# Patient Record
Sex: MALE | Race: WHITE | NOT HISPANIC OR LATINO | Employment: OTHER | URBAN - NONMETROPOLITAN AREA
[De-identification: names, ages, dates, MRNs, and addresses within clinical notes are randomized per-mention and may not be internally consistent; named-entity substitution may affect disease eponyms.]

---

## 2017-01-30 ENCOUNTER — OFFICE VISIT (OUTPATIENT)
Dept: GASTROENTEROLOGY | Facility: CLINIC | Age: 47
End: 2017-01-30

## 2017-01-30 VITALS
SYSTOLIC BLOOD PRESSURE: 161 MMHG | WEIGHT: 173.6 LBS | HEART RATE: 103 BPM | BODY MASS INDEX: 27.9 KG/M2 | OXYGEN SATURATION: 98 % | HEIGHT: 66 IN | DIASTOLIC BLOOD PRESSURE: 92 MMHG

## 2017-01-30 PROCEDURE — 99024 POSTOP FOLLOW-UP VISIT: CPT | Performed by: INTERNAL MEDICINE

## 2017-01-30 RX ORDER — CIMETIDINE 800 MG
800 TABLET ORAL 3 TIMES DAILY
COMMUNITY
End: 2020-12-08

## 2017-01-30 RX ORDER — HYDROCHLOROTHIAZIDE 25 MG/1
25 TABLET ORAL DAILY
COMMUNITY
End: 2021-09-30

## 2017-01-30 RX ORDER — BACLOFEN 20 MG/1
10-20 TABLET ORAL 3 TIMES DAILY PRN
COMMUNITY
End: 2023-01-05 | Stop reason: HOSPADM

## 2017-01-30 RX ORDER — OMEPRAZOLE 40 MG/1
40 CAPSULE, DELAYED RELEASE ORAL DAILY
Status: ON HOLD | COMMUNITY
End: 2017-11-03

## 2017-01-30 RX ORDER — QUETIAPINE FUMARATE 25 MG/1
25 TABLET, FILM COATED ORAL NIGHTLY
Status: ON HOLD | COMMUNITY
End: 2017-04-21

## 2017-04-04 ENCOUNTER — HOSPITAL ENCOUNTER (EMERGENCY)
Facility: HOSPITAL | Age: 47
Discharge: HOME | End: 2017-04-04
Admitting: FAMILY MEDICINE

## 2017-04-04 ENCOUNTER — APPOINTMENT (OUTPATIENT)
Dept: CT IMAGING | Facility: HOSPITAL | Age: 47
End: 2017-04-04

## 2017-04-04 VITALS
BODY MASS INDEX: 27.32 KG/M2 | WEIGHT: 170 LBS | TEMPERATURE: 97.6 F | SYSTOLIC BLOOD PRESSURE: 128 MMHG | OXYGEN SATURATION: 99 % | HEART RATE: 104 BPM | DIASTOLIC BLOOD PRESSURE: 79 MMHG | HEIGHT: 66 IN | RESPIRATION RATE: 18 BRPM

## 2017-04-04 DIAGNOSIS — K29.80 ACUTE DUODENITIS: Primary | ICD-10-CM

## 2017-04-04 LAB
ALBUMIN SERPL-MCNC: 4.4 G/DL (ref 3.5–5)
ALBUMIN/GLOB SERPL: 1.8 G/DL (ref 1.5–2.5)
ALP SERPL-CCNC: 72 U/L (ref 40–129)
ALT SERPL W P-5'-P-CCNC: 11 U/L (ref 10–44)
AMPHET+METHAMPHET UR QL: NEGATIVE
AMYLASE SERPL-CCNC: 38 U/L (ref 28–100)
ANION GAP SERPL CALCULATED.3IONS-SCNC: 6.4 MMOL/L (ref 3.6–11.2)
AST SERPL-CCNC: 17 U/L (ref 10–34)
BACTERIA UR QL AUTO: ABNORMAL /HPF
BARBITURATES UR QL SCN: NEGATIVE
BASOPHILS # BLD AUTO: 0.02 10*3/MM3 (ref 0–0.3)
BASOPHILS NFR BLD AUTO: 0.2 % (ref 0–2)
BENZODIAZ UR QL SCN: NEGATIVE
BILIRUB SERPL-MCNC: 0.3 MG/DL (ref 0.2–1.8)
BILIRUB UR QL STRIP: NEGATIVE
BUN BLD-MCNC: <5 MG/DL (ref 7–21)
BUN/CREAT SERPL: ABNORMAL (ref 7–25)
CALCIUM SPEC-SCNC: 9.3 MG/DL (ref 7.7–10)
CANNABINOIDS SERPL QL: NEGATIVE
CHLORIDE SERPL-SCNC: 100 MMOL/L (ref 99–112)
CLARITY UR: ABNORMAL
CO2 SERPL-SCNC: 26.6 MMOL/L (ref 24.3–31.9)
COCAINE UR QL: NEGATIVE
COLOR UR: YELLOW
CREAT BLD-MCNC: 0.8 MG/DL (ref 0.43–1.29)
DEPRECATED RDW RBC AUTO: 40.2 FL (ref 37–54)
EOSINOPHIL # BLD AUTO: 0.03 10*3/MM3 (ref 0–0.7)
EOSINOPHIL NFR BLD AUTO: 0.2 % (ref 0–5)
ERYTHROCYTE [DISTWIDTH] IN BLOOD BY AUTOMATED COUNT: 13.1 % (ref 11.5–14.5)
GFR SERPL CREATININE-BSD FRML MDRD: 103 ML/MIN/1.73
GLOBULIN UR ELPH-MCNC: 2.4 GM/DL
GLUCOSE BLD-MCNC: 121 MG/DL (ref 70–110)
GLUCOSE UR STRIP-MCNC: NEGATIVE MG/DL
HCT VFR BLD AUTO: 39.8 % (ref 42–52)
HGB BLD-MCNC: 13.4 G/DL (ref 14–18)
HGB UR QL STRIP.AUTO: NEGATIVE
HYALINE CASTS UR QL AUTO: ABNORMAL /LPF
IMM GRANULOCYTES # BLD: 0.01 10*3/MM3 (ref 0–0.03)
IMM GRANULOCYTES NFR BLD: 0.1 % (ref 0–0.5)
KETONES UR QL STRIP: NEGATIVE
LEUKOCYTE ESTERASE UR QL STRIP.AUTO: ABNORMAL
LIPASE SERPL-CCNC: 32 U/L (ref 13–60)
LYMPHOCYTES # BLD AUTO: 1.11 10*3/MM3 (ref 1–3)
LYMPHOCYTES NFR BLD AUTO: 8.3 % (ref 21–51)
MCH RBC QN AUTO: 28.9 PG (ref 27–33)
MCHC RBC AUTO-ENTMCNC: 33.7 G/DL (ref 33–37)
MCV RBC AUTO: 86 FL (ref 80–94)
METHADONE UR QL SCN: NEGATIVE
MONOCYTES # BLD AUTO: 0.8 10*3/MM3 (ref 0.1–0.9)
MONOCYTES NFR BLD AUTO: 6 % (ref 0–10)
NEUTROPHILS # BLD AUTO: 11.35 10*3/MM3 (ref 1.4–6.5)
NEUTROPHILS NFR BLD AUTO: 85.2 % (ref 30–70)
NITRITE UR QL STRIP: NEGATIVE
OPIATES UR QL: NEGATIVE
OSMOLALITY SERPL CALC.SUM OF ELEC: NORMAL MOSM/KG (ref 273–305)
OXYCODONE UR QL SCN: NEGATIVE
PCP UR QL SCN: NEGATIVE
PH UR STRIP.AUTO: 7.5 [PH] (ref 5–8)
PLATELET # BLD AUTO: 248 10*3/MM3 (ref 130–400)
PMV BLD AUTO: 9.6 FL (ref 6–10)
POTASSIUM BLD-SCNC: 3.2 MMOL/L (ref 3.5–5.3)
PROPOXYPH UR QL: NEGATIVE
PROT SERPL-MCNC: 6.8 G/DL (ref 6–8)
PROT UR QL STRIP: NEGATIVE
RBC # BLD AUTO: 4.63 10*6/MM3 (ref 4.7–6.1)
RBC # UR: ABNORMAL /HPF
REF LAB TEST METHOD: ABNORMAL
SODIUM BLD-SCNC: 133 MMOL/L (ref 135–153)
SP GR UR STRIP: 1.02 (ref 1–1.03)
SQUAMOUS #/AREA URNS HPF: ABNORMAL /HPF
UROBILINOGEN UR QL STRIP: ABNORMAL
WBC NRBC COR # BLD: 13.32 10*3/MM3 (ref 4.5–12.5)
WBC UR QL AUTO: ABNORMAL /HPF

## 2017-04-04 PROCEDURE — 80307 DRUG TEST PRSMV CHEM ANLYZR: CPT | Performed by: PHYSICIAN ASSISTANT

## 2017-04-04 PROCEDURE — 25010000002 ONDANSETRON PER 1 MG: Performed by: PHYSICIAN ASSISTANT

## 2017-04-04 PROCEDURE — 83690 ASSAY OF LIPASE: CPT | Performed by: PHYSICIAN ASSISTANT

## 2017-04-04 PROCEDURE — 25010000002 HYDROMORPHONE PER 4 MG: Performed by: FAMILY MEDICINE

## 2017-04-04 PROCEDURE — 25010000002 ONDANSETRON PER 1 MG

## 2017-04-04 PROCEDURE — 80053 COMPREHEN METABOLIC PANEL: CPT | Performed by: PHYSICIAN ASSISTANT

## 2017-04-04 PROCEDURE — 74176 CT ABD & PELVIS W/O CONTRAST: CPT | Performed by: RADIOLOGY

## 2017-04-04 PROCEDURE — 96375 TX/PRO/DX INJ NEW DRUG ADDON: CPT

## 2017-04-04 PROCEDURE — 74176 CT ABD & PELVIS W/O CONTRAST: CPT

## 2017-04-04 PROCEDURE — 96365 THER/PROPH/DIAG IV INF INIT: CPT

## 2017-04-04 PROCEDURE — 81001 URINALYSIS AUTO W/SCOPE: CPT | Performed by: PHYSICIAN ASSISTANT

## 2017-04-04 PROCEDURE — 82150 ASSAY OF AMYLASE: CPT | Performed by: PHYSICIAN ASSISTANT

## 2017-04-04 PROCEDURE — 25010000002 PROMETHAZINE PER 50 MG: Performed by: EMERGENCY MEDICINE

## 2017-04-04 PROCEDURE — 96376 TX/PRO/DX INJ SAME DRUG ADON: CPT

## 2017-04-04 PROCEDURE — 96361 HYDRATE IV INFUSION ADD-ON: CPT

## 2017-04-04 PROCEDURE — 99284 EMERGENCY DEPT VISIT MOD MDM: CPT

## 2017-04-04 PROCEDURE — 25010000002 DIPHENHYDRAMINE PER 50 MG: Performed by: PHYSICIAN ASSISTANT

## 2017-04-04 PROCEDURE — 85025 COMPLETE CBC W/AUTO DIFF WBC: CPT | Performed by: PHYSICIAN ASSISTANT

## 2017-04-04 RX ORDER — ONDANSETRON 2 MG/ML
4 INJECTION INTRAMUSCULAR; INTRAVENOUS ONCE
Status: COMPLETED | OUTPATIENT
Start: 2017-04-04 | End: 2017-04-04

## 2017-04-04 RX ORDER — ONDANSETRON 4 MG/1
4 TABLET, ORALLY DISINTEGRATING ORAL 4 TIMES DAILY
Qty: 15 TABLET | Refills: 0 | Status: ON HOLD | OUTPATIENT
Start: 2017-04-04 | End: 2017-04-21

## 2017-04-04 RX ORDER — CIPROFLOXACIN 500 MG/1
500 TABLET, FILM COATED ORAL 2 TIMES DAILY
Qty: 20 TABLET | Refills: 0 | Status: SHIPPED | OUTPATIENT
Start: 2017-04-04 | End: 2017-04-18

## 2017-04-04 RX ORDER — ONDANSETRON 2 MG/ML
INJECTION INTRAMUSCULAR; INTRAVENOUS
Status: COMPLETED
Start: 2017-04-04 | End: 2017-04-04

## 2017-04-04 RX ORDER — PROMETHAZINE HYDROCHLORIDE 25 MG/1
25 SUPPOSITORY RECTAL EVERY 6 HOURS PRN
Qty: 12 SUPPOSITORY | Refills: 0 | Status: ON HOLD | OUTPATIENT
Start: 2017-04-04 | End: 2017-04-21

## 2017-04-04 RX ORDER — METRONIDAZOLE 500 MG/1
500 TABLET ORAL 3 TIMES DAILY
Qty: 30 TABLET | Refills: 0 | Status: ON HOLD | OUTPATIENT
Start: 2017-04-04 | End: 2017-04-21

## 2017-04-04 RX ORDER — DIPHENHYDRAMINE HYDROCHLORIDE 50 MG/ML
25 INJECTION INTRAMUSCULAR; INTRAVENOUS ONCE
Status: COMPLETED | OUTPATIENT
Start: 2017-04-04 | End: 2017-04-04

## 2017-04-04 RX ORDER — SODIUM CHLORIDE 0.9 % (FLUSH) 0.9 %
10 SYRINGE (ML) INJECTION AS NEEDED
Status: DISCONTINUED | OUTPATIENT
Start: 2017-04-04 | End: 2017-04-05 | Stop reason: HOSPADM

## 2017-04-04 RX ADMIN — ONDANSETRON 4 MG: 2 INJECTION INTRAMUSCULAR; INTRAVENOUS at 21:21

## 2017-04-04 RX ADMIN — ONDANSETRON 4 MG: 2 INJECTION INTRAMUSCULAR; INTRAVENOUS at 22:53

## 2017-04-04 RX ADMIN — DIPHENHYDRAMINE HYDROCHLORIDE 25 MG: 50 INJECTION INTRAMUSCULAR; INTRAVENOUS at 21:58

## 2017-04-04 RX ADMIN — PROMETHAZINE HYDROCHLORIDE 12.5 MG: 25 INJECTION INTRAMUSCULAR; INTRAVENOUS at 22:56

## 2017-04-04 RX ADMIN — HYDROMORPHONE HYDROCHLORIDE 1 MG: 1 INJECTION, SOLUTION INTRAMUSCULAR; INTRAVENOUS; SUBCUTANEOUS at 21:23

## 2017-04-04 RX ADMIN — SODIUM CHLORIDE 1000 ML: 9 INJECTION, SOLUTION INTRAVENOUS at 21:25

## 2017-04-05 ENCOUNTER — HOSPITAL ENCOUNTER (OUTPATIENT)
Dept: ULTRASOUND IMAGING | Facility: HOSPITAL | Age: 47
Discharge: HOME | End: 2017-04-05
Admitting: FAMILY MEDICINE

## 2017-04-05 ENCOUNTER — HOSPITAL ENCOUNTER (OUTPATIENT)
Dept: ULTRASOUND IMAGING | Facility: HOSPITAL | Age: 47
Discharge: HOME | End: 2017-04-05

## 2017-04-05 ENCOUNTER — TRANSCRIBE ORDERS (OUTPATIENT)
Dept: ADMINISTRATIVE | Facility: HOSPITAL | Age: 47
End: 2017-04-05

## 2017-04-05 DIAGNOSIS — R10.9 ACUTE ABDOMINAL PAIN: Primary | ICD-10-CM

## 2017-04-05 DIAGNOSIS — R10.9 ACUTE ABDOMINAL PAIN: ICD-10-CM

## 2017-04-05 PROCEDURE — 76705 ECHO EXAM OF ABDOMEN: CPT

## 2017-04-05 PROCEDURE — 76705 ECHO EXAM OF ABDOMEN: CPT | Performed by: RADIOLOGY

## 2017-04-05 NOTE — ED NOTES
CT scan changed to with out contrast due to patients hx of allergy to contrast dye.      Rupa Robert, RN  04/04/17 8182

## 2017-04-05 NOTE — ED PROVIDER NOTES
Subjective   History of Present Illness    Review of Systems    Past Medical History:   Diagnosis Date   • Barretts esophagus    • Degenerative disc disease, lumbar    • GERD (gastroesophageal reflux disease)    • Hypertension    • Peptic ulcer disease        No Known Allergies    Past Surgical History:   Procedure Laterality Date   • APPENDECTOMY     • ENDOSCOPY         Family History   Problem Relation Age of Onset   • Cancer Other    • Hypertension Other    • Stroke Other    • Diabetes Other        Social History     Social History   • Marital status:      Spouse name: N/A   • Number of children: N/A   • Years of education: N/A     Social History Main Topics   • Smoking status: Current Every Day Smoker     Packs/day: 1.00     Years: 35.00     Types: Cigarettes   • Smokeless tobacco: Never Used   • Alcohol use No   • Drug use: No   • Sexual activity: Defer     Other Topics Concern   • None     Social History Narrative   • None           Objective   Physical Exam    Procedures         ED Course  ED Course                  MDM    Final diagnoses:   None

## 2017-04-05 NOTE — ED NOTES
PT STATES THAT HE HAS BEEN HAVING ABD PAIN NAUSEA AND DIARRHEA OFF AND ON FOR THE PAST SEVERAL MONTHS PT STATES THAT THE PAIN HAS GOT WORSE AND IS DIFFERENT THAN WHAT IT HAS BEEN IN THE PAST WITH HIS PUD PT STATES THAT HE HAS LOST 40LBS IN THE PAST YEAR UNINTENTIONALLY      Livia Gomes RN  04/04/17 0997

## 2017-04-05 NOTE — ED PROVIDER NOTES
Subjective   Patient is a 46 y.o. male presenting with abdominal pain.   History provided by:  Patient   used: No    Abdominal Pain   Pain location:  RUQ  Pain quality: cramping and fullness    Pain quality: not dull and not shooting    Pain radiates to:  Does not radiate  Pain severity:  Severe  Timing:  Constant  Progression:  Worsening  Chronicity:  Recurrent  Context: eating    Context: not alcohol use, not diet changes and not sick contacts    Relieved by:  Nothing  Worsened by:  Eating  Ineffective treatments:  Antacids  Associated symptoms: diarrhea and nausea    Associated symptoms: no chest pain, no chills, no constipation, no dysuria, no fever, no hematuria, no melena and no vomiting    Diarrhea:     Quality:  Semi-solid    Severity:  Moderate    Progression:  Unchanged  Risk factors: no alcohol abuse, no aspirin use and no NSAID use        Review of Systems   Constitutional: Negative.  Negative for chills and fever.   HENT: Negative.    Respiratory: Negative.    Cardiovascular: Negative.  Negative for chest pain.   Gastrointestinal: Positive for abdominal pain, diarrhea and nausea. Negative for constipation, melena and vomiting.   Endocrine: Negative.    Genitourinary: Negative.  Negative for dysuria and hematuria.   Skin: Negative.    Neurological: Negative.    Psychiatric/Behavioral: Negative.    All other systems reviewed and are negative.      Past Medical History:   Diagnosis Date   • Barretts esophagus    • Degenerative disc disease, lumbar    • GERD (gastroesophageal reflux disease)    • Hypertension    • Peptic ulcer disease        No Known Allergies    Past Surgical History:   Procedure Laterality Date   • APPENDECTOMY     • ENDOSCOPY         Family History   Problem Relation Age of Onset   • Cancer Other    • Hypertension Other    • Stroke Other    • Diabetes Other        Social History     Social History   • Marital status:      Spouse name: N/A   • Number of  children: N/A   • Years of education: N/A     Social History Main Topics   • Smoking status: Current Every Day Smoker     Packs/day: 1.00     Years: 35.00     Types: Cigarettes   • Smokeless tobacco: Never Used   • Alcohol use No   • Drug use: No   • Sexual activity: Defer     Other Topics Concern   • None     Social History Narrative   • None           Objective   Physical Exam   Constitutional: He is oriented to person, place, and time. He appears well-developed and well-nourished. No distress.   HENT:   Head: Normocephalic and atraumatic.   Right Ear: External ear normal.   Left Ear: External ear normal.   Nose: Nose normal.   Eyes: Conjunctivae and EOM are normal. Pupils are equal, round, and reactive to light.   Neck: Normal range of motion. Neck supple. No JVD present. No tracheal deviation present.   Cardiovascular: Normal rate, regular rhythm and normal heart sounds.    No murmur heard.  Pulmonary/Chest: Effort normal and breath sounds normal. No respiratory distress. He has no wheezes.   Abdominal: Soft. Normal appearance and bowel sounds are normal. There is tenderness in the right upper quadrant. There is no CVA tenderness, no tenderness at McBurney's point and negative Bates's sign.   Musculoskeletal: Normal range of motion. He exhibits no edema or deformity.   Neurological: He is alert and oriented to person, place, and time. No cranial nerve deficit.   Skin: Skin is warm and dry. No rash noted. He is not diaphoretic. No erythema. No pallor.   Psychiatric: He has a normal mood and affect. His behavior is normal. Thought content normal.   Nursing note and vitals reviewed.      Procedures         ED Course  ED Course                  MDM    Final diagnoses:   Acute duodenitis            Jing Guzman PA-C  04/05/17 0000

## 2017-04-06 ENCOUNTER — OFFICE VISIT (OUTPATIENT)
Dept: GASTROENTEROLOGY | Facility: CLINIC | Age: 47
End: 2017-04-06

## 2017-04-06 VITALS
HEIGHT: 66 IN | SYSTOLIC BLOOD PRESSURE: 154 MMHG | HEART RATE: 96 BPM | BODY MASS INDEX: 28.06 KG/M2 | WEIGHT: 174.6 LBS | DIASTOLIC BLOOD PRESSURE: 90 MMHG | OXYGEN SATURATION: 99 %

## 2017-04-06 DIAGNOSIS — R10.13 EPIGASTRIC PAIN: Primary | ICD-10-CM

## 2017-04-06 DIAGNOSIS — K27.9 PUD (PEPTIC ULCER DISEASE): ICD-10-CM

## 2017-04-06 DIAGNOSIS — K22.70 BARRETT'S ESOPHAGUS WITHOUT DYSPLASIA: ICD-10-CM

## 2017-04-06 DIAGNOSIS — R63.4 WEIGHT LOSS: ICD-10-CM

## 2017-04-06 DIAGNOSIS — Z12.11 COLON CANCER SCREENING: ICD-10-CM

## 2017-04-06 DIAGNOSIS — R19.7 DIARRHEA, UNSPECIFIED TYPE: ICD-10-CM

## 2017-04-06 DIAGNOSIS — K21.9 GASTROESOPHAGEAL REFLUX DISEASE, ESOPHAGITIS PRESENCE NOT SPECIFIED: ICD-10-CM

## 2017-04-06 PROCEDURE — 99204 OFFICE O/P NEW MOD 45 MIN: CPT | Performed by: INTERNAL MEDICINE

## 2017-04-06 NOTE — PROGRESS NOTES
Chief Complaint   Patient presents with   • Peptic Ulcer Disease   • GI Problem     Johnson's esophagus     Jamison Edward is a 46 y.o. male who presents to the office today at the request of Dr. Kenton Cerda for Peptic Ulcer Disease and GI Problem (Johnson's esophagus).    HPI  46-year-old white male presents for second opinion.  Former patient of Dr. Starr.  Patient presents with long history of GI symptoms.  Over the past several weeks he has experienced recurrent severe epigastric pain associated with intermittent nausea/vomiting.  He cannot identify consistent precipitating or palliating factors.  He reports recent recurrent diarrhea without overt rectal bleeding.  He reports gradual 35 pound weight loss (unintentional).  He has history of PUD and Johnson's esophagus.  He is currently taking Prilosec 40 mg daily and Tagamet 800 mg 3 times a day.  He takes aspirin infrequently.  EGD was last performed about 6 months ago.  He has not previously undergone colonoscopy.  Recent lab and imaging studies were reviewed.  Abdominal CT and ultrasound examinations showed no cause for his abdominal pain.  Family history is negative for GI disease.      Review of Systems   Constitutional: Negative.    HENT: Negative.    Eyes: Negative.    Respiratory: Negative.    Cardiovascular: Negative.    Gastrointestinal: Positive for abdominal pain, diarrhea, nausea and vomiting. Negative for abdominal distention, anal bleeding, blood in stool, constipation and rectal pain.   Endocrine: Negative.    Genitourinary: Negative.    Musculoskeletal: Positive for arthralgias, back pain and myalgias.   Skin: Negative.    Allergic/Immunologic: Negative.    Neurological: Negative.    Hematological: Negative.    Psychiatric/Behavioral: Negative.        ACTIVE PROBLEMS:   Specialty Problems     None          PAST MEDICAL HISTORY:  Past Medical History:   Diagnosis Date   • Johnson esophagus    • Barretts esophagus    • Degenerative disc disease,  lumbar    • GERD (gastroesophageal reflux disease)    • Hypertension    • Peptic ulcer disease        SURGICAL HISTORY:  Past Surgical History:   Procedure Laterality Date   • APPENDECTOMY     • ENDOSCOPY         FAMILY HISTORY:  Family History   Problem Relation Age of Onset   • Cancer Other    • Hypertension Other    • Stroke Other    • Diabetes Other        SOCIAL HISTORY:  Social History   Substance Use Topics   • Smoking status: Current Every Day Smoker     Packs/day: 1.00     Years: 35.00     Types: Cigarettes   • Smokeless tobacco: Never Used   • Alcohol use No       CURRENT MEDICATION:    Current Outpatient Prescriptions:   •  baclofen (LIORESAL) 10 MG tablet, Take 10 mg by mouth 2 (Two) Times a Day., Disp: , Rfl:   •  cimetidine (TAGAMET) 800 MG tablet, Take 800 mg by mouth 3 (Three) Times a Day., Disp: , Rfl:   •  ciprofloxacin (CIPRO) 500 MG tablet, Take 1 tablet by mouth 2 (Two) Times a Day., Disp: 20 tablet, Rfl: 0  •  hydrochlorothiazide (HYDRODIURIL) 25 MG tablet, Take 25 mg by mouth Daily., Disp: , Rfl:   •  metroNIDAZOLE (FLAGYL) 500 MG tablet, Take 1 tablet by mouth 3 (Three) Times a Day., Disp: 30 tablet, Rfl: 0  •  omeprazole (priLOSEC) 40 MG capsule, Take 40 mg by mouth Daily., Disp: , Rfl:   •  ondansetron ODT (ZOFRAN-ODT) 4 MG disintegrating tablet, Take 1 tablet by mouth 4 (Four) Times a Day., Disp: 15 tablet, Rfl: 0  •  promethazine (PHENERGAN) 25 MG suppository, Insert 1 suppository into the rectum Every 6 (Six) Hours As Needed for Nausea or Vomiting., Disp: 12 suppository, Rfl: 0  •  QUEtiapine (SEROquel) 25 MG tablet, Take 25 mg by mouth Every Night., Disp: , Rfl:   •  polyethylene glycol (GoLYTELY) 236 G solution, Starting at 6 pm on day prior to procedure, drink 8 ounces every 15 minutes until all gone or stools are clear. May add flavor packet., Disp: 4000 mL, Rfl: 0    ALLERGIES:  Review of patient's allergies indicates no known allergies.    VISIT VITALS:  /90  Pulse 96  Ht  "66\" (167.6 cm)  Wt 174 lb 9.6 oz (79.2 kg)  SpO2 99%  BMI 28.18 kg/m2    PHYSICAL EXAMINATION:  Physical Exam   Constitutional: He is oriented to person, place, and time. He appears well-developed and well-nourished.   HENT:   Head: Normocephalic and atraumatic.   Eyes: Conjunctivae and EOM are normal. Pupils are equal, round, and reactive to light.   Neck: Normal range of motion. Neck supple.   Cardiovascular: Normal rate, regular rhythm and normal heart sounds.    Pulmonary/Chest: Effort normal and breath sounds normal.   Abdominal: Soft. Bowel sounds are normal.   Musculoskeletal: Normal range of motion.   Neurological: He is alert and oriented to person, place, and time. He has normal reflexes.   Skin: Skin is warm and dry.   Psychiatric: He has a normal mood and affect. His behavior is normal.   Nursing note and vitals reviewed.      Assessment/Plan      Diagnosis Plan   1. Epigastric pain  Case request    NM HIDA Scan With Pharmacological Intervention   2. Weight loss  Case request   3. Diarrhea, unspecified type  Case request   4. Colon cancer screening  Case request   5. Gastroesophageal reflux disease, esophagitis presence not specified     6. Johnson's esophagus without dysplasia     7. PUD (peptic ulcer disease)       REC  I recommended that he undergo EGD and screening colonoscopy for further evaluation.  The procedures, benefits, risks and alternatives were explained to the patient.  HIDA scan was requested.  I have recommended no changes in his medical treatment at this time.    Return if symptoms worsen or fail to improve.           Nicho Richey III, MD  "

## 2017-04-14 ENCOUNTER — TELEPHONE (OUTPATIENT)
Dept: GASTROENTEROLOGY | Facility: CLINIC | Age: 47
End: 2017-04-14

## 2017-04-14 ENCOUNTER — HOSPITAL ENCOUNTER (OUTPATIENT)
Dept: NUCLEAR MEDICINE | Facility: HOSPITAL | Age: 47
Discharge: HOME | End: 2017-04-14
Attending: INTERNAL MEDICINE

## 2017-04-14 DIAGNOSIS — R94.8 ABNORMAL BILIARY HIDA SCAN: ICD-10-CM

## 2017-04-14 DIAGNOSIS — R10.9 ABDOMINAL PAIN, UNSPECIFIED LOCATION: Primary | ICD-10-CM

## 2017-04-14 PROCEDURE — 78227 HEPATOBIL SYST IMAGE W/DRUG: CPT | Performed by: RADIOLOGY

## 2017-04-14 PROCEDURE — 25010000002 SINCALIDE PER 5 MCG: Performed by: INTERNAL MEDICINE

## 2017-04-14 PROCEDURE — A9537 TC99M MEBROFENIN: HCPCS | Performed by: INTERNAL MEDICINE

## 2017-04-14 PROCEDURE — 0 TECHNETIUM TC 99M MEBROFENIN KIT: Performed by: INTERNAL MEDICINE

## 2017-04-14 PROCEDURE — 78227 HEPATOBIL SYST IMAGE W/DRUG: CPT

## 2017-04-14 RX ORDER — KIT FOR THE PREPARATION OF TECHNETIUM TC 99M MEBROFENIN 45 MG/10ML
1 INJECTION, POWDER, LYOPHILIZED, FOR SOLUTION INTRAVENOUS
Status: COMPLETED | OUTPATIENT
Start: 2017-04-14 | End: 2017-04-14

## 2017-04-14 RX ADMIN — MEBROFENIN 1 DOSE: 45 INJECTION, POWDER, LYOPHILIZED, FOR SOLUTION INTRAVENOUS at 10:30

## 2017-04-14 RX ADMIN — SINCALIDE 3 MCG: 5 INJECTION, POWDER, LYOPHILIZED, FOR SOLUTION INTRAVENOUS at 11:14

## 2017-04-14 NOTE — TELEPHONE ENCOUNTER
V/o to pt wife, she says he will get egd/colon on 4/24. And then he would either go to Dr Donaldson or Dr De León for surgery consult. sk

## 2017-04-14 NOTE — TELEPHONE ENCOUNTER
HIDA showed decreased GB ejection fraction.  Okay to arrange surgery referral if this is what he wants.  However, I recommend that he undergo both EGD and colonoscopy prior to considering any type of surgery.  NEFTALY

## 2017-04-14 NOTE — TELEPHONE ENCOUNTER
HIDA showed decreased GB ejection fraction.  Okay to arrange surgery referral if this is what he wants.  However, I recommend that he undergo both EGD and colonoscopy prior to considering any type of surgery.  NEFTALY NULL

## 2017-04-14 NOTE — TELEPHONE ENCOUNTER
Pt called to check on results of hida scan he had today. It says EF 27%, wife says he has a lot of abd pain and he has lost 10 # in 3 days.

## 2017-04-18 ENCOUNTER — APPOINTMENT (OUTPATIENT)
Dept: GENERAL RADIOLOGY | Facility: HOSPITAL | Age: 47
End: 2017-04-18

## 2017-04-18 ENCOUNTER — TELEPHONE (OUTPATIENT)
Dept: GASTROENTEROLOGY | Facility: CLINIC | Age: 47
End: 2017-04-18

## 2017-04-18 ENCOUNTER — APPOINTMENT (OUTPATIENT)
Dept: CT IMAGING | Facility: HOSPITAL | Age: 47
End: 2017-04-18

## 2017-04-18 ENCOUNTER — HOSPITAL ENCOUNTER (EMERGENCY)
Facility: HOSPITAL | Age: 47
Discharge: HOME | End: 2017-04-18
Attending: EMERGENCY MEDICINE | Admitting: EMERGENCY MEDICINE

## 2017-04-18 VITALS
SYSTOLIC BLOOD PRESSURE: 133 MMHG | TEMPERATURE: 98.4 F | WEIGHT: 160 LBS | DIASTOLIC BLOOD PRESSURE: 87 MMHG | OXYGEN SATURATION: 100 % | HEART RATE: 78 BPM | BODY MASS INDEX: 25.71 KG/M2 | HEIGHT: 66 IN | RESPIRATION RATE: 18 BRPM

## 2017-04-18 DIAGNOSIS — R10.11 RUQ ABDOMINAL PAIN: Primary | ICD-10-CM

## 2017-04-18 LAB
ALBUMIN SERPL-MCNC: 4.9 G/DL (ref 3.5–5)
ALBUMIN/GLOB SERPL: 1.6 G/DL (ref 1.5–2.5)
ALP SERPL-CCNC: 93 U/L (ref 40–129)
ALT SERPL W P-5'-P-CCNC: 9 U/L (ref 10–44)
ANION GAP SERPL CALCULATED.3IONS-SCNC: 7 MMOL/L (ref 3.6–11.2)
AST SERPL-CCNC: 13 U/L (ref 10–34)
BASOPHILS # BLD AUTO: 0.03 10*3/MM3 (ref 0–0.3)
BASOPHILS NFR BLD AUTO: 0.3 % (ref 0–2)
BILIRUB SERPL-MCNC: 0.2 MG/DL (ref 0.2–1.8)
BILIRUB UR QL STRIP: NEGATIVE
BUN BLD-MCNC: 6 MG/DL (ref 7–21)
BUN/CREAT SERPL: 5.9 (ref 7–25)
CALCIUM SPEC-SCNC: 10.1 MG/DL (ref 7.7–10)
CHLORIDE SERPL-SCNC: 104 MMOL/L (ref 99–112)
CLARITY UR: CLEAR
CO2 SERPL-SCNC: 28 MMOL/L (ref 24.3–31.9)
COLOR UR: YELLOW
CREAT BLD-MCNC: 1.01 MG/DL (ref 0.43–1.29)
DEPRECATED RDW RBC AUTO: 41.3 FL (ref 37–54)
EOSINOPHIL # BLD AUTO: 0.05 10*3/MM3 (ref 0–0.7)
EOSINOPHIL NFR BLD AUTO: 0.5 % (ref 0–5)
ERYTHROCYTE [DISTWIDTH] IN BLOOD BY AUTOMATED COUNT: 13.3 % (ref 11.5–14.5)
GFR SERPL CREATININE-BSD FRML MDRD: 79 ML/MIN/1.73
GLOBULIN UR ELPH-MCNC: 3.1 GM/DL
GLUCOSE BLD-MCNC: 116 MG/DL (ref 70–110)
GLUCOSE UR STRIP-MCNC: NEGATIVE MG/DL
HCT VFR BLD AUTO: 44.8 % (ref 42–52)
HGB BLD-MCNC: 15.2 G/DL (ref 14–18)
HGB UR QL STRIP.AUTO: NEGATIVE
HOLD SPECIMEN: NORMAL
HOLD SPECIMEN: NORMAL
IMM GRANULOCYTES # BLD: 0.02 10*3/MM3 (ref 0–0.03)
IMM GRANULOCYTES NFR BLD: 0.2 % (ref 0–0.5)
KETONES UR QL STRIP: NEGATIVE
LEUKOCYTE ESTERASE UR QL STRIP.AUTO: NEGATIVE
LIPASE SERPL-CCNC: 32 U/L (ref 13–60)
LYMPHOCYTES # BLD AUTO: 1.61 10*3/MM3 (ref 1–3)
LYMPHOCYTES NFR BLD AUTO: 15.1 % (ref 21–51)
MCH RBC QN AUTO: 28.8 PG (ref 27–33)
MCHC RBC AUTO-ENTMCNC: 33.9 G/DL (ref 33–37)
MCV RBC AUTO: 85 FL (ref 80–94)
MONOCYTES # BLD AUTO: 0.77 10*3/MM3 (ref 0.1–0.9)
MONOCYTES NFR BLD AUTO: 7.2 % (ref 0–10)
NEUTROPHILS # BLD AUTO: 8.16 10*3/MM3 (ref 1.4–6.5)
NEUTROPHILS NFR BLD AUTO: 76.7 % (ref 30–70)
NITRITE UR QL STRIP: NEGATIVE
OSMOLALITY SERPL CALC.SUM OF ELEC: 276.1 MOSM/KG (ref 273–305)
PH UR STRIP.AUTO: 7.5 [PH] (ref 5–8)
PLATELET # BLD AUTO: 326 10*3/MM3 (ref 130–400)
PMV BLD AUTO: 9.5 FL (ref 6–10)
POTASSIUM BLD-SCNC: 3.7 MMOL/L (ref 3.5–5.3)
PROT SERPL-MCNC: 8 G/DL (ref 6–8)
PROT UR QL STRIP: NEGATIVE
RBC # BLD AUTO: 5.27 10*6/MM3 (ref 4.7–6.1)
SODIUM BLD-SCNC: 139 MMOL/L (ref 135–153)
SP GR UR STRIP: 1.02 (ref 1–1.03)
TROPONIN I SERPL-MCNC: <0.006 NG/ML
UROBILINOGEN UR QL STRIP: NORMAL
WBC NRBC COR # BLD: 10.64 10*3/MM3 (ref 4.5–12.5)
WHOLE BLOOD HOLD SPECIMEN: NORMAL
WHOLE BLOOD HOLD SPECIMEN: NORMAL

## 2017-04-18 PROCEDURE — 71020 XR CHEST 2 VW: CPT | Performed by: RADIOLOGY

## 2017-04-18 PROCEDURE — 93010 ELECTROCARDIOGRAM REPORT: CPT | Performed by: INTERNAL MEDICINE

## 2017-04-18 PROCEDURE — 74176 CT ABD & PELVIS W/O CONTRAST: CPT | Performed by: RADIOLOGY

## 2017-04-18 RX ORDER — ONDANSETRON 2 MG/ML
4 INJECTION INTRAMUSCULAR; INTRAVENOUS ONCE
Status: COMPLETED | OUTPATIENT
Start: 2017-04-18 | End: 2017-04-18

## 2017-04-18 RX ORDER — SODIUM CHLORIDE 0.9 % (FLUSH) 0.9 %
10 SYRINGE (ML) INJECTION AS NEEDED
Status: DISCONTINUED | OUTPATIENT
Start: 2017-04-18 | End: 2017-04-18 | Stop reason: HOSPADM

## 2017-04-18 RX ORDER — SUCRALFATE 1 G/1
1 TABLET ORAL 4 TIMES DAILY
Qty: 40 TABLET | Refills: 0 | Status: SHIPPED | OUTPATIENT
Start: 2017-04-18 | End: 2020-12-08

## 2017-04-18 RX ADMIN — ONDANSETRON 4 MG: 2 INJECTION, SOLUTION INTRAMUSCULAR; INTRAVENOUS at 15:41

## 2017-04-18 RX ADMIN — SODIUM CHLORIDE 1000 ML: 9 INJECTION, SOLUTION INTRAVENOUS at 15:41

## 2017-04-18 RX ADMIN — HYDROMORPHONE HYDROCHLORIDE 1 MG: 1 INJECTION, SOLUTION INTRAMUSCULAR; INTRAVENOUS; SUBCUTANEOUS at 15:42

## 2017-04-18 NOTE — TELEPHONE ENCOUNTER
I do not prescribe pain meds.  He should go to the ER if his pain becomes intolerable.  Thank you.  NEFTALY

## 2017-04-18 NOTE — TELEPHONE ENCOUNTER
Pt c/o having bad abd pain, wants to know if dr mcmahon can give him something for pain,?  His appt with surgeon is not until the 27th. sk

## 2017-04-21 ENCOUNTER — HOSPITAL ENCOUNTER (INPATIENT)
Facility: HOSPITAL | Age: 47
LOS: 2 days | Discharge: HOME | End: 2017-04-23
Attending: FAMILY MEDICINE | Admitting: FAMILY MEDICINE

## 2017-04-21 DIAGNOSIS — R94.8 ABNORMAL BILIARY HIDA SCAN: Primary | ICD-10-CM

## 2017-04-21 PROBLEM — R10.11 ABDOMINAL PAIN, RIGHT UPPER QUADRANT: Status: ACTIVE | Noted: 2017-04-21

## 2017-04-21 LAB
ANION GAP SERPL CALCULATED.3IONS-SCNC: 6.6 MMOL/L (ref 3.6–11.2)
BASOPHILS # BLD AUTO: 0.02 10*3/MM3 (ref 0–0.3)
BASOPHILS NFR BLD AUTO: 0.2 % (ref 0–2)
BUN BLD-MCNC: 5 MG/DL (ref 7–21)
BUN/CREAT SERPL: 5.2 (ref 7–25)
CALCIUM SPEC-SCNC: 9.8 MG/DL (ref 7.7–10)
CHLORIDE SERPL-SCNC: 100 MMOL/L (ref 99–112)
CO2 SERPL-SCNC: 29.4 MMOL/L (ref 24.3–31.9)
CREAT BLD-MCNC: 0.96 MG/DL (ref 0.43–1.29)
DEPRECATED RDW RBC AUTO: 42.6 FL (ref 37–54)
EOSINOPHIL # BLD AUTO: 0.03 10*3/MM3 (ref 0–0.7)
EOSINOPHIL NFR BLD AUTO: 0.3 % (ref 0–5)
ERYTHROCYTE [DISTWIDTH] IN BLOOD BY AUTOMATED COUNT: 13.7 % (ref 11.5–14.5)
GFR SERPL CREATININE-BSD FRML MDRD: 84 ML/MIN/1.73
GLUCOSE BLD-MCNC: 99 MG/DL (ref 70–110)
HCT VFR BLD AUTO: 40.6 % (ref 42–52)
HGB BLD-MCNC: 13.6 G/DL (ref 14–18)
IMM GRANULOCYTES # BLD: 0.03 10*3/MM3 (ref 0–0.03)
IMM GRANULOCYTES NFR BLD: 0.3 % (ref 0–0.5)
LYMPHOCYTES # BLD AUTO: 1.18 10*3/MM3 (ref 1–3)
LYMPHOCYTES NFR BLD AUTO: 12.9 % (ref 21–51)
MCH RBC QN AUTO: 28.7 PG (ref 27–33)
MCHC RBC AUTO-ENTMCNC: 33.5 G/DL (ref 33–37)
MCV RBC AUTO: 85.7 FL (ref 80–94)
MONOCYTES # BLD AUTO: 0.63 10*3/MM3 (ref 0.1–0.9)
MONOCYTES NFR BLD AUTO: 6.9 % (ref 0–10)
NEUTROPHILS # BLD AUTO: 7.23 10*3/MM3 (ref 1.4–6.5)
NEUTROPHILS NFR BLD AUTO: 79.4 % (ref 30–70)
OSMOLALITY SERPL CALC.SUM OF ELEC: 269.2 MOSM/KG (ref 273–305)
PLATELET # BLD AUTO: 258 10*3/MM3 (ref 130–400)
PMV BLD AUTO: 10.4 FL (ref 6–10)
POTASSIUM BLD-SCNC: 3.7 MMOL/L (ref 3.5–5.3)
RBC # BLD AUTO: 4.74 10*6/MM3 (ref 4.7–6.1)
SODIUM BLD-SCNC: 136 MMOL/L (ref 135–153)
WBC NRBC COR # BLD: 9.12 10*3/MM3 (ref 4.5–12.5)

## 2017-04-21 PROCEDURE — 85025 COMPLETE CBC W/AUTO DIFF WBC: CPT | Performed by: FAMILY MEDICINE

## 2017-04-21 PROCEDURE — 25010000002 HYDROMORPHONE PER 4 MG

## 2017-04-21 PROCEDURE — C1751 CATH, INF, PER/CENT/MIDLINE: HCPCS

## 2017-04-21 PROCEDURE — 25010000002 HEPARIN (PORCINE) PER 1000 UNITS: Performed by: FAMILY MEDICINE

## 2017-04-21 PROCEDURE — 99221 1ST HOSP IP/OBS SF/LOW 40: CPT | Performed by: SURGERY

## 2017-04-21 PROCEDURE — 25010000002 HYDROMORPHONE PER 4 MG: Performed by: FAMILY MEDICINE

## 2017-04-21 PROCEDURE — 25010000002 PROMETHAZINE PER 50 MG: Performed by: FAMILY MEDICINE

## 2017-04-21 PROCEDURE — 80048 BASIC METABOLIC PNL TOTAL CA: CPT | Performed by: FAMILY MEDICINE

## 2017-04-21 RX ORDER — ASPIRIN 81 MG/1
81 TABLET ORAL DAILY
Status: DISCONTINUED | OUTPATIENT
Start: 2017-04-22 | End: 2017-04-23 | Stop reason: HOSPADM

## 2017-04-21 RX ORDER — PROMETHAZINE HYDROCHLORIDE 25 MG/1
25 TABLET ORAL 4 TIMES DAILY
Status: CANCELLED | OUTPATIENT
Start: 2017-04-21

## 2017-04-21 RX ORDER — PANTOPRAZOLE SODIUM 40 MG/1
40 TABLET, DELAYED RELEASE ORAL EVERY MORNING
Status: DISCONTINUED | OUTPATIENT
Start: 2017-04-21 | End: 2017-04-23 | Stop reason: HOSPADM

## 2017-04-21 RX ORDER — SODIUM CHLORIDE 0.9 % (FLUSH) 0.9 %
10 SYRINGE (ML) INJECTION EVERY 12 HOURS SCHEDULED
Status: DISCONTINUED | OUTPATIENT
Start: 2017-04-21 | End: 2017-04-23 | Stop reason: HOSPADM

## 2017-04-21 RX ORDER — BACLOFEN 10 MG/1
10 TABLET ORAL EVERY 12 HOURS SCHEDULED
Status: DISCONTINUED | OUTPATIENT
Start: 2017-04-21 | End: 2017-04-23 | Stop reason: HOSPADM

## 2017-04-21 RX ORDER — SODIUM CHLORIDE 0.9 % (FLUSH) 0.9 %
10 SYRINGE (ML) INJECTION AS NEEDED
Status: DISCONTINUED | OUTPATIENT
Start: 2017-04-21 | End: 2017-04-23 | Stop reason: HOSPADM

## 2017-04-21 RX ORDER — PROMETHAZINE HYDROCHLORIDE 25 MG/1
25 SUPPOSITORY RECTAL EVERY 6 HOURS PRN
Status: DISCONTINUED | OUTPATIENT
Start: 2017-04-21 | End: 2017-04-23 | Stop reason: HOSPADM

## 2017-04-21 RX ORDER — QUETIAPINE FUMARATE 25 MG/1
50 TABLET, FILM COATED ORAL NIGHTLY
COMMUNITY
End: 2018-10-10

## 2017-04-21 RX ORDER — ASPIRIN 81 MG/1
81 TABLET ORAL DAILY
COMMUNITY
End: 2019-09-23 | Stop reason: HOSPADM

## 2017-04-21 RX ORDER — PROMETHAZINE HYDROCHLORIDE 25 MG/1
25 TABLET ORAL EVERY 6 HOURS PRN
Status: DISCONTINUED | OUTPATIENT
Start: 2017-04-21 | End: 2017-04-23 | Stop reason: HOSPADM

## 2017-04-21 RX ORDER — SODIUM CHLORIDE 9 MG/ML
100 INJECTION, SOLUTION INTRAVENOUS CONTINUOUS
Status: DISCONTINUED | OUTPATIENT
Start: 2017-04-21 | End: 2017-04-23 | Stop reason: HOSPADM

## 2017-04-21 RX ORDER — QUETIAPINE FUMARATE 25 MG/1
50 TABLET, FILM COATED ORAL NIGHTLY
Status: DISCONTINUED | OUTPATIENT
Start: 2017-04-21 | End: 2017-04-23 | Stop reason: HOSPADM

## 2017-04-21 RX ORDER — SUCRALFATE 1 G/1
1 TABLET ORAL
Status: DISCONTINUED | OUTPATIENT
Start: 2017-04-21 | End: 2017-04-23 | Stop reason: HOSPADM

## 2017-04-21 RX ORDER — CIMETIDINE 400 MG/1
800 TABLET, FILM COATED ORAL 3 TIMES DAILY
Status: CANCELLED | OUTPATIENT
Start: 2017-04-21

## 2017-04-21 RX ORDER — PROMETHAZINE HYDROCHLORIDE 25 MG/1
25 TABLET ORAL 4 TIMES DAILY
Status: ON HOLD | COMMUNITY
End: 2017-04-29

## 2017-04-21 RX ORDER — HYDROCHLOROTHIAZIDE 25 MG/1
25 TABLET ORAL DAILY
Status: DISCONTINUED | OUTPATIENT
Start: 2017-04-22 | End: 2017-04-23 | Stop reason: HOSPADM

## 2017-04-21 RX ORDER — HYDROMORPHONE HYDROCHLORIDE 1 MG/ML
0.5 INJECTION, SOLUTION INTRAMUSCULAR; INTRAVENOUS; SUBCUTANEOUS EVERY 4 HOURS PRN
Status: DISCONTINUED | OUTPATIENT
Start: 2017-04-21 | End: 2017-04-23 | Stop reason: HOSPADM

## 2017-04-21 RX ORDER — PROMETHAZINE HYDROCHLORIDE 6.25 MG/5ML
12.5 SYRUP ORAL EVERY 6 HOURS PRN
Status: DISCONTINUED | OUTPATIENT
Start: 2017-04-21 | End: 2017-04-21

## 2017-04-21 RX ORDER — SODIUM CHLORIDE 0.9 % (FLUSH) 0.9 %
1-10 SYRINGE (ML) INJECTION AS NEEDED
Status: DISCONTINUED | OUTPATIENT
Start: 2017-04-21 | End: 2017-04-23 | Stop reason: HOSPADM

## 2017-04-21 RX ORDER — HEPARIN SODIUM 5000 [USP'U]/ML
5000 INJECTION, SOLUTION INTRAVENOUS; SUBCUTANEOUS EVERY 12 HOURS SCHEDULED
Status: DISCONTINUED | OUTPATIENT
Start: 2017-04-21 | End: 2017-04-23 | Stop reason: HOSPADM

## 2017-04-21 RX ADMIN — Medication 10 ML: at 15:25

## 2017-04-21 RX ADMIN — PANTOPRAZOLE SODIUM 40 MG: 40 TABLET, DELAYED RELEASE ORAL at 20:27

## 2017-04-21 RX ADMIN — HYDROMORPHONE HYDROCHLORIDE 1 MG: 1 INJECTION, SOLUTION INTRAMUSCULAR; INTRAVENOUS; SUBCUTANEOUS at 18:55

## 2017-04-21 RX ADMIN — SODIUM CHLORIDE 100 ML/HR: 9 INJECTION, SOLUTION INTRAVENOUS at 15:22

## 2017-04-21 RX ADMIN — HEPARIN SODIUM 5000 UNITS: 5000 INJECTION, SOLUTION INTRAVENOUS; SUBCUTANEOUS at 20:28

## 2017-04-21 RX ADMIN — HYDROMORPHONE HYDROCHLORIDE 0.5 MG: 1 INJECTION, SOLUTION INTRAMUSCULAR; INTRAVENOUS; SUBCUTANEOUS at 15:19

## 2017-04-21 RX ADMIN — BACLOFEN 10 MG: 10 TABLET ORAL at 20:28

## 2017-04-21 RX ADMIN — SUCRALFATE 1 G: 1 TABLET ORAL at 20:28

## 2017-04-21 RX ADMIN — QUETIAPINE FUMARATE 50 MG: 25 TABLET, FILM COATED ORAL at 20:27

## 2017-04-21 RX ADMIN — Medication 10 ML: at 20:29

## 2017-04-21 RX ADMIN — HYDROMORPHONE HYDROCHLORIDE 0.5 MG: 1 INJECTION, SOLUTION INTRAMUSCULAR; INTRAVENOUS; SUBCUTANEOUS at 22:57

## 2017-04-21 RX ADMIN — PROMETHAZINE HYDROCHLORIDE 12.5 MG: 25 INJECTION INTRAMUSCULAR; INTRAVENOUS at 19:03

## 2017-04-22 ENCOUNTER — ANESTHESIA EVENT (OUTPATIENT)
Dept: PERIOP | Facility: HOSPITAL | Age: 47
End: 2017-04-22

## 2017-04-22 ENCOUNTER — APPOINTMENT (OUTPATIENT)
Dept: GENERAL RADIOLOGY | Facility: HOSPITAL | Age: 47
End: 2017-04-22

## 2017-04-22 ENCOUNTER — ANESTHESIA (OUTPATIENT)
Dept: PERIOP | Facility: HOSPITAL | Age: 47
End: 2017-04-22

## 2017-04-22 PROCEDURE — 25010000002 HEPARIN (PORCINE) PER 1000 UNITS: Performed by: FAMILY MEDICINE

## 2017-04-22 PROCEDURE — 25010000002 DEXAMETHASONE PER 1 MG: Performed by: ANESTHESIOLOGY

## 2017-04-22 PROCEDURE — 25010000002 HYDROMORPHONE PER 4 MG

## 2017-04-22 PROCEDURE — 25010000002 ONDANSETRON PER 1 MG: Performed by: ANESTHESIOLOGY

## 2017-04-22 PROCEDURE — 25010000002 KETOROLAC TROMETHAMINE PER 15 MG: Performed by: ANESTHESIOLOGY

## 2017-04-22 PROCEDURE — 0FT44ZZ RESECTION OF GALLBLADDER, PERCUTANEOUS ENDOSCOPIC APPROACH: ICD-10-PCS | Performed by: SURGERY

## 2017-04-22 PROCEDURE — 88304 TISSUE EXAM BY PATHOLOGIST: CPT | Performed by: SURGERY

## 2017-04-22 PROCEDURE — C1726 CATH, BAL DIL, NON-VASCULAR: HCPCS | Performed by: SURGERY

## 2017-04-22 PROCEDURE — 63710000001 PROMETHAZINE PER 25 MG: Performed by: FAMILY MEDICINE

## 2017-04-22 PROCEDURE — 47562 LAPAROSCOPIC CHOLECYSTECTOMY: CPT | Performed by: SURGERY

## 2017-04-22 PROCEDURE — 25010000002 FENTANYL CITRATE (PF) 100 MCG/2ML SOLUTION: Performed by: ANESTHESIOLOGY

## 2017-04-22 PROCEDURE — 25010000002 HYDROMORPHONE PER 4 MG: Performed by: ANESTHESIOLOGY

## 2017-04-22 PROCEDURE — 25010000002 PROPOFOL 10 MG/ML EMULSION: Performed by: ANESTHESIOLOGY

## 2017-04-22 RX ORDER — METOPROLOL TARTRATE 5 MG/5ML
INJECTION INTRAVENOUS AS NEEDED
Status: DISCONTINUED | OUTPATIENT
Start: 2017-04-22 | End: 2017-04-22 | Stop reason: SURG

## 2017-04-22 RX ORDER — MAGNESIUM HYDROXIDE 1200 MG/15ML
LIQUID ORAL AS NEEDED
Status: DISCONTINUED | OUTPATIENT
Start: 2017-04-22 | End: 2017-04-22 | Stop reason: HOSPADM

## 2017-04-22 RX ORDER — ROCURONIUM BROMIDE 10 MG/ML
INJECTION, SOLUTION INTRAVENOUS AS NEEDED
Status: DISCONTINUED | OUTPATIENT
Start: 2017-04-22 | End: 2017-04-22 | Stop reason: SURG

## 2017-04-22 RX ORDER — FENTANYL CITRATE 50 UG/ML
50 INJECTION, SOLUTION INTRAMUSCULAR; INTRAVENOUS
Status: COMPLETED | OUTPATIENT
Start: 2017-04-22 | End: 2017-04-22

## 2017-04-22 RX ORDER — OXYCODONE HYDROCHLORIDE AND ACETAMINOPHEN 5; 325 MG/1; MG/1
1 TABLET ORAL ONCE AS NEEDED
Status: DISCONTINUED | OUTPATIENT
Start: 2017-04-22 | End: 2017-04-22 | Stop reason: HOSPADM

## 2017-04-22 RX ORDER — PROPOFOL 10 MG/ML
VIAL (ML) INTRAVENOUS AS NEEDED
Status: DISCONTINUED | OUTPATIENT
Start: 2017-04-22 | End: 2017-04-22 | Stop reason: SURG

## 2017-04-22 RX ORDER — HYDROMORPHONE HCL 110MG/55ML
PATIENT CONTROLLED ANALGESIA SYRINGE INTRAVENOUS AS NEEDED
Status: DISCONTINUED | OUTPATIENT
Start: 2017-04-22 | End: 2017-04-22 | Stop reason: SURG

## 2017-04-22 RX ORDER — SODIUM CHLORIDE, SODIUM LACTATE, POTASSIUM CHLORIDE, CALCIUM CHLORIDE 600; 310; 30; 20 MG/100ML; MG/100ML; MG/100ML; MG/100ML
INJECTION, SOLUTION INTRAVENOUS CONTINUOUS PRN
Status: DISCONTINUED | OUTPATIENT
Start: 2017-04-22 | End: 2017-04-22 | Stop reason: SURG

## 2017-04-22 RX ORDER — KETOROLAC TROMETHAMINE 30 MG/ML
INJECTION, SOLUTION INTRAMUSCULAR; INTRAVENOUS AS NEEDED
Status: DISCONTINUED | OUTPATIENT
Start: 2017-04-22 | End: 2017-04-22 | Stop reason: SURG

## 2017-04-22 RX ORDER — FENTANYL CITRATE 50 UG/ML
INJECTION, SOLUTION INTRAMUSCULAR; INTRAVENOUS AS NEEDED
Status: DISCONTINUED | OUTPATIENT
Start: 2017-04-22 | End: 2017-04-22 | Stop reason: SURG

## 2017-04-22 RX ORDER — ONDANSETRON 2 MG/ML
INJECTION INTRAMUSCULAR; INTRAVENOUS AS NEEDED
Status: DISCONTINUED | OUTPATIENT
Start: 2017-04-22 | End: 2017-04-22 | Stop reason: SURG

## 2017-04-22 RX ORDER — SODIUM CHLORIDE 0.9 % (FLUSH) 0.9 %
1-10 SYRINGE (ML) INJECTION AS NEEDED
Status: DISCONTINUED | OUTPATIENT
Start: 2017-04-22 | End: 2017-04-22 | Stop reason: HOSPADM

## 2017-04-22 RX ORDER — FAMOTIDINE 10 MG/ML
INJECTION, SOLUTION INTRAVENOUS AS NEEDED
Status: DISCONTINUED | OUTPATIENT
Start: 2017-04-22 | End: 2017-04-22 | Stop reason: SURG

## 2017-04-22 RX ORDER — DEXAMETHASONE SODIUM PHOSPHATE 4 MG/ML
INJECTION, SOLUTION INTRA-ARTICULAR; INTRALESIONAL; INTRAMUSCULAR; INTRAVENOUS; SOFT TISSUE AS NEEDED
Status: DISCONTINUED | OUTPATIENT
Start: 2017-04-22 | End: 2017-04-22 | Stop reason: SURG

## 2017-04-22 RX ORDER — SODIUM CHLORIDE 9 MG/ML
INJECTION, SOLUTION INTRAVENOUS CONTINUOUS PRN
Status: DISCONTINUED | OUTPATIENT
Start: 1840-12-31 | End: 2017-04-22 | Stop reason: HOSPADM

## 2017-04-22 RX ORDER — ONDANSETRON 2 MG/ML
4 INJECTION INTRAMUSCULAR; INTRAVENOUS ONCE AS NEEDED
Status: DISCONTINUED | OUTPATIENT
Start: 2017-04-22 | End: 2017-04-22 | Stop reason: HOSPADM

## 2017-04-22 RX ORDER — OXYCODONE HYDROCHLORIDE AND ACETAMINOPHEN 5; 325 MG/1; MG/1
1 TABLET ORAL EVERY 4 HOURS PRN
Status: DISCONTINUED | OUTPATIENT
Start: 2017-04-22 | End: 2017-04-23 | Stop reason: HOSPADM

## 2017-04-22 RX ORDER — SODIUM CHLORIDE, SODIUM LACTATE, POTASSIUM CHLORIDE, CALCIUM CHLORIDE 600; 310; 30; 20 MG/100ML; MG/100ML; MG/100ML; MG/100ML
125 INJECTION, SOLUTION INTRAVENOUS CONTINUOUS
Status: DISCONTINUED | OUTPATIENT
Start: 2017-04-22 | End: 2017-04-23 | Stop reason: HOSPADM

## 2017-04-22 RX ORDER — IPRATROPIUM BROMIDE AND ALBUTEROL SULFATE 2.5; .5 MG/3ML; MG/3ML
3 SOLUTION RESPIRATORY (INHALATION) ONCE AS NEEDED
Status: DISCONTINUED | OUTPATIENT
Start: 2017-04-22 | End: 2017-04-22 | Stop reason: HOSPADM

## 2017-04-22 RX ORDER — MEPERIDINE HYDROCHLORIDE 25 MG/ML
12.5 INJECTION INTRAMUSCULAR; INTRAVENOUS; SUBCUTANEOUS
Status: DISCONTINUED | OUTPATIENT
Start: 2017-04-22 | End: 2017-04-22 | Stop reason: HOSPADM

## 2017-04-22 RX ADMIN — Medication 10 ML: at 20:34

## 2017-04-22 RX ADMIN — SODIUM CHLORIDE, POTASSIUM CHLORIDE, SODIUM LACTATE AND CALCIUM CHLORIDE: 600; 310; 30; 20 INJECTION, SOLUTION INTRAVENOUS at 09:15

## 2017-04-22 RX ADMIN — PROMETHAZINE HYDROCHLORIDE 25 MG: 25 TABLET ORAL at 21:29

## 2017-04-22 RX ADMIN — SODIUM CHLORIDE 100 ML/HR: 9 INJECTION, SOLUTION INTRAVENOUS at 03:08

## 2017-04-22 RX ADMIN — BACLOFEN 10 MG: 10 TABLET ORAL at 20:34

## 2017-04-22 RX ADMIN — ONDANSETRON 4 MG: 2 INJECTION, SOLUTION INTRAMUSCULAR; INTRAVENOUS at 08:59

## 2017-04-22 RX ADMIN — HEPARIN SODIUM 5000 UNITS: 5000 INJECTION, SOLUTION INTRAVENOUS; SUBCUTANEOUS at 20:34

## 2017-04-22 RX ADMIN — PROPOFOL 150 MG: 10 INJECTION, EMULSION INTRAVENOUS at 09:31

## 2017-04-22 RX ADMIN — DEXAMETHASONE SODIUM PHOSPHATE 4 MG: 4 INJECTION, SOLUTION INTRAMUSCULAR; INTRAVENOUS at 08:57

## 2017-04-22 RX ADMIN — FENTANYL CITRATE 50 MCG: 50 INJECTION INTRAMUSCULAR; INTRAVENOUS at 10:40

## 2017-04-22 RX ADMIN — PANTOPRAZOLE SODIUM 40 MG: 40 TABLET, DELAYED RELEASE ORAL at 06:32

## 2017-04-22 RX ADMIN — PROMETHAZINE HYDROCHLORIDE 25 MG: 25 TABLET ORAL at 15:39

## 2017-04-22 RX ADMIN — FENTANYL CITRATE 100 MCG: 50 INJECTION INTRAMUSCULAR; INTRAVENOUS at 09:18

## 2017-04-22 RX ADMIN — SUCRALFATE 1 G: 1 TABLET ORAL at 20:34

## 2017-04-22 RX ADMIN — HYDROMORPHONE HYDROCHLORIDE 0.5 MG: 1 INJECTION, SOLUTION INTRAMUSCULAR; INTRAVENOUS; SUBCUTANEOUS at 03:07

## 2017-04-22 RX ADMIN — HYDROMORPHONE HYDROCHLORIDE 2 MG: 2 INJECTION, SOLUTION INTRAMUSCULAR; INTRAVENOUS; SUBCUTANEOUS at 09:18

## 2017-04-22 RX ADMIN — CEFAZOLIN 1 G: 1 INJECTION, POWDER, FOR SOLUTION INTRAMUSCULAR; INTRAVENOUS; PARENTERAL at 09:07

## 2017-04-22 RX ADMIN — OXYCODONE HYDROCHLORIDE AND ACETAMINOPHEN 1 TABLET: 5; 325 TABLET ORAL at 13:09

## 2017-04-22 RX ADMIN — QUETIAPINE FUMARATE 50 MG: 25 TABLET, FILM COATED ORAL at 20:34

## 2017-04-22 RX ADMIN — FENTANYL CITRATE 50 MCG: 50 INJECTION INTRAMUSCULAR; INTRAVENOUS at 10:35

## 2017-04-22 RX ADMIN — ROCURONIUM BROMIDE 30 MG: 10 INJECTION INTRAVENOUS at 09:18

## 2017-04-22 RX ADMIN — SUCRALFATE 1 G: 1 TABLET ORAL at 06:32

## 2017-04-22 RX ADMIN — KETOROLAC TROMETHAMINE 30 MG: 30 INJECTION, SOLUTION INTRAMUSCULAR; INTRAVENOUS at 08:59

## 2017-04-22 RX ADMIN — FAMOTIDINE 20 MG: 10 INJECTION, SOLUTION INTRAVENOUS at 08:58

## 2017-04-22 RX ADMIN — OXYCODONE HYDROCHLORIDE AND ACETAMINOPHEN 1 TABLET: 5; 325 TABLET ORAL at 21:29

## 2017-04-22 RX ADMIN — PROMETHAZINE HYDROCHLORIDE 25 MG: 25 TABLET ORAL at 04:46

## 2017-04-22 RX ADMIN — OXYCODONE HYDROCHLORIDE AND ACETAMINOPHEN 1 TABLET: 5; 325 TABLET ORAL at 17:15

## 2017-04-22 RX ADMIN — SUCRALFATE 1 G: 1 TABLET ORAL at 17:16

## 2017-04-22 RX ADMIN — FENTANYL CITRATE 100 MCG: 50 INJECTION INTRAMUSCULAR; INTRAVENOUS at 08:56

## 2017-04-22 RX ADMIN — HYDROMORPHONE HYDROCHLORIDE 0.5 MG: 1 INJECTION, SOLUTION INTRAMUSCULAR; INTRAVENOUS; SUBCUTANEOUS at 07:35

## 2017-04-22 RX ADMIN — METOPROLOL TARTRATE 5 MG: 1 INJECTION, SOLUTION INTRAVENOUS at 09:16

## 2017-04-22 NOTE — ANESTHESIA POSTPROCEDURE EVALUATION
Patient: Jamison Edward    Procedure Summary     Date Anesthesia Start Anesthesia Stop Room / Location    04/22/17 0915 1027  [facility] OR 03 / [facility]       Procedure Diagnosis Surgeon Provider    CHOLECYSTECTOMY LAPAROSCOPIC POSSIBLE OPEN CHOLECYSTECTOMY (N/A Abdomen) Abnormal biliary HIDA scan  (Abnormal biliary HIDA scan [R94.8]) MD Issa Escobar MD          Anesthesia Type: general  Last vitals  BP      Temp      Pulse     Resp      SpO2        Post Anesthesia Care and Evaluation    Patient location during evaluation: PACU  Patient participation: complete - patient participated  Level of consciousness: awake and alert  Pain score: 1  Pain management: adequate  Airway patency: patent  Anesthetic complications: No anesthetic complications  PONV Status: controlled  Cardiovascular status: acceptable  Respiratory status: acceptable  Hydration status: acceptable

## 2017-04-22 NOTE — ANESTHESIA PREPROCEDURE EVALUATION
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   Substance History - negative use     OB/GYN negative ob/gyn ROS         Other   (+) arthritis                             Anesthesia Plan    ASA 3     general     intravenous induction   Anesthetic plan and risks discussed with patient and spouse/significant other.  Use of blood products discussed with patient and spouse/significant other  Consented to blood products.

## 2017-04-23 VITALS
RESPIRATION RATE: 18 BRPM | HEIGHT: 66 IN | OXYGEN SATURATION: 95 % | TEMPERATURE: 99.6 F | DIASTOLIC BLOOD PRESSURE: 90 MMHG | WEIGHT: 165 LBS | HEART RATE: 111 BPM | SYSTOLIC BLOOD PRESSURE: 144 MMHG | BODY MASS INDEX: 26.52 KG/M2

## 2017-04-23 PROCEDURE — 25010000002 HEPARIN (PORCINE) PER 1000 UNITS: Performed by: FAMILY MEDICINE

## 2017-04-23 RX ADMIN — OXYCODONE HYDROCHLORIDE AND ACETAMINOPHEN 1 TABLET: 5; 325 TABLET ORAL at 06:30

## 2017-04-23 RX ADMIN — PANTOPRAZOLE SODIUM 40 MG: 40 TABLET, DELAYED RELEASE ORAL at 06:30

## 2017-04-23 RX ADMIN — HYDROCHLOROTHIAZIDE 25 MG: 25 TABLET ORAL at 09:02

## 2017-04-23 RX ADMIN — HEPARIN SODIUM 5000 UNITS: 5000 INJECTION, SOLUTION INTRAVENOUS; SUBCUTANEOUS at 09:01

## 2017-04-23 RX ADMIN — Medication 10 ML: at 09:02

## 2017-04-23 RX ADMIN — OXYCODONE HYDROCHLORIDE AND ACETAMINOPHEN 1 TABLET: 5; 325 TABLET ORAL at 02:24

## 2017-04-23 RX ADMIN — BACLOFEN 10 MG: 10 TABLET ORAL at 09:01

## 2017-04-23 RX ADMIN — SUCRALFATE 1 G: 1 TABLET ORAL at 11:41

## 2017-04-23 RX ADMIN — OXYCODONE HYDROCHLORIDE AND ACETAMINOPHEN 1 TABLET: 5; 325 TABLET ORAL at 10:39

## 2017-04-23 RX ADMIN — SUCRALFATE 1 G: 1 TABLET ORAL at 06:30

## 2017-04-23 RX ADMIN — ASPIRIN 81 MG: 81 TABLET ORAL at 09:01

## 2017-04-27 LAB
LAB AP CASE REPORT: NORMAL
Lab: NORMAL
PATH REPORT.FINAL DX SPEC: NORMAL

## 2017-04-29 ENCOUNTER — APPOINTMENT (OUTPATIENT)
Dept: NUCLEAR MEDICINE | Facility: HOSPITAL | Age: 47
End: 2017-04-29

## 2017-04-29 ENCOUNTER — APPOINTMENT (OUTPATIENT)
Dept: GENERAL RADIOLOGY | Facility: HOSPITAL | Age: 47
End: 2017-04-29

## 2017-04-29 ENCOUNTER — HOSPITAL ENCOUNTER (INPATIENT)
Facility: HOSPITAL | Age: 47
LOS: 2 days | Discharge: HOME | End: 2017-05-01
Attending: EMERGENCY MEDICINE | Admitting: FAMILY MEDICINE

## 2017-04-29 DIAGNOSIS — R07.9 CHEST PAIN, UNSPECIFIED TYPE: Primary | ICD-10-CM

## 2017-04-29 DIAGNOSIS — Z90.49 STATUS POST LAPAROSCOPIC CHOLECYSTECTOMY: ICD-10-CM

## 2017-04-29 LAB
ALBUMIN SERPL-MCNC: 4.8 G/DL (ref 3.5–5)
ALBUMIN/GLOB SERPL: 1.8 G/DL (ref 1.5–2.5)
ALP SERPL-CCNC: 95 U/L (ref 40–129)
ALT SERPL W P-5'-P-CCNC: 14 U/L (ref 10–44)
ANION GAP SERPL CALCULATED.3IONS-SCNC: 4.8 MMOL/L (ref 3.6–11.2)
AST SERPL-CCNC: 19 U/L (ref 10–34)
BASOPHILS # BLD AUTO: 0.04 10*3/MM3 (ref 0–0.3)
BASOPHILS NFR BLD AUTO: 0.4 % (ref 0–2)
BILIRUB SERPL-MCNC: 0.2 MG/DL (ref 0.2–1.8)
BUN BLD-MCNC: 14 MG/DL (ref 7–21)
BUN/CREAT SERPL: 12.5 (ref 7–25)
CALCIUM SPEC-SCNC: 9.9 MG/DL (ref 7.7–10)
CHLORIDE SERPL-SCNC: 102 MMOL/L (ref 99–112)
CK MB SERPL-CCNC: 0.21 NG/ML (ref 0–5)
CK MB SERPL-RTO: 0.3 % (ref 0–3)
CK SERPL-CCNC: 69 U/L (ref 24–204)
CO2 SERPL-SCNC: 31.2 MMOL/L (ref 24.3–31.9)
CREAT BLD-MCNC: 1.12 MG/DL (ref 0.43–1.29)
D DIMER PPP FEU-MCNC: 0.83 MG/L (FEU) (ref 0–0.5)
DEPRECATED RDW RBC AUTO: 42.4 FL (ref 37–54)
EOSINOPHIL # BLD AUTO: 0.05 10*3/MM3 (ref 0–0.7)
EOSINOPHIL NFR BLD AUTO: 0.5 % (ref 0–5)
ERYTHROCYTE [DISTWIDTH] IN BLOOD BY AUTOMATED COUNT: 13.5 % (ref 11.5–14.5)
GFR SERPL CREATININE-BSD FRML MDRD: 70 ML/MIN/1.73
GLOBULIN UR ELPH-MCNC: 2.7 GM/DL
GLUCOSE BLD-MCNC: 115 MG/DL (ref 70–110)
HCT VFR BLD AUTO: 42.9 % (ref 42–52)
HGB BLD-MCNC: 14.6 G/DL (ref 14–18)
HOLD SPECIMEN: NORMAL
HOLD SPECIMEN: NORMAL
IMM GRANULOCYTES # BLD: 0.02 10*3/MM3 (ref 0–0.03)
IMM GRANULOCYTES NFR BLD: 0.2 % (ref 0–0.5)
INR PPP: 0.9 (ref 0.8–1.1)
LYMPHOCYTES # BLD AUTO: 1.15 10*3/MM3 (ref 1–3)
LYMPHOCYTES NFR BLD AUTO: 11.7 % (ref 21–51)
MCH RBC QN AUTO: 29.4 PG (ref 27–33)
MCHC RBC AUTO-ENTMCNC: 34 G/DL (ref 33–37)
MCV RBC AUTO: 86.5 FL (ref 80–94)
MONOCYTES # BLD AUTO: 0.78 10*3/MM3 (ref 0.1–0.9)
MONOCYTES NFR BLD AUTO: 7.9 % (ref 0–10)
NEUTROPHILS # BLD AUTO: 7.78 10*3/MM3 (ref 1.4–6.5)
NEUTROPHILS NFR BLD AUTO: 79.3 % (ref 30–70)
OSMOLALITY SERPL CALC.SUM OF ELEC: 277.1 MOSM/KG (ref 273–305)
PLATELET # BLD AUTO: 307 10*3/MM3 (ref 130–400)
PMV BLD AUTO: 9.6 FL (ref 6–10)
POTASSIUM BLD-SCNC: 4 MMOL/L (ref 3.5–5.3)
PROT SERPL-MCNC: 7.5 G/DL (ref 6–8)
PROTHROMBIN TIME: 9.9 SECONDS (ref 9.8–11.9)
RBC # BLD AUTO: 4.96 10*6/MM3 (ref 4.7–6.1)
SODIUM BLD-SCNC: 138 MMOL/L (ref 135–153)
TROPONIN I SERPL-MCNC: 0.09 NG/ML
TROPONIN I SERPL-MCNC: 0.1 NG/ML
TROPONIN I SERPL-MCNC: 0.14 NG/ML
WBC NRBC COR # BLD: 9.82 10*3/MM3 (ref 4.5–12.5)
WHOLE BLOOD HOLD SPECIMEN: NORMAL

## 2017-04-29 PROCEDURE — 85025 COMPLETE CBC W/AUTO DIFF WBC: CPT | Performed by: PHYSICIAN ASSISTANT

## 2017-04-29 PROCEDURE — 93005 ELECTROCARDIOGRAM TRACING: CPT | Performed by: PHYSICIAN ASSISTANT

## 2017-04-29 PROCEDURE — 99284 EMERGENCY DEPT VISIT MOD MDM: CPT

## 2017-04-29 PROCEDURE — 93010 ELECTROCARDIOGRAM REPORT: CPT | Performed by: INTERNAL MEDICINE

## 2017-04-29 PROCEDURE — 82553 CREATINE MB FRACTION: CPT | Performed by: PHYSICIAN ASSISTANT

## 2017-04-29 PROCEDURE — 80053 COMPREHEN METABOLIC PANEL: CPT | Performed by: PHYSICIAN ASSISTANT

## 2017-04-29 PROCEDURE — 74022 RADEX COMPL AQT ABD SERIES: CPT | Performed by: RADIOLOGY

## 2017-04-29 PROCEDURE — 36415 COLL VENOUS BLD VENIPUNCTURE: CPT

## 2017-04-29 PROCEDURE — 94799 UNLISTED PULMONARY SVC/PX: CPT

## 2017-04-29 PROCEDURE — 99222 1ST HOSP IP/OBS MODERATE 55: CPT | Performed by: INTERNAL MEDICINE

## 2017-04-29 PROCEDURE — 85379 FIBRIN DEGRADATION QUANT: CPT | Performed by: PHYSICIAN ASSISTANT

## 2017-04-29 PROCEDURE — 78582 LUNG VENTILAT&PERFUS IMAGING: CPT

## 2017-04-29 PROCEDURE — 74022 RADEX COMPL AQT ABD SERIES: CPT

## 2017-04-29 PROCEDURE — 0 TECHNETIUM ALBUMIN AGGREGATED: Performed by: EMERGENCY MEDICINE

## 2017-04-29 PROCEDURE — 82550 ASSAY OF CK (CPK): CPT | Performed by: PHYSICIAN ASSISTANT

## 2017-04-29 PROCEDURE — A9539 TC99M PENTETATE: HCPCS | Performed by: EMERGENCY MEDICINE

## 2017-04-29 PROCEDURE — 84484 ASSAY OF TROPONIN QUANT: CPT | Performed by: INTERNAL MEDICINE

## 2017-04-29 PROCEDURE — 84484 ASSAY OF TROPONIN QUANT: CPT | Performed by: PHYSICIAN ASSISTANT

## 2017-04-29 PROCEDURE — 0 TECHNETIUM TC 99M PENTETATE KIT: Performed by: EMERGENCY MEDICINE

## 2017-04-29 PROCEDURE — A9540 TC99M MAA: HCPCS | Performed by: EMERGENCY MEDICINE

## 2017-04-29 PROCEDURE — 78582 LUNG VENTILAT&PERFUS IMAGING: CPT | Performed by: RADIOLOGY

## 2017-04-29 PROCEDURE — 85610 PROTHROMBIN TIME: CPT | Performed by: PHYSICIAN ASSISTANT

## 2017-04-29 RX ORDER — OXYCODONE HYDROCHLORIDE AND ACETAMINOPHEN 5; 325 MG/1; MG/1
1 TABLET ORAL EVERY 6 HOURS PRN
Status: DISCONTINUED | OUTPATIENT
Start: 2017-04-29 | End: 2017-05-01 | Stop reason: HOSPADM

## 2017-04-29 RX ORDER — PANTOPRAZOLE SODIUM 40 MG/1
40 TABLET, DELAYED RELEASE ORAL
Status: CANCELLED | OUTPATIENT
Start: 2017-04-30

## 2017-04-29 RX ORDER — OXYCODONE HYDROCHLORIDE AND ACETAMINOPHEN 5; 325 MG/1; MG/1
1 TABLET ORAL EVERY 6 HOURS PRN
COMMUNITY
End: 2017-11-02

## 2017-04-29 RX ORDER — FAMOTIDINE 20 MG/1
40 TABLET, FILM COATED ORAL EVERY 12 HOURS SCHEDULED
Status: DISCONTINUED | OUTPATIENT
Start: 2017-04-29 | End: 2017-05-01 | Stop reason: HOSPADM

## 2017-04-29 RX ORDER — ASPIRIN 81 MG/1
81 TABLET ORAL DAILY
Status: DISCONTINUED | OUTPATIENT
Start: 2017-04-30 | End: 2017-05-01 | Stop reason: HOSPADM

## 2017-04-29 RX ORDER — SUCRALFATE 1 G/1
1 TABLET ORAL 4 TIMES DAILY
Status: DISCONTINUED | OUTPATIENT
Start: 2017-04-29 | End: 2017-04-30

## 2017-04-29 RX ORDER — CIMETIDINE 400 MG/1
800 TABLET, FILM COATED ORAL 3 TIMES DAILY
Status: CANCELLED | OUTPATIENT
Start: 2017-04-29

## 2017-04-29 RX ORDER — HYDROCHLOROTHIAZIDE 25 MG/1
25 TABLET ORAL DAILY
Status: DISCONTINUED | OUTPATIENT
Start: 2017-04-30 | End: 2017-05-01 | Stop reason: HOSPADM

## 2017-04-29 RX ORDER — SODIUM CHLORIDE 9 MG/ML
75 INJECTION, SOLUTION INTRAVENOUS CONTINUOUS
Status: DISCONTINUED | OUTPATIENT
Start: 2017-04-29 | End: 2017-05-01 | Stop reason: HOSPADM

## 2017-04-29 RX ORDER — SODIUM CHLORIDE 0.9 % (FLUSH) 0.9 %
10 SYRINGE (ML) INJECTION AS NEEDED
Status: DISCONTINUED | OUTPATIENT
Start: 2017-04-29 | End: 2017-05-01 | Stop reason: HOSPADM

## 2017-04-29 RX ORDER — NITROGLYCERIN 0.4 MG/1
0.4 TABLET SUBLINGUAL
Status: DISCONTINUED | OUTPATIENT
Start: 2017-04-29 | End: 2017-05-01 | Stop reason: HOSPADM

## 2017-04-29 RX ORDER — BACLOFEN 10 MG/1
10 TABLET ORAL EVERY 12 HOURS SCHEDULED
Status: DISCONTINUED | OUTPATIENT
Start: 2017-04-29 | End: 2017-05-01 | Stop reason: HOSPADM

## 2017-04-29 RX ORDER — QUETIAPINE FUMARATE 25 MG/1
50 TABLET, FILM COATED ORAL NIGHTLY
Status: DISCONTINUED | OUTPATIENT
Start: 2017-04-29 | End: 2017-05-01 | Stop reason: HOSPADM

## 2017-04-29 RX ORDER — ASPIRIN 81 MG/1
324 TABLET, CHEWABLE ORAL ONCE
Status: COMPLETED | OUTPATIENT
Start: 2017-04-29 | End: 2017-04-29

## 2017-04-29 RX ADMIN — QUETIAPINE FUMARATE 50 MG: 25 TABLET, FILM COATED ORAL at 21:50

## 2017-04-29 RX ADMIN — Medication 1 DOSE: at 13:10

## 2017-04-29 RX ADMIN — SUCRALFATE 1 G: 1 TABLET ORAL at 21:50

## 2017-04-29 RX ADMIN — BACLOFEN 10 MG: 10 TABLET ORAL at 21:50

## 2017-04-29 RX ADMIN — SODIUM CHLORIDE 75 ML/HR: 9 INJECTION, SOLUTION INTRAVENOUS at 20:13

## 2017-04-29 RX ADMIN — NITROGLYCERIN 1 INCH: 20 OINTMENT TOPICAL at 15:16

## 2017-04-29 RX ADMIN — KIT FOR THE PREPARATION OF TECHNETIUM TC 99M PENTETATE 1 DOSE: 20 INJECTION, POWDER, LYOPHILIZED, FOR SOLUTION INTRAVENOUS; RESPIRATORY (INHALATION) at 12:52

## 2017-04-29 RX ADMIN — ASPIRIN 324 MG: 81 TABLET, CHEWABLE ORAL at 12:34

## 2017-04-29 RX ADMIN — FAMOTIDINE 40 MG: 20 TABLET, FILM COATED ORAL at 21:50

## 2017-04-30 ENCOUNTER — APPOINTMENT (OUTPATIENT)
Dept: CARDIOLOGY | Facility: HOSPITAL | Age: 47
End: 2017-04-30
Attending: FAMILY MEDICINE

## 2017-04-30 LAB
ALBUMIN SERPL-MCNC: 4.1 G/DL (ref 3.5–5)
ALBUMIN/GLOB SERPL: 1.5 G/DL (ref 1.5–2.5)
ALP SERPL-CCNC: 86 U/L (ref 40–129)
ALT SERPL W P-5'-P-CCNC: 15 U/L (ref 10–44)
ANION GAP SERPL CALCULATED.3IONS-SCNC: 3.9 MMOL/L (ref 3.6–11.2)
AST SERPL-CCNC: 19 U/L (ref 10–34)
BH CV ECHO MEAS - % IVS THICK: 14.5 %
BH CV ECHO MEAS - % LVPW THICK: 17.3 %
BH CV ECHO MEAS - ACS: 1.8 CM
BH CV ECHO MEAS - AO ROOT AREA (BSA CORRECTED): 1.9
BH CV ECHO MEAS - AO ROOT AREA: 8.9 CM^2
BH CV ECHO MEAS - AO ROOT DIAM: 3.4 CM
BH CV ECHO MEAS - BSA(HAYCOCK): 1.8 M^2
BH CV ECHO MEAS - BSA: 1.8 M^2
BH CV ECHO MEAS - BZI_BMI: 24.5 KILOGRAMS/M^2
BH CV ECHO MEAS - BZI_METRIC_HEIGHT: 167.6 CM
BH CV ECHO MEAS - BZI_METRIC_WEIGHT: 68.9 KG
BH CV ECHO MEAS - CONTRAST EF 4CH: 56.8 ML/M^2
BH CV ECHO MEAS - EDV(CUBED): 153.4 ML
BH CV ECHO MEAS - EDV(MOD-SP4): 37 ML
BH CV ECHO MEAS - EDV(TEICH): 138.5 ML
BH CV ECHO MEAS - EF(CUBED): 54.9 %
BH CV ECHO MEAS - EF(MOD-SP4): 56.8 %
BH CV ECHO MEAS - EF(TEICH): 46.3 %
BH CV ECHO MEAS - ESV(CUBED): 69.2 ML
BH CV ECHO MEAS - ESV(MOD-SP4): 16 ML
BH CV ECHO MEAS - ESV(TEICH): 74.4 ML
BH CV ECHO MEAS - FS: 23.3 %
BH CV ECHO MEAS - IVS/LVPW: 0.78
BH CV ECHO MEAS - IVSD: 0.9 CM
BH CV ECHO MEAS - IVSS: 1 CM
BH CV ECHO MEAS - LA DIMENSION: 2.9 CM
BH CV ECHO MEAS - LA/AO: 0.86
BH CV ECHO MEAS - LV DIASTOLIC VOL/BSA (35-75): 20.8 ML/M^2
BH CV ECHO MEAS - LV MASS(C)D: 211.2 GRAMS
BH CV ECHO MEAS - LV MASS(C)DI: 118.7 GRAMS/M^2
BH CV ECHO MEAS - LV MASS(C)S: 170.5 GRAMS
BH CV ECHO MEAS - LV MASS(C)SI: 95.8 GRAMS/M^2
BH CV ECHO MEAS - LV SYSTOLIC VOL/BSA (12-30): 9 ML/M^2
BH CV ECHO MEAS - LVIDD: 5.4 CM
BH CV ECHO MEAS - LVIDS: 4.1 CM
BH CV ECHO MEAS - LVLD AP4: 7.8 CM
BH CV ECHO MEAS - LVLS AP4: 6.8 CM
BH CV ECHO MEAS - LVOT AREA (M): 2.5 CM^2
BH CV ECHO MEAS - LVOT AREA: 2.6 CM^2
BH CV ECHO MEAS - LVOT DIAM: 1.8 CM
BH CV ECHO MEAS - LVPWD: 1.2 CM
BH CV ECHO MEAS - LVPWS: 1.4 CM
BH CV ECHO MEAS - MV A MAX VEL: 93 CM/SEC
BH CV ECHO MEAS - MV DEC SLOPE: 433.8 CM/SEC^2
BH CV ECHO MEAS - MV E MAX VEL: 93.6 CM/SEC
BH CV ECHO MEAS - MV E/A: 1
BH CV ECHO MEAS - MV P1/2T MAX VEL: 91.1 CM/SEC
BH CV ECHO MEAS - MV P1/2T: 61.5 MSEC
BH CV ECHO MEAS - MVA P1/2T LCG: 2.4 CM^2
BH CV ECHO MEAS - MVA(P1/2T): 3.6 CM^2
BH CV ECHO MEAS - RVDD: 0.94 CM
BH CV ECHO MEAS - SI(CUBED): 47.3 ML/M^2
BH CV ECHO MEAS - SI(MOD-SP4): 11.8 ML/M^2
BH CV ECHO MEAS - SI(TEICH): 36 ML/M^2
BH CV ECHO MEAS - SV(CUBED): 84.2 ML
BH CV ECHO MEAS - SV(MOD-SP4): 21 ML
BH CV ECHO MEAS - SV(TEICH): 64.1 ML
BILIRUB SERPL-MCNC: 0.2 MG/DL (ref 0.2–1.8)
BUN BLD-MCNC: 8 MG/DL (ref 7–21)
BUN/CREAT SERPL: 9.2 (ref 7–25)
CALCIUM SPEC-SCNC: 9.2 MG/DL (ref 7.7–10)
CHLORIDE SERPL-SCNC: 106 MMOL/L (ref 99–112)
CO2 SERPL-SCNC: 27.1 MMOL/L (ref 24.3–31.9)
CREAT BLD-MCNC: 0.87 MG/DL (ref 0.43–1.29)
DEPRECATED RDW RBC AUTO: 42.1 FL (ref 37–54)
ERYTHROCYTE [DISTWIDTH] IN BLOOD BY AUTOMATED COUNT: 13.4 % (ref 11.5–14.5)
GFR SERPL CREATININE-BSD FRML MDRD: 94 ML/MIN/1.73
GLOBULIN UR ELPH-MCNC: 2.7 GM/DL
GLUCOSE BLD-MCNC: 118 MG/DL (ref 70–110)
HCT VFR BLD AUTO: 39.5 % (ref 42–52)
HGB BLD-MCNC: 12.9 G/DL (ref 14–18)
LV EF 2D ECHO EST: 55 %
MCH RBC QN AUTO: 28.5 PG (ref 27–33)
MCHC RBC AUTO-ENTMCNC: 32.7 G/DL (ref 33–37)
MCV RBC AUTO: 87.2 FL (ref 80–94)
OSMOLALITY SERPL CALC.SUM OF ELEC: 273.2 MOSM/KG (ref 273–305)
PLATELET # BLD AUTO: 264 10*3/MM3 (ref 130–400)
PMV BLD AUTO: 10 FL (ref 6–10)
POTASSIUM BLD-SCNC: 3.5 MMOL/L (ref 3.5–5.3)
PROT SERPL-MCNC: 6.8 G/DL (ref 6–8)
RBC # BLD AUTO: 4.53 10*6/MM3 (ref 4.7–6.1)
SODIUM BLD-SCNC: 137 MMOL/L (ref 135–153)
TROPONIN I SERPL-MCNC: 0.03 NG/ML
TROPONIN I SERPL-MCNC: 0.03 NG/ML
TROPONIN I SERPL-MCNC: 0.06 NG/ML
TROPONIN I SERPL-MCNC: 0.08 NG/ML
WBC NRBC COR # BLD: 9.04 10*3/MM3 (ref 4.5–12.5)

## 2017-04-30 PROCEDURE — 84484 ASSAY OF TROPONIN QUANT: CPT | Performed by: INTERNAL MEDICINE

## 2017-04-30 PROCEDURE — 80053 COMPREHEN METABOLIC PANEL: CPT | Performed by: FAMILY MEDICINE

## 2017-04-30 PROCEDURE — 85027 COMPLETE CBC AUTOMATED: CPT | Performed by: FAMILY MEDICINE

## 2017-04-30 PROCEDURE — 93306 TTE W/DOPPLER COMPLETE: CPT | Performed by: INTERNAL MEDICINE

## 2017-04-30 PROCEDURE — 93005 ELECTROCARDIOGRAM TRACING: CPT | Performed by: INTERNAL MEDICINE

## 2017-04-30 PROCEDURE — 93010 ELECTROCARDIOGRAM REPORT: CPT | Performed by: INTERNAL MEDICINE

## 2017-04-30 PROCEDURE — 25010000002 ONDANSETRON PER 1 MG: Performed by: FAMILY MEDICINE

## 2017-04-30 PROCEDURE — 99232 SBSQ HOSP IP/OBS MODERATE 35: CPT | Performed by: INTERNAL MEDICINE

## 2017-04-30 PROCEDURE — 94799 UNLISTED PULMONARY SVC/PX: CPT

## 2017-04-30 PROCEDURE — 93306 TTE W/DOPPLER COMPLETE: CPT

## 2017-04-30 RX ORDER — ONDANSETRON 2 MG/ML
4 INJECTION INTRAMUSCULAR; INTRAVENOUS ONCE
Status: COMPLETED | OUTPATIENT
Start: 2017-04-30 | End: 2017-04-30

## 2017-04-30 RX ORDER — ONDANSETRON 2 MG/ML
4 INJECTION INTRAMUSCULAR; INTRAVENOUS EVERY 8 HOURS PRN
Status: DISCONTINUED | OUTPATIENT
Start: 2017-04-30 | End: 2017-05-01 | Stop reason: HOSPADM

## 2017-04-30 RX ORDER — ACETAMINOPHEN 325 MG/1
500 TABLET ORAL EVERY 4 HOURS PRN
Status: DISCONTINUED | OUTPATIENT
Start: 2017-04-30 | End: 2017-05-01 | Stop reason: HOSPADM

## 2017-04-30 RX ORDER — PANTOPRAZOLE SODIUM 40 MG/10ML
40 INJECTION, POWDER, LYOPHILIZED, FOR SOLUTION INTRAVENOUS
Status: DISCONTINUED | OUTPATIENT
Start: 2017-04-30 | End: 2017-04-30

## 2017-04-30 RX ADMIN — SODIUM CHLORIDE 75 ML/HR: 9 INJECTION, SOLUTION INTRAVENOUS at 11:19

## 2017-04-30 RX ADMIN — ONDANSETRON 4 MG: 2 INJECTION, SOLUTION INTRAMUSCULAR; INTRAVENOUS at 08:55

## 2017-04-30 RX ADMIN — QUETIAPINE FUMARATE 50 MG: 25 TABLET, FILM COATED ORAL at 19:43

## 2017-04-30 RX ADMIN — BACLOFEN 10 MG: 10 TABLET ORAL at 19:43

## 2017-04-30 RX ADMIN — OXYCODONE HYDROCHLORIDE AND ACETAMINOPHEN 1 TABLET: 5; 325 TABLET ORAL at 07:35

## 2017-04-30 RX ADMIN — SODIUM CHLORIDE 75 ML/HR: 9 INJECTION, SOLUTION INTRAVENOUS at 23:40

## 2017-04-30 RX ADMIN — OXYCODONE HYDROCHLORIDE AND ACETAMINOPHEN 1 TABLET: 5; 325 TABLET ORAL at 13:51

## 2017-04-30 RX ADMIN — SUCRALFATE 1 G: 1 TABLET ORAL at 08:13

## 2017-04-30 RX ADMIN — FAMOTIDINE 40 MG: 20 TABLET, FILM COATED ORAL at 08:13

## 2017-04-30 RX ADMIN — ONDANSETRON 4 MG: 2 INJECTION, SOLUTION INTRAMUSCULAR; INTRAVENOUS at 02:02

## 2017-04-30 RX ADMIN — ASPIRIN 81 MG: 81 TABLET ORAL at 08:13

## 2017-04-30 RX ADMIN — BACLOFEN 10 MG: 10 TABLET ORAL at 08:13

## 2017-04-30 RX ADMIN — FAMOTIDINE 40 MG: 20 TABLET, FILM COATED ORAL at 19:43

## 2017-04-30 RX ADMIN — ACETAMINOPHEN 487.5 MG: 325 TABLET, FILM COATED ORAL at 02:02

## 2017-04-30 RX ADMIN — HYDROCHLOROTHIAZIDE 25 MG: 25 TABLET ORAL at 08:13

## 2017-04-30 RX ADMIN — OXYCODONE HYDROCHLORIDE AND ACETAMINOPHEN 1 TABLET: 5; 325 TABLET ORAL at 19:45

## 2017-05-01 ENCOUNTER — APPOINTMENT (OUTPATIENT)
Dept: NUCLEAR MEDICINE | Facility: HOSPITAL | Age: 47
End: 2017-05-01
Attending: INTERNAL MEDICINE

## 2017-05-01 ENCOUNTER — APPOINTMENT (OUTPATIENT)
Dept: CARDIOLOGY | Facility: HOSPITAL | Age: 47
End: 2017-05-01
Attending: INTERNAL MEDICINE

## 2017-05-01 VITALS
WEIGHT: 161.3 LBS | BODY MASS INDEX: 25.92 KG/M2 | RESPIRATION RATE: 20 BRPM | SYSTOLIC BLOOD PRESSURE: 130 MMHG | OXYGEN SATURATION: 99 % | HEIGHT: 66 IN | DIASTOLIC BLOOD PRESSURE: 80 MMHG | HEART RATE: 96 BPM | TEMPERATURE: 99 F

## 2017-05-01 LAB
BH CV NUCLEAR PRIOR STUDY: 3
BH CV STRESS BP STAGE 1: NORMAL
BH CV STRESS BP STAGE 2: NORMAL
BH CV STRESS COMMENTS STAGE 1: NORMAL
BH CV STRESS COMMENTS STAGE 2: NORMAL
BH CV STRESS DOSE REGADENOSON STAGE 1: 0.4
BH CV STRESS DURATION MIN STAGE 1: 0
BH CV STRESS DURATION MIN STAGE 2: 4
BH CV STRESS DURATION SEC STAGE 1: 15
BH CV STRESS DURATION SEC STAGE 2: 0
BH CV STRESS HR STAGE 1: 96
BH CV STRESS HR STAGE 2: 102
BH CV STRESS PROTOCOL 1: NORMAL
BH CV STRESS RECOVERY BP: NORMAL MMHG
BH CV STRESS RECOVERY HR: 97 BPM
BH CV STRESS STAGE 1: 1
BH CV STRESS STAGE 2: 2
LV EF NUC BP: 64 %
MAXIMAL PREDICTED HEART RATE: 172 BPM
PERCENT MAX PREDICTED HR: 69.77 %
STRESS BASELINE BP: NORMAL MMHG
STRESS BASELINE HR: 92 BPM
STRESS PERCENT HR: 82 %
STRESS POST PEAK BP: NORMAL MMHG
STRESS POST PEAK HR: 120 BPM
STRESS TARGET HR: 146 BPM

## 2017-05-01 PROCEDURE — 0 TECHNETIUM SESTAMIBI: Performed by: FAMILY MEDICINE

## 2017-05-01 PROCEDURE — 78452 HT MUSCLE IMAGE SPECT MULT: CPT | Performed by: INTERNAL MEDICINE

## 2017-05-01 PROCEDURE — 94799 UNLISTED PULMONARY SVC/PX: CPT

## 2017-05-01 PROCEDURE — 93018 CV STRESS TEST I&R ONLY: CPT | Performed by: INTERNAL MEDICINE

## 2017-05-01 PROCEDURE — 93017 CV STRESS TEST TRACING ONLY: CPT

## 2017-05-01 PROCEDURE — 25010000002 REGADENOSON 0.4 MG/5ML SOLUTION: Performed by: INTERNAL MEDICINE

## 2017-05-01 PROCEDURE — 99232 SBSQ HOSP IP/OBS MODERATE 35: CPT | Performed by: INTERNAL MEDICINE

## 2017-05-01 PROCEDURE — 78451 HT MUSCLE IMAGE SPECT SING: CPT

## 2017-05-01 PROCEDURE — A9500 TC99M SESTAMIBI: HCPCS | Performed by: FAMILY MEDICINE

## 2017-05-01 RX ADMIN — ASPIRIN 81 MG: 81 TABLET ORAL at 12:54

## 2017-05-01 RX ADMIN — HYDROCHLOROTHIAZIDE 25 MG: 25 TABLET ORAL at 12:54

## 2017-05-01 RX ADMIN — BACLOFEN 10 MG: 10 TABLET ORAL at 12:54

## 2017-05-01 RX ADMIN — Medication 1 DOSE: at 10:55

## 2017-05-01 RX ADMIN — OXYCODONE HYDROCHLORIDE AND ACETAMINOPHEN 1 TABLET: 5; 325 TABLET ORAL at 12:54

## 2017-05-01 RX ADMIN — REGADENOSON 0.4 MG: 0.08 INJECTION, SOLUTION INTRAVENOUS at 10:55

## 2017-05-01 RX ADMIN — FAMOTIDINE 40 MG: 20 TABLET, FILM COATED ORAL at 12:54

## 2017-05-01 RX ADMIN — Medication 1 DOSE: at 10:28

## 2017-05-03 ENCOUNTER — DOCUMENTATION (OUTPATIENT)
Dept: GASTROENTEROLOGY | Facility: CLINIC | Age: 47
End: 2017-05-03

## 2017-05-04 ENCOUNTER — OFFICE VISIT (OUTPATIENT)
Dept: GASTROENTEROLOGY | Facility: CLINIC | Age: 47
End: 2017-05-04

## 2017-05-04 ENCOUNTER — OFFICE VISIT (OUTPATIENT)
Dept: SURGERY | Facility: CLINIC | Age: 47
End: 2017-05-04

## 2017-05-04 VITALS
HEIGHT: 66 IN | OXYGEN SATURATION: 97 % | HEART RATE: 113 BPM | BODY MASS INDEX: 24.85 KG/M2 | SYSTOLIC BLOOD PRESSURE: 141 MMHG | WEIGHT: 154.6 LBS | DIASTOLIC BLOOD PRESSURE: 94 MMHG

## 2017-05-04 VITALS
HEIGHT: 66 IN | HEART RATE: 125 BPM | DIASTOLIC BLOOD PRESSURE: 98 MMHG | BODY MASS INDEX: 25.07 KG/M2 | WEIGHT: 156 LBS | SYSTOLIC BLOOD PRESSURE: 150 MMHG

## 2017-05-04 DIAGNOSIS — K92.1 MELENA: Primary | ICD-10-CM

## 2017-05-04 DIAGNOSIS — R10.33 PERIUMBILICAL ABDOMINAL PAIN: ICD-10-CM

## 2017-05-04 DIAGNOSIS — R11.0 NAUSEA: ICD-10-CM

## 2017-05-04 DIAGNOSIS — R94.8 ABNORMAL BILIARY HIDA SCAN: Primary | ICD-10-CM

## 2017-05-04 DIAGNOSIS — R63.4 WEIGHT LOSS, ABNORMAL: ICD-10-CM

## 2017-05-04 DIAGNOSIS — Z09 POSTOP CHECK: ICD-10-CM

## 2017-05-04 DIAGNOSIS — R10.13 EPIGASTRIC PAIN: ICD-10-CM

## 2017-05-04 DIAGNOSIS — R10.11 RIGHT UPPER QUADRANT ABDOMINAL PAIN: ICD-10-CM

## 2017-05-04 DIAGNOSIS — Z87.11 HISTORY OF BLEEDING ULCERS: ICD-10-CM

## 2017-05-04 DIAGNOSIS — R53.81 MALAISE AND FATIGUE: ICD-10-CM

## 2017-05-04 DIAGNOSIS — R53.83 MALAISE AND FATIGUE: ICD-10-CM

## 2017-05-04 PROCEDURE — 99024 POSTOP FOLLOW-UP VISIT: CPT | Performed by: SURGERY

## 2017-05-04 PROCEDURE — 99214 OFFICE O/P EST MOD 30 MIN: CPT | Performed by: PHYSICIAN ASSISTANT

## 2017-05-04 RX ORDER — HYDROCODONE BITARTRATE AND ACETAMINOPHEN 7.5; 325 MG/1; MG/1
TABLET ORAL
COMMUNITY
Start: 2017-05-02 | End: 2017-11-02

## 2017-05-04 RX ORDER — PROMETHAZINE HYDROCHLORIDE 25 MG/1
TABLET ORAL
COMMUNITY
Start: 2017-05-02 | End: 2018-02-27

## 2017-05-06 PROBLEM — R94.8 ABNORMAL BILIARY HIDA SCAN: Status: ACTIVE | Noted: 2017-05-06

## 2017-05-08 ENCOUNTER — ANESTHESIA EVENT (OUTPATIENT)
Dept: PERIOP | Facility: HOSPITAL | Age: 47
End: 2017-05-08

## 2017-05-08 ENCOUNTER — HOSPITAL ENCOUNTER (OUTPATIENT)
Facility: HOSPITAL | Age: 47
Setting detail: HOSPITAL OUTPATIENT SURGERY
Discharge: HOME | End: 2017-05-08
Attending: INTERNAL MEDICINE | Admitting: INTERNAL MEDICINE

## 2017-05-08 ENCOUNTER — ANESTHESIA (OUTPATIENT)
Dept: PERIOP | Facility: HOSPITAL | Age: 47
End: 2017-05-08

## 2017-05-08 VITALS
OXYGEN SATURATION: 100 % | TEMPERATURE: 98.7 F | HEIGHT: 66 IN | BODY MASS INDEX: 24.75 KG/M2 | WEIGHT: 154 LBS | RESPIRATION RATE: 20 BRPM | SYSTOLIC BLOOD PRESSURE: 129 MMHG | HEART RATE: 84 BPM | DIASTOLIC BLOOD PRESSURE: 84 MMHG

## 2017-05-08 DIAGNOSIS — R53.81 MALAISE AND FATIGUE: ICD-10-CM

## 2017-05-08 DIAGNOSIS — R11.0 NAUSEA: ICD-10-CM

## 2017-05-08 DIAGNOSIS — R53.83 MALAISE AND FATIGUE: ICD-10-CM

## 2017-05-08 DIAGNOSIS — K92.1 MELENA: ICD-10-CM

## 2017-05-08 DIAGNOSIS — R10.11 RIGHT UPPER QUADRANT ABDOMINAL PAIN: ICD-10-CM

## 2017-05-08 DIAGNOSIS — R10.13 EPIGASTRIC PAIN: ICD-10-CM

## 2017-05-08 DIAGNOSIS — R10.33 PERIUMBILICAL ABDOMINAL PAIN: ICD-10-CM

## 2017-05-08 DIAGNOSIS — R63.4 WEIGHT LOSS, ABNORMAL: ICD-10-CM

## 2017-05-08 DIAGNOSIS — Z87.11 HISTORY OF BLEEDING ULCERS: ICD-10-CM

## 2017-05-08 LAB
027 TOXIN: (no result)
C DIFF TOX GENS STL QL NAA+PROBE: NEGATIVE

## 2017-05-08 PROCEDURE — 87177 OVA AND PARASITES SMEARS: CPT | Performed by: INTERNAL MEDICINE

## 2017-05-08 PROCEDURE — 25010000002 PROPOFOL 1000 MG/ML EMULSION: Performed by: NURSE ANESTHETIST, CERTIFIED REGISTERED

## 2017-05-08 PROCEDURE — 25010000002 MIDAZOLAM PER 1 MG: Performed by: NURSE ANESTHETIST, CERTIFIED REGISTERED

## 2017-05-08 PROCEDURE — 45380 COLONOSCOPY AND BIOPSY: CPT | Performed by: INTERNAL MEDICINE

## 2017-05-08 PROCEDURE — 25010000002 FENTANYL CITRATE (PF) 100 MCG/2ML SOLUTION: Performed by: NURSE ANESTHETIST, CERTIFIED REGISTERED

## 2017-05-08 PROCEDURE — 43239 EGD BIOPSY SINGLE/MULTIPLE: CPT | Performed by: INTERNAL MEDICINE

## 2017-05-08 PROCEDURE — 87493 C DIFF AMPLIFIED PROBE: CPT | Performed by: INTERNAL MEDICINE

## 2017-05-08 PROCEDURE — 87209 SMEAR COMPLEX STAIN: CPT | Performed by: INTERNAL MEDICINE

## 2017-05-08 PROCEDURE — 88305 TISSUE EXAM BY PATHOLOGIST: CPT | Performed by: INTERNAL MEDICINE

## 2017-05-08 PROCEDURE — 25010000002 PROPOFOL 10 MG/ML EMULSION: Performed by: NURSE ANESTHETIST, CERTIFIED REGISTERED

## 2017-05-08 RX ORDER — OXYCODONE HYDROCHLORIDE AND ACETAMINOPHEN 5; 325 MG/1; MG/1
1 TABLET ORAL ONCE AS NEEDED
Status: DISCONTINUED | OUTPATIENT
Start: 2017-05-08 | End: 2017-05-08 | Stop reason: HOSPADM

## 2017-05-08 RX ORDER — SODIUM CHLORIDE 0.9 % (FLUSH) 0.9 %
1-10 SYRINGE (ML) INJECTION AS NEEDED
Status: DISCONTINUED | OUTPATIENT
Start: 2017-05-08 | End: 2017-05-08 | Stop reason: HOSPADM

## 2017-05-08 RX ORDER — PROPOFOL 10 MG/ML
VIAL (ML) INTRAVENOUS AS NEEDED
Status: DISCONTINUED | OUTPATIENT
Start: 2017-05-08 | End: 2017-05-08 | Stop reason: SURG

## 2017-05-08 RX ORDER — LIDOCAINE HYDROCHLORIDE 20 MG/ML
INJECTION, SOLUTION INFILTRATION; PERINEURAL AS NEEDED
Status: DISCONTINUED | OUTPATIENT
Start: 2017-05-08 | End: 2017-05-08 | Stop reason: SURG

## 2017-05-08 RX ORDER — MIDAZOLAM HYDROCHLORIDE 1 MG/ML
INJECTION INTRAMUSCULAR; INTRAVENOUS AS NEEDED
Status: DISCONTINUED | OUTPATIENT
Start: 2017-05-08 | End: 2017-05-08 | Stop reason: SURG

## 2017-05-08 RX ORDER — FENTANYL CITRATE 50 UG/ML
INJECTION, SOLUTION INTRAMUSCULAR; INTRAVENOUS AS NEEDED
Status: DISCONTINUED | OUTPATIENT
Start: 2017-05-08 | End: 2017-05-08 | Stop reason: SURG

## 2017-05-08 RX ORDER — IPRATROPIUM BROMIDE AND ALBUTEROL SULFATE 2.5; .5 MG/3ML; MG/3ML
3 SOLUTION RESPIRATORY (INHALATION) ONCE AS NEEDED
Status: DISCONTINUED | OUTPATIENT
Start: 2017-05-08 | End: 2017-05-08 | Stop reason: HOSPADM

## 2017-05-08 RX ORDER — MEPERIDINE HYDROCHLORIDE 25 MG/ML
12.5 INJECTION INTRAMUSCULAR; INTRAVENOUS; SUBCUTANEOUS
Status: DISCONTINUED | OUTPATIENT
Start: 2017-05-08 | End: 2017-05-08 | Stop reason: HOSPADM

## 2017-05-08 RX ORDER — ONDANSETRON 2 MG/ML
4 INJECTION INTRAMUSCULAR; INTRAVENOUS ONCE AS NEEDED
Status: DISCONTINUED | OUTPATIENT
Start: 2017-05-08 | End: 2017-05-08 | Stop reason: HOSPADM

## 2017-05-08 RX ORDER — SODIUM CHLORIDE, SODIUM LACTATE, POTASSIUM CHLORIDE, CALCIUM CHLORIDE 600; 310; 30; 20 MG/100ML; MG/100ML; MG/100ML; MG/100ML
125 INJECTION, SOLUTION INTRAVENOUS CONTINUOUS
Status: DISCONTINUED | OUTPATIENT
Start: 2017-05-08 | End: 2017-05-08 | Stop reason: HOSPADM

## 2017-05-08 RX ORDER — FENTANYL CITRATE 50 UG/ML
50 INJECTION, SOLUTION INTRAMUSCULAR; INTRAVENOUS
Status: DISCONTINUED | OUTPATIENT
Start: 2017-05-08 | End: 2017-05-08 | Stop reason: HOSPADM

## 2017-05-08 RX ADMIN — FENTANYL CITRATE 100 MCG: 50 INJECTION INTRAMUSCULAR; INTRAVENOUS at 12:40

## 2017-05-08 RX ADMIN — PROPOFOL 50 MG: 10 INJECTION, EMULSION INTRAVENOUS at 12:43

## 2017-05-08 RX ADMIN — LIDOCAINE HYDROCHLORIDE 100 MG: 20 INJECTION, SOLUTION INFILTRATION; PERINEURAL at 12:40

## 2017-05-08 RX ADMIN — PROPOFOL 75 MG: 10 INJECTION, EMULSION INTRAVENOUS at 12:40

## 2017-05-08 RX ADMIN — SODIUM CHLORIDE, POTASSIUM CHLORIDE, SODIUM LACTATE AND CALCIUM CHLORIDE: 600; 310; 30; 20 INJECTION, SOLUTION INTRAVENOUS at 12:37

## 2017-05-08 RX ADMIN — PROPOFOL 160 MCG/KG/MIN: 10 INJECTION, EMULSION INTRAVENOUS at 12:41

## 2017-05-08 RX ADMIN — MIDAZOLAM HYDROCHLORIDE 2 MG: 1 INJECTION, SOLUTION INTRAMUSCULAR; INTRAVENOUS at 12:36

## 2017-05-09 LAB
LAB AP CASE REPORT: NORMAL
Lab: NORMAL
PATH REPORT.FINAL DX SPEC: NORMAL

## 2017-05-11 LAB
O+P SPEC MICRO: NORMAL
OVA + PARASITE RESULT 1: NORMAL

## 2017-05-12 ENCOUNTER — TELEPHONE (OUTPATIENT)
Dept: GASTROENTEROLOGY | Facility: CLINIC | Age: 47
End: 2017-05-12

## 2017-10-25 ENCOUNTER — HOSPITAL ENCOUNTER (EMERGENCY)
Facility: HOSPITAL | Age: 47
Discharge: HOME | End: 2017-10-25
Attending: EMERGENCY MEDICINE | Admitting: EMERGENCY MEDICINE

## 2017-10-25 VITALS
BODY MASS INDEX: 23.63 KG/M2 | TEMPERATURE: 98.6 F | DIASTOLIC BLOOD PRESSURE: 82 MMHG | OXYGEN SATURATION: 100 % | RESPIRATION RATE: 16 BRPM | WEIGHT: 147 LBS | HEART RATE: 97 BPM | HEIGHT: 66 IN | SYSTOLIC BLOOD PRESSURE: 129 MMHG

## 2017-10-25 DIAGNOSIS — D64.9 ANEMIA, UNSPECIFIED TYPE: Primary | ICD-10-CM

## 2017-10-25 LAB
ALBUMIN SERPL-MCNC: 4.3 G/DL (ref 3.5–5)
ALBUMIN/GLOB SERPL: 1.9 G/DL (ref 1.5–2.5)
ALP SERPL-CCNC: 69 U/L (ref 40–129)
ALT SERPL W P-5'-P-CCNC: 10 U/L (ref 10–44)
ANION GAP SERPL CALCULATED.3IONS-SCNC: 3.1 MMOL/L (ref 3.6–11.2)
APTT PPP: 27.9 SECONDS (ref 23.8–36.1)
AST SERPL-CCNC: 17 U/L (ref 10–34)
BASOPHILS # BLD AUTO: 0.03 10*3/MM3 (ref 0–0.3)
BASOPHILS NFR BLD AUTO: 0.3 % (ref 0–2)
BILIRUB SERPL-MCNC: 0.1 MG/DL (ref 0.2–1.8)
BUN BLD-MCNC: 7 MG/DL (ref 7–21)
BUN/CREAT SERPL: 6.9 (ref 7–25)
CALCIUM SPEC-SCNC: 9 MG/DL (ref 7.7–10)
CHLORIDE SERPL-SCNC: 108 MMOL/L (ref 99–112)
CO2 SERPL-SCNC: 23.9 MMOL/L (ref 24.3–31.9)
CREAT BLD-MCNC: 1.02 MG/DL (ref 0.43–1.29)
DEPRECATED RDW RBC AUTO: 50 FL (ref 37–54)
EOSINOPHIL # BLD AUTO: 0.03 10*3/MM3 (ref 0–0.7)
EOSINOPHIL NFR BLD AUTO: 0.3 % (ref 0–5)
ERYTHROCYTE [DISTWIDTH] IN BLOOD BY AUTOMATED COUNT: 16.7 % (ref 11.5–14.5)
GFR SERPL CREATININE-BSD FRML MDRD: 78 ML/MIN/1.73
GLOBULIN UR ELPH-MCNC: 2.3 GM/DL
GLUCOSE BLD-MCNC: 194 MG/DL (ref 70–110)
HCT VFR BLD AUTO: 35.5 % (ref 42–52)
HGB BLD-MCNC: 11.1 G/DL (ref 14–18)
IMM GRANULOCYTES # BLD: 0.03 10*3/MM3 (ref 0–0.03)
IMM GRANULOCYTES NFR BLD: 0.3 % (ref 0–0.5)
INR PPP: 0.95 (ref 0.9–1.1)
LIPASE SERPL-CCNC: 37 U/L (ref 13–60)
LYMPHOCYTES # BLD AUTO: 1.45 10*3/MM3 (ref 1–3)
LYMPHOCYTES NFR BLD AUTO: 14.2 % (ref 21–51)
MCH RBC QN AUTO: 25.8 PG (ref 27–33)
MCHC RBC AUTO-ENTMCNC: 31.3 G/DL (ref 33–37)
MCV RBC AUTO: 82.4 FL (ref 80–94)
MONOCYTES # BLD AUTO: 0.76 10*3/MM3 (ref 0.1–0.9)
MONOCYTES NFR BLD AUTO: 7.4 % (ref 0–10)
NEUTROPHILS # BLD AUTO: 7.94 10*3/MM3 (ref 1.4–6.5)
NEUTROPHILS NFR BLD AUTO: 77.5 % (ref 30–70)
OSMOLALITY SERPL CALC.SUM OF ELEC: 273.4 MOSM/KG (ref 273–305)
PLATELET # BLD AUTO: 346 10*3/MM3 (ref 130–400)
PMV BLD AUTO: 10.2 FL (ref 6–10)
POTASSIUM BLD-SCNC: 3.3 MMOL/L (ref 3.5–5.3)
PROT SERPL-MCNC: 6.6 G/DL (ref 6–8)
PROTHROMBIN TIME: 12.8 SECONDS (ref 11–15.4)
RBC # BLD AUTO: 4.31 10*6/MM3 (ref 4.7–6.1)
SODIUM BLD-SCNC: 135 MMOL/L (ref 135–153)
WBC NRBC COR # BLD: 10.24 10*3/MM3 (ref 4.5–12.5)

## 2017-10-25 PROCEDURE — 96360 HYDRATION IV INFUSION INIT: CPT

## 2017-10-25 PROCEDURE — 99283 EMERGENCY DEPT VISIT LOW MDM: CPT

## 2017-10-25 PROCEDURE — 83690 ASSAY OF LIPASE: CPT | Performed by: EMERGENCY MEDICINE

## 2017-10-25 PROCEDURE — 85730 THROMBOPLASTIN TIME PARTIAL: CPT | Performed by: EMERGENCY MEDICINE

## 2017-10-25 PROCEDURE — 85025 COMPLETE CBC W/AUTO DIFF WBC: CPT | Performed by: EMERGENCY MEDICINE

## 2017-10-25 PROCEDURE — 85610 PROTHROMBIN TIME: CPT | Performed by: EMERGENCY MEDICINE

## 2017-10-25 PROCEDURE — 80053 COMPREHEN METABOLIC PANEL: CPT | Performed by: EMERGENCY MEDICINE

## 2017-10-25 RX ORDER — ESOMEPRAZOLE MAGNESIUM 40 MG/1
40 CAPSULE, DELAYED RELEASE ORAL
COMMUNITY
End: 2017-12-13 | Stop reason: SDUPTHER

## 2017-10-25 RX ORDER — PANTOPRAZOLE SODIUM 40 MG/10ML
80 INJECTION, POWDER, LYOPHILIZED, FOR SOLUTION INTRAVENOUS ONCE
Status: DISCONTINUED | OUTPATIENT
Start: 2017-10-25 | End: 2017-10-26 | Stop reason: HOSPADM

## 2017-10-25 RX ORDER — ONDANSETRON 4 MG/1
4 TABLET, ORALLY DISINTEGRATING ORAL EVERY 6 HOURS PRN
Qty: 12 TABLET | Refills: 0 | Status: SHIPPED | OUTPATIENT
Start: 2017-10-25 | End: 2019-09-12

## 2017-10-25 RX ORDER — CITALOPRAM 10 MG/1
10 TABLET ORAL DAILY
COMMUNITY
End: 2019-09-12

## 2017-10-25 RX ORDER — PANTOPRAZOLE SODIUM 40 MG/1
40 TABLET, DELAYED RELEASE ORAL 2 TIMES DAILY
Qty: 24 TABLET | Refills: 0 | Status: SHIPPED | OUTPATIENT
Start: 2017-10-25 | End: 2017-11-02

## 2017-10-25 RX ADMIN — SODIUM CHLORIDE 1000 ML: 9 INJECTION, SOLUTION INTRAVENOUS at 20:52

## 2017-10-26 NOTE — ED NOTES
Per provider, pt is to finish fluid bolus prior to being discharged.     Diana Kern RN  10/25/17 9835

## 2017-10-26 NOTE — ED PROVIDER NOTES
Subjective   Patient is a 47 y.o. male presenting with vomiting.   History provided by:  Patient  Vomiting   The primary symptoms include nausea, vomiting and hematemesis. Primary symptoms do not include fever, abdominal pain or dysuria. The illness began yesterday. The onset was gradual.   The vomiting began today. Vomiting occurs 2 to 5 times per day. The emesis contains stomach contents (some blood in vomitus).   The illness is also significant for chills.       Review of Systems   Constitutional: Positive for chills. Negative for fever.   HENT: Negative.    Respiratory: Negative.    Cardiovascular: Negative.  Negative for chest pain.   Gastrointestinal: Positive for hematemesis, nausea and vomiting. Negative for abdominal pain.   Endocrine: Negative.    Genitourinary: Negative.  Negative for dysuria.   Skin: Negative.    Neurological: Negative.    Psychiatric/Behavioral: Negative.    All other systems reviewed and are negative.      Past Medical History:   Diagnosis Date   • Johnson esophagus    • Barretts esophagus    • COPD (chronic obstructive pulmonary disease)    • DDD (degenerative disc disease), thoracic    • Degenerative disc disease, lumbar    • GERD (gastroesophageal reflux disease)    • Hypertension    • Peptic ulcer disease    • Stroke        Allergies   Allergen Reactions   • Protonix [Pantoprazole Sodium] Palpitations     Patient states that protonix causes chest pain.  Shruthi Pérez [facility]  4/23/2017  11:19 AM         Past Surgical History:   Procedure Laterality Date   • APPENDECTOMY     • CHOLECYSTECTOMY N/A 4/22/2017    Procedure: CHOLECYSTECTOMY LAPAROSCOPIC;  Surgeon: Jaqueline Guaman MD;  Location: [facility];  Service:    • CHOLECYSTECTOMY     • COLONOSCOPY N/A 5/8/2017    Procedure: COLONOSCOPY  CPTCODE:92467;  Surgeon: Nicho Richey III, MD;  Location: [facility] OR;  Service:    • ENDOSCOPY     • ENDOSCOPY N/A 5/8/2017    Procedure: ESOPHAGOGASTRODUODENOSCOPY WITH BIOPSY   CPTCODE:78170;  Surgeon: Nicho Richey III, MD;  Location: [facility];  Service:        Family History   Problem Relation Age of Onset   • Cancer Other    • Hypertension Other    • Stroke Other    • Diabetes Other        Social History     Social History   • Marital status:      Spouse name: N/A   • Number of children: N/A   • Years of education: N/A     Social History Main Topics   • Smoking status: Current Every Day Smoker     Packs/day: 1.00     Years: 35.00     Types: Cigarettes   • Smokeless tobacco: Never Used   • Alcohol use No   • Drug use: No   • Sexual activity: Defer     Other Topics Concern   • None     Social History Narrative           Objective   Physical Exam   Constitutional: He is oriented to person, place, and time. He appears well-developed and well-nourished. No distress.   HENT:   Head: Normocephalic and atraumatic.   Right Ear: External ear normal.   Left Ear: External ear normal.   Nose: Nose normal.   Eyes: Conjunctivae and EOM are normal. Pupils are equal, round, and reactive to light.   Neck: Normal range of motion. Neck supple. No JVD present. No tracheal deviation present.   Cardiovascular: Normal rate, regular rhythm and normal heart sounds.    No murmur heard.  Pulmonary/Chest: Effort normal and breath sounds normal. No respiratory distress. He has no wheezes.   Abdominal: Soft. Bowel sounds are normal. There is no tenderness.   Genitourinary:   Genitourinary Comments: Stool light yellow, guaiac negative   Musculoskeletal: Normal range of motion. He exhibits no edema or deformity.   Neurological: He is alert and oriented to person, place, and time. No cranial nerve deficit.   Skin: Skin is warm and dry. No rash noted. He is not diaphoretic. No erythema. No pallor.   Psychiatric: He has a normal mood and affect. His behavior is normal. Thought content normal.   Nursing note and vitals reviewed.      Procedures         ED Course  ED Course                  MDM    Final  diagnoses:   Anemia, unspecified type            Gordon Grier MD  10/25/17 2049       Gordon Grier MD  10/25/17 2122

## 2017-10-26 NOTE — ED NOTES
"Pt presents to the ED this night c/o \"coughing up blood\", pt states it is not bright red blood, it is just mixed with his saliva/sputum.  Pt states he has been told by his PCP that he is \"anemic\" and will be starting iron medicine when the pharmacy receives it.  Pt also states he has \"lost 65lbs  In less than 6 months\" and does not know why.     Diana Kern RN  10/25/17 2011    "

## 2017-10-26 NOTE — ED NOTES
Provider in room discussing results and plan to discharge home at this time, all questions and concerns addressed, pt and family verbalize understanding.     Diana Kern RN  10/25/17 3040

## 2017-10-31 ENCOUNTER — TELEPHONE (OUTPATIENT)
Dept: GASTROENTEROLOGY | Facility: CLINIC | Age: 47
End: 2017-10-31

## 2017-10-31 DIAGNOSIS — K92.0 HEMATEMESIS WITH NAUSEA: Primary | ICD-10-CM

## 2017-11-01 PROBLEM — K92.0 HEMATEMESIS WITH NAUSEA: Status: ACTIVE | Noted: 2017-11-01

## 2017-11-03 ENCOUNTER — ANESTHESIA (OUTPATIENT)
Dept: PERIOP | Facility: HOSPITAL | Age: 47
End: 2017-11-03

## 2017-11-03 ENCOUNTER — TRANSCRIBE ORDERS (OUTPATIENT)
Dept: ADMINISTRATIVE | Facility: HOSPITAL | Age: 47
End: 2017-11-03

## 2017-11-03 ENCOUNTER — ANESTHESIA EVENT (OUTPATIENT)
Dept: PERIOP | Facility: HOSPITAL | Age: 47
End: 2017-11-03

## 2017-11-03 ENCOUNTER — HOSPITAL ENCOUNTER (OUTPATIENT)
Facility: HOSPITAL | Age: 47
Setting detail: HOSPITAL OUTPATIENT SURGERY
Discharge: HOME | End: 2017-11-03
Attending: INTERNAL MEDICINE | Admitting: INTERNAL MEDICINE

## 2017-11-03 VITALS
RESPIRATION RATE: 18 BRPM | WEIGHT: 143 LBS | BODY MASS INDEX: 22.98 KG/M2 | DIASTOLIC BLOOD PRESSURE: 86 MMHG | OXYGEN SATURATION: 99 % | TEMPERATURE: 98.1 F | HEIGHT: 66 IN | SYSTOLIC BLOOD PRESSURE: 149 MMHG | HEART RATE: 78 BPM

## 2017-11-03 DIAGNOSIS — R10.84 ABDOMINAL PAIN, GENERALIZED: Primary | ICD-10-CM

## 2017-11-03 DIAGNOSIS — K92.0 HEMATEMESIS WITH NAUSEA: ICD-10-CM

## 2017-11-03 PROCEDURE — 88342 IMHCHEM/IMCYTCHM 1ST ANTB: CPT | Performed by: INTERNAL MEDICINE

## 2017-11-03 PROCEDURE — 88305 TISSUE EXAM BY PATHOLOGIST: CPT | Performed by: INTERNAL MEDICINE

## 2017-11-03 PROCEDURE — 25010000002 MIDAZOLAM PER 1 MG: Performed by: NURSE ANESTHETIST, CERTIFIED REGISTERED

## 2017-11-03 PROCEDURE — 25010000002 FENTANYL CITRATE (PF) 100 MCG/2ML SOLUTION: Performed by: NURSE ANESTHETIST, CERTIFIED REGISTERED

## 2017-11-03 PROCEDURE — 43239 EGD BIOPSY SINGLE/MULTIPLE: CPT | Performed by: INTERNAL MEDICINE

## 2017-11-03 PROCEDURE — 25010000002 PROPOFOL 10 MG/ML EMULSION: Performed by: NURSE ANESTHETIST, CERTIFIED REGISTERED

## 2017-11-03 RX ORDER — FENTANYL CITRATE 50 UG/ML
50 INJECTION, SOLUTION INTRAMUSCULAR; INTRAVENOUS
Status: DISCONTINUED | OUTPATIENT
Start: 2017-11-03 | End: 2017-11-03 | Stop reason: HOSPADM

## 2017-11-03 RX ORDER — PROPOFOL 10 MG/ML
VIAL (ML) INTRAVENOUS AS NEEDED
Status: DISCONTINUED | OUTPATIENT
Start: 2017-11-03 | End: 2017-11-03 | Stop reason: SURG

## 2017-11-03 RX ORDER — ONDANSETRON 2 MG/ML
4 INJECTION INTRAMUSCULAR; INTRAVENOUS ONCE AS NEEDED
Status: DISCONTINUED | OUTPATIENT
Start: 2017-11-03 | End: 2017-11-03 | Stop reason: HOSPADM

## 2017-11-03 RX ORDER — MIDAZOLAM HYDROCHLORIDE 1 MG/ML
INJECTION INTRAMUSCULAR; INTRAVENOUS AS NEEDED
Status: DISCONTINUED | OUTPATIENT
Start: 2017-11-03 | End: 2017-11-03 | Stop reason: SURG

## 2017-11-03 RX ORDER — SODIUM CHLORIDE 0.9 % (FLUSH) 0.9 %
1-10 SYRINGE (ML) INJECTION AS NEEDED
Status: DISCONTINUED | OUTPATIENT
Start: 2017-11-03 | End: 2017-11-03 | Stop reason: HOSPADM

## 2017-11-03 RX ORDER — SODIUM CHLORIDE, SODIUM LACTATE, POTASSIUM CHLORIDE, CALCIUM CHLORIDE 600; 310; 30; 20 MG/100ML; MG/100ML; MG/100ML; MG/100ML
125 INJECTION, SOLUTION INTRAVENOUS CONTINUOUS
Status: DISCONTINUED | OUTPATIENT
Start: 2017-11-03 | End: 2017-11-03 | Stop reason: HOSPADM

## 2017-11-03 RX ORDER — IPRATROPIUM BROMIDE AND ALBUTEROL SULFATE 2.5; .5 MG/3ML; MG/3ML
3 SOLUTION RESPIRATORY (INHALATION) ONCE AS NEEDED
Status: DISCONTINUED | OUTPATIENT
Start: 2017-11-03 | End: 2017-11-03 | Stop reason: HOSPADM

## 2017-11-03 RX ORDER — LIDOCAINE HYDROCHLORIDE 20 MG/ML
INJECTION, SOLUTION INFILTRATION; PERINEURAL AS NEEDED
Status: DISCONTINUED | OUTPATIENT
Start: 2017-11-03 | End: 2017-11-03 | Stop reason: SURG

## 2017-11-03 RX ORDER — FENTANYL CITRATE 50 UG/ML
INJECTION, SOLUTION INTRAMUSCULAR; INTRAVENOUS AS NEEDED
Status: DISCONTINUED | OUTPATIENT
Start: 2017-11-03 | End: 2017-11-03 | Stop reason: SURG

## 2017-11-03 RX ADMIN — LIDOCAINE HYDROCHLORIDE 100 MG: 20 INJECTION, SOLUTION INFILTRATION; PERINEURAL at 10:47

## 2017-11-03 RX ADMIN — FENTANYL CITRATE 100 MCG: 50 INJECTION INTRAMUSCULAR; INTRAVENOUS at 10:45

## 2017-11-03 RX ADMIN — SODIUM CHLORIDE, POTASSIUM CHLORIDE, SODIUM LACTATE AND CALCIUM CHLORIDE: 600; 310; 30; 20 INJECTION, SOLUTION INTRAVENOUS at 10:45

## 2017-11-03 RX ADMIN — MIDAZOLAM HYDROCHLORIDE 2 MG: 1 INJECTION, SOLUTION INTRAMUSCULAR; INTRAVENOUS at 10:45

## 2017-11-03 RX ADMIN — PROPOFOL 60 MG: 10 INJECTION, EMULSION INTRAVENOUS at 10:47

## 2017-11-03 NOTE — PLAN OF CARE
Problem: Anesthesia, General (Adult)  Goal: Signs and Symptoms of Listed Potential Problems Will be Absent or Manageable (Anesthesia, General)  Outcome: Ongoing (interventions implemented as appropriate)    11/03/17 0956   Anesthesia, General   Problems Assessed (General Anesthesia) all   Problems Present (General Anesthesia) none

## 2017-11-03 NOTE — PLAN OF CARE
Problem: GI Endoscopy (Adult)  Goal: Signs and Symptoms of Listed Potential Problems Will be Absent or Manageable (GI Endoscopy)  Outcome: Ongoing (interventions implemented as appropriate)    11/03/17 0956   GI Endoscopy   Problems Assessed (GI Endoscopy) all   Problems Present (GI Endoscopy) none

## 2017-11-03 NOTE — ANESTHESIA POSTPROCEDURE EVALUATION
Patient: Jamison Edward    Procedure Summary     Date Anesthesia Start Anesthesia Stop Room / Location    11/03/17 1045 1056 [facility] / [facility]       Procedure Diagnosis Surgeon Provider    ESOPHAGOGASTRODUODENOSCOPY WITH BIOPSY  CPTCODE: 10376 (N/A Esophagus) Hematemesis with nausea  (Hematemesis with nausea [K92.0]) MD Surya Mccabe III, MD          Anesthesia Type: general  Last vitals  BP   129/78 (11/03/17 1101)   Temp   98.1 °F (36.7 °C) (11/03/17 1056)   Pulse   85 (11/03/17 1101)   Resp   16 (11/03/17 1101)     SpO2   100 % (11/03/17 1101)     Post Anesthesia Care and Evaluation    Patient location during evaluation: bedside  Patient participation: complete - patient participated  Level of consciousness: awake and alert  Pain score: 1  Pain management: adequate  Airway patency: patent  Anesthetic complications: No anesthetic complications  PONV Status: none  Cardiovascular status: acceptable  Respiratory status: acceptable  Hydration status: acceptable

## 2017-11-03 NOTE — PLAN OF CARE
Problem: GI Endoscopy (Adult)  Goal: Signs and Symptoms of Listed Potential Problems Will be Absent or Manageable (GI Endoscopy)  Outcome: Ongoing (interventions implemented as appropriate)    11/03/17 1050   GI Endoscopy   Problems Assessed (GI Endoscopy) all   Problems Present (GI Endoscopy) none

## 2017-11-03 NOTE — OP NOTE
11/03/17    ESOPHAGOGASTRODUODENOSCOPY WITH BIOPSY  Procedure Note    Jamison Edward  11/3/2017    Pre-op Diagnosis: Hematemesis, unexplained weight loss      Post-op Diagnosis: Gastric and duodenal ulcers, described below        Anesthesia: Per Anesthesia service, general anesthesia      Estimated Blood Loss: Negligible      Findings: EGD performed with careful inspection of the esophagus, stomach and duodenum to the fourth portion. Two benign-appearing superficial ulcers were found in the prepyloric area and another benign-appearing superficial ulcer was found in the duodenal bulb.  No bleeding was observed.  The duodenum was not obstructed.  No other abnormality was seen.  Biopsies were taken from the gastric antrum and prepyloric area (from the margins of the ulcers).  The scope was withdrawn and the procedure was terminated without complications.  The patient left the OR in stable and satisfactory condition.      Complications: None      Recommendations:   Findings discussed with the patient  Continue present treatment  Avoid use of NSAIDs      Nicho Richey III, MD     Date: 11/3/2017  Time: 10:53 AM

## 2017-11-03 NOTE — H&P
History and physical examination  November 3, 2017    HPI  47-year-old white male presents for EGD in order to investigate recent unexplained weight loss and hematemesis.  He has history of PUD.      Review of Systems   Negative and noncontributory.    ACTIVE PROBLEMS:   Specialty Problems        Gastroenterology Problems    Melena        * (Principal)Hematemesis with nausea              PAST MEDICAL HISTORY:  Past Medical History:   Diagnosis Date   • Johnson esophagus    • Barretts esophagus    • COPD (chronic obstructive pulmonary disease)    • DDD (degenerative disc disease), thoracic    • Degenerative disc disease, lumbar    • GERD (gastroesophageal reflux disease)    • Hypertension    • Peptic ulcer disease    • Stroke        SURGICAL HISTORY:  Past Surgical History:   Procedure Laterality Date   • APPENDECTOMY     • CHOLECYSTECTOMY N/A 4/22/2017    Procedure: CHOLECYSTECTOMY LAPAROSCOPIC;  Surgeon: Jaqueline Guaman MD;  Location: [facility];  Service:    • CHOLECYSTECTOMY     • COLONOSCOPY N/A 5/8/2017    Procedure: COLONOSCOPY  CPTCODE:47242;  Surgeon: Nicho Richey III, MD;  Location: [facility];  Service:    • ENDOSCOPY     • ENDOSCOPY N/A 5/8/2017    Procedure: ESOPHAGOGASTRODUODENOSCOPY WITH BIOPSY  CPTCODE:12204;  Surgeon: Nicho Richey III, MD;  Location: [facility];  Service:        FAMILY HISTORY:  Family History   Problem Relation Age of Onset   • Cancer Other    • Hypertension Other    • Stroke Other    • Diabetes Other        SOCIAL HISTORY:  Social History   Substance Use Topics   • Smoking status: Current Every Day Smoker     Packs/day: 1.00     Years: 35.00     Types: Cigarettes   • Smokeless tobacco: Never Used   • Alcohol use No       CURRENT MEDICATION:    Current Facility-Administered Medications:   •  lactated ringers infusion, 125 mL/hr, Intravenous, Continuous, Surya Ledezma MD  •  sodium chloride 0.9 % flush 1-10 mL, 1-10 mL, Intravenous, PRN, Surya Ledezma MD     I  "have reviewed his list of current medications.    ALLERGIES:  Protonix [pantoprazole sodium]    VISIT VITALS:  /80 (BP Location: Left arm, Patient Position: Lying)  Pulse 104  Temp 98.4 °F (36.9 °C) (Tympanic)   Resp 20  Ht 66\" (167.6 cm)  Wt 143 lb (64.9 kg)  SpO2 99%  BMI 23.08 kg/m2    PHYSICAL EXAMINATION:  Physical Exam   Alert, oriented ×3  HEENT: Normal  Neck: No mass  Chest: Clear  Heart: Regular rhythm  Abdomen: Soft, nontender, active BS  Extremities: No edema  Neuro: No focal deficit    Assessment/Plan   Hematemesis  Unexplained weight loss  History of PUD    REC  EGD is planned.  Procedure, benefits, risks and alternatives were explained to the patient.         Nicho Richey III, MD  "

## 2017-11-08 ENCOUNTER — HOSPITAL ENCOUNTER (OUTPATIENT)
Dept: CT IMAGING | Facility: HOSPITAL | Age: 47
Discharge: HOME | End: 2017-11-08
Attending: FAMILY MEDICINE | Admitting: FAMILY MEDICINE

## 2017-11-08 DIAGNOSIS — R10.84 ABDOMINAL PAIN, GENERALIZED: ICD-10-CM

## 2017-11-08 LAB
LAB AP CASE REPORT: NORMAL
Lab: NORMAL
PATH REPORT.FINAL DX SPEC: NORMAL

## 2017-11-08 PROCEDURE — 74176 CT ABD & PELVIS W/O CONTRAST: CPT | Performed by: RADIOLOGY

## 2017-11-08 PROCEDURE — 74176 CT ABD & PELVIS W/O CONTRAST: CPT

## 2017-12-13 ENCOUNTER — LAB (OUTPATIENT)
Dept: LAB | Facility: HOSPITAL | Age: 47
End: 2017-12-13

## 2017-12-13 ENCOUNTER — OFFICE VISIT (OUTPATIENT)
Dept: GASTROENTEROLOGY | Facility: CLINIC | Age: 47
End: 2017-12-13

## 2017-12-13 ENCOUNTER — TELEPHONE (OUTPATIENT)
Dept: GASTROENTEROLOGY | Facility: CLINIC | Age: 47
End: 2017-12-13

## 2017-12-13 VITALS
DIASTOLIC BLOOD PRESSURE: 88 MMHG | BODY MASS INDEX: 23.3 KG/M2 | HEIGHT: 66 IN | WEIGHT: 145 LBS | OXYGEN SATURATION: 99 % | HEART RATE: 109 BPM | SYSTOLIC BLOOD PRESSURE: 149 MMHG

## 2017-12-13 DIAGNOSIS — R63.4 WEIGHT LOSS, ABNORMAL: ICD-10-CM

## 2017-12-13 DIAGNOSIS — K21.9 GASTROESOPHAGEAL REFLUX DISEASE, ESOPHAGITIS PRESENCE NOT SPECIFIED: ICD-10-CM

## 2017-12-13 DIAGNOSIS — K59.00 CONSTIPATION, UNSPECIFIED CONSTIPATION TYPE: ICD-10-CM

## 2017-12-13 DIAGNOSIS — D50.0 IRON DEFICIENCY ANEMIA DUE TO CHRONIC BLOOD LOSS: ICD-10-CM

## 2017-12-13 DIAGNOSIS — K25.3 ACUTE GASTRIC ULCER WITHOUT HEMORRHAGE OR PERFORATION: Primary | ICD-10-CM

## 2017-12-13 DIAGNOSIS — R10.11 RUQ ABDOMINAL PAIN: ICD-10-CM

## 2017-12-13 DIAGNOSIS — K26.9 DUODENAL ULCER: ICD-10-CM

## 2017-12-13 PROBLEM — K92.0 HEMATEMESIS WITH NAUSEA: Status: RESOLVED | Noted: 2017-11-01 | Resolved: 2017-12-13

## 2017-12-13 PROBLEM — K92.1 MELENA: Status: RESOLVED | Noted: 2017-05-04 | Resolved: 2017-12-13

## 2017-12-13 PROBLEM — R94.8 ABNORMAL BILIARY HIDA SCAN: Status: RESOLVED | Noted: 2017-05-06 | Resolved: 2017-12-13

## 2017-12-13 PROBLEM — R10.33 PERIUMBILICAL ABDOMINAL PAIN: Status: RESOLVED | Noted: 2017-05-04 | Resolved: 2017-12-13

## 2017-12-13 LAB
ALBUMIN SERPL-MCNC: 4.1 G/DL (ref 3.5–5)
ALBUMIN/GLOB SERPL: 1.8 G/DL (ref 1.5–2.5)
ALP SERPL-CCNC: 71 U/L (ref 40–129)
ALT SERPL W P-5'-P-CCNC: 10 U/L (ref 10–44)
AMYLASE SERPL-CCNC: 43 U/L (ref 28–100)
ANION GAP SERPL CALCULATED.3IONS-SCNC: 1.9 MMOL/L (ref 3.6–11.2)
AST SERPL-CCNC: 18 U/L (ref 10–34)
BASOPHILS # BLD AUTO: 0.04 10*3/MM3 (ref 0–0.3)
BASOPHILS NFR BLD AUTO: 0.6 % (ref 0–2)
BILIRUB SERPL-MCNC: 0.1 MG/DL (ref 0.2–1.8)
BUN BLD-MCNC: 14 MG/DL (ref 7–21)
BUN/CREAT SERPL: 14.9 (ref 7–25)
CALCIUM SPEC-SCNC: 8.9 MG/DL (ref 7.7–10)
CHLORIDE SERPL-SCNC: 106 MMOL/L (ref 99–112)
CO2 SERPL-SCNC: 28.1 MMOL/L (ref 24.3–31.9)
CREAT BLD-MCNC: 0.94 MG/DL (ref 0.43–1.29)
CRP SERPL-MCNC: <0.5 MG/DL (ref 0–0.99)
DEPRECATED RDW RBC AUTO: 54 FL (ref 37–54)
EOSINOPHIL # BLD AUTO: 0.03 10*3/MM3 (ref 0–0.7)
EOSINOPHIL NFR BLD AUTO: 0.4 % (ref 0–5)
ERYTHROCYTE [DISTWIDTH] IN BLOOD BY AUTOMATED COUNT: 16.9 % (ref 11.5–14.5)
GFR SERPL CREATININE-BSD FRML MDRD: 85 ML/MIN/1.73
GLOBULIN UR ELPH-MCNC: 2.3 GM/DL
GLUCOSE BLD-MCNC: 98 MG/DL (ref 70–110)
HCT VFR BLD AUTO: 37.3 % (ref 42–52)
HGB BLD-MCNC: 11.5 G/DL (ref 14–18)
IMM GRANULOCYTES # BLD: 0 10*3/MM3 (ref 0–0.03)
IMM GRANULOCYTES NFR BLD: 0 % (ref 0–0.5)
LIPASE SERPL-CCNC: 33 U/L (ref 13–60)
LYMPHOCYTES # BLD AUTO: 1.25 10*3/MM3 (ref 1–3)
LYMPHOCYTES NFR BLD AUTO: 17.6 % (ref 21–51)
MCH RBC QN AUTO: 27.1 PG (ref 27–33)
MCHC RBC AUTO-ENTMCNC: 30.8 G/DL (ref 33–37)
MCV RBC AUTO: 88 FL (ref 80–94)
MONOCYTES # BLD AUTO: 0.51 10*3/MM3 (ref 0.1–0.9)
MONOCYTES NFR BLD AUTO: 7.2 % (ref 0–10)
NEUTROPHILS # BLD AUTO: 5.28 10*3/MM3 (ref 1.4–6.5)
NEUTROPHILS NFR BLD AUTO: 74.2 % (ref 30–70)
OSMOLALITY SERPL CALC.SUM OF ELEC: 272.4 MOSM/KG (ref 273–305)
PLATELET # BLD AUTO: 349 10*3/MM3 (ref 130–400)
PMV BLD AUTO: 9.5 FL (ref 6–10)
POTASSIUM BLD-SCNC: 5.4 MMOL/L (ref 3.5–5.3)
PROT SERPL-MCNC: 6.4 G/DL (ref 6–8)
RBC # BLD AUTO: 4.24 10*6/MM3 (ref 4.7–6.1)
SODIUM BLD-SCNC: 136 MMOL/L (ref 135–153)
WBC NRBC COR # BLD: 7.11 10*3/MM3 (ref 4.5–12.5)

## 2017-12-13 PROCEDURE — 85025 COMPLETE CBC W/AUTO DIFF WBC: CPT

## 2017-12-13 PROCEDURE — 82150 ASSAY OF AMYLASE: CPT

## 2017-12-13 PROCEDURE — 99214 OFFICE O/P EST MOD 30 MIN: CPT | Performed by: PHYSICIAN ASSISTANT

## 2017-12-13 PROCEDURE — 80053 COMPREHEN METABOLIC PANEL: CPT

## 2017-12-13 PROCEDURE — 83690 ASSAY OF LIPASE: CPT

## 2017-12-13 PROCEDURE — 86140 C-REACTIVE PROTEIN: CPT

## 2017-12-13 PROCEDURE — 36415 COLL VENOUS BLD VENIPUNCTURE: CPT

## 2017-12-13 RX ORDER — ESOMEPRAZOLE MAGNESIUM 40 MG/1
40 CAPSULE, DELAYED RELEASE ORAL
Qty: 60 CAPSULE | Refills: 3 | Status: SHIPPED | OUTPATIENT
Start: 2017-12-13 | End: 2022-12-20

## 2017-12-13 RX ORDER — POLYETHYLENE GLYCOL 3350 17 G/17G
17 POWDER, FOR SOLUTION ORAL DAILY
Qty: 510 G | Refills: 2 | Status: SHIPPED | OUTPATIENT
Start: 2017-12-13 | End: 2018-04-27

## 2017-12-13 NOTE — TELEPHONE ENCOUNTER
Please let patient know that his lab results are as follows:  Pancreatic enzymes normal  Potassium mildly elevated at 5.4 (normal up to 5.3) having bowel movements may help this number to regulate  Blood count (Hgb) at 11.5 which is improved since 10/2017

## 2017-12-13 NOTE — PROGRESS NOTES
: 1970    Chief Complaint   Patient presents with   • GI Problem     follow up from procedure       Jamison Edward is a 47 y.o. male who presents to the office today as a follow up appointment regarding abdominal pain and recent procedure.     History of Present Illness:  Abdominal pain is present in the RUQ to mid epigastric region and is non-radiating. Pain is described as sharp at times. Pain is rated as 5/10. Nothing seems to make it better or worse. He had very severe pain in that area even before his cholecystectomy 17 after abnormal HIDA. He has noticed that this pain is worse now at times than previously but does feel some better since then. He has very severe acid reflux on a daily basis. He is taking Nexium 40 mg once daily plus Carafate 1 g 4 times per day plus Tagamet 800 mg TID. EGD and colonoscopy were performed in 2017 and he had only mild gastritis and normal colonoscopy. He had repeat EGD 11/3/17 which showed 2 pre-pyloric ulcers and 1 duodenal ulcer. He is still having chest pain on a daily basis. He was recently told that he is anemic. He was started on iron in 2017. Recent CT abd/pelvis performed in 2017 showed possible colitis vs under distension of the right colon. He takes ASA daily due to TIA in the past.     He does have history of ulcers even in the past and has been followed by Dr. Starr for Johnson's esophagus. He states that he has had ulcers on and off his entire life and has never felt this bad or had this much weight loss.     Bowel movements are occurring every 3 days and are described as #3 on the [place] Stool Scale. He has noticed that they are harder at times now since being on the iron supplements. No rectal bleeding. Stools are darker in color since the iron.     Weight loss total for the year has been 75 lbs per his report. Chart shows initial weight of 173 to now 145 lbs. Appetite is good and he has good oral intake. Nausea is mild and intermittent  without vomiting. He wonders if he has celiac disease but EGD pathology from May 2017 had normal duodenal biopsies, neg H pylori, along with normal random colon biopsies and normal TI biopsies.     Answers for HPI/ROS submitted by the patient on 12/6/2017   Abdominal pain  Chronicity: recurrent  Onset: more than 1 year ago  Onset quality: gradual  Frequency: constantly  Episode duration: 4 days  Progression since onset: gradually worsening  Pain location: RLQ, RUQ  Pain - numeric: 5/10  Pain quality: cramping, a sensation of fullness  Radiates to: LUQ, RLQ, chest  anorexia: No  belching: Yes  flatus: No  hematochezia: No  melena: No  weight loss: Yes  Aggravated by: eating  Relieved by: nothing  Diagnostic workup: CT scan, upper endoscopy    Review of Systems   Constitutional: Negative for fever.   Gastrointestinal: Positive for abdominal pain, diarrhea and nausea. Negative for constipation and vomiting.   Genitourinary: Negative for dysuria, frequency and hematuria.   Musculoskeletal: Positive for arthralgias. Negative for myalgias.   Neurological: Negative for headaches.   All other systems reviewed and are negative.      Past Medical History:   Diagnosis Date   • Johnson esophagus    • Barretts esophagus    • COPD (chronic obstructive pulmonary disease)    • DDD (degenerative disc disease), thoracic    • Degenerative disc disease, lumbar    • GERD (gastroesophageal reflux disease)    • Hypertension    • Peptic ulcer disease    • Stroke        Past Surgical History:   Procedure Laterality Date   • APPENDECTOMY     • CHOLECYSTECTOMY N/A 4/22/2017    Procedure: CHOLECYSTECTOMY LAPAROSCOPIC;  Surgeon: Jaqueline Guaman MD;  Location: [facility];  Service:    • CHOLECYSTECTOMY     • COLONOSCOPY N/A 5/8/2017    Procedure: COLONOSCOPY  CPTCODE:16894;  Surgeon: Nicho Richey III, MD;  Location: [facility] OR;  Service:    • ENDOSCOPY     • ENDOSCOPY N/A 5/8/2017    Procedure: ESOPHAGOGASTRODUODENOSCOPY WITH BIOPSY   CPTCODE:[id number];  Surgeon: Nicho Richey III, MD;  Location: [facility] OR;  Service:    • ENDOSCOPY N/A 11/3/2017    Procedure: ESOPHAGOGASTRODUODENOSCOPY WITH BIOPSY  CPTCODE: [id number];  Surgeon: Nicho Richey III, MD;  Location: [facility] OR;  Service:        Family History   Problem Relation Age of Onset   • No Known Problems Mother    • Hypertension Father    • No Known Problems Sister    • No Known Problems Brother    • Drug abuse Brother    • No Known Problems Daughter        Social History     Social History   • Marital status:      Spouse name: N/A   • Number of children: N/A   • Years of education: N/A     Social History Main Topics   • Smoking status: Current Every Day Smoker     Packs/day: 1.00     Years: 35.00     Types: Cigarettes   • Smokeless tobacco: Never Used   • Alcohol use No   • Drug use: No   • Sexual activity: Defer     Other Topics Concern   • None     Social History Narrative     Current Outpatient Prescriptions:   •  aspirin 81 MG EC tablet, Take 81 mg by mouth Daily. Patient took 2 tabs this morning, Disp: , Rfl:   •  baclofen (LIORESAL) 10 MG tablet, Take 10 mg by mouth 2 (Two) Times a Day., Disp: , Rfl:   •  cimetidine (TAGAMET) 800 MG tablet, Take 800 mg by mouth 3 (Three) Times a Day., Disp: , Rfl:   •  citalopram (CeleXA) 10 MG tablet, Take 10 mg by mouth Daily., Disp: , Rfl:   •  esomeprazole (nexIUM) 40 MG capsule, Take 40 mg by mouth Every Morning Before Breakfast., Disp: , Rfl:   •  hydrochlorothiazide (HYDRODIURIL) 25 MG tablet, Take 25 mg by mouth Daily., Disp: , Rfl:   •  ondansetron ODT (ZOFRAN-ODT) 4 MG disintegrating tablet, Take 1 tablet by mouth Every 6 (Six) Hours As Needed for Nausea or Vomiting., Disp: 12 tablet, Rfl: 0  •  promethazine (PHENERGAN) 25 MG tablet, , Disp: , Rfl:   •  QUEtiapine (SEROquel) 25 MG tablet, Take 50 mg by mouth Every Night., Disp: , Rfl:   •  sucralfate (CARAFATE) 1 G tablet, Take 1 tablet by mouth 4 (Four) Times a Day., Disp: 40  "tablet, Rfl: 0    Allergies:   Protonix [pantoprazole sodium] and Contrast dye    Vitals:  /88  Pulse 109  Ht 167.6 cm (66\")  Wt 65.8 kg (145 lb)  SpO2 99%  BMI 23.4 kg/m2    Physical Exam   Constitutional: He is oriented to person, place, and time. He appears well-developed and well-nourished. No distress.   HENT:   Head: Normocephalic and atraumatic.   Right Ear: External ear normal.   Left Ear: External ear normal.   Nose: Nose normal.   Mouth/Throat: Oropharynx is clear and moist.   Eyes: Conjunctivae and EOM are normal. Right eye exhibits no discharge. Left eye exhibits no discharge. No scleral icterus.   Neck: Normal range of motion. Neck supple.   Cardiovascular: Normal rate, regular rhythm and normal heart sounds.  Exam reveals no gallop and no friction rub.    No murmur heard.  Pulmonary/Chest: Effort normal and breath sounds normal. No respiratory distress. He has no wheezes. He has no rales. He exhibits no tenderness.   Abdominal: Soft. Normal appearance and bowel sounds are normal. He exhibits no distension, no ascites and no mass. There is tenderness (severe in epigastric and RUQ). There is no rigidity and no guarding. No hernia.   Scaphoid abdomen   Musculoskeletal: Normal range of motion. He exhibits no edema or deformity.   Neurological: He is alert and oriented to person, place, and time. He exhibits normal muscle tone. Coordination normal.   Skin: Skin is warm and dry. No rash noted. No erythema. No pallor.   Psychiatric: His behavior is normal. Judgment and thought content normal.   Depressed affect   Nursing note and vitals reviewed.      Assessment/Plan:  1. Acute gastric ulcer without hemorrhage or perforation    2. Duodenal ulcer    3. Gastroesophageal reflux disease, esophagitis presence not specified    4. Constipation, unspecified constipation type    5. Iron deficiency anemia due to chronic blood loss    6. RUQ abdominal pain    7. Weight loss, abnormal        Orders Placed This " Encounter   Procedures   • Comprehensive Metabolic Panel   • Lipase   • C-reactive Protein   • Amylase   • CBC & Differential     Repeat EGD will be arranged at his next appt, due at end of Jan 2018 to re-check ulcers. He will increase nexium 40 mg to BID 30 minutes before meals. He will continue Carafate 1 g 4 times per day but will be careful to avoid taking within 1 hour of his other medications due to risk of decreased absorption. Continue Tagamet. Discussed ASA use which he will continue and smoking. He does not take any other NSAIDs. No alcohol intake or drug abuse.     Reviewed CT which showed possible colitis. His last colonoscopy was 5/2017 and colon was normal at that time. He is having right sided abdominal pain but no diarrhea. We will monitor the pain for resolution and he may need repeat colonoscopy at the time of his repeat EGD. He will start taking Miralax 17 g once daily this week due to constipation then decrease while monitoring bowel movements.     Anemia is now present and we will check again with CBC to see if it is improving. Currently on iron supplement.        Return in about 2 weeks (around 12/27/2017).      Electronically signed 12/13/2017 10:58 AM  Rupa Cook PA-C, [facility]

## 2017-12-14 ENCOUNTER — TELEPHONE (OUTPATIENT)
Dept: GASTROENTEROLOGY | Facility: CLINIC | Age: 47
End: 2017-12-14

## 2017-12-14 NOTE — TELEPHONE ENCOUNTER
Spoke with patients wife and let her know his lab results. I confirmed that the phone number we have listed is the correct number.

## 2017-12-19 ENCOUNTER — TRANSCRIBE ORDERS (OUTPATIENT)
Dept: ADMINISTRATIVE | Facility: HOSPITAL | Age: 47
End: 2017-12-19

## 2017-12-19 ENCOUNTER — DOCUMENTATION (OUTPATIENT)
Dept: GASTROENTEROLOGY | Facility: CLINIC | Age: 47
End: 2017-12-19

## 2017-12-19 ENCOUNTER — HOSPITAL ENCOUNTER (OUTPATIENT)
Dept: GENERAL RADIOLOGY | Facility: HOSPITAL | Age: 47
Discharge: HOME | End: 2017-12-19
Admitting: FAMILY MEDICINE

## 2017-12-19 DIAGNOSIS — R07.9 CHEST PAIN, UNSPECIFIED TYPE: ICD-10-CM

## 2017-12-19 DIAGNOSIS — R07.9 CHEST PAIN, UNSPECIFIED TYPE: Primary | ICD-10-CM

## 2017-12-19 PROCEDURE — 71020 XR CHEST PA AND LATERAL: CPT | Performed by: RADIOLOGY

## 2017-12-19 PROCEDURE — 71020 HC CHEST PA AND LATERAL: CPT

## 2018-01-15 ENCOUNTER — APPOINTMENT (OUTPATIENT)
Dept: ONCOLOGY | Facility: HOSPITAL | Age: 48
End: 2018-01-15

## 2018-01-15 ENCOUNTER — CONSULT (OUTPATIENT)
Dept: ONCOLOGY | Facility: CLINIC | Age: 48
End: 2018-01-15

## 2018-01-15 VITALS
RESPIRATION RATE: 18 BRPM | WEIGHT: 140.8 LBS | TEMPERATURE: 97.8 F | HEIGHT: 66 IN | OXYGEN SATURATION: 100 % | BODY MASS INDEX: 22.63 KG/M2 | HEART RATE: 115 BPM | SYSTOLIC BLOOD PRESSURE: 153 MMHG | DIASTOLIC BLOOD PRESSURE: 82 MMHG

## 2018-01-15 DIAGNOSIS — R05.9 COUGH: ICD-10-CM

## 2018-01-15 DIAGNOSIS — R63.4 WEIGHT LOSS: ICD-10-CM

## 2018-01-15 DIAGNOSIS — Z11.4 ENCOUNTER FOR SCREENING FOR HIV: ICD-10-CM

## 2018-01-15 DIAGNOSIS — E53.8 B12 DEFICIENCY: ICD-10-CM

## 2018-01-15 DIAGNOSIS — R22.2 NODULE OF CHEST WALL: ICD-10-CM

## 2018-01-15 DIAGNOSIS — E53.8 FOLATE DEFICIENCY: ICD-10-CM

## 2018-01-15 DIAGNOSIS — Z71.6 TOBACCO ABUSE COUNSELING: ICD-10-CM

## 2018-01-15 DIAGNOSIS — D64.9 NORMOCYTIC ANEMIA: Primary | ICD-10-CM

## 2018-01-15 LAB
BASOPHILS # BLD AUTO: 0.03 10*3/MM3 (ref 0–0.3)
BASOPHILS NFR BLD AUTO: 0.3 % (ref 0–2)
CRP SERPL-MCNC: <0.5 MG/DL (ref 0–0.99)
DAT POLY-SP REAG RBC QL: NEGATIVE
DEPRECATED RDW RBC AUTO: 58.6 FL (ref 37–54)
EOSINOPHIL # BLD AUTO: 0.02 10*3/MM3 (ref 0–0.7)
EOSINOPHIL NFR BLD AUTO: 0.2 % (ref 0–5)
ERYTHROCYTE [DISTWIDTH] IN BLOOD BY AUTOMATED COUNT: 18.2 % (ref 11.5–14.5)
ERYTHROCYTE [SEDIMENTATION RATE] IN BLOOD: 24 MM/HR (ref 0–15)
FERRITIN SERPL-MCNC: 10 NG/ML (ref 21.9–321.7)
FOLATE SERPL-MCNC: 14.45 NG/ML (ref 5.4–20)
HAV IGM SERPL QL IA: NORMAL
HBV CORE IGM SERPL QL IA: NORMAL
HBV SURFACE AG SERPL QL IA: NORMAL
HCT VFR BLD AUTO: 36.4 % (ref 42–52)
HCV AB SER DONR QL: NORMAL
HGB BLD-MCNC: 11.5 G/DL (ref 14–18)
HIV1+2 AB SER QL: NORMAL
IMM GRANULOCYTES # BLD: 0.02 10*3/MM3 (ref 0–0.03)
IMM GRANULOCYTES NFR BLD: 0.2 % (ref 0–0.5)
IRON 24H UR-MRATE: 27 MCG/DL (ref 53–167)
IRON SATN MFR SERPL: 6 % (ref 20–50)
LDH SERPL-CCNC: 211 U/L (ref 135–225)
LYMPHOCYTES # BLD AUTO: 1.47 10*3/MM3 (ref 1–3)
LYMPHOCYTES NFR BLD AUTO: 16.6 % (ref 21–51)
MCH RBC QN AUTO: 27.7 PG (ref 27–33)
MCHC RBC AUTO-ENTMCNC: 31.6 G/DL (ref 33–37)
MCV RBC AUTO: 87.7 FL (ref 80–94)
MONOCYTES # BLD AUTO: 0.53 10*3/MM3 (ref 0.1–0.9)
MONOCYTES NFR BLD AUTO: 6 % (ref 0–10)
NEUTROPHILS # BLD AUTO: 6.81 10*3/MM3 (ref 1.4–6.5)
NEUTROPHILS NFR BLD AUTO: 76.7 % (ref 30–70)
PLATELET # BLD AUTO: 265 10*3/MM3 (ref 130–400)
PMV BLD AUTO: 9.4 FL (ref 6–10)
RBC # BLD AUTO: 4.15 10*6/MM3 (ref 4.7–6.1)
RETICS #: 0.05 10*6/MM3 (ref 0.02–0.13)
RETICS/RBC NFR AUTO: 1.21 % (ref 0.5–2)
TIBC SERPL-MCNC: 433 MCG/DL (ref 241–421)
TSH SERPL DL<=0.05 MIU/L-ACNC: 0.9 MIU/ML (ref 0.55–4.78)
VIT B12 BLD-MCNC: 249 PG/ML (ref 211–911)
WBC NRBC COR # BLD: 8.88 10*3/MM3 (ref 4.5–12.5)

## 2018-01-15 PROCEDURE — 85045 AUTOMATED RETICULOCYTE COUNT: CPT | Performed by: INTERNAL MEDICINE

## 2018-01-15 PROCEDURE — 83615 LACTATE (LD) (LDH) ENZYME: CPT | Performed by: INTERNAL MEDICINE

## 2018-01-15 PROCEDURE — 80074 ACUTE HEPATITIS PANEL: CPT | Performed by: INTERNAL MEDICINE

## 2018-01-15 PROCEDURE — 84443 ASSAY THYROID STIM HORMONE: CPT | Performed by: INTERNAL MEDICINE

## 2018-01-15 PROCEDURE — 83550 IRON BINDING TEST: CPT | Performed by: INTERNAL MEDICINE

## 2018-01-15 PROCEDURE — G0432 EIA HIV-1/HIV-2 SCREEN: HCPCS | Performed by: INTERNAL MEDICINE

## 2018-01-15 PROCEDURE — 85060 BLOOD SMEAR INTERPRETATION: CPT | Performed by: INTERNAL MEDICINE

## 2018-01-15 PROCEDURE — 83540 ASSAY OF IRON: CPT | Performed by: INTERNAL MEDICINE

## 2018-01-15 PROCEDURE — 85025 COMPLETE CBC W/AUTO DIFF WBC: CPT | Performed by: INTERNAL MEDICINE

## 2018-01-15 PROCEDURE — 83010 ASSAY OF HAPTOGLOBIN QUANT: CPT | Performed by: INTERNAL MEDICINE

## 2018-01-15 PROCEDURE — 99205 OFFICE O/P NEW HI 60 MIN: CPT | Performed by: INTERNAL MEDICINE

## 2018-01-15 PROCEDURE — 82746 ASSAY OF FOLIC ACID SERUM: CPT | Performed by: INTERNAL MEDICINE

## 2018-01-15 PROCEDURE — 86140 C-REACTIVE PROTEIN: CPT | Performed by: INTERNAL MEDICINE

## 2018-01-15 PROCEDURE — 86880 COOMBS TEST DIRECT: CPT | Performed by: INTERNAL MEDICINE

## 2018-01-15 PROCEDURE — 82728 ASSAY OF FERRITIN: CPT | Performed by: INTERNAL MEDICINE

## 2018-01-15 PROCEDURE — 85652 RBC SED RATE AUTOMATED: CPT | Performed by: INTERNAL MEDICINE

## 2018-01-15 PROCEDURE — 82607 VITAMIN B-12: CPT | Performed by: INTERNAL MEDICINE

## 2018-01-15 PROCEDURE — 99406 BEHAV CHNG SMOKING 3-10 MIN: CPT | Performed by: INTERNAL MEDICINE

## 2018-01-15 RX ORDER — FERROUS SULFATE 325(65) MG
325 TABLET ORAL
Qty: 90 TABLET | Refills: 5 | Status: SHIPPED | OUTPATIENT
Start: 2018-01-15 | End: 2018-04-27

## 2018-01-15 NOTE — PROGRESS NOTES
Jamison Edward  [id number]  1970  1/15/2018      Referring Provider:   Lázaro Heart MD    Reason for Consultation:   Normocytic anemia    Chief Complaint:   Weight Loss      History of Present Illness:  Jamison Edward is a very pleasant 47 y.o.  male who presents in new consultation at the request of Dr. Heart for further management and evaluation of normocytic anemia.    Mr. Edward has been following with Dr. Richey for history of peptic ulcer disease and hematemesis. He was placed on oral iron once daily several months ago and experienced some constipation with the medication however self discontinued this one month ago. He has had gradual weight loss where he previously weighed 205 pounds two years ago and is currently down to 140 pounds. He states that he continues to have a good appetite and eat normally. He recently had an EGD in November 2017 for weight loss, hematemesis and abdominal pain which was significant for gastric and duodenal ulcers. He does have history of peptic ulcer disease and in the past and has also been followed by Dr. Starr for Johnson's esophagus. He also had an EGD in May 2017 that showed normal duodenal biopsies, negative H pylori, along with normal random colon biopsies. He also had a CT abd/pelvis performed in 11/2017 which showed possible colitis versus under distension of the right colon. He is currently on ASA 81mg daily due to TIA in the past however denies of any other NSAID use. He also reports that when he swallows at times he feels as if he is going to pass out. He states that he has had swallow evaluation that have returned normal. He also experiences chest pain every night and has been evaluated by cardiology and as per patient was felt to be non cardiac related.          The following portions of the patient's history were reviewed and updated as appropriate: allergies, current medications, past family history, past medical history, past social history,  past surgical history and problem list.    Allergies   Allergen Reactions   • Protonix [Pantoprazole Sodium] Palpitations     Patient states that protonix causes chest pain.  Shruthi Rina Pérez [facility]  4/23/2017  11:19 AM     • Contrast Dye      Shortness of breath       Past Medical History:   Diagnosis Date   • Johnson esophagus    • Barretts esophagus    • COPD (chronic obstructive pulmonary disease)    • DDD (degenerative disc disease), thoracic    • Degenerative disc disease, lumbar    • GERD (gastroesophageal reflux disease)    • Hypertension    • Peptic ulcer disease    • Stroke        Past Surgical History:   Procedure Laterality Date   • APPENDECTOMY     • CHOLECYSTECTOMY N/A 4/22/2017    Procedure: CHOLECYSTECTOMY LAPAROSCOPIC;  Surgeon: Jaqueline Guaman MD;  Location: [facility] OR;  Service:    • CHOLECYSTECTOMY     • COLONOSCOPY N/A 5/8/2017    Procedure: COLONOSCOPY  CPTCODE:81185;  Surgeon: Nicho Richey III, MD;  Location: [facility] OR;  Service:    • ENDOSCOPY     • ENDOSCOPY N/A 5/8/2017    Procedure: ESOPHAGOGASTRODUODENOSCOPY WITH BIOPSY  CPTCODE:11152;  Surgeon: Nicho Richey III, MD;  Location: [facility] OR;  Service:    • ENDOSCOPY N/A 11/3/2017    Procedure: ESOPHAGOGASTRODUODENOSCOPY WITH BIOPSY  CPTCODE: 96320;  Surgeon: Nicho Richey III, MD;  Location: [facility] OR;  Service:        Social History     Social History   • Marital status:      Spouse name: N/A   • Number of children: N/A   • Years of education: N/A     Occupational History   • Not on file.     Social History Main Topics   • Smoking status: Current Every Day Smoker     Packs/day: 1.00     Years: 35.00     Types: Cigarettes   • Smokeless tobacco: Never Used   • Alcohol use No   • Drug use: No   • Sexual activity: Defer     Other Topics Concern   • Not on file     Social History Narrative       Family History   Problem Relation Age of Onset   • No Known Problems Mother    • Hypertension Father    • No Known Problems  Sister    • No Known Problems Brother    • Drug abuse Brother    • No Known Problems Daughter    Maternal GF - H&N cancer  Paternal GF - Prostate cancer          Current Outpatient Prescriptions:   •  aspirin 81 MG EC tablet, Take 81 mg by mouth Daily. Patient took 2 tabs this morning, Disp: , Rfl:   •  baclofen (LIORESAL) 10 MG tablet, Take 10 mg by mouth 2 (Two) Times a Day., Disp: , Rfl:   •  cimetidine (TAGAMET) 800 MG tablet, Take 800 mg by mouth 3 (Three) Times a Day., Disp: , Rfl:   •  citalopram (CeleXA) 10 MG tablet, Take 10 mg by mouth Daily., Disp: , Rfl:   •  esomeprazole (nexIUM) 40 MG capsule, Take 1 capsule by mouth 2 (Two) Times a Day Before Meals., Disp: 60 capsule, Rfl: 3  •  hydrochlorothiazide (HYDRODIURIL) 25 MG tablet, Take 25 mg by mouth Daily., Disp: , Rfl:   •  ondansetron ODT (ZOFRAN-ODT) 4 MG disintegrating tablet, Take 1 tablet by mouth Every 6 (Six) Hours As Needed for Nausea or Vomiting., Disp: 12 tablet, Rfl: 0  •  polyethylene glycol (MIRALAX) powder, Take 17 g by mouth Daily., Disp: 510 g, Rfl: 2  •  promethazine (PHENERGAN) 25 MG tablet, , Disp: , Rfl:   •  QUEtiapine (SEROquel) 25 MG tablet, Take 50 mg by mouth Every Night., Disp: , Rfl:   •  sucralfate (CARAFATE) 1 G tablet, Take 1 tablet by mouth 4 (Four) Times a Day., Disp: 40 tablet, Rfl: 0        Review of Systems  Constitutional: No fever, chills, night sweats, +weight loss.   HEENT:  No headaches, vision changes or hearing changes, no sinus drainage, sore throat.   Cardiovascular:  No palpitations, +chest pain at night, no syncopal episodes or edema. +dizziness  Pulmonary:  No shortness of breath, hemoptysis +cough   Gastrointestinal:  +nausea, no vomiting. +constipation and diarrhea. No change in appetite. No abdominal pain. No melena or hematochezia.    Genitourinary:  No hematuria, or changes in urination.   Musculoskeletal:  No pain, +rib nodules, +chronic neck pain  Skin: No rashes or pruritus.   Endocrine:  No hot  flashes or chills   Hematologic:  No history of free bleeding, spontaneous bleeding or clotting problems. No lymphadenopathy.    Immunologic:  No allergies or frequent infections.   Neurologic: No numbness, tingling, or weakness.   Psychiatric:  No anxiety or depression.       Physical Exam  Vital Signs: These were reviewed and listed as per patient’s electronic medical chart  Vitals:    01/15/18 1047   BP: 153/82   Pulse: 115   Resp: 18   Temp: 97.8 °F (36.6 °C)   SpO2: 100%     General: Awake, alert and oriented, in no distress  HEENT: Head is atraumatic, normocephalic, extraocular movements full, oropharynx clear, no scleral icterus, pink moist mucous membranes  Neck: supple, no jvd, lymphadenopathy or masses  Cardiovascular: regular rate and rhythm without murmurs, rubs or gallops  Pulmonary: non-labored, clear to auscultation bilaterally, no wheezing  Abdomen: soft, non-tender, non-distended, normal active bowel sounds present, no organomegaly  Extremities: No clubbing, cyanosis or edema  Lymph: No cervical, supraclavicular, axillary, adenopathy  Neurologic: Mental status as above, alert, awake and oriented, grossly non-focal exam  Skin: warm, dry, intact, +nodularity and tenderness of rib        Labs / Studies:    Consult on 01/15/2018   Component Date Value   • Ferritin 01/15/2018 10.00*   • Iron 01/15/2018 27*   • TIBC 01/15/2018 433*   • Iron Saturation 01/15/2018 6*   • Vitamin B-12 01/15/2018 249    • Folate 01/15/2018 14.45    • Reticulocyte % 01/15/2018 1.21    • Reticulocyte Absolute 01/15/2018 0.0502    • LDH 01/15/2018 211    • C-Reactive Protein 01/15/2018 <0.50    • Sed Rate 01/15/2018 24*   • TSH 01/15/2018 0.903    • ROWENA 01/15/2018 Negative    • WBC 01/15/2018 8.88    • RBC 01/15/2018 4.15*   • Hemoglobin 01/15/2018 11.5*   • Hematocrit 01/15/2018 36.4*   • MCV 01/15/2018 87.7    • MCH 01/15/2018 27.7    • MCHC 01/15/2018 31.6*   • RDW 01/15/2018 18.2*   • RDW-SD 01/15/2018 58.6*   • MPV  01/15/2018 9.4    • Platelets 01/15/2018 265    • Neutrophil % 01/15/2018 76.7*   • Lymphocyte % 01/15/2018 16.6*   • Monocyte % 01/15/2018 6.0    • Eosinophil % 01/15/2018 0.2    • Basophil % 01/15/2018 0.3    • Immature Grans % 01/15/2018 0.2    • Neutrophils, Absolute 01/15/2018 6.81*   • Lymphocytes, Absolute 01/15/2018 1.47    • Monocytes, Absolute 01/15/2018 0.53    • Eosinophils, Absolute 01/15/2018 0.02    • Basophils, Absolute 01/15/2018 0.03    • Immature Grans, Absolute 01/15/2018 0.02    • HIV-1/ HIV-2 01/15/2018 Non-Reactive    Lab on 12/13/2017   Component Date Value   • Glucose 12/13/2017 98    • BUN 12/13/2017 14    • Creatinine 12/13/2017 0.94    • Sodium 12/13/2017 136    • Potassium 12/13/2017 5.4*   • Chloride 12/13/2017 106    • CO2 12/13/2017 28.1    • Calcium 12/13/2017 8.9    • Total Protein 12/13/2017 6.4    • Albumin 12/13/2017 4.10    • ALT (SGPT) 12/13/2017 10    • AST (SGOT) 12/13/2017 18    • Alkaline Phosphatase 12/13/2017 71    • Total Bilirubin 12/13/2017 0.1*   • eGFR Non African Amer 12/13/2017 85    • Globulin 12/13/2017 2.3    • A/G Ratio 12/13/2017 1.8    • BUN/Creatinine Ratio 12/13/2017 14.9    • Anion Gap 12/13/2017 1.9*   • Lipase 12/13/2017 33    • C-Reactive Protein 12/13/2017 <0.50    • Amylase 12/13/2017 43    • WBC 12/13/2017 7.11    • RBC 12/13/2017 4.24*   • Hemoglobin 12/13/2017 11.5*   • Hematocrit 12/13/2017 37.3*   • MCV 12/13/2017 88.0    • MCH 12/13/2017 27.1    • MCHC 12/13/2017 30.8*   • RDW 12/13/2017 16.9*   • RDW-SD 12/13/2017 54.0    • MPV 12/13/2017 9.5    • Platelets 12/13/2017 349    • Neutrophil % 12/13/2017 74.2*   • Lymphocyte % 12/13/2017 17.6*   • Monocyte % 12/13/2017 7.2    • Eosinophil % 12/13/2017 0.4    • Basophil % 12/13/2017 0.6    • Immature Grans % 12/13/2017 0.0    • Neutrophils, Absolute 12/13/2017 5.28    • Lymphocytes, Absolute 12/13/2017 1.25    • Monocytes, Absolute 12/13/2017 0.51    • Eosinophils, Absolute 12/13/2017 0.03    •  Basophils, Absolute 12/13/2017 0.04    • Immature Grans, Absolute 12/13/2017 0.00    • Osmolality Calc 12/13/2017 272.4*   Admission on 11/03/2017, Discharged on 11/03/2017   Component Date Value   • Case Report 11/03/2017                      Value:Surgical Pathology Report                         Case: [id number]                                  Authorizing Provider:  Nicho Richey III,   Collected:           11/03/2017 10:52 AM                                 MD                                                                           Ordering Location:     [facility]      Received:            11/03/2017 12:59 PM                                 OPERATING ROOM DEPARTMENT                                                    Pathologist:           Swapna Rodriguez MD                                                        Specimen:    Gastric, Antrum                                                                           • Final Diagnosis 11/03/2017                      Value:This result contains rich text formatting which cannot be displayed here.   Admission on 10/25/2017, Discharged on 10/25/2017   Component Date Value   • Glucose 10/25/2017 194*   • BUN 10/25/2017 7    • Creatinine 10/25/2017 1.02    • Sodium 10/25/2017 135    • Potassium 10/25/2017 3.3*   • Chloride 10/25/2017 108    • CO2 10/25/2017 23.9*   • Calcium 10/25/2017 9.0    • Total Protein 10/25/2017 6.6    • Albumin 10/25/2017 4.30    • ALT (SGPT) 10/25/2017 10    • AST (SGOT) 10/25/2017 17    • Alkaline Phosphatase 10/25/2017 69    • Total Bilirubin 10/25/2017 0.1*   • eGFR Non  Amer 10/25/2017 78    • Globulin 10/25/2017 2.3    • A/G Ratio 10/25/2017 1.9    • BUN/Creatinine Ratio 10/25/2017 6.9*   • Anion Gap 10/25/2017 3.1*   • Lipase 10/25/2017 37    • Protime 10/25/2017 12.8    • INR 10/25/2017 0.95    • PTT 10/25/2017 27.9    • WBC 10/25/2017 10.24    • RBC 10/25/2017 4.31*   • Hemoglobin 10/25/2017 11.1*   •  Hematocrit 10/25/2017 35.5*   • MCV 10/25/2017 82.4    • MCH 10/25/2017 25.8*   • MCHC 10/25/2017 31.3*   • RDW 10/25/2017 16.7*   • RDW-SD 10/25/2017 50.0    • MPV 10/25/2017 10.2*   • Platelets 10/25/2017 346    • Neutrophil % 10/25/2017 77.5*   • Lymphocyte % 10/25/2017 14.2*   • Monocyte % 10/25/2017 7.4    • Eosinophil % 10/25/2017 0.3    • Basophil % 10/25/2017 0.3    • Immature Grans % 10/25/2017 0.3    • Neutrophils, Absolute 10/25/2017 7.94*   • Lymphocytes, Absolute 10/25/2017 1.45    • Monocytes, Absolute 10/25/2017 0.76    • Eosinophils, Absolute 10/25/2017 0.03    • Basophils, Absolute 10/25/2017 0.03    • Immature Grans, Absolute 10/25/2017 0.03    • Osmolality Calc 10/25/2017 273.4           Assessment/Plan   Jamison Edward is a very pleasant 47 y.o.  male who presents in new consultation at the request of Dr. Heart for further management and evaluation of normocytic anemia.    Normocytic anemia  Iron deficiency   B12 deficiency  - He has a hemoglobin that is stable at 11.5.  - He was previously on oral iron replacement however discontinued this a month ago. Iron panel and ferritin are consistent with iron deficiency and thus he was advised to restart oral iron (ferrous sulfate) and start once daily and if able to tolerate will titrate to three times daily. He was advised of possible side effects such as but not limited to (nausea, stomach upset, constipation) and should these occur should notify our clinic. He currently follows with gastroenterology closely and has another appointment with them this month.  - He was called with these results and reports that they do not need refills for oral iron.  - Folate was normal however B12 was low and therefore will start B12 injections. Cyanocobalamin injections 1000mcg/mL inj SC daily for one week, then weekly for 4 weeks and monthly thereafter. He was also called with these results.  - ROWENA was normal, LDH, retic count normal going against  hemolysis, haptoglobin pending. TSH normal  - Peripheral smear pending    Weight loss, cough, rib nodule and tenderness  - Will obtain CT chest to further evaluate  - HIV non reactive, acute hepatitis panel is pending  - TSH is normal    Tobacco Abuse Counceling  - He was advised of the possible detrimental health affects on what tobacco abuse may have on ones health. He was counseled on tobacco cessation for 3 minutes, at this time he was would like think about this and will rediscuss during our next visit. I have also offered nicotine patches and Chantix should he want to try to quit.    I will have the patient return in follow up appointment to review test results in one month. He understands that should he have any questions or concerns prior to his appointment he should give us a call at any time and I would be happy to see him sooner. It was a pleasure to see this patient in clinic today, thank you for allowing me to participate in the care of this patient.    I spent 60 minutes in regards to this patient’s care today. More than 31 minutes of the time was spent in direct interaction with the patient for the above problems.        Cami Gustafson MD  01/15/2018  12:44 PM

## 2018-01-16 LAB
CYTOLOGIST CVX/VAG CYTO: NORMAL
HAPTOGLOB SERPL-MCNC: 240 MG/DL (ref 34–200)
PATH INTERP BLD-IMP: NORMAL

## 2018-01-17 ENCOUNTER — APPOINTMENT (OUTPATIENT)
Dept: CT IMAGING | Facility: HOSPITAL | Age: 48
End: 2018-01-17
Attending: INTERNAL MEDICINE

## 2018-01-19 ENCOUNTER — HOSPITAL ENCOUNTER (OUTPATIENT)
Dept: CT IMAGING | Facility: HOSPITAL | Age: 48
Discharge: HOME | End: 2018-01-19
Attending: INTERNAL MEDICINE | Admitting: INTERNAL MEDICINE

## 2018-01-19 DIAGNOSIS — R63.4 WEIGHT LOSS: ICD-10-CM

## 2018-01-19 DIAGNOSIS — R22.2 NODULE OF CHEST WALL: ICD-10-CM

## 2018-01-19 DIAGNOSIS — Z71.6 TOBACCO ABUSE COUNSELING: ICD-10-CM

## 2018-01-19 DIAGNOSIS — R05.9 COUGH: ICD-10-CM

## 2018-01-19 PROCEDURE — 71250 CT THORAX DX C-: CPT | Performed by: RADIOLOGY

## 2018-01-19 PROCEDURE — 71250 CT THORAX DX C-: CPT

## 2018-01-23 ENCOUNTER — OFFICE VISIT (OUTPATIENT)
Dept: GASTROENTEROLOGY | Facility: CLINIC | Age: 48
End: 2018-01-23

## 2018-01-23 VITALS
WEIGHT: 139 LBS | OXYGEN SATURATION: 99 % | BODY MASS INDEX: 22.34 KG/M2 | DIASTOLIC BLOOD PRESSURE: 85 MMHG | SYSTOLIC BLOOD PRESSURE: 146 MMHG | HEIGHT: 66 IN | HEART RATE: 88 BPM

## 2018-01-23 DIAGNOSIS — K26.9 DUODENAL ULCER: ICD-10-CM

## 2018-01-23 DIAGNOSIS — K25.3 ACUTE GASTRIC ULCER WITHOUT HEMORRHAGE OR PERFORATION: ICD-10-CM

## 2018-01-23 DIAGNOSIS — K59.04 CHRONIC IDIOPATHIC CONSTIPATION: Primary | ICD-10-CM

## 2018-01-23 DIAGNOSIS — R13.10 DYSPHAGIA, UNSPECIFIED TYPE: ICD-10-CM

## 2018-01-23 DIAGNOSIS — K21.9 GASTROESOPHAGEAL REFLUX DISEASE, ESOPHAGITIS PRESENCE NOT SPECIFIED: ICD-10-CM

## 2018-01-23 DIAGNOSIS — R63.0 POOR APPETITE: ICD-10-CM

## 2018-01-23 PROCEDURE — 99214 OFFICE O/P EST MOD 30 MIN: CPT | Performed by: PHYSICIAN ASSISTANT

## 2018-01-23 RX ORDER — LUBIPROSTONE 24 UG/1
24 CAPSULE ORAL 2 TIMES DAILY WITH MEALS
Qty: 60 CAPSULE | Refills: 5 | Status: SHIPPED | OUTPATIENT
Start: 2018-01-23 | End: 2019-09-12

## 2018-01-24 PROBLEM — K26.9 DUODENAL ULCER: Status: ACTIVE | Noted: 2018-01-24

## 2018-01-24 PROBLEM — K21.9 GASTROESOPHAGEAL REFLUX DISEASE: Status: ACTIVE | Noted: 2018-01-24

## 2018-01-24 PROBLEM — R63.0 POOR APPETITE: Status: ACTIVE | Noted: 2018-01-24

## 2018-01-24 PROBLEM — R13.10 DYSPHAGIA: Status: ACTIVE | Noted: 2018-01-24

## 2018-01-24 PROBLEM — K25.3 ACUTE GASTRIC ULCER WITHOUT HEMORRHAGE OR PERFORATION: Status: ACTIVE | Noted: 2018-01-24

## 2018-02-20 ENCOUNTER — ANESTHESIA (OUTPATIENT)
Dept: PERIOP | Facility: HOSPITAL | Age: 48
End: 2018-02-20

## 2018-02-20 ENCOUNTER — ANESTHESIA EVENT (OUTPATIENT)
Dept: PERIOP | Facility: HOSPITAL | Age: 48
End: 2018-02-20

## 2018-02-20 ENCOUNTER — HOSPITAL ENCOUNTER (OUTPATIENT)
Facility: HOSPITAL | Age: 48
Setting detail: HOSPITAL OUTPATIENT SURGERY
Discharge: HOME | End: 2018-02-20
Attending: INTERNAL MEDICINE | Admitting: INTERNAL MEDICINE

## 2018-02-20 VITALS
OXYGEN SATURATION: 98 % | SYSTOLIC BLOOD PRESSURE: 109 MMHG | DIASTOLIC BLOOD PRESSURE: 71 MMHG | RESPIRATION RATE: 20 BRPM | TEMPERATURE: 98.4 F | HEART RATE: 97 BPM

## 2018-02-20 DIAGNOSIS — K25.3 ACUTE GASTRIC ULCER WITHOUT HEMORRHAGE OR PERFORATION: ICD-10-CM

## 2018-02-20 DIAGNOSIS — K21.9 GASTROESOPHAGEAL REFLUX DISEASE, ESOPHAGITIS PRESENCE NOT SPECIFIED: ICD-10-CM

## 2018-02-20 DIAGNOSIS — K26.9 DUODENAL ULCER: ICD-10-CM

## 2018-02-20 DIAGNOSIS — R63.0 POOR APPETITE: ICD-10-CM

## 2018-02-20 DIAGNOSIS — R13.10 DYSPHAGIA, UNSPECIFIED TYPE: ICD-10-CM

## 2018-02-20 PROCEDURE — 43239 EGD BIOPSY SINGLE/MULTIPLE: CPT | Performed by: INTERNAL MEDICINE

## 2018-02-20 PROCEDURE — 43248 EGD GUIDE WIRE INSERTION: CPT | Performed by: INTERNAL MEDICINE

## 2018-02-20 PROCEDURE — 88342 IMHCHEM/IMCYTCHM 1ST ANTB: CPT | Performed by: INTERNAL MEDICINE

## 2018-02-20 PROCEDURE — 88305 TISSUE EXAM BY PATHOLOGIST: CPT | Performed by: INTERNAL MEDICINE

## 2018-02-20 PROCEDURE — 88313 SPECIAL STAINS GROUP 2: CPT | Performed by: INTERNAL MEDICINE

## 2018-02-20 PROCEDURE — 25010000002 PROPOFOL 1000 MG/ML EMULSION: Performed by: NURSE ANESTHETIST, CERTIFIED REGISTERED

## 2018-02-20 PROCEDURE — 25010000002 FENTANYL CITRATE (PF) 100 MCG/2ML SOLUTION: Performed by: NURSE ANESTHETIST, CERTIFIED REGISTERED

## 2018-02-20 RX ORDER — IPRATROPIUM BROMIDE AND ALBUTEROL SULFATE 2.5; .5 MG/3ML; MG/3ML
3 SOLUTION RESPIRATORY (INHALATION) ONCE AS NEEDED
Status: DISCONTINUED | OUTPATIENT
Start: 2018-02-20 | End: 2018-02-20 | Stop reason: HOSPADM

## 2018-02-20 RX ORDER — SODIUM CHLORIDE 0.9 % (FLUSH) 0.9 %
1-10 SYRINGE (ML) INJECTION AS NEEDED
Status: DISCONTINUED | OUTPATIENT
Start: 2018-02-20 | End: 2018-02-20 | Stop reason: HOSPADM

## 2018-02-20 RX ORDER — LIDOCAINE HYDROCHLORIDE 20 MG/ML
INJECTION, SOLUTION INFILTRATION; PERINEURAL AS NEEDED
Status: DISCONTINUED | OUTPATIENT
Start: 2018-02-20 | End: 2018-02-20 | Stop reason: SURG

## 2018-02-20 RX ORDER — SODIUM CHLORIDE, SODIUM LACTATE, POTASSIUM CHLORIDE, CALCIUM CHLORIDE 600; 310; 30; 20 MG/100ML; MG/100ML; MG/100ML; MG/100ML
125 INJECTION, SOLUTION INTRAVENOUS CONTINUOUS
Status: DISCONTINUED | OUTPATIENT
Start: 2018-02-20 | End: 2018-02-20 | Stop reason: HOSPADM

## 2018-02-20 RX ORDER — OXYCODONE HYDROCHLORIDE AND ACETAMINOPHEN 5; 325 MG/1; MG/1
1 TABLET ORAL ONCE AS NEEDED
Status: DISCONTINUED | OUTPATIENT
Start: 2018-02-20 | End: 2018-02-20 | Stop reason: HOSPADM

## 2018-02-20 RX ORDER — MEPERIDINE HYDROCHLORIDE 25 MG/ML
12.5 INJECTION INTRAMUSCULAR; INTRAVENOUS; SUBCUTANEOUS
Status: DISCONTINUED | OUTPATIENT
Start: 2018-02-20 | End: 2018-02-20 | Stop reason: HOSPADM

## 2018-02-20 RX ORDER — FENTANYL CITRATE 50 UG/ML
50 INJECTION, SOLUTION INTRAMUSCULAR; INTRAVENOUS
Status: DISCONTINUED | OUTPATIENT
Start: 2018-02-20 | End: 2018-02-20 | Stop reason: HOSPADM

## 2018-02-20 RX ORDER — FENTANYL CITRATE 50 UG/ML
INJECTION, SOLUTION INTRAMUSCULAR; INTRAVENOUS AS NEEDED
Status: DISCONTINUED | OUTPATIENT
Start: 2018-02-20 | End: 2018-02-20 | Stop reason: SURG

## 2018-02-20 RX ORDER — ONDANSETRON 2 MG/ML
4 INJECTION INTRAMUSCULAR; INTRAVENOUS ONCE AS NEEDED
Status: DISCONTINUED | OUTPATIENT
Start: 2018-02-20 | End: 2018-02-20 | Stop reason: HOSPADM

## 2018-02-20 RX ADMIN — FENTANYL CITRATE 100 MCG: 50 INJECTION INTRAMUSCULAR; INTRAVENOUS at 10:05

## 2018-02-20 RX ADMIN — SODIUM CHLORIDE, POTASSIUM CHLORIDE, SODIUM LACTATE AND CALCIUM CHLORIDE: 600; 310; 30; 20 INJECTION, SOLUTION INTRAVENOUS at 10:04

## 2018-02-20 RX ADMIN — PROPOFOL 200 MCG/KG/MIN: 10 INJECTION, EMULSION INTRAVENOUS at 10:06

## 2018-02-20 RX ADMIN — LIDOCAINE HYDROCHLORIDE 40 MG: 20 INJECTION, SOLUTION INFILTRATION; PERINEURAL at 10:05

## 2018-02-20 NOTE — OP NOTE
02/20/18    ESOPHAGOGASTRODUODENOSCOPY WITH BIOPSIES AND ESOPHAGEAL DILATATION  Procedure Note    Jamison Edward  2/20/2018    Dr. Richey      Pre-op Diagnosis: GERD symptoms, nausea, dysphagia, history of PUD      Post-op Diagnosis:   -Gastric and duodenal ulcers        Anesthesia: Per Anesthesia service, general anesthesia      Estimated Blood Loss: Negligible      Findings: EGD was performed with examination of the esophagus, stomach and duodenum to the fourth portion.  The esophagus appeared normal. Two benign-appearing superficial ulcers that were found in the prepyloric area.  A third ulcer crossed the pylorus from the prepyloric area into the duodenal bulb.  Distal to the bulb, the duodenum appeared normal.  No other abnormality was seen.  Biopsies were taken from the margins of the ulcers (indicated above) and randomly from the gastric antrum in order to check for H. pylori.  Biopsies were taken randomly from the esophagus.  Esophageal dilation was performed without difficulty using a 48 [place] Savary dilator.  Reinspection of the esophagus, using the endoscope, revealed no injury to the esophagus.  He tolerated the procedures well and there were no complications.  He left the OR in stable and satisfactory condition.      Specimens: Esophagus, gastric antrum      Grafts/Implants: N/A      Complications: None      Recommendations:   Findings discussed with the patient  Await biopsy findings  Continue present treatment      Nicho Richey III, MD     Date: 2/20/2018  Time: 10:24 AM

## 2018-02-20 NOTE — ANESTHESIA POSTPROCEDURE EVALUATION
Patient: Jamison Edward    Procedure Summary     Date Anesthesia Start Anesthesia Stop Room / Location    02/20/18 1004 1022 [facility] OR 10 / [facility]       Procedure Diagnosis Surgeon Provider    ESOPHAGOGASTRODUODENOSCOPY WITH DILATATION CPT CODE: 91086 (N/A Esophagus) Acute gastric ulcer without hemorrhage or perforation; Poor appetite; Duodenal ulcer; Gastroesophageal reflux disease, esophagitis presence not specified; Dysphagia, unspecified type  (Acute gastric ulcer without hemorrhage or perforation [K25.3]; Poor appetite [R63.0]; Duodenal ulcer [K26.9]; Gastroesophageal reflux disease, esophagitis presence not specified [K21.9]; Dysphagia, unspecified type [R13.10]) MD Issa Mccabe III, MD          Anesthesia Type: general  Last vitals  BP   121/75 (02/20/18 1040)   Temp   98.4 °F (36.9 °C) (02/20/18 1025)   Pulse   93 (02/20/18 1040)   Resp   20 (02/20/18 1040)     SpO2   97 % (02/20/18 1040)     Post Anesthesia Care and Evaluation    Patient location during evaluation: PHASE II  Patient participation: complete - patient participated  Level of consciousness: awake and alert  Pain score: 1  Pain management: adequate  Airway patency: patent  Anesthetic complications: No anesthetic complications  PONV Status: controlled  Cardiovascular status: acceptable  Respiratory status: acceptable and room air  Hydration status: euvolemic  No anesthesia care post op

## 2018-02-20 NOTE — PLAN OF CARE
Problem: GI Endoscopy (Adult)  Goal: Signs and Symptoms of Listed Potential Problems Will be Absent or Manageable (GI Endoscopy)  Outcome: Ongoing (interventions implemented as appropriate)   02/20/18 1007   GI Endoscopy   Problems Assessed (GI Endoscopy) all   Problems Present (GI Endoscopy) none

## 2018-02-20 NOTE — ANESTHESIA PREPROCEDURE EVALUATION
Anesthesia Evaluation     Patient summary reviewed and Nursing notes reviewed   no history of anesthetic complications:  NPO Solid Status: > 8 hours  NPO Liquid Status: > 8 hours           Airway   Mallampati: III  TM distance: <3 FB  Neck ROM: limited  no difficulty expected  Dental - normal exam   (+) edentulous    Pulmonary - normal exam   (+) a smoker Current Smoked day of surgery, COPD (machine shop for 10 yrs ),   (-) asthma  Cardiovascular - normal exam  Exercise tolerance: good (4-7 METS)    NYHA Classification: II  Rate: abnormal    (+) hypertension, dysrhythmias Tachycardia,   (-) past MI, angina, CHF, hyperlipidemia      Neuro/Psych  (+) TIA (5 yrs ago), psychiatric history Depression,     (-) seizures, CVA, numbness  GI/Hepatic/Renal/Endo    (+)  GERD, PUD,   (-) liver disease, no renal disease, diabetes, hypothyroidism    Musculoskeletal     (+) back pain,   (-) radiculopathy  Abdominal  - normal exam    Bowel sounds: normal.   Substance History - negative use     OB/GYN negative ob/gyn ROS         Other   (+) arthritis                     Anesthesia Plan    ASA 3     general     Anesthetic plan and risks discussed with patient and spouse/significant other.  Use of blood products discussed with patient and spouse/significant other  Consented to blood products.

## 2018-02-20 NOTE — H&P (VIEW-ONLY)
: 1970    Chief Complaint   Patient presents with   • gastric ulcer       Jamison Edward is a 47 y.o. male who presents to the office today as a follow up appointment regarding gastric ulcer.    History of Present Illness:  Today, he reports that nausea is still present and severe. He states that his symptoms are the same as last visit and admits that he feels very fatigued all the time. Bowel movements are occurring every 3 days and are described as #3 on the [place] Stool Scale. He has noticed that they are harder at times now since being on the iron supplements. No rectal bleeding. Stools are darker in color since the iron. Took Miralax daily without any improvement.     Dysphagia is present. He reports pain between his shoulder blades and poor appetite. On past EGD, he had gastric ulcers and duodenal ulcer. Heartburn is some improved but still has severe symptoms intermittently, especially after eating gravy. He is taking Nexium 40 mg BID plus carafte plus Tagement 3 times daily. There has been an additional 6 lb weight loss since 1 month ago.    Labs 17:  Pancreatic enzymes normal  Potassium mildly elevated at 5.4 (normal up to 5.3)   Blood count (Hgb) at 11.5 which is improved since 10/2017    EGD and colonoscopy were performed in 2017 and he had only mild gastritis and normal colonoscopy. He had repeat EGD 11/3/17 which showed 2 pre-pyloric ulcers and 1 duodenal ulcer. Recent CT abd/pelvis performed in 2017 showed possible colitis vs under distension of the right colon.    Review of Systems   Constitutional: Negative for fever.   Gastrointestinal: Positive for abdominal pain. Negative for abdominal distention, anal bleeding, blood in stool, constipation, diarrhea, nausea, rectal pain and vomiting.   Genitourinary: Negative for dysuria, frequency and hematuria.   Musculoskeletal: Positive for arthralgias. Negative for myalgias.   Neurological: Negative for headaches.   All other systems  reviewed and are negative.      Past Medical History:   Diagnosis Date   • Johnson esophagus    • Barretts esophagus    • COPD (chronic obstructive pulmonary disease)    • DDD (degenerative disc disease), thoracic    • Degenerative disc disease, lumbar    • GERD (gastroesophageal reflux disease)    • Hypertension    • Peptic ulcer disease    • Stroke        Past Surgical History:   Procedure Laterality Date   • APPENDECTOMY     • CHOLECYSTECTOMY N/A 4/22/2017    Procedure: CHOLECYSTECTOMY LAPAROSCOPIC;  Surgeon: Jaqueline Guaman MD;  Location: [facility] OR;  Service:    • CHOLECYSTECTOMY     • COLONOSCOPY N/A 5/8/2017    Procedure: COLONOSCOPY  CPTCODE:77394;  Surgeon: Nicho Richey III, MD;  Location: [facility] OR;  Service:    • ENDOSCOPY     • ENDOSCOPY N/A 5/8/2017    Procedure: ESOPHAGOGASTRODUODENOSCOPY WITH BIOPSY  CPTCODE:49766;  Surgeon: Nicho Richey III, MD;  Location: [facility] OR;  Service:    • ENDOSCOPY N/A 11/3/2017    Procedure: ESOPHAGOGASTRODUODENOSCOPY WITH BIOPSY  CPTCODE: 02369;  Surgeon: Nicho Richey III, MD;  Location: [facility] OR;  Service:        Family History   Problem Relation Age of Onset   • No Known Problems Mother    • Hypertension Father    • No Known Problems Sister    • No Known Problems Brother    • Drug abuse Brother    • No Known Problems Daughter        Social History     Social History   • Marital status:      Spouse name: N/A   • Number of children: N/A   • Years of education: N/A     Social History Main Topics   • Smoking status: Current Every Day Smoker     Packs/day: 1.00     Years: 35.00     Types: Cigarettes   • Smokeless tobacco: Never Used   • Alcohol use No   • Drug use: No   • Sexual activity: Defer     Other Topics Concern   • Not on file     Social History Narrative       Current Outpatient Prescriptions:   •  aspirin 81 MG EC tablet, Take 81 mg by mouth Daily. Patient took 2 tabs this morning, Disp: , Rfl:   •  baclofen (LIORESAL) 10 MG tablet,  "Take 10 mg by mouth 2 (Two) Times a Day., Disp: , Rfl:   •  cimetidine (TAGAMET) 800 MG tablet, Take 800 mg by mouth 3 (Three) Times a Day., Disp: , Rfl:   •  citalopram (CeleXA) 10 MG tablet, Take 10 mg by mouth Daily., Disp: , Rfl:   •  Cyanocobalamin (VITAMIN B-12 IJ), Inject  as directed., Disp: , Rfl:   •  esomeprazole (nexIUM) 40 MG capsule, Take 1 capsule by mouth 2 (Two) Times a Day Before Meals., Disp: 60 capsule, Rfl: 3  •  ferrous sulfate 325 (65 FE) MG tablet, Take 1 tablet by mouth 3 (Three) Times a Day With Meals., Disp: 90 tablet, Rfl: 5  •  hydrochlorothiazide (HYDRODIURIL) 25 MG tablet, Take 25 mg by mouth Daily., Disp: , Rfl:   •  ondansetron ODT (ZOFRAN-ODT) 4 MG disintegrating tablet, Take 1 tablet by mouth Every 6 (Six) Hours As Needed for Nausea or Vomiting., Disp: 12 tablet, Rfl: 0  •  polyethylene glycol (MIRALAX) powder, Take 17 g by mouth Daily., Disp: 510 g, Rfl: 2  •  promethazine (PHENERGAN) 25 MG tablet, , Disp: , Rfl:   •  QUEtiapine (SEROquel) 25 MG tablet, Take 50 mg by mouth Every Night., Disp: , Rfl:   •  sucralfate (CARAFATE) 1 G tablet, Take 1 tablet by mouth 4 (Four) Times a Day., Disp: 40 tablet, Rfl: 0    Allergies:   Protonix [pantoprazole sodium] and Contrast dye    Vitals:  /85  Pulse 88  Ht 167.6 cm (66\")  Wt 63 kg (139 lb)  SpO2 99%  BMI 22.44 kg/m2    Physical Exam   Constitutional: He is oriented to person, place, and time. He appears well-developed and well-nourished. No distress.   HENT:   Head: Normocephalic and atraumatic.   Right Ear: External ear normal.   Left Ear: External ear normal.   Nose: Nose normal.   Mouth/Throat: Oropharynx is clear and moist.   Eyes: Conjunctivae and EOM are normal. Right eye exhibits no discharge. Left eye exhibits no discharge. No scleral icterus.   Neck: Normal range of motion. Neck supple.   Cardiovascular: Normal rate, regular rhythm and normal heart sounds.  Exam reveals no gallop and no friction rub.    No murmur " heard.  Pulmonary/Chest: Effort normal and breath sounds normal. No respiratory distress. He has no wheezes. He has no rales. He exhibits no tenderness.   Abdominal: Soft. Normal appearance and bowel sounds are normal. He exhibits no distension, no ascites and no mass. There is tenderness (severe in epigastric and RUQ). There is no rigidity and no guarding. No hernia.   Scaphoid abdomen   Musculoskeletal: Normal range of motion. He exhibits no edema or deformity.   Neurological: He is alert and oriented to person, place, and time. He exhibits normal muscle tone. Coordination normal.   Skin: Skin is warm and dry. No rash noted. No erythema. No pallor.   Psychiatric: His behavior is normal. Judgment and thought content normal.   Depressed affect   Nursing note and vitals reviewed.      Assessment/Plan:  1. Chronic idiopathic constipation    2. Dysphagia, unspecified type    3. Gastroesophageal reflux disease, esophagitis presence not specified    4. Acute gastric ulcer without hemorrhage or perforation    5. Duodenal ulcer    6. Poor appetite      ESOPHAGOGASTRODUODENOSCOPY WITH DILATATION CPT CODE: 41963 (N/A)  He will need an esophagogastroduodenoscopy with possible dilatation of the esophagus performed with IV general sedation. All of the risks, benefits and alternatives of this procedure have been discussed with him, all of his questions have been answered and he has elected to proceed. He should follow up in the office after this procedure to discuss the results and further recommendations can be made at that time.    Start taking Amitiza 24 mcg for relief of chronic idiopathic constipation. This should be taken as directed; 1 tab PO twice daily with meals. Samples were given at today's visit.    New Medications Ordered This Visit   Medications   • lubiprostone (AMITIZA) 24 MCG capsule     Sig: Take 1 capsule by mouth 2 (Two) Times a Day With Meals.     Dispense:  60 capsule     Refill:  5           Return for  follow up after procedure to discuss results.      Electronically signed 2/4/2018 2:40 PM  Rupa Cook PA-C, [facility]

## 2018-02-22 LAB
LAB AP CASE REPORT: NORMAL
Lab: NORMAL
PATH REPORT.FINAL DX SPEC: NORMAL

## 2018-02-23 ENCOUNTER — DOCUMENTATION (OUTPATIENT)
Dept: GASTROENTEROLOGY | Facility: CLINIC | Age: 48
End: 2018-02-23

## 2018-02-23 ENCOUNTER — TELEPHONE (OUTPATIENT)
Dept: GASTROENTEROLOGY | Facility: CLINIC | Age: 48
End: 2018-02-23

## 2018-02-23 NOTE — TELEPHONE ENCOUNTER
----- Message from Nicho Richey III, MD sent at 2/22/2018  4:22 PM EST -----  Please schedule office visit in 1–2 months if not already scheduled.  Thank you.  NEFTALY

## 2018-02-27 ENCOUNTER — OFFICE VISIT (OUTPATIENT)
Dept: ONCOLOGY | Facility: CLINIC | Age: 48
End: 2018-02-27

## 2018-02-27 VITALS
RESPIRATION RATE: 20 BRPM | HEART RATE: 121 BPM | TEMPERATURE: 98.7 F | BODY MASS INDEX: 22.84 KG/M2 | WEIGHT: 141.5 LBS | SYSTOLIC BLOOD PRESSURE: 128 MMHG | OXYGEN SATURATION: 100 % | DIASTOLIC BLOOD PRESSURE: 84 MMHG

## 2018-02-27 DIAGNOSIS — E53.8 B12 DEFICIENCY: ICD-10-CM

## 2018-02-27 DIAGNOSIS — Z71.6 TOBACCO ABUSE COUNSELING: ICD-10-CM

## 2018-02-27 DIAGNOSIS — D50.9 IRON DEFICIENCY ANEMIA, UNSPECIFIED IRON DEFICIENCY ANEMIA TYPE: Primary | ICD-10-CM

## 2018-02-27 LAB
BASOPHILS # BLD AUTO: 0.04 10*3/MM3 (ref 0–0.3)
BASOPHILS NFR BLD AUTO: 0.4 % (ref 0–2)
DEPRECATED RDW RBC AUTO: 48.6 FL (ref 37–54)
EOSINOPHIL # BLD AUTO: 0.03 10*3/MM3 (ref 0–0.7)
EOSINOPHIL NFR BLD AUTO: 0.3 % (ref 0–5)
ERYTHROCYTE [DISTWIDTH] IN BLOOD BY AUTOMATED COUNT: 15 % (ref 11.5–14.5)
FERRITIN SERPL-MCNC: 7 NG/ML (ref 21.9–321.7)
HCT VFR BLD AUTO: 35.4 % (ref 42–52)
HGB BLD-MCNC: 10.9 G/DL (ref 14–18)
IMM GRANULOCYTES # BLD: 0.01 10*3/MM3 (ref 0–0.03)
IMM GRANULOCYTES NFR BLD: 0.1 % (ref 0–0.5)
IRON 24H UR-MRATE: 21 MCG/DL (ref 53–167)
IRON SATN MFR SERPL: 5 % (ref 20–50)
LYMPHOCYTES # BLD AUTO: 1.33 10*3/MM3 (ref 1–3)
LYMPHOCYTES NFR BLD AUTO: 14.1 % (ref 21–51)
MCH RBC QN AUTO: 27.8 PG (ref 27–33)
MCHC RBC AUTO-ENTMCNC: 30.8 G/DL (ref 33–37)
MCV RBC AUTO: 90.3 FL (ref 80–94)
MONOCYTES # BLD AUTO: 0.7 10*3/MM3 (ref 0.1–0.9)
MONOCYTES NFR BLD AUTO: 7.4 % (ref 0–10)
NEUTROPHILS # BLD AUTO: 7.31 10*3/MM3 (ref 1.4–6.5)
NEUTROPHILS NFR BLD AUTO: 77.7 % (ref 30–70)
PLATELET # BLD AUTO: 319 10*3/MM3 (ref 130–400)
PMV BLD AUTO: 9.3 FL (ref 6–10)
RBC # BLD AUTO: 3.92 10*6/MM3 (ref 4.7–6.1)
TIBC SERPL-MCNC: 409 MCG/DL (ref 241–421)
VIT B12 BLD-MCNC: 697 PG/ML (ref 211–911)
WBC NRBC COR # BLD: 9.42 10*3/MM3 (ref 4.5–12.5)

## 2018-02-27 PROCEDURE — 82607 VITAMIN B-12: CPT | Performed by: INTERNAL MEDICINE

## 2018-02-27 PROCEDURE — 82728 ASSAY OF FERRITIN: CPT | Performed by: INTERNAL MEDICINE

## 2018-02-27 PROCEDURE — 99406 BEHAV CHNG SMOKING 3-10 MIN: CPT | Performed by: INTERNAL MEDICINE

## 2018-02-27 PROCEDURE — 99215 OFFICE O/P EST HI 40 MIN: CPT | Performed by: INTERNAL MEDICINE

## 2018-02-27 PROCEDURE — 83540 ASSAY OF IRON: CPT | Performed by: INTERNAL MEDICINE

## 2018-02-27 PROCEDURE — 85025 COMPLETE CBC W/AUTO DIFF WBC: CPT | Performed by: INTERNAL MEDICINE

## 2018-02-27 PROCEDURE — 83550 IRON BINDING TEST: CPT | Performed by: INTERNAL MEDICINE

## 2018-02-27 RX ORDER — NICOTINE 21-14-7MG
KIT TRANSDERMAL
Qty: 1 EACH | Refills: 0 | Status: SHIPPED | OUTPATIENT
Start: 2018-02-27 | End: 2018-06-04

## 2018-02-27 RX ORDER — PROMETHAZINE HYDROCHLORIDE 12.5 MG/1
25 TABLET ORAL EVERY 8 HOURS PRN
Qty: 40 TABLET | Refills: 0 | Status: SHIPPED | OUTPATIENT
Start: 2018-02-27 | End: 2018-05-22 | Stop reason: SDUPTHER

## 2018-02-27 NOTE — PROGRESS NOTES
Jamison Edward  [id number]  1970  2/27/2018      Referring Provider:   Kenton Boogie MD    Primary Physician:  Lázaro Heart MD    Reason for Follow Up:   Normocytic anemia  Iron deficiency  B12 deficiency    Chief Complaint:   Weight Loss      History of Present Illness:  Jamison Edward is a very pleasant 47 y.o.  male who presents in follow up appointment for further management and evaluation of normocytic anemia secondary to iron and B12 deficiency.    Mr. Edward has been following with Dr. Richey for history of peptic ulcer disease and hematemesis. He was placed on oral iron once daily several months ago and experienced some constipation with the medication however self discontinued this one month prior to his initial consultation. He has had gradual weight loss where he previously weighed 205 pounds two years ago and is currently down to 140 pounds. He states that he continues to have a good appetite and eat normally. He recently had an EGD in November 2017 for weight loss, hematemesis and abdominal pain which was significant for gastric and duodenal ulcers. He does have history of peptic ulcer disease and in the past and has also been followed by Dr. Starr for Johnson's esophagus. He also had an EGD in May 2017 that showed normal duodenal biopsies, negative H pylori, along with normal random colon biopsies. He also had a CT abd/pelvis performed in 11/2017 which showed possible colitis versus under distension of the right colon. He is currently on ASA 81mg daily due to TIA in the past however denies of any other NSAID use. He also reports that when he swallows at times he feels as if he is going to pass out. He states that he has had swallow evaluation that have returned normal. He also experiences chest pain every night and has been evaluated by cardiology and as per patient was felt to be non cardiac related.    Interim History:  Patient reports that his weight has been stable  since consuming 2 boosts daily. He continues to have ongoing nausea and abdominal pain. He recently underwent an EGD with Dr. Richey and was found to have gastric and duodenal ulcers. His medication since has been changed and reports that his symptoms are slightly improving however continues to have pain which is worse with food. He also notes back pain which is currently being evaluated by his primary provider.        The following portions of the patient's history were reviewed and updated as appropriate: allergies, current medications, past family history, past medical history, past social history, past surgical history and problem list.    Allergies   Allergen Reactions   • Protonix [Pantoprazole Sodium] Palpitations     Patient states that protonix causes chest pain.  Shruthi Pérez, [facility]  4/23/2017  11:19 AM     • Contrast Dye      Shortness of breath       Past Medical History:   Diagnosis Date   • Johnson esophagus    • Barretts esophagus    • COPD (chronic obstructive pulmonary disease)    • DDD (degenerative disc disease), thoracic    • Degenerative disc disease, lumbar    • GERD (gastroesophageal reflux disease)    • Hypertension    • Peptic ulcer disease    • Stroke        Past Surgical History:   Procedure Laterality Date   • APPENDECTOMY     • CHOLECYSTECTOMY N/A 4/22/2017    Procedure: CHOLECYSTECTOMY LAPAROSCOPIC;  Surgeon: Jaqueline Guaman MD;  Location: [facility] OR;  Service:    • CHOLECYSTECTOMY     • COLONOSCOPY N/A 5/8/2017    Procedure: COLONOSCOPY  CPTCODE:04928;  Surgeon: Nicho Richey III, MD;  Location: [facility] OR;  Service:    • ENDOSCOPY     • ENDOSCOPY N/A 5/8/2017    Procedure: ESOPHAGOGASTRODUODENOSCOPY WITH BIOPSY  CPTCODE:13328;  Surgeon: Nicho Richey III, MD;  Location: [facility] OR;  Service:    • ENDOSCOPY N/A 11/3/2017    Procedure: ESOPHAGOGASTRODUODENOSCOPY WITH BIOPSY  CPTCODE: 99878;  Surgeon: Nicho Richey III, MD;  Location: [facility] OR;  Service:    •  ENDOSCOPY N/A 2/20/2018    Procedure: ESOPHAGOGASTRODUODENOSCOPY WITH DILATATION CPT CODE: 41659;  Surgeon: Nicho Richey III, MD;  [facility];  Service:        Social History     Social History   • Marital status:      Spouse name: N/A   • Number of children: N/A   • Years of education: N/A     Occupational History   • Not on file.     Social History Main Topics   • Smoking status: Current Every Day Smoker     Packs/day: 1.00     Years: 35.00     Types: Cigarettes   • Smokeless tobacco: Never Used   • Alcohol use No   • Drug use: No   • Sexual activity: Defer     Other Topics Concern   • Not on file     Social History Narrative       Family History   Problem Relation Age of Onset   • No Known Problems Mother    • Hypertension Father    • No Known Problems Sister    • No Known Problems Brother    • Drug abuse Brother    • No Known Problems Daughter    Maternal GF - H&N cancer  Paternal GF - Prostate cancer          Current Outpatient Prescriptions:   •  aspirin 81 MG EC tablet, Take 81 mg by mouth Daily. Patient took 2 tabs this morning, Disp: , Rfl:   •  baclofen (LIORESAL) 10 MG tablet, Take 10 mg by mouth 2 (Two) Times a Day., Disp: , Rfl:   •  cimetidine (TAGAMET) 800 MG tablet, Take 800 mg by mouth 3 (Three) Times a Day., Disp: , Rfl:   •  citalopram (CeleXA) 10 MG tablet, Take 10 mg by mouth Daily., Disp: , Rfl:   •  Cyanocobalamin (VITAMIN B-12 IJ), Inject  as directed., Disp: , Rfl:   •  esomeprazole (nexIUM) 40 MG capsule, Take 1 capsule by mouth 2 (Two) Times a Day Before Meals., Disp: 60 capsule, Rfl: 3  •  ferrous sulfate 325 (65 FE) MG tablet, Take 1 tablet by mouth 3 (Three) Times a Day With Meals., Disp: 90 tablet, Rfl: 5  •  hydrochlorothiazide (HYDRODIURIL) 25 MG tablet, Take 25 mg by mouth Daily., Disp: , Rfl:   •  lubiprostone (AMITIZA) 24 MCG capsule, Take 1 capsule by mouth 2 (Two) Times a Day With Meals., Disp: 60 capsule, Rfl: 5  •  ondansetron ODT (ZOFRAN-ODT) 4 MG  disintegrating tablet, Take 1 tablet by mouth Every 6 (Six) Hours As Needed for Nausea or Vomiting., Disp: 12 tablet, Rfl: 0  •  polyethylene glycol (MIRALAX) powder, Take 17 g by mouth Daily., Disp: 510 g, Rfl: 2  •  promethazine (PHENERGAN) 25 MG tablet, , Disp: , Rfl:   •  QUEtiapine (SEROquel) 25 MG tablet, Take 50 mg by mouth Every Night., Disp: , Rfl:   •  sucralfate (CARAFATE) 1 G tablet, Take 1 tablet by mouth 4 (Four) Times a Day., Disp: 40 tablet, Rfl: 0        Review of Systems  Constitutional: No fever, chills, night sweats, +weight loss.   HEENT:  No headaches, vision changes or hearing changes, no sinus drainage, sore throat.   Cardiovascular:  No palpitations, +chest pain at night, no syncopal episodes or edema. +dizziness  Pulmonary:  No shortness of breath, hemoptysis +cough   Gastrointestinal:  +nausea, no vomiting. +constipation and diarrhea. No change in appetite. No abdominal pain. No melena or hematochezia.    Genitourinary:  No hematuria, or changes in urination.   Musculoskeletal:  No pain, +chronic neck pain  Skin: No rashes or pruritus.   Endocrine:  No hot flashes or chills   Hematologic:  No history of free bleeding, spontaneous bleeding or clotting problems. No lymphadenopathy.    Immunologic:  No allergies or frequent infections.   Neurologic: No numbness, tingling, or weakness.   Psychiatric:  No anxiety or depression.       Physical Exam  Vital Signs: These were reviewed and listed as per patient’s electronic medical chart  Vitals:    02/27/18 1209   BP: 128/84   Pulse: (!) 121   Resp: 20   Temp: 98.7 °F (37.1 °C)   SpO2: 100%     General: Awake, alert and oriented, in no distress  HEENT: Head is atraumatic, normocephalic, extraocular movements full, oropharynx clear, no scleral icterus, pink moist mucous membranes  Neck: supple, no jvd, lymphadenopathy or masses  Cardiovascular: regular rate and rhythm without murmurs, not tachycardic on examintation  Pulmonary: non-labored, clear to  auscultation bilaterally, no wheezing  Abdomen: soft, mild tenderness, non-distended, normal active bowel sounds present  Extremities: No clubbing, cyanosis or edema  Lymph: No cervical, supraclavicular adenopathy  Neurologic: Mental status as above, alert, awake and oriented, grossly non-focal exam  Skin: warm, dry, intact, chronic lesions        Labs / Studies:    Admission on 02/20/2018, Discharged on 02/20/2018   Component Date Value   • Case Report 02/20/2018                      Value:Surgical Pathology Report                         Case: [id number]                                  Authorizing Provider:  Nicho Richey III,   Collected:           02/20/2018 10:16 AM                                 MD                                                                           Ordering Location:     [facility]      Received:            02/20/2018 01:40 PM                                 OPERATING ROOM DEPARTMENT                                                    Pathologist:           Swapna Rodriguez MD                                                        Specimens:   1) - Gastric, Antrum, Antrum BX                                                                     2) - Esophagus, Esophagus BX                                                              • Final Diagnosis 02/20/2018                      Value:This result contains rich text formatting which cannot be displayed here.   Consult on 01/15/2018   Component Date Value   • Performed by: 01/15/2018 Mai Licea    • Pathologist Interpretati* 01/15/2018 See scanned report    • Ferritin 01/15/2018 10.00*   • Iron 01/15/2018 27*   • TIBC 01/15/2018 433*   • Iron Saturation 01/15/2018 6*   • Vitamin B-12 01/15/2018 249    • Folate 01/15/2018 14.45    • Reticulocyte % 01/15/2018 1.21    • Reticulocyte Absolute 01/15/2018 0.0502    • LDH 01/15/2018 211    • C-Reactive Protein 01/15/2018 <0.50    • Sed Rate 01/15/2018 24*   • TSH  01/15/2018 0.903    • ROWENA 01/15/2018 Negative    • Haptoglobin 01/15/2018 240*   • Hepatitis B Surface Ag 01/15/2018 Non-Reactive    • Hep A IgM 01/15/2018 Non-Reactive    • Hep B C IgM 01/15/2018 Non-Reactive    • Hepatitis C Ab 01/15/2018 Non-Reactive    • WBC 01/15/2018 8.88    • RBC 01/15/2018 4.15*   • Hemoglobin 01/15/2018 11.5*   • Hematocrit 01/15/2018 36.4*   • MCV 01/15/2018 87.7    • MCH 01/15/2018 27.7    • MCHC 01/15/2018 31.6*   • RDW 01/15/2018 18.2*   • RDW-SD 01/15/2018 58.6*   • MPV 01/15/2018 9.4    • Platelets 01/15/2018 265    • Neutrophil % 01/15/2018 76.7*   • Lymphocyte % 01/15/2018 16.6*   • Monocyte % 01/15/2018 6.0    • Eosinophil % 01/15/2018 0.2    • Basophil % 01/15/2018 0.3    • Immature Grans % 01/15/2018 0.2    • Neutrophils, Absolute 01/15/2018 6.81*   • Lymphocytes, Absolute 01/15/2018 1.47    • Monocytes, Absolute 01/15/2018 0.53    • Eosinophils, Absolute 01/15/2018 0.02    • Basophils, Absolute 01/15/2018 0.03    • Immature Grans, Absolute 01/15/2018 0.02    • HIV-1/ HIV-2 01/15/2018 Non-Reactive    Lab on 12/13/2017   Component Date Value   • Glucose 12/13/2017 98    • BUN 12/13/2017 14    • Creatinine 12/13/2017 0.94    • Sodium 12/13/2017 136    • Potassium 12/13/2017 5.4*   • Chloride 12/13/2017 106    • CO2 12/13/2017 28.1    • Calcium 12/13/2017 8.9    • Total Protein 12/13/2017 6.4    • Albumin 12/13/2017 4.10    • ALT (SGPT) 12/13/2017 10    • AST (SGOT) 12/13/2017 18    • Alkaline Phosphatase 12/13/2017 71    • Total Bilirubin 12/13/2017 0.1*   • eGFR Non African Amer 12/13/2017 85    • Globulin 12/13/2017 2.3    • A/G Ratio 12/13/2017 1.8    • BUN/Creatinine Ratio 12/13/2017 14.9    • Anion Gap 12/13/2017 1.9*   • Lipase 12/13/2017 33    • C-Reactive Protein 12/13/2017 <0.50    • Amylase 12/13/2017 43    • WBC 12/13/2017 7.11    • RBC 12/13/2017 4.24*   • Hemoglobin 12/13/2017 11.5*   • Hematocrit 12/13/2017 37.3*   • MCV 12/13/2017 88.0    • MCH 12/13/2017 27.1    •  MCHC 12/13/2017 30.8*   • RDW 12/13/2017 16.9*   • RDW-SD 12/13/2017 54.0    • MPV 12/13/2017 9.5    • Platelets 12/13/2017 349    • Neutrophil % 12/13/2017 74.2*   • Lymphocyte % 12/13/2017 17.6*   • Monocyte % 12/13/2017 7.2    • Eosinophil % 12/13/2017 0.4    • Basophil % 12/13/2017 0.6    • Immature Grans % 12/13/2017 0.0    • Neutrophils, Absolute 12/13/2017 5.28    • Lymphocytes, Absolute 12/13/2017 1.25    • Monocytes, Absolute 12/13/2017 0.51    • Eosinophils, Absolute 12/13/2017 0.03    • Basophils, Absolute 12/13/2017 0.04    • Immature Grans, Absolute 12/13/2017 0.00    • Osmolality Calc 12/13/2017 272.4*   Admission on 11/03/2017, Discharged on 11/03/2017   Component Date Value   • Case Report 11/03/2017                      Value:Surgical Pathology Report                         Case: [id number]                                  Authorizing Provider:  Nicho Richey III,   Collected:           11/03/2017 10:52 AM                                 MD                                                                           Ordering Location:     [facility]      Received:            11/03/2017 12:59 PM                                 OPERATING ROOM DEPARTMENT                                                    Pathologist:           Swapna Rodriguez MD                                                        Specimen:    Gastric, Antrum                                                                           • Final Diagnosis 11/03/2017                      Value:This result contains rich text formatting which cannot be displayed here.   Admission on 10/25/2017, Discharged on 10/25/2017   Component Date Value   • Glucose 10/25/2017 194*   • BUN 10/25/2017 7    • Creatinine 10/25/2017 1.02    • Sodium 10/25/2017 135    • Potassium 10/25/2017 3.3*   • Chloride 10/25/2017 108    • CO2 10/25/2017 23.9*   • Calcium 10/25/2017 9.0    • Total Protein 10/25/2017 6.6    • Albumin 10/25/2017 4.30    •  ALT (SGPT) 10/25/2017 10    • AST (SGOT) 10/25/2017 17    • Alkaline Phosphatase 10/25/2017 69    • Total Bilirubin 10/25/2017 0.1*   • eGFR Non  Amer 10/25/2017 78    • Globulin 10/25/2017 2.3    • A/G Ratio 10/25/2017 1.9    • BUN/Creatinine Ratio 10/25/2017 6.9*   • Anion Gap 10/25/2017 3.1*   • Lipase 10/25/2017 37    • Protime 10/25/2017 12.8    • INR 10/25/2017 0.95    • PTT 10/25/2017 27.9    • WBC 10/25/2017 10.24    • RBC 10/25/2017 4.31*   • Hemoglobin 10/25/2017 11.1*   • Hematocrit 10/25/2017 35.5*   • MCV 10/25/2017 82.4    • MCH 10/25/2017 25.8*   • MCHC 10/25/2017 31.3*   • RDW 10/25/2017 16.7*   • RDW-SD 10/25/2017 50.0    • MPV 10/25/2017 10.2*   • Platelets 10/25/2017 346    • Neutrophil % 10/25/2017 77.5*   • Lymphocyte % 10/25/2017 14.2*   • Monocyte % 10/25/2017 7.4    • Eosinophil % 10/25/2017 0.3    • Basophil % 10/25/2017 0.3    • Immature Grans % 10/25/2017 0.3    • Neutrophils, Absolute 10/25/2017 7.94*   • Lymphocytes, Absolute 10/25/2017 1.45    • Monocytes, Absolute 10/25/2017 0.76    • Eosinophils, Absolute 10/25/2017 0.03    • Basophils, Absolute 10/25/2017 0.03    • Immature Grans, Absolute 10/25/2017 0.03    • Osmolality Calc 10/25/2017 273.4         PATHOLOGY:  01/16/18: Peripheral Smear:              Assessment/Plan   Jamison Edward is a very pleasant 47 y.o.  male who presents in follow up appointment for further management and evaluation of normocytic anemia secondary to iron and B12 deficiency.    Normocytic anemia  Iron deficiency   B12 deficiency  - His hemoglobin is slightly decreased today. While B12 levels have improved he has not been taking his oral iron as previously discussed and therefore his iron levels have continued to decrease.  - He was previously on oral iron replacement however discontinued this a month prior to his initial consultation. Iron panel and ferritin are consistent with iron deficiency and thus he was again advised to restart oral  iron (ferrous sulfate) and start once daily and if able to tolerate will titrate to three times daily. He was advised of possible side effects such as but not limited to (nausea, stomach upset, constipation) and should these occur or worsen his baseline symptoms he should notify our clinic. He currently follows with gastroenterology closely and recently underwent an EGD which was significant for gastric and duodenal ulcers without malignancy..  - Folate was normal however B12 was low normal and therefore he was started on B12 injections. Cyanocobalamin injections 1000mcg/mL inj SC daily for one week, then weekly for 4 weeks and monthly thereafter. He is currently on monthly dosage.  - ROWENA was normal, LDH, retic count normal going against hemolysis, and haptoglobin was high. TSH normal  - Peripheral smear as above.    Weight loss, back pain  - I did obtain CT chest to further evaluate given his long term smoking history as well as current symptoms. No bone abnormalities, lymphadenopathy, or masses to account for symptoms and was significant for bilateral emphysema which was discussed with the patient.  - HIV non reactive, acute hepatitis panel nonreactive  - TSH is normal  - He recent had imaging obtained by his primary provider and will follow up with them for the results and for further evaluation.    Nausea  - I re-prescribed prn Phenergan     [employer] Quality measures/Tobacco Abuse Counceling  - I advised the patient of the risks in continuing to use tobacco, and I had advised this patient of smoking cessation. Today he is willing to try nicotine patches in an effort to quit and I have prescribed this to his pharmacy.  During this visit, I spent < 3 minutes counseling the patient regarding smoking cessation.  - Patient's BMI is within normal parameters. No follow-up required. Patient has been losing weight and is trying to maintain weight with boost.  - Current outpatient and discharge medications have been reconciled  for the patient.      I will have the patient return for repeat laboratory evaluation in 6 weeks and in follow up appointment in three months. He understands that should he have any questions or concerns prior to his appointment he should give us a call at any time and I would be happy to see him sooner. It was a pleasure to see this patient in clinic today, thank you for allowing me to participate in the care of this patient.    I spent 40 minutes in regards to this patient’s care today. More than 29 minutes of the time was spent in direct interaction with the patient for the above problems.        Cami Gustafson MD  02/27/2018  12:18 PM

## 2018-04-10 ENCOUNTER — LAB (OUTPATIENT)
Dept: ONCOLOGY | Facility: CLINIC | Age: 48
End: 2018-04-10

## 2018-04-10 VITALS
OXYGEN SATURATION: 98 % | TEMPERATURE: 98.1 F | SYSTOLIC BLOOD PRESSURE: 126 MMHG | RESPIRATION RATE: 18 BRPM | HEART RATE: 114 BPM | DIASTOLIC BLOOD PRESSURE: 80 MMHG

## 2018-04-10 DIAGNOSIS — D50.9 IRON DEFICIENCY ANEMIA, UNSPECIFIED IRON DEFICIENCY ANEMIA TYPE: ICD-10-CM

## 2018-04-10 DIAGNOSIS — E53.8 B12 DEFICIENCY: ICD-10-CM

## 2018-04-10 LAB
BASOPHILS # BLD AUTO: 0.05 10*3/MM3 (ref 0–0.3)
BASOPHILS NFR BLD AUTO: 0.5 % (ref 0–2)
DEPRECATED RDW RBC AUTO: 47.3 FL (ref 37–54)
EOSINOPHIL # BLD AUTO: 0.09 10*3/MM3 (ref 0–0.7)
EOSINOPHIL NFR BLD AUTO: 1 % (ref 0–5)
ERYTHROCYTE [DISTWIDTH] IN BLOOD BY AUTOMATED COUNT: 14.7 % (ref 11.5–14.5)
FERRITIN SERPL-MCNC: 7 NG/ML (ref 21.9–321.7)
HCT VFR BLD AUTO: 38.6 % (ref 42–52)
HGB BLD-MCNC: 11.8 G/DL (ref 14–18)
IMM GRANULOCYTES # BLD: 0.02 10*3/MM3 (ref 0–0.03)
IMM GRANULOCYTES NFR BLD: 0.2 % (ref 0–0.5)
IRON 24H UR-MRATE: 19 MCG/DL (ref 53–167)
IRON SATN MFR SERPL: 4 % (ref 20–50)
LYMPHOCYTES # BLD AUTO: 1.45 10*3/MM3 (ref 1–3)
LYMPHOCYTES NFR BLD AUTO: 15.3 % (ref 21–51)
MCH RBC QN AUTO: 26.8 PG (ref 27–33)
MCHC RBC AUTO-ENTMCNC: 30.6 G/DL (ref 33–37)
MCV RBC AUTO: 87.7 FL (ref 80–94)
MONOCYTES # BLD AUTO: 0.82 10*3/MM3 (ref 0.1–0.9)
MONOCYTES NFR BLD AUTO: 8.7 % (ref 0–10)
NEUTROPHILS # BLD AUTO: 7.02 10*3/MM3 (ref 1.4–6.5)
NEUTROPHILS NFR BLD AUTO: 74.3 % (ref 30–70)
PLATELET # BLD AUTO: 357 10*3/MM3 (ref 130–400)
PMV BLD AUTO: 9.4 FL (ref 6–10)
RBC # BLD AUTO: 4.4 10*6/MM3 (ref 4.7–6.1)
TIBC SERPL-MCNC: 436 MCG/DL (ref 241–421)
WBC NRBC COR # BLD: 9.45 10*3/MM3 (ref 4.5–12.5)

## 2018-04-10 PROCEDURE — 83540 ASSAY OF IRON: CPT | Performed by: INTERNAL MEDICINE

## 2018-04-10 PROCEDURE — 85025 COMPLETE CBC W/AUTO DIFF WBC: CPT | Performed by: INTERNAL MEDICINE

## 2018-04-10 PROCEDURE — 82728 ASSAY OF FERRITIN: CPT | Performed by: INTERNAL MEDICINE

## 2018-04-10 PROCEDURE — 83550 IRON BINDING TEST: CPT | Performed by: INTERNAL MEDICINE

## 2018-04-12 ENCOUNTER — TRANSCRIBE ORDERS (OUTPATIENT)
Dept: PULMONOLOGY | Facility: HOSPITAL | Age: 48
End: 2018-04-12

## 2018-04-12 DIAGNOSIS — K22.4 DYSKINESIA OF ESOPHAGUS: Primary | ICD-10-CM

## 2018-04-12 DIAGNOSIS — R10.13 ABDOMINAL PAIN, EPIGASTRIC: ICD-10-CM

## 2018-04-12 DIAGNOSIS — R63.4 LOSS OF WEIGHT: ICD-10-CM

## 2018-04-13 ENCOUNTER — HOSPITAL ENCOUNTER (OUTPATIENT)
Dept: MRI IMAGING | Facility: HOSPITAL | Age: 48
Discharge: HOME | End: 2018-04-13
Admitting: FAMILY MEDICINE

## 2018-04-13 DIAGNOSIS — R63.4 LOSS OF WEIGHT: ICD-10-CM

## 2018-04-13 DIAGNOSIS — K22.4 DYSKINESIA OF ESOPHAGUS: ICD-10-CM

## 2018-04-13 DIAGNOSIS — R10.13 ABDOMINAL PAIN, EPIGASTRIC: ICD-10-CM

## 2018-04-13 PROCEDURE — 74181 MRI ABDOMEN W/O CONTRAST: CPT

## 2018-04-13 PROCEDURE — 74181 MRI ABDOMEN W/O CONTRAST: CPT | Performed by: RADIOLOGY

## 2018-04-23 DIAGNOSIS — D50.9 IRON DEFICIENCY ANEMIA, UNSPECIFIED IRON DEFICIENCY ANEMIA TYPE: Primary | ICD-10-CM

## 2018-04-23 DIAGNOSIS — K90.49 MALABSORPTION DUE TO INTOLERANCE, NOT ELSEWHERE CLASSIFIED: ICD-10-CM

## 2018-04-23 PROBLEM — K90.9 MALABSORPTION: Status: ACTIVE | Noted: 2018-04-23

## 2018-04-23 RX ORDER — SODIUM CHLORIDE 9 MG/ML
250 INJECTION, SOLUTION INTRAVENOUS ONCE
Status: CANCELLED | OUTPATIENT
Start: 2018-04-27 | End: 2018-04-27

## 2018-04-23 RX ORDER — SODIUM CHLORIDE 9 MG/ML
250 INJECTION, SOLUTION INTRAVENOUS ONCE
Status: CANCELLED | OUTPATIENT
Start: 2018-05-04 | End: 2018-05-04

## 2018-04-27 ENCOUNTER — OFFICE VISIT (OUTPATIENT)
Dept: GASTROENTEROLOGY | Facility: CLINIC | Age: 48
End: 2018-04-27

## 2018-04-27 ENCOUNTER — INFUSION (OUTPATIENT)
Dept: ONCOLOGY | Facility: HOSPITAL | Age: 48
End: 2018-04-27

## 2018-04-27 VITALS
WEIGHT: 145.6 LBS | SYSTOLIC BLOOD PRESSURE: 123 MMHG | OXYGEN SATURATION: 98 % | HEART RATE: 114 BPM | DIASTOLIC BLOOD PRESSURE: 77 MMHG | HEIGHT: 66 IN | BODY MASS INDEX: 23.4 KG/M2

## 2018-04-27 VITALS
OXYGEN SATURATION: 100 % | DIASTOLIC BLOOD PRESSURE: 82 MMHG | SYSTOLIC BLOOD PRESSURE: 129 MMHG | WEIGHT: 144.8 LBS | RESPIRATION RATE: 20 BRPM | BODY MASS INDEX: 23.37 KG/M2 | TEMPERATURE: 98.2 F | HEART RATE: 112 BPM

## 2018-04-27 DIAGNOSIS — K25.9 GASTRIC ULCER WITHOUT HEMORRHAGE OR PERFORATION, UNSPECIFIED CHRONICITY: Primary | ICD-10-CM

## 2018-04-27 DIAGNOSIS — K26.9 DUODENAL ULCER: ICD-10-CM

## 2018-04-27 DIAGNOSIS — D50.9 IRON DEFICIENCY ANEMIA, UNSPECIFIED IRON DEFICIENCY ANEMIA TYPE: Primary | ICD-10-CM

## 2018-04-27 DIAGNOSIS — K59.00 CONSTIPATION, UNSPECIFIED CONSTIPATION TYPE: ICD-10-CM

## 2018-04-27 DIAGNOSIS — K90.49 MALABSORPTION DUE TO INTOLERANCE, NOT ELSEWHERE CLASSIFIED: ICD-10-CM

## 2018-04-27 PROCEDURE — 96374 THER/PROPH/DIAG INJ IV PUSH: CPT

## 2018-04-27 PROCEDURE — 25010000002 FERRIC CARBOXYMALTOSE 750 MG/15ML SOLUTION 15 ML VIAL: Performed by: INTERNAL MEDICINE

## 2018-04-27 PROCEDURE — 99214 OFFICE O/P EST MOD 30 MIN: CPT | Performed by: PHYSICIAN ASSISTANT

## 2018-04-27 RX ORDER — SODIUM CHLORIDE 9 MG/ML
250 INJECTION, SOLUTION INTRAVENOUS ONCE
Status: COMPLETED | OUTPATIENT
Start: 2018-04-27 | End: 2018-04-27

## 2018-04-27 RX ORDER — POLYETHYLENE GLYCOL 3350 17 G/17G
POWDER, FOR SOLUTION ORAL
Qty: 510 G | Refills: 0 | Status: SHIPPED | OUTPATIENT
Start: 2018-04-27 | End: 2019-09-12

## 2018-04-27 RX ADMIN — SODIUM CHLORIDE 250 ML: 9 INJECTION, SOLUTION INTRAVENOUS at 10:26

## 2018-04-27 RX ADMIN — FERRIC CARBOXYMALTOSE INJECTION 750 MG: 50 INJECTION, SOLUTION INTRAVENOUS at 10:27

## 2018-05-04 ENCOUNTER — INFUSION (OUTPATIENT)
Dept: ONCOLOGY | Facility: HOSPITAL | Age: 48
End: 2018-05-04

## 2018-05-04 VITALS
WEIGHT: 143.7 LBS | DIASTOLIC BLOOD PRESSURE: 82 MMHG | HEART RATE: 104 BPM | SYSTOLIC BLOOD PRESSURE: 130 MMHG | OXYGEN SATURATION: 100 % | BODY MASS INDEX: 23.19 KG/M2 | TEMPERATURE: 98.2 F | RESPIRATION RATE: 18 BRPM

## 2018-05-04 DIAGNOSIS — D50.9 IRON DEFICIENCY ANEMIA, UNSPECIFIED IRON DEFICIENCY ANEMIA TYPE: Primary | ICD-10-CM

## 2018-05-04 DIAGNOSIS — K90.49 MALABSORPTION DUE TO INTOLERANCE, NOT ELSEWHERE CLASSIFIED: ICD-10-CM

## 2018-05-04 PROCEDURE — 96374 THER/PROPH/DIAG INJ IV PUSH: CPT

## 2018-05-04 PROCEDURE — 25010000002 FERRIC CARBOXYMALTOSE 750 MG/15ML SOLUTION 15 ML VIAL: Performed by: INTERNAL MEDICINE

## 2018-05-04 RX ORDER — SODIUM CHLORIDE 9 MG/ML
250 INJECTION, SOLUTION INTRAVENOUS ONCE
Status: COMPLETED | OUTPATIENT
Start: 2018-05-04 | End: 2018-05-04

## 2018-05-04 RX ADMIN — SODIUM CHLORIDE 250 ML: 9 INJECTION, SOLUTION INTRAVENOUS at 11:00

## 2018-05-04 RX ADMIN — FERRIC CARBOXYMALTOSE INJECTION 750 MG: 50 INJECTION, SOLUTION INTRAVENOUS at 11:00

## 2018-05-17 ENCOUNTER — TRANSCRIBE ORDERS (OUTPATIENT)
Dept: LAB | Facility: HOSPITAL | Age: 48
End: 2018-05-17

## 2018-05-17 ENCOUNTER — LAB (OUTPATIENT)
Dept: LAB | Facility: HOSPITAL | Age: 48
End: 2018-05-17

## 2018-05-17 DIAGNOSIS — K25.9 GASTRIC ULCER, UNSPECIFIED CHRONICITY, UNSPECIFIED WHETHER GASTRIC ULCER HEMORRHAGE OR PERFORATION PRESENT: Primary | ICD-10-CM

## 2018-05-17 DIAGNOSIS — K25.9 GASTRIC ULCER, UNSPECIFIED CHRONICITY, UNSPECIFIED WHETHER GASTRIC ULCER HEMORRHAGE OR PERFORATION PRESENT: ICD-10-CM

## 2018-05-17 PROCEDURE — 36415 COLL VENOUS BLD VENIPUNCTURE: CPT

## 2018-05-17 PROCEDURE — 82941 ASSAY OF GASTRIN: CPT

## 2018-05-17 NOTE — PROGRESS NOTES
Jamison Edward  [id number]  1970  5/22/2018      Referring Provider:   Kenton Boogie MD    Primary Physician:  Lázaro Heart MD    Reason for Follow Up:   Normocytic anemia  Iron deficiency  B12 deficiency    Chief Complaint:   Weight Loss  Nausea      History of Present Illness:  Jamison Edward is a very pleasant 47 y.o.  male who presents in follow up appointment for further management and evaluation of normocytic anemia secondary to iron and B12 deficiency.    Mr. Edward has been following with Dr. Richey for history of peptic ulcer disease and hematemesis. He was placed on oral iron once daily several months ago and experienced some constipation with the medication however self discontinued this one month prior to his initial consultation. He has had gradual weight loss where he previously weighed 205 pounds two years ago and is currently down to 140 pounds. He states that he continues to have a good appetite and eat normally. He recently had an EGD in November 2017 for weight loss, hematemesis and abdominal pain which was significant for gastric and duodenal ulcers. He does have history of peptic ulcer disease and in the past and has also been followed by Dr. Starr for Johnson's esophagus. He also had an EGD in May 2017 that showed normal duodenal biopsies, negative H pylori, along with normal random colon biopsies. He also had a CT abd/pelvis performed in 11/2017 which showed possible colitis versus under distension of the right colon. He is currently on ASA 81mg daily due to a TIA in the past however denies of any other NSAID use. He also experiences chest pain every night and has been evaluated by cardiology and as per patient was felt to be non cardiac related. He underwent an EGD with Dr. Richey and was found to have gastric and duodenal ulcers and is to continue current treatment for the ulcers and is undergo repeat endoscopy.     Interim History:  Since his last visit he was  "started on IV Injectafer as he continued to remain iron deficient despite oral iron which he received on 4/27 and 5/4. He has also been following with gastroenterology in [place] with Dr. Moran for further evaluation. He underwent an MRCP in August that was essentially unremarkable. He also reports he recently underwent an abdominal US with duplex that was reportedly negative however those reults are unavailable to me at present. He continues to have ongoing nausea and abdominal pain while eating although slightly improved. He does reports ongoing weight loss however he attributes this to a recent \"stomach bug\" in which he had ongoing nausea, vomiting, and diarrhea. He reports that he vomited black substance one am. He denies of any hematochezia but reports a possible one time episode of melena.          The following portions of the patient's history were reviewed and updated as appropriate: allergies, current medications, past family history, past medical history, past social history, past surgical history and problem list.    Allergies   Allergen Reactions   • Protonix [Pantoprazole Sodium] Palpitations     Patient states that protonix causes chest pain.  Shruthi P[facility]rez [facility]  4/23/2017  11:19 AM     • Contrast Dye      Shortness of breath       Past Medical History:   Diagnosis Date   • Johnson esophagus    • Barretts esophagus    • COPD (chronic obstructive pulmonary disease)    • DDD (degenerative disc disease), thoracic    • Degenerative disc disease, lumbar    • GERD (gastroesophageal reflux disease)    • Hypertension    • Peptic ulcer disease    • Stroke        Past Surgical History:   Procedure Laterality Date   • APPENDECTOMY     • CHOLECYSTECTOMY N/A 4/22/2017    Procedure: CHOLECYSTECTOMY LAPAROSCOPIC;  Surgeon: Jaqueline Guaman MD;  Location: [facility];  Service:    • CHOLECYSTECTOMY     • COLONOSCOPY N/A 5/8/2017    Procedure: COLONOSCOPY  CPTCODE:75717;  Surgeon: Nicho Richey III, " MD;  Location:  [facility];  Service:    • ENDOSCOPY     • ENDOSCOPY N/A 5/8/2017    Procedure: ESOPHAGOGASTRODUODENOSCOPY WITH BIOPSY  CPTCODE:97636;  Surgeon: Nicho Richey III, MD;  Location:  [facility];  Service:    • ENDOSCOPY N/A 11/3/2017    Procedure: ESOPHAGOGASTRODUODENOSCOPY WITH BIOPSY  CPTCODE: 49127;  Surgeon: Nicho Richey III, MD;  Location:  [facility];  Service:    • ENDOSCOPY N/A 2/20/2018    Procedure: ESOPHAGOGASTRODUODENOSCOPY WITH DILATATION CPT CODE: 69414;  Surgeon: Nicho Richey III, MD;  Location: [facility];  Service:        Social History     Social History   • Marital status:      Spouse name: N/A   • Number of children: N/A   • Years of education: N/A     Occupational History   • Not on file.     Social History Main Topics   • Smoking status: Current Every Day Smoker     Packs/day: 1.00     Years: 35.00     Types: Cigarettes   • Smokeless tobacco: Never Used   • Alcohol use No   • Drug use: No   • Sexual activity: Defer     Other Topics Concern   • Not on file     Social History Narrative   • No narrative on file       Family History   Problem Relation Age of Onset   • No Known Problems Mother    • Hypertension Father    • No Known Problems Sister    • No Known Problems Brother    • Drug abuse Brother    • No Known Problems Daughter    Maternal GF - H&N cancer  Paternal GF - Prostate cancer          Current Outpatient Prescriptions:   •  aspirin 81 MG EC tablet, Take 81 mg by mouth Daily. Patient took 2 tabs this morning, Disp: , Rfl:   •  baclofen (LIORESAL) 10 MG tablet, Take 10 mg by mouth 2 (Two) Times a Day., Disp: , Rfl:   •  cimetidine (TAGAMET) 800 MG tablet, Take 800 mg by mouth 3 (Three) Times a Day., Disp: , Rfl:   •  citalopram (CeleXA) 10 MG tablet, Take 10 mg by mouth Daily., Disp: , Rfl:   •  Cyanocobalamin (VITAMIN B-12 IJ), Inject  as directed., Disp: , Rfl:   •  esomeprazole (nexIUM) 40 MG capsule, Take 1 capsule by mouth 2 (Two) Times a Day Before  Meals., Disp: 60 capsule, Rfl: 3  •  hydrochlorothiazide (HYDRODIURIL) 25 MG tablet, Take 25 mg by mouth Daily., Disp: , Rfl:   •  lubiprostone (AMITIZA) 24 MCG capsule, Take 1 capsule by mouth 2 (Two) Times a Day With Meals., Disp: 60 capsule, Rfl: 5  •  Nicotine 21-14-7 MG/24HR kit, Take as directed per box, Disp: 1 each, Rfl: 0  •  ondansetron ODT (ZOFRAN-ODT) 4 MG disintegrating tablet, Take 1 tablet by mouth Every 6 (Six) Hours As Needed for Nausea or Vomiting., Disp: 12 tablet, Rfl: 0  •  polyethylene glycol (MIRALAX) powder, Take 255 g of MiraLAX with 32 ounces liquid then repeat 8 hours later for MiraLAX cleanout., Disp: 510 g, Rfl: 0  •  promethazine (PHENERGAN) 12.5 MG tablet, Take 1 tablet by mouth Every 8 (Eight) Hours As Needed for Nausea or Vomiting., Disp: 40 tablet, Rfl: 0  •  QUEtiapine (SEROquel) 25 MG tablet, Take 50 mg by mouth Every Night., Disp: , Rfl:   •  sucralfate (CARAFATE) 1 G tablet, Take 1 tablet by mouth 4 (Four) Times a Day., Disp: 40 tablet, Rfl: 0        Review of Systems  Constitutional: No fever, chills, night sweats, +weight loss.   HEENT:  No headaches, vision changes or hearing changes, no sinus drainage, sore throat.   Cardiovascular:  No palpitations, +chest pain at night, no syncopal episodes or edema.  Pulmonary:  No shortness of breath, hemoptysis +cough   Gastrointestinal:  +nausea, vomiting. +diarrhea now resolves. decreased appetite due to pain and nausea. No abdominal pain. No melena or hematochezia.    Genitourinary:  No hematuria, or changes in urination.   Musculoskeletal:  No pain,   Skin: No rashes or pruritus.   Endocrine:  No hot flashes or chills   Hematologic:  As per HPI. No lymphadenopathy.    Immunologic:  No allergies or frequent infections.   Neurologic: No numbness, tingling, or weakness.   Psychiatric:  No anxiety or depression.       Physical Exam  Vital Signs: These were reviewed and listed as per patient’s electronic medical chart  Vitals:     05/22/18 1110   BP: 115/78   Pulse: 107   Resp: 18   Temp: 97.9 °F (36.6 °C)   SpO2: 98%     General: Awake, alert and oriented, in no distress  HEENT: Head is atraumatic, normocephalic, extraocular movements full, oropharynx clear, no scleral icterus, pink moist mucous membranes  Neck: supple, no jvd, lymphadenopathy or masses  Cardiovascular: regular rhythm, tachycardic without murmurs  Pulmonary: non-labored, clear to auscultation bilaterally, no wheezing  Abdomen: soft, mild tenderness, non-distended, normal active bowel sounds present  Extremities: No clubbing, cyanosis or edema  Lymph: No cervical, supraclavicular adenopathy  Neurologic: Mental status as above, alert, awake and oriented, grossly non-focal exam  Skin: warm, dry, intact, chronic lesions        Labs / Studies:    Office Visit on 05/22/2018   Component Date Value   • Iron 05/22/2018 56    • TIBC 05/22/2018 315    • Iron Saturation 05/22/2018 18    • Ferritin 05/22/2018 383.00*   • Vitamin B-12 05/22/2018 1221*   • WBC 05/22/2018 10.24    • RBC 05/22/2018 4.96    • Hemoglobin 05/22/2018 14.3    • Hematocrit 05/22/2018 44.3    • MCV 05/22/2018 89.3    • MCH 05/22/2018 28.8    • MCHC 05/22/2018 32.3*   • RDW 05/22/2018 20.7*   • RDW-SD 05/22/2018 65.1*   • MPV 05/22/2018 9.0    • Platelets 05/22/2018 391    • Neutrophil % 05/22/2018 75.3*   • Lymphocyte % 05/22/2018 17.0*   • Monocyte % 05/22/2018 6.2    • Eosinophil % 05/22/2018 1.0    • Basophil % 05/22/2018 0.3    • Immature Grans % 05/22/2018 0.2    • Neutrophils, Absolute 05/22/2018 7.72*   • Lymphocytes, Absolute 05/22/2018 1.74    • Monocytes, Absolute 05/22/2018 0.63    • Eosinophils, Absolute 05/22/2018 0.10    • Basophils, Absolute 05/22/2018 0.03    • Immature Grans, Absolute 05/22/2018 0.02    Lab on 05/17/2018   Component Date Value   • Gastrin 05/17/2018 75    Lab on 04/10/2018   Component Date Value   • Ferritin 04/10/2018 7.00*   • Iron 04/10/2018 19*   • TIBC 04/10/2018 436*   • Iron  Saturation 04/10/2018 4    • WBC 04/10/2018 9.45    • RBC 04/10/2018 4.40*   • Hemoglobin 04/10/2018 11.8*   • Hematocrit 04/10/2018 38.6*   • MCV 04/10/2018 87.7    • MCH 04/10/2018 26.8*   • MCHC 04/10/2018 30.6*   • RDW 04/10/2018 14.7*   • RDW-SD 04/10/2018 47.3    • MPV 04/10/2018 9.4    • Platelets 04/10/2018 357    • Neutrophil % 04/10/2018 74.3*   • Lymphocyte % 04/10/2018 15.3*   • Monocyte % 04/10/2018 8.7    • Eosinophil % 04/10/2018 1.0    • Basophil % 04/10/2018 0.5    • Immature Grans % 04/10/2018 0.2    • Neutrophils, Absolute 04/10/2018 7.02*   • Lymphocytes, Absolute 04/10/2018 1.45    • Monocytes, Absolute 04/10/2018 0.82    • Eosinophils, Absolute 04/10/2018 0.09    • Basophils, Absolute 04/10/2018 0.05    • Immature Grans, Absolute 04/10/2018 0.02    Office Visit on 02/27/2018   Component Date Value   • Iron 02/27/2018 21*   • TIBC 02/27/2018 409    • Iron Saturation 02/27/2018 5    • Ferritin 02/27/2018 7.00*   • Vitamin B-12 02/27/2018 697    • WBC 02/27/2018 9.42    • RBC 02/27/2018 3.92*   • Hemoglobin 02/27/2018 10.9*   • Hematocrit 02/27/2018 35.4*   • MCV 02/27/2018 90.3    • MCH 02/27/2018 27.8    • MCHC 02/27/2018 30.8*   • RDW 02/27/2018 15.0*   • RDW-SD 02/27/2018 48.6    • MPV 02/27/2018 9.3    • Platelets 02/27/2018 319    • Neutrophil % 02/27/2018 77.7*   • Lymphocyte % 02/27/2018 14.1*   • Monocyte % 02/27/2018 7.4    • Eosinophil % 02/27/2018 0.3    • Basophil % 02/27/2018 0.4    • Immature Grans % 02/27/2018 0.1    • Neutrophils, Absolute 02/27/2018 7.31*   • Lymphocytes, Absolute 02/27/2018 1.33    • Monocytes, Absolute 02/27/2018 0.70    • Eosinophils, Absolute 02/27/2018 0.03    • Basophils, Absolute 02/27/2018 0.04    • Immature Grans, Absolute 02/27/2018 0.01    Admission on 02/20/2018, Discharged on 02/20/2018   Component Date Value   • Case Report 02/20/2018                      Value:Surgical Pathology Report                         Case: [id number]                                   Authorizing Provider:  Nicho Richey III,   Collected:           02/20/2018 10:16 AM                                 MD                                                                           Ordering Location:     [facility]      Received:            02/20/2018 01:40 PM                                 OPERATING ROOM DEPARTMENT                                                    Pathologist:           Swapna Rodriguez MD                                                        Specimens:   1) - Gastric, Antrum, Antrum BX                                                                     2) - Esophagus, Esophagus BX                                                              • Final Diagnosis 02/20/2018                      Value:This result contains rich text formatting which cannot be displayed here.   Consult on 01/15/2018   Component Date Value   • Performed by: 01/15/2018 Mai Licea    • Pathologist Interpretati* 01/15/2018 See scanned report    • Ferritin 01/15/2018 10.00*   • Iron 01/15/2018 27*   • TIBC 01/15/2018 433*   • Iron Saturation 01/15/2018 6*   • Vitamin B-12 01/15/2018 249    • Folate 01/15/2018 14.45    • Reticulocyte % 01/15/2018 1.21    • Reticulocyte Absolute 01/15/2018 0.0502    • LDH 01/15/2018 211    • C-Reactive Protein 01/15/2018 <0.50    • Sed Rate 01/15/2018 24*   • TSH 01/15/2018 0.903    • ROWENA 01/15/2018 Negative    • Haptoglobin 01/15/2018 240*   • Hepatitis B Surface Ag 01/15/2018 Non-Reactive    • Hep A IgM 01/15/2018 Non-Reactive    • Hep B C IgM 01/15/2018 Non-Reactive    • Hepatitis C Ab 01/15/2018 Non-Reactive    • WBC 01/15/2018 8.88    • RBC 01/15/2018 4.15*   • Hemoglobin 01/15/2018 11.5*   • Hematocrit 01/15/2018 36.4*   • MCV 01/15/2018 87.7    • MCH 01/15/2018 27.7    • MCHC 01/15/2018 31.6*   • RDW 01/15/2018 18.2*   • RDW-SD 01/15/2018 58.6*   • MPV 01/15/2018 9.4    • Platelets 01/15/2018 265    • Neutrophil % 01/15/2018 76.7*   •  Lymphocyte % 01/15/2018 16.6*   • Monocyte % 01/15/2018 6.0    • Eosinophil % 01/15/2018 0.2    • Basophil % 01/15/2018 0.3    • Immature Grans % 01/15/2018 0.2    • Neutrophils, Absolute 01/15/2018 6.81*   • Lymphocytes, Absolute 01/15/2018 1.47    • Monocytes, Absolute 01/15/2018 0.53    • Eosinophils, Absolute 01/15/2018 0.02    • Basophils, Absolute 01/15/2018 0.03    • Immature Grans, Absolute 01/15/2018 0.02    • HIV-1/ HIV-2 01/15/2018 Non-Reactive    Lab on 12/13/2017   Component Date Value   • Glucose 12/13/2017 98    • BUN 12/13/2017 14    • Creatinine 12/13/2017 0.94    • Sodium 12/13/2017 136    • Potassium 12/13/2017 5.4*   • Chloride 12/13/2017 106    • CO2 12/13/2017 28.1    • Calcium 12/13/2017 8.9    • Total Protein 12/13/2017 6.4    • Albumin 12/13/2017 4.10    • ALT (SGPT) 12/13/2017 10    • AST (SGOT) 12/13/2017 18    • Alkaline Phosphatase 12/13/2017 71    • Total Bilirubin 12/13/2017 0.1*   • eGFR Non African Amer 12/13/2017 85    • Globulin 12/13/2017 2.3    • A/G Ratio 12/13/2017 1.8    • BUN/Creatinine Ratio 12/13/2017 14.9    • Anion Gap 12/13/2017 1.9*   • Lipase 12/13/2017 33    • C-Reactive Protein 12/13/2017 <0.50    • Amylase 12/13/2017 43    • WBC 12/13/2017 7.11    • RBC 12/13/2017 4.24*   • Hemoglobin 12/13/2017 11.5*   • Hematocrit 12/13/2017 37.3*   • MCV 12/13/2017 88.0    • MCH 12/13/2017 27.1    • MCHC 12/13/2017 30.8*   • RDW 12/13/2017 16.9*   • RDW-SD 12/13/2017 54.0    • MPV 12/13/2017 9.5    • Platelets 12/13/2017 349    • Neutrophil % 12/13/2017 74.2*   • Lymphocyte % 12/13/2017 17.6*   • Monocyte % 12/13/2017 7.2    • Eosinophil % 12/13/2017 0.4    • Basophil % 12/13/2017 0.6    • Immature Grans % 12/13/2017 0.0    • Neutrophils, Absolute 12/13/2017 5.28    • Lymphocytes, Absolute 12/13/2017 1.25    • Monocytes, Absolute 12/13/2017 0.51    • Eosinophils, Absolute 12/13/2017 0.03    • Basophils, Absolute 12/13/2017 0.04    • Immature Grans, Absolute 12/13/2017 0.00    •  Osmolality Calc 2017 272.4*          IMAGIN18: CT CHEST WO CONTRAST: LUNGS: MODERATE BILATERAL LUNG EMPHYSEMA. HEART: Unremarkable. PERICARDIUM: No effusion. MEDIASTINUM: No masses. No enlarged lymph nodes.  No fluid collections. PLEURA: No pleural effusion. No pleural mass or abnormal calcification. MAJOR AIRWAYS: Clear. No intrinsic mass. VASCULATURE: No evidence of aneurysm. VISUALIZED UPPER ABDOMEN: LIVER: Homogeneous. No focal hepatic mass or ductal dilatation. SPLEEN: Homogeneous. No splenomegaly.  ADRENALS: No mass. KIDNEYS: No mass. No obstructive uropathy.  No evidence of Urolithiasis. GI TRACT: Non-dilated. No definite wall thickening. PERITONEUM: No free air. No free fluid or loculated fluid Collections. ABDOMINAL WALL: No focal hernia or mass. OTHER: None. BONES: No acute bony abnormality. Impression: 1. Unremarkable exam.  No source for the patient symptoms. 2. Other incidental/non-acute findings as above.    18: MRI ABDOMEN WO CONTRAST MRCP: multiple axial gradient-echo T1 and T2-weighted images were obtained. Oblique coronal T2-weighted images were  acquired to evaluate the bile ducts. The common bile duct and common hepatic duct and main intrahepatic bile ducts are well-demonstrated and appear within normal limits. No gallstones are identified and there are no strictures. There is no bile duct dilatation. The main pancreatic duct was also fairly well demonstrated and appears within normal limits. The liver, spleen, pancreas, and adrenal glands appear within normal limits. IMPRESSION: Unremarkable MRCP imaging of the abdomen.           PATHOLOGY:  18: Peripheral Smear:                Assessment/Plan   Jamison Edward is a very pleasant 47 y.o.  male who presents in follow up appointment for further management and evaluation of normocytic anemia secondary to iron and B12 deficiency.    Normocytic anemia  Iron deficiency   B12 deficiency  - His hemoglobin is normal  today with repletion of iron (IV Injectafer) and B12.  - He was previously on oral iron replacement however discontinued this a month prior to his initial consultation. Iron panel and ferritin were consistent with iron deficiency and thus he was again advised to restart oral iron (ferrous sulfate) and started this once daily with titration to three times daily. He tolerated this well however he continued to be iron deficient and therefore was started on IV Injectafer which he received on 4/27/18 and 5/4/18. His iron levels have now normalized. I have also obtained Zinc, Copper levels as well as a tissues transglutaminase level.  - He currently follows with gastroenterology closely and recently underwent an EGD which was significant for gastric and duodenal ulcers without malignancy and has a repeat endoscopy scheduled.  - Folate was normal however B12 was low normal and therefore he was started on B12 injections. Currently he is on Cyanocobalamin injections 1000mcg/mL inj SC monthly.  - ROWENA was normal, LDH, retic count normal going against hemolysis, and haptoglobin was high. TSH normal.  - Peripheral smear as above.    Weight loss, back pain  - I did obtain CT chest to further evaluate given his long term smoking history as well as current symptoms. No bone abnormalities, lymphadenopathy, or masses to account for symptoms and was significant for bilateral emphysema which was discussed with the patient.  - HIV non reactive, acute hepatitis panel nonreactive  - TSH is normal  - He recently MRCP done which was unremarkable and is also now being evaluated by gastroenterology in [place].  - He believe he may have pulled a muscle on his lateral side from his vomiting and I advised of prn Tylenol use and heating pads to help alleviate pain if it continues to persist he will further discuss this with his primary provider    Nausea  - I re-prescribed prn Phenergan     [employer] Quality measures/Tob[employer] Abuse [employer]  - I  previously advised the patient of the risks in continuing to use tobacco, and I had advised this patient of smoking cessation. During his last visit he was willing to try nicotine patches in an effort to quit and I had previously prescribed this to his pharmacy.  During this visit, I did not spend time counseling the patient regarding smoking cessation as we were focused on the above issues.  - Patient's BMI is within normal parameters. No follow-up required. Patient has been losing weight and is trying to maintain weight with boost.  - Current outpatient and discharge medications have been reconciled for the patient.      I will have the patient return for repeat laboratory evaluation in 6 weeks(CBC, Ferritin, Iron panel) and in follow up appointment in three months with nurse practitioner. He understands that should he have any questions or concerns prior to his appointment he should give us a call at any time and I would be happy to see him sooner. It was a pleasure to see this patient in clinic today, thank you for allowing me to participate in the care of this patient.    I spent 30 minutes in regards to this patient’s care today. More than 23 minutes of the time was spent in direct interaction with the patient for the above problems.        Cami Gustafson MD  05/22/2018  12:03 PM

## 2018-05-19 LAB — GASTRIN SERPL-MCNC: 75 PG/ML (ref 0–115)

## 2018-05-21 DIAGNOSIS — E53.8 B12 DEFICIENCY: ICD-10-CM

## 2018-05-21 DIAGNOSIS — E61.1 IRON DEFICIENCY: Primary | ICD-10-CM

## 2018-05-22 ENCOUNTER — OFFICE VISIT (OUTPATIENT)
Dept: ONCOLOGY | Facility: CLINIC | Age: 48
End: 2018-05-22

## 2018-05-22 VITALS
WEIGHT: 137 LBS | OXYGEN SATURATION: 98 % | TEMPERATURE: 97.9 F | DIASTOLIC BLOOD PRESSURE: 78 MMHG | SYSTOLIC BLOOD PRESSURE: 115 MMHG | HEART RATE: 107 BPM | BODY MASS INDEX: 22.11 KG/M2 | RESPIRATION RATE: 18 BRPM

## 2018-05-22 DIAGNOSIS — E61.1 IRON DEFICIENCY: Primary | ICD-10-CM

## 2018-05-22 DIAGNOSIS — E53.8 B12 DEFICIENCY: ICD-10-CM

## 2018-05-22 DIAGNOSIS — R11.0 NAUSEA: ICD-10-CM

## 2018-05-22 LAB
BASOPHILS # BLD AUTO: 0.03 10*3/MM3 (ref 0–0.3)
BASOPHILS NFR BLD AUTO: 0.3 % (ref 0–2)
DEPRECATED RDW RBC AUTO: 65.1 FL (ref 37–54)
EOSINOPHIL # BLD AUTO: 0.1 10*3/MM3 (ref 0–0.7)
EOSINOPHIL NFR BLD AUTO: 1 % (ref 0–5)
ERYTHROCYTE [DISTWIDTH] IN BLOOD BY AUTOMATED COUNT: 20.7 % (ref 11.5–14.5)
FERRITIN SERPL-MCNC: 383 NG/ML (ref 21.9–321.7)
HCT VFR BLD AUTO: 44.3 % (ref 42–52)
HGB BLD-MCNC: 14.3 G/DL (ref 14–18)
IMM GRANULOCYTES # BLD: 0.02 10*3/MM3 (ref 0–0.03)
IMM GRANULOCYTES NFR BLD: 0.2 % (ref 0–0.5)
IRON 24H UR-MRATE: 56 MCG/DL (ref 53–167)
IRON SATN MFR SERPL: 18 % (ref 20–50)
LYMPHOCYTES # BLD AUTO: 1.74 10*3/MM3 (ref 1–3)
LYMPHOCYTES NFR BLD AUTO: 17 % (ref 21–51)
MCH RBC QN AUTO: 28.8 PG (ref 27–33)
MCHC RBC AUTO-ENTMCNC: 32.3 G/DL (ref 33–37)
MCV RBC AUTO: 89.3 FL (ref 80–94)
MONOCYTES # BLD AUTO: 0.63 10*3/MM3 (ref 0.1–0.9)
MONOCYTES NFR BLD AUTO: 6.2 % (ref 0–10)
NEUTROPHILS # BLD AUTO: 7.72 10*3/MM3 (ref 1.4–6.5)
NEUTROPHILS NFR BLD AUTO: 75.3 % (ref 30–70)
PLATELET # BLD AUTO: 391 10*3/MM3 (ref 130–400)
PMV BLD AUTO: 9 FL (ref 6–10)
RBC # BLD AUTO: 4.96 10*6/MM3 (ref 4.7–6.1)
TIBC SERPL-MCNC: 315 MCG/DL (ref 241–421)
VIT B12 BLD-MCNC: 1221 PG/ML (ref 211–911)
WBC NRBC COR # BLD: 10.24 10*3/MM3 (ref 4.5–12.5)

## 2018-05-22 PROCEDURE — 84630 ASSAY OF ZINC: CPT | Performed by: INTERNAL MEDICINE

## 2018-05-22 PROCEDURE — 83540 ASSAY OF IRON: CPT | Performed by: INTERNAL MEDICINE

## 2018-05-22 PROCEDURE — 83516 IMMUNOASSAY NONANTIBODY: CPT | Performed by: INTERNAL MEDICINE

## 2018-05-22 PROCEDURE — 82728 ASSAY OF FERRITIN: CPT | Performed by: INTERNAL MEDICINE

## 2018-05-22 PROCEDURE — 85025 COMPLETE CBC W/AUTO DIFF WBC: CPT | Performed by: INTERNAL MEDICINE

## 2018-05-22 PROCEDURE — 82607 VITAMIN B-12: CPT | Performed by: INTERNAL MEDICINE

## 2018-05-22 PROCEDURE — 82525 ASSAY OF COPPER: CPT | Performed by: INTERNAL MEDICINE

## 2018-05-22 PROCEDURE — 83550 IRON BINDING TEST: CPT | Performed by: INTERNAL MEDICINE

## 2018-05-22 PROCEDURE — 99214 OFFICE O/P EST MOD 30 MIN: CPT | Performed by: INTERNAL MEDICINE

## 2018-05-22 PROCEDURE — 36415 COLL VENOUS BLD VENIPUNCTURE: CPT | Performed by: INTERNAL MEDICINE

## 2018-05-22 RX ORDER — PROMETHAZINE HYDROCHLORIDE 12.5 MG/1
12.5 TABLET ORAL EVERY 8 HOURS PRN
Qty: 40 TABLET | Refills: 0 | Status: SHIPPED | OUTPATIENT
Start: 2018-05-22 | End: 2019-09-12

## 2018-05-23 LAB — TTG IGA SER-ACNC: <2 U/ML (ref 0–3)

## 2018-05-24 LAB
COPPER SERPL-MCNC: 74 UG/DL (ref 72–166)
ZINC SERPL-MCNC: 81 UG/DL (ref 56–134)

## 2018-06-04 ENCOUNTER — ANESTHESIA EVENT (OUTPATIENT)
Dept: PERIOP | Facility: HOSPITAL | Age: 48
End: 2018-06-04

## 2018-06-05 ENCOUNTER — ANESTHESIA (OUTPATIENT)
Dept: PERIOP | Facility: HOSPITAL | Age: 48
End: 2018-06-05

## 2018-06-05 ENCOUNTER — HOSPITAL ENCOUNTER (OUTPATIENT)
Facility: HOSPITAL | Age: 48
Setting detail: HOSPITAL OUTPATIENT SURGERY
Discharge: HOME | End: 2018-06-05
Attending: INTERNAL MEDICINE | Admitting: INTERNAL MEDICINE

## 2018-06-05 VITALS
TEMPERATURE: 97.5 F | HEART RATE: 74 BPM | DIASTOLIC BLOOD PRESSURE: 80 MMHG | WEIGHT: 137 LBS | OXYGEN SATURATION: 100 % | BODY MASS INDEX: 22.02 KG/M2 | SYSTOLIC BLOOD PRESSURE: 122 MMHG | HEIGHT: 66 IN | RESPIRATION RATE: 20 BRPM

## 2018-06-05 DIAGNOSIS — K25.9 GASTRIC ULCER WITHOUT HEMORRHAGE OR PERFORATION, UNSPECIFIED CHRONICITY: ICD-10-CM

## 2018-06-05 DIAGNOSIS — K26.9 DUODENAL ULCER: ICD-10-CM

## 2018-06-05 PROCEDURE — 88305 TISSUE EXAM BY PATHOLOGIST: CPT | Performed by: INTERNAL MEDICINE

## 2018-06-05 PROCEDURE — 43239 EGD BIOPSY SINGLE/MULTIPLE: CPT | Performed by: INTERNAL MEDICINE

## 2018-06-05 PROCEDURE — 88342 IMHCHEM/IMCYTCHM 1ST ANTB: CPT | Performed by: INTERNAL MEDICINE

## 2018-06-05 PROCEDURE — 25010000002 PROPOFOL 10 MG/ML EMULSION: Performed by: NURSE ANESTHETIST, CERTIFIED REGISTERED

## 2018-06-05 PROCEDURE — 25010000002 PROPOFOL 1000 MG/ML EMULSION: Performed by: NURSE ANESTHETIST, CERTIFIED REGISTERED

## 2018-06-05 RX ORDER — IPRATROPIUM BROMIDE AND ALBUTEROL SULFATE 2.5; .5 MG/3ML; MG/3ML
3 SOLUTION RESPIRATORY (INHALATION) ONCE AS NEEDED
Status: DISCONTINUED | OUTPATIENT
Start: 2018-06-05 | End: 2018-06-05 | Stop reason: HOSPADM

## 2018-06-05 RX ORDER — LIDOCAINE HYDROCHLORIDE 20 MG/ML
INJECTION, SOLUTION INFILTRATION; PERINEURAL AS NEEDED
Status: DISCONTINUED | OUTPATIENT
Start: 2018-06-05 | End: 2018-06-05 | Stop reason: SURG

## 2018-06-05 RX ORDER — SODIUM CHLORIDE, SODIUM LACTATE, POTASSIUM CHLORIDE, CALCIUM CHLORIDE 600; 310; 30; 20 MG/100ML; MG/100ML; MG/100ML; MG/100ML
125 INJECTION, SOLUTION INTRAVENOUS CONTINUOUS
Status: DISCONTINUED | OUTPATIENT
Start: 2018-06-05 | End: 2018-06-05 | Stop reason: HOSPADM

## 2018-06-05 RX ORDER — ONDANSETRON 2 MG/ML
4 INJECTION INTRAMUSCULAR; INTRAVENOUS ONCE AS NEEDED
Status: DISCONTINUED | OUTPATIENT
Start: 2018-06-05 | End: 2018-06-05 | Stop reason: HOSPADM

## 2018-06-05 RX ORDER — SODIUM CHLORIDE 0.9 % (FLUSH) 0.9 %
1-10 SYRINGE (ML) INJECTION AS NEEDED
Status: DISCONTINUED | OUTPATIENT
Start: 2018-06-05 | End: 2018-06-05 | Stop reason: HOSPADM

## 2018-06-05 RX ORDER — PROPOFOL 10 MG/ML
VIAL (ML) INTRAVENOUS AS NEEDED
Status: DISCONTINUED | OUTPATIENT
Start: 2018-06-05 | End: 2018-06-05 | Stop reason: SURG

## 2018-06-05 RX ORDER — FENTANYL CITRATE 50 UG/ML
50 INJECTION, SOLUTION INTRAMUSCULAR; INTRAVENOUS
Status: DISCONTINUED | OUTPATIENT
Start: 2018-06-05 | End: 2018-06-05 | Stop reason: HOSPADM

## 2018-06-05 RX ORDER — MIDAZOLAM HYDROCHLORIDE 1 MG/ML
2 INJECTION INTRAMUSCULAR; INTRAVENOUS
Status: DISCONTINUED | OUTPATIENT
Start: 2018-06-05 | End: 2018-06-05 | Stop reason: HOSPADM

## 2018-06-05 RX ORDER — MIDAZOLAM HYDROCHLORIDE 1 MG/ML
1 INJECTION INTRAMUSCULAR; INTRAVENOUS
Status: DISCONTINUED | OUTPATIENT
Start: 2018-06-05 | End: 2018-06-05 | Stop reason: HOSPADM

## 2018-06-05 RX ADMIN — PROPOFOL 150 MCG/KG/MIN: 10 INJECTION, EMULSION INTRAVENOUS at 07:48

## 2018-06-05 RX ADMIN — SODIUM CHLORIDE, POTASSIUM CHLORIDE, SODIUM LACTATE AND CALCIUM CHLORIDE: 600; 310; 30; 20 INJECTION, SOLUTION INTRAVENOUS at 07:46

## 2018-06-05 RX ADMIN — PROPOFOL 80 MG: 10 INJECTION, EMULSION INTRAVENOUS at 07:48

## 2018-06-05 RX ADMIN — LIDOCAINE HYDROCHLORIDE 80 MG: 20 INJECTION, SOLUTION INFILTRATION; PERINEURAL at 07:48

## 2018-06-05 NOTE — H&P
History of presenting illness: 47-year-old white male presents for EGD for follow-up of gastric and duodenal ulcers.    Review of Systems:   Negative and noncontributory    Past History:  Past Medical History:   Diagnosis Date   • Johnson esophagus    • Barretts esophagus    • COPD (chronic obstructive pulmonary disease)    • DDD (degenerative disc disease), thoracic    • Degenerative disc disease, lumbar    • GERD (gastroesophageal reflux disease)    • Hypertension    • Peptic ulcer disease    • Stroke      Past Surgical History:   Procedure Laterality Date   • APPENDECTOMY     • CHOLECYSTECTOMY N/A 4/22/2017    Procedure: CHOLECYSTECTOMY LAPAROSCOPIC;  Surgeon: Jaqueline Guaman MD;  Location: [facility] OR;  Service:    • CHOLECYSTECTOMY     • COLONOSCOPY N/A 5/8/2017    Procedure: COLONOSCOPY  CPTCODE:66898;  Surgeon: Nicho Richey III, MD;  Location: [facility] OR;  Service:    • ENDOSCOPY     • ENDOSCOPY N/A 5/8/2017    Procedure: ESOPHAGOGASTRODUODENOSCOPY WITH BIOPSY  CPTCODE:46758;  Surgeon: Nicho Richey III, MD;  Location: [facility] OR;  Service:    • ENDOSCOPY N/A 11/3/2017    Procedure: ESOPHAGOGASTRODUODENOSCOPY WITH BIOPSY  CPTCODE: 23379;  Surgeon: Nicho Richey III, MD;  Location: [facility] OR;  Service:    • ENDOSCOPY N/A 2/20/2018    Procedure: ESOPHAGOGASTRODUODENOSCOPY WITH DILATATION CPT CODE: 45799;  Surgeon: Nicho Richey III, MD;  Location: [facility] OR;  Service:      Family History   Problem Relation Age of Onset   • No Known Problems Mother    • Hypertension Father    • No Known Problems Sister    • No Known Problems Brother    • Drug abuse Brother    • No Known Problems Daughter      Social History     Social History   • Marital status:      Social History Main Topics   • Smoking status: Current Every Day Smoker     Packs/day: 1.00     Years: 35.00     Types: Cigarettes   • Smokeless tobacco: Never Used   • Alcohol use No   • Drug use: No   • Sexual activity: Defer      Other Topics Concern   • Not on file     Prior to Admission medications    Medication Sig Start Date End Date Taking? Authorizing Provider   aspirin 81 MG EC tablet Take 81 mg by mouth Daily. Patient took 2 tabs this morning   Yes Historical Provider, MD   baclofen (LIORESAL) 10 MG tablet Take 10 mg by mouth 2 (Two) Times a Day.   Yes Historical Provider, MD   cimetidine (TAGAMET) 800 MG tablet Take 800 mg by mouth 3 (Three) Times a Day.   Yes Historical Provider, MD   citalopram (CeleXA) 10 MG tablet Take 10 mg by mouth Daily.   Yes Historical Provider, MD   Cyanocobalamin (VITAMIN B-12 IJ) Inject  as directed.   Yes Historical Provider, MD   esomeprazole (nexIUM) 40 MG capsule Take 1 capsule by mouth 2 (Two) Times a Day Before Meals. 12/13/17  Yes Rupa Cook PA-C   hydrochlorothiazide (HYDRODIURIL) 25 MG tablet Take 25 mg by mouth Daily.   Yes Historical Provider, MD   lubiprostone (AMITIZA) 24 MCG capsule Take 1 capsule by mouth 2 (Two) Times a Day With Meals. 1/23/18  Yes Rpua Cook PA-C   ondansetron ODT (ZOFRAN-ODT) 4 MG disintegrating tablet Take 1 tablet by mouth Every 6 (Six) Hours As Needed for Nausea or Vomiting. 10/25/17  Yes Gordon Grier MD   polyethylene glycol (MIRALAX) powder Take 255 g of MiraLAX with 32 ounces liquid then repeat 8 hours later for MiraLAX cleanout. 4/27/18  Yes Rupa Cook PA-C   promethazine (PHENERGAN) 12.5 MG tablet Take 1 tablet by mouth Every 8 (Eight) Hours As Needed for Nausea or Vomiting. 5/22/18  Yes Cami Gustafson MD   QUEtiapine (SEROquel) 25 MG tablet Take 50 mg by mouth Every Night.   Yes Historical Provider, MD   sucralfate (CARAFATE) 1 G tablet Take 1 tablet by mouth 4 (Four) Times a Day. 4/18/17  Yes SUE Parker       Current medication:  I have reviewed the list of current medications.    Allergies:   Protonix [pantoprazole sodium] and Contrast dye    Vital Signs  Temp:  [97 °F (36.1 °C)] 97 °F (36.1 °C)  Heart Rate:  [92] 92  Resp:   [20] 20  BP: (131)/(74) 131/74    Physical exam:  Alert, oriented ×3  HEENT: Normal  Neck: No mass  Chest: Clear  Heart: Regular rhythm  Abdomen: Soft, nontender, active BS  Extremities: No edema  Neuro: No focal deficit    Assessment/Plan:   History of gastric ulcer  History of duodenal ulcer    REC  EGD is planned.  Procedure, benefits, risks and alternatives were explained to the patient.      Nicho Richey III, MD  06/05/18  7:36 AM

## 2018-06-05 NOTE — OP NOTE
06/05/18    ESOPHAGOGASTRODUODENOSCOPY WITH BIOPSY  Procedure Note    Jamison Neriland  6/5/2018    Dr. Richey      Pre-op Diagnosis: Surveillance of gastric and duodenal ulcers      Post-op Diagnosis:   -Gastric ulcers  -Pyloroduodenal ulcer        Anesthesia: Per Anesthesia service, general anesthesia      Estimated Blood Loss: Negligible      Findings: EGD was performed with careful inspection of the esophagus, stomach, duodenum to the fourth portion.  Esophagus appeared normal.  A small amount of retained food residue was present in the stomach. Three benign-appearing ulcers were found in the prepyloric region, the largest of which was about 2-3 CM in size.  Another large pyloroduodenal ulcer was identified (pylorus and duodenal bulb), also about 2-3 CM in size.  Distal to the bulb, the duodenum was unremarkable in appearance.  Biopsies were obtained from the margins of the ulcers (indicated above).  Biopsies were taken from the duodenum in order to screen for occult small bowel mucosal disease.  He tolerated the procedure well and there were no complications.  He left the OR in stable and satisfactory condition.      Specimens: Gastric antrum, duodenum      Grafts/Implants: N/A      Complications: None      Recommendations:   Findings discussed with the patient  Continue present treatment  Consider surgical consultation        Nicho Richey III, MD     Date: 6/5/2018  Time: 7:57 AM

## 2018-06-05 NOTE — ANESTHESIA POSTPROCEDURE EVALUATION
Patient: Jamison Edward    Procedure Summary     Date:  06/05/18 Room / Location:  [facility] OR [address] [place] [address] COR OR    Anesthesia Start:  0746 Anesthesia Stop:  0759    Procedure:  ESOPHAGOGASTRODUODENOSCOPY WITH BIOPSY CPT CODE: 37011 (N/A Esophagus) Diagnosis:       Duodenal ulcer      Gastric ulcer without hemorrhage or perforation, unspecified chronicity      (Duodenal ulcer [K26.9])      (Gastric ulcer without hemorrhage or perforation, unspecified chronicity [K25.9])    Surgeon:  Nicho Richey III, MD Provider:  Surya Ledezma MD    Anesthesia Type:  general ASA Status:  3          Anesthesia Type: general  Last vitals  BP   96/62 (06/05/18 0800)   Temp   97.5 °F (36.4 °C) (06/05/18 0800)   Pulse   77 (06/05/18 0800)   Resp   20 (06/05/18 0800)     SpO2   100 % (06/05/18 0800)     Post Anesthesia Care and Evaluation    Patient location during evaluation: PHASE II  Patient participation: complete - patient participated  Level of consciousness: awake and alert  Pain score: 0  Pain management: adequate  Airway patency: patent  Anesthetic complications: No anesthetic complications    Cardiovascular status: acceptable  Respiratory status: acceptable  Hydration status: acceptable

## 2018-06-05 NOTE — ANESTHESIA PREPROCEDURE EVALUATION
Anesthesia Evaluation     Patient summary reviewed and Nursing notes reviewed   no history of anesthetic complications:  NPO Solid Status: > 8 hours  NPO Liquid Status: > 8 hours           Airway   Mallampati: III  TM distance: <3 FB  Neck ROM: limited  no difficulty expected  Dental - normal exam   (+) edentulous    Pulmonary - normal exam   (+) a smoker Current Smoked day of surgery, COPD (machine shop for 10 yrs ),   (-) asthma  Cardiovascular - normal exam  Exercise tolerance: good (4-7 METS)    NYHA Classification: II  Rate: abnormal    (+) hypertension, dysrhythmias Tachycardia,   (-) past MI, angina, CHF, hyperlipidemia      Neuro/Psych  (+) TIA (5 yrs ago), psychiatric history Depression,     (-) seizures, CVA, numbness  GI/Hepatic/Renal/Endo    (+)  GERD, PUD, GI bleeding,   (-) liver disease, no renal disease, diabetes, hypothyroidism    Musculoskeletal     (+) back pain,   (-) radiculopathy  Abdominal  - normal exam    Bowel sounds: normal.   Substance History - negative use     OB/GYN negative ob/gyn ROS         Other   (+) arthritis                       Anesthesia Plan    ASA 3     general     Anesthetic plan and risks discussed with patient and spouse/significant other.  Use of blood products discussed with patient and spouse/significant other  Consented to blood products.

## 2018-06-07 LAB
LAB AP CASE REPORT: NORMAL
Lab: NORMAL
PATH REPORT.FINAL DX SPEC: NORMAL

## 2018-06-12 ENCOUNTER — OFFICE VISIT (OUTPATIENT)
Dept: GASTROENTEROLOGY | Facility: CLINIC | Age: 48
End: 2018-06-12

## 2018-06-12 VITALS
WEIGHT: 137 LBS | HEIGHT: 66 IN | SYSTOLIC BLOOD PRESSURE: 118 MMHG | HEART RATE: 107 BPM | DIASTOLIC BLOOD PRESSURE: 76 MMHG | OXYGEN SATURATION: 99 % | BODY MASS INDEX: 22.02 KG/M2

## 2018-06-12 DIAGNOSIS — K21.9 GASTROESOPHAGEAL REFLUX DISEASE, ESOPHAGITIS PRESENCE NOT SPECIFIED: Primary | ICD-10-CM

## 2018-06-12 DIAGNOSIS — K25.7 CHRONIC GASTRIC ULCER WITHOUT HEMORRHAGE AND WITHOUT PERFORATION: ICD-10-CM

## 2018-06-12 DIAGNOSIS — K26.9 DUODENAL ULCER: ICD-10-CM

## 2018-06-12 PROCEDURE — 99213 OFFICE O/P EST LOW 20 MIN: CPT | Performed by: PHYSICIAN ASSISTANT

## 2018-06-12 RX ORDER — DEXLANSOPRAZOLE 60 MG/1
60 CAPSULE, DELAYED RELEASE ORAL DAILY
Qty: 30 CAPSULE | Refills: 5 | Status: SHIPPED | OUTPATIENT
Start: 2018-06-12 | End: 2019-09-12

## 2018-06-13 ENCOUNTER — TRANSCRIBE ORDERS (OUTPATIENT)
Dept: ADMINISTRATIVE | Facility: HOSPITAL | Age: 48
End: 2018-06-13

## 2018-06-13 ENCOUNTER — HOSPITAL ENCOUNTER (OUTPATIENT)
Dept: GENERAL RADIOLOGY | Facility: HOSPITAL | Age: 48
Discharge: HOME | End: 2018-06-13
Attending: PSYCHIATRY & NEUROLOGY | Admitting: PSYCHIATRY & NEUROLOGY

## 2018-06-13 DIAGNOSIS — M54.2 NECK PAIN: ICD-10-CM

## 2018-06-13 DIAGNOSIS — M54.2 NECK PAIN: Primary | ICD-10-CM

## 2018-06-13 PROCEDURE — 72050 X-RAY EXAM NECK SPINE 4/5VWS: CPT

## 2018-06-13 PROCEDURE — 72052 X-RAY EXAM NECK SPINE 6/>VWS: CPT | Performed by: RADIOLOGY

## 2018-07-02 ENCOUNTER — HOSPITAL ENCOUNTER (EMERGENCY)
Facility: HOSPITAL | Age: 48
Discharge: HOME | End: 2018-07-02
Attending: FAMILY MEDICINE | Admitting: FAMILY MEDICINE

## 2018-07-02 ENCOUNTER — APPOINTMENT (OUTPATIENT)
Dept: CT IMAGING | Facility: HOSPITAL | Age: 48
End: 2018-07-02

## 2018-07-02 VITALS
HEART RATE: 100 BPM | TEMPERATURE: 98.9 F | SYSTOLIC BLOOD PRESSURE: 124 MMHG | WEIGHT: 134 LBS | RESPIRATION RATE: 17 BRPM | OXYGEN SATURATION: 100 % | BODY MASS INDEX: 21.53 KG/M2 | HEIGHT: 66 IN | DIASTOLIC BLOOD PRESSURE: 85 MMHG

## 2018-07-02 DIAGNOSIS — K21.00 GERD WITH ESOPHAGITIS: ICD-10-CM

## 2018-07-02 DIAGNOSIS — R63.4 ABNORMAL WEIGHT LOSS: ICD-10-CM

## 2018-07-02 DIAGNOSIS — R00.0 CHRONIC TACHYCARDIA: ICD-10-CM

## 2018-07-02 DIAGNOSIS — G89.29 ABDOMINAL PAIN, CHRONIC, GENERALIZED: Primary | ICD-10-CM

## 2018-07-02 DIAGNOSIS — R10.84 ABDOMINAL PAIN, CHRONIC, GENERALIZED: Primary | ICD-10-CM

## 2018-07-02 LAB
ABO GROUP BLD: NORMAL
ABO GROUP BLD: NORMAL
ALBUMIN SERPL-MCNC: 4.3 G/DL (ref 3.5–5)
ALBUMIN/GLOB SERPL: 1.7 G/DL (ref 1.5–2.5)
ALP SERPL-CCNC: 98 U/L (ref 40–129)
ALT SERPL W P-5'-P-CCNC: 15 U/L (ref 10–44)
ANION GAP SERPL CALCULATED.3IONS-SCNC: 4.3 MMOL/L (ref 3.6–11.2)
APTT PPP: 27.9 SECONDS (ref 23.8–36.1)
AST SERPL-CCNC: 14 U/L (ref 10–34)
BASOPHILS # BLD AUTO: 0.02 10*3/MM3 (ref 0–0.3)
BASOPHILS NFR BLD AUTO: 0.2 % (ref 0–2)
BILIRUB SERPL-MCNC: 0.2 MG/DL (ref 0.2–1.8)
BLD GP AB SCN SERPL QL: NEGATIVE
BUN BLD-MCNC: 15 MG/DL (ref 7–21)
BUN/CREAT SERPL: 14.3 (ref 7–25)
CALCIUM SPEC-SCNC: 9.5 MG/DL (ref 7.7–10)
CHLORIDE SERPL-SCNC: 114 MMOL/L (ref 99–112)
CO2 SERPL-SCNC: 23.7 MMOL/L (ref 24.3–31.9)
CREAT BLD-MCNC: 1.05 MG/DL (ref 0.43–1.29)
D-LACTATE SERPL-SCNC: 1.7 MMOL/L (ref 0.5–2)
DEPRECATED RDW RBC AUTO: 56.6 FL (ref 37–54)
EOSINOPHIL # BLD AUTO: 0.06 10*3/MM3 (ref 0–0.7)
EOSINOPHIL NFR BLD AUTO: 0.5 % (ref 0–5)
ERYTHROCYTE [DISTWIDTH] IN BLOOD BY AUTOMATED COUNT: 17.6 % (ref 11.5–14.5)
GFR SERPL CREATININE-BSD FRML MDRD: 75 ML/MIN/1.73
GLOBULIN UR ELPH-MCNC: 2.6 GM/DL
GLUCOSE BLD-MCNC: 141 MG/DL (ref 70–110)
HCT VFR BLD AUTO: 45.2 % (ref 42–52)
HGB BLD-MCNC: 15.3 G/DL (ref 14–18)
IMM GRANULOCYTES # BLD: 0.02 10*3/MM3 (ref 0–0.03)
IMM GRANULOCYTES NFR BLD: 0.2 % (ref 0–0.5)
INR PPP: 0.84 (ref 0.9–1.1)
LIPASE SERPL-CCNC: 40 U/L (ref 13–60)
LYMPHOCYTES # BLD AUTO: 0.88 10*3/MM3 (ref 1–3)
LYMPHOCYTES NFR BLD AUTO: 7 % (ref 21–51)
MCH RBC QN AUTO: 30.8 PG (ref 27–33)
MCHC RBC AUTO-ENTMCNC: 33.8 G/DL (ref 33–37)
MCV RBC AUTO: 90.9 FL (ref 80–94)
MONOCYTES # BLD AUTO: 0.39 10*3/MM3 (ref 0.1–0.9)
MONOCYTES NFR BLD AUTO: 3.1 % (ref 0–10)
NEUTROPHILS # BLD AUTO: 11.2 10*3/MM3 (ref 1.4–6.5)
NEUTROPHILS NFR BLD AUTO: 89 % (ref 30–70)
OSMOLALITY SERPL CALC.SUM OF ELEC: 286.3 MOSM/KG (ref 273–305)
PLATELET # BLD AUTO: 234 10*3/MM3 (ref 130–400)
PMV BLD AUTO: 9.4 FL (ref 6–10)
POTASSIUM BLD-SCNC: 3.6 MMOL/L (ref 3.5–5.3)
PROT SERPL-MCNC: 6.9 G/DL (ref 6–8)
PROTHROMBIN TIME: 11.7 SECONDS (ref 11–15.4)
RBC # BLD AUTO: 4.97 10*6/MM3 (ref 4.7–6.1)
RH BLD: POSITIVE
RH BLD: POSITIVE
SODIUM BLD-SCNC: 142 MMOL/L (ref 135–153)
T&S EXPIRATION DATE: NORMAL
TROPONIN I SERPL-MCNC: <0.006 NG/ML
WBC NRBC COR # BLD: 12.57 10*3/MM3 (ref 4.5–12.5)

## 2018-07-02 PROCEDURE — 74176 CT ABD & PELVIS W/O CONTRAST: CPT

## 2018-07-02 PROCEDURE — 93005 ELECTROCARDIOGRAM TRACING: CPT | Performed by: FAMILY MEDICINE

## 2018-07-02 PROCEDURE — 96374 THER/PROPH/DIAG INJ IV PUSH: CPT

## 2018-07-02 PROCEDURE — 85730 THROMBOPLASTIN TIME PARTIAL: CPT | Performed by: FAMILY MEDICINE

## 2018-07-02 PROCEDURE — 84484 ASSAY OF TROPONIN QUANT: CPT | Performed by: FAMILY MEDICINE

## 2018-07-02 PROCEDURE — 83605 ASSAY OF LACTIC ACID: CPT | Performed by: FAMILY MEDICINE

## 2018-07-02 PROCEDURE — 86900 BLOOD TYPING SEROLOGIC ABO: CPT

## 2018-07-02 PROCEDURE — 86901 BLOOD TYPING SEROLOGIC RH(D): CPT

## 2018-07-02 PROCEDURE — 71250 CT THORAX DX C-: CPT

## 2018-07-02 PROCEDURE — 99284 EMERGENCY DEPT VISIT MOD MDM: CPT

## 2018-07-02 PROCEDURE — 83690 ASSAY OF LIPASE: CPT | Performed by: FAMILY MEDICINE

## 2018-07-02 PROCEDURE — 85025 COMPLETE CBC W/AUTO DIFF WBC: CPT | Performed by: FAMILY MEDICINE

## 2018-07-02 PROCEDURE — 93010 ELECTROCARDIOGRAM REPORT: CPT | Performed by: INTERNAL MEDICINE

## 2018-07-02 PROCEDURE — 86900 BLOOD TYPING SEROLOGIC ABO: CPT | Performed by: FAMILY MEDICINE

## 2018-07-02 PROCEDURE — 96376 TX/PRO/DX INJ SAME DRUG ADON: CPT

## 2018-07-02 PROCEDURE — 71250 CT THORAX DX C-: CPT | Performed by: RADIOLOGY

## 2018-07-02 PROCEDURE — 86901 BLOOD TYPING SEROLOGIC RH(D): CPT | Performed by: FAMILY MEDICINE

## 2018-07-02 PROCEDURE — 25010000002 HYDROMORPHONE PER 4 MG: Performed by: FAMILY MEDICINE

## 2018-07-02 PROCEDURE — 85610 PROTHROMBIN TIME: CPT | Performed by: FAMILY MEDICINE

## 2018-07-02 PROCEDURE — 80053 COMPREHEN METABOLIC PANEL: CPT | Performed by: FAMILY MEDICINE

## 2018-07-02 PROCEDURE — 86850 RBC ANTIBODY SCREEN: CPT | Performed by: FAMILY MEDICINE

## 2018-07-02 PROCEDURE — 74176 CT ABD & PELVIS W/O CONTRAST: CPT | Performed by: RADIOLOGY

## 2018-07-02 RX ORDER — HYDROMORPHONE HYDROCHLORIDE 1 MG/ML
0.5 INJECTION, SOLUTION INTRAMUSCULAR; INTRAVENOUS; SUBCUTANEOUS ONCE
Status: COMPLETED | OUTPATIENT
Start: 2018-07-02 | End: 2018-07-02

## 2018-07-02 RX ORDER — LEVETIRACETAM 750 MG/1
750 TABLET ORAL 2 TIMES DAILY
Status: ON HOLD | COMMUNITY
End: 2023-01-16

## 2018-07-02 RX ORDER — SODIUM CHLORIDE 0.9 % (FLUSH) 0.9 %
10 SYRINGE (ML) INJECTION AS NEEDED
Status: DISCONTINUED | OUTPATIENT
Start: 2018-07-02 | End: 2018-07-02 | Stop reason: HOSPADM

## 2018-07-02 RX ADMIN — SODIUM CHLORIDE 1000 ML: 9 INJECTION, SOLUTION INTRAVENOUS at 17:40

## 2018-07-02 RX ADMIN — HYDROMORPHONE HYDROCHLORIDE 0.5 MG: 1 INJECTION, SOLUTION INTRAMUSCULAR; INTRAVENOUS; SUBCUTANEOUS at 19:20

## 2018-07-02 RX ADMIN — HYDROMORPHONE HYDROCHLORIDE 0.5 MG: 1 INJECTION, SOLUTION INTRAMUSCULAR; INTRAVENOUS; SUBCUTANEOUS at 17:39

## 2018-07-02 NOTE — ED NOTES
Felton here from CT to transport pt for a ct scan of abdomen.     Tatiana Pryor RN  07/02/18 7426

## 2018-07-02 NOTE — ED NOTES
Dr John made aware of the pulsating action noted in the patient's abdomen as well as the simple sepsis positive screening.     Tatiana Pryor RN  07/02/18 9996

## 2018-07-02 NOTE — ED NOTES
Pt states over the last year he has lost over 50 pounds and has been very sick, states his physicians have not been able to diagnose the patient with a cause for the weight loss and generalized sickness.     Tatiana Pryor RN  07/02/18 6496

## 2018-07-02 NOTE — ED NOTES
Pt complained of returned pain 7/10, Dr. John made aware, new orders noted     Tatiana Pryor, RN  07/02/18 1930

## 2018-07-02 NOTE — ED PROVIDER NOTES
Subjective   History of Present Illness  46 y/o M w/h/o tobacco abuse p/f PCP's office with abd pain and pulsatile abd mass found on PE. No weakness, CP. No abd distention. Pt has h/o HTN.  Pt's PCP is concerned for poss AAA. Pt has allergy to IV contrast. No SOB. No fever/chills. Pt states he has h/o tachycardia. Pt states his abd pain is at baseline and has been ongoing for several months. Pt states he has had multiple EGD's and a colonoscopy to evaluate this abdominal pain and pt was found to have multiple abdominal ulcers which were H. Pylori negative. Pt does have chronic loose BM that are NB and pt states that this is at baseline as well.   Review of Systems   Constitutional: Negative for chills, fatigue and fever.   Eyes: Negative for photophobia and visual disturbance.   Respiratory: Negative for cough, shortness of breath and wheezing.    Cardiovascular: Negative for chest pain and palpitations.   Gastrointestinal: Positive for abdominal pain. Negative for abdominal distention, diarrhea and nausea.        +pulsatile abd mass   Genitourinary: Negative for difficulty urinating and dysuria.   Musculoskeletal: Negative for back pain and neck pain.   Skin: Negative for color change and pallor.   Neurological: Negative for headaches.   Hematological: Does not bruise/bleed easily.   Psychiatric/Behavioral: Negative for confusion.   All other systems reviewed and are negative.      Past Medical History:   Diagnosis Date   • Johnson esophagus    • Barretts esophagus    • COPD (chronic obstructive pulmonary disease) (CMS/HCC)    • DDD (degenerative disc disease), thoracic    • Degenerative disc disease, lumbar    • GERD (gastroesophageal reflux disease)    • Hypertension    • Peptic ulcer disease    • Stroke (CMS/[facility])        Allergies   Allergen Reactions   • Protonix [Pantoprazole Sodium] Palpitations     Patient states that protonix causes chest pain.  Shruthi Pérez [facility]  4/23/2017  11:19 AM     • Contrast Dye       Shortness of breath       Past Surgical History:   Procedure Laterality Date   • APPENDECTOMY     • CHOLECYSTECTOMY N/A 4/22/2017    Procedure: CHOLECYSTECTOMY LAPAROSCOPIC;  Surgeon: Jaqueline Guaman MD;  [facility]  [facility];  Service:    • CHOLECYSTECTOMY     • COLONOSCOPY N/A 5/8/2017    Procedure: COLONOSCOPY  CPTCODE:71869;  Surgeon: Nicho Richey III, MD;  [facility]  [facility];  Service:    • ENDOSCOPY     • ENDOSCOPY N/A 5/8/2017    Procedure: ESOPHAGOGASTRODUODENOSCOPY WITH BIOPSY  CPTCODE:54280;  Surgeon: Nicho Richey III, MD;  [facility]  [facility];  Service:    • ENDOSCOPY N/A 11/3/2017    Procedure: ESOPHAGOGASTRODUODENOSCOPY WITH BIOPSY  CPTCODE: 64700;  Surgeon: Nicho Richey III, MD;  [facility]  [facility];  Service:    • ENDOSCOPY N/A 2/20/2018    Procedure: ESOPHAGOGASTRODUODENOSCOPY WITH DILATATION CPT CODE: 87470;  Surgeon: Nicho Richey III, MD;  [facility]  [facility];  Service:    • ENDOSCOPY N/A 6/5/2018    Procedure: ESOPHAGOGASTRODUODENOSCOPY WITH BIOPSY CPT CODE: 00128;  Surgeon: Nicho Richey III, MD;  Location: [facility] OR;  Service: Gastroenterology       Family History   Problem Relation Age of Onset   • No Known Problems Mother    • Hypertension Father    • No Known Problems Sister    • No Known Problems Brother    • Drug abuse Brother    • No Known Problems Daughter        Social History     Social History   • Marital status:      Social History Main Topics   • Smoking status: Current Every Day Smoker     Packs/day: 1.00     Years: 35.00     Types: Cigarettes   • Smokeless tobacco: Never Used   • Alcohol use No   • Drug use: No   • Sexual activity: Defer     Other Topics Concern   • Not on file           Objective   Physical Exam   Constitutional: He is oriented to person, place, and time. He appears well-developed and well-nourished. He is active.   HENT:   Head: Normocephalic and atraumatic.   Right Ear: Hearing, external ear and ear canal normal.    Left Ear: Hearing, external ear and ear canal normal.   Nose: Nose normal.   Mouth/Throat: Uvula is midline, oropharynx is clear and moist and mucous membranes are normal.   Eyes: Conjunctivae, EOM and lids are normal. Pupils are equal, round, and reactive to light.   Neck: Trachea normal, normal range of motion, full passive range of motion without pain and phonation normal. Neck supple.   Cardiovascular: Normal rate, regular rhythm and normal heart sounds.    Pulmonary/Chest: Effort normal. He has decreased breath sounds. He has no wheezes. He has no rhonchi.   Abdominal: Soft. Normal appearance. He exhibits no distension, no abdominal bruit, no pulsatile midline mass and no mass. There is no hepatosplenomegaly. There is no tenderness. There is no rigidity, no rebound and no guarding. No hernia.       Musculoskeletal:        Cervical back: Normal.        Thoracic back: Normal.        Lumbar back: Normal.   Neurological: He is alert and oriented to person, place, and time. GCS eye subscore is 4. GCS verbal subscore is 5. GCS motor subscore is 6.   Skin: Skin is warm, dry and intact. Capillary refill takes less than 2 seconds.   Psychiatric: He has a normal mood and affect. His speech is normal and behavior is normal. Cognition and memory are normal.   Nursing note and vitals reviewed.      Procedures           ED Course  ED Course as of Jul 02 1935   Mon Jul 02, 2018   1733 Sinus rhythm, 129 bpm. QTc 427ms. No ST segment abnormalities concerning for STEMI. Sinus tachycardia, no blocks/dysrhythmias noted. ECG 12 Lead [BR]      ED Course User Index  [BR] Laurent John MD      Pt denies any acute worsening of his abdominal pain and states his pulse is chronically high and he has been prescribed medication for this in the past. Pt imaging negative for any acute pathology. Pt has f/u with specialists both here and in [place] to continue w/u on his abd pain. Pt has been having q3 month EGD for chronic  ulcers and is on PPI and carafate. Pt reports f/u with GI within the month. Pt is otherwise at baseline and requests to go home. Pt lab and PE findings not concerning for infectious etiology making abx unnecessary.             MDM  Number of Diagnoses or Management Options  Abdominal pain, chronic, generalized: new and requires workup  Abnormal weight loss: new and requires workup  Chronic tachycardia: new and requires workup  GERD with esophagitis: new and requires workup     Amount and/or Complexity of Data Reviewed  Clinical lab tests: reviewed and ordered  Tests in the radiology section of CPT®: reviewed and ordered  Tests in the medicine section of CPT®: reviewed and ordered  Discuss the patient with other providers: yes  Independent visualization of images, tracings, or specimens: yes    Risk of Complications, Morbidity, and/or Mortality  Presenting problems: high  Diagnostic procedures: high  Management options: high    Patient Progress  Patient progress: stable        Final diagnoses:   Abdominal pain, chronic, generalized   Abnormal weight loss   GERD with esophagitis   Chronic tachycardia            Laurent John MD  07/02/18 1933       Laurent John MD  07/02/18 1935

## 2018-07-03 ENCOUNTER — LAB (OUTPATIENT)
Dept: ONCOLOGY | Facility: CLINIC | Age: 48
End: 2018-07-03

## 2018-07-03 ENCOUNTER — TRANSCRIBE ORDERS (OUTPATIENT)
Dept: ADMINISTRATIVE | Facility: HOSPITAL | Age: 48
End: 2018-07-03

## 2018-07-03 ENCOUNTER — HOSPITAL ENCOUNTER (EMERGENCY)
Facility: HOSPITAL | Age: 48
Discharge: HOME | End: 2018-07-03
Attending: EMERGENCY MEDICINE | Admitting: EMERGENCY MEDICINE

## 2018-07-03 VITALS
HEART RATE: 110 BPM | OXYGEN SATURATION: 98 % | SYSTOLIC BLOOD PRESSURE: 100 MMHG | TEMPERATURE: 99.3 F | DIASTOLIC BLOOD PRESSURE: 63 MMHG | RESPIRATION RATE: 18 BRPM

## 2018-07-03 VITALS
DIASTOLIC BLOOD PRESSURE: 65 MMHG | WEIGHT: 137 LBS | HEART RATE: 97 BPM | OXYGEN SATURATION: 100 % | BODY MASS INDEX: 22.02 KG/M2 | RESPIRATION RATE: 18 BRPM | HEIGHT: 66 IN | TEMPERATURE: 99 F | SYSTOLIC BLOOD PRESSURE: 102 MMHG

## 2018-07-03 DIAGNOSIS — R19.00 ABDOMINAL PULSATILE MASS: Primary | ICD-10-CM

## 2018-07-03 DIAGNOSIS — I95.9 HYPOTENSION, UNSPECIFIED HYPOTENSION TYPE: Primary | ICD-10-CM

## 2018-07-03 DIAGNOSIS — E61.1 IRON DEFICIENCY: ICD-10-CM

## 2018-07-03 LAB
ALBUMIN SERPL-MCNC: 3.5 G/DL (ref 3.5–5)
ALBUMIN/GLOB SERPL: 1.7 G/DL (ref 1.5–2.5)
ALP SERPL-CCNC: 105 U/L (ref 40–129)
ALT SERPL W P-5'-P-CCNC: 22 U/L (ref 10–44)
ANION GAP SERPL CALCULATED.3IONS-SCNC: 3.2 MMOL/L (ref 3.6–11.2)
AST SERPL-CCNC: 36 U/L (ref 10–34)
BASOPHILS # BLD AUTO: 0.01 10*3/MM3 (ref 0–0.3)
BASOPHILS # BLD AUTO: 0.01 10*3/MM3 (ref 0–0.3)
BASOPHILS NFR BLD AUTO: 0.1 % (ref 0–2)
BASOPHILS NFR BLD AUTO: 0.1 % (ref 0–2)
BILIRUB SERPL-MCNC: 0.2 MG/DL (ref 0.2–1.8)
BUN BLD-MCNC: 10 MG/DL (ref 7–21)
BUN/CREAT SERPL: 14.3 (ref 7–25)
CALCIUM SPEC-SCNC: 8.8 MG/DL (ref 7.7–10)
CHLORIDE SERPL-SCNC: 113 MMOL/L (ref 99–112)
CO2 SERPL-SCNC: 24.8 MMOL/L (ref 24.3–31.9)
CREAT BLD-MCNC: 0.7 MG/DL (ref 0.43–1.29)
DEPRECATED RDW RBC AUTO: 58.4 FL (ref 37–54)
DEPRECATED RDW RBC AUTO: 58.9 FL (ref 37–54)
EOSINOPHIL # BLD AUTO: 0.04 10*3/MM3 (ref 0–0.7)
EOSINOPHIL # BLD AUTO: 0.05 10*3/MM3 (ref 0–0.7)
EOSINOPHIL NFR BLD AUTO: 0.3 % (ref 0–5)
EOSINOPHIL NFR BLD AUTO: 0.5 % (ref 0–5)
ERYTHROCYTE [DISTWIDTH] IN BLOOD BY AUTOMATED COUNT: 17.6 % (ref 11.5–14.5)
ERYTHROCYTE [DISTWIDTH] IN BLOOD BY AUTOMATED COUNT: 17.8 % (ref 11.5–14.5)
FERRITIN SERPL-MCNC: 248 NG/ML (ref 21.9–321.7)
GFR SERPL CREATININE-BSD FRML MDRD: 120 ML/MIN/1.73
GLOBULIN UR ELPH-MCNC: 2.1 GM/DL
GLUCOSE BLD-MCNC: 96 MG/DL (ref 70–110)
HCT VFR BLD AUTO: 38.8 % (ref 42–52)
HCT VFR BLD AUTO: 39.1 % (ref 42–52)
HGB BLD-MCNC: 12.7 G/DL (ref 14–18)
HGB BLD-MCNC: 13.1 G/DL (ref 14–18)
IMM GRANULOCYTES # BLD: 0.02 10*3/MM3 (ref 0–0.03)
IMM GRANULOCYTES # BLD: 0.03 10*3/MM3 (ref 0–0.03)
IMM GRANULOCYTES NFR BLD: 0.2 % (ref 0–0.5)
IMM GRANULOCYTES NFR BLD: 0.3 % (ref 0–0.5)
IRON 24H UR-MRATE: 4 MCG/DL (ref 53–167)
IRON SATN MFR SERPL: 2 % (ref 20–50)
LYMPHOCYTES # BLD AUTO: 0.69 10*3/MM3 (ref 1–3)
LYMPHOCYTES # BLD AUTO: 0.77 10*3/MM3 (ref 1–3)
LYMPHOCYTES NFR BLD AUTO: 5.2 % (ref 21–51)
LYMPHOCYTES NFR BLD AUTO: 7.1 % (ref 21–51)
MCH RBC QN AUTO: 29.8 PG (ref 27–33)
MCH RBC QN AUTO: 30.3 PG (ref 27–33)
MCHC RBC AUTO-ENTMCNC: 32.7 G/DL (ref 33–37)
MCHC RBC AUTO-ENTMCNC: 33.5 G/DL (ref 33–37)
MCV RBC AUTO: 90.5 FL (ref 80–94)
MCV RBC AUTO: 91.1 FL (ref 80–94)
MONOCYTES # BLD AUTO: 0.4 10*3/MM3 (ref 0.1–0.9)
MONOCYTES # BLD AUTO: 0.55 10*3/MM3 (ref 0.1–0.9)
MONOCYTES NFR BLD AUTO: 3.7 % (ref 0–10)
MONOCYTES NFR BLD AUTO: 4.2 % (ref 0–10)
NEUTROPHILS # BLD AUTO: 11.88 10*3/MM3 (ref 1.4–6.5)
NEUTROPHILS # BLD AUTO: 9.57 10*3/MM3 (ref 1.4–6.5)
NEUTROPHILS NFR BLD AUTO: 88.3 % (ref 30–70)
NEUTROPHILS NFR BLD AUTO: 90 % (ref 30–70)
OSMOLALITY SERPL CALC.SUM OF ELEC: 280.2 MOSM/KG (ref 273–305)
PLATELET # BLD AUTO: 145 10*3/MM3 (ref 130–400)
PLATELET # BLD AUTO: 150 10*3/MM3 (ref 130–400)
PMV BLD AUTO: 10 FL (ref 6–10)
PMV BLD AUTO: 9.6 FL (ref 6–10)
POTASSIUM BLD-SCNC: 3.6 MMOL/L (ref 3.5–5.3)
PROT SERPL-MCNC: 5.6 G/DL (ref 6–8)
RBC # BLD AUTO: 4.26 10*6/MM3 (ref 4.7–6.1)
RBC # BLD AUTO: 4.32 10*6/MM3 (ref 4.7–6.1)
SODIUM BLD-SCNC: 141 MMOL/L (ref 135–153)
TIBC SERPL-MCNC: 263 MCG/DL (ref 241–421)
TROPONIN I SERPL-MCNC: <0.006 NG/ML
WBC NRBC COR # BLD: 10.83 10*3/MM3 (ref 4.5–12.5)
WBC NRBC COR # BLD: 13.19 10*3/MM3 (ref 4.5–12.5)

## 2018-07-03 PROCEDURE — 36415 COLL VENOUS BLD VENIPUNCTURE: CPT

## 2018-07-03 PROCEDURE — 85025 COMPLETE CBC W/AUTO DIFF WBC: CPT | Performed by: PHYSICIAN ASSISTANT

## 2018-07-03 PROCEDURE — 83540 ASSAY OF IRON: CPT | Performed by: INTERNAL MEDICINE

## 2018-07-03 PROCEDURE — 85025 COMPLETE CBC W/AUTO DIFF WBC: CPT | Performed by: INTERNAL MEDICINE

## 2018-07-03 PROCEDURE — 84484 ASSAY OF TROPONIN QUANT: CPT | Performed by: PHYSICIAN ASSISTANT

## 2018-07-03 PROCEDURE — 93010 ELECTROCARDIOGRAM REPORT: CPT | Performed by: INTERNAL MEDICINE

## 2018-07-03 PROCEDURE — 80053 COMPREHEN METABOLIC PANEL: CPT | Performed by: PHYSICIAN ASSISTANT

## 2018-07-03 PROCEDURE — 93005 ELECTROCARDIOGRAM TRACING: CPT | Performed by: PHYSICIAN ASSISTANT

## 2018-07-03 PROCEDURE — 99284 EMERGENCY DEPT VISIT MOD MDM: CPT

## 2018-07-03 PROCEDURE — 82728 ASSAY OF FERRITIN: CPT | Performed by: INTERNAL MEDICINE

## 2018-07-03 PROCEDURE — 83550 IRON BINDING TEST: CPT | Performed by: INTERNAL MEDICINE

## 2018-07-03 NOTE — ED PROVIDER NOTES
Subjective     Illness   Severity:  Mild  Duration:  1 hour  Timing:  Sporadic  Progression:  Resolved  Chronicity:  New  Context:  Pt was in Dr Stokes office this morning for labwork, states BP was low.  Pt denied any symptoms and states he didnt feel any different then before.  BP was checked a few times by staff in office and continued to read low.  Pt was sent to ER for evaluation.  BP was found to be normotensive at this time.  Pt denies any symptoms.   Associated symptoms: diarrhea (chronic)    Associated symptoms: no abdominal pain, no chest pain, no congestion, no cough, no ear pain, no fatigue, no fever, no headaches, no loss of consciousness, no myalgias, no nausea, no rash, no rhinorrhea, no shortness of breath, no sore throat, no vomiting and no wheezing    Diarrhea:     Quality:  Semi-solid    Severity:  Mild    Timing:  Intermittent    Progression:  Unchanged      Review of Systems   Constitutional: Negative for fatigue and fever.   HENT: Negative for congestion, ear pain, rhinorrhea and sore throat.    Respiratory: Negative for cough, shortness of breath and wheezing.    Cardiovascular: Negative for chest pain.   Gastrointestinal: Positive for diarrhea (chronic). Negative for abdominal pain, nausea and vomiting.   Musculoskeletal: Negative for myalgias.   Skin: Negative for rash.   Neurological: Negative for loss of consciousness and headaches.   All other systems reviewed and are negative.      Past Medical History:   Diagnosis Date   • Johnson esophagus    • Barretts esophagus    • COPD (chronic obstructive pulmonary disease) (CMS/HCC)    • DDD (degenerative disc disease), thoracic    • Degenerative disc disease, lumbar    • GERD (gastroesophageal reflux disease)    • Hypertension    • Peptic ulcer disease    • Stroke (CMS/[facility])        Allergies   Allergen Reactions   • Protonix [Pantoprazole Sodium] Palpitations     Patient states that protonix causes chest pain.  Shruthi Pérez,  [facility]  4/23/2017  11:19 AM     • Contrast Dye      Shortness of breath       Past Surgical History:   Procedure Laterality Date   • APPENDECTOMY     • CHOLECYSTECTOMY N/A 4/22/2017    Procedure: CHOLECYSTECTOMY LAPAROSCOPIC;  Surgeon: Jaqueline Guaman MD;  [facility];  Service:    • CHOLECYSTECTOMY     • COLONOSCOPY N/A 5/8/2017    Procedure: COLONOSCOPY  CPTCODE:36346;  Surgeon: Nicho Richey III, MD;  [facility]  [facility];  Service:    • ENDOSCOPY     • ENDOSCOPY N/A 5/8/2017    Procedure: ESOPHAGOGASTRODUODENOSCOPY WITH BIOPSY  CPTCODE:37128;  Surgeon: Nicho Richey III, MD;  [facility]  [facility];  Service:    • ENDOSCOPY N/A 11/3/2017    Procedure: ESOPHAGOGASTRODUODENOSCOPY WITH BIOPSY  CPTCODE: 62098;  Surgeon: Nicho Richey III, MD;  [facility]  [facility];  Service:    • ENDOSCOPY N/A 2/20/2018    Procedure: ESOPHAGOGASTRODUODENOSCOPY WITH DILATATION CPT CODE: 77469;  Surgeon: Nicho Richey III, MD;  [facility];  Service:    • ENDOSCOPY N/A 6/5/2018    Procedure: ESOPHAGOGASTRODUODENOSCOPY WITH BIOPSY CPT CODE: 42001;  Surgeon: Nicho Richey III, MD;  [facility]  [facility];  Service: Gastroenterology       Family History   Problem Relation Age of Onset   • No Known Problems Mother    • Hypertension Father    • No Known Problems Sister    • No Known Problems Brother    • Drug abuse Brother    • No Known Problems Daughter        Social History     Social History   • Marital status:      Social History Main Topics   • Smoking status: Current Every Day Smoker     Packs/day: 1.00     Years: 35.00     Types: Cigarettes   • Smokeless tobacco: Never Used   • Alcohol use No   • Drug use: No   • Sexual activity: Defer     Other Topics Concern   • Not on file           Objective   Physical Exam   Constitutional: He is oriented to person, place, and time. Vital signs are normal. He appears well-developed and well-nourished.   HENT:   Head: Normocephalic and atraumatic.    Right Ear: External ear and ear canal normal.   Left Ear: External ear and ear canal normal.   Nose: Nose normal.   Mouth/Throat: Uvula is midline and oropharynx is clear and moist.   Eyes: Conjunctivae and EOM are normal. Pupils are equal, round, and reactive to light.   Neck: Normal range of motion. Neck supple.   Cardiovascular: Normal rate, regular rhythm and normal heart sounds.    Pulmonary/Chest: Effort normal and breath sounds normal.   Abdominal: Soft. Bowel sounds are normal.   Musculoskeletal: Normal range of motion.   Neurological: He is alert and oriented to person, place, and time. GCS eye subscore is 4. GCS verbal subscore is 5. GCS motor subscore is 6.   Skin: Skin is warm. Capillary refill takes less than 2 seconds.   Nursing note and vitals reviewed.      Procedures           ED Course  ED Course as of Jul 03 1245   Tue Jul 03, 2018   1238 Pt hasnt had any complaints during er visit.  Advised pt to follow with PCP.  Pt also advised to monitor BP.  Pt advised to take BP prior to taking blood pressure medication.  If BP is lower than 110/80 do not take medication.   [MC]      ED Course User Index  [MC] SUE Morgan                  MDM  Number of Diagnoses or Management Options  Hypotension, unspecified hypotension type: new and requires workup     Amount and/or Complexity of Data Reviewed  Clinical lab tests: reviewed and ordered  Tests in the medicine section of CPT®: reviewed    Risk of Complications, Morbidity, and/or Mortality  Presenting problems: moderate  Diagnostic procedures: moderate  Management options: moderate    Patient Progress  Patient progress: resolved        Final diagnoses:   Hypotension, unspecified hypotension type            SUE Morgan  07/03/18 1245

## 2018-07-10 DIAGNOSIS — K90.49 MALABSORPTION DUE TO INTOLERANCE, NOT ELSEWHERE CLASSIFIED: ICD-10-CM

## 2018-07-10 DIAGNOSIS — D50.9 IRON DEFICIENCY ANEMIA, UNSPECIFIED IRON DEFICIENCY ANEMIA TYPE: ICD-10-CM

## 2018-07-10 RX ORDER — SODIUM CHLORIDE 9 MG/ML
250 INJECTION, SOLUTION INTRAVENOUS ONCE
Status: CANCELLED | OUTPATIENT
Start: 2018-07-24 | End: 2018-07-24

## 2018-07-10 RX ORDER — SODIUM CHLORIDE 9 MG/ML
250 INJECTION, SOLUTION INTRAVENOUS ONCE
Status: CANCELLED | OUTPATIENT
Start: 2018-07-17

## 2018-07-11 ENCOUNTER — HOSPITAL ENCOUNTER (OUTPATIENT)
Dept: ULTRASOUND IMAGING | Facility: HOSPITAL | Age: 48
Discharge: HOME | End: 2018-07-11
Admitting: FAMILY MEDICINE

## 2018-07-11 DIAGNOSIS — R19.00 ABDOMINAL PULSATILE MASS: ICD-10-CM

## 2018-07-11 PROCEDURE — 76775 US EXAM ABDO BACK WALL LIM: CPT

## 2018-07-11 PROCEDURE — 76775 US EXAM ABDO BACK WALL LIM: CPT | Performed by: RADIOLOGY

## 2018-07-17 ENCOUNTER — INFUSION (OUTPATIENT)
Dept: ONCOLOGY | Facility: HOSPITAL | Age: 48
End: 2018-07-17

## 2018-07-17 VITALS
WEIGHT: 131.7 LBS | HEART RATE: 107 BPM | TEMPERATURE: 99.2 F | OXYGEN SATURATION: 95 % | SYSTOLIC BLOOD PRESSURE: 135 MMHG | BODY MASS INDEX: 21.26 KG/M2 | RESPIRATION RATE: 18 BRPM | DIASTOLIC BLOOD PRESSURE: 77 MMHG

## 2018-07-17 DIAGNOSIS — K90.49 MALABSORPTION DUE TO INTOLERANCE, NOT ELSEWHERE CLASSIFIED: ICD-10-CM

## 2018-07-17 DIAGNOSIS — D50.9 IRON DEFICIENCY ANEMIA, UNSPECIFIED IRON DEFICIENCY ANEMIA TYPE: Primary | ICD-10-CM

## 2018-07-17 PROCEDURE — 96365 THER/PROPH/DIAG IV INF INIT: CPT

## 2018-07-17 PROCEDURE — 25010000002 FERRIC CARBOXYMALTOSE 750 MG/15ML SOLUTION 15 ML VIAL: Performed by: INTERNAL MEDICINE

## 2018-07-17 PROCEDURE — 96374 THER/PROPH/DIAG INJ IV PUSH: CPT

## 2018-07-17 RX ORDER — SODIUM CHLORIDE 9 MG/ML
250 INJECTION, SOLUTION INTRAVENOUS ONCE
Status: COMPLETED | OUTPATIENT
Start: 2018-07-17 | End: 2018-07-17

## 2018-07-17 RX ADMIN — FERRIC CARBOXYMALTOSE INJECTION 750 MG: 50 INJECTION, SOLUTION INTRAVENOUS at 14:48

## 2018-07-17 RX ADMIN — SODIUM CHLORIDE 250 ML: 9 INJECTION, SOLUTION INTRAVENOUS at 14:48

## 2018-07-18 ENCOUNTER — TELEPHONE (OUTPATIENT)
Dept: ONCOLOGY | Facility: HOSPITAL | Age: 48
End: 2018-07-18

## 2018-07-18 NOTE — TELEPHONE ENCOUNTER
Pt's wife had called earlier today to just make us aware that he was going to his PCP today d/t some diarrhea that he had had last night, and a minor weight loss of 2-3 pounds. She wanted to make sure that was the right thing to do since she didn't feel that it was due to the patient's infusion of Injectafer yesterday. He had the treatment in the past and didn't have this issue. I reviewed the common side effects of the medication which do not include weight loss or diarrhea. I have talked with Dr. Costa and we were both in agreement that seeing the PCP is the best interest of the patient at this time, since it is likely not d/t the injectafer.

## 2018-07-24 ENCOUNTER — INFUSION (OUTPATIENT)
Dept: ONCOLOGY | Facility: HOSPITAL | Age: 48
End: 2018-07-24

## 2018-07-24 VITALS
BODY MASS INDEX: 20.85 KG/M2 | HEART RATE: 105 BPM | DIASTOLIC BLOOD PRESSURE: 83 MMHG | RESPIRATION RATE: 20 BRPM | TEMPERATURE: 97.9 F | WEIGHT: 129.2 LBS | OXYGEN SATURATION: 100 % | SYSTOLIC BLOOD PRESSURE: 130 MMHG

## 2018-07-24 DIAGNOSIS — D50.9 IRON DEFICIENCY ANEMIA, UNSPECIFIED IRON DEFICIENCY ANEMIA TYPE: Primary | ICD-10-CM

## 2018-07-24 DIAGNOSIS — K90.49 MALABSORPTION DUE TO INTOLERANCE, NOT ELSEWHERE CLASSIFIED: ICD-10-CM

## 2018-07-24 PROCEDURE — 25010000002 FERRIC CARBOXYMALTOSE 750 MG/15ML SOLUTION 15 ML VIAL: Performed by: INTERNAL MEDICINE

## 2018-07-24 PROCEDURE — 96374 THER/PROPH/DIAG INJ IV PUSH: CPT

## 2018-07-24 PROCEDURE — 96365 THER/PROPH/DIAG IV INF INIT: CPT

## 2018-07-24 RX ORDER — SODIUM CHLORIDE 9 MG/ML
250 INJECTION, SOLUTION INTRAVENOUS ONCE
Status: COMPLETED | OUTPATIENT
Start: 2018-07-24 | End: 2018-07-24

## 2018-07-24 RX ADMIN — SODIUM CHLORIDE 250 ML: 9 INJECTION, SOLUTION INTRAVENOUS at 09:07

## 2018-07-24 RX ADMIN — FERRIC CARBOXYMALTOSE INJECTION 750 MG: 50 INJECTION, SOLUTION INTRAVENOUS at 09:10

## 2018-07-31 ENCOUNTER — TRANSCRIBE ORDERS (OUTPATIENT)
Dept: ADMINISTRATIVE | Facility: HOSPITAL | Age: 48
End: 2018-07-31

## 2018-07-31 DIAGNOSIS — R63.4 WEIGHT LOSS: ICD-10-CM

## 2018-07-31 DIAGNOSIS — K25.9 GASTRIC ULCER, UNSPECIFIED CHRONICITY, UNSPECIFIED WHETHER GASTRIC ULCER HEMORRHAGE OR PERFORATION PRESENT: Primary | ICD-10-CM

## 2018-07-31 DIAGNOSIS — R10.9 ABDOMINAL PAIN, UNSPECIFIED ABDOMINAL LOCATION: ICD-10-CM

## 2018-08-03 ENCOUNTER — HOSPITAL ENCOUNTER (OUTPATIENT)
Dept: GENERAL RADIOLOGY | Facility: HOSPITAL | Age: 48
Discharge: HOME | End: 2018-08-03
Admitting: INTERNAL MEDICINE

## 2018-08-03 DIAGNOSIS — K25.9 GASTRIC ULCER, UNSPECIFIED CHRONICITY, UNSPECIFIED WHETHER GASTRIC ULCER HEMORRHAGE OR PERFORATION PRESENT: ICD-10-CM

## 2018-08-03 DIAGNOSIS — R10.9 ABDOMINAL PAIN, UNSPECIFIED ABDOMINAL LOCATION: ICD-10-CM

## 2018-08-03 DIAGNOSIS — R63.4 WEIGHT LOSS: ICD-10-CM

## 2018-08-03 PROCEDURE — A9270 NON-COVERED ITEM OR SERVICE: HCPCS | Performed by: INTERNAL MEDICINE

## 2018-08-03 PROCEDURE — 63710000001 BARIUM SULFATE 96 % RECONSTITUTED SUSPENSION: Performed by: INTERNAL MEDICINE

## 2018-08-03 PROCEDURE — 74241: CPT

## 2018-08-03 RX ADMIN — BARIUM SULFATE 183 ML: 960 POWDER, FOR SUSPENSION ORAL at 09:27

## 2018-08-15 DIAGNOSIS — D64.9 NORMOCYTIC ANEMIA: Primary | ICD-10-CM

## 2018-08-22 ENCOUNTER — LAB (OUTPATIENT)
Dept: ONCOLOGY | Facility: CLINIC | Age: 48
End: 2018-08-22

## 2018-08-22 ENCOUNTER — OFFICE VISIT (OUTPATIENT)
Dept: ONCOLOGY | Facility: CLINIC | Age: 48
End: 2018-08-22

## 2018-08-22 VITALS
DIASTOLIC BLOOD PRESSURE: 78 MMHG | WEIGHT: 127.7 LBS | RESPIRATION RATE: 18 BRPM | HEART RATE: 111 BPM | OXYGEN SATURATION: 95 % | SYSTOLIC BLOOD PRESSURE: 112 MMHG | BODY MASS INDEX: 20.61 KG/M2 | TEMPERATURE: 97.8 F

## 2018-08-22 DIAGNOSIS — E53.8 B12 DEFICIENCY: Primary | ICD-10-CM

## 2018-08-22 DIAGNOSIS — D50.9 IRON DEFICIENCY ANEMIA, UNSPECIFIED IRON DEFICIENCY ANEMIA TYPE: ICD-10-CM

## 2018-08-22 DIAGNOSIS — D64.9 NORMOCYTIC ANEMIA: ICD-10-CM

## 2018-08-22 LAB
BASOPHILS # BLD AUTO: 0.03 10*3/MM3 (ref 0–0.3)
BASOPHILS NFR BLD AUTO: 0.4 % (ref 0–2)
DEPRECATED RDW RBC AUTO: 52.2 FL (ref 37–54)
EOSINOPHIL # BLD AUTO: 0.08 10*3/MM3 (ref 0–0.7)
EOSINOPHIL NFR BLD AUTO: 0.9 % (ref 0–5)
ERYTHROCYTE [DISTWIDTH] IN BLOOD BY AUTOMATED COUNT: 15.6 % (ref 11.5–14.5)
FERRITIN SERPL-MCNC: 520 NG/ML (ref 21.9–321.7)
HCT VFR BLD AUTO: 43.5 % (ref 42–52)
HGB BLD-MCNC: 14.7 G/DL (ref 14–18)
IMM GRANULOCYTES # BLD: 0.01 10*3/MM3 (ref 0–0.03)
IMM GRANULOCYTES NFR BLD: 0.1 % (ref 0–0.5)
IRON 24H UR-MRATE: 46 MCG/DL (ref 53–167)
IRON SATN MFR SERPL: 16 % (ref 20–50)
LYMPHOCYTES # BLD AUTO: 1.2 10*3/MM3 (ref 1–3)
LYMPHOCYTES NFR BLD AUTO: 14.2 % (ref 21–51)
MCH RBC QN AUTO: 31.9 PG (ref 27–33)
MCHC RBC AUTO-ENTMCNC: 33.8 G/DL (ref 33–37)
MCV RBC AUTO: 94.4 FL (ref 80–94)
MONOCYTES # BLD AUTO: 0.44 10*3/MM3 (ref 0.1–0.9)
MONOCYTES NFR BLD AUTO: 5.2 % (ref 0–10)
NEUTROPHILS # BLD AUTO: 6.68 10*3/MM3 (ref 1.4–6.5)
NEUTROPHILS NFR BLD AUTO: 79.2 % (ref 30–70)
PLATELET # BLD AUTO: 286 10*3/MM3 (ref 130–400)
PMV BLD AUTO: 9.2 FL (ref 6–10)
RBC # BLD AUTO: 4.61 10*6/MM3 (ref 4.7–6.1)
TIBC SERPL-MCNC: 284 MCG/DL (ref 241–421)
VIT B12 BLD-MCNC: 425 PG/ML (ref 211–911)
WBC NRBC COR # BLD: 8.44 10*3/MM3 (ref 4.5–12.5)

## 2018-08-22 PROCEDURE — 99213 OFFICE O/P EST LOW 20 MIN: CPT | Performed by: NURSE PRACTITIONER

## 2018-08-22 PROCEDURE — 85025 COMPLETE CBC W/AUTO DIFF WBC: CPT | Performed by: INTERNAL MEDICINE

## 2018-08-22 PROCEDURE — 83550 IRON BINDING TEST: CPT | Performed by: INTERNAL MEDICINE

## 2018-08-22 PROCEDURE — 82607 VITAMIN B-12: CPT | Performed by: INTERNAL MEDICINE

## 2018-08-22 PROCEDURE — 82728 ASSAY OF FERRITIN: CPT | Performed by: INTERNAL MEDICINE

## 2018-08-22 PROCEDURE — 83540 ASSAY OF IRON: CPT | Performed by: INTERNAL MEDICINE

## 2018-08-22 NOTE — PROGRESS NOTES
DATE:  8/22/2018    DIAGNOSIS:  Normocytic anemia  Iron deficiency  B12 deficiency    CHIEF COMPLAINT:  Follow up of MASSIMO    HISTORY OF PRESENT ILLNESS:   Jamison Edward is a very pleasant 47 y.o.  male who presents in follow up appointment for further management and evaluation of normocytic anemia secondary to iron and B12 deficiency.     Mr. Edward has been following with Dr. Richey for history of peptic ulcer disease and hematemesis. He was placed on oral iron once daily several months ago and experienced some constipation with the medication however self discontinued this one month prior to his initial consultation. He has had gradual weight loss where he previously weighed 205 pounds two years ago and is currently down to 140 pounds. He states that he continues to have a good appetite and eat normally. He recently had an EGD in November 2017 for weight loss, hematemesis and abdominal pain which was significant for gastric and duodenal ulcers. He does have history of peptic ulcer disease and in the past and has also been followed by Dr. Starr for Johnson's esophagus. He also had an EGD in May 2017 that showed normal duodenal biopsies, negative H pylori, along with normal random colon biopsies. He also had a CT abd/pelvis performed in 11/2017 which showed possible colitis versus under distension of the right colon. He is currently on ASA 81mg daily due to a TIA in the past however denies of any other NSAID use. He also experiences chest pain every night and has been evaluated by cardiology and as per patient was felt to be non cardiac related. He underwent an EGD with Dr. Richey and was found to have gastric and duodenal ulcers and is to continue current treatment for the ulcers and is undergo repeat endoscopy.      Interim History:  Since his last visit he was started on IV Injectafer as he continued to remain iron deficient despite oral iron which he received on 4/27 and 5/4. He has also been  "following with gastroenterology in [place] with Dr. Moran for further evaluation. He underwent an MRCP in August that was essentially unremarkable. He also reports he recently underwent an abdominal US with duplex that was reportedly negative however those reults are unavailable to me at present. He continues to have ongoing nausea and abdominal pain while eating although slightly improved. He does reports ongoing weight loss however he attributes this to a recent \"stomach bug\" in which he had ongoing nausea, vomiting, and diarrhea. He reports that he vomited black substance one am. He denies of any hematochezia but reports a possible one time episode of melena.     Interval History:  Mr. Edward presents today for follow up of MASSIMO. Since his last visit, he reports he has been doing about the same. He continues to have ongoing fatigue but this seemed to improve following most recent IV iron replacement which he completed at end of July. He denies any obvious blood loss from any source. He is being followed by GI and has a follow up scheduled in September. He continues to be on monthly B12 injections and is now receiving this at his PCP office. He denies any other complaints today.     PAST MEDICAL HISTORY:  Past Medical History:   Diagnosis Date   • Johnson esophagus    • Barretts esophagus    • COPD (chronic obstructive pulmonary disease) (CMS/HCC)    • DDD (degenerative disc disease), thoracic    • Degenerative disc disease, lumbar    • GERD (gastroesophageal reflux disease)    • Hypertension    • Peptic ulcer disease    • Stroke (CMS/HCC)        PAST SURGICAL HISTORY:  Past Surgical History:   Procedure Laterality Date   • APPENDECTOMY     • CHOLECYSTECTOMY N/A 4/22/2017    Procedure: CHOLECYSTECTOMY LAPAROSCOPIC;  Surgeon: Jaqueline Guaman MD;  Location: [facility];  Service:    • CHOLECYSTECTOMY     • COLONOSCOPY N/A 5/8/2017    Procedure: COLONOSCOPY  CPTCODE:92245;  Surgeon: Nicho Richey III, " MD;  Location:  [facility];  Service:    • ENDOSCOPY     • ENDOSCOPY N/A 5/8/2017    Procedure: ESOPHAGOGASTRODUODENOSCOPY WITH BIOPSY  CPTCODE:38279;  Surgeon: Nicho Richey III, MD;  Location:  [facility];  Service:    • ENDOSCOPY N/A 11/3/2017    Procedure: ESOPHAGOGASTRODUODENOSCOPY WITH BIOPSY  CPTCODE: 05222;  Surgeon: Nicho Richey III, MD;  Location:  [facility];  Service:    • ENDOSCOPY N/A 2/20/2018    Procedure: ESOPHAGOGASTRODUODENOSCOPY WITH DILATATION CPT CODE: 68340;  Surgeon: Nicho Richey III, MD;  Location:  [facility];  Service:    • ENDOSCOPY N/A 6/5/2018    Procedure: ESOPHAGOGASTRODUODENOSCOPY WITH BIOPSY CPT CODE: 46085;  Surgeon: Nicho Richey III, MD;  Location: [facility] OR;  Service: Gastroenterology       SOCIAL HISTORY:  Social History     Social History   • Marital status:      Spouse name: N/A   • Number of children: N/A   • Years of education: N/A     Occupational History   • Not on file.     Social History Main Topics   • Smoking status: Current Every Day Smoker     Packs/day: 1.00     Years: 35.00     Types: Cigarettes   • Smokeless tobacco: Never Used   • Alcohol use No   • Drug use: No   • Sexual activity: Defer     Other Topics Concern   • Not on file     Social History Narrative   • No narrative on file       FAMILY HISTORY:  Family History   Problem Relation Age of Onset   • No Known Problems Mother    • Hypertension Father    • No Known Problems Sister    • No Known Problems Brother    • Drug abuse Brother    • No Known Problems Daughter      MEDICATIONS:  The current medication list was reviewed in the EMR    Current Outpatient Prescriptions:   •  aspirin 81 MG EC tablet, Take 81 mg by mouth Daily. Patient took 2 tabs this morning, Disp: , Rfl:   •  baclofen (LIORESAL) 10 MG tablet, Take 20 mg by mouth 3 (Three) Times a Day., Disp: , Rfl:   •  cimetidine (TAGAMET) 800 MG tablet, Take 800 mg by mouth 3 (Three) Times a Day., Disp: , Rfl:   •  citalopram  (CeleXA) 10 MG tablet, Take 10 mg by mouth Daily., Disp: , Rfl:   •  Cyanocobalamin (VITAMIN B-12 IJ), Inject  as directed., Disp: , Rfl:   •  dexlansoprazole (DEXILANT) 60 MG capsule, Take 1 capsule by mouth Daily., Disp: 30 capsule, Rfl: 5  •  esomeprazole (nexIUM) 40 MG capsule, Take 1 capsule by mouth 2 (Two) Times a Day Before Meals., Disp: 60 capsule, Rfl: 3  •  hydrochlorothiazide (HYDRODIURIL) 25 MG tablet, Take 25 mg by mouth Daily., Disp: , Rfl:   •  levETIRAcetam (KEPPRA) 500 MG tablet, Take 1,000 mg by mouth 2 (Two) Times a Day., Disp: , Rfl:   •  lubiprostone (AMITIZA) 24 MCG capsule, Take 1 capsule by mouth 2 (Two) Times a Day With Meals., Disp: 60 capsule, Rfl: 5  •  ondansetron ODT (ZOFRAN-ODT) 4 MG disintegrating tablet, Take 1 tablet by mouth Every 6 (Six) Hours As Needed for Nausea or Vomiting., Disp: 12 tablet, Rfl: 0  •  polyethylene glycol (MIRALAX) powder, Take 255 g of MiraLAX with 32 ounces liquid then repeat 8 hours later for MiraLAX cleanout., Disp: 510 g, Rfl: 0  •  promethazine (PHENERGAN) 12.5 MG tablet, Take 1 tablet by mouth Every 8 (Eight) Hours As Needed for Nausea or Vomiting., Disp: 40 tablet, Rfl: 0  •  QUEtiapine (SEROquel) 25 MG tablet, Take 50 mg by mouth Every Night., Disp: , Rfl:   •  sucralfate (CARAFATE) 1 G tablet, Take 1 tablet by mouth 4 (Four) Times a Day., Disp: 40 tablet, Rfl: 0    ALLERGIES:    Allergies   Allergen Reactions   • Protonix [Pantoprazole Sodium] Palpitations     Patient states that protonix causes chest pain.  Shruthi Pérez [facility]  4/23/2017  11:19 AM     • Contrast Dye      Shortness of breath     REVIEW OF SYSTEMS:    A comprehensive 14 point review of systems was performed.  Significant findings as mentioned above.  All other systems reviewed and are negative.      Physical Exam   Vital Signs:   Vitals:    08/22/18 1033   BP: 112/78   Pulse: 111   Resp: 18   Temp: 97.8 °F (36.6 °C)   SpO2: 95%    Patient's Body mass index is 20.61 kg/m².    General: Awake, alert and oriented, in no distress  HEENT: Head is atraumatic, normocephalic, extraocular movements full, oropharynx clear, no scleral icterus, pink moist mucous membranes  Neck: supple, no jvd, lymphadenopathy or masses  Cardiovascular: regular rhythm, tachycardic without murmurs  Pulmonary: non-labored, clear to auscultation bilaterally, no wheezing  Abdomen: soft, mild tenderness, non-distended, normal active bowel sounds present  Extremities: No clubbing, cyanosis or edema  Lymph: No cervical, supraclavicular adenopathy  Neurologic: Mental status as above, alert, awake and oriented, grossly non-focal exam  Skin: warm, dry, intact, chronic lesions     IMAGING:  Ct Abdomen Pelvis Without Contrast    Result Date: 7/2/2018  : 1. No evidence of an aortic aneurysm 2. No periaortic hematoma 3. Arthritic change in the spine       This report was finalized on 7/2/2018 8:15 PM by Dr. Giacomo Barnes MD.      Xr Spine Cervical Complete 4 Or 5 View    Result Date: 6/13/2018  Unremarkable evaluation of the the cervical spine.  This report was finalized on 6/13/2018 3:30 PM by Dr. Fab Huff MD.      Ct Chest Without Contrast    Result Date: 7/2/2018  Coronary Calcifications No aneurysm or hematoma  This report was finalized on 7/2/2018 8:20 PM by Dr. Giacomo Barnes MD.      Us Aorta Limited    Result Date: 7/11/2018  No evidence of an abdominal aortic aneurysm.   This report was finalized on 7/11/2018 10:35 AM by Dr. Giacomo Barnes MD.      Fl Upper Gi Single Contrast With Kub    Result Date: 8/3/2018  1. Mild gross thickening of the gastric folds which may represent mild gastritis. Further evaluation may be considered if clinically relevant 2. Mucosal irregularity, and deformity of the duodenal bulb, and first portion of the duodenal C-loop, which may represent scarring, and or inflammation from active ulcer. Further evaluation such as endoscopy may be considered if clinically relevant.    This report was  finalized on 8/3/2018 12:31 PM by Cholo Soto.        RECENT LABS:  Lab Results   Component Value Date    WBC 8.44 08/22/2018    HGB 14.7 08/22/2018    HCT 43.5 08/22/2018    MCV 94.4 (H) 08/22/2018    RDW 15.6 (H) 08/22/2018     08/22/2018    NEUTRORELPCT 79.2 (H) 08/22/2018    LYMPHORELPCT 14.2 (L) 08/22/2018    MONORELPCT 5.2 08/22/2018    EOSRELPCT 0.9 08/22/2018    BASORELPCT 0.4 08/22/2018    NEUTROABS 6.68 (H) 08/22/2018    LYMPHSABS 1.20 08/22/2018       Lab Results   Component Value Date     07/03/2018    K 3.6 07/03/2018    CO2 24.8 07/03/2018     (H) 07/03/2018    BUN 10 07/03/2018    CREATININE 0.70 07/03/2018    EGFRIFNONA 120 07/03/2018    GLUCOSE 96 07/03/2018    CALCIUM 8.8 07/03/2018    ALKPHOS 105 07/03/2018    AST 36 (H) 07/03/2018    ALT 22 07/03/2018    BILITOT 0.2 07/03/2018    ALBUMIN 3.50 07/03/2018    PROTEINTOT 5.6 (L) 07/03/2018       Lab Results   Component Value Date     01/15/2018       Lab Results   Component Value Date    FERRITIN 520.00 (H) 08/22/2018    IRON 46 (L) 08/22/2018    TIBC 284 08/22/2018    LABIRON 16 (L) 08/22/2018    LDVKJBDG44 425 08/22/2018    FOLATE 14.45 01/15/2018    HAPTOGLOBIN 240 (H) 01/15/2018    RETICCTPCT 1.21 01/15/2018    RETIC 0.0502 01/15/2018      ASSESSMENT & PLAN:  Jamison Edward is a very pleasant 47 y.o. male with    1.  Normocytic anemia:  -Please see Dr. Gustafson's most recent follow up note from 05/22/18 for full workup details.   - Iron studies were suggestive of iron deficiency and he was started on oral iron replacement but did not have improvement in iron stores. Therefore, likely has malabsorption of iron. We have been replacing with IV iron (injectafer) as needed, most recently received at the end of July.    -He currently follows with gastroenterology closely and underwent an EGD which was significant for gastric and duodenal ulcers without malignancy and has a repeat endoscopy scheduled.  -Currently, he  denies any obvious blood loss from any source. Repeat iron studies from today more consistent with AOCD/inflammation.   -He also has B12 deficiency and was started on B12 injections. He is now receiving them monthly at PCP office. Repeat B12 today is replete.   -Repeat CBC from today showing normalized H/H.   -Will plan to repeat labs in 2 months and follow up again in 4 months with repeat labs. Advised him to follow up with GI as scheduled.       ACO Quality measures  - I advised Jamison of the risks of continuing to use tobacco.  During this visit, I spent <3 minutes counseling the patient regarding tobacco cessation.  - Patient's Body mass index is 20.61 kg/m². BMI is within normal parameters. No follow-up required.  - Current outpatient and discharge medications have been reconciled for the patient.  Reviewed by: EILEEN Hamlin    I spent 15 minutes with Jamison Edward today. More than 50% of the time was spent in counseling / coordination of care for the above problems.      Electronically Signed by: EILEEN Hamlin APRN August 22, 2018 11:46 AM       CC:   No ref. provider found  Kenton Cerda MD

## 2018-09-07 ENCOUNTER — TRANSCRIBE ORDERS (OUTPATIENT)
Dept: ADMINISTRATIVE | Facility: HOSPITAL | Age: 48
End: 2018-09-07

## 2018-09-14 ENCOUNTER — LAB REQUISITION (OUTPATIENT)
Dept: LAB | Facility: HOSPITAL | Age: 48
End: 2018-09-14

## 2018-09-14 DIAGNOSIS — K27.9 PEPTIC ULCER WITHOUT HEMORRHAGE OR PERFORATION: ICD-10-CM

## 2018-09-14 PROCEDURE — 88305 TISSUE EXAM BY PATHOLOGIST: CPT | Performed by: SURGERY

## 2018-09-17 LAB
CYTO UR: NORMAL
LAB AP CASE REPORT: NORMAL
LAB AP CLINICAL INFORMATION: NORMAL
PATH REPORT.FINAL DX SPEC: NORMAL
PATH REPORT.GROSS SPEC: NORMAL

## 2018-10-10 ENCOUNTER — OFFICE VISIT (OUTPATIENT)
Dept: NEUROLOGY | Facility: CLINIC | Age: 48
End: 2018-10-10

## 2018-10-10 VITALS
SYSTOLIC BLOOD PRESSURE: 118 MMHG | HEIGHT: 66 IN | RESPIRATION RATE: 16 BRPM | WEIGHT: 137 LBS | HEART RATE: 95 BPM | BODY MASS INDEX: 22.02 KG/M2 | DIASTOLIC BLOOD PRESSURE: 68 MMHG | OXYGEN SATURATION: 99 %

## 2018-10-10 DIAGNOSIS — G24.9 DYSTONIA: Primary | ICD-10-CM

## 2018-10-10 PROCEDURE — 99204 OFFICE O/P NEW MOD 45 MIN: CPT | Performed by: PSYCHIATRY & NEUROLOGY

## 2018-10-10 RX ORDER — METOCLOPRAMIDE 10 MG/1
10 TABLET ORAL
COMMUNITY
End: 2018-10-10 | Stop reason: SINTOL

## 2018-10-10 RX ORDER — QUETIAPINE FUMARATE 25 MG/1
25 TABLET, FILM COATED ORAL NIGHTLY
Qty: 7 TABLET | Refills: 0 | Status: SHIPPED | OUTPATIENT
Start: 2018-10-10 | End: 2019-09-12

## 2018-10-10 RX ORDER — TRAZODONE HYDROCHLORIDE 50 MG/1
50 TABLET ORAL NIGHTLY
Qty: 30 TABLET | Refills: 2 | Status: SHIPPED | OUTPATIENT
Start: 2018-10-10 | End: 2022-04-14

## 2018-10-10 NOTE — PROGRESS NOTES
Subjective:    CC: Jamison Edward is seen today in consultation at the request of EILEEN Knox for Neurologic Problem        HPI:  48-year-old male accompanied by his wife with a history of Johnson's esophagus, peptic ulcers, anxiety, depression, migraine headaches, insomnia presents with dystonia.  As per patient he started having symptoms of painful cramping of his right hand last year.  This lasts for about 15-20 seconds.  He has also started to have pain on the left side of his neck with his neck pulling towards the left shoulder.  He denies having any abnormal movements in his face and tongue.  He has been taking Seroquel 100 mg at night for sleep for several years now.  Earlier this year he was also started on Reglan that he takes 4 times a day.  In addition to this he also takes Celexa as well as promethazine as needed.  He was also started on Keppra 1000 milligrams twice a day for migraine headaches and these painful movements.  The Keppra has helped with the migraines but not so much with these movements.  He also gets side effect of mild agitation and confusion with the Keppra.  He had an MRI brain recently that showed nonspecific white matter changes but no acute intracranial abnormalities.  There was a concern of a demyelinating pathology based on the MRI however patient denies any events of sudden onset weakness, numbness or vision changes.    The following portions of the patient's history were reviewed today and updated as of 10/10/2018  : allergies, current medications, past family history, past medical history, past social history, past surgical history and problem list  These document will be scanned to patient's chart.      Current Outpatient Prescriptions:   •  aspirin 81 MG EC tablet, Take 81 mg by mouth Daily. Patient took 2 tabs this morning, Disp: , Rfl:   •  baclofen (LIORESAL) 10 MG tablet, Take 20 mg by mouth 3 (Three) Times a Day., Disp: , Rfl:   •  cimetidine (TAGAMET) 800  MG tablet, Take 800 mg by mouth 3 (Three) Times a Day., Disp: , Rfl:   •  citalopram (CeleXA) 10 MG tablet, Take 10 mg by mouth Daily., Disp: , Rfl:   •  Cyanocobalamin (VITAMIN B-12 IJ), Inject  as directed., Disp: , Rfl:   •  dexlansoprazole (DEXILANT) 60 MG capsule, Take 1 capsule by mouth Daily., Disp: 30 capsule, Rfl: 5  •  esomeprazole (nexIUM) 40 MG capsule, Take 1 capsule by mouth 2 (Two) Times a Day Before Meals., Disp: 60 capsule, Rfl: 3  •  hydrochlorothiazide (HYDRODIURIL) 25 MG tablet, Take 25 mg by mouth Daily., Disp: , Rfl:   •  levETIRAcetam (KEPPRA) 500 MG tablet, Take 1,000 mg by mouth 2 (Two) Times a Day., Disp: , Rfl:   •  sucralfate (CARAFATE) 1 G tablet, Take 1 tablet by mouth 4 (Four) Times a Day., Disp: 40 tablet, Rfl: 0  •  lubiprostone (AMITIZA) 24 MCG capsule, Take 1 capsule by mouth 2 (Two) Times a Day With Meals., Disp: 60 capsule, Rfl: 5  •  ondansetron ODT (ZOFRAN-ODT) 4 MG disintegrating tablet, Take 1 tablet by mouth Every 6 (Six) Hours As Needed for Nausea or Vomiting., Disp: 12 tablet, Rfl: 0  •  polyethylene glycol (MIRALAX) powder, Take 255 g of MiraLAX with 32 ounces liquid then repeat 8 hours later for MiraLAX cleanout., Disp: 510 g, Rfl: 0  •  promethazine (PHENERGAN) 12.5 MG tablet, Take 1 tablet by mouth Every 8 (Eight) Hours As Needed for Nausea or Vomiting., Disp: 40 tablet, Rfl: 0  •  QUEtiapine (SEROQUEL) 25 MG tablet, Take 1 tablet by mouth Every Night., Disp: 7 tablet, Rfl: 0  •  traZODone (DESYREL) 50 MG tablet, Take 1 tablet by mouth Every Night., Disp: 30 tablet, Rfl: 2   Past Medical History:   Diagnosis Date   • Johnson esophagus    • Barretts esophagus    • COPD (chronic obstructive pulmonary disease) (CMS/HCC)    • DDD (degenerative disc disease), thoracic    • Degenerative disc disease, lumbar    • GERD (gastroesophageal reflux disease)    • Hypertension    • Peptic ulcer disease    • Stroke (CMS/HCC)       Past Surgical History:   Procedure Laterality Date   •  APPENDECTOMY     • CHOLECYSTECTOMY N/A 4/22/2017    Procedure: CHOLECYSTECTOMY LAPAROSCOPIC;  Surgeon: Jaqueline Guaman MD;  Location:  [facility];  Service:    • CHOLECYSTECTOMY     • COLONOSCOPY N/A 5/8/2017    Procedure: COLONOSCOPY  CPTCODE:78397;  Surgeon: Nicho Richey III, MD;  Location:  [facility];  Service:    • ENDOSCOPY     • ENDOSCOPY N/A 5/8/2017    Procedure: ESOPHAGOGASTRODUODENOSCOPY WITH BIOPSY  CPTCODE:[id number];  Surgeon: Nicho Richey III, MD;  [facility];  Service:    • ENDOSCOPY N/A 11/3/2017    Procedure: ESOPHAGOGASTRODUODENOSCOPY WITH BIOPSY  CPTCODE: [id number];  Surgeon: Nicho Richey III, MD;  [facility];  Service:    • ENDOSCOPY N/A 2/20/2018    Procedure: ESOPHAGOGASTRODUODENOSCOPY WITH DILATATION CPT CODE: 28582;  Surgeon: Nicho Richey III, MD;  [facility];  Service:    • ENDOSCOPY N/A 6/5/2018    Procedure: ESOPHAGOGASTRODUODENOSCOPY WITH BIOPSY CPT CODE: 41969;  Surgeon: Nicho Richey III, MD;  Location:  [facility];  Service: Gastroenterology      Family History   Problem Relation Age of Onset   • No Known Problems Mother    • Hypertension Father    • No Known Problems Sister    • No Known Problems Brother    • Drug abuse Brother    • No Known Problems Daughter       Social History     Social History   • Marital status:      Spouse name: N/A   • Number of children: N/A   • Years of education: N/A     Occupational History   • Not on file.     Social History Main Topics   • Smoking status: Current Every Day Smoker     Packs/day: 1.00     Years: 35.00     Types: Cigarettes   • Smokeless tobacco: Never Used   • Alcohol use No   • Drug use: No   • Sexual activity: Defer     Other Topics Concern   • Not on file     Social History Narrative   • No narrative on file     Review of Systems   Constitutional: Positive for fatigue. Negative for diaphoresis and fever.   Respiratory: Negative for shortness of breath.    Cardiovascular: Positive  "for palpitations. Negative for chest pain.   Gastrointestinal: Positive for abdominal pain and nausea. Negative for vomiting.   Genitourinary: Negative for urinary incontinence.   Musculoskeletal: Positive for back pain and neck pain.   Neurological: Positive for dizziness, weakness, light-headedness and confusion. Negative for syncope.   All other systems reviewed and are negative.      Objective:    /68   Pulse 95   Resp 16   Ht 167.6 cm (66\")   Wt 62.1 kg (137 lb)   SpO2 99%   BMI 22.11 kg/m²     Neurology Exam:    General apperance: NAD.     Mental status: Alert, awake and oriented to time place and person.    Recent and Remote memory: Intact.    Attention span and Concentration: Normal.     Language and Speech: Intact- No dysarthria.    Fluency, Naming , Repitition and Comprehension:  Intact    Cranial Nerves:   CN II: Visual fields are full. Intact. Fundi - Normal, No papillederma, Pupils - JAYME  CN III, IV and VI: Extraocular movements are intact. Normal saccades.   CN V: Facial sensation is intact.   CN VII: Muscles of facial expression reveal no asymmetry. Intact.   CN VIII: Hearing is intact. Whispered voice intact.   CN IX and X: Palate elevates symmetrically. Intact  CN XI: Shoulder shrug is intact.   CN XII: Tongue is midline without evidence of atrophy or fasciculation.     Ophthalmoscopic exam of optic disc-normal    Motor:-  Right UE muscle strength 5/5. Normal tone.     Left UE muscle strength 5/5. Normal tone.      Right LE muscle strength5/5. Normal tone.     Left LE muscle strength 5/5. Normal tone.      Neck- Decreased range of movements to the left    Sensory: Normal light touch, vibration and pinprick sensation bilaterally.    DTRs: 2+ bilaterally in upper and lower extremities.    Babinski: Negative bilaterally.    Co-ordination: Normal finger-to-nose, heel to shin B/L.    Rhomberg: Negative.    Gait: Normal.    Cardiovascular: Regular rate and rhythm without murmur, gallop or " rub.    Assessment and Plan:  1. 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  I have told him to gradually taper and stop Reglan within the next few days.    I will also slowly wean him off of Seroquel.  Instead I will start him on trazodone 50 mg for insomnia a few days after he has stopped the Seroquel to prevent serotonergic side effects.  As he has side effects with Keppra I have told him to change his dose to 1 tablet in the morning (500 mg) and 2 tablets at night 1000 mg).  If at his next visit his symptoms have not improved then I may start him on low doses of Sinemet and Cogentin.  He is already taking baclofen which has not helped much.    As far as his MRI is concerned I do not think he has a demyelinating etiology based on the size and location of the lesions and also absence of symptoms.  These are most likely nonspecific chronic ischemic changes due to vascular risk factors such as high blood pressure, smoking and migraine headaches.          Answers for HPI/ROS submitted by the patient on 10/7/2018   Neurologic complaint  altered mental status: Yes  clumsiness: Yes  focal sensory loss: Yes  focal weakness: Yes  loss of balance: Yes  near-syncope: No  slurred speech: Yes  visual change: No  Chronicity: recurrent  Onset: more than 1 year ago  Onset quality: gradually  Progression since onset: gradually worsening  Focality: left-sided, right-sided, lower extremity  auditory change: Yes  aura: No  bowel incontinence: No  headaches: Yes  vertigo: No  Treatments tried: acetaminophen, aspirin, bed rest, eating, medication, position change, sleep  Improvement on treatment: no relief       Return in about 4 weeks (around 11/7/2018).       Brenna Gaston MD

## 2018-10-26 ENCOUNTER — LAB (OUTPATIENT)
Dept: ONCOLOGY | Facility: CLINIC | Age: 48
End: 2018-10-26

## 2018-10-26 VITALS
DIASTOLIC BLOOD PRESSURE: 72 MMHG | OXYGEN SATURATION: 98 % | SYSTOLIC BLOOD PRESSURE: 115 MMHG | HEART RATE: 97 BPM | RESPIRATION RATE: 18 BRPM | TEMPERATURE: 98 F

## 2018-10-26 DIAGNOSIS — D50.9 IRON DEFICIENCY ANEMIA, UNSPECIFIED IRON DEFICIENCY ANEMIA TYPE: ICD-10-CM

## 2018-10-26 LAB
ALBUMIN SERPL-MCNC: 4.1 G/DL (ref 3.5–5)
ALBUMIN/GLOB SERPL: 1.8 G/DL (ref 1.5–2.5)
ALP SERPL-CCNC: 107 U/L (ref 40–129)
ALT SERPL W P-5'-P-CCNC: 13 U/L (ref 10–44)
ANION GAP SERPL CALCULATED.3IONS-SCNC: 1.3 MMOL/L (ref 3.6–11.2)
AST SERPL-CCNC: 14 U/L (ref 10–34)
BASOPHILS # BLD AUTO: 0.01 10*3/MM3 (ref 0–0.3)
BASOPHILS NFR BLD AUTO: 0.1 % (ref 0–2)
BILIRUB SERPL-MCNC: 0.1 MG/DL (ref 0.2–1.8)
BUN BLD-MCNC: 7 MG/DL (ref 7–21)
BUN/CREAT SERPL: 9.2 (ref 7–25)
CALCIUM SPEC-SCNC: 9.1 MG/DL (ref 7.7–10)
CHLORIDE SERPL-SCNC: 105 MMOL/L (ref 99–112)
CO2 SERPL-SCNC: 27.7 MMOL/L (ref 24.3–31.9)
CREAT BLD-MCNC: 0.76 MG/DL (ref 0.43–1.29)
DEPRECATED RDW RBC AUTO: 42.9 FL (ref 37–54)
EOSINOPHIL # BLD AUTO: 0.06 10*3/MM3 (ref 0–0.7)
EOSINOPHIL NFR BLD AUTO: 0.7 % (ref 0–5)
ERYTHROCYTE [DISTWIDTH] IN BLOOD BY AUTOMATED COUNT: 12.2 % (ref 11.5–14.5)
FERRITIN SERPL-MCNC: 329 NG/ML (ref 21.9–321.7)
GFR SERPL CREATININE-BSD FRML MDRD: 109 ML/MIN/1.73
GLOBULIN UR ELPH-MCNC: 2.3 GM/DL
GLUCOSE BLD-MCNC: 116 MG/DL (ref 70–110)
HCT VFR BLD AUTO: 42 % (ref 42–52)
HGB BLD-MCNC: 14 G/DL (ref 14–18)
IMM GRANULOCYTES # BLD: 0.02 10*3/MM3 (ref 0–0.03)
IMM GRANULOCYTES NFR BLD: 0.2 % (ref 0–0.5)
IRON 24H UR-MRATE: 56 MCG/DL (ref 53–167)
IRON SATN MFR SERPL: 21 % (ref 20–50)
LYMPHOCYTES # BLD AUTO: 1.23 10*3/MM3 (ref 1–3)
LYMPHOCYTES NFR BLD AUTO: 14.9 % (ref 21–51)
MCH RBC QN AUTO: 32 PG (ref 27–33)
MCHC RBC AUTO-ENTMCNC: 33.3 G/DL (ref 33–37)
MCV RBC AUTO: 95.9 FL (ref 80–94)
MONOCYTES # BLD AUTO: 0.5 10*3/MM3 (ref 0.1–0.9)
MONOCYTES NFR BLD AUTO: 6 % (ref 0–10)
NEUTROPHILS # BLD AUTO: 6.45 10*3/MM3 (ref 1.4–6.5)
NEUTROPHILS NFR BLD AUTO: 78.1 % (ref 30–70)
OSMOLALITY SERPL CALC.SUM OF ELEC: 267.2 MOSM/KG (ref 273–305)
PLATELET # BLD AUTO: 269 10*3/MM3 (ref 130–400)
PMV BLD AUTO: 9.1 FL (ref 6–10)
POTASSIUM BLD-SCNC: 4.5 MMOL/L (ref 3.5–5.3)
PROT SERPL-MCNC: 6.4 G/DL (ref 6–8)
RBC # BLD AUTO: 4.38 10*6/MM3 (ref 4.7–6.1)
SODIUM BLD-SCNC: 134 MMOL/L (ref 135–153)
TIBC SERPL-MCNC: 266 MCG/DL (ref 241–421)
WBC NRBC COR # BLD: 8.27 10*3/MM3 (ref 4.5–12.5)

## 2018-10-26 PROCEDURE — 83550 IRON BINDING TEST: CPT | Performed by: NURSE PRACTITIONER

## 2018-10-26 PROCEDURE — 80053 COMPREHEN METABOLIC PANEL: CPT | Performed by: NURSE PRACTITIONER

## 2018-10-26 PROCEDURE — 85025 COMPLETE CBC W/AUTO DIFF WBC: CPT | Performed by: NURSE PRACTITIONER

## 2018-10-26 PROCEDURE — 82728 ASSAY OF FERRITIN: CPT | Performed by: NURSE PRACTITIONER

## 2018-10-26 PROCEDURE — 83540 ASSAY OF IRON: CPT | Performed by: NURSE PRACTITIONER

## 2019-01-22 NOTE — PROGRESS NOTES
Jamison Edward  [id number]  1970  1/23/2019      Referring Provider:   Kenton Boogie MD    Primary Physician:  Lázaro Heart MD    Reason for Follow Up:   Normocytic anemia  Iron deficiency  B12 deficiency    Chief Complaint:   Weight Loss  Nausea      History of Present Illness:  Jamison Edward is a very pleasant 47 y.o.  male who presents in follow up appointment for further management and evaluation of normocytic anemia secondary to iron and B12 deficiency.    Mr. Edward has been following with Dr. Richey for history of peptic ulcer disease and hematemesis. He was placed on oral iron once daily several months ago and experienced some constipation with the medication however self discontinued this one month prior to his initial consultation. He has had gradual weight loss where he previously weighed 205 pounds two years ago and is currently down to 140 pounds. He states that he continues to have a good appetite and eat normally. He recently had an EGD in November 2017 for weight loss, hematemesis and abdominal pain which was significant for gastric and duodenal ulcers. He does have history of peptic ulcer disease and in the past and has also been followed by Dr. Starr for Johnson's esophagus. He also had an EGD in May 2017 that showed normal duodenal biopsies, negative H pylori, along with normal random colon biopsies. He also had a CT abd/pelvis performed in 11/2017 which showed possible colitis versus under distension of the right colon. He is currently on ASA 81mg daily due to a TIA in the past however denies of any other NSAID use. He also experiences chest pain every night and has been evaluated by cardiology and as per patient was felt to be non cardiac related. He underwent an EGD with Dr. Richey and was found to have gastric and duodenal ulcers and is to continue current treatment for the ulcers and was to undergo repeat endoscopy. He has also been following with  gastroenterology in [place] with Dr. Moran for further evaluation. He underwent an MRCP in August that was essentially unremarkable. He also reports he recently underwent an abdominal US with duplex that was reportedly negative however those reults are unavailable to me at present. He continues to have ongoing nausea and abdominal pain while eating although slightly improved.     Interim History:  Patient has gained weight since his last visit. He does continue to experience intermittent nausea and severe reflux in which he follows with gastroenterology. He reports that his last upper endoscope showed healing ulcers and has improved since his last one. He was recently taken of Seroquel and placed on Trazodone otherwise no new medications. He is currently not on oral iron supplementation as he has intermittently received IV iron and his last IV injectafer was 7/17 and 7/24/18. He denies of any abnormal or further bleeding. He continues to remain on B1 injections.         The following portions of the patient's history were reviewed and updated as appropriate: allergies, current medications, past family history, past medical history, past social history, past surgical history and problem list.    Allergies   Allergen Reactions   • Protonix [Pantoprazole Sodium] Palpitations     Patient states that protonix causes chest pain.  Shruthi Pérez, [facility]  4/23/2017  11:19 AM     • Contrast Dye      Shortness of breath   • Flagyl [Metronidazole] Unknown (See Comments)     unknown       Past Medical History:   Diagnosis Date   • Johnson esophagus    • Barretts esophagus    • COPD (chronic obstructive pulmonary disease) (CMS/HCC)    • DDD (degenerative disc disease), thoracic    • Degenerative disc disease, lumbar    • GERD (gastroesophageal reflux disease)    • Hypertension    • Peptic ulcer disease    • Stroke (CMS/HCC)        Past Surgical History:   Procedure Laterality Date   • APPENDECTOMY     • CHOLECYSTECTOMY  N/A 4/22/2017    Procedure: CHOLECYSTECTOMY LAPAROSCOPIC;  Surgeon: Jaqueline Guaman MD;  Location:  [facility];  Service:    • CHOLECYSTECTOMY     • COLONOSCOPY N/A 5/8/2017    Procedure: COLONOSCOPY  CPTCODE:80084;  Surgeon: Nicho Richey III, MD;  Location:  [facility];  Service:    • ENDOSCOPY     • ENDOSCOPY N/A 5/8/2017    Procedure: ESOPHAGOGASTRODUODENOSCOPY WITH BIOPSY  CPTCODE:14886;  Surgeon: Nicho Richey III, MD;  Location:  [facility];  Service:    • ENDOSCOPY N/A 11/3/2017    Procedure: ESOPHAGOGASTRODUODENOSCOPY WITH BIOPSY  CPTCODE: 49014;  Surgeon: Nicho Richey III, MD;  Location:  [facility];  Service:    • ENDOSCOPY N/A 2/20/2018    Procedure: ESOPHAGOGASTRODUODENOSCOPY WITH DILATATION CPT CODE: 33476;  Surgeon: Nicho Richey III, MD;  Location:  [facility];  Service:    • ENDOSCOPY N/A 6/5/2018    Procedure: ESOPHAGOGASTRODUODENOSCOPY WITH BIOPSY CPT CODE: 61044;  Surgeon: Nicho Richey III, MD;  Location:  [facility];  Service: Gastroenterology       Social History     Socioeconomic History   • Marital status:      Spouse name: Not on file   • Number of children: Not on file   • Years of education: Not on file   • Highest education level: Not on file   Social Needs   • Financial resource strain: Not on file   • Food insecurity - worry: Not on file   • Food insecurity - inability: Not on file   • Transportation needs - medical: Not on file   • Transportation needs - non-medical: Not on file   Occupational History   • Not on file   Tobacco Use   • Smoking status: Current Every Day Smoker     Packs/day: 1.00     Years: 35.00     Pack years: 35.00     Types: Cigarettes   • Smokeless tobacco: Never Used   Substance and Sexual Activity   • Alcohol use: No   • Drug use: No   • Sexual activity: Defer   Other Topics Concern   • Not on file   Social History Narrative   • Not on file       Family History   Problem Relation Age of Onset   • No Known Problems Mother    •  Hypertension Father    • No Known Problems Sister    • No Known Problems Brother    • Drug abuse Brother    • No Known Problems Daughter    Maternal GF - H&N cancer  Paternal GF - Prostate cancer          Current Outpatient Medications:   •  aspirin 81 MG EC tablet, Take 81 mg by mouth Daily. Patient took 2 tabs this morning, Disp: , Rfl:   •  baclofen (LIORESAL) 10 MG tablet, Take 20 mg by mouth 3 (Three) Times a Day., Disp: , Rfl:   •  cimetidine (TAGAMET) 800 MG tablet, Take 800 mg by mouth 3 (Three) Times a Day., Disp: , Rfl:   •  citalopram (CeleXA) 10 MG tablet, Take 10 mg by mouth Daily., Disp: , Rfl:   •  Cyanocobalamin (VITAMIN B-12 IJ), Inject  as directed., Disp: , Rfl:   •  dexlansoprazole (DEXILANT) 60 MG capsule, Take 1 capsule by mouth Daily., Disp: 30 capsule, Rfl: 5  •  esomeprazole (nexIUM) 40 MG capsule, Take 1 capsule by mouth 2 (Two) Times a Day Before Meals., Disp: 60 capsule, Rfl: 3  •  hydrochlorothiazide (HYDRODIURIL) 25 MG tablet, Take 25 mg by mouth Daily., Disp: , Rfl:   •  levETIRAcetam (KEPPRA) 500 MG tablet, Take 1,000 mg by mouth 2 (Two) Times a Day., Disp: , Rfl:   •  lubiprostone (AMITIZA) 24 MCG capsule, Take 1 capsule by mouth 2 (Two) Times a Day With Meals., Disp: 60 capsule, Rfl: 5  •  ondansetron ODT (ZOFRAN-ODT) 4 MG disintegrating tablet, Take 1 tablet by mouth Every 6 (Six) Hours As Needed for Nausea or Vomiting., Disp: 12 tablet, Rfl: 0  •  polyethylene glycol (MIRALAX) powder, Take 255 g of MiraLAX with 32 ounces liquid then repeat 8 hours later for MiraLAX cleanout., Disp: 510 g, Rfl: 0  •  promethazine (PHENERGAN) 12.5 MG tablet, Take 1 tablet by mouth Every 8 (Eight) Hours As Needed for Nausea or Vomiting., Disp: 40 tablet, Rfl: 0  •  QUEtiapine (SEROQUEL) 25 MG tablet, Take 1 tablet by mouth Every Night., Disp: 7 tablet, Rfl: 0  •  sucralfate (CARAFATE) 1 G tablet, Take 1 tablet by mouth 4 (Four) Times a Day., Disp: 40 tablet, Rfl: 0  •  traZODone (DESYREL) 50 MG  tablet, Take 1 tablet by mouth Every Night., Disp: 30 tablet, Rfl: 2        Review of Systems  A comprehensive 14 point review of systems was performed.  Significant findings as mentioned above.  All other systems reviewed and are negative.      Physical Exam  Vital Signs: These were reviewed and listed as per patient’s electronic medical chart  Vitals:    01/23/19 1032   BP: 130/83   Pulse: 106   Resp: 18   Temp: 98 °F (36.7 °C)   SpO2: 99%     General: Awake, alert and oriented, in no distress, has put on weight since last visit  HEENT: Head is atraumatic, normocephalic, extraocular movements full, oropharynx clear, no scleral icterus, pink moist mucous membranes  Neck: supple, no jvd, lymphadenopathy or masses  Cardiovascular: regular rhythm, tachycardic without murmurs  Pulmonary: non-labored, clear to auscultation bilaterally, no wheezing  Abdomen: soft, mild tenderness, non-distended, normal active bowel sounds present  Extremities: No clubbing, cyanosis or edema  Lymph: No cervical, supraclavicular adenopathy  Neurologic: Mental status as above, alert, awake and oriented, grossly non-focal exam  Skin: warm, dry, intact, chronic lesions  Patient was examined on 01/23/19 and changes are reflected and noted above.      Labs / Studies:    Office Visit on 01/23/2019   Component Date Value   • Iron 01/23/2019 78    • TIBC 01/23/2019 318    • Iron Saturation 01/23/2019 25    • Ferritin 01/23/2019 244.00    • WBC 01/23/2019 8.00    • RBC 01/23/2019 4.80    • Hemoglobin 01/23/2019 15.7    • Hematocrit 01/23/2019 46.1    • MCV 01/23/2019 96.0*   • MCH 01/23/2019 32.7    • MCHC 01/23/2019 34.1    • RDW 01/23/2019 14.0    • RDW-SD 01/23/2019 50.1    • MPV 01/23/2019 9.6    • Platelets 01/23/2019 244    • Neutrophil % 01/23/2019 71.6*   • Lymphocyte % 01/23/2019 17.5*   • Monocyte % 01/23/2019 9.5    • Eosinophil % 01/23/2019 0.8    • Basophil % 01/23/2019 0.3    • Immature Grans % 01/23/2019 0.3    • Neutrophils,  Absolute 01/23/2019 5.74    • Lymphocytes, Absolute 01/23/2019 1.40    • Monocytes, Absolute 01/23/2019 0.76    • Eosinophils, Absolute 01/23/2019 0.06    • Basophils, Absolute 01/23/2019 0.02    • Immature Grans, Absolute 01/23/2019 0.02    • Vitamin B-12 01/23/2019 452    Lab on 10/26/2018   Component Date Value   • Glucose 10/26/2018 116*   • BUN 10/26/2018 7    • Creatinine 10/26/2018 0.76    • Sodium 10/26/2018 134*   • Potassium 10/26/2018 4.5    • Chloride 10/26/2018 105    • CO2 10/26/2018 27.7    • Calcium 10/26/2018 9.1    • Total Protein 10/26/2018 6.4    • Albumin 10/26/2018 4.10    • ALT (SGPT) 10/26/2018 13    • AST (SGOT) 10/26/2018 14    • Alkaline Phosphatase 10/26/2018 107    • Total Bilirubin 10/26/2018 0.1*   • eGFR Non  Amer 10/26/2018 109    • Globulin 10/26/2018 2.3    • A/G Ratio 10/26/2018 1.8    • BUN/Creatinine Ratio 10/26/2018 9.2    • Anion Gap 10/26/2018 1.3*   • Iron 10/26/2018 56    • TIBC 10/26/2018 266    • Iron Saturation 10/26/2018 21    • Ferritin 10/26/2018 329.00*   • WBC 10/26/2018 8.27    • RBC 10/26/2018 4.38*   • Hemoglobin 10/26/2018 14.0    • Hematocrit 10/26/2018 42.0    • MCV 10/26/2018 95.9*   • MCH 10/26/2018 32.0    • MCHC 10/26/2018 33.3    • RDW 10/26/2018 12.2    • RDW-SD 10/26/2018 42.9    • MPV 10/26/2018 9.1    • Platelets 10/26/2018 269    • Neutrophil % 10/26/2018 78.1*   • Lymphocyte % 10/26/2018 14.9*   • Monocyte % 10/26/2018 6.0    • Eosinophil % 10/26/2018 0.7    • Basophil % 10/26/2018 0.1    • Immature Grans % 10/26/2018 0.2    • Neutrophils, Absolute 10/26/2018 6.45    • Lymphocytes, Absolute 10/26/2018 1.23    • Monocytes, Absolute 10/26/2018 0.50    • Eosinophils, Absolute 10/26/2018 0.06    • Basophils, Absolute 10/26/2018 0.01    • Immature Grans, Absolute 10/26/2018 0.02    • Osmolality Calc 10/26/2018 267.2*   Lab Requisition on 09/14/2018   Component Date Value   • Case Report 09/14/2018                      Value:Surgical Pathology  Report                         Case: [id number]                                  Authorizing Provider:  Herbert Umanzor MD      Collected:           09/14/2018 09:16 AM          Pathologist:           Corbin Alexander MD         Received:            09/14/2018 09:16 AM          Specimen:    Gastric                                                                                   • Clinical Information 09/14/2018                      Value:This result contains rich text formatting which cannot be displayed here.   • Final Diagnosis 09/14/2018                      Value:This result contains rich text formatting which cannot be displayed here.   • Gross Description 09/14/2018                      Value:This result contains rich text formatting which cannot be displayed here.   • Microscopic Description 09/14/2018                      Value:This result contains rich text formatting which cannot be displayed here.   Office Visit on 08/22/2018   Component Date Value   • Vitamin B-12 08/22/2018 425    Lab on 08/22/2018   Component Date Value   • Iron 08/22/2018 46*   • TIBC 08/22/2018 284    • Iron Saturation 08/22/2018 16*   • Ferritin 08/22/2018 520.00*   • WBC 08/22/2018 8.44    • RBC 08/22/2018 4.61*   • Hemoglobin 08/22/2018 14.7    • Hematocrit 08/22/2018 43.5    • MCV 08/22/2018 94.4*   • MCH 08/22/2018 31.9    • MCHC 08/22/2018 33.8    • RDW 08/22/2018 15.6*   • RDW-SD 08/22/2018 52.2    • MPV 08/22/2018 9.2    • Platelets 08/22/2018 286    • Neutrophil % 08/22/2018 79.2*   • Lymphocyte % 08/22/2018 14.2*   • Monocyte % 08/22/2018 5.2    • Eosinophil % 08/22/2018 0.9    • Basophil % 08/22/2018 0.4    • Immature Grans % 08/22/2018 0.1    • Neutrophils, Absolute 08/22/2018 6.68*   • Lymphocytes, Absolute 08/22/2018 1.20    • Monocytes, Absolute 08/22/2018 0.44    • Eosinophils, Absolute 08/22/2018 0.08    • Basophils, Absolute 08/22/2018 0.03    • Immature Grans, Absolute 08/22/2018 0.01            IMAGIN18: CT CHEST WO CONTRAST: LUNGS: MODERATE BILATERAL LUNG EMPHYSEMA. HEART: Unremarkable. PERICARDIUM: No effusion. MEDIASTINUM: No masses. No enlarged lymph nodes.  No fluid collections. PLEURA: No pleural effusion. No pleural mass or abnormal calcification. MAJOR AIRWAYS: Clear. No intrinsic mass. VASCULATURE: No evidence of aneurysm. VISUALIZED UPPER ABDOMEN: LIVER: Homogeneous. No focal hepatic mass or ductal dilatation. SPLEEN: Homogeneous. No splenomegaly.  ADRENALS: No mass. KIDNEYS: No mass. No obstructive uropathy.  No evidence of Urolithiasis. GI TRACT: Non-dilated. No definite wall thickening. PERITONEUM: No free air. No free fluid or loculated fluid Collections. ABDOMINAL WALL: No focal hernia or mass. OTHER: None. BONES: No acute bony abnormality. Impression: 1. Unremarkable exam.  No source for the patient symptoms. 2. Other incidental/non-acute findings as above.    18: MRI ABDOMEN WO CONTRAST MRCP: multiple axial gradient-echo T1 and T2-weighted images were obtained. Oblique coronal T2-weighted images were  acquired to evaluate the bile ducts. The common bile duct and common hepatic duct and main intrahepatic bile ducts are well-demonstrated and appear within normal limits. No gallstones are identified and there are no strictures. There is no bile duct dilatation. The main pancreatic duct was also fairly well demonstrated and appears within normal limits. The liver, spleen, pancreas, and adrenal glands appear within normal limits. IMPRESSION: Unremarkable MRCP imaging of the abdomen.           PATHOLOGY:  18: Peripheral Smear:                Assessment/Plan   Jamison Edward is a very pleasant 48 y.o.  male who presents in follow up appointment for further management and evaluation of normocytic anemia secondary to iron and B12 deficiency.    Normocytic anemia  Iron deficiency   B12 deficiency  - His hemoglobin is normal today with repletion of iron (IV  Injectafer) and B12. Iron panel and B12 are also within normal limits today and he does not require further IV iron at present. Iron deficiency was likely secondary to blood loss as well as malabsorption.  - He was previously on oral iron replacement however discontinued this a month prior to his initial consultation. Iron panel and ferritin were consistent with iron deficiency and thus he was again advised to restart oral iron (ferrous sulfate) and started this once daily with titration to three times daily. He tolerated this well, however, continued to be iron deficient and therefore was started on IV Injectafer which he received on 4/27/18 and 5/4/18. His iron levels have now normalized. I have also obtained Zinc, Copper levels as well as a tissues transglutaminase level which were normal  - He currently follows with gastroenterology closely and recently underwent an EGD which was significant for gastric and duodenal ulcers without malignancy and has a repeat endoscopy scheduled.  - Folate was normal however B12 was low normal and therefore he was started on B12 injections. Currently he is on Cyanocobalamin injections 1000mcg/mL inj SC monthly.  - ROWENA was normal, LDH, retic count normal going against hemolysis, and haptoglobin was high. TSH normal.  - Peripheral smear as above.    Weight loss, back pain  - I did obtain CT chest to further evaluate given his long term smoking history as well as current symptoms. No bone abnormalities, lymphadenopathy, or masses to account for symptoms and was significant for bilateral emphysema which was discussed with the patient.  - HIV non reactive, acute hepatitis panel nonreactive  - TSH is normal  - He had an MRCP which was unremarkable per the patient and is being evaluated by gastroenterology in [place].  - Has gained weight since his last visit    [employer] Quality measures/Tobacco Abuse Counceling  - I previously advised the patient of the risks in continuing to use tobacco,  and smoking cessation. During his last visit he was willing to try nicotine patches in an effort to quit and I had previously prescribed this to his pharmacy.  During this visit, I did not spend time counseling the patient regarding smoking cessation as we were focused on the above issues.  - Patient's BMI is within normal parameters. No follow-up required. Patient has been losing weight and has gained ten pounds since his last visit.  - Current outpatient and discharge medications have been reconciled for the patient.    I will have the patient return for repeat laboratory evaluation in 3 months (CBC, Ferritin, Iron panel) and in follow up appointment in six months. He understands that should he have any questions or concerns prior to his appointment he should give us a call at any time and I would be happy to see him sooner. It was a pleasure to see this patient in clinic today, thank you for allowing me to participate in the care of this patient.        Cami Gustafson MD  01/22/2019  3:09 PM

## 2019-01-23 ENCOUNTER — OFFICE VISIT (OUTPATIENT)
Dept: ONCOLOGY | Facility: CLINIC | Age: 49
End: 2019-01-23

## 2019-01-23 VITALS
RESPIRATION RATE: 18 BRPM | OXYGEN SATURATION: 99 % | TEMPERATURE: 98 F | HEART RATE: 106 BPM | BODY MASS INDEX: 23.95 KG/M2 | WEIGHT: 148.4 LBS | DIASTOLIC BLOOD PRESSURE: 83 MMHG | SYSTOLIC BLOOD PRESSURE: 130 MMHG

## 2019-01-23 DIAGNOSIS — E53.8 B12 DEFICIENCY: ICD-10-CM

## 2019-01-23 DIAGNOSIS — D50.9 IRON DEFICIENCY ANEMIA, UNSPECIFIED IRON DEFICIENCY ANEMIA TYPE: Primary | ICD-10-CM

## 2019-01-23 LAB
BASOPHILS # BLD AUTO: 0.02 10*3/MM3 (ref 0–0.3)
BASOPHILS NFR BLD AUTO: 0.3 % (ref 0–2)
DEPRECATED RDW RBC AUTO: 50.1 FL (ref 37–54)
EOSINOPHIL # BLD AUTO: 0.06 10*3/MM3 (ref 0–0.7)
EOSINOPHIL NFR BLD AUTO: 0.8 % (ref 0–5)
ERYTHROCYTE [DISTWIDTH] IN BLOOD BY AUTOMATED COUNT: 14 % (ref 11.5–14.5)
FERRITIN SERPL-MCNC: 244 NG/ML (ref 21.9–321.7)
HCT VFR BLD AUTO: 46.1 % (ref 42–52)
HGB BLD-MCNC: 15.7 G/DL (ref 14–18)
IMM GRANULOCYTES # BLD AUTO: 0.02 10*3/MM3 (ref 0–0.03)
IMM GRANULOCYTES NFR BLD AUTO: 0.3 % (ref 0–0.5)
IRON 24H UR-MRATE: 78 MCG/DL (ref 53–167)
IRON SATN MFR SERPL: 25 % (ref 20–50)
LYMPHOCYTES # BLD AUTO: 1.4 10*3/MM3 (ref 1–3)
LYMPHOCYTES NFR BLD AUTO: 17.5 % (ref 21–51)
MCH RBC QN AUTO: 32.7 PG (ref 27–33)
MCHC RBC AUTO-ENTMCNC: 34.1 G/DL (ref 33–37)
MCV RBC AUTO: 96 FL (ref 80–94)
MONOCYTES # BLD AUTO: 0.76 10*3/MM3 (ref 0.1–0.9)
MONOCYTES NFR BLD AUTO: 9.5 % (ref 0–10)
NEUTROPHILS # BLD AUTO: 5.74 10*3/MM3 (ref 1.4–6.5)
NEUTROPHILS NFR BLD AUTO: 71.6 % (ref 30–70)
PLATELET # BLD AUTO: 244 10*3/MM3 (ref 130–400)
PMV BLD AUTO: 9.6 FL (ref 6–10)
RBC # BLD AUTO: 4.8 10*6/MM3 (ref 4.7–6.1)
TIBC SERPL-MCNC: 318 MCG/DL (ref 241–421)
VIT B12 BLD-MCNC: 452 PG/ML (ref 211–911)
WBC NRBC COR # BLD: 8 10*3/MM3 (ref 4.5–12.5)

## 2019-01-23 PROCEDURE — 83540 ASSAY OF IRON: CPT | Performed by: INTERNAL MEDICINE

## 2019-01-23 PROCEDURE — 85025 COMPLETE CBC W/AUTO DIFF WBC: CPT | Performed by: INTERNAL MEDICINE

## 2019-01-23 PROCEDURE — 99214 OFFICE O/P EST MOD 30 MIN: CPT | Performed by: INTERNAL MEDICINE

## 2019-01-23 PROCEDURE — 83550 IRON BINDING TEST: CPT | Performed by: INTERNAL MEDICINE

## 2019-01-23 PROCEDURE — 82728 ASSAY OF FERRITIN: CPT | Performed by: INTERNAL MEDICINE

## 2019-01-23 PROCEDURE — 82607 VITAMIN B-12: CPT | Performed by: INTERNAL MEDICINE

## 2019-01-24 ENCOUNTER — TELEPHONE (OUTPATIENT)
Dept: ONCOLOGY | Facility: HOSPITAL | Age: 49
End: 2019-01-24

## 2019-04-24 ENCOUNTER — LAB (OUTPATIENT)
Dept: ONCOLOGY | Facility: CLINIC | Age: 49
End: 2019-04-24

## 2019-04-24 VITALS
OXYGEN SATURATION: 98 % | RESPIRATION RATE: 18 BRPM | HEART RATE: 97 BPM | SYSTOLIC BLOOD PRESSURE: 124 MMHG | TEMPERATURE: 98.3 F | DIASTOLIC BLOOD PRESSURE: 79 MMHG

## 2019-04-24 DIAGNOSIS — E53.8 B12 DEFICIENCY: ICD-10-CM

## 2019-04-24 DIAGNOSIS — D50.9 IRON DEFICIENCY ANEMIA, UNSPECIFIED IRON DEFICIENCY ANEMIA TYPE: ICD-10-CM

## 2019-04-24 LAB
BASOPHILS # BLD AUTO: 0.03 10*3/MM3 (ref 0–0.2)
BASOPHILS NFR BLD AUTO: 0.4 % (ref 0–1.5)
DEPRECATED RDW RBC AUTO: 40 FL (ref 37–54)
EOSINOPHIL # BLD AUTO: 0.05 10*3/MM3 (ref 0–0.4)
EOSINOPHIL NFR BLD AUTO: 0.7 % (ref 0.3–6.2)
ERYTHROCYTE [DISTWIDTH] IN BLOOD BY AUTOMATED COUNT: 12 % (ref 12.3–15.4)
FERRITIN SERPL-MCNC: 180.6 NG/ML (ref 30–400)
HCT VFR BLD AUTO: 44.5 % (ref 37.5–51)
HGB BLD-MCNC: 15.3 G/DL (ref 13–17.7)
IMM GRANULOCYTES # BLD AUTO: 0.01 10*3/MM3 (ref 0–0.05)
IMM GRANULOCYTES NFR BLD AUTO: 0.1 % (ref 0–0.5)
IRON 24H UR-MRATE: 75 MCG/DL (ref 59–158)
IRON SATN MFR SERPL: 19 % (ref 20–50)
LYMPHOCYTES # BLD AUTO: 1.32 10*3/MM3 (ref 0.7–3.1)
LYMPHOCYTES NFR BLD AUTO: 19.1 % (ref 19.6–45.3)
MCH RBC QN AUTO: 31.9 PG (ref 26.6–33)
MCHC RBC AUTO-ENTMCNC: 34.4 G/DL (ref 31.5–35.7)
MCV RBC AUTO: 92.9 FL (ref 79–97)
MONOCYTES # BLD AUTO: 0.54 10*3/MM3 (ref 0.1–0.9)
MONOCYTES NFR BLD AUTO: 7.8 % (ref 5–12)
NEUTROPHILS # BLD AUTO: 4.97 10*3/MM3 (ref 1.7–7)
NEUTROPHILS NFR BLD AUTO: 71.9 % (ref 42.7–76)
PLATELET # BLD AUTO: 214 10*3/MM3 (ref 140–450)
PMV BLD AUTO: 8.7 FL (ref 6–12)
RBC # BLD AUTO: 4.79 10*6/MM3 (ref 4.14–5.8)
TIBC SERPL-MCNC: 387 MCG/DL (ref 298–536)
TRANSFERRIN SERPL-MCNC: 260 MG/DL (ref 200–360)
WBC NRBC COR # BLD: 6.92 10*3/MM3 (ref 3.4–10.8)

## 2019-04-24 PROCEDURE — 83540 ASSAY OF IRON: CPT | Performed by: INTERNAL MEDICINE

## 2019-04-24 PROCEDURE — 82728 ASSAY OF FERRITIN: CPT | Performed by: INTERNAL MEDICINE

## 2019-04-24 PROCEDURE — 85025 COMPLETE CBC W/AUTO DIFF WBC: CPT | Performed by: INTERNAL MEDICINE

## 2019-04-24 PROCEDURE — 84466 ASSAY OF TRANSFERRIN: CPT | Performed by: INTERNAL MEDICINE

## 2019-07-09 ENCOUNTER — HOSPITAL ENCOUNTER (OUTPATIENT)
Dept: GENERAL RADIOLOGY | Facility: HOSPITAL | Age: 49
Discharge: HOME | End: 2019-07-09
Admitting: PSYCHIATRY & NEUROLOGY

## 2019-07-09 ENCOUNTER — TRANSCRIBE ORDERS (OUTPATIENT)
Dept: ADMINISTRATIVE | Facility: HOSPITAL | Age: 49
End: 2019-07-09

## 2019-07-09 DIAGNOSIS — M54.9 BACK PAIN, UNSPECIFIED BACK LOCATION, UNSPECIFIED BACK PAIN LATERALITY, UNSPECIFIED CHRONICITY: Primary | ICD-10-CM

## 2019-07-09 DIAGNOSIS — M54.9 BACK PAIN, UNSPECIFIED BACK LOCATION, UNSPECIFIED BACK PAIN LATERALITY, UNSPECIFIED CHRONICITY: ICD-10-CM

## 2019-07-09 PROCEDURE — 72110 X-RAY EXAM L-2 SPINE 4/>VWS: CPT | Performed by: RADIOLOGY

## 2019-07-09 PROCEDURE — 72110 X-RAY EXAM L-2 SPINE 4/>VWS: CPT

## 2019-08-09 ENCOUNTER — LAB REQUISITION (OUTPATIENT)
Dept: LAB | Facility: HOSPITAL | Age: 49
End: 2019-08-09

## 2019-08-09 ENCOUNTER — TRANSCRIBE ORDERS (OUTPATIENT)
Dept: ADMINISTRATIVE | Facility: HOSPITAL | Age: 49
End: 2019-08-09

## 2019-08-09 DIAGNOSIS — K31.5 STRICTURE OF DUODENUM: Primary | ICD-10-CM

## 2019-08-09 DIAGNOSIS — K25.3 ACUTE GASTRIC ULCER WITHOUT HEMORRHAGE OR PERFORATION: ICD-10-CM

## 2019-08-09 PROCEDURE — 88305 TISSUE EXAM BY PATHOLOGIST: CPT | Performed by: SURGERY

## 2019-08-13 ENCOUNTER — HOSPITAL ENCOUNTER (OUTPATIENT)
Dept: GENERAL RADIOLOGY | Facility: HOSPITAL | Age: 49
Discharge: HOME | End: 2019-08-13
Admitting: SURGERY

## 2019-08-13 DIAGNOSIS — K31.5 STRICTURE OF DUODENUM: ICD-10-CM

## 2019-08-13 PROCEDURE — 74241: CPT

## 2019-08-13 PROCEDURE — 0 DIATRIZOATE MEGLUMINE & SODIUM PER 1 ML: Performed by: SURGERY

## 2019-08-13 RX ADMIN — DIATRIZOATE MEGLUMINE AND DIATRIZOATE SODIUM 120 ML: 660; 100 LIQUID ORAL; RECTAL at 09:41

## 2019-09-12 ENCOUNTER — HOSPITAL ENCOUNTER (OUTPATIENT)
Dept: GENERAL RADIOLOGY | Facility: HOSPITAL | Age: 49
Discharge: HOME | End: 2019-09-12
Admitting: SURGERY

## 2019-09-12 ENCOUNTER — APPOINTMENT (OUTPATIENT)
Dept: PREADMISSION TESTING | Facility: HOSPITAL | Age: 49
End: 2019-09-12

## 2019-09-12 VITALS — HEIGHT: 66 IN | BODY MASS INDEX: 24.06 KG/M2 | WEIGHT: 149.69 LBS

## 2019-09-12 DIAGNOSIS — Z01.89 LABORATORY TEST: Primary | ICD-10-CM

## 2019-09-12 LAB
ABO GROUP BLD: NORMAL
ANION GAP SERPL CALCULATED.3IONS-SCNC: 11 MMOL/L (ref 5–15)
BUN BLD-MCNC: 8 MG/DL (ref 6–20)
BUN/CREAT SERPL: 11 (ref 7–25)
CALCIUM SPEC-SCNC: 8.8 MG/DL (ref 8.6–10.5)
CHLORIDE SERPL-SCNC: 94 MMOL/L (ref 98–107)
CO2 SERPL-SCNC: 29 MMOL/L (ref 22–29)
CREAT BLD-MCNC: 0.73 MG/DL (ref 0.76–1.27)
DEPRECATED RDW RBC AUTO: 39.7 FL (ref 37–54)
ERYTHROCYTE [DISTWIDTH] IN BLOOD BY AUTOMATED COUNT: 11.4 % (ref 12.3–15.4)
GFR SERPL CREATININE-BSD FRML MDRD: 114 ML/MIN/1.73
GLUCOSE BLD-MCNC: 131 MG/DL (ref 65–99)
HBA1C MFR BLD: 5.1 % (ref 4.8–5.6)
HCT VFR BLD AUTO: 42.2 % (ref 37.5–51)
HGB BLD-MCNC: 14.2 G/DL (ref 13–17.7)
MCH RBC QN AUTO: 31.5 PG (ref 26.6–33)
MCHC RBC AUTO-ENTMCNC: 33.6 G/DL (ref 31.5–35.7)
MCV RBC AUTO: 93.6 FL (ref 79–97)
PLATELET # BLD AUTO: 224 10*3/MM3 (ref 140–450)
PMV BLD AUTO: 9.1 FL (ref 6–12)
POTASSIUM BLD-SCNC: 3.7 MMOL/L (ref 3.5–5.2)
RBC # BLD AUTO: 4.51 10*6/MM3 (ref 4.14–5.8)
RH BLD: POSITIVE
SODIUM BLD-SCNC: 134 MMOL/L (ref 136–145)
WBC NRBC COR # BLD: 10.89 10*3/MM3 (ref 3.4–10.8)

## 2019-09-12 PROCEDURE — 93010 ELECTROCARDIOGRAM REPORT: CPT | Performed by: INTERNAL MEDICINE

## 2019-09-12 PROCEDURE — 86900 BLOOD TYPING SEROLOGIC ABO: CPT

## 2019-09-12 PROCEDURE — 80048 BASIC METABOLIC PNL TOTAL CA: CPT | Performed by: SURGERY

## 2019-09-12 PROCEDURE — 83036 HEMOGLOBIN GLYCOSYLATED A1C: CPT | Performed by: SURGERY

## 2019-09-12 PROCEDURE — 86901 BLOOD TYPING SEROLOGIC RH(D): CPT

## 2019-09-12 PROCEDURE — 85027 COMPLETE CBC AUTOMATED: CPT | Performed by: SURGERY

## 2019-09-12 PROCEDURE — 71046 X-RAY EXAM CHEST 2 VIEWS: CPT

## 2019-09-12 PROCEDURE — 36415 COLL VENOUS BLD VENIPUNCTURE: CPT

## 2019-09-12 PROCEDURE — 93005 ELECTROCARDIOGRAM TRACING: CPT

## 2019-09-12 RX ORDER — VENLAFAXINE 75 MG/1
75 TABLET ORAL
COMMUNITY
End: 2023-01-05 | Stop reason: HOSPADM

## 2019-09-12 NOTE — PAT
The following information and instructions were given:    Do not eat, drink, smoke or chew gum after midnight the night before surgery. This also includes no mints.  Take all routine, prescribed medications including heart and blood pressure medicines with a sip of water unless otherwise instructed by your physician.   Do NOT take diabetic medication unless instructed by your physician.    If you were instructed to drink 20 ounces (or until full) of Gatorade the morning of surgery, the Gatorade must be completed 1 hour before arrival time on the day of surgery .  No RED Gatorade.      DO NOT shave for two days before your procedure.  Do not wear makeup.      DO NOT wear fingernail polish (gel/regular) and/or acrylic/artificial nails on the day of surgery.   If you have had a recent manicure and would rather not remove polish or artificial nails, then the minimum requirement is that the polish/artificial nails must be removed from the middle finger on each hand.      If you are having surgery/procedure on an upper extremity, then fingernail polish/artificial fingernails must be removed for surgery.  NO EXCEPTIONS.      If you are having surgery/procedure on a lower extremity, then toenail polish on both extremities must be removed for surgery.  NO EXCEPTIONS.    Remove all jewelry (advise to go to jeweler if unable to remove).  Jewelry, especially rings, can no longer be taped for surgery.    Leave anything you consider valuable at home.    Leave your suitcase in the car until after your surgery.    Bring the following with you the day of your procedure (when applicable)       -picture ID and insurance cards       -Co-pay/deductible required by insurance       -Medications in the original bottles (not a list) including all over-the-counter medications if not brought to PAT       -Copy of advance directive, living will or power of  documents if not brought to PAT       -CPAP or BIPAP mask and tubing (do not  bring machine)       -Skin prep instruction(s) sheet       -PAT Pass    Education booklet, brochure, handout or binder related to procedure given to patient.    When applicable, an ERAS booklet was given to patient.    Pain Control After Surgery handout given to patient.    Respirex use (handout given to patient) and pneumonia prevention education provided.    Signs and Symptoms of infection discussed.    DVT Prevention education given.  Stressing the importance of ambulation.    Please apply Chlorhexadine wipes to surgical area (if instructed) the night before procedure and the AM of procedure and document date/time of applications on skin prep instruction sheet.        Per Anesthesia Request, patient instructed not to take their ACE/ARB medications on the AM of surgery.  Non feeding tube information given to patient

## 2019-09-16 ENCOUNTER — ANESTHESIA EVENT (OUTPATIENT)
Dept: PERIOP | Facility: HOSPITAL | Age: 49
End: 2019-09-16

## 2019-09-16 RX ORDER — FAMOTIDINE 20 MG/1
20 TABLET, FILM COATED ORAL ONCE
Status: CANCELLED | OUTPATIENT
Start: 2019-09-16 | End: 2019-09-16

## 2019-09-17 ENCOUNTER — ANESTHESIA (OUTPATIENT)
Dept: PERIOP | Facility: HOSPITAL | Age: 49
End: 2019-09-17

## 2019-09-17 ENCOUNTER — HOSPITAL ENCOUNTER (INPATIENT)
Facility: HOSPITAL | Age: 49
LOS: 6 days | Discharge: HOME | End: 2019-09-23
Attending: SURGERY | Admitting: SURGERY

## 2019-09-17 DIAGNOSIS — K31.1 GASTRIC OUT LET OBSTRUCTION: ICD-10-CM

## 2019-09-17 PROBLEM — K27.9 PEPTIC ULCER WITHOUT HEMORRHAGE OR PERFORATION: Status: ACTIVE | Noted: 2019-09-17

## 2019-09-17 LAB — POTASSIUM BLD-SCNC: 3.7 MMOL/L (ref 3.5–5.2)

## 2019-09-17 PROCEDURE — 25010000002 PROMETHAZINE PER 50 MG: Performed by: NURSE ANESTHETIST, CERTIFIED REGISTERED

## 2019-09-17 PROCEDURE — A9270 NON-COVERED ITEM OR SERVICE: HCPCS | Performed by: SURGERY

## 2019-09-17 PROCEDURE — 25010000002 HYDROMORPHONE HCL-NACL 30-0.9 MG/30ML-% SOLUTION PREFILLED SYRINGE: Performed by: SURGERY

## 2019-09-17 PROCEDURE — 25010000002 NEOSTIGMINE 10 MG/10ML SOLUTION: Performed by: NURSE ANESTHETIST, CERTIFIED REGISTERED

## 2019-09-17 PROCEDURE — 25010000002 ONDANSETRON PER 1 MG: Performed by: SURGERY

## 2019-09-17 PROCEDURE — 63710000001 TRAZODONE 50 MG TABLET: Performed by: SURGERY

## 2019-09-17 PROCEDURE — 25010000002 HYDROMORPHONE PER 4 MG: Performed by: NURSE ANESTHETIST, CERTIFIED REGISTERED

## 2019-09-17 PROCEDURE — 88307 TISSUE EXAM BY PATHOLOGIST: CPT | Performed by: SURGERY

## 2019-09-17 PROCEDURE — 008Q0ZZ DIVISION OF VAGUS NERVE, OPEN APPROACH: ICD-10-PCS | Performed by: SURGERY

## 2019-09-17 PROCEDURE — 25010000002 FENTANYL CITRATE (PF) 100 MCG/2ML SOLUTION: Performed by: NURSE ANESTHETIST, CERTIFIED REGISTERED

## 2019-09-17 PROCEDURE — 0D160ZA BYPASS STOMACH TO JEJUNUM, OPEN APPROACH: ICD-10-PCS | Performed by: SURGERY

## 2019-09-17 PROCEDURE — 88302 TISSUE EXAM BY PATHOLOGIST: CPT | Performed by: SURGERY

## 2019-09-17 PROCEDURE — 25010000002 PROPOFOL 10 MG/ML EMULSION: Performed by: NURSE ANESTHETIST, CERTIFIED REGISTERED

## 2019-09-17 PROCEDURE — 25010000002 DEXAMETHASONE SODIUM PHOSPHATE 10 MG/ML SOLUTION: Performed by: ANESTHESIOLOGY

## 2019-09-17 PROCEDURE — 25010000002 ONDANSETRON PER 1 MG: Performed by: NURSE ANESTHETIST, CERTIFIED REGISTERED

## 2019-09-17 PROCEDURE — 63710000001 LEVETIRACETAM 750 MG TABLET: Performed by: SURGERY

## 2019-09-17 PROCEDURE — 84132 ASSAY OF SERUM POTASSIUM: CPT | Performed by: ANESTHESIOLOGY

## 2019-09-17 PROCEDURE — 63710000001 DOCUSATE SODIUM 100 MG CAPSULE: Performed by: SURGERY

## 2019-09-17 PROCEDURE — 25010000002 BUPRENORPHINE PER 0.1 MG: Performed by: ANESTHESIOLOGY

## 2019-09-17 PROCEDURE — 63710000001 HYDROCHLOROTHIAZIDE 25 MG TABLET: Performed by: SURGERY

## 2019-09-17 PROCEDURE — 63710000001 VENLAFAXINE 75 MG TABLET: Performed by: SURGERY

## 2019-09-17 PROCEDURE — 25010000002 DEXAMETHASONE PER 1 MG: Performed by: NURSE ANESTHETIST, CERTIFIED REGISTERED

## 2019-09-17 PROCEDURE — 63710000001 SUCRALFATE 1 G TABLET: Performed by: SURGERY

## 2019-09-17 PROCEDURE — 25010000003 CEFAZOLIN IN DEXTROSE 2-4 GM/100ML-% SOLUTION: Performed by: SURGERY

## 2019-09-17 DEVICE — PROXIMATE RELOADABLE LINEAR CUTTER WITH SAFETY LOCK-OUT, 75MM
Type: IMPLANTABLE DEVICE | Site: ABDOMEN | Status: FUNCTIONAL
Brand: PROXIMATE

## 2019-09-17 DEVICE — PROXIMATE LINEAR CUTTER  RELOAD (THICK)
Type: IMPLANTABLE DEVICE | Site: ABDOMEN | Status: FUNCTIONAL
Brand: PROXIMATE

## 2019-09-17 DEVICE — PROXIMATE LINEAR CUTTER RELOAD, BLUE, 75MM
Type: IMPLANTABLE DEVICE | Site: ABDOMEN | Status: FUNCTIONAL
Brand: PROXIMATE

## 2019-09-17 RX ORDER — PROMETHAZINE HYDROCHLORIDE 25 MG/ML
25 INJECTION, SOLUTION INTRAMUSCULAR; INTRAVENOUS EVERY 6 HOURS PRN
Status: DISCONTINUED | OUTPATIENT
Start: 2019-09-17 | End: 2019-09-23 | Stop reason: HOSPADM

## 2019-09-17 RX ORDER — TRAZODONE HYDROCHLORIDE 50 MG/1
50 TABLET ORAL NIGHTLY
Status: DISCONTINUED | OUTPATIENT
Start: 2019-09-17 | End: 2019-09-23 | Stop reason: HOSPADM

## 2019-09-17 RX ORDER — LABETALOL HYDROCHLORIDE 5 MG/ML
5 INJECTION, SOLUTION INTRAVENOUS
Status: DISCONTINUED | OUTPATIENT
Start: 2019-09-17 | End: 2019-09-17 | Stop reason: HOSPADM

## 2019-09-17 RX ORDER — HYDROCODONE BITARTRATE AND ACETAMINOPHEN 5; 325 MG/1; MG/1
1 TABLET ORAL ONCE AS NEEDED
Status: DISCONTINUED | OUTPATIENT
Start: 2019-09-17 | End: 2019-09-17 | Stop reason: HOSPADM

## 2019-09-17 RX ORDER — PROMETHAZINE HYDROCHLORIDE 25 MG/1
25 TABLET ORAL ONCE AS NEEDED
Status: COMPLETED | OUTPATIENT
Start: 2019-09-17 | End: 2019-09-17

## 2019-09-17 RX ORDER — PROMETHAZINE HYDROCHLORIDE 25 MG/ML
12.5 INJECTION, SOLUTION INTRAMUSCULAR; INTRAVENOUS EVERY 6 HOURS PRN
Status: DISCONTINUED | OUTPATIENT
Start: 2019-09-17 | End: 2019-09-23 | Stop reason: HOSPADM

## 2019-09-17 RX ORDER — SODIUM CHLORIDE, SODIUM LACTATE, POTASSIUM CHLORIDE, CALCIUM CHLORIDE 600; 310; 30; 20 MG/100ML; MG/100ML; MG/100ML; MG/100ML
9 INJECTION, SOLUTION INTRAVENOUS CONTINUOUS
Status: DISCONTINUED | OUTPATIENT
Start: 2019-09-17 | End: 2019-09-17

## 2019-09-17 RX ORDER — GLYCOPYRROLATE 0.2 MG/ML
INJECTION INTRAMUSCULAR; INTRAVENOUS AS NEEDED
Status: DISCONTINUED | OUTPATIENT
Start: 2019-09-17 | End: 2019-09-17 | Stop reason: SURG

## 2019-09-17 RX ORDER — HYDROCHLOROTHIAZIDE 25 MG/1
25 TABLET ORAL DAILY
Status: DISCONTINUED | OUTPATIENT
Start: 2019-09-17 | End: 2019-09-23 | Stop reason: HOSPADM

## 2019-09-17 RX ORDER — HEPARIN SODIUM 5000 [USP'U]/ML
5000 INJECTION, SOLUTION INTRAVENOUS; SUBCUTANEOUS EVERY 8 HOURS SCHEDULED
Status: DISCONTINUED | OUTPATIENT
Start: 2019-09-18 | End: 2019-09-23 | Stop reason: HOSPADM

## 2019-09-17 RX ORDER — VENLAFAXINE 75 MG/1
75 TABLET ORAL 2 TIMES DAILY WITH MEALS
Status: DISCONTINUED | OUTPATIENT
Start: 2019-09-17 | End: 2019-09-23 | Stop reason: HOSPADM

## 2019-09-17 RX ORDER — IPRATROPIUM BROMIDE AND ALBUTEROL SULFATE 2.5; .5 MG/3ML; MG/3ML
3 SOLUTION RESPIRATORY (INHALATION) ONCE AS NEEDED
Status: DISCONTINUED | OUTPATIENT
Start: 2019-09-17 | End: 2019-09-17 | Stop reason: HOSPADM

## 2019-09-17 RX ORDER — BUPRENORPHINE HYDROCHLORIDE 0.32 MG/ML
INJECTION INTRAMUSCULAR; INTRAVENOUS
Status: COMPLETED | OUTPATIENT
Start: 2019-09-17 | End: 2019-09-17

## 2019-09-17 RX ORDER — DEXAMETHASONE SODIUM PHOSPHATE 4 MG/ML
INJECTION, SOLUTION INTRA-ARTICULAR; INTRALESIONAL; INTRAMUSCULAR; INTRAVENOUS; SOFT TISSUE AS NEEDED
Status: DISCONTINUED | OUTPATIENT
Start: 2019-09-17 | End: 2019-09-17 | Stop reason: SURG

## 2019-09-17 RX ORDER — PROPOFOL 10 MG/ML
VIAL (ML) INTRAVENOUS AS NEEDED
Status: DISCONTINUED | OUTPATIENT
Start: 2019-09-17 | End: 2019-09-17 | Stop reason: SURG

## 2019-09-17 RX ORDER — FAMOTIDINE 10 MG/ML
20 INJECTION, SOLUTION INTRAVENOUS ONCE
Status: COMPLETED | OUTPATIENT
Start: 2019-09-17 | End: 2019-09-17

## 2019-09-17 RX ORDER — SODIUM CHLORIDE, SODIUM LACTATE, POTASSIUM CHLORIDE, CALCIUM CHLORIDE 600; 310; 30; 20 MG/100ML; MG/100ML; MG/100ML; MG/100ML
150 INJECTION, SOLUTION INTRAVENOUS CONTINUOUS
Status: DISCONTINUED | OUTPATIENT
Start: 2019-09-17 | End: 2019-09-18

## 2019-09-17 RX ORDER — DEXAMETHASONE SODIUM PHOSPHATE 10 MG/ML
INJECTION, SOLUTION INTRAMUSCULAR; INTRAVENOUS
Status: COMPLETED | OUTPATIENT
Start: 2019-09-17 | End: 2019-09-17

## 2019-09-17 RX ORDER — ATRACURIUM BESYLATE 10 MG/ML
INJECTION, SOLUTION INTRAVENOUS AS NEEDED
Status: DISCONTINUED | OUTPATIENT
Start: 2019-09-17 | End: 2019-09-17 | Stop reason: SURG

## 2019-09-17 RX ORDER — DOCUSATE SODIUM 50 MG/5 ML
100 LIQUID (ML) ORAL DAILY
Status: DISCONTINUED | OUTPATIENT
Start: 2019-09-17 | End: 2019-09-17 | Stop reason: SDUPTHER

## 2019-09-17 RX ORDER — DOCUSATE SODIUM 100 MG/1
100 CAPSULE, LIQUID FILLED ORAL 2 TIMES DAILY
Status: DISCONTINUED | OUTPATIENT
Start: 2019-09-17 | End: 2019-09-17

## 2019-09-17 RX ORDER — SODIUM CHLORIDE 0.9 % (FLUSH) 0.9 %
3-10 SYRINGE (ML) INJECTION AS NEEDED
Status: DISCONTINUED | OUTPATIENT
Start: 2019-09-17 | End: 2019-09-17 | Stop reason: HOSPADM

## 2019-09-17 RX ORDER — ONDANSETRON 2 MG/ML
4 INJECTION INTRAMUSCULAR; INTRAVENOUS EVERY 6 HOURS PRN
Status: DISCONTINUED | OUTPATIENT
Start: 2019-09-17 | End: 2019-09-23 | Stop reason: HOSPADM

## 2019-09-17 RX ORDER — FAMOTIDINE 10 MG/ML
20 INJECTION, SOLUTION INTRAVENOUS EVERY 12 HOURS SCHEDULED
Status: DISCONTINUED | OUTPATIENT
Start: 2019-09-17 | End: 2019-09-21

## 2019-09-17 RX ORDER — LIDOCAINE HYDROCHLORIDE 10 MG/ML
INJECTION, SOLUTION EPIDURAL; INFILTRATION; INTRACAUDAL; PERINEURAL AS NEEDED
Status: DISCONTINUED | OUTPATIENT
Start: 2019-09-17 | End: 2019-09-17 | Stop reason: SURG

## 2019-09-17 RX ORDER — CEFAZOLIN SODIUM 2 G/100ML
2 INJECTION, SOLUTION INTRAVENOUS ONCE
Status: COMPLETED | OUTPATIENT
Start: 2019-09-17 | End: 2019-09-17

## 2019-09-17 RX ORDER — NALOXONE HCL 0.4 MG/ML
0.1 VIAL (ML) INJECTION
Status: DISCONTINUED | OUTPATIENT
Start: 2019-09-17 | End: 2019-09-23 | Stop reason: HOSPADM

## 2019-09-17 RX ORDER — ONDANSETRON 2 MG/ML
INJECTION INTRAMUSCULAR; INTRAVENOUS AS NEEDED
Status: DISCONTINUED | OUTPATIENT
Start: 2019-09-17 | End: 2019-09-17 | Stop reason: SURG

## 2019-09-17 RX ORDER — PROMETHAZINE HYDROCHLORIDE 6.25 MG/5ML
12.5 SYRUP ORAL EVERY 6 HOURS PRN
Status: DISCONTINUED | OUTPATIENT
Start: 2019-09-17 | End: 2019-09-23 | Stop reason: HOSPADM

## 2019-09-17 RX ORDER — MORPHINE SULFATE 2 MG/ML
2 INJECTION, SOLUTION INTRAMUSCULAR; INTRAVENOUS
Status: DISCONTINUED | OUTPATIENT
Start: 2019-09-17 | End: 2019-09-23 | Stop reason: HOSPADM

## 2019-09-17 RX ORDER — NALOXONE HCL 0.4 MG/ML
0.4 VIAL (ML) INJECTION AS NEEDED
Status: DISCONTINUED | OUTPATIENT
Start: 2019-09-17 | End: 2019-09-17 | Stop reason: HOSPADM

## 2019-09-17 RX ORDER — PROMETHAZINE HYDROCHLORIDE 25 MG/ML
6.25 INJECTION, SOLUTION INTRAMUSCULAR; INTRAVENOUS ONCE AS NEEDED
Status: COMPLETED | OUTPATIENT
Start: 2019-09-17 | End: 2019-09-17

## 2019-09-17 RX ORDER — SODIUM CHLORIDE 0.9 % (FLUSH) 0.9 %
3 SYRINGE (ML) INJECTION EVERY 12 HOURS SCHEDULED
Status: DISCONTINUED | OUTPATIENT
Start: 2019-09-17 | End: 2019-09-17 | Stop reason: HOSPADM

## 2019-09-17 RX ORDER — LIDOCAINE HYDROCHLORIDE 10 MG/ML
0.5 INJECTION, SOLUTION EPIDURAL; INFILTRATION; INTRACAUDAL; PERINEURAL ONCE AS NEEDED
Status: COMPLETED | OUTPATIENT
Start: 2019-09-17 | End: 2019-09-17

## 2019-09-17 RX ORDER — FENTANYL CITRATE 50 UG/ML
50 INJECTION, SOLUTION INTRAMUSCULAR; INTRAVENOUS
Status: DISCONTINUED | OUTPATIENT
Start: 2019-09-17 | End: 2019-09-17 | Stop reason: HOSPADM

## 2019-09-17 RX ORDER — MEPERIDINE HYDROCHLORIDE 25 MG/ML
12.5 INJECTION INTRAMUSCULAR; INTRAVENOUS; SUBCUTANEOUS
Status: DISCONTINUED | OUTPATIENT
Start: 2019-09-17 | End: 2019-09-17 | Stop reason: HOSPADM

## 2019-09-17 RX ORDER — MAGNESIUM HYDROXIDE 1200 MG/15ML
LIQUID ORAL AS NEEDED
Status: DISCONTINUED | OUTPATIENT
Start: 2019-09-17 | End: 2019-09-17 | Stop reason: HOSPADM

## 2019-09-17 RX ORDER — HYDRALAZINE HYDROCHLORIDE 20 MG/ML
5 INJECTION INTRAMUSCULAR; INTRAVENOUS
Status: DISCONTINUED | OUTPATIENT
Start: 2019-09-17 | End: 2019-09-17 | Stop reason: HOSPADM

## 2019-09-17 RX ORDER — LEVETIRACETAM 750 MG/1
1500 TABLET ORAL 2 TIMES DAILY
Status: DISCONTINUED | OUTPATIENT
Start: 2019-09-17 | End: 2019-09-23 | Stop reason: HOSPADM

## 2019-09-17 RX ORDER — DIPHENHYDRAMINE HYDROCHLORIDE 50 MG/ML
25 INJECTION INTRAMUSCULAR; INTRAVENOUS EVERY 6 HOURS PRN
Status: DISCONTINUED | OUTPATIENT
Start: 2019-09-17 | End: 2019-09-23 | Stop reason: HOSPADM

## 2019-09-17 RX ORDER — PROMETHAZINE HYDROCHLORIDE 25 MG/1
25 SUPPOSITORY RECTAL ONCE AS NEEDED
Status: COMPLETED | OUTPATIENT
Start: 2019-09-17 | End: 2019-09-17

## 2019-09-17 RX ORDER — BUPIVACAINE HYDROCHLORIDE 2.5 MG/ML
INJECTION, SOLUTION EPIDURAL; INFILTRATION; INTRACAUDAL
Status: COMPLETED | OUTPATIENT
Start: 2019-09-17 | End: 2019-09-17

## 2019-09-17 RX ORDER — SIMETHICONE 80 MG
80 TABLET,CHEWABLE ORAL 4 TIMES DAILY PRN
Status: DISCONTINUED | OUTPATIENT
Start: 2019-09-17 | End: 2019-09-23 | Stop reason: HOSPADM

## 2019-09-17 RX ORDER — SUCRALFATE 1 G/1
1 TABLET ORAL 4 TIMES DAILY
Status: DISCONTINUED | OUTPATIENT
Start: 2019-09-17 | End: 2019-09-23 | Stop reason: HOSPADM

## 2019-09-17 RX ORDER — NEOSTIGMINE METHYLSULFATE 1 MG/ML
INJECTION, SOLUTION INTRAVENOUS AS NEEDED
Status: DISCONTINUED | OUTPATIENT
Start: 2019-09-17 | End: 2019-09-17 | Stop reason: SURG

## 2019-09-17 RX ORDER — NALOXONE HCL 0.4 MG/ML
0.4 VIAL (ML) INJECTION
Status: DISCONTINUED | OUTPATIENT
Start: 2019-09-17 | End: 2019-09-23 | Stop reason: HOSPADM

## 2019-09-17 RX ORDER — FENTANYL CITRATE 50 UG/ML
INJECTION, SOLUTION INTRAMUSCULAR; INTRAVENOUS AS NEEDED
Status: DISCONTINUED | OUTPATIENT
Start: 2019-09-17 | End: 2019-09-17 | Stop reason: SURG

## 2019-09-17 RX ORDER — HYDROMORPHONE HYDROCHLORIDE 1 MG/ML
0.5 INJECTION, SOLUTION INTRAMUSCULAR; INTRAVENOUS; SUBCUTANEOUS
Status: DISCONTINUED | OUTPATIENT
Start: 2019-09-17 | End: 2019-09-17 | Stop reason: HOSPADM

## 2019-09-17 RX ORDER — ONDANSETRON 2 MG/ML
4 INJECTION INTRAMUSCULAR; INTRAVENOUS ONCE AS NEEDED
Status: DISCONTINUED | OUTPATIENT
Start: 2019-09-17 | End: 2019-09-17 | Stop reason: HOSPADM

## 2019-09-17 RX ORDER — DOCUSATE SODIUM 50 MG/5 ML
100 LIQUID (ML) ORAL 2 TIMES DAILY
Status: DISCONTINUED | OUTPATIENT
Start: 2019-09-17 | End: 2019-09-19 | Stop reason: SDUPTHER

## 2019-09-17 RX ADMIN — FENTANYL CITRATE 50 MCG: 50 INJECTION INTRAMUSCULAR; INTRAVENOUS at 12:23

## 2019-09-17 RX ADMIN — FAMOTIDINE 20 MG: 10 INJECTION, SOLUTION INTRAVENOUS at 06:40

## 2019-09-17 RX ADMIN — FENTANYL CITRATE 50 MCG: 50 INJECTION INTRAMUSCULAR; INTRAVENOUS at 12:37

## 2019-09-17 RX ADMIN — DEXAMETHASONE SODIUM PHOSPHATE 4 MG: 10 INJECTION INTRAMUSCULAR; INTRAVENOUS at 07:56

## 2019-09-17 RX ADMIN — HYDROMORPHONE HYDROCHLORIDE 0.5 MG: 1 INJECTION, SOLUTION INTRAMUSCULAR; INTRAVENOUS; SUBCUTANEOUS at 12:03

## 2019-09-17 RX ADMIN — SODIUM CHLORIDE, POTASSIUM CHLORIDE, SODIUM LACTATE AND CALCIUM CHLORIDE 9 ML/HR: 600; 310; 30; 20 INJECTION, SOLUTION INTRAVENOUS at 11:51

## 2019-09-17 RX ADMIN — SODIUM CHLORIDE, POTASSIUM CHLORIDE, SODIUM LACTATE AND CALCIUM CHLORIDE 9 ML/HR: 600; 310; 30; 20 INJECTION, SOLUTION INTRAVENOUS at 06:23

## 2019-09-17 RX ADMIN — LIDOCAINE HYDROCHLORIDE 50 MG: 10 INJECTION, SOLUTION EPIDURAL; INFILTRATION; INTRACAUDAL; PERINEURAL at 07:54

## 2019-09-17 RX ADMIN — DEXAMETHASONE SODIUM PHOSPHATE 8 MG: 4 INJECTION, SOLUTION INTRAMUSCULAR; INTRAVENOUS at 08:10

## 2019-09-17 RX ADMIN — CEFAZOLIN SODIUM 2 G: 2 INJECTION, SOLUTION INTRAVENOUS at 07:50

## 2019-09-17 RX ADMIN — ATRACURIUM BESYLATE 20 MG: 10 INJECTION, SOLUTION INTRAVENOUS at 09:42

## 2019-09-17 RX ADMIN — LEVETIRACETAM 1500 MG: 750 TABLET, FILM COATED ORAL at 21:35

## 2019-09-17 RX ADMIN — SODIUM CHLORIDE, POTASSIUM CHLORIDE, SODIUM LACTATE AND CALCIUM CHLORIDE 150 ML/HR: 600; 310; 30; 20 INJECTION, SOLUTION INTRAVENOUS at 15:43

## 2019-09-17 RX ADMIN — TRAZODONE HYDROCHLORIDE 50 MG: 50 TABLET ORAL at 21:35

## 2019-09-17 RX ADMIN — ATRACURIUM BESYLATE 15 MG: 10 INJECTION, SOLUTION INTRAVENOUS at 10:20

## 2019-09-17 RX ADMIN — SUCRALFATE 1 G: 1 TABLET ORAL at 17:30

## 2019-09-17 RX ADMIN — SUCRALFATE 1 G: 1 TABLET ORAL at 21:35

## 2019-09-17 RX ADMIN — SODIUM CHLORIDE 40 MG: 9 INJECTION, SOLUTION INTRAVENOUS at 15:43

## 2019-09-17 RX ADMIN — FENTANYL CITRATE 50 MCG: 50 INJECTION, SOLUTION INTRAMUSCULAR; INTRAVENOUS at 07:51

## 2019-09-17 RX ADMIN — FAMOTIDINE 20 MG: 10 INJECTION INTRAVENOUS at 21:34

## 2019-09-17 RX ADMIN — SODIUM CHLORIDE, POTASSIUM CHLORIDE, SODIUM LACTATE AND CALCIUM CHLORIDE: 600; 310; 30; 20 INJECTION, SOLUTION INTRAVENOUS at 06:24

## 2019-09-17 RX ADMIN — BUPIVACAINE HYDROCHLORIDE 60 ML: 2.5 INJECTION, SOLUTION EPIDURAL; INFILTRATION; INTRACAUDAL; PERINEURAL at 07:56

## 2019-09-17 RX ADMIN — PROMETHAZINE HYDROCHLORIDE 6.25 MG: 25 INJECTION INTRAMUSCULAR; INTRAVENOUS at 12:39

## 2019-09-17 RX ADMIN — GLYCOPYRROLATE 0.5 MG: 0.2 INJECTION, SOLUTION INTRAMUSCULAR; INTRAVENOUS at 11:20

## 2019-09-17 RX ADMIN — VENLAFAXINE 75 MG: 75 TABLET ORAL at 17:31

## 2019-09-17 RX ADMIN — HYDROCHLOROTHIAZIDE 25 MG: 25 TABLET ORAL at 17:30

## 2019-09-17 RX ADMIN — PROPOFOL 200 MG: 10 INJECTION, EMULSION INTRAVENOUS at 07:54

## 2019-09-17 RX ADMIN — ONDANSETRON 4 MG: 2 INJECTION INTRAMUSCULAR; INTRAVENOUS at 10:45

## 2019-09-17 RX ADMIN — ATRACURIUM BESYLATE 40 MG: 10 INJECTION, SOLUTION INTRAVENOUS at 07:54

## 2019-09-17 RX ADMIN — DOCUSATE SODIUM 100 MG: 100 CAPSULE, LIQUID FILLED ORAL at 21:35

## 2019-09-17 RX ADMIN — ATRACURIUM BESYLATE 20 MG: 10 INJECTION, SOLUTION INTRAVENOUS at 08:20

## 2019-09-17 RX ADMIN — HYDROMORPHONE HYDROCHLORIDE 0.5 MG: 1 INJECTION, SOLUTION INTRAMUSCULAR; INTRAVENOUS; SUBCUTANEOUS at 12:13

## 2019-09-17 RX ADMIN — PROPOFOL 25 MCG/KG/MIN: 10 INJECTION, EMULSION INTRAVENOUS at 08:10

## 2019-09-17 RX ADMIN — NEOSTIGMINE METHYLSULFATE 4 MG: 1 INJECTION, SOLUTION INTRAVENOUS at 11:20

## 2019-09-17 RX ADMIN — LIDOCAINE HYDROCHLORIDE 0.2 ML: 10 INJECTION, SOLUTION EPIDURAL; INFILTRATION; INTRACAUDAL; PERINEURAL at 06:23

## 2019-09-17 RX ADMIN — ATRACURIUM BESYLATE 20 MG: 10 INJECTION, SOLUTION INTRAVENOUS at 09:14

## 2019-09-17 RX ADMIN — SODIUM CHLORIDE, POTASSIUM CHLORIDE, SODIUM LACTATE AND CALCIUM CHLORIDE 150 ML/HR: 600; 310; 30; 20 INJECTION, SOLUTION INTRAVENOUS at 23:20

## 2019-09-17 RX ADMIN — Medication: at 13:52

## 2019-09-17 RX ADMIN — BUPRENORPHINE HYDROCHLORIDE 0.3 MG: 0.32 INJECTION INTRAMUSCULAR; INTRAVENOUS at 07:56

## 2019-09-17 RX ADMIN — ONDANSETRON 4 MG: 2 INJECTION INTRAMUSCULAR; INTRAVENOUS at 15:08

## 2019-09-17 NOTE — ANESTHESIA PROCEDURE NOTES
Peripheral Block      Patient reassessed immediately prior to procedure    Patient location during procedure: OR  Reason for block: at surgeon's request and post-op pain management  Preanesthetic Checklist  Completed: patient identified, site marked, surgical consent, pre-op evaluation, timeout performed, IV checked, risks and benefits discussed and monitors and equipment checked  Prep:  Pt Position: supine  Sterile barriers:cap, gloves, sterile barriers and mask  Prep: ChloraPrep  Patient monitoring: blood pressure monitoring, continuous pulse oximetry and EKG  Procedure  Sedation:yes  Performed under: general  Guidance:ultrasound guided  Images:still images obtained, printed/placed on chart    Laterality:Bilateral  Block Type:TAP  Injection Technique:single-shot  Needle Type:short-bevel and echogenic  Needle Gauge:20 G  Resistance on Injection: none    Medications Used: dexamethasone sodium phosphate injection, 4 mg  buprenorphine (BUPRENEX) injection, 0.3 mg  bupivacaine PF (MARCAINE) 0.25 % injection, 60 mL  Med admintered at 9/17/2019 7:56 AM      Medications  Comment:Block Injection:  LA dose divided between Right and Left block        Post Assessment  Injection Assessment: negative aspiration for heme, incremental injection and no paresthesia on injection  Patient Tolerance:comfortable throughout block  Complications:no  Additional Notes      Under Ultrasound guidance, a BBraun 4inch 360 degree needle was advanced with Normal Saline hydro dissection of tissue.  The Internal Oblique and Transversus Abdominus muscles where visualized.  At or before the aponeurosis of Internal Oblique, local anesthetic spread was visualized in the Transversus Abdominus Plane. Injection was made incrementally with aspiration every 5 mls.  There was no  intravascular injection,  injection pressure was normal, there was no neural injection, and the procedure was completed without difficulty.  Thank You.

## 2019-09-17 NOTE — OP NOTE
OPERATIVE NOTE    Patient Name:  Jamison Edward  YOB: 1970  [id number]    Date of Surgery:  9/17/2019      PREOPERATIVE DIAGNOSIS: Peptic ulcer with gastric outlet obstruction      POSTOPERATIVE DIAGNOSIS: Same        PROCEDURE PERFORMED:   1. Truncal vagotomy  2. Antrectomy with Steven-en-Y gastrojejunostomy  3. EGD       SURGEON: Herbert Umanzor MD       ASSISTANT: Remigio Dennis MD       SPECIMENS:   1. Posterior vagus  2. Anterior vagus  3. Gastric Antrum       ANESTHESIA: General.        FINDINGS:   1. Peptic ulcer at first portion of duodenal c-loop and duodenal bulb necessitating Antrectomy with retrocolic Steven-en- Y gastrojejunostomy     INDICATIONS: The patient is a 49 y.o. male who presented with a chronic gastric outlet obstruction related to duodenal ulcers.  Previous biopsies were negative for malignancy.  These ulcers involve the duodenal bulb as well as the first portion of the C-loop of the duodenum, necessitating the need for antrectomy, vagotomy, and Steven-en-Y reconstruction.  The risks and benefits were discussed at length with the patient and his family, and they agreed to proceed.       DESCRIPTION OF PROCEDURE:      After obtaining informed consent, the patient was taken to the operating room and placed in supine  position. After appropriate DVT and antibiotic prophylaxis, general anesthesia was induced. TAP blocks were placed by the anesthesia staff bilaterally to aid in post-op pain control . The abdomen  was prepped and draped in standard sterile fashion.  A vertical midline incision was made from the xiphoid to the umbilicus.  Electrocautery dissection was carried down to the anterior fascia which is elevated anteriorly and sharply divided.  Blunt dissection was carried into the peritoneal cavity.  The peritoneal and fascial incisions were then extended to the length of the skin incision.  The Bookwalter retractor was brought into the field to aid in retraction and  visualization of structures.  The hospital and was taken between clamps, the contents of the clamps tied, and it was divided.    The left lobe of the liver was freed from its diaphragmatic attachments and rotated medially.  The pars flaccida was then divided using ultrasonic dissection.  The anterior surface of the right joel was then scored and using meticulous blunt dissection a retroesophageal window was created.  The esophagus was then encircled with a Penrose drain to allow for retraction.  Using meticulous blunt dissection the posterior vagus nerve was clearly identified.  It was clipped proximally and distally, and a 1 cm segment was resected and passed off as specimen.  In similar fashion using meticulous blunt dissection the anterior vagus nerve was clearly identified.  It was also clipped proximally and distally.  A 1 similar segment was resected and then passed off.  The Penrose drain was then removed.  No additional bleeding was encountered.    Attention was then turned to the stomach.  The gastrocolic omentum was divided using ultrasonic dissection up to including the short gastric vessels.  The lesser sac was entered and the stomach freed from its posterior attachments as well.  This completely freed the stomach.  The area of gastric and peptic ulcers were identified at the pylorus.  Using meticulous dissection, the pylorus and proximal duodenum was then freed circumferentially.  No injury to pancreas or subsequent injury to the biliary structures was performed.  A Clemson maneuver was performed to further free up the duodenal sweep.  The gallbladder had been previously resected.    An appropriate transection site on the proximal duodenum was chosen through healthy tissue and divided using a stapling device.  The duodenal stump staple line was then oversewn in 2 layers using silk sutures which completely controlled the duodenal stump.  There was no tension whatsoever.    Attention was then turned to the  stomach itself.  The antrum was circumferentially cleared of adhesions and all vessels ligated with silk sutures.  An appropriate antrectomy resection site was chosen proximal to the angularis and divided using a stapling device.  The antrum and pylorus were then passed off as specimen.    The small bowel was run from the ligament of Treitz distally approximately 30 cm.  An appropriate site for ruling creation was determined and was easily able to reach the stomach without tension.  The bowel was divided using a stapling device in this location, and the mesentery divided in a wedge shape to allow for mobilization of the Steven limb.  A Steven limb of approximately 40 cm was then created.  The jejunojejunostomy was then completed using a stapling device.  The common channel was closed in 2 layers, with the inner layer being composed of interruptured Vicryl sutures and this was then oversewn using interrupted silk sutures.  The enteric defect at the jejunojejunostomy was then closed using interrupted silk sutures.  The jejunojejunostomy was palpated and was widely patent.     The Steven limb was brought in retrocolic fashion through the transverse colon mesentery to lay adjacent to the stomach.  A 2 layer handsewn in-line anastomosis between the stomach and the Steven limb was performed, with the inner layer consisting of running Vicryl, and this was oversewn circumferentially using Lembert sutures of silk.  In similar fashion the distal staple line on the remaining stomach was also oversewn with Lembert sutures of silk.  At the completion of the anastomosis it was widely patent.    After placing a bite block, the endoscope was advanced through the mouth and the esophagus into the stomach without difficulty.  Under maximal insufflation the stomach was inspected.  There was no evidence of bleeding.  The anastomosis was visualized and able to be traversed with the scope fairly easily.  There was no evidence of air leak under  water during insufflation.  The scope was then used to desufflate stomach and removed patient mouth without difficulty.  The anesthetist then placed an NG tube and its position was confirmed by the operating surgeon.  The operating team then changed gowns and gloves.    The abdomen was irrigated with normal saline until clear.  There is no evidence of active bleeding.  The anastomoses were widely patent and sitting in good anatomic positioning.  A 10 flat MISTY drain was placed posterior to the gastrojejunostomy and overlying the duodenal stump and brought through a separate stab incision in the right lateral abdomen.  It was sutured in place using a nylon suture.  The abdomen was again copiously irrigated with normal saline until clear, and the fascia closed using a running looped PDS suture x2, as well as internal retention sutures of 0 Vicryl.  The wound irrigated with antibiotics saline and then closed using skin staples.  The incision was dressed in standard sterile fashion, and covered with a dry sterile dressing.  The MISTY drain was placed to bulb suction and dressed in standard sterile fashion as well.  An nasal bridle was then placed by the operating surgeon and the NG tube affixed to it.    All sponge and needle counts were correct times two at the completion of the procedure. The patient recovered from anesthesia, was extubated in the operating room  and was transported to the PACU  in stable condition.      Herbert Umanzor MD  9/17/2019  11:38 AM      Please note that portions of this note were completed with a voice recognition program. Efforts were made to edit the dictations, but occasionally words are mistranscribed.

## 2019-09-17 NOTE — ANESTHESIA POSTPROCEDURE EVALUATION
Patient: Jamison Edward    Procedure Summary     Date:  09/17/19 Room / Location:   [facility] OR 04 /  [facility]    Anesthesia Start:  0749 Anesthesia Stop:  1143    Procedure:  OPEN VAGOTOMY, ANTRECTOMY,REVISION- Y GASTROJEJUNOSOTOMY (N/A Abdomen) Diagnosis:      Surgeon:  Herbert Umanzor MD Provider:  Marianne Lechuga MD    Anesthesia Type:  general ASA Status:  3          Anesthesia Type: general  Last vitals  BP   129/75 (09/17/19 1141)   Temp   98.6 °F (37 °C) (09/17/19 1141)   Pulse   97 (09/17/19 1141)   Resp   16 (09/17/19 1141)     SpO2   95 % (09/17/19 1141)     Post Anesthesia Care and Evaluation    Patient location during evaluation: PACU  Patient participation: waiting for patient participation  Level of consciousness: sleepy but conscious  Pain score: 0  Pain management: adequate  Airway patency: patent  Anesthetic complications: No anesthetic complications  PONV Status: none  Cardiovascular status: hemodynamically stable and acceptable  Respiratory status: nonlabored ventilation, acceptable and nasal cannula  Hydration status: acceptable

## 2019-09-17 NOTE — ANESTHESIA PROCEDURE NOTES
Airway  Urgency: elective    Date/Time: 9/17/2019 7:57 AM  Airway not difficult    General Information and Staff    Patient location during procedure: OR    Indications and Patient Condition  Indications for airway management: airway protection    Preoxygenated: yes  MILS not maintained throughout  Mask difficulty assessment: 1 - vent by mask    Final Airway Details  Final airway type: endotracheal airway      Successful airway: ETT  Cuffed: yes   Successful intubation technique: direct laryngoscopy  Endotracheal tube insertion site: oral  Blade: Gregg  Blade size: 3  ETT size (mm): 7.0  Cormack-Lehane Classification: grade I - full view of glottis  Placement verified by: chest auscultation and capnometry   Measured from: lips  ETT/EBT  to lips (cm): 20  Number of attempts at approach: 1    Additional Comments  Negative epigastric sounds, Breath sound equal bilaterally with symmetric chest rise and fall

## 2019-09-17 NOTE — PLAN OF CARE
Problem: Patient Care Overview  Goal: Plan of Care Review  Outcome: Ongoing (interventions implemented as appropriate)   09/17/19 1508 09/17/19 9945   OTHER   Outcome Summary --  VSS UOP ADQ, pain managed with pain pump, nausea x1 will cont to monitor   Coping/Psychosocial   Plan of Care Reviewed With patient;spouse --      Goal: Discharge Needs Assessment  Outcome: Ongoing (interventions implemented as appropriate)      Problem: Pain, Acute (Adult)  Goal: Identify Related Risk Factors and Signs and Symptoms  Outcome: Ongoing (interventions implemented as appropriate)    Goal: Acceptable Pain Control/Comfort Level  Outcome: Ongoing (interventions implemented as appropriate)    Goal: Identify Related Risk Factors and Signs and Symptoms  Outcome: Ongoing (interventions implemented as appropriate)    Goal: Acceptable Pain Control/Comfort Level  Outcome: Ongoing (interventions implemented as appropriate)

## 2019-09-17 NOTE — H&P
Pre-Op H&P  Jamison Edward  [id number]  1970    Chief complaint: Peptic ulcer    HPI:    Patient is a 49 y.o.male who presents with chronic peptic ulcer that has worsened over the last year with medical therapy.  Here today to undergo open vagotomy, antrectomy and Steven-en-Y, gastrojejunostomy.     Review of Systems:  General ROS: negative for chills, fever or skin lesions;  No changes since last office visit  Cardiovascular ROS: no chest pain or dyspnea on exertion  Respiratory ROS: no cough, shortness of breath, or wheezing.  +COPD/tob abuse  Neuro:  History of TIA with no residual    Allergies:   Allergies   Allergen Reactions   • Contrast Dye Other (See Comments)     Shortness of breath   • Protonix [Pantoprazole Sodium] Palpitations     Patient states that protonix causes chest pain.  Shruthi Pérez, [facility]  4/23/2017  11:19 AM     • Prilosec [Omeprazole] Other (See Comments)     Chest pain     • Flagyl [Metronidazole] Nausea And Vomiting       Home Meds:    No current facility-administered medications on file prior to encounter.      Current Outpatient Medications on File Prior to Encounter   Medication Sig Dispense Refill   • baclofen (LIORESAL) 10 MG tablet Take 20 mg by mouth 3 (Three) Times a Day.     • cimetidine (TAGAMET) 800 MG tablet Take 800 mg by mouth 3 (Three) Times a Day.     • esomeprazole (nexIUM) 40 MG capsule Take 1 capsule by mouth 2 (Two) Times a Day Before Meals. (Patient taking differently: Take 40 mg by mouth Daily.) 60 capsule 3   • hydrochlorothiazide (HYDRODIURIL) 25 MG tablet Take 25 mg by mouth Daily.     • levETIRAcetam (KEPPRA) 500 MG tablet Take 1,500 mg by mouth 2 (Two) Times a Day.     • sucralfate (CARAFATE) 1 G tablet Take 1 tablet by mouth 4 (Four) Times a Day. 40 tablet 0   • traZODone (DESYREL) 50 MG tablet Take 1 tablet by mouth Every Night. (Patient taking differently: Take 100 mg by mouth Every Night.) 30 tablet 2   • aspirin 81 MG EC tablet Take 81 mg by mouth  Daily. Patient took 2 tabs this morning     • Cyanocobalamin (VITAMIN B-12 IJ) Inject  as directed.         PMH:   Past Medical History:   Diagnosis Date   • Johnson esophagus    • COPD (chronic obstructive pulmonary disease) (CMS/HCC)    • DDD (degenerative disc disease), thoracic    • Degenerative disc disease, lumbar    • GERD (gastroesophageal reflux disease)    • Hypertension    • Peptic ulcer disease    • TIA (transient ischemic attack)    • Wears dentures     upper only         PSH:    Past Surgical History:   Procedure Laterality Date   • APPENDECTOMY     • CHOLECYSTECTOMY N/A 4/22/2017    Procedure: CHOLECYSTECTOMY LAPAROSCOPIC;  Surgeon: Jaqueline Guaman MD;  Location:  [facility];  Service:    • CHOLECYSTECTOMY     • COLONOSCOPY N/A 5/8/2017    Procedure: COLONOSCOPY  CPTCODE:97922;  Surgeon: Nicho Richey III, MD;  Location:  [facility];  Service:    • ENDOSCOPY     • ENDOSCOPY N/A 5/8/2017    Procedure: ESOPHAGOGASTRODUODENOSCOPY WITH BIOPSY  CPTCODE:10728;  Surgeon: Nicho Richey III, MD;  Location:  [facility];  Service:    • ENDOSCOPY N/A 11/3/2017    Procedure: ESOPHAGOGASTRODUODENOSCOPY WITH BIOPSY  CPTCODE: 96263;  Surgeon: Nicho Richey III, MD;  Location:  [facility];  Service:    • ENDOSCOPY N/A 2/20/2018    Procedure: ESOPHAGOGASTRODUODENOSCOPY WITH DILATATION CPT CODE: 28363;  Surgeon: Nicho Richey III, MD;  Location:  [facility];  Service:    • ENDOSCOPY N/A 6/5/2018    Procedure: ESOPHAGOGASTRODUODENOSCOPY WITH BIOPSY CPT CODE: 17011;  Surgeon: Nicho Richey III, MD;  Location: [facility] OR;  Service: Gastroenterology     Social History:   Tobacco:   Social History     Tobacco Use   Smoking Status Current Every Day Smoker   • Packs/day: 0.50   • Years: 35.00   • Pack years: 17.50   • Types: Cigarettes   Smokeless Tobacco Never Used      Alcohol:     Social History     Substance and Sexual Activity   Alcohol Use No       Vitals:           /85 (BP Location: Right arm,  "Patient Position: Lying)   Pulse 93   Temp 97.6 °F (36.4 °C) (Temporal)   Resp 18   Ht 167.6 cm (66\")   Wt 67.6 kg (149 lb)   SpO2 100%   BMI 24.05 kg/m²     Physical Exam:  General Appearance:    Alert, cooperative, no distress, appears stated age   Head:    Normocephalic, without obvious abnormality, atraumatic   Lungs:     Clear to auscultation bilaterally, respirations unlabored    Heart:   Regular rate and rhythm, S1 and S2 normal, no murmur, rub    or gallop    Abdomen:    Soft, non-tender.  +bowel sounds   Breast Exam:    deferred   Genitalia:    deferred   Extremities:   Extremities normal, atraumatic, no cyanosis or edema   Skin:   Skin color, texture, turgor normal, no rashes or lesions   Neurologic:   Grossly intact   Results Review  LABS:  Lab Results   Component Value Date    WBC 10.89 (H) 09/12/2019    HGB 14.2 09/12/2019    HCT 42.2 09/12/2019    MCV 93.6 09/12/2019     09/12/2019    NEUTROABS 4.97 04/24/2019    GLUCOSE 131 (H) 09/12/2019    BUN 8 09/12/2019    CREATININE 0.73 (L) 09/12/2019    EGFRIFNONA 114 09/12/2019     (L) 09/12/2019    K 3.7 09/17/2019    CL 94 (L) 09/12/2019    CO2 29.0 09/12/2019    CALCIUM 8.8 09/12/2019    ALBUMIN 4.10 10/26/2018    AST 14 10/26/2018    ALT 13 10/26/2018    BILITOT 0.1 (L) 10/26/2018       RADIOLOGY:  Imaging Results (last 72 hours)     ** No results found for the last 72 hours. **          I reviewed the patient's new clinical results.    Cancer Staging (if applicable)  Cancer Patient: __ yes _x_no __unknown; If yes, clinical stage T:__ N:__M:__, stage group or __N/A    Impression: Duodenal ulcer    Plan: Open vagotomy, antrectomy and Steven-en-Y, gastrojejunostomy    Shelly Dewey, APRN   9/17/2019   7:26 AM  "

## 2019-09-17 NOTE — ANESTHESIA PREPROCEDURE EVALUATION
Anesthesia Evaluation     Patient summary reviewed and Nursing notes reviewed   no history of anesthetic complications:  NPO Solid Status: > 8 hours  NPO Liquid Status: > 2 hours           Airway   Mallampati: I  TM distance: >3 FB  Neck ROM: full  No difficulty expected  Dental    (+) upper dentures    Pulmonary - normal exam   (+) a smoker Current, COPD,   Cardiovascular - normal exam  Exercise tolerance: good (4-7 METS)    ECG reviewed    (+) hypertension,   (-) dysrhythmias, angina, BROWN    ROS comment: 4/30/17 Echo:  Interpretation Summary        · The study is technically difficult for diagnosis.  · Left ventricular function is normal. Estimated EF = 55%.  · There are no significant valvular abnormalities noted.  · There is no evidence of pericardial effusion.        Neuro/Psych  (+) TIA (2 years ago no residual symptoms),     GI/Hepatic/Renal/Endo    (+)  GERD, PUD, GI bleeding,   (-) hepatitis, liver disease, no renal disease, diabetes, hypothyroidism    ROS Comment: hooper's esophagus    Musculoskeletal     (+) back pain,   Abdominal    Substance History      OB/GYN          Other   (+) arthritis                   Anesthesia Plan    ASA 3     general   (TAP blocks)  intravenous induction   Anesthetic plan, all risks, benefits, and alternatives have been provided, discussed and informed consent has been obtained with: patient.    Plan discussed with CRNA.

## 2019-09-17 NOTE — PROGRESS NOTES
Discharge Planning Assessment  [facility]     Patient Name: Jamison Edward  MRN: [id number]  Today's Date: 9/17/2019    Admit Date: 9/17/2019    Discharge Needs Assessment     Row Name 09/17/19 1504       Living Environment    Lives With  spouse;child(conchita), adult    Name(s) of Who Lives With Patient  spouse is Arcelia Sebastian    Current Living Arrangements  home/apartment/condo    Primary Care Provided by  self    Provides Primary Care For  no one    Family Caregiver if Needed  spouse    Quality of Family Relationships  supportive    Able to Return to Prior Arrangements  yes       Resource/Environmental Concerns    Resource/Environmental Concerns  none    Transportation Concerns  car, none       Transition Planning    Patient/Family Anticipates Transition to  home with family    Patient/Family Anticipated Services at Transition  none    Transportation Anticipated  family or friend will provide       Discharge Needs Assessment    Readmission Within the Last 30 Days  no previous admission in last 30 days    Concerns to be Addressed  no discharge needs identified    Equipment Currently Used at Home  none    Anticipated Changes Related to Illness  none    Equipment Needed After Discharge  none        Discharge Plan     Row Name 09/17/19 1504       Plan    Plan  Home with family    Patient/Family in Agreement with Plan  yes    Plan Comments  I met with patient and his wife this afternoon for discharge planning needs. Patient does not have an medical equipment that he uses, but has a walker in the home, if needed. I will follow for discharge planning needs.     Final Discharge Disposition Code  01 - home or self-care        Destination      No service coordination in this encounter.      Durable Medical Equipment      No service coordination in this encounter.      Dialysis/Infusion      No service coordination in this encounter.      Home Medical Care      No service coordination in this encounter.       Therapy      No service coordination in this encounter.      Community Resources      No service coordination in this encounter.          Demographic Summary     Row Name 09/17/19 1501       General Information    Admission Type  inpatient    Referral Source  admission list    Preferred Language  English    General Information Comments  PCP is [facility]       Contact Information    Permission Granted to Share Info With      Contact Information Comments  [phone]        Functional Status     Row Name 09/17/19 1503       Functional Status    Usual Activity Tolerance  good    Current Activity Tolerance  good       Functional Status, IADL    Medications  independent    Meal Preparation  independent    Housekeeping  independent    Laundry  independent    Shopping  independent    IADL Comments  His wife does much the house hold chores       Employment/    Employment/ Comments  Has Medicare and Medicaid,         Psychosocial    No documentation.       Abuse/Neglect    No documentation.       Legal    No documentation.       Substance Abuse    No documentation.       Patient Forms    No documentation.           Diana Ames RN

## 2019-09-17 NOTE — PROGRESS NOTES
"Patient Name:  Jamison Edward  YOB: 1970  [id number]    Surgery Post Operative Note    Date of visit: 9/17/2019    Subjective   Subjective: Stable after OR. Pain controlled         Objective     Objective:     /74   Pulse 116   Temp 98.6 °F (37 °C) (Temporal)   Resp 16   Ht 167.6 cm (66\")   Wt 67.6 kg (149 lb)   SpO2 96%   BMI 24.05 kg/m²     Intake/Output Summary (Last 24 hours) at 9/17/2019 1557  Last data filed at 9/17/2019 1543  Gross per 24 hour   Intake 2800 ml   Output 660 ml   Net 2140 ml       CV:  Rhythm  regular and rate mildly tachy  L:  Clear  to auscultation bilaterally   Abd:  Bowel sounds hypoactive, soft, appropriately tender. Dressings c/d/i, MISTY serosanguinous, scant  Ext:  No cyanosis, clubbing, edema    Recent labs that are back at this time have been reviewed.        Assessment/Plan     Assessment/ Plan:    Hospital Problem List     * (Principal) Peptic ulcer without hemorrhage or perforation- Stable after OR. Continue current treatment.    Dystonia    Gastric outlet obstruction              Herbert Umanzor MD  9/17/2019  3:57 PM      "

## 2019-09-17 NOTE — BRIEF OP NOTE
GASTRECTOMY  Progress Note    Jamison Edward  9/17/2019    Pre-op Diagnosis:   Peptic ulcer with gastric outlet obstruction       Post-Op Diagnosis Codes:   Same    Procedure/CPT® Codes:      Procedure(s):  OPEN VAGOTOMY, ANTRECTOMY, BOBBY-EN- Y GASTROJEJUNOSOTOMY, EGD    Surgeon(s):  Herbert Umanzor MD Bess, Kyle M, MD    Anesthesia: General    Staff:   Circulator: Pauline Walton RN; Myah Ruiz RN; Nam Khan RN  Scrub Person: Jj Ewing  Nursing Assistant: Thelma Nelson CNA    Estimated Blood Loss: 50 mL    Urine Voided: 350 mL    Specimens:                Specimens     ID Source Type Tests Collected By Collected At Frozen?      A Stomach, Greater curvature Tissue · TISSUE PATHOLOGY EXAM   Herbert Umanzor MD 9/17/19 0845 No     Description: Posterior Vagas    B Stomach, Greater curvature Tissue · TISSUE PATHOLOGY EXAM   Herbert Umanzor MD 9/17/19 0850 No     Description: Anterior Vagas    C Stomach Tissue · TISSUE PATHOLOGY EXAM   Herbert Umanzor MD 9/17/19 0934 No     Description: Antrum                Drains:   Open Drain Abdomen (Active)       NG/OG Tube Orogastric 18 Fr Right mouth (Active)       Urethral Catheter Non-latex 16 Fr. (Active)       Findings:   1. Ulcer disease involving the duodenal bulb and C-loop resulting in gastric outlet obstruction  2. Retrocolic Bobby limb    Complications: None      Herbert Umanzor MD     Date: 9/17/2019  Time: 11:31 AM

## 2019-09-18 LAB
ANION GAP SERPL CALCULATED.3IONS-SCNC: 8 MMOL/L (ref 5–15)
BASOPHILS # BLD AUTO: 0 10*3/MM3 (ref 0–0.2)
BASOPHILS NFR BLD AUTO: 0 % (ref 0–1.5)
BUN BLD-MCNC: 11 MG/DL (ref 6–20)
BUN/CREAT SERPL: 20.4 (ref 7–25)
CALCIUM SPEC-SCNC: 8.7 MG/DL (ref 8.6–10.5)
CHLORIDE SERPL-SCNC: 101 MMOL/L (ref 98–107)
CO2 SERPL-SCNC: 31 MMOL/L (ref 22–29)
CREAT BLD-MCNC: 0.54 MG/DL (ref 0.76–1.27)
CYTO UR: NORMAL
DEPRECATED RDW RBC AUTO: 40.4 FL (ref 37–54)
EOSINOPHIL # BLD AUTO: 0.24 10*3/MM3 (ref 0–0.4)
EOSINOPHIL NFR BLD AUTO: 1.9 % (ref 0.3–6.2)
ERYTHROCYTE [DISTWIDTH] IN BLOOD BY AUTOMATED COUNT: 11.5 % (ref 12.3–15.4)
GFR SERPL CREATININE-BSD FRML MDRD: >150 ML/MIN/1.73
GLUCOSE BLD-MCNC: 122 MG/DL (ref 65–99)
HCT VFR BLD AUTO: 37.9 % (ref 37.5–51)
HGB BLD-MCNC: 12.4 G/DL (ref 13–17.7)
IMM GRANULOCYTES # BLD AUTO: 0.03 10*3/MM3 (ref 0–0.05)
IMM GRANULOCYTES NFR BLD AUTO: 0.2 % (ref 0–0.5)
LAB AP CASE REPORT: NORMAL
LAB AP CLINICAL INFORMATION: NORMAL
LYMPHOCYTES # BLD AUTO: 0.76 10*3/MM3 (ref 0.7–3.1)
LYMPHOCYTES NFR BLD AUTO: 5.9 % (ref 19.6–45.3)
MCH RBC QN AUTO: 31.2 PG (ref 26.6–33)
MCHC RBC AUTO-ENTMCNC: 32.7 G/DL (ref 31.5–35.7)
MCV RBC AUTO: 95.5 FL (ref 79–97)
MONOCYTES # BLD AUTO: 1.01 10*3/MM3 (ref 0.1–0.9)
MONOCYTES NFR BLD AUTO: 7.8 % (ref 5–12)
NEUTROPHILS # BLD AUTO: 10.9 10*3/MM3 (ref 1.7–7)
NEUTROPHILS NFR BLD AUTO: 84.2 % (ref 42.7–76)
NRBC BLD AUTO-RTO: 0 /100 WBC (ref 0–0.2)
PATH REPORT.FINAL DX SPEC: NORMAL
PATH REPORT.GROSS SPEC: NORMAL
PLAT MORPH BLD: NORMAL
PLATELET # BLD AUTO: 218 10*3/MM3 (ref 140–450)
PMV BLD AUTO: 9.3 FL (ref 6–12)
POTASSIUM BLD-SCNC: 4.8 MMOL/L (ref 3.5–5.2)
RBC # BLD AUTO: 3.97 10*6/MM3 (ref 4.14–5.8)
RBC MORPH BLD: NORMAL
SMUDGE CELLS BLD QL SMEAR: NORMAL
SODIUM BLD-SCNC: 140 MMOL/L (ref 136–145)
WBC NRBC COR # BLD: 12.94 10*3/MM3 (ref 3.4–10.8)

## 2019-09-18 PROCEDURE — 63710000001 SUCRALFATE 1 G TABLET: Performed by: SURGERY

## 2019-09-18 PROCEDURE — 25010000002 ONDANSETRON PER 1 MG: Performed by: SURGERY

## 2019-09-18 PROCEDURE — 80048 BASIC METABOLIC PNL TOTAL CA: CPT | Performed by: SURGERY

## 2019-09-18 PROCEDURE — 63710000001 LEVETIRACETAM 750 MG TABLET: Performed by: SURGERY

## 2019-09-18 PROCEDURE — 63710000001 DOCUSATE SODIUM 150 MG/15ML LIQUID: Performed by: SURGERY

## 2019-09-18 PROCEDURE — 25010000002 PROMETHAZINE PER 50 MG: Performed by: SURGERY

## 2019-09-18 PROCEDURE — A9270 NON-COVERED ITEM OR SERVICE: HCPCS | Performed by: SURGERY

## 2019-09-18 PROCEDURE — 63710000001 HYDROCHLOROTHIAZIDE 25 MG TABLET: Performed by: SURGERY

## 2019-09-18 PROCEDURE — 63710000001 VENLAFAXINE 75 MG TABLET: Performed by: SURGERY

## 2019-09-18 PROCEDURE — 25010000002 HEPARIN (PORCINE) PER 1000 UNITS: Performed by: SURGERY

## 2019-09-18 PROCEDURE — 94799 UNLISTED PULMONARY SVC/PX: CPT

## 2019-09-18 PROCEDURE — 85025 COMPLETE CBC W/AUTO DIFF WBC: CPT | Performed by: SURGERY

## 2019-09-18 PROCEDURE — 85007 BL SMEAR W/DIFF WBC COUNT: CPT | Performed by: SURGERY

## 2019-09-18 RX ORDER — DEXTROSE AND SODIUM CHLORIDE 5; .9 G/100ML; G/100ML
125 INJECTION, SOLUTION INTRAVENOUS CONTINUOUS
Status: DISCONTINUED | OUTPATIENT
Start: 2019-09-18 | End: 2019-09-21

## 2019-09-18 RX ORDER — BACLOFEN 10 MG/1
10 TABLET ORAL EVERY 8 HOURS SCHEDULED
Status: DISCONTINUED | OUTPATIENT
Start: 2019-09-18 | End: 2019-09-23 | Stop reason: HOSPADM

## 2019-09-18 RX ADMIN — DEXTROSE AND SODIUM CHLORIDE 150 ML/HR: 5; 900 INJECTION, SOLUTION INTRAVENOUS at 22:27

## 2019-09-18 RX ADMIN — TRAZODONE HYDROCHLORIDE 50 MG: 50 TABLET ORAL at 21:55

## 2019-09-18 RX ADMIN — SUCRALFATE 1 G: 1 TABLET ORAL at 21:55

## 2019-09-18 RX ADMIN — DEXTROSE AND SODIUM CHLORIDE 150 ML/HR: 5; 900 INJECTION, SOLUTION INTRAVENOUS at 08:42

## 2019-09-18 RX ADMIN — HYDROCHLOROTHIAZIDE 25 MG: 25 TABLET ORAL at 08:42

## 2019-09-18 RX ADMIN — SUCRALFATE 1 G: 1 TABLET ORAL at 08:42

## 2019-09-18 RX ADMIN — ONDANSETRON 4 MG: 2 INJECTION INTRAMUSCULAR; INTRAVENOUS at 05:45

## 2019-09-18 RX ADMIN — PROMETHAZINE HYDROCHLORIDE 12.5 MG: 25 INJECTION INTRAMUSCULAR; INTRAVENOUS at 15:14

## 2019-09-18 RX ADMIN — SUCRALFATE 1 G: 1 TABLET ORAL at 13:04

## 2019-09-18 RX ADMIN — LEVETIRACETAM 1500 MG: 750 TABLET, FILM COATED ORAL at 21:55

## 2019-09-18 RX ADMIN — SUCRALFATE 1 G: 1 TABLET ORAL at 18:43

## 2019-09-18 RX ADMIN — VENLAFAXINE 75 MG: 75 TABLET ORAL at 18:43

## 2019-09-18 RX ADMIN — BACLOFEN 10 MG: 10 TABLET ORAL at 21:55

## 2019-09-18 RX ADMIN — VENLAFAXINE 75 MG: 75 TABLET ORAL at 08:42

## 2019-09-18 RX ADMIN — FAMOTIDINE 20 MG: 10 INJECTION INTRAVENOUS at 08:42

## 2019-09-18 RX ADMIN — HEPARIN SODIUM 5000 UNITS: 5000 INJECTION, SOLUTION INTRAVENOUS; SUBCUTANEOUS at 05:48

## 2019-09-18 RX ADMIN — HEPARIN SODIUM 5000 UNITS: 5000 INJECTION, SOLUTION INTRAVENOUS; SUBCUTANEOUS at 21:55

## 2019-09-18 RX ADMIN — DOCUSATE SODIUM 100 MG: 50 LIQUID ORAL at 08:42

## 2019-09-18 RX ADMIN — HEPARIN SODIUM 5000 UNITS: 5000 INJECTION, SOLUTION INTRAVENOUS; SUBCUTANEOUS at 13:03

## 2019-09-18 RX ADMIN — LEVETIRACETAM 1500 MG: 750 TABLET, FILM COATED ORAL at 08:42

## 2019-09-18 RX ADMIN — FAMOTIDINE 20 MG: 10 INJECTION INTRAVENOUS at 21:56

## 2019-09-18 RX ADMIN — DOCUSATE SODIUM 100 MG: 50 LIQUID ORAL at 21:55

## 2019-09-18 RX ADMIN — SODIUM CHLORIDE, POTASSIUM CHLORIDE, SODIUM LACTATE AND CALCIUM CHLORIDE 150 ML/HR: 600; 310; 30; 20 INJECTION, SOLUTION INTRAVENOUS at 05:52

## 2019-09-18 NOTE — PROGRESS NOTES
"Patient Name:  Jamison Edward  YOB: 1970  [id number]    Surgery Progress Note    Date of visit: 9/18/2019    Subjective   Subjective: Pain controlled. Good UOP. No other complaints.         Objective     Objective:     /64   Pulse 102   Temp 98.7 °F (37.1 °C) (Oral)   Resp 18   Ht 167.6 cm (66\")   Wt 67.6 kg (149 lb)   SpO2 95%   BMI 24.05 kg/m²     Intake/Output Summary (Last 24 hours) at 9/18/2019 0627  Last data filed at 9/18/2019 0552  Gross per 24 hour   Intake 4811.2 ml   Output 2535 ml   Net 2276.2 ml       CV:  Rhythm  regular and rate regular   L:  Clear  to auscultation bilaterally   Abd:  Bowel sounds positive , soft, appropriately tender. Dressings c/d/i, MISTY serosanguinous  Ext:  No cyanosis, clubbing, edema    Recent labs that are back at this time have been reviewed.        Assessment/Plan     Assessment/ Plan:    Hospital Problem List     * (Principal) Peptic ulcer without hemorrhage or perforation - Stable after OR. D/C Robert, up in chair. Continue NPO.      Dystonia    Gastric outlet obstruction              Herbert Umanzor MD  9/18/2019  6:27 AM      "

## 2019-09-18 NOTE — PLAN OF CARE
Problem: Patient Care Overview  Goal: Plan of Care Review  Outcome: Ongoing (interventions implemented as appropriate)   09/17/19 1827 09/18/19 0524   OTHER   Outcome Summary --  vss, pt slept most of shift, spouse at bedside, will continue to monitor   Coping/Psychosocial   Plan of Care Reviewed With patient;spouse --      Goal: Individualization and Mutuality  Outcome: Ongoing (interventions implemented as appropriate)    Goal: Discharge Needs Assessment  Outcome: Ongoing (interventions implemented as appropriate)    Goal: Interprofessional Rounds/Family Conf  Outcome: Ongoing (interventions implemented as appropriate)      Problem: Pain, Acute (Adult)  Goal: Identify Related Risk Factors and Signs and Symptoms  Outcome: Ongoing (interventions implemented as appropriate)    Goal: Acceptable Pain Control/Comfort Level  Outcome: Ongoing (interventions implemented as appropriate)    Goal: Identify Related Risk Factors and Signs and Symptoms  Outcome: Ongoing (interventions implemented as appropriate)    Goal: Acceptable Pain Control/Comfort Level  Outcome: Ongoing (interventions implemented as appropriate)

## 2019-09-19 LAB
ANION GAP SERPL CALCULATED.3IONS-SCNC: 9 MMOL/L (ref 5–15)
BUN BLD-MCNC: 6 MG/DL (ref 6–20)
BUN/CREAT SERPL: 13.3 (ref 7–25)
CALCIUM SPEC-SCNC: 8.6 MG/DL (ref 8.6–10.5)
CHLORIDE SERPL-SCNC: 101 MMOL/L (ref 98–107)
CO2 SERPL-SCNC: 30 MMOL/L (ref 22–29)
CREAT BLD-MCNC: 0.45 MG/DL (ref 0.76–1.27)
DEPRECATED RDW RBC AUTO: 40.8 FL (ref 37–54)
ERYTHROCYTE [DISTWIDTH] IN BLOOD BY AUTOMATED COUNT: 11.6 % (ref 12.3–15.4)
GFR SERPL CREATININE-BSD FRML MDRD: >150 ML/MIN/1.73
GLUCOSE BLD-MCNC: 114 MG/DL (ref 65–99)
HCT VFR BLD AUTO: 34.7 % (ref 37.5–51)
HGB BLD-MCNC: 11.2 G/DL (ref 13–17.7)
MCH RBC QN AUTO: 30.9 PG (ref 26.6–33)
MCHC RBC AUTO-ENTMCNC: 32.3 G/DL (ref 31.5–35.7)
MCV RBC AUTO: 95.9 FL (ref 79–97)
PLATELET # BLD AUTO: 182 10*3/MM3 (ref 140–450)
PMV BLD AUTO: 9.5 FL (ref 6–12)
POTASSIUM BLD-SCNC: 4 MMOL/L (ref 3.5–5.2)
RBC # BLD AUTO: 3.62 10*6/MM3 (ref 4.14–5.8)
SODIUM BLD-SCNC: 140 MMOL/L (ref 136–145)
WBC NRBC COR # BLD: 11.79 10*3/MM3 (ref 3.4–10.8)

## 2019-09-19 PROCEDURE — 94799 UNLISTED PULMONARY SVC/PX: CPT

## 2019-09-19 PROCEDURE — 80048 BASIC METABOLIC PNL TOTAL CA: CPT | Performed by: SURGERY

## 2019-09-19 PROCEDURE — 85027 COMPLETE CBC AUTOMATED: CPT | Performed by: SURGERY

## 2019-09-19 PROCEDURE — 25010000002 HEPARIN (PORCINE) PER 1000 UNITS: Performed by: SURGERY

## 2019-09-19 PROCEDURE — 25010000002 MORPHINE PER 10 MG: Performed by: SURGERY

## 2019-09-19 RX ORDER — NICOTINE 21 MG/24HR
1 PATCH, TRANSDERMAL 24 HOURS TRANSDERMAL
Status: DISCONTINUED | OUTPATIENT
Start: 2019-09-19 | End: 2019-09-23 | Stop reason: HOSPADM

## 2019-09-19 RX ORDER — NICOTINE 21 MG/24HR
1 PATCH, TRANSDERMAL 24 HOURS TRANSDERMAL ONCE
Status: COMPLETED | OUTPATIENT
Start: 2019-09-19 | End: 2019-09-20

## 2019-09-19 RX ORDER — DOCUSATE SODIUM 50 MG/5 ML
100 LIQUID (ML) ORAL DAILY
Status: DISCONTINUED | OUTPATIENT
Start: 2019-09-19 | End: 2019-09-23 | Stop reason: HOSPADM

## 2019-09-19 RX ORDER — MORPHINE SULFATE 2 MG/ML
2 INJECTION, SOLUTION INTRAMUSCULAR; INTRAVENOUS
Status: DISCONTINUED | OUTPATIENT
Start: 2019-09-19 | End: 2019-09-23 | Stop reason: HOSPADM

## 2019-09-19 RX ADMIN — BACLOFEN 10 MG: 10 TABLET ORAL at 21:20

## 2019-09-19 RX ADMIN — HEPARIN SODIUM 5000 UNITS: 5000 INJECTION, SOLUTION INTRAVENOUS; SUBCUTANEOUS at 15:12

## 2019-09-19 RX ADMIN — NICOTINE 1 PATCH: 21 PATCH, EXTENDED RELEASE TRANSDERMAL at 09:26

## 2019-09-19 RX ADMIN — HYDROCODONE BITARTRATE AND ACETAMINOPHEN 15 ML: 7.5; 325 SOLUTION ORAL at 15:12

## 2019-09-19 RX ADMIN — BACLOFEN 10 MG: 10 TABLET ORAL at 06:10

## 2019-09-19 RX ADMIN — DEXTROSE AND SODIUM CHLORIDE 150 ML/HR: 5; 900 INJECTION, SOLUTION INTRAVENOUS at 04:33

## 2019-09-19 RX ADMIN — LEVETIRACETAM 1500 MG: 750 TABLET, FILM COATED ORAL at 09:26

## 2019-09-19 RX ADMIN — SUCRALFATE 1 G: 1 TABLET ORAL at 12:25

## 2019-09-19 RX ADMIN — NICOTINE 1 PATCH: 21 PATCH, EXTENDED RELEASE TRANSDERMAL at 20:22

## 2019-09-19 RX ADMIN — FAMOTIDINE 20 MG: 10 INJECTION INTRAVENOUS at 09:27

## 2019-09-19 RX ADMIN — VENLAFAXINE 75 MG: 75 TABLET ORAL at 09:27

## 2019-09-19 RX ADMIN — DEXTROSE AND SODIUM CHLORIDE 125 ML/HR: 5; 900 INJECTION, SOLUTION INTRAVENOUS at 20:23

## 2019-09-19 RX ADMIN — SUCRALFATE 1 G: 1 TABLET ORAL at 09:27

## 2019-09-19 RX ADMIN — HYDROCODONE BITARTRATE AND ACETAMINOPHEN 15 ML: 7.5; 325 SOLUTION ORAL at 09:26

## 2019-09-19 RX ADMIN — FAMOTIDINE 20 MG: 10 INJECTION INTRAVENOUS at 21:22

## 2019-09-19 RX ADMIN — BACLOFEN 10 MG: 10 TABLET ORAL at 15:12

## 2019-09-19 RX ADMIN — LEVETIRACETAM 1500 MG: 750 TABLET, FILM COATED ORAL at 20:15

## 2019-09-19 RX ADMIN — SUCRALFATE 1 G: 1 TABLET ORAL at 17:53

## 2019-09-19 RX ADMIN — HYDROCHLOROTHIAZIDE 25 MG: 25 TABLET ORAL at 09:27

## 2019-09-19 RX ADMIN — MORPHINE SULFATE 2 MG: 2 INJECTION, SOLUTION INTRAMUSCULAR; INTRAVENOUS at 10:20

## 2019-09-19 RX ADMIN — MORPHINE SULFATE 2 MG: 2 INJECTION, SOLUTION INTRAMUSCULAR; INTRAVENOUS at 22:02

## 2019-09-19 RX ADMIN — HEPARIN SODIUM 5000 UNITS: 5000 INJECTION, SOLUTION INTRAVENOUS; SUBCUTANEOUS at 21:20

## 2019-09-19 RX ADMIN — DOCUSATE SODIUM 100 MG: 50 LIQUID ORAL at 09:27

## 2019-09-19 RX ADMIN — SUCRALFATE 1 G: 1 TABLET ORAL at 20:15

## 2019-09-19 RX ADMIN — HEPARIN SODIUM 5000 UNITS: 5000 INJECTION, SOLUTION INTRAVENOUS; SUBCUTANEOUS at 06:10

## 2019-09-19 RX ADMIN — TRAZODONE HYDROCHLORIDE 50 MG: 50 TABLET ORAL at 20:15

## 2019-09-19 RX ADMIN — VENLAFAXINE 75 MG: 75 TABLET ORAL at 17:53

## 2019-09-19 NOTE — PLAN OF CARE
Problem: Pain, Acute (Adult)  Goal: Acceptable Pain Control/Comfort Level  Outcome: Ongoing (interventions implemented as appropriate)   09/19/19 9374   Pain, Acute (Adult)   Acceptable Pain Control/Comfort Level making progress toward outcome   D/c pca, ambulating in trujillo, hoping to eat/drink tomorrow.

## 2019-09-19 NOTE — PROGRESS NOTES
"Patient Name:  Jamison Edward  YOB: 1970  [id number]    Surgery Progress Note    Date of visit: 9/19/2019    Subjective   Subjective: Feels a bit better, but complains that Dilaudid is causing mental issues.         Objective     Objective:     /78 (BP Location: Right arm, Patient Position: Lying)   Pulse 108   Temp 98.3 °F (36.8 °C) (Oral)   Resp 18   Ht 167.6 cm (66\")   Wt 67.6 kg (149 lb)   SpO2 95%   BMI 24.05 kg/m²     Intake/Output Summary (Last 24 hours) at 9/19/2019 0655  Last data filed at 9/19/2019 0618  Gross per 24 hour   Intake 3355.8 ml   Output 2770 ml   Net 585.8 ml       CV:  Rhythm  regular and rate mildly tachycardic  L:  Clear  to auscultation bilaterally   Abd:  Bowel sounds hypoactive, soft, appropriately tender  Ext:  No cyanosis, clubbing, edema    Recent labs that are back at this time have been reviewed.        Assessment/Plan     Assessment/ Plan:    Hospital Problem List     * (Principal) Peptic ulcer without hemorrhage or perforation - Overall doing well. D/C PCA. PO pain meds. Add nicotine patch. Increase ambulation. Plan for UGI tomorrow to evaluate patency of anastomosis.      Dystonia    Gastric outlet obstruction              Herbert Umanzor MD  9/19/2019  6:55 AM      "

## 2019-09-19 NOTE — PLAN OF CARE
Problem: Patient Care Overview  Goal: Plan of Care Review  Outcome: Ongoing (interventions implemented as appropriate)   09/19/19 0443   OTHER   Outcome Summary Temp high of 100.2 Pt c/o periods of diaphoresis. Other VSS. Pt awoke anxious this am. States is a 1 pkd smoker. c/o haullcinations. Pain controlled with. Dilaudid PCA. O2 weanes to 1L. Minimal output from MISTY. Up in chair x2. Encourage IS use.    Coping/Psychosocial   Plan of Care Reviewed With patient   Plan of Care Review   Progress no change       Problem: Pain, Acute (Adult)  Goal: Identify Related Risk Factors and Signs and Symptoms  Outcome: Ongoing (interventions implemented as appropriate)    Goal: Acceptable Pain Control/Comfort Level  Outcome: Ongoing (interventions implemented as appropriate)      Problem: Surgery Nonspecified (Adult)  Goal: Signs and Symptoms of Listed Potential Problems Will be Absent, Minimized or Managed (Surgery Nonspecified)  Outcome: Ongoing (interventions implemented as appropriate)    Goal: Anesthesia/Sedation Recovery  Outcome: Ongoing (interventions implemented as appropriate)

## 2019-09-19 NOTE — PROGRESS NOTES
Continued Stay Note  [facility]     Patient Name: Jamison Edward  MRN: [id number]  Today's Date: 9/19/2019    Admit Date: 9/17/2019    Discharge Plan     Row Name 09/19/19 1028       Plan    Plan  Home with family    Patient/Family in Agreement with Plan  yes    Plan Comments  I met with patient and his wife this am. He was up in bedside chair getting ready to ambulate the trujillo. I am following for discharge planning needs. Please advise of any.     Final Discharge Disposition Code  01 - home or self-care        Discharge Codes    No documentation.       Expected Discharge Date and Time     Expected Discharge Date Expected Discharge Time    Sep 20, 2019             Diana Ames RN

## 2019-09-20 ENCOUNTER — APPOINTMENT (OUTPATIENT)
Dept: GENERAL RADIOLOGY | Facility: HOSPITAL | Age: 49
End: 2019-09-20

## 2019-09-20 LAB
ANION GAP SERPL CALCULATED.3IONS-SCNC: 12 MMOL/L (ref 5–15)
BUN BLD-MCNC: 8 MG/DL (ref 6–20)
BUN/CREAT SERPL: 15.7 (ref 7–25)
CALCIUM SPEC-SCNC: 9.1 MG/DL (ref 8.6–10.5)
CHLORIDE SERPL-SCNC: 103 MMOL/L (ref 98–107)
CO2 SERPL-SCNC: 28 MMOL/L (ref 22–29)
CREAT BLD-MCNC: 0.51 MG/DL (ref 0.76–1.27)
DEPRECATED RDW RBC AUTO: 39.6 FL (ref 37–54)
ERYTHROCYTE [DISTWIDTH] IN BLOOD BY AUTOMATED COUNT: 11.6 % (ref 12.3–15.4)
GFR SERPL CREATININE-BSD FRML MDRD: >150 ML/MIN/1.73
GLUCOSE BLD-MCNC: 136 MG/DL (ref 65–99)
HCT VFR BLD AUTO: 33.2 % (ref 37.5–51)
HGB BLD-MCNC: 11.1 G/DL (ref 13–17.7)
MCH RBC QN AUTO: 31.5 PG (ref 26.6–33)
MCHC RBC AUTO-ENTMCNC: 33.4 G/DL (ref 31.5–35.7)
MCV RBC AUTO: 94.3 FL (ref 79–97)
PLATELET # BLD AUTO: 209 10*3/MM3 (ref 140–450)
PMV BLD AUTO: 9.5 FL (ref 6–12)
POTASSIUM BLD-SCNC: 3.4 MMOL/L (ref 3.5–5.2)
RBC # BLD AUTO: 3.52 10*6/MM3 (ref 4.14–5.8)
SODIUM BLD-SCNC: 143 MMOL/L (ref 136–145)
WBC NRBC COR # BLD: 11.22 10*3/MM3 (ref 3.4–10.8)

## 2019-09-20 PROCEDURE — 80048 BASIC METABOLIC PNL TOTAL CA: CPT | Performed by: SURGERY

## 2019-09-20 PROCEDURE — 74241: CPT

## 2019-09-20 PROCEDURE — 94799 UNLISTED PULMONARY SVC/PX: CPT

## 2019-09-20 PROCEDURE — 0 DIATRIZOATE MEGLUMINE & SODIUM PER 1 ML: Performed by: SURGERY

## 2019-09-20 PROCEDURE — 25010000002 HEPARIN (PORCINE) PER 1000 UNITS: Performed by: SURGERY

## 2019-09-20 PROCEDURE — 85027 COMPLETE CBC AUTOMATED: CPT | Performed by: SURGERY

## 2019-09-20 RX ADMIN — LEVETIRACETAM 1500 MG: 750 TABLET, FILM COATED ORAL at 09:47

## 2019-09-20 RX ADMIN — BACLOFEN 10 MG: 10 TABLET ORAL at 13:26

## 2019-09-20 RX ADMIN — HEPARIN SODIUM 5000 UNITS: 5000 INJECTION, SOLUTION INTRAVENOUS; SUBCUTANEOUS at 21:49

## 2019-09-20 RX ADMIN — BACLOFEN 10 MG: 10 TABLET ORAL at 21:49

## 2019-09-20 RX ADMIN — TRAZODONE HYDROCHLORIDE 50 MG: 50 TABLET ORAL at 20:49

## 2019-09-20 RX ADMIN — SUCRALFATE 1 G: 1 TABLET ORAL at 18:28

## 2019-09-20 RX ADMIN — NICOTINE 1 PATCH: 21 PATCH, EXTENDED RELEASE TRANSDERMAL at 09:54

## 2019-09-20 RX ADMIN — SUCRALFATE 1 G: 1 TABLET ORAL at 09:47

## 2019-09-20 RX ADMIN — HYDROCODONE BITARTRATE AND ACETAMINOPHEN 15 ML: 7.5; 325 SOLUTION ORAL at 10:57

## 2019-09-20 RX ADMIN — VENLAFAXINE 75 MG: 75 TABLET ORAL at 09:53

## 2019-09-20 RX ADMIN — HEPARIN SODIUM 5000 UNITS: 5000 INJECTION, SOLUTION INTRAVENOUS; SUBCUTANEOUS at 06:25

## 2019-09-20 RX ADMIN — DOCUSATE SODIUM 100 MG: 50 LIQUID ORAL at 09:47

## 2019-09-20 RX ADMIN — LEVETIRACETAM 1500 MG: 750 TABLET, FILM COATED ORAL at 20:49

## 2019-09-20 RX ADMIN — BACLOFEN 10 MG: 10 TABLET ORAL at 06:25

## 2019-09-20 RX ADMIN — FAMOTIDINE 20 MG: 10 INJECTION INTRAVENOUS at 20:49

## 2019-09-20 RX ADMIN — HYDROCHLOROTHIAZIDE 25 MG: 25 TABLET ORAL at 09:48

## 2019-09-20 RX ADMIN — HEPARIN SODIUM 5000 UNITS: 5000 INJECTION, SOLUTION INTRAVENOUS; SUBCUTANEOUS at 13:26

## 2019-09-20 RX ADMIN — SUCRALFATE 1 G: 1 TABLET ORAL at 20:49

## 2019-09-20 RX ADMIN — Medication 30 ML: at 09:07

## 2019-09-20 RX ADMIN — VENLAFAXINE 75 MG: 75 TABLET ORAL at 18:28

## 2019-09-20 RX ADMIN — FAMOTIDINE 20 MG: 10 INJECTION INTRAVENOUS at 09:48

## 2019-09-20 NOTE — PROGRESS NOTES
"Patient Name:  Jamison Edward  YOB: 1970  [id number]    Surgery Progress Note    Date of visit: 9/20/2019    Subjective   Subjective: Feeling a bit better. Less pain, and less confusion off of IVPCA         Objective     Objective:     /73 (BP Location: Right arm, Patient Position: Sitting)   Pulse 112   Temp 99.4 °F (37.4 °C) (Oral)   Resp 18   Ht 167.6 cm (66\")   Wt 67.6 kg (149 lb)   SpO2 100%   BMI 24.05 kg/m²     Intake/Output Summary (Last 24 hours) at 9/20/2019 0732  Last data filed at 9/20/2019 0400  Gross per 24 hour   Intake --   Output 1020 ml   Net -1020 ml       CV:  Rhythm  regular and rate regular   L:  Clear  to auscultation bilaterally   Abd:  Bowel sounds positive , soft, incision c/d/i. MISTY serosanguinous  Ext:  No cyanosis, clubbing, edema    Recent labs that are back at this time have been reviewed.        Assessment/Plan     Assessment/ Plan:    Hospital Problem List     * (Principal) Peptic ulcer without hemorrhage or perforation - Stable after OR. UGI today to evaluate patency of anastomosis. Increase mobility.      Dystonia    Gastric outlet obstruction              Herbert Umanzor MD  9/20/2019  7:32 AM    UGI personally reviewed. No evidence of leak, no delay in gastric emptying. Will D/C NG and start sips of PO clears    Herbert Umanzor MD  2:59 PM    "

## 2019-09-20 NOTE — PLAN OF CARE
Problem: Patient Care Overview  Goal: Plan of Care Review  Outcome: Ongoing (interventions implemented as appropriate)   09/20/19 0354   OTHER   Outcome Summary Pain well controlled. Denies nausea. Ambulated in halls. Tolerated clamping of NG with PO medications. I/S demonstrated. Incisions CDI. VSS.    Coping/Psychosocial   Plan of Care Reviewed With patient;spouse   Plan of Care Review   Progress improving     Goal: Individualization and Mutuality  Outcome: Ongoing (interventions implemented as appropriate)    Goal: Discharge Needs Assessment  Outcome: Ongoing (interventions implemented as appropriate)    Goal: Interprofessional Rounds/Family Conf  Outcome: Ongoing (interventions implemented as appropriate)      Problem: Pain, Acute (Adult)  Goal: Identify Related Risk Factors and Signs and Symptoms  Outcome: Ongoing (interventions implemented as appropriate)    Goal: Identify Related Risk Factors and Signs and Symptoms  Outcome: Ongoing (interventions implemented as appropriate)    Goal: Acceptable Pain Control/Comfort Level  Outcome: Ongoing (interventions implemented as appropriate)      Problem: Surgery Nonspecified (Adult)  Goal: Signs and Symptoms of Listed Potential Problems Will be Absent, Minimized or Managed (Surgery Nonspecified)  Outcome: Ongoing (interventions implemented as appropriate)    Goal: Anesthesia/Sedation Recovery  Outcome: Ongoing (interventions implemented as appropriate)

## 2019-09-20 NOTE — PLAN OF CARE
Problem: Patient Care Overview  Goal: Plan of Care Review   09/20/19 1801   OTHER   Outcome Summary Pain well controlled. NG pulled per Dr. Umanzor. Tolerating sips of clears well. Ambulating in the halls. Dressing changed. Staples intact. Incision well approximated. VSS   Coping/Psychosocial   Plan of Care Reviewed With patient   Plan of Care Review   Progress improving

## 2019-09-20 NOTE — PROGRESS NOTES
"                  Clinical Nutrition     Reason for Visit:   NPO/Clear liquid > 72 hours    Patient Name: Jamison Edward  YOB: 1970  MRN: [id number]  Date of Encounter: 09/20/19 9:30 AM  Admission date: 9/17/2019    Nutrition Assessment   Assessment     Admission diagnosis  Chronic peptic ulcer / gastric outlet obstruction    Additional diagnosis/conditions/procedures  Dystonia    (9/17) s/p open vagotomy, antrectomy, akash-en-y, gastrojejunostomy  (9/17) NGT placed  (9/20) plan for UGI to evaluate patency of anastomosis    Additional PMH/procedures:  HTN  GERD  Peptic ulcer disease  Barretts esophagus  Degenerative disc disease  COPD  TIA    CCY  Appy    Reported/Observed/Food/Nutrition Related History:      Patient continues on NPO. Noted per surgery, plan for UGI today to evaluate patency of anastomosis. Per documentation, patient has been ambulating.    Anthropometrics     Height: 167.6 cm (66\")  Last filed wt: Weight: 67.6 kg (149 lb) (09/17/19 0628)  Weight Method: Stated    BMI: BMI (Calculated): 24.1  Normal: 18.5-24.9kg/m2    Ideal Body Weight (IBW) (kg): 65.3  Admission wt: 149 lb  Method obtained: stated weight per charting 9/12    Labs reviewed     Results from last 7 days   Lab Units 09/20/19  0626 09/19/19  0430 09/18/19  0459   GLUCOSE mg/dL 136* 114* 122*   BUN mg/dL 8 6 11   CREATININE mg/dL 0.51* 0.45* 0.54*   SODIUM mmol/L 143 140 140   CHLORIDE mmol/L 103 101 101   POTASSIUM mmol/L 3.4* 4.0 4.8         Lab Results   Lab Value Date/Time    HGBA1C 5.10 09/12/2019 0844       Medications reviewed   Pertinent: baclofen, colace, pepcid, keppra, carafate, trazodone, effexor      Current Nutrition Prescription     PO: NPO Diet NPO Except: Ice Chips, Sips With Meds    Nutrition Diagnosis     9/20  Problem Inadequate oral intake   Etiology GI status   Signs/Symptoms NPO / Clear liquid x3/d       Nutrition Intervention   1.  Follow treatment progress, Care plan reviewed  2.  Await begin " PO     Goal:   General: Nutrition support treatment  PO: Establish PO      Monitoring/Evaluation:   Per protocol, I&O, Pertinent labs, GI status, Symptoms    Will Continue to follow per protocol    Brandee Barahona RDN, LD  Time Spent: 25 minutes

## 2019-09-20 NOTE — PROGRESS NOTES
Continued Stay Note  [facility]     Patient Name: Jamison Edward  MRN: [id number]  Today's Date: 9/20/2019    Admit Date: 9/17/2019    Discharge Plan     Row Name 09/20/19 1244       Plan    Plan  Home with family    Patient/Family in Agreement with Plan  yes    Plan Comments  I met with patient this am. He has been up walking in room. No current discharge planning needs identified. Will continue to follow.     Final Discharge Disposition Code  01 - home or self-care        Discharge Codes    No documentation.       Expected Discharge Date and Time     Expected Discharge Date Expected Discharge Time    Sep 20, 2019             Diana Ames RN

## 2019-09-21 LAB
ANION GAP SERPL CALCULATED.3IONS-SCNC: 13 MMOL/L (ref 5–15)
BUN BLD-MCNC: 6 MG/DL (ref 6–20)
BUN/CREAT SERPL: 13 (ref 7–25)
CALCIUM SPEC-SCNC: 9 MG/DL (ref 8.6–10.5)
CHLORIDE SERPL-SCNC: 96 MMOL/L (ref 98–107)
CO2 SERPL-SCNC: 30 MMOL/L (ref 22–29)
CREAT BLD-MCNC: 0.46 MG/DL (ref 0.76–1.27)
DEPRECATED RDW RBC AUTO: 38.1 FL (ref 37–54)
ERYTHROCYTE [DISTWIDTH] IN BLOOD BY AUTOMATED COUNT: 11.2 % (ref 12.3–15.4)
GFR SERPL CREATININE-BSD FRML MDRD: >150 ML/MIN/1.73
GLUCOSE BLD-MCNC: 94 MG/DL (ref 65–99)
HCT VFR BLD AUTO: 29.3 % (ref 37.5–51)
HGB BLD-MCNC: 10.1 G/DL (ref 13–17.7)
MCH RBC QN AUTO: 31.5 PG (ref 26.6–33)
MCHC RBC AUTO-ENTMCNC: 34.5 G/DL (ref 31.5–35.7)
MCV RBC AUTO: 91.3 FL (ref 79–97)
PLATELET # BLD AUTO: 229 10*3/MM3 (ref 140–450)
PMV BLD AUTO: 9.6 FL (ref 6–12)
POTASSIUM BLD-SCNC: 2.4 MMOL/L (ref 3.5–5.2)
POTASSIUM BLD-SCNC: 3.8 MMOL/L (ref 3.5–5.2)
RBC # BLD AUTO: 3.21 10*6/MM3 (ref 4.14–5.8)
SODIUM BLD-SCNC: 139 MMOL/L (ref 136–145)
WBC NRBC COR # BLD: 9.73 10*3/MM3 (ref 3.4–10.8)

## 2019-09-21 PROCEDURE — 25010000002 HEPARIN (PORCINE) PER 1000 UNITS: Performed by: SURGERY

## 2019-09-21 PROCEDURE — 85027 COMPLETE CBC AUTOMATED: CPT | Performed by: SURGERY

## 2019-09-21 PROCEDURE — 80048 BASIC METABOLIC PNL TOTAL CA: CPT | Performed by: SURGERY

## 2019-09-21 PROCEDURE — 84132 ASSAY OF SERUM POTASSIUM: CPT | Performed by: SURGERY

## 2019-09-21 RX ORDER — POTASSIUM CHLORIDE 1.5 G/1.77G
40 POWDER, FOR SOLUTION ORAL AS NEEDED
Status: DISCONTINUED | OUTPATIENT
Start: 2019-09-21 | End: 2019-09-23 | Stop reason: HOSPADM

## 2019-09-21 RX ORDER — POTASSIUM CHLORIDE 750 MG/1
40 CAPSULE, EXTENDED RELEASE ORAL AS NEEDED
Status: DISCONTINUED | OUTPATIENT
Start: 2019-09-21 | End: 2019-09-23 | Stop reason: HOSPADM

## 2019-09-21 RX ORDER — DEXTROSE, SODIUM CHLORIDE, AND POTASSIUM CHLORIDE 5; .45; .15 G/100ML; G/100ML; G/100ML
75 INJECTION INTRAVENOUS CONTINUOUS
Status: DISCONTINUED | OUTPATIENT
Start: 2019-09-21 | End: 2019-09-22

## 2019-09-21 RX ORDER — POTASSIUM CHLORIDE 7.45 MG/ML
10 INJECTION INTRAVENOUS
Status: DISCONTINUED | OUTPATIENT
Start: 2019-09-21 | End: 2019-09-23 | Stop reason: HOSPADM

## 2019-09-21 RX ORDER — FAMOTIDINE 20 MG/1
20 TABLET, FILM COATED ORAL
Status: DISCONTINUED | OUTPATIENT
Start: 2019-09-21 | End: 2019-09-23 | Stop reason: HOSPADM

## 2019-09-21 RX ADMIN — POTASSIUM CHLORIDE, DEXTROSE MONOHYDRATE AND SODIUM CHLORIDE 75 ML/HR: 150; 5; 450 INJECTION, SOLUTION INTRAVENOUS at 11:15

## 2019-09-21 RX ADMIN — FAMOTIDINE 20 MG: 10 INJECTION INTRAVENOUS at 08:12

## 2019-09-21 RX ADMIN — HYDROCODONE BITARTRATE AND ACETAMINOPHEN 15 ML: 7.5; 325 SOLUTION ORAL at 14:22

## 2019-09-21 RX ADMIN — POTASSIUM CHLORIDE 40 MEQ: 1.5 POWDER, FOR SOLUTION ORAL at 15:44

## 2019-09-21 RX ADMIN — SUCRALFATE 1 G: 1 TABLET ORAL at 12:14

## 2019-09-21 RX ADMIN — BACLOFEN 10 MG: 10 TABLET ORAL at 21:35

## 2019-09-21 RX ADMIN — HEPARIN SODIUM 5000 UNITS: 5000 INJECTION, SOLUTION INTRAVENOUS; SUBCUTANEOUS at 21:36

## 2019-09-21 RX ADMIN — POTASSIUM CHLORIDE 40 MEQ: 750 CAPSULE, EXTENDED RELEASE ORAL at 08:14

## 2019-09-21 RX ADMIN — SUCRALFATE 1 G: 1 TABLET ORAL at 08:13

## 2019-09-21 RX ADMIN — HEPARIN SODIUM 5000 UNITS: 5000 INJECTION, SOLUTION INTRAVENOUS; SUBCUTANEOUS at 13:38

## 2019-09-21 RX ADMIN — FAMOTIDINE 20 MG: 20 TABLET ORAL at 17:24

## 2019-09-21 RX ADMIN — HYDROCODONE BITARTRATE AND ACETAMINOPHEN 15 ML: 7.5; 325 SOLUTION ORAL at 07:19

## 2019-09-21 RX ADMIN — DOCUSATE SODIUM 100 MG: 50 LIQUID ORAL at 08:12

## 2019-09-21 RX ADMIN — BACLOFEN 10 MG: 10 TABLET ORAL at 13:38

## 2019-09-21 RX ADMIN — HYDROCHLOROTHIAZIDE 25 MG: 25 TABLET ORAL at 08:13

## 2019-09-21 RX ADMIN — SUCRALFATE 1 G: 1 TABLET ORAL at 21:35

## 2019-09-21 RX ADMIN — LEVETIRACETAM 1500 MG: 750 TABLET, FILM COATED ORAL at 08:13

## 2019-09-21 RX ADMIN — BACLOFEN 10 MG: 10 TABLET ORAL at 05:31

## 2019-09-21 RX ADMIN — SUCRALFATE 1 G: 1 TABLET ORAL at 17:24

## 2019-09-21 RX ADMIN — LEVETIRACETAM 1500 MG: 750 TABLET, FILM COATED ORAL at 21:35

## 2019-09-21 RX ADMIN — VENLAFAXINE 75 MG: 75 TABLET ORAL at 08:13

## 2019-09-21 RX ADMIN — POTASSIUM CHLORIDE 40 MEQ: 1.5 POWDER, FOR SOLUTION ORAL at 12:14

## 2019-09-21 RX ADMIN — VENLAFAXINE 75 MG: 75 TABLET ORAL at 17:24

## 2019-09-21 RX ADMIN — NICOTINE 1 PATCH: 21 PATCH, EXTENDED RELEASE TRANSDERMAL at 08:14

## 2019-09-21 RX ADMIN — TRAZODONE HYDROCHLORIDE 50 MG: 50 TABLET ORAL at 21:35

## 2019-09-21 NOTE — CONSULTS
"                  Clinical Nutrition     Reason for Visit:   Physician consult, Need for education- post-gastrectomy diet education    Patient Name: Jamison Edward  YOB: 1970  MRN: [id number]  Date of Encounter: 09/21/19 11:37 AM  Admission date: 9/17/2019    Nutrition Assessment   Assessment     Admission diagnosis  Chronic peptic ulcer / gastric outlet obstruction    Additional diagnosis/conditions/procedures  Dystonia    (9/17) s/p open vagotomy, antrectomy, akash-en-y, gastrojejunostomy  (9/17) NGT placed  (9/20) s/p UGI to evaluate patency of anastomosis    Additional PMH/procedures:  HTN  GERD  Peptic ulcer disease  Barretts esophagus  Degenerative disc disease  COPD  TIA    CCY  Appy    Reported/Observed/Food/Nutrition Related History:     RD spoke with pt and pt's wife at the bedside. Pt reports that he is tolerating clear liquids, no N/V and no abdominal pain. States that this AM he consumed- broth, apple juice, Boost Breeze, coffee, and icee. Pt reports that he feels like he could tolerate solid foods. Wife states that she bought a case of Boost High Protein oral nutrition supplements for pt to drink at home.       Anthropometrics     Height: 167.6 cm (66\")  Last filed wt: Weight: 67.6 kg (149 lb) (09/17/19 0628)  Weight Method: Stated    BMI: BMI (Calculated): 24.1  Normal: 18.5-24.9kg/m2    Ideal Body Weight (IBW) (kg): 65.3  Admission wt: 149 lb  Method obtained: stated weight per charting 9/12    Weight change: Wife reports that pt lost 71 lb from October 2017 to October 2018. Pt went from 195 lb to 124 lb. Pt and wife state that pt was eating the same and that they went to multiple doctors and no one was able to identify why pt was losing wt. Wife states that pt started to gain wt back over the past 1 year, and pt has so far gained back 25 lb.     Date Weight (kg) Weight (lbs) Weight Method   9/17/2019 67.586 kg 149 lb Stated   9/12/2019 67.9 kg 149 lb 11.1 oz Standing scale "   1/23/2019 67.314 kg 148 lb 6.4 oz -   10/10/2018 62.143 kg 137 lb -   8/22/2018 57.924 kg 127 lb 11.2 oz -   7/24/2018 58.605 kg 129 lb 3.2 oz -   7/17/2018 59.739 kg 131 lb 11.2 oz -   7/3/2018 62.143 kg 137 lb -   7/2/2018 60.782 kg 134 lb Stated   6/12/2018 62.143 kg 137 lb -   6/5/2018 62.143 kg 137 lb Stated   5/22/2018 62.143 kg 137 lb -   5/4/2018 65.182 kg 143 lb 11.2 oz -   4/27/2018 65.681 kg 144 lb 12.8 oz -   4/27/2018 66.044 kg 145 lb 9.6 oz -       Labs reviewed   Replacing K+    Results from last 7 days   Lab Units 09/21/19  0536 09/20/19  0626 09/19/19  0430   GLUCOSE mg/dL 94 136* 114*   BUN mg/dL 6 8 6   CREATININE mg/dL 0.46* 0.51* 0.45*   SODIUM mmol/L 139 143 140   CHLORIDE mmol/L 96* 103 101   POTASSIUM mmol/L 2.4* 3.4* 4.0         Lab Results   Lab Value Date/Time    HGBA1C 5.10 09/12/2019 0844       Medications reviewed   Pertinent: colace, pepcid, keppra, carafate  PRN: [id number]      Current Nutrition Prescription     PO: Clear liquids/post-gastrectomy diet    Nutrition Diagnosis     9/20, 9/21  Problem Inadequate oral intake   Etiology GI status   Signs/Symptoms NPO / Clear liquid x4/d       9/21  Problem Food and nutrition knowledge deficit   Etiology No prior need for nutrition education   Signs/Symptoms S/p open vagotomy, antrectomy, akash-en-Y gastrojejunostomy (9/17)       Nutrition Intervention   1.  Follow treatment progress, Care plan reviewed, Interview for preferences, Education provided   -RD discussed post-gastrectomy and akash-en-Y nutrition education, handouts and education materials provided from the [employer]  -Emphasized:    -small, frequent meals    -no carbonated beverages    -drinking fluids between meals    -drinking oral nutrition supplements between meals    -Avoiding high-concentrated sweets/sugars    -Advised pt of the signs/symptoms of dumping syndrome    -RD provided pt education materials for life-long post-akash-en-Y vitamin/mineral  supplementation:    -2 MV with iron per day    -100 mg/day thiamin    -500 mg 3x/day calcium citrate    -1000 mcg/day B12    -1000 IU/day vitamin D3      Goal:   General: Nutrition support treatment  PO: Establish PO, Advace diet as medically feasible/appropriate      Monitoring/Evaluation:   Per protocol, I&O, PO intake, Supplement intake, Pertinent labs, Weight, GI status, Symptoms    Will Continue to follow per protocol    Cristina Dixon, MS RD/LD CNSC  Time Spent: 60 minutes

## 2019-09-21 NOTE — NURSING NOTE
Patient continues to refuse IVF as on dayshift yesterday per RN bedside report.  Also refusing scuds, pepcid, continuous pulse ox, and heparin shots.

## 2019-09-21 NOTE — PROGRESS NOTES
"Patient Name:  Jamison Edward  YOB: 1970  [id number]    Surgery Progress Note    Date of visit: 9/21/2019    Subjective   Subjective: Feeling better. Tolerating sips of PO clears without nausea.         Objective     Objective:     /74 (Patient Position: Sitting)   Pulse 94   Temp 98.6 °F (37 °C) (Oral)   Resp 18   Ht 167.6 cm (66\")   Wt 67.6 kg (149 lb)   SpO2 100%   BMI 24.05 kg/m²     Intake/Output Summary (Last 24 hours) at 9/21/2019 0929  Last data filed at 9/20/2019 1933  Gross per 24 hour   Intake --   Output 330 ml   Net -330 ml       CV:  Rhythm  regular and rate regular   L:  Clear  to auscultation bilaterally   Abd:  Bowel sounds positive , soft, minimally tender. MISTY serosanguinous  Ext:  No cyanosis, clubbing, edema    Recent labs that are back at this time have been reviewed. Hypokalemic with K 2.4, HGB 10       Assessment/Plan     Assessment/ Plan:    Hospital Problem List     * (Principal) Peptic ulcer without hemorrhage or perforation - Doing well after V&A, RYGJ. Replace K. Change IVF. Slowly advance diet. Dietitian to see to day for post-gastrectomy teaching. Hopefully will be ready for D/C by Monday.      Dystonia    Gastric outlet obstruction              Herbert Umanzor MD  9/21/2019  9:29 AM      "

## 2019-09-21 NOTE — PLAN OF CARE
Problem: Patient Care Overview  Goal: Plan of Care Review  Outcome: Ongoing (interventions implemented as appropriate)   09/21/19 1734   OTHER   Outcome Summary Patient c/o pain, treated with medication. Patient is tolerating clear diet well. Patient has walked hallway several times throughout the day. VSS. Will continue to monitor.    Coping/Psychosocial   Plan of Care Reviewed With patient   Plan of Care Review   Progress improving       Problem: Pain, Acute (Adult)  Goal: Identify Related Risk Factors and Signs and Symptoms  Outcome: Ongoing (interventions implemented as appropriate)   09/21/19 1734   Pain, Acute (Adult)   Related Risk Factors (Acute Pain) surgery;disease process   Signs and Symptoms (Acute Pain) verbalization of pain descriptors     Goal: Acceptable Pain Control/Comfort Level   09/21/19 1734   Pain, Acute (Adult)   Acceptable Pain Control/Comfort Level making progress toward outcome

## 2019-09-21 NOTE — PLAN OF CARE
Problem: Patient Care Overview  Goal: Plan of Care Review  Outcome: Ongoing (interventions implemented as appropriate)   09/21/19 7186   OTHER   Outcome Summary Pain well controlled. Denies nausea and vomitting. Denies flatus. Ambulating in halls. Refusing scuds. Refusing continuous pulse ox.    Coping/Psychosocial   Plan of Care Reviewed With patient;spouse   Plan of Care Review   Progress improving     Goal: Individualization and Mutuality  Outcome: Ongoing (interventions implemented as appropriate)    Goal: Discharge Needs Assessment  Outcome: Ongoing (interventions implemented as appropriate)    Goal: Interprofessional Rounds/Family Conf  Outcome: Ongoing (interventions implemented as appropriate)      Problem: Pain, Acute (Adult)  Goal: Identify Related Risk Factors and Signs and Symptoms  Outcome: Ongoing (interventions implemented as appropriate)    Goal: Acceptable Pain Control/Comfort Level  Outcome: Ongoing (interventions implemented as appropriate)    Goal: Identify Related Risk Factors and Signs and Symptoms  Outcome: Ongoing (interventions implemented as appropriate)    Goal: Acceptable Pain Control/Comfort Level  Outcome: Ongoing (interventions implemented as appropriate)      Problem: Surgery Nonspecified (Adult)  Goal: Signs and Symptoms of Listed Potential Problems Will be Absent, Minimized or Managed (Surgery Nonspecified)  Outcome: Ongoing (interventions implemented as appropriate)    Goal: Anesthesia/Sedation Recovery  Outcome: Ongoing (interventions implemented as appropriate)

## 2019-09-22 LAB
ANION GAP SERPL CALCULATED.3IONS-SCNC: 9 MMOL/L (ref 5–15)
BUN BLD-MCNC: 2 MG/DL (ref 6–20)
BUN/CREAT SERPL: 3.8 (ref 7–25)
CALCIUM SPEC-SCNC: 8.6 MG/DL (ref 8.6–10.5)
CHLORIDE SERPL-SCNC: 96 MMOL/L (ref 98–107)
CO2 SERPL-SCNC: 33 MMOL/L (ref 22–29)
CREAT BLD-MCNC: 0.52 MG/DL (ref 0.76–1.27)
DEPRECATED RDW RBC AUTO: 36.6 FL (ref 37–54)
ERYTHROCYTE [DISTWIDTH] IN BLOOD BY AUTOMATED COUNT: 11 % (ref 12.3–15.4)
GFR SERPL CREATININE-BSD FRML MDRD: >150 ML/MIN/1.73
GLUCOSE BLD-MCNC: 124 MG/DL (ref 65–99)
HCT VFR BLD AUTO: 28.7 % (ref 37.5–51)
HGB BLD-MCNC: 9.8 G/DL (ref 13–17.7)
MCH RBC QN AUTO: 30.8 PG (ref 26.6–33)
MCHC RBC AUTO-ENTMCNC: 34.1 G/DL (ref 31.5–35.7)
MCV RBC AUTO: 90.3 FL (ref 79–97)
PLATELET # BLD AUTO: 232 10*3/MM3 (ref 140–450)
PMV BLD AUTO: 9.3 FL (ref 6–12)
POTASSIUM BLD-SCNC: 2.6 MMOL/L (ref 3.5–5.2)
POTASSIUM BLD-SCNC: 3.2 MMOL/L (ref 3.5–5.2)
RBC # BLD AUTO: 3.18 10*6/MM3 (ref 4.14–5.8)
SODIUM BLD-SCNC: 138 MMOL/L (ref 136–145)
WBC NRBC COR # BLD: 7.85 10*3/MM3 (ref 3.4–10.8)

## 2019-09-22 PROCEDURE — 25010000002 HEPARIN (PORCINE) PER 1000 UNITS: Performed by: SURGERY

## 2019-09-22 PROCEDURE — 85027 COMPLETE CBC AUTOMATED: CPT | Performed by: SURGERY

## 2019-09-22 PROCEDURE — 84132 ASSAY OF SERUM POTASSIUM: CPT | Performed by: SURGERY

## 2019-09-22 PROCEDURE — 80048 BASIC METABOLIC PNL TOTAL CA: CPT | Performed by: SURGERY

## 2019-09-22 RX ADMIN — POTASSIUM CHLORIDE 40 MEQ: 750 CAPSULE, EXTENDED RELEASE ORAL at 16:02

## 2019-09-22 RX ADMIN — SUCRALFATE 1 G: 1 TABLET ORAL at 11:20

## 2019-09-22 RX ADMIN — VENLAFAXINE 75 MG: 75 TABLET ORAL at 17:22

## 2019-09-22 RX ADMIN — POTASSIUM CHLORIDE 40 MEQ: 750 CAPSULE, EXTENDED RELEASE ORAL at 08:44

## 2019-09-22 RX ADMIN — NICOTINE 1 PATCH: 21 PATCH, EXTENDED RELEASE TRANSDERMAL at 08:46

## 2019-09-22 RX ADMIN — POTASSIUM CHLORIDE 40 MEQ: 750 CAPSULE, EXTENDED RELEASE ORAL at 11:20

## 2019-09-22 RX ADMIN — SUCRALFATE 1 G: 1 TABLET ORAL at 21:08

## 2019-09-22 RX ADMIN — HEPARIN SODIUM 5000 UNITS: 5000 INJECTION, SOLUTION INTRAVENOUS; SUBCUTANEOUS at 21:08

## 2019-09-22 RX ADMIN — FAMOTIDINE 20 MG: 20 TABLET ORAL at 08:47

## 2019-09-22 RX ADMIN — LEVETIRACETAM 1500 MG: 750 TABLET, FILM COATED ORAL at 08:45

## 2019-09-22 RX ADMIN — HYDROCODONE BITARTRATE AND ACETAMINOPHEN 15 ML: 7.5; 325 SOLUTION ORAL at 17:22

## 2019-09-22 RX ADMIN — SUCRALFATE 1 G: 1 TABLET ORAL at 17:22

## 2019-09-22 RX ADMIN — HYDROCHLOROTHIAZIDE 25 MG: 25 TABLET ORAL at 08:46

## 2019-09-22 RX ADMIN — POTASSIUM CHLORIDE 40 MEQ: 750 CAPSULE, EXTENDED RELEASE ORAL at 20:17

## 2019-09-22 RX ADMIN — BACLOFEN 10 MG: 10 TABLET ORAL at 21:08

## 2019-09-22 RX ADMIN — VENLAFAXINE 75 MG: 75 TABLET ORAL at 08:45

## 2019-09-22 RX ADMIN — BACLOFEN 10 MG: 10 TABLET ORAL at 05:11

## 2019-09-22 RX ADMIN — HYDROCODONE BITARTRATE AND ACETAMINOPHEN 15 ML: 7.5; 325 SOLUTION ORAL at 21:08

## 2019-09-22 RX ADMIN — DOCUSATE SODIUM 100 MG: 50 LIQUID ORAL at 08:47

## 2019-09-22 RX ADMIN — BACLOFEN 10 MG: 10 TABLET ORAL at 14:00

## 2019-09-22 RX ADMIN — TRAZODONE HYDROCHLORIDE 50 MG: 50 TABLET ORAL at 21:08

## 2019-09-22 RX ADMIN — FAMOTIDINE 20 MG: 20 TABLET ORAL at 17:22

## 2019-09-22 RX ADMIN — LEVETIRACETAM 1500 MG: 750 TABLET, FILM COATED ORAL at 21:08

## 2019-09-22 RX ADMIN — SUCRALFATE 1 G: 1 TABLET ORAL at 08:45

## 2019-09-22 RX ADMIN — HEPARIN SODIUM 5000 UNITS: 5000 INJECTION, SOLUTION INTRAVENOUS; SUBCUTANEOUS at 05:11

## 2019-09-22 RX ADMIN — HEPARIN SODIUM 5000 UNITS: 5000 INJECTION, SOLUTION INTRAVENOUS; SUBCUTANEOUS at 14:00

## 2019-09-22 NOTE — PLAN OF CARE
Problem: Patient Care Overview  Goal: Plan of Care Review  Outcome: Ongoing (interventions implemented as appropriate)   09/22/19 1708   OTHER   Outcome Summary ambulating in halls, pain controlled, tolerating full liquids, potassium low even after potassium replacement.    Coping/Psychosocial   Plan of Care Reviewed With patient   Plan of Care Review   Progress improving       Problem: Pain, Acute (Adult)  Goal: Acceptable Pain Control/Comfort Level  Outcome: Ongoing (interventions implemented as appropriate)   09/22/19 1708   Pain, Acute (Adult)   Acceptable Pain Control/Comfort Level making progress toward outcome

## 2019-09-22 NOTE — PLAN OF CARE
Problem: Patient Care Overview  Goal: Plan of Care Review  Outcome: Ongoing (interventions implemented as appropriate)   09/22/19 0320   OTHER   Outcome Summary VSS, ambulated in trujillo. Incision CDI. Resting well on shift.No nausea or pain.   Coping/Psychosocial   Plan of Care Reviewed With patient   Plan of Care Review   Progress improving      09/22/19 0320   OTHER   Outcome Summary VSS, ambulated in trujillo. Incision CDI. Resting well on shift. No nausea or pain.   Coping/Psychosocial   Plan of Care Reviewed With patient   Plan of Care Review   Progress improving     Goal: Individualization and Mutuality  Outcome: Ongoing (interventions implemented as appropriate)    Goal: Discharge Needs Assessment  Outcome: Ongoing (interventions implemented as appropriate)    Goal: Interprofessional Rounds/Family Conf  Outcome: Ongoing (interventions implemented as appropriate)      Problem: Pain, Acute (Adult)  Goal: Identify Related Risk Factors and Signs and Symptoms  Outcome: Ongoing (interventions implemented as appropriate)      Problem: Surgery Nonspecified (Adult)  Goal: Signs and Symptoms of Listed Potential Problems Will be Absent, Minimized or Managed (Surgery Nonspecified)  Outcome: Ongoing (interventions implemented as appropriate)    Goal: Anesthesia/Sedation Recovery  Outcome: Ongoing (interventions implemented as appropriate)

## 2019-09-22 NOTE — PROGRESS NOTES
"Patient Name:  Jamison Edward  YOB: 1970  [id number]    Surgery Progress Note    Date of visit: 9/22/2019    Subjective   Subjective: Tolerating clears. No nausea or vomiting. Bowels working.         Objective     Objective:     /74 (BP Location: Right arm, Patient Position: Sitting)   Pulse 115   Temp 98.8 °F (37.1 °C) (Oral)   Resp 16   Ht 167.6 cm (66\")   Wt 67.6 kg (149 lb)   SpO2 97%   BMI 24.05 kg/m²     Intake/Output Summary (Last 24 hours) at 9/22/2019 0807  Last data filed at 9/21/2019 2300  Gross per 24 hour   Intake 200 ml   Output 25 ml   Net 175 ml       CV:  Rhythm  regular and rate mildly tachy   L:  Clear  to auscultation bilaterally   Abd:  Bowel sounds positive , soft, minimally tender. Incision c/d/i, MISTY serosanguinous  Ext:  No cyanosis, clubbing, edema    Recent labs that are back at this time have been reviewed. K 3.1. HGB 9.8       Assessment/Plan     Assessment/ Plan:    Hospital Problem List     * (Principal) Peptic ulcer without hemorrhage or perforation - Doing well. Advance to full liquids. If continued improvement, will D/C MISTY and hopefully D/C home tomorrow.    Dystonia    Gastric outlet obstruction              Herbert Umanzor MD  9/22/2019  8:07 AM      "

## 2019-09-23 VITALS
WEIGHT: 149 LBS | SYSTOLIC BLOOD PRESSURE: 123 MMHG | DIASTOLIC BLOOD PRESSURE: 67 MMHG | RESPIRATION RATE: 16 BRPM | OXYGEN SATURATION: 95 % | HEIGHT: 66 IN | HEART RATE: 107 BPM | BODY MASS INDEX: 23.95 KG/M2 | TEMPERATURE: 99.1 F

## 2019-09-23 LAB
ANION GAP SERPL CALCULATED.3IONS-SCNC: 9 MMOL/L (ref 5–15)
BUN BLD-MCNC: 2 MG/DL (ref 6–20)
BUN/CREAT SERPL: 3.9 (ref 7–25)
CALCIUM SPEC-SCNC: 8.9 MG/DL (ref 8.6–10.5)
CHLORIDE SERPL-SCNC: 99 MMOL/L (ref 98–107)
CO2 SERPL-SCNC: 32 MMOL/L (ref 22–29)
CREAT BLD-MCNC: 0.51 MG/DL (ref 0.76–1.27)
DEPRECATED RDW RBC AUTO: 36.4 FL (ref 37–54)
ERYTHROCYTE [DISTWIDTH] IN BLOOD BY AUTOMATED COUNT: 11 % (ref 12.3–15.4)
GFR SERPL CREATININE-BSD FRML MDRD: >150 ML/MIN/1.73
GLUCOSE BLD-MCNC: 120 MG/DL (ref 65–99)
HCT VFR BLD AUTO: 30 % (ref 37.5–51)
HGB BLD-MCNC: 10.2 G/DL (ref 13–17.7)
MCH RBC QN AUTO: 30.8 PG (ref 26.6–33)
MCHC RBC AUTO-ENTMCNC: 34 G/DL (ref 31.5–35.7)
MCV RBC AUTO: 90.6 FL (ref 79–97)
PLATELET # BLD AUTO: 271 10*3/MM3 (ref 140–450)
PMV BLD AUTO: 9.2 FL (ref 6–12)
POTASSIUM BLD-SCNC: 3.6 MMOL/L (ref 3.5–5.2)
POTASSIUM BLD-SCNC: 3.6 MMOL/L (ref 3.5–5.2)
RBC # BLD AUTO: 3.31 10*6/MM3 (ref 4.14–5.8)
SODIUM BLD-SCNC: 140 MMOL/L (ref 136–145)
WBC NRBC COR # BLD: 9.05 10*3/MM3 (ref 3.4–10.8)

## 2019-09-23 PROCEDURE — 80048 BASIC METABOLIC PNL TOTAL CA: CPT | Performed by: SURGERY

## 2019-09-23 PROCEDURE — 84132 ASSAY OF SERUM POTASSIUM: CPT | Performed by: SURGERY

## 2019-09-23 PROCEDURE — 85027 COMPLETE CBC AUTOMATED: CPT | Performed by: SURGERY

## 2019-09-23 PROCEDURE — 25010000002 HEPARIN (PORCINE) PER 1000 UNITS: Performed by: SURGERY

## 2019-09-23 RX ORDER — PROMETHAZINE HYDROCHLORIDE 6.25 MG/5ML
12.5 SYRUP ORAL EVERY 6 HOURS PRN
Qty: 300 ML | Refills: 0 | Status: SHIPPED | OUTPATIENT
Start: 2019-09-23 | End: 2019-10-08

## 2019-09-23 RX ORDER — POTASSIUM CHLORIDE 750 MG/1
20 TABLET, FILM COATED, EXTENDED RELEASE ORAL 2 TIMES DAILY
Qty: 60 TABLET | Refills: 1 | Status: SHIPPED | OUTPATIENT
Start: 2019-09-23 | End: 2020-12-08

## 2019-09-23 RX ADMIN — LEVETIRACETAM 1500 MG: 750 TABLET, FILM COATED ORAL at 08:38

## 2019-09-23 RX ADMIN — SUCRALFATE 1 G: 1 TABLET ORAL at 08:39

## 2019-09-23 RX ADMIN — HYDROCHLOROTHIAZIDE 25 MG: 25 TABLET ORAL at 08:39

## 2019-09-23 RX ADMIN — FAMOTIDINE 20 MG: 20 TABLET ORAL at 08:39

## 2019-09-23 RX ADMIN — POTASSIUM CHLORIDE 40 MEQ: 750 CAPSULE, EXTENDED RELEASE ORAL at 02:32

## 2019-09-23 RX ADMIN — HEPARIN SODIUM 5000 UNITS: 5000 INJECTION, SOLUTION INTRAVENOUS; SUBCUTANEOUS at 05:06

## 2019-09-23 RX ADMIN — BACLOFEN 10 MG: 10 TABLET ORAL at 05:06

## 2019-09-23 RX ADMIN — POTASSIUM CHLORIDE 40 MEQ: 750 CAPSULE, EXTENDED RELEASE ORAL at 08:37

## 2019-09-23 RX ADMIN — DOCUSATE SODIUM 100 MG: 50 LIQUID ORAL at 08:38

## 2019-09-23 NOTE — DISCHARGE SUMMARY
Discharge Summary    Patient Name:  Jamison Edward  YOB: 1970  [id number]      DATE OF ADMISSION: 9/17/2019    DATE OF DISCHARGE: 9/23/2019       FINAL DIAGNOSES:   Patient Active Problem List   Diagnosis   • Chest pain   • Weight loss, abnormal   • Right upper quadrant abdominal pain   • Epigastric pain   • History of bleeding ulcers   • Nausea   • Malaise and fatigue   • Acute gastric ulcer without hemorrhage or perforation   • Poor appetite   • Duodenal ulcer   • Gastroesophageal reflux disease   • Dysphagia   • Iron deficiency anemia   • Malabsorption   • Gastric ulcer without hemorrhage or perforation   • Dystonia   • Peptic ulcer without hemorrhage or perforation   • Gastric outlet obstruction       CONSULTING SERVICES: None      PROCEDURES PERFORMED:   1.  Vagotomy, antrectomy, Steven-en-Y gastrojejunostomy performed on 9/17/2019    HISTORY: The patient is a very pleasant 49 y.o. male with a history of peptic ulcer with stricture that could not be relieved with medical means.  After complete preoperative work-up he was deemed an operative candidate.  The risks and benefits were discussed at length with the patient and his family, and they agreed to proceed.      HOSPITAL COURSE: The patient came in as an outpatient, underwent his operative procedure, was transferred to floor.  Postoperatively he did well.  His pain was initially controlled on IV PCA and transition to oral pain medication.  On postoperative day #3 underwent a Gastrografin upper GI which showed an excellent technical result without leak.  He was started on postgastrectomy clear liquid diet.  He was instructed by the nutrition service on a postgastrectomy diet.  He had some difficulty with hypokalemia which was treated with supplementation successfully.  He continued to improve, his MISTY drain was removed and he was ready for discharge home on 9/23/2019.     CONDITION: At the time of discharge, the patient is tolerating a  post-gastrectomy diet without difficulty. he is having normal bowel and bladder function. his incisions are clean, dry and intact.      DISCHARGE MEDICATIONS:   1. All previous home medications.   2.  Hydrocodone elixir 7.5 mg p.o. every 6 hours as needed for pain  3. Colace 100mg PO BID  4.  Potassium chloride 20 mEq p.o. twice daily     DISCHARGE INSTRUCTIONS:  1. The patient is instructed to follow up with Dr. Umanzor in 2 weeks’ time.  2. he is instructed to refrain from lifting more than 15 or 20 pounds until their return to clinic.   3. If the patient has worsening problems with fever or chills nausea or vomiting he is to contact Dr. Umanzor's office and a contact card has been provided.  4.  He is to stop smoking.      Herbert Umanzor MD  9/23/2019  7:00 AM      Please note that portions of this note were completed with a voice recognition program. Efforts were made to edit the dictations, but occasionally words are mistranscribed.

## 2019-09-23 NOTE — PROGRESS NOTES
Continued Stay Note  [facility]     Patient Name: Jamison Edward  MRN: [id number]  Today's Date: 9/23/2019    Admit Date: 9/17/2019    Discharge Plan     Row Name 09/23/19 0800       Plan    Plan  Home with family    Patient/Family in Agreement with Plan  yes    Plan Comments  I spoke with patient this am. He has been up walking hallway with his wife. They tell me no discharge planning needs at this time. He's anxious to get home.     Final Discharge Disposition Code  01 - home or self-care        Discharge Codes    No documentation.       Expected Discharge Date and Time     Expected Discharge Date Expected Discharge Time    Sep 23, 2019             Diana Ames RN

## 2019-09-23 NOTE — PLAN OF CARE
Problem: Patient Care Overview  Goal: Plan of Care Review  Outcome: Ongoing (interventions implemented as appropriate)   09/23/19 0328   OTHER   Outcome Summary pt anticpating dc in am. potassium replacement given. rested well    Coping/Psychosocial   Plan of Care Reviewed With patient   Plan of Care Review   Progress improving     Goal: Individualization and Mutuality  Outcome: Ongoing (interventions implemented as appropriate)    Goal: Discharge Needs Assessment  Outcome: Ongoing (interventions implemented as appropriate)    Goal: Interprofessional Rounds/Family Conf  Outcome: Ongoing (interventions implemented as appropriate)      Problem: Pain, Acute (Adult)  Goal: Identify Related Risk Factors and Signs and Symptoms  Outcome: Ongoing (interventions implemented as appropriate)    Goal: Acceptable Pain Control/Comfort Level  Outcome: Ongoing (interventions implemented as appropriate)    Goal: Identify Related Risk Factors and Signs and Symptoms  Outcome: Ongoing (interventions implemented as appropriate)    Goal: Acceptable Pain Control/Comfort Level  Outcome: Ongoing (interventions implemented as appropriate)      Problem: Surgery Nonspecified (Adult)  Goal: Signs and Symptoms of Listed Potential Problems Will be Absent, Minimized or Managed (Surgery Nonspecified)  Outcome: Ongoing (interventions implemented as appropriate)    Goal: Anesthesia/Sedation Recovery  Outcome: Ongoing (interventions implemented as appropriate)

## 2019-09-23 NOTE — PROGRESS NOTES
"Patient Name:  Jamison Edward  YOB: 1970  [id number]    Surgery Progress Note    Date of visit: 9/23/2019    Subjective   Subjective: Feeling OK, tolerating PO, wants to go home.         Objective     Objective:     /67 (BP Location: Right arm, Patient Position: Lying)   Pulse 107   Temp 99.1 °F (37.3 °C) (Oral)   Resp 16   Ht 167.6 cm (66\")   Wt 67.6 kg (149 lb)   SpO2 95%   BMI 24.05 kg/m²     Intake/Output Summary (Last 24 hours) at 9/23/2019 0656  Last data filed at 9/22/2019 1700  Gross per 24 hour   Intake 1610 ml   Output 5 ml   Net 1605 ml       CV:  Rhythm regular and rate regular   L:  Clear  to auscultation bilaterally   Abd:  Bowel sounds positive , soft, incision c/d/i, MISTY serosanguinous, scant  Ext:  No cyanosis, clubbing, edema    Recent labs that are back at this time have been reviewed. K 3.6       Assessment/Plan     Assessment/ Plan:    Hospital Problem List     * (Principal) Peptic ulcer without hemorrhage or perforation - Doing well. D/C home. RTC 2 weeks.    Dystonia    Gastric outlet obstruction              Herbert Umanzor MD  9/23/2019  6:56 AM      "

## 2019-09-24 ENCOUNTER — READMISSION MANAGEMENT (OUTPATIENT)
Dept: CALL CENTER | Facility: HOSPITAL | Age: 49
End: 2019-09-24

## 2019-09-25 NOTE — OUTREACH NOTE
Prep Survey      Responses   Facility patient discharged from?  [facility]   Is patient eligible?  Yes   Discharge diagnosis   Vagotomy, antrectomy, Steven-en-Y gastrojejunostomy    Does the patient have one of the following disease processes/diagnoses(primary or secondary)?  General Surgery   Does the patient have Home health ordered?  No   Is there a DME ordered?  No   Prep survey completed?  Yes          Selena Collins RN

## 2019-09-26 ENCOUNTER — READMISSION MANAGEMENT (OUTPATIENT)
Dept: CALL CENTER | Facility: HOSPITAL | Age: 49
End: 2019-09-26

## 2019-09-26 NOTE — OUTREACH NOTE
General Surgery Week 1 Survey      Responses   Facility patient discharged from?  [facility]   Does the patient have one of the following disease processes/diagnoses(primary or secondary)?  General Surgery   Is there a successful TCM telephone encounter documented?  No   Week 1 attempt successful?  No   Unsuccessful attempts  Attempt 1          Mar yKate Ames RN

## 2019-09-29 ENCOUNTER — READMISSION MANAGEMENT (OUTPATIENT)
Dept: CALL CENTER | Facility: HOSPITAL | Age: 49
End: 2019-09-29

## 2019-09-29 NOTE — OUTREACH NOTE
General Surgery Week 1 Survey      Responses   Facility patient discharged from?  [facility]   Does the patient have one of the following disease processes/diagnoses(primary or secondary)?  General Surgery   Is there a successful TCM telephone encounter documented?  No   Week 1 attempt successful?  Yes   Call start time  1718   Call end time  1719   Discharge diagnosis   Vagotomy, antrectomy, Steven-en-Y gastrojejunostomy    Is patient permission given to speak with other caregiver?  Yes   List who call center can speak with  Lena- Wife   Person spoke with today (if not patient) and relationship  Lena- Wife    Meds reviewed with patient/caregiver?  Yes   Is the patient having any side effects they believe may be caused by any medication additions or changes?  No   Does the patient have all medications related to this admission filled (includes all antibiotics, pain medications, etc.)  Yes   Is the patient taking all medications as directed (includes completed medication regime)?  Yes   Does the patient have a follow up appointment scheduled with their surgeon?  Yes   Has the patient kept scheduled appointments due by today?  N/A   Psychosocial issues?  No   Did the patient receive a copy of their discharge instructions?  Yes   Nursing interventions  Reviewed instructions with patient   What is the patient's perception of their health status since discharge?  Improving   Nursing interventions  Nurse provided patient education   Is the patient /caregiver able to teach back basic post-op care?  Continue use of incentive spirometry at least 1 week post discharge, Practice 'cough and deep breath'   Is the patient/caregiver able to teach back signs and symptoms of incisional infection?  Increased redness, swelling or pain at the incisonal site, Increased drainage or bleeding, Incisional warmth, Pus or odor from incision, Fever   Is the patient/caregiver able to teach back steps to recovery at home?  Set small,  achievable goals for return to baseline health, Rest and rebuild strength, gradually increase activity   Is the patient/caregiver able to teach back the hierarchy of who to call/visit for symptoms/problems? PCP, Specialist, Home health nurse, Urgent Care, ED, 911  Yes   Week 1 call completed?  Yes          Thelma Davis RN

## 2019-10-08 ENCOUNTER — READMISSION MANAGEMENT (OUTPATIENT)
Dept: CALL CENTER | Facility: HOSPITAL | Age: 49
End: 2019-10-08

## 2019-10-08 ENCOUNTER — OFFICE VISIT (OUTPATIENT)
Dept: ONCOLOGY | Facility: CLINIC | Age: 49
End: 2019-10-08

## 2019-10-08 VITALS
HEART RATE: 117 BPM | TEMPERATURE: 98.9 F | OXYGEN SATURATION: 98 % | DIASTOLIC BLOOD PRESSURE: 73 MMHG | BODY MASS INDEX: 22.74 KG/M2 | RESPIRATION RATE: 18 BRPM | SYSTOLIC BLOOD PRESSURE: 117 MMHG | WEIGHT: 140.9 LBS

## 2019-10-08 DIAGNOSIS — D50.9 IRON DEFICIENCY ANEMIA, UNSPECIFIED IRON DEFICIENCY ANEMIA TYPE: Primary | ICD-10-CM

## 2019-10-08 DIAGNOSIS — D64.9 NORMOCYTIC ANEMIA: ICD-10-CM

## 2019-10-08 DIAGNOSIS — E53.8 B12 DEFICIENCY: ICD-10-CM

## 2019-10-08 DIAGNOSIS — D50.0 IRON DEFICIENCY ANEMIA DUE TO CHRONIC BLOOD LOSS: ICD-10-CM

## 2019-10-08 LAB
BASOPHILS # BLD AUTO: 0.03 10*3/MM3 (ref 0–0.2)
BASOPHILS NFR BLD AUTO: 0.3 % (ref 0–1.5)
DEPRECATED RDW RBC AUTO: 41.7 FL (ref 37–54)
EOSINOPHIL # BLD AUTO: 0.07 10*3/MM3 (ref 0–0.4)
EOSINOPHIL NFR BLD AUTO: 0.7 % (ref 0.3–6.2)
ERYTHROCYTE [DISTWIDTH] IN BLOOD BY AUTOMATED COUNT: 12.7 % (ref 12.3–15.4)
FERRITIN SERPL-MCNC: 279.2 NG/ML (ref 30–400)
HCT VFR BLD AUTO: 39.2 % (ref 37.5–51)
HGB BLD-MCNC: 12.5 G/DL (ref 13–17.7)
IMM GRANULOCYTES # BLD AUTO: 0.02 10*3/MM3 (ref 0–0.05)
IMM GRANULOCYTES NFR BLD AUTO: 0.2 % (ref 0–0.5)
IRON 24H UR-MRATE: 26 MCG/DL (ref 59–158)
IRON SATN MFR SERPL: 10 % (ref 20–50)
LYMPHOCYTES # BLD AUTO: 1.45 10*3/MM3 (ref 0.7–3.1)
LYMPHOCYTES NFR BLD AUTO: 13.9 % (ref 19.6–45.3)
MCH RBC QN AUTO: 29.4 PG (ref 26.6–33)
MCHC RBC AUTO-ENTMCNC: 31.9 G/DL (ref 31.5–35.7)
MCV RBC AUTO: 92.2 FL (ref 79–97)
MONOCYTES # BLD AUTO: 0.72 10*3/MM3 (ref 0.1–0.9)
MONOCYTES NFR BLD AUTO: 6.9 % (ref 5–12)
NEUTROPHILS # BLD AUTO: 8.13 10*3/MM3 (ref 1.7–7)
NEUTROPHILS NFR BLD AUTO: 78 % (ref 42.7–76)
PLATELET # BLD AUTO: 438 10*3/MM3 (ref 140–450)
PMV BLD AUTO: 8.3 FL (ref 6–12)
RBC # BLD AUTO: 4.25 10*6/MM3 (ref 4.14–5.8)
TIBC SERPL-MCNC: 270 MCG/DL (ref 298–536)
TRANSFERRIN SERPL-MCNC: 181 MG/DL (ref 200–360)
WBC NRBC COR # BLD: 10.42 10*3/MM3 (ref 3.4–10.8)

## 2019-10-08 PROCEDURE — 83540 ASSAY OF IRON: CPT | Performed by: INTERNAL MEDICINE

## 2019-10-08 PROCEDURE — 84132 ASSAY OF SERUM POTASSIUM: CPT | Performed by: INTERNAL MEDICINE

## 2019-10-08 PROCEDURE — 82728 ASSAY OF FERRITIN: CPT | Performed by: INTERNAL MEDICINE

## 2019-10-08 PROCEDURE — 82746 ASSAY OF FOLIC ACID SERUM: CPT | Performed by: INTERNAL MEDICINE

## 2019-10-08 PROCEDURE — 82607 VITAMIN B-12: CPT | Performed by: INTERNAL MEDICINE

## 2019-10-08 PROCEDURE — 99214 OFFICE O/P EST MOD 30 MIN: CPT | Performed by: INTERNAL MEDICINE

## 2019-10-08 PROCEDURE — 85025 COMPLETE CBC W/AUTO DIFF WBC: CPT | Performed by: INTERNAL MEDICINE

## 2019-10-08 PROCEDURE — 84466 ASSAY OF TRANSFERRIN: CPT | Performed by: INTERNAL MEDICINE

## 2019-10-08 NOTE — OUTREACH NOTE
General Surgery Week 2 Survey      Responses   Facility patient discharged from?  [facility]   Does the patient have one of the following disease processes/diagnoses(primary or secondary)?  General Surgery   Week 2 attempt successful?  No   Unsuccessful attempts  Attempt 1          Milan Xavier RN

## 2019-10-08 NOTE — PROGRESS NOTES
Jamison Edward  [id number]  1970  10/8/2019      Referring Provider:   Kenton Boogie MD    Primary Physician:  Lázaro Heart MD    Reason for Follow Up:   Normocytic anemia  Iron deficiency  B12 deficiency    Chief Complaint:   Weight Loss  Nausea      History of Present Illness:  Jamison Edward is a very pleasant 49 y.o.  male who presents in follow up appointment for further management and evaluation of normocytic anemia secondary to iron and B12 deficiency.    Mr. Edward has been following with Dr. Richey for history of peptic ulcer disease and hematemesis. He was placed on oral iron once daily several months ago and experienced some constipation with the medication however self discontinued this one month prior to his initial consultation. He has had gradual weight loss where he previously weighed 205 pounds two years ago and is currently down to 140 pounds. He states that he continues to have a good appetite and eat normally. He recently had an EGD in November 2017 for weight loss, hematemesis and abdominal pain which was significant for gastric and duodenal ulcers. He does have history of peptic ulcer disease and in the past and has also been followed by Dr. Starr for Johnson's esophagus. He also had an EGD in May 2017 that showed normal duodenal biopsies, negative H pylori, along with normal random colon biopsies. He also had a CT abd/pelvis performed in 11/2017 which showed possible colitis versus under distension of the right colon. He is currently on ASA 81mg daily due to a TIA in the past however denies of any other NSAID use. He also experiences chest pain every night and has been evaluated by cardiology and as per patient was felt to be non cardiac related. He underwent an EGD with Dr. Richey and was found to have gastric and duodenal ulcers and is to continue current treatment for the ulcers and was to undergo repeat endoscopy. He has also been following with  gastroenterology in [place] with Dr. Moran for further evaluation. He underwent an MRCP in August that was essentially unremarkable. He also reports he recently underwent an abdominal US with duplex that was reportedly negative however those reults are unavailable to me at present. He continues to have ongoing nausea and abdominal pain while eating although slightly improved.     Interim History:  Since his last visit he underwent a vagotomy, antrectomy, Steven-en-Y gastrojejunostomy for peptic ulcer with gastric outlet obstruction which was performed on 9/17/2019. He notes that he has been recovering since his hospitalization and has an appointment with his surgeon tomorrow for staple removal. He has been receiving B12 injections which his primary provider and is currently no on oral iron.        The following portions of the patient's history were reviewed and updated as appropriate: allergies, current medications, past family history, past medical history, past social history, past surgical history and problem list.    Allergies   Allergen Reactions   • Contrast Dye Other (See Comments)     Shortness of breath   • Protonix [Pantoprazole Sodium] Palpitations     Patient states that protonix causes chest pain.  Shruthi Pérez, [facility]  4/23/2017  11:19 AM     • Prilosec [Omeprazole] Other (See Comments)     Chest pain     • Flagyl [Metronidazole] Nausea And Vomiting       Past Medical History:   Diagnosis Date   • Johnson esophagus    • COPD (chronic obstructive pulmonary disease) (CMS/[facility])    • DDD (degenerative disc disease), thoracic    • Degenerative disc disease, lumbar    • GERD (gastroesophageal reflux disease)    • Hypertension    • Peptic ulcer disease    • TIA (transient ischemic attack)    • Wears dentures     upper only       Past Surgical History:   Procedure Laterality Date   • APPENDECTOMY     • CHOLECYSTECTOMY N/A 4/22/2017    Procedure: CHOLECYSTECTOMY LAPAROSCOPIC;  Surgeon: Jaqueline Guaman,  MD;  Location:  [facility];  Service:    • CHOLECYSTECTOMY     • COLONOSCOPY N/A 5/8/2017    Procedure: COLONOSCOPY  CPTCODE:93428;  Surgeon: Nicho Richey III, MD;  Location:  [facility];  Service:    • ENDOSCOPY     • ENDOSCOPY N/A 5/8/2017    Procedure: ESOPHAGOGASTRODUODENOSCOPY WITH BIOPSY  CPTCODE:42455;  Surgeon: Nicho Richey III, MD;  Location:  [facility];  Service:    • ENDOSCOPY N/A 11/3/2017    Procedure: ESOPHAGOGASTRODUODENOSCOPY WITH BIOPSY  CPTCODE: 98177;  Surgeon: Nciho Richey III, MD;  Location:  [facility];  Service:    • ENDOSCOPY N/A 2/20/2018    Procedure: ESOPHAGOGASTRODUODENOSCOPY WITH DILATATION CPT CODE: 06027;  Surgeon: Nicho Richey III, MD;  Location:  [facility];  Service:    • ENDOSCOPY N/A 6/5/2018    Procedure: ESOPHAGOGASTRODUODENOSCOPY WITH BIOPSY CPT CODE: 51877;  Surgeon: Nicho Richey III, MD;  Location: [facility] OR;  Service: Gastroenterology   • GASTRECTOMY N/A 9/17/2019    Procedure: OPEN VAGOTOMY, ANTRECTOMY,REVISION- Y GASTROJEJUNOSOTOMY;  Surgeon: Herbert Umanzor MD;  Location:  [facility] OR;  Service: General       Social History     Socioeconomic History   • Marital status:      Spouse name: Not on file   • Number of children: Not on file   • Years of education: Not on file   • Highest education level: Not on file   Tobacco Use   • Smoking status: Current Every Day Smoker     Packs/day: 0.50     Years: 35.00     Pack years: 17.50     Types: Cigarettes   • Smokeless tobacco: Never Used   Substance and Sexual Activity   • Alcohol use: No   • Drug use: No   • Sexual activity: Defer       Family History   Problem Relation Age of Onset   • No Known Problems Mother    • Hypertension Father    • No Known Problems Sister    • No Known Problems Brother    • Drug abuse Brother    • No Known Problems Daughter    Maternal GF - H&N cancer  Paternal GF - Prostate cancer          Current Outpatient Medications:   •  baclofen (LIORESAL) 10 MG tablet, Take 20  mg by mouth 3 (Three) Times a Day., Disp: , Rfl:   •  cimetidine (TAGAMET) 800 MG tablet, Take 800 mg by mouth 3 (Three) Times a Day., Disp: , Rfl:   •  Cyanocobalamin (VITAMIN B-12 IJ), Inject  as directed., Disp: , Rfl:   •  docusate (COLACE) 50 MG/5ML liquid, Take 10 mL by mouth Daily., Disp: 200 mL, Rfl: 0  •  esomeprazole (nexIUM) 40 MG capsule, Take 1 capsule by mouth 2 (Two) Times a Day Before Meals. (Patient taking differently: Take 40 mg by mouth Daily.), Disp: 60 capsule, Rfl: 3  •  hydrochlorothiazide (HYDRODIURIL) 25 MG tablet, Take 25 mg by mouth Daily., Disp: , Rfl:   •  levETIRAcetam (KEPPRA) 500 MG tablet, Take 1,500 mg by mouth 2 (Two) Times a Day., Disp: , Rfl:   •  potassium chloride (K-DUR) 10 MEQ CR tablet, Take 2 tablets by mouth 2 (Two) Times a Day., Disp: 60 tablet, Rfl: 1  •  sucralfate (CARAFATE) 1 G tablet, Take 1 tablet by mouth 4 (Four) Times a Day., Disp: 40 tablet, Rfl: 0  •  traZODone (DESYREL) 50 MG tablet, Take 1 tablet by mouth Every Night. (Patient taking differently: Take 100 mg by mouth Every Night.), Disp: 30 tablet, Rfl: 2  •  venlafaxine (EFFEXOR) 75 MG tablet, Take 75 mg by mouth every night at bedtime., Disp: , Rfl:         Review of Systems  A comprehensive 14 point review of systems was performed.  Significant findings as mentioned above.  All other systems reviewed and are negative.      Physical Exam  Vital Signs: These were reviewed and listed as per patient’s electronic medical chart  Vitals:    10/08/19 1500   BP: 117/73   Pulse: 117   Resp: 18   Temp: 98.9 °F (37.2 °C)   SpO2: 98%     General: Awake, alert and oriented, in no distress, has put on weight since last visit  HEENT: Head is atraumatic, normocephalic, extraocular movements full, oropharynx clear, no scleral icterus, pink moist mucous membranes  Neck: supple, no jvd, lymphadenopathy or masses  Cardiovascular: regular rhythm, tachycardic without murmurs  Pulmonary: non-labored, clear to auscultation  bilaterally, no wheezing  Abdomen: soft, non-distended, normal active bowel sounds present, mid abdominal staples  Extremities: No clubbing, cyanosis or edema  Lymph: No cervical, supraclavicular adenopathy  Neurologic: Mental status as above, alert, awake and oriented, grossly non-focal exam  Skin: warm, dry, intact, chronic lesions  Patient was examined on 10/08/19 and changes are reflected and noted above.      Labs / Studies:    Office Visit on 10/08/2019   Component Date Value   • Iron 10/08/2019 26*   • Iron Saturation 10/08/2019 10*   • Transferrin 10/08/2019 181*   • TIBC 10/08/2019 270*   • Ferritin 10/08/2019 279.20    • WBC 10/08/2019 10.42    • RBC 10/08/2019 4.25    • Hemoglobin 10/08/2019 12.5*   • Hematocrit 10/08/2019 39.2    • MCV 10/08/2019 92.2    • MCH 10/08/2019 29.4    • MCHC 10/08/2019 31.9    • RDW 10/08/2019 12.7    • RDW-SD 10/08/2019 41.7    • MPV 10/08/2019 8.3    • Platelets 10/08/2019 438    • Neutrophil % 10/08/2019 78.0*   • Lymphocyte % 10/08/2019 13.9*   • Monocyte % 10/08/2019 6.9    • Eosinophil % 10/08/2019 0.7    • Basophil % 10/08/2019 0.3    • Immature Grans % 10/08/2019 0.2    • Neutrophils, Absolute 10/08/2019 8.13*   • Lymphocytes, Absolute 10/08/2019 1.45    • Monocytes, Absolute 10/08/2019 0.72    • Eosinophils, Absolute 10/08/2019 0.07    • Basophils, Absolute 10/08/2019 0.03    • Immature Grans, Absolute 10/08/2019 0.02    Admission on 09/17/2019, Discharged on 09/23/2019   Component Date Value   • Potassium 09/17/2019 3.7    • Case Report 09/17/2019                      Value:Surgical Pathology Report                         Case: [id number]                                  Authorizing Provider:  Herbert Umanzor MD      Collected:           09/17/2019 08:45 AM          Ordering Location:     [facility]   Received:            09/17/2019 12:30 PM                                 OR                                                                            Pathologist:           Edd Teresa MD                                                        Specimens:   1) - Stomach, Greater curvature, Posterior Vagas                                                    2) - Stomach, Greater curvature, Anterior Vagas                                                     3) - Stomach, Antrum                                                                      • Clinical Information 09/17/2019                      Value:This result contains rich text formatting which cannot be displayed here.   • Final Diagnosis 09/17/2019                      Value:This result contains rich text formatting which cannot be displayed here.   • Gross Description 09/17/2019                      Value:This result contains rich text formatting which cannot be displayed here.   • Microscopic Description 09/17/2019                      Value:This result contains rich text formatting which cannot be displayed here.   • Glucose 09/18/2019 122*   • BUN 09/18/2019 11    • Creatinine 09/18/2019 0.54*   • Sodium 09/18/2019 140    • Potassium 09/18/2019 4.8    • Chloride 09/18/2019 101    • CO2 09/18/2019 31.0*   • Calcium 09/18/2019 8.7    • eGFR Non African Amer 09/18/2019 >150    • BUN/Creatinine Ratio 09/18/2019 20.4    • Anion Gap 09/18/2019 8.0    • WBC 09/18/2019 12.94*   • RBC 09/18/2019 3.97*   • Hemoglobin 09/18/2019 12.4*   • Hematocrit 09/18/2019 37.9    • MCV 09/18/2019 95.5    • MCH 09/18/2019 31.2    • MCHC 09/18/2019 32.7    • RDW 09/18/2019 11.5*   • RDW-SD 09/18/2019 40.4    • MPV 09/18/2019 9.3    • Platelets 09/18/2019 218    • Neutrophil % 09/18/2019 84.2*   • Lymphocyte % 09/18/2019 5.9*   • Monocyte % 09/18/2019 7.8    • Eosinophil % 09/18/2019 1.9    • Basophil % 09/18/2019 0.0    • Immature Grans % 09/18/2019 0.2    • Neutrophils, Absolute 09/18/2019 10.90*   • Lymphocytes, Absolute 09/18/2019 0.76    • Monocytes, Absolute 09/18/2019 1.01*   • Eosinophils, Absolute 09/18/2019 0.24     • Basophils, Absolute 09/18/2019 0.00    • Immature Grans, Absolute 09/18/2019 0.03    • nRBC 09/18/2019 0.0    • RBC Morphology 09/18/2019 Normal    • Smudge Cells 09/18/2019 Slight/1+    • Platelet Morphology 09/18/2019 Normal    • WBC 09/19/2019 11.79*   • RBC 09/19/2019 3.62*   • Hemoglobin 09/19/2019 11.2*   • Hematocrit 09/19/2019 34.7*   • MCV 09/19/2019 95.9    • MCH 09/19/2019 30.9    • MCHC 09/19/2019 32.3    • RDW 09/19/2019 11.6*   • RDW-SD 09/19/2019 40.8    • MPV 09/19/2019 9.5    • Platelets 09/19/2019 182    • Glucose 09/19/2019 114*   • BUN 09/19/2019 6    • Creatinine 09/19/2019 0.45*   • Sodium 09/19/2019 140    • Potassium 09/19/2019 4.0    • Chloride 09/19/2019 101    • CO2 09/19/2019 30.0*   • Calcium 09/19/2019 8.6    • eGFR Non African Amer 09/19/2019 >150    • BUN/Creatinine Ratio 09/19/2019 13.3    • Anion Gap 09/19/2019 9.0    • WBC 09/20/2019 11.22*   • RBC 09/20/2019 3.52*   • Hemoglobin 09/20/2019 11.1*   • Hematocrit 09/20/2019 33.2*   • MCV 09/20/2019 94.3    • MCH 09/20/2019 31.5    • MCHC 09/20/2019 33.4    • RDW 09/20/2019 11.6*   • RDW-SD 09/20/2019 39.6    • MPV 09/20/2019 9.5    • Platelets 09/20/2019 209    • Glucose 09/20/2019 136*   • BUN 09/20/2019 8    • Creatinine 09/20/2019 0.51*   • Sodium 09/20/2019 143    • Potassium 09/20/2019 3.4*   • Chloride 09/20/2019 103    • CO2 09/20/2019 28.0    • Calcium 09/20/2019 9.1    • eGFR Non  Amer 09/20/2019 >150    • BUN/Creatinine Ratio 09/20/2019 15.7    • Anion Gap 09/20/2019 12.0    • WBC 09/21/2019 9.73    • RBC 09/21/2019 3.21*   • Hemoglobin 09/21/2019 10.1*   • Hematocrit 09/21/2019 29.3*   • MCV 09/21/2019 91.3    • MCH 09/21/2019 31.5    • MCHC 09/21/2019 34.5    • RDW 09/21/2019 11.2*   • RDW-SD 09/21/2019 38.1    • MPV 09/21/2019 9.6    • Platelets 09/21/2019 229    • Glucose 09/21/2019 94    • BUN 09/21/2019 6    • Creatinine 09/21/2019 0.46*   • Sodium 09/21/2019 139    • Potassium 09/21/2019 2.4*   • Chloride  09/21/2019 96*   • CO2 09/21/2019 30.0*   • Calcium 09/21/2019 9.0    • eGFR Non African Amer 09/21/2019 >150    • BUN/Creatinine Ratio 09/21/2019 13.0    • Anion Gap 09/21/2019 13.0    • Potassium 09/21/2019 3.8    • WBC 09/22/2019 7.85    • RBC 09/22/2019 3.18*   • Hemoglobin 09/22/2019 9.8*   • Hematocrit 09/22/2019 28.7*   • MCV 09/22/2019 90.3    • MCH 09/22/2019 30.8    • MCHC 09/22/2019 34.1    • RDW 09/22/2019 11.0*   • RDW-SD 09/22/2019 36.6*   • MPV 09/22/2019 9.3    • Platelets 09/22/2019 232    • Glucose 09/22/2019 124*   • BUN 09/22/2019 2*   • Creatinine 09/22/2019 0.52*   • Sodium 09/22/2019 138    • Potassium 09/22/2019 3.2*   • Chloride 09/22/2019 96*   • CO2 09/22/2019 33.0*   • Calcium 09/22/2019 8.6    • eGFR Non African Amer 09/22/2019 >150    • BUN/Creatinine Ratio 09/22/2019 3.8*   • Anion Gap 09/22/2019 9.0    • Potassium 09/22/2019 2.6*   • WBC 09/23/2019 9.05    • RBC 09/23/2019 3.31*   • Hemoglobin 09/23/2019 10.2*   • Hematocrit 09/23/2019 30.0*   • MCV 09/23/2019 90.6    • MCH 09/23/2019 30.8    • MCHC 09/23/2019 34.0    • RDW 09/23/2019 11.0*   • RDW-SD 09/23/2019 36.4*   • MPV 09/23/2019 9.2    • Platelets 09/23/2019 271    • Glucose 09/23/2019 120*   • BUN 09/23/2019 2*   • Creatinine 09/23/2019 0.51*   • Sodium 09/23/2019 140    • Potassium 09/23/2019 3.6    • Chloride 09/23/2019 99    • CO2 09/23/2019 32.0*   • Calcium 09/23/2019 8.9    • eGFR Non  Amer 09/23/2019 >150    • BUN/Creatinine Ratio 09/23/2019 3.9*   • Anion Gap 09/23/2019 9.0    • Potassium 09/23/2019 3.6    Appointment on 09/12/2019   Component Date Value   • Hemoglobin A1C 09/12/2019 5.10    • WBC 09/12/2019 10.89*   • RBC 09/12/2019 4.51    • Hemoglobin 09/12/2019 14.2    • Hematocrit 09/12/2019 42.2    • MCV 09/12/2019 93.6    • MCH 09/12/2019 31.5    • MCHC 09/12/2019 33.6    • RDW 09/12/2019 11.4*   • RDW-SD 09/12/2019 39.7    • MPV 09/12/2019 9.1    • Platelets 09/12/2019 224    • Glucose 09/12/2019 131*    • BUN 09/12/2019 8    • Creatinine 09/12/2019 0.73*   • Sodium 09/12/2019 134*   • Potassium 09/12/2019 3.7    • Chloride 09/12/2019 94*   • CO2 09/12/2019 29.0    • Calcium 09/12/2019 8.8    • eGFR Non  Amer 09/12/2019 114    • BUN/Creatinine Ratio 09/12/2019 11.0    • Anion Gap 09/12/2019 11.0    • ABO Type 09/12/2019 O    • RH type 09/12/2019 Positive    Lab Requisition on 08/09/2019   Component Date Value   • Case Report 08/09/2019                      Value:Surgical Pathology Report                         Case: [id number]                                  Authorizing Provider:  Herbert Umanzor MD      Collected:           08/09/2019 09:42 AM          Pathologist:           Corbin Alexander MD         Received:            08/09/2019 12:53 PM          Specimen:    Gastric, pylorus channel ulcer                                                            • Clinical Information 08/09/2019                      Value:This result contains rich text formatting which cannot be displayed here.   • Final Diagnosis 08/09/2019                      Value:This result contains rich text formatting which cannot be displayed here.   • Gross Description 08/09/2019                      Value:This result contains rich text formatting which cannot be displayed here.   • Microscopic Description 08/09/2019                      Value:This result contains rich text formatting which cannot be displayed here.   Lab on 04/24/2019   Component Date Value   • Iron 04/24/2019 75    • Iron Saturation 04/24/2019 19*   • Transferrin 04/24/2019 260    • TIBC 04/24/2019 387    • Ferritin 04/24/2019 180.60    • WBC 04/24/2019 6.92    • RBC 04/24/2019 4.79    • Hemoglobin 04/24/2019 15.3    • Hematocrit 04/24/2019 44.5    • MCV 04/24/2019 92.9    • MCH 04/24/2019 31.9    • MCHC 04/24/2019 34.4    • RDW 04/24/2019 12.0*   • RDW-SD 04/24/2019 40.0    • MPV 04/24/2019 8.7    • Platelets 04/24/2019 214    • Neutrophil % 04/24/2019 71.9    •  Lymphocyte % 2019 19.1*   • Monocyte % 2019 7.8    • Eosinophil % 2019 0.7    • Basophil % 2019 0.4    • Immature Grans % 2019 0.1    • Neutrophils, Absolute 2019 4.97    • Lymphocytes, Absolute 2019 1.32    • Monocytes, Absolute 2019 0.54    • Eosinophils, Absolute 2019 0.05    • Basophils, Absolute 2019 0.03    • Immature Grans, Absolute 2019 0.01           IMAGIN18: CT CHEST WO CONTRAST: LUNGS: MODERATE BILATERAL LUNG EMPHYSEMA. HEART: Unremarkable. PERICARDIUM: No effusion. MEDIASTINUM: No masses. No enlarged lymph nodes.  No fluid collections. PLEURA: No pleural effusion. No pleural mass or abnormal calcification. MAJOR AIRWAYS: Clear. No intrinsic mass. VASCULATURE: No evidence of aneurysm. VISUALIZED UPPER ABDOMEN: LIVER: Homogeneous. No focal hepatic mass or ductal dilatation. SPLEEN: Homogeneous. No splenomegaly.  ADRENALS: No mass. KIDNEYS: No mass. No obstructive uropathy.  No evidence of Urolithiasis. GI TRACT: Non-dilated. No definite wall thickening. PERITONEUM: No free air. No free fluid or loculated fluid Collections. ABDOMINAL WALL: No focal hernia or mass. OTHER: None. BONES: No acute bony abnormality. Impression: 1. Unremarkable exam.  No source for the patient symptoms. 2. Other incidental/non-acute findings as above.    18: MRI ABDOMEN WO CONTRAST MRCP: multiple axial gradient-echo T1 and T2-weighted images were obtained. Oblique coronal T2-weighted images were  acquired to evaluate the bile ducts. The common bile duct and common hepatic duct and main intrahepatic bile ducts are well-demonstrated and appear within normal limits. No gallstones are identified and there are no strictures. There is no bile duct dilatation. The main pancreatic duct was also fairly well demonstrated and appears within normal limits. The liver, spleen, pancreas, and adrenal glands appear within normal limits. IMPRESSION: Unremarkable MRCP  imaging of the abdomen.           PATHOLOGY:  01/16/18: Peripheral Smear:                Assessment/Plan   Jamison Edward is a very pleasant 49 y.o.  male who presents in follow up appointment for further management and evaluation of normocytic anemia secondary to iron and B12 deficiency.    Normocytic anemia  Iron deficiency   B12 deficiency  - His hemoglobin is mildly low today with low iron and iron saturation level. Therefore will restart IV injectafer and check phosphorus levels after dose.   - During his initial consultation the Iron panel and ferritin were consistent with iron deficiency and thus he was advised to restart oral iron (ferrous sulfate) and started this once daily with titration to three times daily. He tolerated this well, however, continued to be iron deficient and therefore was started on IV Injectafer. I have also obtained Zinc, Copper levels as well as a tissues transglutaminase level which were normal  - He currently follows with gastroenterology closely and recently underwent an EGD which was significant for gastric and duodenal ulcers without malignancy and recently underwent surgical resection with improvement in his symptoms.   - Folate was normal however B12 was low normal and therefore he was started on B12 injections. Currently he is on Cyanocobalamin injections 1000mcg/mL inj SC monthly with his primary physician.  - ROWENA was normal, LDH, retic count normal going against hemolysis, and haptoglobin was high. TSH normal.  - Peripheral smear as above.    Hypokalemia  - Patient was requesting this to be checked as he is on oral potassium. This is on the upper limits of normal which was discussed with the patient and should likely have this re-checked.  - His potassium is normal however on the upper limits of normal and he will have this rechecked by his primary this week to further assess his potassium requirements.    ACO / CONRAD/Other  Quality measures  - Jamison Edward  did not receive 2019 flu vaccine.   - Jamison Edward reports a pain score of 0.  Given his pain assessment as noted, no further treatment or follow up is required.  - Current outpatient and discharge medications have been reconciled for the patient.  Reviewed by: Cami Gustafson MD      I will have the patient return for repeat laboratory evaluation in 2 months (CBC, Ferritin, Iron panel) and in follow up appointment in four months. He understands that should he have any questions or concerns prior to his appointment he should give us a call at any time and I would be happy to see him sooner. It was a pleasure to see this patient in clinic today, thank you for allowing me to participate in the care of this patient.      Cami Gustafson MD  10/08/2019  3:47 PM

## 2019-10-09 ENCOUNTER — READMISSION MANAGEMENT (OUTPATIENT)
Dept: CALL CENTER | Facility: HOSPITAL | Age: 49
End: 2019-10-09

## 2019-10-09 LAB
FOLATE SERPL-MCNC: 6.16 NG/ML (ref 4.78–24.2)
VIT B12 BLD-MCNC: 660 PG/ML (ref 211–946)

## 2019-10-09 RX ORDER — SODIUM CHLORIDE 9 MG/ML
250 INJECTION, SOLUTION INTRAVENOUS ONCE
Status: CANCELLED | OUTPATIENT
Start: 2019-10-11

## 2019-10-09 RX ORDER — SODIUM CHLORIDE 9 MG/ML
250 INJECTION, SOLUTION INTRAVENOUS ONCE
Status: CANCELLED | OUTPATIENT
Start: 2019-10-18

## 2019-10-09 NOTE — OUTREACH NOTE
General Surgery Week 2 Survey      Responses   Facility patient discharged from?  [facility]   Does the patient have one of the following disease processes/diagnoses(primary or secondary)?  General Surgery   Week 2 attempt successful?  No   Unsuccessful attempts  Attempt 2          Radha Gillis RN

## 2019-10-11 ENCOUNTER — INFUSION (OUTPATIENT)
Dept: ONCOLOGY | Facility: HOSPITAL | Age: 49
End: 2019-10-11

## 2019-10-11 VITALS
DIASTOLIC BLOOD PRESSURE: 72 MMHG | HEART RATE: 107 BPM | TEMPERATURE: 97.8 F | BODY MASS INDEX: 22.85 KG/M2 | OXYGEN SATURATION: 98 % | WEIGHT: 141.6 LBS | SYSTOLIC BLOOD PRESSURE: 115 MMHG | RESPIRATION RATE: 18 BRPM

## 2019-10-11 DIAGNOSIS — D50.0 IRON DEFICIENCY ANEMIA DUE TO CHRONIC BLOOD LOSS: Primary | ICD-10-CM

## 2019-10-11 DIAGNOSIS — E61.1 IRON DEFICIENCY: ICD-10-CM

## 2019-10-11 PROCEDURE — 25010000002 FERRIC CARBOXYMALTOSE 750 MG/15ML SOLUTION 15 ML VIAL: Performed by: INTERNAL MEDICINE

## 2019-10-11 PROCEDURE — 96374 THER/PROPH/DIAG INJ IV PUSH: CPT

## 2019-10-11 PROCEDURE — 96365 THER/PROPH/DIAG IV INF INIT: CPT

## 2019-10-11 RX ORDER — SODIUM CHLORIDE 9 MG/ML
250 INJECTION, SOLUTION INTRAVENOUS ONCE
Status: COMPLETED | OUTPATIENT
Start: 2019-10-11 | End: 2019-10-11

## 2019-10-11 RX ADMIN — SODIUM CHLORIDE 250 ML: 9 INJECTION, SOLUTION INTRAVENOUS at 14:55

## 2019-10-11 RX ADMIN — FERRIC CARBOXYMALTOSE INJECTION 750 MG: 50 INJECTION, SOLUTION INTRAVENOUS at 14:55

## 2019-10-18 ENCOUNTER — INFUSION (OUTPATIENT)
Dept: ONCOLOGY | Facility: HOSPITAL | Age: 49
End: 2019-10-18

## 2019-10-18 VITALS
SYSTOLIC BLOOD PRESSURE: 105 MMHG | WEIGHT: 140.8 LBS | OXYGEN SATURATION: 98 % | HEART RATE: 103 BPM | RESPIRATION RATE: 18 BRPM | DIASTOLIC BLOOD PRESSURE: 72 MMHG | BODY MASS INDEX: 22.73 KG/M2 | TEMPERATURE: 98 F

## 2019-10-18 DIAGNOSIS — D50.0 IRON DEFICIENCY ANEMIA DUE TO CHRONIC BLOOD LOSS: Primary | ICD-10-CM

## 2019-10-18 PROCEDURE — 96365 THER/PROPH/DIAG IV INF INIT: CPT

## 2019-10-18 PROCEDURE — 25010000002 FERRIC CARBOXYMALTOSE 750 MG/15ML SOLUTION 15 ML VIAL: Performed by: INTERNAL MEDICINE

## 2019-10-18 PROCEDURE — 96374 THER/PROPH/DIAG INJ IV PUSH: CPT

## 2019-10-18 RX ORDER — SODIUM CHLORIDE 9 MG/ML
250 INJECTION, SOLUTION INTRAVENOUS ONCE
Status: COMPLETED | OUTPATIENT
Start: 2019-10-18 | End: 2019-10-18

## 2019-10-18 RX ADMIN — SODIUM CHLORIDE 250 ML: 9 INJECTION, SOLUTION INTRAVENOUS at 14:23

## 2019-10-18 RX ADMIN — FERRIC CARBOXYMALTOSE INJECTION 750 MG: 50 INJECTION, SOLUTION INTRAVENOUS at 14:24

## 2019-11-01 ENCOUNTER — TELEPHONE (OUTPATIENT)
Dept: ONCOLOGY | Facility: HOSPITAL | Age: 49
End: 2019-11-01

## 2019-11-01 ENCOUNTER — LAB (OUTPATIENT)
Dept: ONCOLOGY | Facility: CLINIC | Age: 49
End: 2019-11-01

## 2019-11-01 VITALS
SYSTOLIC BLOOD PRESSURE: 124 MMHG | HEART RATE: 99 BPM | DIASTOLIC BLOOD PRESSURE: 79 MMHG | TEMPERATURE: 98.3 F | RESPIRATION RATE: 18 BRPM | OXYGEN SATURATION: 98 %

## 2019-11-01 DIAGNOSIS — D50.0 IRON DEFICIENCY ANEMIA DUE TO CHRONIC BLOOD LOSS: ICD-10-CM

## 2019-11-01 DIAGNOSIS — E83.39 HYPOPHOSPHATEMIA: Primary | ICD-10-CM

## 2019-11-01 DIAGNOSIS — E61.1 IRON DEFICIENCY: ICD-10-CM

## 2019-11-01 LAB
ANION GAP SERPL CALCULATED.3IONS-SCNC: 8.8 MMOL/L (ref 5–15)
BUN BLD-MCNC: 6 MG/DL (ref 6–20)
BUN/CREAT SERPL: 8.6 (ref 7–25)
CALCIUM SPEC-SCNC: 8.9 MG/DL (ref 8.6–10.5)
CHLORIDE SERPL-SCNC: 100 MMOL/L (ref 98–107)
CO2 SERPL-SCNC: 28.2 MMOL/L (ref 22–29)
CREAT BLD-MCNC: 0.7 MG/DL (ref 0.76–1.27)
GFR SERPL CREATININE-BSD FRML MDRD: 119 ML/MIN/1.73
GLUCOSE BLD-MCNC: 158 MG/DL (ref 65–99)
PHOSPHATE SERPL-MCNC: 1.2 MG/DL (ref 2.5–4.5)
POTASSIUM BLD-SCNC: 4.8 MMOL/L (ref 3.5–5.2)
SODIUM BLD-SCNC: 137 MMOL/L (ref 136–145)

## 2019-11-01 PROCEDURE — 84100 ASSAY OF PHOSPHORUS: CPT | Performed by: INTERNAL MEDICINE

## 2019-11-01 PROCEDURE — 80048 BASIC METABOLIC PNL TOTAL CA: CPT | Performed by: INTERNAL MEDICINE

## 2019-11-01 NOTE — TELEPHONE ENCOUNTER
Called to patient and spoke with his wife, on behalf of Dr. Gustafson.  Informed pt's wife that his phosphorus blood level came back critically low, and Dr. Hernández has prescribed oral replacement (K-Phos Neutral) to begin taking today, thru next Tues, 11/5 at 9am, when patient will need to return to clinic to have the phos level rechecked.  Pt's wife verbalized understanding, and was in agreement with plan.

## 2019-11-05 ENCOUNTER — INFUSION (OUTPATIENT)
Dept: ONCOLOGY | Facility: HOSPITAL | Age: 49
End: 2019-11-05

## 2019-11-05 ENCOUNTER — LAB (OUTPATIENT)
Dept: ONCOLOGY | Facility: CLINIC | Age: 49
End: 2019-11-05

## 2019-11-05 VITALS
RESPIRATION RATE: 18 BRPM | DIASTOLIC BLOOD PRESSURE: 76 MMHG | SYSTOLIC BLOOD PRESSURE: 125 MMHG | HEART RATE: 119 BPM | TEMPERATURE: 97.9 F | OXYGEN SATURATION: 97 %

## 2019-11-05 DIAGNOSIS — K90.9 INTESTINAL MALABSORPTION, UNSPECIFIED TYPE: ICD-10-CM

## 2019-11-05 DIAGNOSIS — E83.39 HYPOPHOSPHATEMIA: ICD-10-CM

## 2019-11-05 DIAGNOSIS — D50.0 IRON DEFICIENCY ANEMIA DUE TO CHRONIC BLOOD LOSS: ICD-10-CM

## 2019-11-05 LAB
ANION GAP SERPL CALCULATED.3IONS-SCNC: 8.5 MMOL/L (ref 5–15)
BUN BLD-MCNC: 9 MG/DL (ref 6–20)
BUN/CREAT SERPL: 14.5 (ref 7–25)
CALCIUM SPEC-SCNC: 9.3 MG/DL (ref 8.6–10.5)
CHLORIDE SERPL-SCNC: 99 MMOL/L (ref 98–107)
CO2 SERPL-SCNC: 30.5 MMOL/L (ref 22–29)
CREAT BLD-MCNC: 0.62 MG/DL (ref 0.76–1.27)
GFR SERPL CREATININE-BSD FRML MDRD: 137 ML/MIN/1.73
GLUCOSE BLD-MCNC: 92 MG/DL (ref 65–99)
PHOSPHATE SERPL-MCNC: 1.2 MG/DL (ref 2.5–4.5)
POTASSIUM BLD-SCNC: 4.8 MMOL/L (ref 3.5–5.2)
SODIUM BLD-SCNC: 138 MMOL/L (ref 136–145)

## 2019-11-05 PROCEDURE — 84100 ASSAY OF PHOSPHORUS: CPT | Performed by: INTERNAL MEDICINE

## 2019-11-05 PROCEDURE — 96366 THER/PROPH/DIAG IV INF ADDON: CPT

## 2019-11-05 PROCEDURE — 80048 BASIC METABOLIC PNL TOTAL CA: CPT | Performed by: INTERNAL MEDICINE

## 2019-11-05 PROCEDURE — 96365 THER/PROPH/DIAG IV INF INIT: CPT

## 2019-11-05 RX ADMIN — SODIUM PHOSPHATE, MONOBASIC, MONOHYDRATE 20 MMOL: 276; 142 INJECTION, SOLUTION INTRAVENOUS at 10:40

## 2019-11-06 ENCOUNTER — INFUSION (OUTPATIENT)
Dept: ONCOLOGY | Facility: HOSPITAL | Age: 49
End: 2019-11-06

## 2019-11-06 ENCOUNTER — DOCUMENTATION (OUTPATIENT)
Dept: ONCOLOGY | Facility: HOSPITAL | Age: 49
End: 2019-11-06

## 2019-11-06 VITALS
RESPIRATION RATE: 18 BRPM | SYSTOLIC BLOOD PRESSURE: 123 MMHG | OXYGEN SATURATION: 92 % | DIASTOLIC BLOOD PRESSURE: 79 MMHG | HEART RATE: 116 BPM | TEMPERATURE: 98.3 F

## 2019-11-06 DIAGNOSIS — E83.39 HYPOPHOSPHATEMIA: Primary | ICD-10-CM

## 2019-11-06 LAB — PHOSPHATE SERPL-MCNC: 1.6 MG/DL (ref 2.5–4.5)

## 2019-11-06 PROCEDURE — 84100 ASSAY OF PHOSPHORUS: CPT

## 2019-11-06 NOTE — PROGRESS NOTES
Lab called Phosphorus level 1.6. Spoke w/Dr. Julissa MD order for patient to continue the K Phos Neutral QID as ordered yesterday and return on Monday 11/11 for lab recheck. Advised pt to return call for any questions or concerns. Pt and spouse verbalized understanding and agreed with plan of care.

## 2019-11-08 ENCOUNTER — APPOINTMENT (OUTPATIENT)
Dept: ONCOLOGY | Facility: HOSPITAL | Age: 49
End: 2019-11-08

## 2019-11-11 ENCOUNTER — INFUSION (OUTPATIENT)
Dept: ONCOLOGY | Facility: HOSPITAL | Age: 49
End: 2019-11-11

## 2019-11-11 ENCOUNTER — LAB (OUTPATIENT)
Dept: ONCOLOGY | Facility: CLINIC | Age: 49
End: 2019-11-11

## 2019-11-11 VITALS
WEIGHT: 142.6 LBS | DIASTOLIC BLOOD PRESSURE: 82 MMHG | SYSTOLIC BLOOD PRESSURE: 132 MMHG | OXYGEN SATURATION: 99 % | HEART RATE: 112 BPM | BODY MASS INDEX: 23.02 KG/M2 | TEMPERATURE: 98 F | RESPIRATION RATE: 18 BRPM

## 2019-11-11 DIAGNOSIS — E83.39 HYPOPHOSPHATEMIA: ICD-10-CM

## 2019-11-11 DIAGNOSIS — E83.39 HYPOPHOSPHATEMIA: Primary | ICD-10-CM

## 2019-11-11 LAB — PHOSPHATE SERPL-MCNC: 1 MG/DL (ref 2.5–4.5)

## 2019-11-11 PROCEDURE — 84100 ASSAY OF PHOSPHORUS: CPT | Performed by: INTERNAL MEDICINE

## 2019-11-11 PROCEDURE — 96365 THER/PROPH/DIAG IV INF INIT: CPT

## 2019-11-11 PROCEDURE — 96366 THER/PROPH/DIAG IV INF ADDON: CPT

## 2019-11-11 RX ADMIN — SODIUM PHOSPHATE, MONOBASIC, MONOHYDRATE 20 MMOL: 276; 142 INJECTION, SOLUTION INTRAVENOUS at 09:55

## 2019-11-12 ENCOUNTER — LAB (OUTPATIENT)
Dept: ONCOLOGY | Facility: CLINIC | Age: 49
End: 2019-11-12

## 2019-11-12 ENCOUNTER — INFUSION (OUTPATIENT)
Dept: ONCOLOGY | Facility: HOSPITAL | Age: 49
End: 2019-11-12

## 2019-11-12 VITALS
DIASTOLIC BLOOD PRESSURE: 81 MMHG | TEMPERATURE: 98 F | HEART RATE: 112 BPM | SYSTOLIC BLOOD PRESSURE: 144 MMHG | BODY MASS INDEX: 23.53 KG/M2 | OXYGEN SATURATION: 96 % | RESPIRATION RATE: 18 BRPM | WEIGHT: 145.8 LBS

## 2019-11-12 DIAGNOSIS — E83.39 HYPOPHOSPHATEMIA: Primary | ICD-10-CM

## 2019-11-12 DIAGNOSIS — E83.39 HYPOPHOSPHATEMIA: ICD-10-CM

## 2019-11-12 LAB — PHOSPHATE SERPL-MCNC: 2 MG/DL (ref 2.5–4.5)

## 2019-11-12 PROCEDURE — 96366 THER/PROPH/DIAG IV INF ADDON: CPT

## 2019-11-12 PROCEDURE — 96365 THER/PROPH/DIAG IV INF INIT: CPT

## 2019-11-12 PROCEDURE — 84100 ASSAY OF PHOSPHORUS: CPT | Performed by: INTERNAL MEDICINE

## 2019-11-12 RX ADMIN — SODIUM PHOSPHATE, MONOBASIC, MONOHYDRATE 20 MMOL: 276; 142 INJECTION, SOLUTION INTRAVENOUS at 10:22

## 2019-11-13 ENCOUNTER — INFUSION (OUTPATIENT)
Dept: ONCOLOGY | Facility: HOSPITAL | Age: 49
End: 2019-11-13

## 2019-11-13 ENCOUNTER — LAB (OUTPATIENT)
Dept: ONCOLOGY | Facility: CLINIC | Age: 49
End: 2019-11-13

## 2019-11-13 VITALS
TEMPERATURE: 98.4 F | DIASTOLIC BLOOD PRESSURE: 73 MMHG | OXYGEN SATURATION: 98 % | HEART RATE: 105 BPM | SYSTOLIC BLOOD PRESSURE: 115 MMHG | RESPIRATION RATE: 18 BRPM

## 2019-11-13 VITALS
TEMPERATURE: 98.4 F | OXYGEN SATURATION: 98 % | DIASTOLIC BLOOD PRESSURE: 73 MMHG | RESPIRATION RATE: 18 BRPM | SYSTOLIC BLOOD PRESSURE: 115 MMHG | HEART RATE: 105 BPM | BODY MASS INDEX: 23.61 KG/M2 | WEIGHT: 146.3 LBS

## 2019-11-13 DIAGNOSIS — E83.39 HYPOPHOSPHATEMIA: ICD-10-CM

## 2019-11-13 DIAGNOSIS — E83.39 HYPOPHOSPHATEMIA: Primary | ICD-10-CM

## 2019-11-13 LAB — PHOSPHATE SERPL-MCNC: 2.3 MG/DL (ref 2.5–4.5)

## 2019-11-13 PROCEDURE — 84100 ASSAY OF PHOSPHORUS: CPT | Performed by: INTERNAL MEDICINE

## 2019-11-13 NOTE — PROGRESS NOTES
Phos 2.3. Reported to Dr. Stokes. MD order for pt to continue the oral phosphorus QID at home and for pt to return on Monday 11/18/19 for repeat Phos level. Pt and spouse verbalized understanding of MD orders and agreed with plan of care.

## 2019-11-14 ENCOUNTER — APPOINTMENT (OUTPATIENT)
Dept: ONCOLOGY | Facility: HOSPITAL | Age: 49
End: 2019-11-14

## 2019-11-15 ENCOUNTER — APPOINTMENT (OUTPATIENT)
Dept: ONCOLOGY | Facility: HOSPITAL | Age: 49
End: 2019-11-15

## 2019-11-18 ENCOUNTER — INFUSION (OUTPATIENT)
Dept: ONCOLOGY | Facility: HOSPITAL | Age: 49
End: 2019-11-18

## 2019-11-18 ENCOUNTER — LAB (OUTPATIENT)
Dept: ONCOLOGY | Facility: CLINIC | Age: 49
End: 2019-11-18

## 2019-11-18 VITALS
BODY MASS INDEX: 22.53 KG/M2 | TEMPERATURE: 98.5 F | SYSTOLIC BLOOD PRESSURE: 130 MMHG | OXYGEN SATURATION: 99 % | HEART RATE: 114 BPM | WEIGHT: 139.6 LBS | DIASTOLIC BLOOD PRESSURE: 83 MMHG | RESPIRATION RATE: 18 BRPM

## 2019-11-18 DIAGNOSIS — E83.39 HYPOPHOSPHATEMIA: Primary | ICD-10-CM

## 2019-11-18 DIAGNOSIS — K90.9 INTESTINAL MALABSORPTION, UNSPECIFIED TYPE: ICD-10-CM

## 2019-11-18 LAB — PHOSPHATE SERPL-MCNC: 2.2 MG/DL (ref 2.5–4.5)

## 2019-11-18 PROCEDURE — 96365 THER/PROPH/DIAG IV INF INIT: CPT

## 2019-11-18 PROCEDURE — 36415 COLL VENOUS BLD VENIPUNCTURE: CPT

## 2019-11-18 PROCEDURE — 96366 THER/PROPH/DIAG IV INF ADDON: CPT

## 2019-11-18 PROCEDURE — 84100 ASSAY OF PHOSPHORUS: CPT | Performed by: INTERNAL MEDICINE

## 2019-11-18 RX ADMIN — SODIUM PHOSPHATE, MONOBASIC, MONOHYDRATE 20 MMOL: 276; 142 INJECTION, SOLUTION INTRAVENOUS at 11:05

## 2019-11-20 ENCOUNTER — INFUSION (OUTPATIENT)
Dept: ONCOLOGY | Facility: HOSPITAL | Age: 49
End: 2019-11-20

## 2019-11-20 VITALS
SYSTOLIC BLOOD PRESSURE: 139 MMHG | HEART RATE: 102 BPM | DIASTOLIC BLOOD PRESSURE: 81 MMHG | OXYGEN SATURATION: 98 % | RESPIRATION RATE: 18 BRPM | TEMPERATURE: 98.2 F

## 2019-11-20 DIAGNOSIS — E83.39 HYPOPHOSPHATEMIA: Primary | ICD-10-CM

## 2019-11-20 LAB — PHOSPHATE SERPL-MCNC: 1.7 MG/DL (ref 2.5–4.5)

## 2019-11-20 PROCEDURE — 84100 ASSAY OF PHOSPHORUS: CPT

## 2019-11-20 PROCEDURE — 96365 THER/PROPH/DIAG IV INF INIT: CPT

## 2019-11-20 PROCEDURE — 96366 THER/PROPH/DIAG IV INF ADDON: CPT

## 2019-11-20 RX ADMIN — SODIUM PHOSPHATE, MONOBASIC, MONOHYDRATE 20 MMOL: 276; 142 INJECTION, SOLUTION INTRAVENOUS at 12:50

## 2019-11-20 NOTE — CODE DOCUMENTATION
0930  Orders noted for Sod. Phos 20 mmol to infuse over 4 hours.  Patient has a doctor appointment that he had to keep today spouse states they will return at noon for infusion.

## 2019-11-22 ENCOUNTER — LAB (OUTPATIENT)
Dept: ONCOLOGY | Facility: CLINIC | Age: 49
End: 2019-11-22

## 2019-11-22 ENCOUNTER — INFUSION (OUTPATIENT)
Dept: ONCOLOGY | Facility: HOSPITAL | Age: 49
End: 2019-11-22

## 2019-11-22 VITALS
SYSTOLIC BLOOD PRESSURE: 129 MMHG | HEART RATE: 112 BPM | TEMPERATURE: 97.7 F | OXYGEN SATURATION: 99 % | DIASTOLIC BLOOD PRESSURE: 85 MMHG | RESPIRATION RATE: 18 BRPM

## 2019-11-22 VITALS
RESPIRATION RATE: 18 BRPM | TEMPERATURE: 97.7 F | HEART RATE: 112 BPM | SYSTOLIC BLOOD PRESSURE: 129 MMHG | OXYGEN SATURATION: 99 % | DIASTOLIC BLOOD PRESSURE: 85 MMHG

## 2019-11-22 DIAGNOSIS — E83.39 HYPOPHOSPHATEMIA: Primary | ICD-10-CM

## 2019-11-22 LAB — PHOSPHATE SERPL-MCNC: 2.6 MG/DL (ref 2.5–4.5)

## 2019-11-22 PROCEDURE — 84100 ASSAY OF PHOSPHORUS: CPT | Performed by: INTERNAL MEDICINE

## 2019-11-22 PROCEDURE — 36415 COLL VENOUS BLD VENIPUNCTURE: CPT

## 2019-11-26 ENCOUNTER — LAB (OUTPATIENT)
Dept: ONCOLOGY | Facility: CLINIC | Age: 49
End: 2019-11-26

## 2019-11-26 VITALS
DIASTOLIC BLOOD PRESSURE: 84 MMHG | HEART RATE: 104 BPM | OXYGEN SATURATION: 98 % | TEMPERATURE: 98.3 F | RESPIRATION RATE: 18 BRPM | SYSTOLIC BLOOD PRESSURE: 123 MMHG

## 2019-11-26 DIAGNOSIS — E83.39 HYPOPHOSPHATEMIA: Primary | ICD-10-CM

## 2019-11-26 LAB — PHOSPHATE SERPL-MCNC: 3.1 MG/DL (ref 2.5–4.5)

## 2019-11-26 PROCEDURE — 84100 ASSAY OF PHOSPHORUS: CPT | Performed by: INTERNAL MEDICINE

## 2019-12-03 ENCOUNTER — LAB (OUTPATIENT)
Dept: ONCOLOGY | Facility: CLINIC | Age: 49
End: 2019-12-03

## 2019-12-03 VITALS
RESPIRATION RATE: 16 BRPM | SYSTOLIC BLOOD PRESSURE: 132 MMHG | OXYGEN SATURATION: 91 % | TEMPERATURE: 98 F | HEART RATE: 106 BPM | DIASTOLIC BLOOD PRESSURE: 83 MMHG

## 2019-12-03 DIAGNOSIS — E53.8 B12 DEFICIENCY: ICD-10-CM

## 2019-12-03 DIAGNOSIS — D64.9 NORMOCYTIC ANEMIA: ICD-10-CM

## 2019-12-03 DIAGNOSIS — E83.39 HYPOPHOSPHATEMIA: Primary | ICD-10-CM

## 2019-12-03 DIAGNOSIS — D50.9 IRON DEFICIENCY ANEMIA, UNSPECIFIED IRON DEFICIENCY ANEMIA TYPE: ICD-10-CM

## 2019-12-03 LAB
BASOPHILS # BLD AUTO: 0.04 10*3/MM3 (ref 0–0.2)
BASOPHILS NFR BLD AUTO: 0.5 % (ref 0–1.5)
DEPRECATED RDW RBC AUTO: 48 FL (ref 37–54)
EOSINOPHIL # BLD AUTO: 0.05 10*3/MM3 (ref 0–0.4)
EOSINOPHIL NFR BLD AUTO: 0.6 % (ref 0.3–6.2)
ERYTHROCYTE [DISTWIDTH] IN BLOOD BY AUTOMATED COUNT: 14.3 % (ref 12.3–15.4)
FERRITIN SERPL-MCNC: 702.6 NG/ML (ref 30–400)
HCT VFR BLD AUTO: 50.7 % (ref 37.5–51)
HGB BLD-MCNC: 16.4 G/DL (ref 13–17.7)
IMM GRANULOCYTES # BLD AUTO: 0.02 10*3/MM3 (ref 0–0.05)
IMM GRANULOCYTES NFR BLD AUTO: 0.2 % (ref 0–0.5)
IRON 24H UR-MRATE: 94 MCG/DL (ref 59–158)
IRON SATN MFR SERPL: 28 % (ref 20–50)
LYMPHOCYTES # BLD AUTO: 1.38 10*3/MM3 (ref 0.7–3.1)
LYMPHOCYTES NFR BLD AUTO: 16.5 % (ref 19.6–45.3)
MCH RBC QN AUTO: 29.6 PG (ref 26.6–33)
MCHC RBC AUTO-ENTMCNC: 32.3 G/DL (ref 31.5–35.7)
MCV RBC AUTO: 91.5 FL (ref 79–97)
MONOCYTES # BLD AUTO: 0.49 10*3/MM3 (ref 0.1–0.9)
MONOCYTES NFR BLD AUTO: 5.9 % (ref 5–12)
NEUTROPHILS # BLD AUTO: 6.37 10*3/MM3 (ref 1.7–7)
NEUTROPHILS NFR BLD AUTO: 76.3 % (ref 42.7–76)
NRBC BLD AUTO-RTO: 0 /100 WBC (ref 0–0.2)
PHOSPHATE SERPL-MCNC: 4 MG/DL (ref 2.5–4.5)
PLATELET # BLD AUTO: 265 10*3/MM3 (ref 140–450)
PMV BLD AUTO: 8.9 FL (ref 6–12)
RBC # BLD AUTO: 5.54 10*6/MM3 (ref 4.14–5.8)
TIBC SERPL-MCNC: 337 MCG/DL (ref 298–536)
TRANSFERRIN SERPL-MCNC: 226 MG/DL (ref 200–360)
WBC NRBC COR # BLD: 8.35 10*3/MM3 (ref 3.4–10.8)

## 2019-12-03 PROCEDURE — 85025 COMPLETE CBC W/AUTO DIFF WBC: CPT | Performed by: INTERNAL MEDICINE

## 2019-12-03 PROCEDURE — 84466 ASSAY OF TRANSFERRIN: CPT | Performed by: INTERNAL MEDICINE

## 2019-12-03 PROCEDURE — 83540 ASSAY OF IRON: CPT | Performed by: INTERNAL MEDICINE

## 2019-12-03 PROCEDURE — 36415 COLL VENOUS BLD VENIPUNCTURE: CPT

## 2019-12-03 PROCEDURE — 84100 ASSAY OF PHOSPHORUS: CPT | Performed by: INTERNAL MEDICINE

## 2019-12-03 PROCEDURE — 82728 ASSAY OF FERRITIN: CPT | Performed by: INTERNAL MEDICINE

## 2019-12-10 ENCOUNTER — LAB (OUTPATIENT)
Dept: ONCOLOGY | Facility: CLINIC | Age: 49
End: 2019-12-10

## 2019-12-10 VITALS
HEART RATE: 101 BPM | RESPIRATION RATE: 18 BRPM | SYSTOLIC BLOOD PRESSURE: 125 MMHG | OXYGEN SATURATION: 98 % | DIASTOLIC BLOOD PRESSURE: 81 MMHG | TEMPERATURE: 97.7 F

## 2019-12-10 DIAGNOSIS — E53.8 B12 DEFICIENCY: ICD-10-CM

## 2019-12-10 DIAGNOSIS — E83.39 HYPOPHOSPHATEMIA: Primary | ICD-10-CM

## 2019-12-10 DIAGNOSIS — D50.9 IRON DEFICIENCY ANEMIA, UNSPECIFIED IRON DEFICIENCY ANEMIA TYPE: ICD-10-CM

## 2019-12-10 LAB
BASOPHILS # BLD AUTO: 0.05 10*3/MM3 (ref 0–0.2)
BASOPHILS NFR BLD AUTO: 0.6 % (ref 0–1.5)
DEPRECATED RDW RBC AUTO: 47.3 FL (ref 37–54)
EOSINOPHIL # BLD AUTO: 0.03 10*3/MM3 (ref 0–0.4)
EOSINOPHIL NFR BLD AUTO: 0.3 % (ref 0.3–6.2)
ERYTHROCYTE [DISTWIDTH] IN BLOOD BY AUTOMATED COUNT: 14.4 % (ref 12.3–15.4)
FERRITIN SERPL-MCNC: 623.5 NG/ML (ref 30–400)
HCT VFR BLD AUTO: 48.8 % (ref 37.5–51)
HGB BLD-MCNC: 16.6 G/DL (ref 13–17.7)
IMM GRANULOCYTES # BLD AUTO: 0.03 10*3/MM3 (ref 0–0.05)
IMM GRANULOCYTES NFR BLD AUTO: 0.3 % (ref 0–0.5)
IRON 24H UR-MRATE: 110 MCG/DL (ref 59–158)
IRON SATN MFR SERPL: 33 % (ref 20–50)
LYMPHOCYTES # BLD AUTO: 1.37 10*3/MM3 (ref 0.7–3.1)
LYMPHOCYTES NFR BLD AUTO: 15.9 % (ref 19.6–45.3)
MCH RBC QN AUTO: 30.3 PG (ref 26.6–33)
MCHC RBC AUTO-ENTMCNC: 34 G/DL (ref 31.5–35.7)
MCV RBC AUTO: 89.2 FL (ref 79–97)
MONOCYTES # BLD AUTO: 0.64 10*3/MM3 (ref 0.1–0.9)
MONOCYTES NFR BLD AUTO: 7.4 % (ref 5–12)
NEUTROPHILS # BLD AUTO: 6.49 10*3/MM3 (ref 1.7–7)
NEUTROPHILS NFR BLD AUTO: 75.5 % (ref 42.7–76)
NRBC BLD AUTO-RTO: 0 /100 WBC (ref 0–0.2)
PHOSPHATE SERPL-MCNC: 3.6 MG/DL (ref 2.5–4.5)
PLATELET # BLD AUTO: 248 10*3/MM3 (ref 140–450)
PMV BLD AUTO: 8.9 FL (ref 6–12)
RBC # BLD AUTO: 5.47 10*6/MM3 (ref 4.14–5.8)
TIBC SERPL-MCNC: 329 MCG/DL (ref 298–536)
TRANSFERRIN SERPL-MCNC: 221 MG/DL (ref 200–360)
WBC NRBC COR # BLD: 8.61 10*3/MM3 (ref 3.4–10.8)

## 2019-12-10 PROCEDURE — 85025 COMPLETE CBC W/AUTO DIFF WBC: CPT | Performed by: INTERNAL MEDICINE

## 2019-12-10 PROCEDURE — 83540 ASSAY OF IRON: CPT | Performed by: INTERNAL MEDICINE

## 2019-12-10 PROCEDURE — 36415 COLL VENOUS BLD VENIPUNCTURE: CPT

## 2019-12-10 PROCEDURE — 82728 ASSAY OF FERRITIN: CPT | Performed by: INTERNAL MEDICINE

## 2019-12-10 PROCEDURE — 84466 ASSAY OF TRANSFERRIN: CPT | Performed by: INTERNAL MEDICINE

## 2019-12-10 PROCEDURE — 84100 ASSAY OF PHOSPHORUS: CPT | Performed by: INTERNAL MEDICINE

## 2019-12-30 ENCOUNTER — LAB (OUTPATIENT)
Dept: ONCOLOGY | Facility: CLINIC | Age: 49
End: 2019-12-30

## 2019-12-30 VITALS
SYSTOLIC BLOOD PRESSURE: 132 MMHG | HEART RATE: 103 BPM | DIASTOLIC BLOOD PRESSURE: 87 MMHG | OXYGEN SATURATION: 99 % | TEMPERATURE: 98.4 F | RESPIRATION RATE: 18 BRPM

## 2019-12-30 DIAGNOSIS — D50.9 IRON DEFICIENCY ANEMIA, UNSPECIFIED IRON DEFICIENCY ANEMIA TYPE: Primary | ICD-10-CM

## 2019-12-30 DIAGNOSIS — E61.1 IRON DEFICIENCY: ICD-10-CM

## 2019-12-30 DIAGNOSIS — E53.8 FOLATE DEFICIENCY: ICD-10-CM

## 2019-12-30 DIAGNOSIS — E83.39 HYPOPHOSPHATEMIA: ICD-10-CM

## 2019-12-30 LAB
ANION GAP SERPL CALCULATED.3IONS-SCNC: 11.8 MMOL/L (ref 5–15)
BASOPHILS # BLD AUTO: 0.02 10*3/MM3 (ref 0–0.2)
BASOPHILS NFR BLD AUTO: 0.3 % (ref 0–1.5)
BUN BLD-MCNC: 10 MG/DL (ref 6–20)
BUN/CREAT SERPL: 11.5 (ref 7–25)
CALCIUM SPEC-SCNC: 9.7 MG/DL (ref 8.6–10.5)
CHLORIDE SERPL-SCNC: 96 MMOL/L (ref 98–107)
CO2 SERPL-SCNC: 27.2 MMOL/L (ref 22–29)
CREAT BLD-MCNC: 0.87 MG/DL (ref 0.76–1.27)
DEPRECATED RDW RBC AUTO: 49 FL (ref 37–54)
EOSINOPHIL # BLD AUTO: 0.08 10*3/MM3 (ref 0–0.4)
EOSINOPHIL NFR BLD AUTO: 1 % (ref 0.3–6.2)
ERYTHROCYTE [DISTWIDTH] IN BLOOD BY AUTOMATED COUNT: 14.7 % (ref 12.3–15.4)
FERRITIN SERPL-MCNC: 676.1 NG/ML (ref 30–400)
GFR SERPL CREATININE-BSD FRML MDRD: 93 ML/MIN/1.73
GLUCOSE BLD-MCNC: 145 MG/DL (ref 65–99)
HCT VFR BLD AUTO: 49.6 % (ref 37.5–51)
HGB BLD-MCNC: 16.2 G/DL (ref 13–17.7)
IMM GRANULOCYTES # BLD AUTO: 0.03 10*3/MM3 (ref 0–0.05)
IMM GRANULOCYTES NFR BLD AUTO: 0.4 % (ref 0–0.5)
IRON 24H UR-MRATE: 116 MCG/DL (ref 59–158)
IRON SATN MFR SERPL: 35 % (ref 20–50)
LYMPHOCYTES # BLD AUTO: 1.41 10*3/MM3 (ref 0.7–3.1)
LYMPHOCYTES NFR BLD AUTO: 18.2 % (ref 19.6–45.3)
MCH RBC QN AUTO: 29.7 PG (ref 26.6–33)
MCHC RBC AUTO-ENTMCNC: 32.7 G/DL (ref 31.5–35.7)
MCV RBC AUTO: 90.8 FL (ref 79–97)
MONOCYTES # BLD AUTO: 0.54 10*3/MM3 (ref 0.1–0.9)
MONOCYTES NFR BLD AUTO: 7 % (ref 5–12)
NEUTROPHILS # BLD AUTO: 5.66 10*3/MM3 (ref 1.7–7)
NEUTROPHILS NFR BLD AUTO: 73.1 % (ref 42.7–76)
NRBC BLD AUTO-RTO: 0 /100 WBC (ref 0–0.2)
PHOSPHATE SERPL-MCNC: 3.4 MG/DL (ref 2.5–4.5)
PLATELET # BLD AUTO: 253 10*3/MM3 (ref 140–450)
PMV BLD AUTO: 8.5 FL (ref 6–12)
POTASSIUM BLD-SCNC: 4.7 MMOL/L (ref 3.5–5.2)
RBC # BLD AUTO: 5.46 10*6/MM3 (ref 4.14–5.8)
SODIUM BLD-SCNC: 135 MMOL/L (ref 136–145)
TIBC SERPL-MCNC: 334 MCG/DL (ref 298–536)
TRANSFERRIN SERPL-MCNC: 224 MG/DL (ref 200–360)
WBC NRBC COR # BLD: 7.74 10*3/MM3 (ref 3.4–10.8)

## 2019-12-30 PROCEDURE — 80048 BASIC METABOLIC PNL TOTAL CA: CPT | Performed by: INTERNAL MEDICINE

## 2019-12-30 PROCEDURE — 83540 ASSAY OF IRON: CPT | Performed by: INTERNAL MEDICINE

## 2019-12-30 PROCEDURE — 84466 ASSAY OF TRANSFERRIN: CPT | Performed by: INTERNAL MEDICINE

## 2019-12-30 PROCEDURE — 36415 COLL VENOUS BLD VENIPUNCTURE: CPT

## 2019-12-30 PROCEDURE — 84100 ASSAY OF PHOSPHORUS: CPT | Performed by: INTERNAL MEDICINE

## 2019-12-30 PROCEDURE — 85025 COMPLETE CBC W/AUTO DIFF WBC: CPT | Performed by: INTERNAL MEDICINE

## 2019-12-30 PROCEDURE — 82728 ASSAY OF FERRITIN: CPT | Performed by: INTERNAL MEDICINE

## 2020-02-03 NOTE — PROGRESS NOTES
Jamison Edward  [id number]  1970  2/4/2020      Referring Provider:   Kenton Boogie MD    Primary Physician:  Lázaro Heart MD    Reason for Follow Up:   Normocytic anemia  Iron deficiency  B12 deficiency    Chief Complaint:   Abdominal pain since surgery      History of Present Illness:  Jamison Edward is a very pleasant 49 y.o.  male who presents in follow up appointment for further management and evaluation of normocytic anemia secondary to iron and B12 deficiency.    Mr. Edward has been following with Dr. Richey for history of peptic ulcer disease and hematemesis. He was placed on oral iron once daily several months ago and experienced some constipation with the medication however self discontinued this one month prior to his initial consultation. He has had gradual weight loss where he previously weighed 205 pounds two years ago and is currently down to 140 pounds. He states that he continues to have a good appetite and eat normally. He recently had an EGD in November 2017 for weight loss, hematemesis and abdominal pain which was significant for gastric and duodenal ulcers. He does have history of peptic ulcer disease and in the past and has also been followed by Dr. Starr for Johnson's esophagus. He also had an EGD in May 2017 that showed normal duodenal biopsies, negative H pylori, along with normal random colon biopsies. He also had a CT abd/pelvis performed in 11/2017 which showed possible colitis versus under distension of the right colon. He is currently on ASA 81mg daily due to a TIA in the past however denies of any other NSAID use. He also experiences chest pain every night and has been evaluated by cardiology and as per patient was felt to be non cardiac related. He underwent an EGD with Dr. Richey and was found to have gastric and duodenal ulcers and is to continue current treatment for the ulcers and was to undergo repeat endoscopy. He has also been following with  gastroenterology in [place] with Dr. Moran for further evaluation. He underwent an MRCP in August that was essentially unremarkable. He also reports he recently underwent an abdominal US with duplex that was reportedly negative however those reults are unavailable to me at present. He continues to have ongoing nausea and abdominal pain while eating although slightly improved.     Interim History:  Since his last visit he underwent a vagotomy, antrectomy, Steven-en-Y gastrojejunostomy for peptic ulcer with gastric outlet obstruction which was performed on 9/17/2019. He has noticed some mild abdominal pain since surgery however this has healed well. He is currently not on oral iron or B12 injections. He is also no longer on phosphorus and required supplementation after IV injectafer.       The following portions of the patient's history were reviewed and updated as appropriate: allergies, current medications, past family history, past medical history, past social history, past surgical history and problem list.    Allergies   Allergen Reactions   • Contrast Dye Other (See Comments)     Shortness of breath   • Protonix [Pantoprazole Sodium] Palpitations     Patient states that protonix causes chest pain.  Shruthi Pérez [facility]  4/23/2017  11:19 AM     • Prilosec [Omeprazole] Other (See Comments)     Chest pain     • Flagyl [Metronidazole] Nausea And Vomiting       Past Medical History:   Diagnosis Date   • Johnson esophagus    • COPD (chronic obstructive pulmonary disease) (CMS/[facility])    • DDD (degenerative disc disease), thoracic    • Degenerative disc disease, lumbar    • GERD (gastroesophageal reflux disease)    • Hypertension    • Peptic ulcer disease    • TIA (transient ischemic attack)    • Wears dentures     upper only       Past Surgical History:   Procedure Laterality Date   • APPENDECTOMY     • CHOLECYSTECTOMY N/A 4/22/2017    Procedure: CHOLECYSTECTOMY LAPAROSCOPIC;  Surgeon: Jaqueline Guaman MD;   Location:  [facility];  Service:    • CHOLECYSTECTOMY     • COLONOSCOPY N/A 5/8/2017    Procedure: COLONOSCOPY  CPTCODE:20503;  Surgeon: Nicho Richey III, MD;  Location:  [facility];  Service:    • ENDOSCOPY     • ENDOSCOPY N/A 5/8/2017    Procedure: ESOPHAGOGASTRODUODENOSCOPY WITH BIOPSY  CPTCODE:27840;  Surgeon: Nicho Richey III, MD;  Location:  [facility];  Service:    • ENDOSCOPY N/A 11/3/2017    Procedure: ESOPHAGOGASTRODUODENOSCOPY WITH BIOPSY  CPTCODE: 13642;  Surgeon: Nicho Richey III, MD;  Location:  [facility];  Service:    • ENDOSCOPY N/A 2/20/2018    Procedure: ESOPHAGOGASTRODUODENOSCOPY WITH DILATATION CPT CODE: 76877;  Surgeon: Nicho Richey III, MD;  Location:  [facility];  Service:    • ENDOSCOPY N/A 6/5/2018    Procedure: ESOPHAGOGASTRODUODENOSCOPY WITH BIOPSY CPT CODE: 18617;  Surgeon: Nicho Richey III, MD;  Location:  [facility];  Service: Gastroenterology   • GASTRECTOMY N/A 9/17/2019    Procedure: OPEN VAGOTOMY, ANTRECTOMY,REVISION- Y GASTROJEJUNOSOTOMY;  Surgeon: Herbert Umanzor MD;  Location:  [facility];  Service: General       Social History     Socioeconomic History   • Marital status:      Spouse name: Not on file   • Number of children: Not on file   • Years of education: Not on file   • Highest education level: Not on file   Tobacco Use   • Smoking status: Current Every Day Smoker     Packs/day: 0.50     Years: 35.00     Pack years: 17.50     Types: Cigarettes   • Smokeless tobacco: Never Used   Substance and Sexual Activity   • Alcohol use: No   • Drug use: No   • Sexual activity: Defer       Family History   Problem Relation Age of Onset   • No Known Problems Mother    • Hypertension Father    • No Known Problems Sister    • No Known Problems Brother    • Drug abuse Brother    • No Known Problems Daughter    Maternal GF - H&N cancer  Paternal GF - Prostate cancer          Current Outpatient Medications:   •  baclofen (LIORESAL) 10 MG tablet, Take 20 mg by  mouth 3 (Three) Times a Day., Disp: , Rfl:   •  cimetidine (TAGAMET) 800 MG tablet, Take 800 mg by mouth 3 (Three) Times a Day., Disp: , Rfl:   •  Cyanocobalamin (VITAMIN B-12 IJ), Inject  as directed., Disp: , Rfl:   •  docusate (COLACE) 50 MG/5ML liquid, Take 10 mL by mouth Daily., Disp: 200 mL, Rfl: 0  •  esomeprazole (nexIUM) 40 MG capsule, Take 1 capsule by mouth 2 (Two) Times a Day Before Meals. (Patient taking differently: Take 40 mg by mouth Daily.), Disp: 60 capsule, Rfl: 3  •  hydrochlorothiazide (HYDRODIURIL) 25 MG tablet, Take 25 mg by mouth Daily., Disp: , Rfl:   •  levETIRAcetam (KEPPRA) 500 MG tablet, Take 1,500 mg by mouth 2 (Two) Times a Day., Disp: , Rfl:   •  potassium chloride (K-DUR) 10 MEQ CR tablet, Take 2 tablets by mouth 2 (Two) Times a Day., Disp: 60 tablet, Rfl: 1  •  sucralfate (CARAFATE) 1 G tablet, Take 1 tablet by mouth 4 (Four) Times a Day., Disp: 40 tablet, Rfl: 0  •  traZODone (DESYREL) 50 MG tablet, Take 1 tablet by mouth Every Night. (Patient taking differently: Take 100 mg by mouth Every Night.), Disp: 30 tablet, Rfl: 2  •  venlafaxine (EFFEXOR) 75 MG tablet, Take 75 mg by mouth every night at bedtime., Disp: , Rfl:         Review of Systems  A comprehensive 14 point review of systems was performed.  Significant findings as mentioned above.  All other systems reviewed and are negative.      Physical Exam  Vital Signs: These were reviewed and listed as per patient’s electronic medical chart  Vitals:    02/04/20 1059   BP: 133/87   Pulse: 99   Resp: 18   Temp: 98.2 °F (36.8 °C)   SpO2: 98%     General: Awake, alert and oriented, in no distress, has put on weight since last visit  HEENT: Head is atraumatic, normocephalic, extraocular movements full, oropharynx clear, no scleral icterus, pink moist mucous membranes  Neck: supple, no jvd, lymphadenopathy or masses  Cardiovascular: regular rhythm, tachycardic without murmurs  Pulmonary: non-labored, clear to auscultation bilaterally,  no wheezing  Abdomen: soft, non-distended, normal active bowel sounds present, mid abdominal staples  Extremities: No clubbing, cyanosis or edema  Lymph: No cervical, supraclavicular adenopathy  Neurologic: Mental status as above, alert, awake and oriented, grossly non-focal exam  Skin: warm, dry, intact, chronic lesions  Patient was examined on 02/04/20 and changes are reflected and noted above.      Labs / Studies:    Office Visit on 02/04/2020   Component Date Value   • Iron 02/04/2020 75    • Iron Saturation 02/04/2020 22    • Transferrin 02/04/2020 232    • TIBC 02/04/2020 346    • Ferritin 02/04/2020 664.20*   • Phosphorus 02/04/2020 3.3    • WBC 02/04/2020 8.59    • RBC 02/04/2020 5.15    • Hemoglobin 02/04/2020 16.2    • Hematocrit 02/04/2020 48.1    • MCV 02/04/2020 93.4    • MCH 02/04/2020 31.5    • MCHC 02/04/2020 33.7    • RDW 02/04/2020 14.0    • RDW-SD 02/04/2020 47.3    • MPV 02/04/2020 9.0    • Platelets 02/04/2020 239    • Neutrophil % 02/04/2020 74.1    • Lymphocyte % 02/04/2020 17.9*   • Monocyte % 02/04/2020 6.5    • Eosinophil % 02/04/2020 0.5    • Basophil % 02/04/2020 0.5    • Immature Grans % 02/04/2020 0.5    • Neutrophils, Absolute 02/04/2020 6.37    • Lymphocytes, Absolute 02/04/2020 1.54    • Monocytes, Absolute 02/04/2020 0.56    • Eosinophils, Absolute 02/04/2020 0.04    • Basophils, Absolute 02/04/2020 0.04    • Immature Grans, Absolute 02/04/2020 0.04    • nRBC 02/04/2020 0.0    Lab on 12/30/2019   Component Date Value   • Phosphorus 12/30/2019 3.4    • Ferritin 12/30/2019 676.10*   • Iron 12/30/2019 116    • Iron Saturation 12/30/2019 35    • Transferrin 12/30/2019 224    • TIBC 12/30/2019 334    • WBC 12/30/2019 7.74    • RBC 12/30/2019 5.46    • Hemoglobin 12/30/2019 16.2    • Hematocrit 12/30/2019 49.6    • MCV 12/30/2019 90.8    • MCH 12/30/2019 29.7    • MCHC 12/30/2019 32.7    • RDW 12/30/2019 14.7    • RDW-SD 12/30/2019 49.0    • MPV 12/30/2019 8.5    • Platelets 12/30/2019  253    • Neutrophil % 12/30/2019 73.1    • Lymphocyte % 12/30/2019 18.2*   • Monocyte % 12/30/2019 7.0    • Eosinophil % 12/30/2019 1.0    • Basophil % 12/30/2019 0.3    • Immature Grans % 12/30/2019 0.4    • Neutrophils, Absolute 12/30/2019 5.66    • Lymphocytes, Absolute 12/30/2019 1.41    • Monocytes, Absolute 12/30/2019 0.54    • Eosinophils, Absolute 12/30/2019 0.08    • Basophils, Absolute 12/30/2019 0.02    • Immature Grans, Absolute 12/30/2019 0.03    • nRBC 12/30/2019 0.0    • Glucose 12/30/2019 145*   • BUN 12/30/2019 10    • Creatinine 12/30/2019 0.87    • Sodium 12/30/2019 135*   • Potassium 12/30/2019 4.7    • Chloride 12/30/2019 96*   • CO2 12/30/2019 27.2    • Calcium 12/30/2019 9.7    • eGFR Non  Amer 12/30/2019 93    • BUN/Creatinine Ratio 12/30/2019 11.5    • Anion Gap 12/30/2019 11.8    Lab on 12/10/2019   Component Date Value   • Iron 12/10/2019 110    • Iron Saturation 12/10/2019 33    • Transferrin 12/10/2019 221    • TIBC 12/10/2019 329    • Ferritin 12/10/2019 623.50*   • Phosphorus 12/10/2019 3.6    • WBC 12/10/2019 8.61    • RBC 12/10/2019 5.47    • Hemoglobin 12/10/2019 16.6    • Hematocrit 12/10/2019 48.8    • MCV 12/10/2019 89.2    • MCH 12/10/2019 30.3    • MCHC 12/10/2019 34.0    • RDW 12/10/2019 14.4    • RDW-SD 12/10/2019 47.3    • MPV 12/10/2019 8.9    • Platelets 12/10/2019 248    • Neutrophil % 12/10/2019 75.5    • Lymphocyte % 12/10/2019 15.9*   • Monocyte % 12/10/2019 7.4    • Eosinophil % 12/10/2019 0.3    • Basophil % 12/10/2019 0.6    • Immature Grans % 12/10/2019 0.3    • Neutrophils, Absolute 12/10/2019 6.49    • Lymphocytes, Absolute 12/10/2019 1.37    • Monocytes, Absolute 12/10/2019 0.64    • Eosinophils, Absolute 12/10/2019 0.03    • Basophils, Absolute 12/10/2019 0.05    • Immature Grans, Absolute 12/10/2019 0.03    • nRBC 12/10/2019 0.0    Lab on 12/03/2019   Component Date Value   • Iron 12/03/2019 94    • Iron Saturation 12/03/2019 28    • Transferrin  2019 226    • TIBC 2019 337    • Ferritin 2019 702.60*   • WBC 2019 8.35    • RBC 2019 5.54    • Hemoglobin 2019 16.4    • Hematocrit 2019 50.7    • MCV 2019 91.5    • MCH 2019 29.6    • MCHC 2019 32.3    • RDW 2019 14.3    • RDW-SD 2019 48.0    • MPV 2019 8.9    • Platelets 2019 265    • Neutrophil % 2019 76.3*   • Lymphocyte % 2019 16.5*   • Monocyte % 2019 5.9    • Eosinophil % 2019 0.6    • Basophil % 2019 0.5    • Immature Grans % 2019 0.2    • Neutrophils, Absolute 2019 6.37    • Lymphocytes, Absolute 2019 1.38    • Monocytes, Absolute 2019 0.49    • Eosinophils, Absolute 2019 0.05    • Basophils, Absolute 2019 0.04    • Immature Grans, Absolute 2019 0.02    • nRBC 2019 0.0    • Phosphorus 2019 4.0    Lab on 2019   Component Date Value   • Phosphorus 2019 3.1    Lab on 2019   Component Date Value   • Phosphorus 2019 2.6    Infusion on 2019   Component Date Value   • Phosphorus 2019 1.7*   Lab on 2019   Component Date Value   • Phosphorus 2019 2.2*   Lab on 2019   Component Date Value   • Phosphorus 2019 2.3*   Lab on 2019   Component Date Value   • Phosphorus 2019 2.0*   There may be more visits with results that are not included.          IMAGIN18: CT CHEST WO CONTRAST: LUNGS: MODERATE BILATERAL LUNG EMPHYSEMA. HEART: Unremarkable. PERICARDIUM: No effusion. MEDIASTINUM: No masses. No enlarged lymph nodes.  No fluid collections. PLEURA: No pleural effusion. No pleural mass or abnormal calcification. MAJOR AIRWAYS: Clear. No intrinsic mass. VASCULATURE: No evidence of aneurysm. VISUALIZED UPPER ABDOMEN: LIVER: Homogeneous. No focal hepatic mass or ductal dilatation. SPLEEN: Homogeneous. No splenomegaly.  ADRENALS: No mass. KIDNEYS: No mass. No obstructive uropathy.   No evidence of Urolithiasis. GI TRACT: Non-dilated. No definite wall thickening. PERITONEUM: No free air. No free fluid or loculated fluid Collections. ABDOMINAL WALL: No focal hernia or mass. OTHER: None. BONES: No acute bony abnormality. Impression: 1. Unremarkable exam.  No source for the patient symptoms. 2. Other incidental/non-acute findings as above.    04/13/18: MRI ABDOMEN WO CONTRAST MRCP: multiple axial gradient-echo T1 and T2-weighted images were obtained. Oblique coronal T2-weighted images were  acquired to evaluate the bile ducts. The common bile duct and common hepatic duct and main intrahepatic bile ducts are well-demonstrated and appear within normal limits. No gallstones are identified and there are no strictures. There is no bile duct dilatation. The main pancreatic duct was also fairly well demonstrated and appears within normal limits. The liver, spleen, pancreas, and adrenal glands appear within normal limits. IMPRESSION: Unremarkable MRCP imaging of the abdomen.           PATHOLOGY:  01/16/18: Peripheral Smear:                Assessment/Plan   Jamison Edward is a very pleasant 49 y.o.  male who presents in follow up appointment for further management and evaluation of normocytic anemia secondary to iron and B12 deficiency.    Normocytic anemia  Iron deficiency   B12 deficiency  - His hemoglobin is normal today along with normal iron panel. Therefore he does not need iron today but has in the past required IV Injectafer. After his last dose he has severe hypophosphatemia and required phosphorus supplementation for quite some time which has now improved.    - During his initial consultation the Iron panel and ferritin were consistent with iron deficiency and thus he was advised to restart oral iron (ferrous sulfate) and started this once daily with titration to three times daily. He tolerated this well, however, continued to be iron deficient and therefore was started on IV  Injectafer. I have also obtained Zinc, Copper levels as well as a tissues transglutaminase level which were normal  - He was following with gastroenterology closely and previously had an EGD which was significant for gastric and duodenal ulcers without malignancy and recently underwent surgical resection with improvement in his symptoms September 2019   - Folate was normal however B12 was low normal and therefore he was started on B12 injections. He is no longer on Cyanocobalamin injections.  - ORWENA was normal, LDH, retic count normal going against hemolysis, and haptoglobin was high. TSH normal.  - Peripheral smear as above.      ACO / CONRAD/Other  Quality measures  -  Mr. Edward is a current smoker and we discussed smoking cessation today. At present he does not want to quit but has been cutting back.   - Jamison Edward did not receive 2019 flu vaccine and patient does not want one.  -  Jamison Edward reports a pain score of 3.  Given his pain assessment as noted, treatment options were discussed and the following options were decided upon as a follow-up plan to address the patient's pain: continuation of current treatment plan for pain.  -  Current outpatient and discharge medications have been reconciled for the patient.  Reviewed by: Cami Gustafson MD    I will have the patient return for repeat laboratory evaluation in 2 months (CBC, Ferritin, Iron panel) and in follow up appointment in four months. He understands that should he have any questions or concerns prior to his appointment he should give us a call at any time and I would be happy to see him sooner. It was a pleasure to see this patient in clinic today, thank you for allowing me to participate in the care of this patient.      Cami Gustafson MD  02/04/2020  4:29 PM

## 2020-02-04 ENCOUNTER — OFFICE VISIT (OUTPATIENT)
Dept: ONCOLOGY | Facility: CLINIC | Age: 50
End: 2020-02-04

## 2020-02-04 VITALS
TEMPERATURE: 98.2 F | BODY MASS INDEX: 23.69 KG/M2 | OXYGEN SATURATION: 98 % | WEIGHT: 146.8 LBS | HEART RATE: 99 BPM | SYSTOLIC BLOOD PRESSURE: 133 MMHG | DIASTOLIC BLOOD PRESSURE: 87 MMHG | RESPIRATION RATE: 18 BRPM

## 2020-02-04 DIAGNOSIS — E83.39 HYPOPHOSPHATEMIA: ICD-10-CM

## 2020-02-04 DIAGNOSIS — D50.9 IRON DEFICIENCY ANEMIA, UNSPECIFIED IRON DEFICIENCY ANEMIA TYPE: Primary | ICD-10-CM

## 2020-02-04 LAB
BASOPHILS # BLD AUTO: 0.04 10*3/MM3 (ref 0–0.2)
BASOPHILS NFR BLD AUTO: 0.5 % (ref 0–1.5)
DEPRECATED RDW RBC AUTO: 47.3 FL (ref 37–54)
EOSINOPHIL # BLD AUTO: 0.04 10*3/MM3 (ref 0–0.4)
EOSINOPHIL NFR BLD AUTO: 0.5 % (ref 0.3–6.2)
ERYTHROCYTE [DISTWIDTH] IN BLOOD BY AUTOMATED COUNT: 14 % (ref 12.3–15.4)
FERRITIN SERPL-MCNC: 664.2 NG/ML (ref 30–400)
FOLATE SERPL-MCNC: 9.23 NG/ML (ref 4.78–24.2)
HCT VFR BLD AUTO: 48.1 % (ref 37.5–51)
HGB BLD-MCNC: 16.2 G/DL (ref 13–17.7)
IMM GRANULOCYTES # BLD AUTO: 0.04 10*3/MM3 (ref 0–0.05)
IMM GRANULOCYTES NFR BLD AUTO: 0.5 % (ref 0–0.5)
IRON 24H UR-MRATE: 75 MCG/DL (ref 59–158)
IRON SATN MFR SERPL: 22 % (ref 20–50)
LYMPHOCYTES # BLD AUTO: 1.54 10*3/MM3 (ref 0.7–3.1)
LYMPHOCYTES NFR BLD AUTO: 17.9 % (ref 19.6–45.3)
MCH RBC QN AUTO: 31.5 PG (ref 26.6–33)
MCHC RBC AUTO-ENTMCNC: 33.7 G/DL (ref 31.5–35.7)
MCV RBC AUTO: 93.4 FL (ref 79–97)
MONOCYTES # BLD AUTO: 0.56 10*3/MM3 (ref 0.1–0.9)
MONOCYTES NFR BLD AUTO: 6.5 % (ref 5–12)
NEUTROPHILS # BLD AUTO: 6.37 10*3/MM3 (ref 1.7–7)
NEUTROPHILS NFR BLD AUTO: 74.1 % (ref 42.7–76)
NRBC BLD AUTO-RTO: 0 /100 WBC (ref 0–0.2)
PHOSPHATE SERPL-MCNC: 3.3 MG/DL (ref 2.5–4.5)
PLATELET # BLD AUTO: 239 10*3/MM3 (ref 140–450)
PMV BLD AUTO: 9 FL (ref 6–12)
RBC # BLD AUTO: 5.15 10*6/MM3 (ref 4.14–5.8)
TIBC SERPL-MCNC: 346 MCG/DL (ref 298–536)
TRANSFERRIN SERPL-MCNC: 232 MG/DL (ref 200–360)
VIT B12 BLD-MCNC: 368 PG/ML (ref 211–946)
WBC NRBC COR # BLD: 8.59 10*3/MM3 (ref 3.4–10.8)

## 2020-02-04 PROCEDURE — 99214 OFFICE O/P EST MOD 30 MIN: CPT | Performed by: INTERNAL MEDICINE

## 2020-02-04 PROCEDURE — 85025 COMPLETE CBC W/AUTO DIFF WBC: CPT | Performed by: INTERNAL MEDICINE

## 2020-02-04 PROCEDURE — 82728 ASSAY OF FERRITIN: CPT | Performed by: INTERNAL MEDICINE

## 2020-02-04 PROCEDURE — 84100 ASSAY OF PHOSPHORUS: CPT | Performed by: INTERNAL MEDICINE

## 2020-02-04 PROCEDURE — 83540 ASSAY OF IRON: CPT | Performed by: INTERNAL MEDICINE

## 2020-02-04 PROCEDURE — 36415 COLL VENOUS BLD VENIPUNCTURE: CPT | Performed by: INTERNAL MEDICINE

## 2020-02-04 PROCEDURE — 82746 ASSAY OF FOLIC ACID SERUM: CPT | Performed by: INTERNAL MEDICINE

## 2020-02-04 PROCEDURE — 82607 VITAMIN B-12: CPT | Performed by: INTERNAL MEDICINE

## 2020-02-04 PROCEDURE — 84466 ASSAY OF TRANSFERRIN: CPT | Performed by: INTERNAL MEDICINE

## 2020-05-26 ENCOUNTER — LAB (OUTPATIENT)
Dept: LAB | Facility: HOSPITAL | Age: 50
End: 2020-05-26

## 2020-05-26 ENCOUNTER — TRANSCRIBE ORDERS (OUTPATIENT)
Dept: ADMINISTRATIVE | Facility: HOSPITAL | Age: 50
End: 2020-05-26

## 2020-05-26 DIAGNOSIS — R05.9 COUGH: Primary | ICD-10-CM

## 2020-05-26 DIAGNOSIS — R05.9 COUGH: ICD-10-CM

## 2020-05-26 PROCEDURE — U0003 INFECTIOUS AGENT DETECTION BY NUCLEIC ACID (DNA OR RNA); SEVERE ACUTE RESPIRATORY SYNDROME CORONAVIRUS 2 (SARS-COV-2) (CORONAVIRUS DISEASE [COVID-19]), AMPLIFIED PROBE TECHNIQUE, MAKING USE OF HIGH THROUGHPUT TECHNOLOGIES AS DESCRIBED BY CMS-2020-01-R: HCPCS

## 2020-05-26 PROCEDURE — C9803 HOPD COVID-19 SPEC COLLECT: HCPCS

## 2020-05-28 LAB — SARS-COV-2 RNA RESP QL NAA+PROBE: NOT DETECTED

## 2020-06-08 ENCOUNTER — OFFICE VISIT (OUTPATIENT)
Dept: ONCOLOGY | Facility: CLINIC | Age: 50
End: 2020-06-08

## 2020-06-08 VITALS
TEMPERATURE: 97.8 F | WEIGHT: 150.6 LBS | BODY MASS INDEX: 24.31 KG/M2 | SYSTOLIC BLOOD PRESSURE: 118 MMHG | RESPIRATION RATE: 18 BRPM | HEART RATE: 95 BPM | DIASTOLIC BLOOD PRESSURE: 80 MMHG | OXYGEN SATURATION: 96 %

## 2020-06-08 DIAGNOSIS — D64.9 NORMOCYTIC ANEMIA: ICD-10-CM

## 2020-06-08 DIAGNOSIS — E53.8 FOLATE DEFICIENCY: ICD-10-CM

## 2020-06-08 DIAGNOSIS — D50.9 IRON DEFICIENCY ANEMIA, UNSPECIFIED IRON DEFICIENCY ANEMIA TYPE: Primary | ICD-10-CM

## 2020-06-08 DIAGNOSIS — E53.8 B12 DEFICIENCY: ICD-10-CM

## 2020-06-08 DIAGNOSIS — E83.39 HYPOPHOSPHATEMIA: ICD-10-CM

## 2020-06-08 LAB
BASOPHILS # BLD AUTO: 0.04 10*3/MM3 (ref 0–0.2)
BASOPHILS NFR BLD AUTO: 0.4 % (ref 0–1.5)
DEPRECATED RDW RBC AUTO: 45.5 FL (ref 37–54)
EOSINOPHIL # BLD AUTO: 0.01 10*3/MM3 (ref 0–0.4)
EOSINOPHIL NFR BLD AUTO: 0.1 % (ref 0.3–6.2)
ERYTHROCYTE [DISTWIDTH] IN BLOOD BY AUTOMATED COUNT: 12.6 % (ref 12.3–15.4)
FERRITIN SERPL-MCNC: 385.5 NG/ML (ref 30–400)
FOLATE SERPL-MCNC: 6.89 NG/ML (ref 4.78–24.2)
HCT VFR BLD AUTO: 46.5 % (ref 37.5–51)
HGB BLD-MCNC: 15.7 G/DL (ref 13–17.7)
IMM GRANULOCYTES # BLD AUTO: 0.02 10*3/MM3 (ref 0–0.05)
IMM GRANULOCYTES NFR BLD AUTO: 0.2 % (ref 0–0.5)
IRON 24H UR-MRATE: 78 MCG/DL (ref 59–158)
IRON SATN MFR SERPL: 23 % (ref 20–50)
LYMPHOCYTES # BLD AUTO: 1.18 10*3/MM3 (ref 0.7–3.1)
LYMPHOCYTES NFR BLD AUTO: 12.6 % (ref 19.6–45.3)
MCH RBC QN AUTO: 32.7 PG (ref 26.6–33)
MCHC RBC AUTO-ENTMCNC: 33.8 G/DL (ref 31.5–35.7)
MCV RBC AUTO: 96.9 FL (ref 79–97)
MONOCYTES # BLD AUTO: 0.5 10*3/MM3 (ref 0.1–0.9)
MONOCYTES NFR BLD AUTO: 5.3 % (ref 5–12)
NEUTROPHILS # BLD AUTO: 7.64 10*3/MM3 (ref 1.7–7)
NEUTROPHILS NFR BLD AUTO: 81.4 % (ref 42.7–76)
NRBC BLD AUTO-RTO: 0 /100 WBC (ref 0–0.2)
PLATELET # BLD AUTO: 231 10*3/MM3 (ref 140–450)
PMV BLD AUTO: 8.9 FL (ref 6–12)
RBC # BLD AUTO: 4.8 10*6/MM3 (ref 4.14–5.8)
TIBC SERPL-MCNC: 335 MCG/DL (ref 298–536)
TRANSFERRIN SERPL-MCNC: 225 MG/DL (ref 200–360)
VIT B12 BLD-MCNC: 339 PG/ML (ref 211–946)
WBC NRBC COR # BLD: 9.39 10*3/MM3 (ref 3.4–10.8)

## 2020-06-08 PROCEDURE — 99213 OFFICE O/P EST LOW 20 MIN: CPT | Performed by: INTERNAL MEDICINE

## 2020-06-08 PROCEDURE — 82746 ASSAY OF FOLIC ACID SERUM: CPT | Performed by: INTERNAL MEDICINE

## 2020-06-08 PROCEDURE — 84466 ASSAY OF TRANSFERRIN: CPT | Performed by: INTERNAL MEDICINE

## 2020-06-08 PROCEDURE — 82728 ASSAY OF FERRITIN: CPT | Performed by: INTERNAL MEDICINE

## 2020-06-08 PROCEDURE — 82607 VITAMIN B-12: CPT | Performed by: INTERNAL MEDICINE

## 2020-06-08 PROCEDURE — 85025 COMPLETE CBC W/AUTO DIFF WBC: CPT | Performed by: INTERNAL MEDICINE

## 2020-06-08 PROCEDURE — 83540 ASSAY OF IRON: CPT | Performed by: INTERNAL MEDICINE

## 2020-06-08 NOTE — PROGRESS NOTES
Jamison Edward  [id number]  1970  6/8/2020      Referring Provider:   Kenton Boogie MD    Primary Physician:  Lázaro Heart MD    Reason for Follow Up:   Normocytic anemia  Iron deficiency  B12 deficiency    Chief Complaint:   Iron and B12 deficiency      History of Present Illness:  Jamison Edward is a very pleasant 49 y.o.  male who presents in follow up appointment for further management and evaluation of normocytic anemia secondary to iron and B12 deficiency.    Mr. Edward has been following with Dr. Richey for history of peptic ulcer disease and hematemesis. He was placed on oral iron once daily several months ago and experienced some constipation with the medication however self discontinued this one month prior to his initial consultation. He has had gradual weight loss where he previously weighed 205 pounds two years ago and is currently down to 140 pounds. He states that he continues to have a good appetite and eat normally. He recently had an EGD in November 2017 for weight loss, hematemesis and abdominal pain which was significant for gastric and duodenal ulcers. He does have history of peptic ulcer disease and in the past and has also been followed by Dr. Starr for Johnson's esophagus. He also had an EGD in May 2017 that showed normal duodenal biopsies, negative H pylori, along with normal random colon biopsies. He also had a CT abd/pelvis performed in 11/2017 which showed possible colitis versus under distension of the right colon. He is currently on ASA 81mg daily due to a TIA in the past however denies of any other NSAID use. He also experiences chest pain every night and has been evaluated by cardiology and as per patient was felt to be non cardiac related. He underwent an EGD with Dr. Richey and was found to have gastric and duodenal ulcers and is to continue current treatment for the ulcers and was to undergo repeat endoscopy. He has also been following with  gastroenterology in [place] with Dr. Moran for further evaluation. He underwent an MRCP in August that was essentially unremarkable as well as an abdominal duplex. He had a vagotomy, antrectomy, Steven-en-Y gastrojejunostomy for peptic ulcer with gastric outlet obstruction that was performed on 9/17/2019.      Interim History:  He has not required IV iron in for some time and is no longer on B12 injections. He has been doing well since his last visit without any significant complaints today. He denies of any bleeding or abdominal pain.       The following portions of the patient's history were reviewed and updated as appropriate: allergies, current medications, past family history, past medical history, past social history, past surgical history and problem list.    Allergies   Allergen Reactions   • Contrast Dye Other (See Comments)     Shortness of breath   • Protonix [Pantoprazole Sodium] Palpitations     Patient states that protonix causes chest pain.  Shruthi Pérez, [facility]  4/23/2017  11:19 AM     • Prilosec [Omeprazole] Other (See Comments)     Chest pain     • Flagyl [Metronidazole] Nausea And Vomiting       Past Medical History:   Diagnosis Date   • Johnson esophagus    • COPD (chronic obstructive pulmonary disease) (CMS/[facility])    • DDD (degenerative disc disease), thoracic    • Degenerative disc disease, lumbar    • GERD (gastroesophageal reflux disease)    • Hypertension    • Peptic ulcer disease    • TIA (transient ischemic attack)    • Wears dentures     upper only       Past Surgical History:   Procedure Laterality Date   • APPENDECTOMY     • CHOLECYSTECTOMY N/A 4/22/2017    Procedure: CHOLECYSTECTOMY LAPAROSCOPIC;  Surgeon: Jaqueline Guaman MD;  Location: [facility];  Service:    • CHOLECYSTECTOMY     • COLONOSCOPY N/A 5/8/2017    Procedure: COLONOSCOPY  CPTCODE:96489;  Surgeon: Nicho Richey III, MD;  Location: [facility];  Service:    • ENDOSCOPY     • ENDOSCOPY N/A 5/8/2017    Procedure:  ESOPHAGOGASTRODUODENOSCOPY WITH BIOPSY  CPTCODE:98808;  Surgeon: Nicho Richey III, MD;  Location:  [facility];  Service:    • ENDOSCOPY N/A 11/3/2017    Procedure: ESOPHAGOGASTRODUODENOSCOPY WITH BIOPSY  CPTCODE: 30762;  Surgeon: Nicho Richey III, MD;  Location:  [facility];  Service:    • ENDOSCOPY N/A 2/20/2018    Procedure: ESOPHAGOGASTRODUODENOSCOPY WITH DILATATION CPT CODE: 85099;  Surgeon: Nicho Richey III, MD;  Location:  [facility];  Service:    • ENDOSCOPY N/A 6/5/2018    Procedure: ESOPHAGOGASTRODUODENOSCOPY WITH BIOPSY CPT CODE: 25391;  Surgeon: Nicho Richey III, MD;  Location:  [facility];  Service: Gastroenterology   • GASTRECTOMY N/A 9/17/2019    Procedure: OPEN VAGOTOMY, ANTRECTOMY,REVISION- Y GASTROJEJUNOSOTOMY;  Surgeon: Herbert Umanzor MD;  Location:  [facility];  Service: General       Social History     Socioeconomic History   • Marital status:      Spouse name: Not on file   • Number of children: Not on file   • Years of education: Not on file   • Highest education level: Not on file   Tobacco Use   • Smoking status: Current Every Day Smoker     Packs/day: 0.50     Years: 35.00     Pack years: 17.50     Types: Cigarettes   • Smokeless tobacco: Never Used   Substance and Sexual Activity   • Alcohol use: No   • Drug use: No   • Sexual activity: Defer       Family History   Problem Relation Age of Onset   • No Known Problems Mother    • Hypertension Father    • No Known Problems Sister    • No Known Problems Brother    • Drug abuse Brother    • No Known Problems Daughter    Maternal GF - H&N cancer  Paternal GF - Prostate cancer          Current Outpatient Medications:   •  baclofen (LIORESAL) 10 MG tablet, Take 20 mg by mouth 3 (Three) Times a Day., Disp: , Rfl:   •  cimetidine (TAGAMET) 800 MG tablet, Take 800 mg by mouth 3 (Three) Times a Day., Disp: , Rfl:   •  Cyanocobalamin (VITAMIN B-12 IJ), Inject  as directed., Disp: , Rfl:   •  docusate (COLACE) 50 MG/5ML  liquid, Take 10 mL by mouth Daily., Disp: 200 mL, Rfl: 0  •  esomeprazole (nexIUM) 40 MG capsule, Take 1 capsule by mouth 2 (Two) Times a Day Before Meals. (Patient taking differently: Take 40 mg by mouth Daily.), Disp: 60 capsule, Rfl: 3  •  hydrochlorothiazide (HYDRODIURIL) 25 MG tablet, Take 25 mg by mouth Daily., Disp: , Rfl:   •  levETIRAcetam (KEPPRA) 500 MG tablet, Take 1,500 mg by mouth 2 (Two) Times a Day., Disp: , Rfl:   •  potassium chloride (K-DUR) 10 MEQ CR tablet, Take 2 tablets by mouth 2 (Two) Times a Day., Disp: 60 tablet, Rfl: 1  •  sucralfate (CARAFATE) 1 G tablet, Take 1 tablet by mouth 4 (Four) Times a Day., Disp: 40 tablet, Rfl: 0  •  traZODone (DESYREL) 50 MG tablet, Take 1 tablet by mouth Every Night. (Patient taking differently: Take 100 mg by mouth Every Night.), Disp: 30 tablet, Rfl: 2  •  venlafaxine (EFFEXOR) 75 MG tablet, Take 75 mg by mouth every night at bedtime., Disp: , Rfl:         Review of Systems  A comprehensive 14 point review of systems was performed.  Significant findings as mentioned above.  All other systems reviewed and are negative.      Physical Exam  Vital Signs: These were reviewed and listed as per patient’s electronic medical chart  Vitals:    06/08/20 1048   BP: 118/80   Pulse: 95   Resp: 18   Temp: 97.8 °F (36.6 °C)   SpO2: 96%     General: Awake, alert and oriented, in no distress, has put on weight since last visit  HEENT: Head is atraumatic, normocephalic, extraocular movements full, no scleral icterus  Neck: no jvd or masses  Cardiovascular: regular rhythm, tachycardic without murmurs  Pulmonary: non-labored, clear to auscultation bilaterally, no wheezing  Abdomen: soft, non-distended, normal active bowel sounds present  Extremities: No clubbing, cyanosis or edema  Neurologic: Mental status as above, alert, awake and oriented, grossly non-focal exam  Skin: warm, dry, intact, chronic lesions  Patient was examined on 06/08/20 and changes are reflected and noted  above.          Labs / Studies:    Office Visit on 06/08/2020   Component Date Value   • Iron 06/08/2020 78    • Iron Saturation 06/08/2020 23    • Transferrin 06/08/2020 225    • TIBC 06/08/2020 335    • Ferritin 06/08/2020 385.50    • WBC 06/08/2020 9.39    • RBC 06/08/2020 4.80    • Hemoglobin 06/08/2020 15.7    • Hematocrit 06/08/2020 46.5    • MCV 06/08/2020 96.9    • MCH 06/08/2020 32.7    • MCHC 06/08/2020 33.8    • RDW 06/08/2020 12.6    • RDW-SD 06/08/2020 45.5    • MPV 06/08/2020 8.9    • Platelets 06/08/2020 231    • Neutrophil % 06/08/2020 81.4*   • Lymphocyte % 06/08/2020 12.6*   • Monocyte % 06/08/2020 5.3    • Eosinophil % 06/08/2020 0.1*   • Basophil % 06/08/2020 0.4    • Immature Grans % 06/08/2020 0.2    • Neutrophils, Absolute 06/08/2020 7.64*   • Lymphocytes, Absolute 06/08/2020 1.18    • Monocytes, Absolute 06/08/2020 0.50    • Eosinophils, Absolute 06/08/2020 0.01    • Basophils, Absolute 06/08/2020 0.04    • Immature Grans, Absolute 06/08/2020 0.02    • nRBC 06/08/2020 0.0    Lab on 05/26/2020   Component Date Value   • SARS-CoV-2, [facility] 05/26/2020 Not Detected    Office Visit on 02/04/2020   Component Date Value   • Iron 02/04/2020 75    • Iron Saturation 02/04/2020 22    • Transferrin 02/04/2020 232    • TIBC 02/04/2020 346    • Ferritin 02/04/2020 664.20*   • Vitamin B-12 02/04/2020 368    • Folate 02/04/2020 9.23    • Phosphorus 02/04/2020 3.3    • WBC 02/04/2020 8.59    • RBC 02/04/2020 5.15    • Hemoglobin 02/04/2020 16.2    • Hematocrit 02/04/2020 48.1    • MCV 02/04/2020 93.4    • MCH 02/04/2020 31.5    • MCHC 02/04/2020 33.7    • RDW 02/04/2020 14.0    • RDW-SD 02/04/2020 47.3    • MPV 02/04/2020 9.0    • Platelets 02/04/2020 239    • Neutrophil % 02/04/2020 74.1    • Lymphocyte % 02/04/2020 17.9*   • Monocyte % 02/04/2020 6.5    • Eosinophil % 02/04/2020 0.5    • Basophil % 02/04/2020 0.5    • Immature Grans % 02/04/2020 0.5    • Neutrophils, Absolute 02/04/2020 6.37    • Lymphocytes,  Absolute 02/04/2020 1.54    • Monocytes, Absolute 02/04/2020 0.56    • Eosinophils, Absolute 02/04/2020 0.04    • Basophils, Absolute 02/04/2020 0.04    • Immature Grans, Absolute 02/04/2020 0.04    • nRBC 02/04/2020 0.0    Lab on 12/30/2019   Component Date Value   • Phosphorus 12/30/2019 3.4    • Ferritin 12/30/2019 676.10*   • Iron 12/30/2019 116    • Iron Saturation 12/30/2019 35    • Transferrin 12/30/2019 224    • TIBC 12/30/2019 334    • WBC 12/30/2019 7.74    • RBC 12/30/2019 5.46    • Hemoglobin 12/30/2019 16.2    • Hematocrit 12/30/2019 49.6    • MCV 12/30/2019 90.8    • MCH 12/30/2019 29.7    • MCHC 12/30/2019 32.7    • RDW 12/30/2019 14.7    • RDW-SD 12/30/2019 49.0    • MPV 12/30/2019 8.5    • Platelets 12/30/2019 253    • Neutrophil % 12/30/2019 73.1    • Lymphocyte % 12/30/2019 18.2*   • Monocyte % 12/30/2019 7.0    • Eosinophil % 12/30/2019 1.0    • Basophil % 12/30/2019 0.3    • Immature Grans % 12/30/2019 0.4    • Neutrophils, Absolute 12/30/2019 5.66    • Lymphocytes, Absolute 12/30/2019 1.41    • Monocytes, Absolute 12/30/2019 0.54    • Eosinophils, Absolute 12/30/2019 0.08    • Basophils, Absolute 12/30/2019 0.02    • Immature Grans, Absolute 12/30/2019 0.03    • nRBC 12/30/2019 0.0    • Glucose 12/30/2019 145*   • BUN 12/30/2019 10    • Creatinine 12/30/2019 0.87    • Sodium 12/30/2019 135*   • Potassium 12/30/2019 4.7    • Chloride 12/30/2019 96*   • CO2 12/30/2019 27.2    • Calcium 12/30/2019 9.7    • eGFR Non  Amer 12/30/2019 93    • BUN/Creatinine Ratio 12/30/2019 11.5    • Anion Gap 12/30/2019 11.8    Lab on 12/10/2019   Component Date Value   • Iron 12/10/2019 110    • Iron Saturation 12/10/2019 33    • Transferrin 12/10/2019 221    • TIBC 12/10/2019 329    • Ferritin 12/10/2019 623.50*   • Phosphorus 12/10/2019 3.6    • WBC 12/10/2019 8.61    • RBC 12/10/2019 5.47    • Hemoglobin 12/10/2019 16.6    • Hematocrit 12/10/2019 48.8    • MCV 12/10/2019 89.2    • MCH 12/10/2019 30.3    •  MCHC 12/10/2019 34.0    • RDW 12/10/2019 14.4    • RDW-SD 12/10/2019 47.3    • MPV 12/10/2019 8.9    • Platelets 12/10/2019 248    • Neutrophil % 12/10/2019 75.5    • Lymphocyte % 12/10/2019 15.9*   • Monocyte % 12/10/2019 7.4    • Eosinophil % 12/10/2019 0.3    • Basophil % 12/10/2019 0.6    • Immature Grans % 12/10/2019 0.3    • Neutrophils, Absolute 12/10/2019 6.49    • Lymphocytes, Absolute 12/10/2019 1.37    • Monocytes, Absolute 12/10/2019 0.64    • Eosinophils, Absolute 12/10/2019 0.03    • Basophils, Absolute 12/10/2019 0.05    • Immature Grans, Absolute 12/10/2019 0.03    • nRBC 12/10/2019 0.0           IMAGIN18: CT CHEST WO CONTRAST: LUNGS: MODERATE BILATERAL LUNG EMPHYSEMA. HEART: Unremarkable. PERICARDIUM: No effusion. MEDIASTINUM: No masses. No enlarged lymph nodes.  No fluid collections. PLEURA: No pleural effusion. No pleural mass or abnormal calcification. MAJOR AIRWAYS: Clear. No intrinsic mass. VASCULATURE: No evidence of aneurysm. VISUALIZED UPPER ABDOMEN: LIVER: Homogeneous. No focal hepatic mass or ductal dilatation. SPLEEN: Homogeneous. No splenomegaly.  ADRENALS: No mass. KIDNEYS: No mass. No obstructive uropathy.  No evidence of Urolithiasis. GI TRACT: Non-dilated. No definite wall thickening. PERITONEUM: No free air. No free fluid or loculated fluid Collections. ABDOMINAL WALL: No focal hernia or mass. OTHER: None. BONES: No acute bony abnormality. Impression: 1. Unremarkable exam.  No source for the patient symptoms. 2. Other incidental/non-acute findings as above.    18: MRI ABDOMEN WO CONTRAST MRCP: multiple axial gradient-echo T1 and T2-weighted images were obtained. Oblique coronal T2-weighted images were  acquired to evaluate the bile ducts. The common bile duct and common hepatic duct and main intrahepatic bile ducts are well-demonstrated and appear within normal limits. No gallstones are identified and there are no strictures. There is no bile duct dilatation. The  main pancreatic duct was also fairly well demonstrated and appears within normal limits. The liver, spleen, pancreas, and adrenal glands appear within normal limits. IMPRESSION: Unremarkable MRCP imaging of the abdomen.           PATHOLOGY:  01/16/18: Peripheral Smear:                Assessment/Plan   Jamison Edward is a very pleasant 49 y.o.  male who presents in follow up appointment for further management and evaluation of normocytic anemia secondary to iron and B12 deficiency.    Normocytic anemia  Iron deficiency   B12 deficiency  - His hemoglobin is normal today along with normal iron panel. Therefore he does not need iron today but has in the past required IV Injectafer. After his last dose he has severe hypophosphatemia and required phosphorus supplementation for quite some time which later eventually normalized.    - During his initial consultation the Iron panel and ferritin were consistent with iron deficiency and was restarted on ferrous sulfate three times daily. He tolerated this well, however, continued to be iron deficient and was started on IV Injectafer. I have also obtained Zinc, Copper levels as well as a tissues transglutaminase level which were normal  - He was following with gastroenterology closely and previously had an EGD which was significant for gastric and duodenal ulcers without malignancy and underwent surgical resection with improvement in his symptoms September 2019.   - Folate normal, however, B12 was low normal and he was started on B12 injections. He is no longer on Cyanocobalamin injections with normalization of B12.  - ROWENA was normal, LDH, retic count normal going against hemolysis, and haptoglobin was high. TSH normal.  - Peripheral smear as above.  - Hemoglobin and hematocrit are normal today along with iron appand he does not require further iron. Will continue to monitor and repeat labs in six months.     ACO / CONRAD/Other  Quality measures  -  Mr. Edward is a  current smoker and we discussed smoking cessation previously. At present he does not want to quit but has been cutting back.   - Jamison Edward did not receive 2019 flu vaccine and patient does not want one.  -  Jamison Edward reports a pain score of .  Given his pain assessment as noted, treatment options were discussed and the following options were decided upon as a follow-up plan to address the patient's pain: continuation of current treatment plan for pain.  -  Current outpatient and discharge medications have been reconciled for the patient.  Reviewed by: Cami Gustafson MD    I will have the patient return for repeat laboratory evaluation and follow up appointment in six months. He understands that should he have any questions or concerns prior to his appointment he should give us a call at any time and I would be happy to see him sooner. It was a pleasure to see this patient in clinic today, thank you for allowing me to participate in the care of this patient.      Cami Gustafosn MD  06/08/2020  11:19

## 2020-07-14 ENCOUNTER — TRANSCRIBE ORDERS (OUTPATIENT)
Dept: ADMINISTRATIVE | Facility: HOSPITAL | Age: 50
End: 2020-07-14

## 2020-07-23 ENCOUNTER — TRANSCRIBE ORDERS (OUTPATIENT)
Dept: ADMINISTRATIVE | Facility: HOSPITAL | Age: 50
End: 2020-07-23

## 2020-07-23 DIAGNOSIS — K42.9 UMBILICAL HERNIA WITHOUT OBSTRUCTION OR GANGRENE: Primary | ICD-10-CM

## 2020-07-29 ENCOUNTER — HOSPITAL ENCOUNTER (OUTPATIENT)
Dept: CT IMAGING | Facility: HOSPITAL | Age: 50
Discharge: HOME | End: 2020-07-29
Admitting: NURSE PRACTITIONER

## 2020-07-29 DIAGNOSIS — K42.9 UMBILICAL HERNIA WITHOUT OBSTRUCTION OR GANGRENE: ICD-10-CM

## 2020-07-29 PROCEDURE — 74176 CT ABD & PELVIS W/O CONTRAST: CPT | Performed by: RADIOLOGY

## 2020-07-29 PROCEDURE — 74176 CT ABD & PELVIS W/O CONTRAST: CPT

## 2020-10-16 ENCOUNTER — TRANSCRIBE ORDERS (OUTPATIENT)
Dept: ADMINISTRATIVE | Facility: HOSPITAL | Age: 50
End: 2020-10-16

## 2020-10-16 DIAGNOSIS — K43.2 INCISIONAL HERNIA, WITHOUT OBSTRUCTION OR GANGRENE: Primary | ICD-10-CM

## 2020-10-29 ENCOUNTER — HOSPITAL ENCOUNTER (OUTPATIENT)
Dept: CT IMAGING | Facility: HOSPITAL | Age: 50
Discharge: HOME | End: 2020-10-29
Admitting: SURGERY

## 2020-10-29 ENCOUNTER — APPOINTMENT (OUTPATIENT)
Dept: CT IMAGING | Facility: HOSPITAL | Age: 50
End: 2020-10-29

## 2020-10-29 DIAGNOSIS — K43.2 INCISIONAL HERNIA, WITHOUT OBSTRUCTION OR GANGRENE: ICD-10-CM

## 2020-10-29 PROCEDURE — 74176 CT ABD & PELVIS W/O CONTRAST: CPT

## 2020-11-19 NOTE — PROGRESS NOTES
Jamison Edward  [id number]  1970  11/20/2020      Referring Provider:   Kenton Boogie MD    Primary Physician:  Lázaro Heart MD    Reason for Follow Up:   Normocytic anemia  Iron deficiency  B12 deficiency    Chief Complaint:   Iron and B12 deficiency      History of Present Illness:  Jamison Edward is a very pleasant 50 y.o.  male who presents in follow up appointment for further management and evaluation of normocytic anemia secondary to iron and B12 deficiency.    Mr. Edward has been following with Dr. Richey for history of peptic ulcer disease and hematemesis. He was placed on oral iron once daily several months ago and experienced some constipation with the medication however self discontinued this one month prior to his initial consultation. He has had gradual weight loss where he previously weighed 205 pounds two years ago and is currently down to 140 pounds. He states that he continues to have a good appetite and eat normally. He recently had an EGD in November 2017 for weight loss, hematemesis and abdominal pain which was significant for gastric and duodenal ulcers. He does have history of peptic ulcer disease and in the past and has also been followed by Dr. Starr for Johnson's esophagus. He also had an EGD in May 2017 that showed normal duodenal biopsies, negative H pylori, along with normal random colon biopsies. He also had a CT abd/pelvis performed in 11/2017 which showed possible colitis versus under distension of the right colon. He is currently on ASA 81mg daily due to a TIA in the past however denies of any other NSAID use. He also experiences chest pain every night and has been evaluated by cardiology and as per patient was felt to be non cardiac related. He underwent an EGD with Dr. Richey and was found to have gastric and duodenal ulcers and is to continue current treatment for the ulcers and was to undergo repeat endoscopy. He has also been following with  gastroenterology in [place] with Dr. Moran for further evaluation. He underwent an MRCP in August that was essentially unremarkable as well as an abdominal duplex. He had a vagotomy, antrectomy, Steven-en-Y gastrojejunostomy for peptic ulcer with gastric outlet obstruction that was performed on 9/17/2019.      Interim History:  He has not required IV iron in for some time and is intermittently takes B12 injections. He has been doing well since his last visit without any significant complaints today. He denies of any bleeding or abdominal pain. He has had some hernias and has surgery planned next month for repair. He continues to smoke <1/2ppd and is trying to quit on his own. He denies of any testosterone use and carries a history significant for COPD.      The following portions of the patient's history were reviewed and updated as appropriate: allergies, current medications, past family history, past medical history, past social history, past surgical history and problem list.    Allergies   Allergen Reactions   • Contrast Dye Other (See Comments)     Shortness of breath   • Protonix [Pantoprazole Sodium] Palpitations     Patient states that protonix causes chest pain.  Shruthi Pérez, [facility]  4/23/2017  11:19 AM     • Prilosec [Omeprazole] Other (See Comments)     Chest pain     • Flagyl [Metronidazole] Nausea And Vomiting       Past Medical History:   Diagnosis Date   • Johnson esophagus    • COPD (chronic obstructive pulmonary disease) (CMS/[facility])    • DDD (degenerative disc disease), thoracic    • Degenerative disc disease, lumbar    • GERD (gastroesophageal reflux disease)    • Hypertension    • Peptic ulcer disease    • TIA (transient ischemic attack)    • Wears dentures     upper only       Past Surgical History:   Procedure Laterality Date   • APPENDECTOMY     • CHOLECYSTECTOMY N/A 4/22/2017    Procedure: CHOLECYSTECTOMY LAPAROSCOPIC;  Surgeon: Jaqueline Guaman MD;  Location: [facility];  Service:    •  CHOLECYSTECTOMY     • COLONOSCOPY N/A 5/8/2017    Procedure: COLONOSCOPY  CPTCODE:11976;  Surgeon: Nicho Richey III, MD;  Location:  [facility];  Service:    • ENDOSCOPY     • ENDOSCOPY N/A 5/8/2017    Procedure: ESOPHAGOGASTRODUODENOSCOPY WITH BIOPSY  CPTCODE:02495;  Surgeon: Nicho Richey III, MD;  Location:  [facility];  Service:    • ENDOSCOPY N/A 11/3/2017    Procedure: ESOPHAGOGASTRODUODENOSCOPY WITH BIOPSY  CPTCODE: 77997;  Surgeon: Nicho Richey III, MD;  [facility];  Service:    • ENDOSCOPY N/A 2/20/2018    Procedure: ESOPHAGOGASTRODUODENOSCOPY WITH DILATATION CPT CODE: 71617;  Surgeon: Nicho Richey III, MD;  Location:  [facility];  Service:    • ENDOSCOPY N/A 6/5/2018    Procedure: ESOPHAGOGASTRODUODENOSCOPY WITH BIOPSY CPT CODE: 70030;  Surgeon: Nicho Richey III, MD;  Location:  [facility];  Service: Gastroenterology   • GASTRECTOMY N/A 9/17/2019    Procedure: OPEN VAGOTOMY, ANTRECTOMY,REVISION- Y GASTROJEJUNOSOTOMY;  Surgeon: Herbert Umanzor MD;  Location:  [facility];  Service: General       Social History     Socioeconomic History   • Marital status:      Spouse name: Not on file   • Number of children: Not on file   • Years of education: Not on file   • Highest education level: Not on file   Tobacco Use   • Smoking status: Current Every Day Smoker     Packs/day: 0.50     Years: 35.00     Pack years: 17.50     Types: Cigarettes   • Smokeless tobacco: Never Used   Substance and Sexual Activity   • Alcohol use: No   • Drug use: No   • Sexual activity: Defer       Family History   Problem Relation Age of Onset   • No Known Problems Mother    • Hypertension Father    • No Known Problems Sister    • No Known Problems Brother    • Drug abuse Brother    • No Known Problems Daughter    Maternal GF - H&N cancer  Paternal GF - Prostate cancer          Current Outpatient Medications:   •  baclofen (LIORESAL) 10 MG tablet, Take 20 mg by mouth 3 (Three) Times a Day., Disp:  , Rfl:   •  cimetidine (TAGAMET) 800 MG tablet, Take 800 mg by mouth 3 (Three) Times a Day., Disp: , Rfl:   •  Cyanocobalamin (VITAMIN B-12 IJ), Inject  as directed., Disp: , Rfl:   •  docusate (COLACE) 50 MG/5ML liquid, Take 10 mL by mouth Daily., Disp: 200 mL, Rfl: 0  •  esomeprazole (nexIUM) 40 MG capsule, Take 1 capsule by mouth 2 (Two) Times a Day Before Meals. (Patient taking differently: Take 40 mg by mouth Daily.), Disp: 60 capsule, Rfl: 3  •  hydrochlorothiazide (HYDRODIURIL) 25 MG tablet, Take 25 mg by mouth Daily., Disp: , Rfl:   •  levETIRAcetam (KEPPRA) 500 MG tablet, Take 1,500 mg by mouth 2 (Two) Times a Day., Disp: , Rfl:   •  potassium chloride (K-DUR) 10 MEQ CR tablet, Take 2 tablets by mouth 2 (Two) Times a Day., Disp: 60 tablet, Rfl: 1  •  sucralfate (CARAFATE) 1 G tablet, Take 1 tablet by mouth 4 (Four) Times a Day., Disp: 40 tablet, Rfl: 0  •  traZODone (DESYREL) 50 MG tablet, Take 1 tablet by mouth Every Night. (Patient taking differently: Take 100 mg by mouth Every Night.), Disp: 30 tablet, Rfl: 2  •  venlafaxine (EFFEXOR) 75 MG tablet, Take 75 mg by mouth every night at bedtime., Disp: , Rfl:         Review of Systems  A comprehensive 14 point review of systems was performed.  Significant findings as mentioned above.  All other systems reviewed and are negative. Positive hernias otherwise no significant complaints today.      Physical Exam  Vital Signs: These were reviewed and listed as per patient’s electronic medical chart  Vitals:    11/20/20 0855   BP: 160/94   Pulse: 103   Temp: 97.5 °F (36.4 °C)   SpO2: 99%     Constitutional: He appears well-developed and well-nourished. In in no acute distress  HENT:   Head: Normocephalic and atraumatic.   Right Ear: External ear normal.   Left Ear: External ear normal.   Nose: Nose normal.  Eyes: Pupils are equal, round. Conjunctivae and EOM are normal. No scleral icterus.  Neck: Neck normal appearance.  Pulmonary/Chest: Effort normal. Non-labored,  no auditory wheezing.  Musculoskeletal: Normal range of motion.   Extremities: No obvious cyanosis or edema that can be seen via video.  Neurological: Alert and awake.  Psychiatric: Normal mood and affect.   Skin: no skin lesions or rashes          Labs / Studies:    Office Visit on 11/20/2020   Component Date Value   • Iron 11/20/2020 121    • Iron Saturation 11/20/2020 40    • Transferrin 11/20/2020 205    • TIBC 11/20/2020 305    • Ferritin 11/20/2020 374.20    • WBC 11/20/2020 7.17    • RBC 11/20/2020 4.81    • Hemoglobin 11/20/2020 16.4    • Hematocrit 11/20/2020 47.8    • MCV 11/20/2020 99.4*   • MCH 11/20/2020 34.1*   • MCHC 11/20/2020 34.3    • RDW 11/20/2020 11.7*   • RDW-SD 11/20/2020 43.2    • MPV 11/20/2020 8.7    • Platelets 11/20/2020 209    • Neutrophil % 11/20/2020 75.1    • Lymphocyte % 11/20/2020 15.1*   • Monocyte % 11/20/2020 8.5    • Eosinophil % 11/20/2020 0.6    • Basophil % 11/20/2020 0.4    • Immature Grans % 11/20/2020 0.3    • Neutrophils, Absolute 11/20/2020 5.39    • Lymphocytes, Absolute 11/20/2020 1.08    • Monocytes, Absolute 11/20/2020 0.61    • Eosinophils, Absolute 11/20/2020 0.04    • Basophils, Absolute 11/20/2020 0.03    • Immature Grans, Absolute 11/20/2020 0.02    • nRBC 11/20/2020 0.0    Office Visit on 06/08/2020   Component Date Value   • Iron 06/08/2020 78    • Iron Saturation 06/08/2020 23    • Transferrin 06/08/2020 225    • TIBC 06/08/2020 335    • Ferritin 06/08/2020 385.50    • Vitamin B-12 06/08/2020 339    • Folate 06/08/2020 6.89    • WBC 06/08/2020 9.39    • RBC 06/08/2020 4.80    • Hemoglobin 06/08/2020 15.7    • Hematocrit 06/08/2020 46.5    • MCV 06/08/2020 96.9    • MCH 06/08/2020 32.7    • MCHC 06/08/2020 33.8    • RDW 06/08/2020 12.6    • RDW-SD 06/08/2020 45.5    • MPV 06/08/2020 8.9    • Platelets 06/08/2020 231    • Neutrophil % 06/08/2020 81.4*   • Lymphocyte % 06/08/2020 12.6*   • Monocyte % 06/08/2020 5.3    • Eosinophil % 06/08/2020 0.1*   • Basophil  % 2020 0.4    • Immature Grans % 2020 0.2    • Neutrophils, Absolute 2020 7.64*   • Lymphocytes, Absolute 2020 1.18    • Monocytes, Absolute 2020 0.50    • Eosinophils, Absolute 2020 0.01    • Basophils, Absolute 2020 0.04    • Immature Grans, Absolute 2020 0.02    • nRBC 2020 0.0    Lab on 2020   Component Date Value   • SARS-CoV-2, L[place] 2020 Not Detected           IMAGIN18: CT CHEST WO CONTRAST: LUNGS: MODERATE BILATERAL LUNG EMPHYSEMA. HEART: Unremarkable. PERICARDIUM: No effusion. MEDIASTINUM: No masses. No enlarged lymph nodes.  No fluid collections. PLEURA: No pleural effusion. No pleural mass or abnormal calcification. MAJOR AIRWAYS: Clear. No intrinsic mass. VASCULATURE: No evidence of aneurysm. VISUALIZED UPPER ABDOMEN: LIVER: Homogeneous. No focal hepatic mass or ductal dilatation. SPLEEN: Homogeneous. No splenomegaly.  ADRENALS: No mass. KIDNEYS: No mass. No obstructive uropathy.  No evidence of Urolithiasis. GI TRACT: Non-dilated. No definite wall thickening. PERITONEUM: No free air. No free fluid or loculated fluid Collections. ABDOMINAL WALL: No focal hernia or mass. OTHER: None. BONES: No acute bony abnormality. Impression: 1. Unremarkable exam.  No source for the patient symptoms. 2. Other incidental/non-acute findings as above.    18: MRI ABDOMEN WO CONTRAST MRCP: multiple axial gradient-echo T1 and T2-weighted images were obtained. Oblique coronal T2-weighted images were  acquired to evaluate the bile ducts. The common bile duct and common hepatic duct and main intrahepatic bile ducts are well-demonstrated and appear within normal limits. No gallstones are identified and there are no strictures. There is no bile duct dilatation. The main pancreatic duct was also fairly well demonstrated and appears within normal limits. The liver, spleen, pancreas, and adrenal glands appear within normal limits. IMPRESSION:  Unremarkable MRCP imaging of the abdomen.     10/29/20: CT ABDOMEN PELVIS WO CONTRAST: The lung bases are clear. Body wall soft tissues demonstrate a 4 cm defect in the ventral abdominal wall near the umbilicus containing noninflamed nondilated segments of bowel. The liver, spleen, pancreas and bilateral adrenal glands demonstrate homogeneous attenuation without evidence of focal lesion. Unremarkable bilateral kidneys. No free fluid or pneumoperitoneum. No acute fracture or aggressive osseous lesion. Small and large bowel loops are nondilated. No suspicious focal bowel wall thickening. The pelvic viscera are unremarkable. No retroperitoneal adenopathy. Normal caliber atherosclerotic abdominal aorta. IMPRESSION: 4 cm ventral body wall hernia adjacent to the umbilicus containing mildly narrowed but otherwise nondilated and noninflamed segments of bowel. No additional acute findings in the abdomen and pelvis.        PATHOLOGY:  01/16/18: Peripheral Smear:                Assessment/Plan   Jamison Edward is a very pleasant 50 y.o.  male who presents in follow up appointment for further management and evaluation of normocytic anemia secondary to iron and B12 deficiency.    Normocytic anemia  Iron deficiency   B12 deficiency  - His hemoglobin is normal today along with normal iron panel.   - During his initial consultation the iron panel and ferritin were consistent with iron deficiency and was restarted on ferrous sulfate three times daily. He tolerated this well, however, continued to be iron deficient and was started on IV Injectafer as he likely has malabsorption to the oral iron. After his last dose he experienced severe hypophosphatemia and required phosphorus supplementation for quite some time which later eventually normalized. Therefore will change this to Feraheme should he require IV iron in the future.   - I have also obtained Zinc, Copper levels as well as a tissues transglutaminase level which  were normal.  - He was following with gastroenterology closely and previously had an EGD which was significant for gastric and duodenal ulcers without malignancy and underwent surgical resection with improvement in his symptoms September 2019.   - Folate low normal therefore will repeat today - he is currently not on folate supplementation. B12 was low normal and he was started on B12 injections. He now only intermittently takes cyanocobalamin injections with normalization of B12.  - ROWENA was normal, LDH, retic count normal going against hemolysis, and haptoglobin was high. TSH normal. Peripheral smear as above.  - Hemoglobin and hematocrit are normal today along with iron panel and he does not require further iron. Will continue to monitor and repeat labs in six months.     ACO / CONRAD/Other  Quality measures  -  Mr. Edward is a current smoker and we discussed smoking cessation. He understands the risks of smoking such as polycythemia, cancer, COPD. At present he is trying to quit but would prefer to quit on his own and has cut back to <1/2ppd.   -  Jamison Edward received 2020 flu vaccine. in our clinic today.  -  Jamison Edward reports a pain score of 0.  Given his pain assessment as noted, treatment options were discussed and the following options were decided upon as a follow-up plan to address the patient's pain: continuation of current treatment plan for pain.  -  Current outpatient and discharge medications have been reconciled for the patient.  Reviewed by: Cami Gustafson MD      I will have the patient return for repeat laboratory evaluation and follow up appointment in six months. He understands that should he have any questions or concerns prior to his appointment he should give us a call at any time and I would be happy to see him sooner. It was a pleasure to see this patient in clinic today, thank you for allowing me to participate in the care of this patient.      Cami Gustafson  MD  11/20/2020  09:39 EST

## 2020-11-20 ENCOUNTER — LAB (OUTPATIENT)
Dept: ONCOLOGY | Facility: CLINIC | Age: 50
End: 2020-11-20

## 2020-11-20 ENCOUNTER — OFFICE VISIT (OUTPATIENT)
Dept: ONCOLOGY | Facility: CLINIC | Age: 50
End: 2020-11-20

## 2020-11-20 VITALS
DIASTOLIC BLOOD PRESSURE: 94 MMHG | HEART RATE: 103 BPM | OXYGEN SATURATION: 99 % | TEMPERATURE: 97.5 F | WEIGHT: 156 LBS | SYSTOLIC BLOOD PRESSURE: 160 MMHG | BODY MASS INDEX: 25.18 KG/M2

## 2020-11-20 VITALS
BODY MASS INDEX: 25.18 KG/M2 | WEIGHT: 156 LBS | TEMPERATURE: 97.5 F | OXYGEN SATURATION: 99 % | HEART RATE: 103 BPM | DIASTOLIC BLOOD PRESSURE: 94 MMHG | SYSTOLIC BLOOD PRESSURE: 160 MMHG

## 2020-11-20 DIAGNOSIS — D51.9 ANEMIA DUE TO VITAMIN B12 DEFICIENCY, UNSPECIFIED B12 DEFICIENCY TYPE: ICD-10-CM

## 2020-11-20 DIAGNOSIS — D50.9 IRON DEFICIENCY ANEMIA, UNSPECIFIED IRON DEFICIENCY ANEMIA TYPE: Primary | ICD-10-CM

## 2020-11-20 LAB
BASOPHILS # BLD AUTO: 0.03 10*3/MM3 (ref 0–0.2)
BASOPHILS NFR BLD AUTO: 0.4 % (ref 0–1.5)
DEPRECATED RDW RBC AUTO: 43.2 FL (ref 37–54)
EOSINOPHIL # BLD AUTO: 0.04 10*3/MM3 (ref 0–0.4)
EOSINOPHIL NFR BLD AUTO: 0.6 % (ref 0.3–6.2)
ERYTHROCYTE [DISTWIDTH] IN BLOOD BY AUTOMATED COUNT: 11.7 % (ref 12.3–15.4)
FERRITIN SERPL-MCNC: 374.2 NG/ML (ref 30–400)
FOLATE SERPL-MCNC: 7.42 NG/ML (ref 4.78–24.2)
HCT VFR BLD AUTO: 47.8 % (ref 37.5–51)
HGB BLD-MCNC: 16.4 G/DL (ref 13–17.7)
IMM GRANULOCYTES # BLD AUTO: 0.02 10*3/MM3 (ref 0–0.05)
IMM GRANULOCYTES NFR BLD AUTO: 0.3 % (ref 0–0.5)
IRON 24H UR-MRATE: 121 MCG/DL (ref 59–158)
IRON SATN MFR SERPL: 40 % (ref 20–50)
LYMPHOCYTES # BLD AUTO: 1.08 10*3/MM3 (ref 0.7–3.1)
LYMPHOCYTES NFR BLD AUTO: 15.1 % (ref 19.6–45.3)
MCH RBC QN AUTO: 34.1 PG (ref 26.6–33)
MCHC RBC AUTO-ENTMCNC: 34.3 G/DL (ref 31.5–35.7)
MCV RBC AUTO: 99.4 FL (ref 79–97)
MONOCYTES # BLD AUTO: 0.61 10*3/MM3 (ref 0.1–0.9)
MONOCYTES NFR BLD AUTO: 8.5 % (ref 5–12)
NEUTROPHILS NFR BLD AUTO: 5.39 10*3/MM3 (ref 1.7–7)
NEUTROPHILS NFR BLD AUTO: 75.1 % (ref 42.7–76)
NRBC BLD AUTO-RTO: 0 /100 WBC (ref 0–0.2)
PLATELET # BLD AUTO: 209 10*3/MM3 (ref 140–450)
PMV BLD AUTO: 8.7 FL (ref 6–12)
RBC # BLD AUTO: 4.81 10*6/MM3 (ref 4.14–5.8)
TIBC SERPL-MCNC: 305 MCG/DL (ref 298–536)
TRANSFERRIN SERPL-MCNC: 205 MG/DL (ref 200–360)
VIT B12 BLD-MCNC: 339 PG/ML (ref 211–946)
WBC # BLD AUTO: 7.17 10*3/MM3 (ref 3.4–10.8)

## 2020-11-20 PROCEDURE — 82728 ASSAY OF FERRITIN: CPT | Performed by: INTERNAL MEDICINE

## 2020-11-20 PROCEDURE — 36415 COLL VENOUS BLD VENIPUNCTURE: CPT | Performed by: INTERNAL MEDICINE

## 2020-11-20 PROCEDURE — 85025 COMPLETE CBC W/AUTO DIFF WBC: CPT | Performed by: INTERNAL MEDICINE

## 2020-11-20 PROCEDURE — 82746 ASSAY OF FOLIC ACID SERUM: CPT | Performed by: INTERNAL MEDICINE

## 2020-11-20 PROCEDURE — G0008 ADMIN INFLUENZA VIRUS VAC: HCPCS | Performed by: INTERNAL MEDICINE

## 2020-11-20 PROCEDURE — 90694 VACC AIIV4 NO PRSRV 0.5ML IM: CPT | Performed by: INTERNAL MEDICINE

## 2020-11-20 PROCEDURE — 84466 ASSAY OF TRANSFERRIN: CPT | Performed by: INTERNAL MEDICINE

## 2020-11-20 PROCEDURE — 82607 VITAMIN B-12: CPT | Performed by: INTERNAL MEDICINE

## 2020-11-20 PROCEDURE — 83540 ASSAY OF IRON: CPT | Performed by: INTERNAL MEDICINE

## 2020-11-20 PROCEDURE — 99214 OFFICE O/P EST MOD 30 MIN: CPT | Performed by: INTERNAL MEDICINE

## 2020-12-08 ENCOUNTER — APPOINTMENT (OUTPATIENT)
Dept: PREADMISSION TESTING | Facility: HOSPITAL | Age: 50
End: 2020-12-08

## 2020-12-08 ENCOUNTER — HOSPITAL ENCOUNTER (OUTPATIENT)
Dept: GENERAL RADIOLOGY | Facility: HOSPITAL | Age: 50
Discharge: HOME | End: 2020-12-08

## 2020-12-08 VITALS — WEIGHT: 160.2 LBS | HEIGHT: 66 IN | BODY MASS INDEX: 25.75 KG/M2

## 2020-12-08 LAB
ANION GAP SERPL CALCULATED.3IONS-SCNC: 12 MMOL/L (ref 5–15)
BUN SERPL-MCNC: 7 MG/DL (ref 6–20)
BUN/CREAT SERPL: 10.6 (ref 7–25)
CALCIUM SPEC-SCNC: 9.7 MG/DL (ref 8.6–10.5)
CHLORIDE SERPL-SCNC: 96 MMOL/L (ref 98–107)
CO2 SERPL-SCNC: 26 MMOL/L (ref 22–29)
CREAT SERPL-MCNC: 0.66 MG/DL (ref 0.76–1.27)
DEPRECATED RDW RBC AUTO: 41.6 FL (ref 37–54)
ERYTHROCYTE [DISTWIDTH] IN BLOOD BY AUTOMATED COUNT: 11.2 % (ref 12.3–15.4)
GFR SERPL CREATININE-BSD FRML MDRD: 127 ML/MIN/1.73
GLUCOSE SERPL-MCNC: 111 MG/DL (ref 65–99)
HBA1C MFR BLD: 5.1 % (ref 4.8–5.6)
HCT VFR BLD AUTO: 49.7 % (ref 37.5–51)
HGB BLD-MCNC: 17.4 G/DL (ref 13–17.7)
MCH RBC QN AUTO: 35 PG (ref 26.6–33)
MCHC RBC AUTO-ENTMCNC: 35 G/DL (ref 31.5–35.7)
MCV RBC AUTO: 100 FL (ref 79–97)
PLATELET # BLD AUTO: 268 10*3/MM3 (ref 140–450)
PMV BLD AUTO: 9.1 FL (ref 6–12)
POTASSIUM SERPL-SCNC: 4 MMOL/L (ref 3.5–5.2)
QT INTERVAL: 330 MS
QTC INTERVAL: 421 MS
RBC # BLD AUTO: 4.97 10*6/MM3 (ref 4.14–5.8)
SODIUM SERPL-SCNC: 134 MMOL/L (ref 136–145)
WBC # BLD AUTO: 9.23 10*3/MM3 (ref 3.4–10.8)

## 2020-12-08 PROCEDURE — 93005 ELECTROCARDIOGRAM TRACING: CPT

## 2020-12-08 PROCEDURE — U0004 COV-19 TEST NON-CDC HGH THRU: HCPCS

## 2020-12-08 PROCEDURE — 36415 COLL VENOUS BLD VENIPUNCTURE: CPT

## 2020-12-08 PROCEDURE — 93010 ELECTROCARDIOGRAM REPORT: CPT | Performed by: INTERNAL MEDICINE

## 2020-12-08 PROCEDURE — 85027 COMPLETE CBC AUTOMATED: CPT

## 2020-12-08 PROCEDURE — C9803 HOPD COVID-19 SPEC COLLECT: HCPCS

## 2020-12-08 PROCEDURE — 71046 X-RAY EXAM CHEST 2 VIEWS: CPT

## 2020-12-08 PROCEDURE — 83036 HEMOGLOBIN GLYCOSYLATED A1C: CPT

## 2020-12-08 PROCEDURE — 80048 BASIC METABOLIC PNL TOTAL CA: CPT

## 2020-12-08 RX ORDER — LEVETIRACETAM 750 MG/1
1500 TABLET ORAL NIGHTLY
Status: ON HOLD | COMMUNITY
End: 2020-12-10

## 2020-12-09 ENCOUNTER — ANESTHESIA EVENT (OUTPATIENT)
Dept: PERIOP | Facility: HOSPITAL | Age: 50
End: 2020-12-09

## 2020-12-09 LAB — SARS-COV-2 RNA RESP QL NAA+PROBE: NOT DETECTED

## 2020-12-09 RX ORDER — SODIUM CHLORIDE 0.9 % (FLUSH) 0.9 %
10 SYRINGE (ML) INJECTION EVERY 12 HOURS SCHEDULED
Status: CANCELLED | OUTPATIENT
Start: 2020-12-09

## 2020-12-09 RX ORDER — SODIUM CHLORIDE 0.9 % (FLUSH) 0.9 %
10 SYRINGE (ML) INJECTION AS NEEDED
Status: CANCELLED | OUTPATIENT
Start: 2020-12-09

## 2020-12-09 RX ORDER — FAMOTIDINE 10 MG/ML
20 INJECTION, SOLUTION INTRAVENOUS ONCE
Status: CANCELLED | OUTPATIENT
Start: 2020-12-09 | End: 2020-12-09

## 2020-12-10 ENCOUNTER — ANESTHESIA (OUTPATIENT)
Dept: PERIOP | Facility: HOSPITAL | Age: 50
End: 2020-12-10

## 2020-12-10 ENCOUNTER — HOSPITAL ENCOUNTER (OUTPATIENT)
Facility: HOSPITAL | Age: 50
Discharge: HOME | End: 2020-12-14
Attending: SURGERY | Admitting: SURGERY

## 2020-12-10 DIAGNOSIS — K43.2 INCISIONAL HERNIA, WITHOUT OBSTRUCTION OR GANGRENE: Primary | ICD-10-CM

## 2020-12-10 PROCEDURE — A9270 NON-COVERED ITEM OR SERVICE: HCPCS | Performed by: SURGERY

## 2020-12-10 PROCEDURE — 63710000001 BISACODYL 5 MG TABLET DELAYED-RELEASE: Performed by: SURGERY

## 2020-12-10 PROCEDURE — 25010000002 NEOSTIGMINE 10 MG/10ML SOLUTION: Performed by: NURSE ANESTHETIST, CERTIFIED REGISTERED

## 2020-12-10 PROCEDURE — 25010000002 METHYLNALTREXONE 12 MG/0.6ML SOLUTION: Performed by: SURGERY

## 2020-12-10 PROCEDURE — C1781 MESH (IMPLANTABLE): HCPCS | Performed by: SURGERY

## 2020-12-10 PROCEDURE — 25010000002 PROPOFOL 10 MG/ML EMULSION: Performed by: NURSE ANESTHETIST, CERTIFIED REGISTERED

## 2020-12-10 PROCEDURE — 63710000001 DOCUSATE SODIUM 100 MG CAPSULE: Performed by: SURGERY

## 2020-12-10 PROCEDURE — 25010000002 HYDROMORPHONE PER 4 MG: Performed by: NURSE ANESTHETIST, CERTIFIED REGISTERED

## 2020-12-10 PROCEDURE — 25010000002 MIDAZOLAM PER 1 MG: Performed by: NURSE ANESTHETIST, CERTIFIED REGISTERED

## 2020-12-10 PROCEDURE — 63710000001 TRAZODONE 50 MG TABLET: Performed by: SURGERY

## 2020-12-10 PROCEDURE — G0378 HOSPITAL OBSERVATION PER HR: HCPCS

## 2020-12-10 PROCEDURE — 25010000002 ONDANSETRON PER 1 MG: Performed by: SURGERY

## 2020-12-10 PROCEDURE — 63710000001 FAMOTIDINE 20 MG TABLET: Performed by: SURGERY

## 2020-12-10 PROCEDURE — 25010000002 DEXAMETHASONE SODIUM PHOSPHATE 10 MG/ML SOLUTION: Performed by: ANESTHESIOLOGY

## 2020-12-10 PROCEDURE — 25010000002 FENTANYL CITRATE (PF) 100 MCG/2ML SOLUTION: Performed by: NURSE ANESTHETIST, CERTIFIED REGISTERED

## 2020-12-10 PROCEDURE — 25010000002 BUPRENORPHINE PER 0.1 MG: Performed by: ANESTHESIOLOGY

## 2020-12-10 PROCEDURE — 25010000002 METOCLOPRAMIDE PER 10 MG: Performed by: SURGERY

## 2020-12-10 PROCEDURE — 63710000001 VENLAFAXINE 37.5 MG TABLET: Performed by: SURGERY

## 2020-12-10 PROCEDURE — 25010000002 ONDANSETRON PER 1 MG: Performed by: NURSE ANESTHETIST, CERTIFIED REGISTERED

## 2020-12-10 PROCEDURE — 63710000001 LEVETIRACETAM 500 MG TABLET: Performed by: SURGERY

## 2020-12-10 PROCEDURE — 25010000003 CEFAZOLIN IN DEXTROSE 2-4 GM/100ML-% SOLUTION: Performed by: SURGERY

## 2020-12-10 PROCEDURE — 25010000002 DEXAMETHASONE PER 1 MG: Performed by: NURSE ANESTHETIST, CERTIFIED REGISTERED

## 2020-12-10 PROCEDURE — 25010000002 MORPHINE PER 10 MG: Performed by: SURGERY

## 2020-12-10 PROCEDURE — 63710000001 HYDROCHLOROTHIAZIDE 25 MG TABLET: Performed by: SURGERY

## 2020-12-10 DEVICE — FIXATION DEVICE
Type: IMPLANTABLE DEVICE | Site: ABDOMEN | Status: FUNCTIONAL
Brand: PROTACK

## 2020-12-10 DEVICE — VENTRALIGHT ST MESH
Type: IMPLANTABLE DEVICE | Site: ABDOMEN | Status: FUNCTIONAL
Brand: VENTRALIGHT ST

## 2020-12-10 RX ORDER — BACLOFEN 10 MG/1
10 TABLET ORAL 3 TIMES DAILY PRN
Status: DISCONTINUED | OUTPATIENT
Start: 2020-12-10 | End: 2020-12-14 | Stop reason: HOSPADM

## 2020-12-10 RX ORDER — OXYCODONE HYDROCHLORIDE AND ACETAMINOPHEN 5; 325 MG/1; MG/1
2 TABLET ORAL EVERY 4 HOURS PRN
Status: DISCONTINUED | OUTPATIENT
Start: 2020-12-10 | End: 2020-12-14 | Stop reason: HOSPADM

## 2020-12-10 RX ORDER — ACETAMINOPHEN 325 MG/1
650 TABLET ORAL EVERY 4 HOURS PRN
Status: DISCONTINUED | OUTPATIENT
Start: 2020-12-10 | End: 2020-12-14 | Stop reason: HOSPADM

## 2020-12-10 RX ORDER — CEFAZOLIN SODIUM 2 G/100ML
2 INJECTION, SOLUTION INTRAVENOUS ONCE
Status: COMPLETED | OUTPATIENT
Start: 2020-12-10 | End: 2020-12-10

## 2020-12-10 RX ORDER — METOCLOPRAMIDE HYDROCHLORIDE 5 MG/ML
10 INJECTION INTRAMUSCULAR; INTRAVENOUS EVERY 6 HOURS
Status: DISCONTINUED | OUTPATIENT
Start: 2020-12-10 | End: 2020-12-12

## 2020-12-10 RX ORDER — DEXAMETHASONE SODIUM PHOSPHATE 10 MG/ML
INJECTION, SOLUTION INTRAMUSCULAR; INTRAVENOUS
Status: COMPLETED | OUTPATIENT
Start: 2020-12-10 | End: 2020-12-10

## 2020-12-10 RX ORDER — MIDAZOLAM HYDROCHLORIDE 1 MG/ML
INJECTION INTRAMUSCULAR; INTRAVENOUS AS NEEDED
Status: DISCONTINUED | OUTPATIENT
Start: 2020-12-10 | End: 2020-12-10 | Stop reason: SURG

## 2020-12-10 RX ORDER — NALOXONE HCL 0.4 MG/ML
0.1 VIAL (ML) INJECTION
Status: DISCONTINUED | OUTPATIENT
Start: 2020-12-10 | End: 2020-12-10 | Stop reason: SDUPTHER

## 2020-12-10 RX ORDER — LIDOCAINE HYDROCHLORIDE 10 MG/ML
0.5 INJECTION, SOLUTION EPIDURAL; INFILTRATION; INTRACAUDAL; PERINEURAL ONCE AS NEEDED
Status: COMPLETED | OUTPATIENT
Start: 2020-12-10 | End: 2020-12-10

## 2020-12-10 RX ORDER — ACETAMINOPHEN 650 MG/1
650 SUPPOSITORY RECTAL EVERY 4 HOURS PRN
Status: DISCONTINUED | OUTPATIENT
Start: 2020-12-10 | End: 2020-12-14 | Stop reason: HOSPADM

## 2020-12-10 RX ORDER — NALOXONE HCL 0.4 MG/ML
0.4 VIAL (ML) INJECTION
Status: DISCONTINUED | OUTPATIENT
Start: 2020-12-10 | End: 2020-12-14 | Stop reason: HOSPADM

## 2020-12-10 RX ORDER — ROCURONIUM BROMIDE 10 MG/ML
INJECTION, SOLUTION INTRAVENOUS AS NEEDED
Status: DISCONTINUED | OUTPATIENT
Start: 2020-12-10 | End: 2020-12-10 | Stop reason: SURG

## 2020-12-10 RX ORDER — TRAZODONE HYDROCHLORIDE 50 MG/1
50 TABLET ORAL NIGHTLY
Status: DISCONTINUED | OUTPATIENT
Start: 2020-12-10 | End: 2020-12-14 | Stop reason: HOSPADM

## 2020-12-10 RX ORDER — MORPHINE SULFATE 2 MG/ML
2 INJECTION, SOLUTION INTRAMUSCULAR; INTRAVENOUS
Status: DISCONTINUED | OUTPATIENT
Start: 2020-12-10 | End: 2020-12-14 | Stop reason: HOSPADM

## 2020-12-10 RX ORDER — NALOXONE HCL 0.4 MG/ML
0.1 VIAL (ML) INJECTION
Status: CANCELLED | OUTPATIENT
Start: 2020-12-10

## 2020-12-10 RX ORDER — FAMOTIDINE 20 MG/1
20 TABLET, FILM COATED ORAL ONCE
Status: COMPLETED | OUTPATIENT
Start: 2020-12-10 | End: 2020-12-10

## 2020-12-10 RX ORDER — DEXAMETHASONE SODIUM PHOSPHATE 10 MG/ML
INJECTION INTRAMUSCULAR; INTRAVENOUS AS NEEDED
Status: DISCONTINUED | OUTPATIENT
Start: 2020-12-10 | End: 2020-12-10 | Stop reason: SURG

## 2020-12-10 RX ORDER — LEVETIRACETAM 750 MG/1
1500 TABLET ORAL NIGHTLY
Status: DISCONTINUED | OUTPATIENT
Start: 2020-12-10 | End: 2020-12-10

## 2020-12-10 RX ORDER — BUPIVACAINE HYDROCHLORIDE 2.5 MG/ML
INJECTION, SOLUTION EPIDURAL; INFILTRATION; INTRACAUDAL
Status: COMPLETED | OUTPATIENT
Start: 2020-12-10 | End: 2020-12-10

## 2020-12-10 RX ORDER — MAGNESIUM HYDROXIDE 1200 MG/15ML
LIQUID ORAL AS NEEDED
Status: DISCONTINUED | OUTPATIENT
Start: 2020-12-10 | End: 2020-12-10 | Stop reason: HOSPADM

## 2020-12-10 RX ORDER — LEVETIRACETAM 500 MG/1
1000 TABLET ORAL NIGHTLY
COMMUNITY
End: 2021-05-25

## 2020-12-10 RX ORDER — HYDROCHLOROTHIAZIDE 25 MG/1
25 TABLET ORAL DAILY
Status: DISCONTINUED | OUTPATIENT
Start: 2020-12-10 | End: 2020-12-14 | Stop reason: HOSPADM

## 2020-12-10 RX ORDER — ONDANSETRON 2 MG/ML
INJECTION INTRAMUSCULAR; INTRAVENOUS AS NEEDED
Status: DISCONTINUED | OUTPATIENT
Start: 2020-12-10 | End: 2020-12-10 | Stop reason: SURG

## 2020-12-10 RX ORDER — CARISOPRODOL 350 MG/1
350 TABLET ORAL EVERY 8 HOURS PRN
Status: DISCONTINUED | OUTPATIENT
Start: 2020-12-10 | End: 2020-12-14 | Stop reason: HOSPADM

## 2020-12-10 RX ORDER — LIDOCAINE HYDROCHLORIDE 20 MG/ML
INJECTION, SOLUTION INFILTRATION; PERINEURAL AS NEEDED
Status: DISCONTINUED | OUTPATIENT
Start: 2020-12-10 | End: 2020-12-10 | Stop reason: SURG

## 2020-12-10 RX ORDER — SODIUM CHLORIDE, SODIUM LACTATE, POTASSIUM CHLORIDE, CALCIUM CHLORIDE 600; 310; 30; 20 MG/100ML; MG/100ML; MG/100ML; MG/100ML
50 INJECTION, SOLUTION INTRAVENOUS CONTINUOUS
Status: DISCONTINUED | OUTPATIENT
Start: 2020-12-10 | End: 2020-12-12

## 2020-12-10 RX ORDER — LEVETIRACETAM 500 MG/1
500 TABLET ORAL 2 TIMES DAILY
Status: DISCONTINUED | OUTPATIENT
Start: 2020-12-11 | End: 2020-12-14 | Stop reason: HOSPADM

## 2020-12-10 RX ORDER — HEPARIN SODIUM 5000 [USP'U]/ML
5000 INJECTION, SOLUTION INTRAVENOUS; SUBCUTANEOUS EVERY 8 HOURS SCHEDULED
Status: DISCONTINUED | OUTPATIENT
Start: 2020-12-11 | End: 2020-12-14 | Stop reason: HOSPADM

## 2020-12-10 RX ORDER — MORPHINE SULFATE 1 MG/ML
INJECTION INTRAVENOUS CONTINUOUS
Status: DISCONTINUED | OUTPATIENT
Start: 2020-12-10 | End: 2020-12-11

## 2020-12-10 RX ORDER — BUPRENORPHINE HYDROCHLORIDE 0.32 MG/ML
INJECTION INTRAMUSCULAR; INTRAVENOUS
Status: COMPLETED | OUTPATIENT
Start: 2020-12-10 | End: 2020-12-10

## 2020-12-10 RX ORDER — NEOSTIGMINE METHYLSULFATE 1 MG/ML
INJECTION, SOLUTION INTRAVENOUS AS NEEDED
Status: DISCONTINUED | OUTPATIENT
Start: 2020-12-10 | End: 2020-12-10 | Stop reason: SURG

## 2020-12-10 RX ORDER — PANTOPRAZOLE SODIUM 40 MG/1
40 TABLET, DELAYED RELEASE ORAL EVERY MORNING
Status: DISCONTINUED | OUTPATIENT
Start: 2020-12-10 | End: 2020-12-10 | Stop reason: ALTCHOICE

## 2020-12-10 RX ORDER — GLYCOPYRROLATE 0.2 MG/ML
INJECTION INTRAMUSCULAR; INTRAVENOUS AS NEEDED
Status: DISCONTINUED | OUTPATIENT
Start: 2020-12-10 | End: 2020-12-10 | Stop reason: SURG

## 2020-12-10 RX ORDER — ONDANSETRON 2 MG/ML
4 INJECTION INTRAMUSCULAR; INTRAVENOUS ONCE AS NEEDED
Status: DISCONTINUED | OUTPATIENT
Start: 2020-12-10 | End: 2020-12-10 | Stop reason: HOSPADM

## 2020-12-10 RX ORDER — PROPOFOL 10 MG/ML
VIAL (ML) INTRAVENOUS AS NEEDED
Status: DISCONTINUED | OUTPATIENT
Start: 2020-12-10 | End: 2020-12-10 | Stop reason: SURG

## 2020-12-10 RX ORDER — SIMETHICONE 80 MG
80 TABLET,CHEWABLE ORAL 4 TIMES DAILY PRN
Status: DISCONTINUED | OUTPATIENT
Start: 2020-12-10 | End: 2020-12-14 | Stop reason: HOSPADM

## 2020-12-10 RX ORDER — DEXAMETHASONE SODIUM PHOSPHATE 4 MG/ML
8 INJECTION, SOLUTION INTRA-ARTICULAR; INTRALESIONAL; INTRAMUSCULAR; INTRAVENOUS; SOFT TISSUE ONCE AS NEEDED
Status: DISCONTINUED | OUTPATIENT
Start: 2020-12-10 | End: 2020-12-10 | Stop reason: HOSPADM

## 2020-12-10 RX ORDER — HYDROMORPHONE HYDROCHLORIDE 1 MG/ML
0.5 INJECTION, SOLUTION INTRAMUSCULAR; INTRAVENOUS; SUBCUTANEOUS
Status: DISCONTINUED | OUTPATIENT
Start: 2020-12-10 | End: 2020-12-10 | Stop reason: HOSPADM

## 2020-12-10 RX ORDER — DIPHENHYDRAMINE HYDROCHLORIDE 50 MG/ML
25 INJECTION INTRAMUSCULAR; INTRAVENOUS EVERY 6 HOURS PRN
Status: DISCONTINUED | OUTPATIENT
Start: 2020-12-10 | End: 2020-12-14 | Stop reason: HOSPADM

## 2020-12-10 RX ORDER — LEVETIRACETAM 500 MG/1
1000 TABLET ORAL NIGHTLY
Status: DISCONTINUED | OUTPATIENT
Start: 2020-12-10 | End: 2020-12-14 | Stop reason: HOSPADM

## 2020-12-10 RX ORDER — FENTANYL CITRATE 50 UG/ML
50 INJECTION, SOLUTION INTRAMUSCULAR; INTRAVENOUS
Status: DISCONTINUED | OUTPATIENT
Start: 2020-12-10 | End: 2020-12-10 | Stop reason: HOSPADM

## 2020-12-10 RX ORDER — FAMOTIDINE 20 MG/1
20 TABLET, FILM COATED ORAL
Status: DISCONTINUED | OUTPATIENT
Start: 2020-12-11 | End: 2020-12-10

## 2020-12-10 RX ORDER — ENALAPRILAT 2.5 MG/2ML
1.25 INJECTION INTRAVENOUS EVERY 6 HOURS PRN
Status: DISCONTINUED | OUTPATIENT
Start: 2020-12-10 | End: 2020-12-14 | Stop reason: HOSPADM

## 2020-12-10 RX ORDER — DIPHENHYDRAMINE HYDROCHLORIDE 50 MG/ML
25 INJECTION INTRAMUSCULAR; INTRAVENOUS EVERY 6 HOURS PRN
Status: CANCELLED | OUTPATIENT
Start: 2020-12-10

## 2020-12-10 RX ORDER — PROMETHAZINE HYDROCHLORIDE 12.5 MG/1
12.5 TABLET ORAL EVERY 6 HOURS PRN
Status: DISCONTINUED | OUTPATIENT
Start: 2020-12-10 | End: 2020-12-14 | Stop reason: HOSPADM

## 2020-12-10 RX ORDER — ONDANSETRON 2 MG/ML
4 INJECTION INTRAMUSCULAR; INTRAVENOUS EVERY 6 HOURS PRN
Status: DISCONTINUED | OUTPATIENT
Start: 2020-12-10 | End: 2020-12-14 | Stop reason: HOSPADM

## 2020-12-10 RX ORDER — BISACODYL 5 MG/1
10 TABLET, DELAYED RELEASE ORAL DAILY
Status: DISCONTINUED | OUTPATIENT
Start: 2020-12-10 | End: 2020-12-14 | Stop reason: HOSPADM

## 2020-12-10 RX ORDER — VENLAFAXINE 37.5 MG/1
75 TABLET ORAL 2 TIMES DAILY WITH MEALS
Status: DISCONTINUED | OUTPATIENT
Start: 2020-12-10 | End: 2020-12-14 | Stop reason: HOSPADM

## 2020-12-10 RX ORDER — SODIUM CHLORIDE, SODIUM LACTATE, POTASSIUM CHLORIDE, CALCIUM CHLORIDE 600; 310; 30; 20 MG/100ML; MG/100ML; MG/100ML; MG/100ML
9 INJECTION, SOLUTION INTRAVENOUS CONTINUOUS
Status: DISCONTINUED | OUTPATIENT
Start: 2020-12-10 | End: 2020-12-10

## 2020-12-10 RX ORDER — LEVETIRACETAM 750 MG/1
750 TABLET ORAL 2 TIMES DAILY
Status: DISCONTINUED | OUTPATIENT
Start: 2020-12-10 | End: 2020-12-10

## 2020-12-10 RX ORDER — FAMOTIDINE 20 MG/1
20 TABLET, FILM COATED ORAL
Status: DISCONTINUED | OUTPATIENT
Start: 2020-12-10 | End: 2020-12-14 | Stop reason: HOSPADM

## 2020-12-10 RX ORDER — MORPHINE SULFATE 1 MG/ML
INJECTION INTRAVENOUS CONTINUOUS
Status: CANCELLED | OUTPATIENT
Start: 2020-12-10 | End: 2020-12-13

## 2020-12-10 RX ORDER — DOCUSATE SODIUM 100 MG/1
100 CAPSULE, LIQUID FILLED ORAL 2 TIMES DAILY
Status: DISCONTINUED | OUTPATIENT
Start: 2020-12-10 | End: 2020-12-14 | Stop reason: HOSPADM

## 2020-12-10 RX ORDER — FAMOTIDINE 20 MG/1
20 TABLET, FILM COATED ORAL
Status: CANCELLED | OUTPATIENT
Start: 2020-12-10

## 2020-12-10 RX ADMIN — BUPIVACAINE HYDROCHLORIDE 60 ML: 2.5 INJECTION, SOLUTION EPIDURAL; INFILTRATION; INTRACAUDAL; PERINEURAL at 07:58

## 2020-12-10 RX ADMIN — ROCURONIUM BROMIDE 50 MG: 10 INJECTION INTRAVENOUS at 07:55

## 2020-12-10 RX ADMIN — Medication: at 14:44

## 2020-12-10 RX ADMIN — ROCURONIUM BROMIDE 10 MG: 10 INJECTION INTRAVENOUS at 08:29

## 2020-12-10 RX ADMIN — HYDROCHLOROTHIAZIDE 25 MG: 25 TABLET ORAL at 17:40

## 2020-12-10 RX ADMIN — FAMOTIDINE 20 MG: 20 TABLET, FILM COATED ORAL at 20:12

## 2020-12-10 RX ADMIN — MIDAZOLAM 2 MG: 1 INJECTION INTRAMUSCULAR; INTRAVENOUS at 07:49

## 2020-12-10 RX ADMIN — GLYCOPYRROLATE 0.4 MG: 0.2 INJECTION INTRAMUSCULAR; INTRAVENOUS at 09:23

## 2020-12-10 RX ADMIN — SODIUM CHLORIDE, POTASSIUM CHLORIDE, SODIUM LACTATE AND CALCIUM CHLORIDE 150 ML/HR: 600; 310; 30; 20 INJECTION, SOLUTION INTRAVENOUS at 11:23

## 2020-12-10 RX ADMIN — ONDANSETRON 4 MG: 2 INJECTION INTRAMUSCULAR; INTRAVENOUS at 09:10

## 2020-12-10 RX ADMIN — DEXAMETHASONE SODIUM PHOSPHATE 4 MG: 10 INJECTION INTRAMUSCULAR; INTRAVENOUS at 08:14

## 2020-12-10 RX ADMIN — VENLAFAXINE 75 MG: 37.5 TABLET ORAL at 17:40

## 2020-12-10 RX ADMIN — DEXAMETHASONE SODIUM PHOSPHATE 4 MG: 10 INJECTION INTRAMUSCULAR; INTRAVENOUS at 07:58

## 2020-12-10 RX ADMIN — BUPRENORPHINE HYDROCHLORIDE 0.3 MG: 0.32 INJECTION INTRAMUSCULAR; INTRAVENOUS at 07:58

## 2020-12-10 RX ADMIN — ROCURONIUM BROMIDE 6 MG: 10 INJECTION INTRAVENOUS at 08:59

## 2020-12-10 RX ADMIN — CEFAZOLIN SODIUM 2 G: 2 INJECTION, SOLUTION INTRAVENOUS at 07:49

## 2020-12-10 RX ADMIN — EPHEDRINE SULFATE 15 MG: 50 INJECTION INTRAMUSCULAR; INTRAVENOUS; SUBCUTANEOUS at 08:16

## 2020-12-10 RX ADMIN — PROPOFOL 200 MG: 10 INJECTION, EMULSION INTRAVENOUS at 07:55

## 2020-12-10 RX ADMIN — DOCUSATE SODIUM 100 MG: 100 CAPSULE, LIQUID FILLED ORAL at 20:16

## 2020-12-10 RX ADMIN — FENTANYL CITRATE 50 MCG: 50 INJECTION INTRAMUSCULAR; INTRAVENOUS at 09:50

## 2020-12-10 RX ADMIN — BISACODYL 10 MG: 5 TABLET, COATED ORAL at 17:40

## 2020-12-10 RX ADMIN — ONDANSETRON 4 MG: 2 INJECTION INTRAMUSCULAR; INTRAVENOUS at 20:12

## 2020-12-10 RX ADMIN — METHYLNALTREXONE BROMIDE 4 MG: 12 INJECTION, SOLUTION SUBCUTANEOUS at 20:12

## 2020-12-10 RX ADMIN — FENTANYL CITRATE 50 MCG: 50 INJECTION INTRAMUSCULAR; INTRAVENOUS at 10:20

## 2020-12-10 RX ADMIN — ROCURONIUM BROMIDE 10 MG: 10 INJECTION INTRAVENOUS at 08:43

## 2020-12-10 RX ADMIN — HYDROMORPHONE HYDROCHLORIDE 0.5 MG: 1 INJECTION, SOLUTION INTRAMUSCULAR; INTRAVENOUS; SUBCUTANEOUS at 09:36

## 2020-12-10 RX ADMIN — NEOSTIGMINE 2.5 MG: 1 INJECTION INTRAVENOUS at 09:23

## 2020-12-10 RX ADMIN — TRAZODONE HYDROCHLORIDE 50 MG: 50 TABLET ORAL at 20:14

## 2020-12-10 RX ADMIN — Medication: at 10:15

## 2020-12-10 RX ADMIN — LIDOCAINE HYDROCHLORIDE 50 MG: 20 INJECTION, SOLUTION INFILTRATION; PERINEURAL at 07:55

## 2020-12-10 RX ADMIN — LIDOCAINE HYDROCHLORIDE 0.2 ML: 10 INJECTION, SOLUTION EPIDURAL; INFILTRATION; INTRACAUDAL; PERINEURAL at 06:56

## 2020-12-10 RX ADMIN — LEVETIRACETAM 1000 MG: 500 TABLET, FILM COATED ORAL at 20:14

## 2020-12-10 RX ADMIN — FENTANYL CITRATE 50 MCG: 50 INJECTION INTRAMUSCULAR; INTRAVENOUS at 09:36

## 2020-12-10 RX ADMIN — FAMOTIDINE 20 MG: 20 TABLET, FILM COATED ORAL at 06:56

## 2020-12-10 RX ADMIN — METOCLOPRAMIDE 10 MG: 5 INJECTION, SOLUTION INTRAMUSCULAR; INTRAVENOUS at 23:50

## 2020-12-10 RX ADMIN — FENTANYL CITRATE 50 MCG: 50 INJECTION INTRAMUSCULAR; INTRAVENOUS at 10:45

## 2020-12-10 RX ADMIN — METOCLOPRAMIDE 10 MG: 5 INJECTION, SOLUTION INTRAMUSCULAR; INTRAVENOUS at 17:40

## 2020-12-10 RX ADMIN — SODIUM CHLORIDE, POTASSIUM CHLORIDE, SODIUM LACTATE AND CALCIUM CHLORIDE 9 ML/HR: 600; 310; 30; 20 INJECTION, SOLUTION INTRAVENOUS at 06:55

## 2020-12-10 NOTE — ANESTHESIA POSTPROCEDURE EVALUATION
Patient: Jamison Edward    Procedure Summary     Date: 12/10/20 Room / Location:  [facility]    Anesthesia Start: 0749 Anesthesia Stop: 0939    Procedure: INCISIONAL HERNIA REPAIR LAPAROSCOPIC WITH MESH (N/A Abdomen) Diagnosis:     Surgeon: Herbert Umanzor MD Provider: Lizbeth Bobby DO    Anesthesia Type: general with block ASA Status: 3          Anesthesia Type: general with block    Vitals  No vitals data found for the desired time range.          Post Anesthesia Care and Evaluation    Patient location during evaluation: PACU  Patient participation: complete - patient participated  Level of consciousness: awake and alert  Pain score: 5  Pain management: adequate  Airway patency: patent  Anesthetic complications: No anesthetic complications  PONV Status: none  Cardiovascular status: hemodynamically stable and acceptable  Respiratory status: nonlabored ventilation, acceptable and nasal cannula  Hydration status: acceptable

## 2020-12-10 NOTE — ANESTHESIA PROCEDURE NOTES
Peripheral Block      Patient reassessed immediately prior to procedure    Patient location during procedure: OR  Reason for block: at surgeon's request and post-op pain management  Performed by  Anesthesiologist: Lizbeth Bobby DO  Preanesthetic Checklist  Completed: patient identified, site marked, surgical consent, pre-op evaluation, timeout performed, IV checked, risks and benefits discussed and monitors and equipment checked  Prep:  Pt Position: supine  Sterile barriers:cap, gloves, sterile barriers and mask  Prep: ChloraPrep  Patient monitoring: blood pressure monitoring, continuous pulse oximetry and EKG  Procedure  Sedation:yes  Performed under: general  Guidance:ultrasound guided  Images:still images obtained, printed/placed on chart    Laterality:Bilateral  Block Type:TAP  Injection Technique:single-shot  Needle Type:short-bevel and echogenic  Needle Gauge:20 G  Resistance on Injection: none    Medications Used: buprenorphine (BUPRENEX) injection, 0.3 mg  dexamethasone sodium phosphate injection, 4 mg  bupivacaine PF (MARCAINE) 0.25 % injection, 60 mL  Med admintered at 12/10/2020 7:58 AM      Medications  Comment:Block Injection:  LA dose divided between Right and Left block        Post Assessment  Injection Assessment: negative aspiration for heme, incremental injection and no paresthesia on injection  Patient Tolerance:comfortable throughout block  Complications:no  Additional Notes      Under Ultrasound guidance, a BBraun 4inch 360 degree needle was advanced with Normal Saline hydro dissection of tissue.  The Internal Oblique and Transversus Abdominus muscles where visualized.  At or before the aponeurosis of Internal Oblique, local anesthetic spread was visualized in the Transversus Abdominus Plane. Injection was made incrementally with aspiration every 5 mls.  There was no  intravascular injection,  injection pressure was normal, there was no neural injection, and the procedure was completed  without difficulty.  Thank You.

## 2020-12-10 NOTE — H&P
Pre-Op H&P  Jamison Edward  [id number]  1970    Chief complaint: Incisional hernia    HPI:    Patient is a 50 y.o.male who presents today for a laparoscopic incisional hernia repair with mesh, possible open for an incisional hernia. He notes becoming symptomatic in the last year with multiple small abdominal bulges; he denies significant pain. He has not experienced any new symptoms since being seen in the office.     He denies chest pain or shortness of breath.      Review of Systems:  General ROS: negative for chills, fever or skin lesions;  No changes since last office visit.  Neg for recent sick exposure  Cardiovascular ROS: no chest pain or dyspnea on exertion  Respiratory ROS: no cough, shortness of breath, or wheezing    Allergies:   Allergies   Allergen Reactions   • Contrast Dye Other (See Comments)     Shortness of breath   • Protonix [Pantoprazole Sodium] Palpitations     Patient states that protonix causes chest pain.  Shruthi Pérez, [facility]  4/23/2017  11:19 AM  Pt states he can tolerate pepcid with no problem     • Prilosec [Omeprazole] Other (See Comments)     Chest pain  Pt states he can take pepcid with no problem   • Flagyl [Metronidazole] Nausea And Vomiting       Home Meds:    No current facility-administered medications on file prior to encounter.      Current Outpatient Medications on File Prior to Encounter   Medication Sig Dispense Refill   • baclofen (LIORESAL) 20 MG tablet Take 10-20 mg by mouth 3 (Three) Times a Day As Needed.     • esomeprazole (nexIUM) 40 MG capsule Take 1 capsule by mouth 2 (Two) Times a Day Before Meals. (Patient taking differently: Take 40 mg by mouth Daily.) 60 capsule 3   • hydrochlorothiazide (HYDRODIURIL) 25 MG tablet Take 25 mg by mouth Daily.     • levETIRAcetam (KEPPRA) 500 MG tablet Take 750 mg by mouth 2 (Two) Times a Day.     • traZODone (DESYREL) 50 MG tablet Take 1 tablet by mouth Every Night. (Patient taking differently: Take 100 mg by mouth  Every Night.) 30 tablet 2   • venlafaxine (EFFEXOR) 75 MG tablet Take 75 mg by mouth every night at bedtime.         PMH:   Past Medical History:   Diagnosis Date   • Johnson esophagus    • COPD (chronic obstructive pulmonary disease) (CMS/HCC)    • DDD (degenerative disc disease), thoracic    • Degenerative disc disease, lumbar    • Dystonia    • GERD (gastroesophageal reflux disease)    • Hypertension    • Migraines    • Peptic ulcer disease    • TIA (transient ischemic attack)    • Wears dentures     upper only     PSH:    Past Surgical History:   Procedure Laterality Date   • APPENDECTOMY     • CHOLECYSTECTOMY N/A 4/22/2017    Procedure: CHOLECYSTECTOMY LAPAROSCOPIC;  Surgeon: Jaqueline Guaman MD;  Location:  [facility];  Service:    • COLONOSCOPY N/A 5/8/2017    Procedure: COLONOSCOPY  CPTCODE:85363;  Surgeon: Nicho Richey III, MD;  Location:  [facility];  Service:    • ENDOSCOPY     • ENDOSCOPY N/A 5/8/2017    Procedure: ESOPHAGOGASTRODUODENOSCOPY WITH BIOPSY  CPTCODE:34201;  Surgeon: Nicho Richey III, MD;  Location:  [facility];  Service:    • ENDOSCOPY N/A 11/3/2017    Procedure: ESOPHAGOGASTRODUODENOSCOPY WITH BIOPSY  CPTCODE: 41001;  Surgeon: Nicho Richey III, MD;  Location:  [facility];  Service:    • ENDOSCOPY N/A 2/20/2018    Procedure: ESOPHAGOGASTRODUODENOSCOPY WITH DILATATION CPT CODE: 67522;  Surgeon: Nicho Richey III, MD;  Location:  [facility];  Service:    • ENDOSCOPY N/A 6/5/2018    Procedure: ESOPHAGOGASTRODUODENOSCOPY WITH BIOPSY CPT CODE: 31720;  Surgeon: Nicho Richey III, MD;  Location:  [facility];  Service: Gastroenterology   • GASTRECTOMY N/A 9/17/2019    Procedure: OPEN VAGOTOMY, ANTRECTOMY,REVISION- Y GASTROJEJUNOSOTOMY;  Surgeon: Herbert Umanzor MD;  Location:  [facility];  Service: General       Immunization History:  Influenza: yes  Pneumococcal: no  Tetanus: yes    Social History:   Tobacco:   Social History     Tobacco Use   Smoking Status Former Smoker   •  Packs/day: 0.50   • Years: 35.00   • Pack years: 17.50   • Types: Cigarettes   • Quit date: 2020   • Years since quittin.2   Smokeless Tobacco Never Used      Alcohol:     Social History     Substance and Sexual Activity   Alcohol Use No       Vitals:           /88 (BP Location: Right arm, Patient Position: Lying)   Pulse 100   Temp 98.7 °F (37.1 °C) (Tympanic)   Resp 18   SpO2 98%     Physical Exam:  General Appearance:    Alert, cooperative, no distress, appears stated age   Head:    Normocephalic, without obvious abnormality, atraumatic   Lungs:     Breath sounds diminished in all lung fields with no audible wheezes or rales; respirations unlabored    Heart:   Regular rate and rhythm, S1 and S2 normal, no murmur, rub    or gallop    Abdomen:    Soft, non tender with multiple reducible incisional hernias along the midline       Genitalia:    deferred   Extremities:   Extremities normal, atraumatic, no cyanosis or edema   Skin:   Skin color, texture, turgor normal, no rashes or lesions   Neurologic:   Grossly intact   Results Review  LABS:  Lab Results   Component Value Date    WBC 9.23 2020    HGB 17.4 2020    HCT 49.7 2020    .0 (H) 2020     2020    NEUTROABS 5.39 2020    GLUCOSE 111 (H) 2020    BUN 7 2020    CREATININE 0.66 (L) 2020    EGFRIFNONA 127 2020     (L) 2020    K 4.0 2020    CL 96 (L) 2020    CO2 26.0 2020    PHOS 3.3 2020    CALCIUM 9.7 2020    ALBUMIN 4.10 10/26/2018    AST 14 10/26/2018    ALT 13 10/26/2018    BILITOT 0.1 (L) 10/26/2018    PTT 27.9 2018    INR 0.84 (L) 2018       RADIOLOGY:  Xr Chest Pa & Lateral    Result Date: 2020  EXAMINATION: XR CHEST PA AND LATERAL-  INDICATION: COPD  COMPARISON: 2019  FINDINGS: Mild hyperexpansion, likely related to COPD. No focal airspace opacity. No pleural effusion or pneumothorax. Normal heart and  mediastinal contours. Minimal thoracic spondylosis changes are present.      No evidence of acute disease in the chest.  This report was finalized on 12/9/2020 11:52 AM by Curtis Reyes.         I reviewed the patient's new clinical results.    Impression:   1. Incisional hernia repair     Plan:   1. Laparoscopic incisional hernia repair with mesh placement, possible open   Patient was seen in the office and risks and benefits were discussed. Please see office note for details.     SUE Bishop   12/10/2020   07:04 EST

## 2020-12-10 NOTE — OP NOTE
"  OPERATIVE NOTE    Patient Name:  Jamison Edward  YOB: 1970  [id number]    Date of Surgery:  12/10/2020      PREOPERATIVE DIAGNOSIS: Incisional hernia      POSTOPERATIVE DIAGNOSIS: Same        PROCEDURE PERFORMED: Laparoscopic incisional hernia repair with mesh       SURGEON: Herbert Umanzor MD       Circulator: Pauline Walton RN  Scrub Person: Franco Teixeira RN  Nursing Assistant: Rachel Finn; Maureen Garcia PCT        SPECIMENS: None        ANESTHESIA: General.        FINDINGS:   1. 16 x 5 cm \"swiss cheese\" incisional hernia completely repaired with a 22 x 12 cm piece of Ventralight ST mesh in a retrofascial location.       INDICATIONS: The patient is a 50 y.o. male who presented with an incisional hernia related to previous antrectomy with Steven-en-Y gastrojejunostomy.  After complete preoperative work-up he was deemed an operative candidate.  The risks and benefits were discussed at length with the patient, who agreed to proceed.       DESCRIPTION OF PROCEDURE:      After obtaining informed consent, the patient was taken to the operating room and placed in supine  position. After appropriate DVT and antibiotic prophylaxis, general anesthesia was induced. TAP blocks were placed by the anesthesia staff bilaterally to aid in post-op pain control . The abdomen  was prepped and draped in standard sterile fashion and covered with an Ioban dressing to prevent contact of mesh with the skin.  A transverse 12 mm skin incision was made inferior to the right costal margin in the anterior axillary line.  An optically guided trocar was advanced without difficulty into the abdominal cavity.  The laparoscope was advanced the trocar and the abdominal contents inspected.  There is no evidence of bowel, bladder, or visceral injury with entrance of the trocar.  At this point 2 5 mm ports were placed, one at the level of the umbilicus in the anterior axillary line on the right, and the other in " "the midclavicular line inferior to the right costal margin.    There were adhesions of omentum to the anterior abdominal wall that were taken down using blunt and LigaSure dissection.  The transverse colon was clearly identified and spared.  No bowel injury was made.  Additional dissection cleared the entire hernia edges and was carried cephalad to the xiphoid process.  In similar fashion, the urachal remnant was taken down using LigaSure, and ligated using an Endoloop.    The hernia defect was inspected.  There was a \"Afghan cheese\" defect extending from just the level of the umbilicus cephalad.  The total length was approximately 16 x 5 cm.  Therefore a 22 x 12 cm piece of Ventralight ST mesh was chosen.  It was placed in a retrofascial location and tacked in the 4 cardinal points using transfascial sutures of Curtiss.  Spiral tacker was then used to circumferentially tack the mesh to the anterior abdominal wall.  This allowed for excellent coverage of the defect, with at least 3 to 4 cm of overlap in all directions.  Additional transfascial sutures of Curtiss were placed every 2 to 3 cm around the periphery of the mesh as well.    The abdominal contents were inspected and there was no evidence of bleeding.  Using a 0 Vicryl suture, and a laparoscopic suture passer, a figure-of-eight suture around the 12 mm trocar site fascia was placed.  All trochars were then used to desufflate the abdomen and removed from the patient's abdomen.  The fascia at the 12 mm trocar site was closed using the previously placed 0 Vicryl suture.  The wounds were irrigated with normal saline, and closed in each area using running subcuticular sutures.  The transfascial suture sites were closed using tissue glue and dressed in standard sterile fashion.  An abdominal binder was placed.    All sponge and needle counts were correct times two at the completion of the procedure. The patient recovered from anesthesia, was extubated in the operating " room  and was transported to the PACU  in stable condition.      Herbert Umanzor MD  12/10/2020  09:31 EST

## 2020-12-10 NOTE — ANESTHESIA PREPROCEDURE EVALUATION
Anesthesia Evaluation     Patient summary reviewed and Nursing notes reviewed   no history of anesthetic complications:  NPO Solid Status: > 8 hours  NPO Liquid Status: > 8 hours           Airway   Mallampati: I  TM distance: >3 FB  Neck ROM: full  No difficulty expected  Dental    (+) upper dentures    Pulmonary    (+) a smoker (17.5PY hx, 09/2020 quit) Current, COPD, shortness of breath (2/2 COPD, unchanged), decreased breath sounds,   (-) no home oxygen  Cardiovascular - normal exam  Exercise tolerance: good (4-7 METS)    ECG reviewed    (+) hypertension,   (-) past MI, CAD, dysrhythmias, angina, CHF, BROWN, cardiac stents    ROS comment: 4/30/17 Echo:  LVEF = 55%.  no significant valvular abnormalities noted. no evidence of pericardial effusion.  04/2017-stress test nl , lvef 64%.  12/8/20 ekg - nsr.      Neuro/Psych  (+) TIA (2 years ago no residual symptoms), headaches,     (-) seizures  GI/Hepatic/Renal/Endo    (+)  GERD (hooper esophagus), PUD, GI bleeding ,   (-) hepatitis, liver disease, no renal disease, diabetes    ROS Comment: hooper's esophagus    Musculoskeletal     (+) back pain,   Abdominal    Substance History      OB/GYN          Other   arthritis,      ROS/Med Hx Other: 9/17/19 vagotomy - gr1v mac3 7.0 ett; hct 49.7 k 4.0                  Anesthesia Plan    ASA 3     general with block   (Risks and benefits of general anesthesia discussed with patient (including MI, CVA, death, recall, aspiration), questions answered, agreeable to proceed.  Benefits and risks of nerve block (TAP)  discussed with patient ((including failed block, damage to nerve/nearby structures, intravascular injection)); questions answered, agreeable to proceed.  )  intravenous induction     Anesthetic plan, all risks, benefits, and alternatives have been provided, discussed and informed consent has been obtained with: patient.    Plan discussed with CRNA.

## 2020-12-10 NOTE — PROGRESS NOTES
"Patient Name:  Jamison Edward  YOB: 1970  [id number]    Surgery Post - Operative Note    Date of visit: 12/10/2020    Subjective   Subjective: Feels OK. Pain controlled.       Objective     Objective:    /69 (BP Location: Right arm, Patient Position: Lying)   Pulse 104   Temp 98.1 °F (36.7 °C)   Resp 16   Ht 167.6 cm (65.98\")   Wt 72.7 kg (160 lb 4.4 oz)   SpO2 95%   BMI 25.88 kg/m²     CV:  Rate  regular and rhythm  regular  L:  Clear  to auscultation bilaterally   ABD:  Soft, appropriately tender. Dressings  clean, dry and intact   EXT:  No cyanosis, clubbing or edema         Assessment/Plan     Assessment/ Plan: Doing well after Lap incisional hernia repair. Continue Pulmonary toilet    Hospital Problem List     * (Principal) Incisional hernia, without obstruction or gangrene    Gastroesophageal reflux disease    Overview Signed 1/24/2018  6:05 AM by Rupa Cook PA-C     Added automatically from request for surgery [id number]         Dystonia    Incisional hernia              Herbert Umanzor MD  12/10/2020  17:35 EST    "

## 2020-12-10 NOTE — ANESTHESIA PROCEDURE NOTES
Airway  Urgency: elective    Date/Time: 12/10/2020 7:57 AM  Airway not difficult    General Information and Staff    Patient location during procedure: OR    Indications and Patient Condition  Indications for airway management: airway protection    Preoxygenated: yes  Mask difficulty assessment: 1 - vent by mask    Final Airway Details  Final airway type: endotracheal airway      Successful airway: ETT  Cuffed: yes   Successful intubation technique: direct laryngoscopy  Facilitating devices/methods: intubating stylet  Endotracheal tube insertion site: oral  Blade: Walker  Blade size: 2  ETT size (mm): 7.5  Cormack-Lehane Classification: grade I - full view of glottis  Placement verified by: chest auscultation and capnometry   Measured from: lips  ETT/EBT  to lips (cm): 21  Number of attempts at approach: 1  Assessment: lips, teeth, and gum same as pre-op and atraumatic intubation

## 2020-12-11 LAB
ANION GAP SERPL CALCULATED.3IONS-SCNC: 10 MMOL/L (ref 5–15)
BASOPHILS # BLD AUTO: 0.01 10*3/MM3 (ref 0–0.2)
BASOPHILS NFR BLD AUTO: 0.1 % (ref 0–1.5)
BUN SERPL-MCNC: 6 MG/DL (ref 6–20)
BUN/CREAT SERPL: 12.5 (ref 7–25)
CALCIUM SPEC-SCNC: 9.5 MG/DL (ref 8.6–10.5)
CHLORIDE SERPL-SCNC: 93 MMOL/L (ref 98–107)
CO2 SERPL-SCNC: 30 MMOL/L (ref 22–29)
CREAT SERPL-MCNC: 0.48 MG/DL (ref 0.76–1.27)
DEPRECATED RDW RBC AUTO: 41.8 FL (ref 37–54)
EOSINOPHIL # BLD AUTO: 0.01 10*3/MM3 (ref 0–0.4)
EOSINOPHIL NFR BLD AUTO: 0.1 % (ref 0.3–6.2)
ERYTHROCYTE [DISTWIDTH] IN BLOOD BY AUTOMATED COUNT: 11.3 % (ref 12.3–15.4)
GFR SERPL CREATININE-BSD FRML MDRD: >150 ML/MIN/1.73
GLUCOSE SERPL-MCNC: 144 MG/DL (ref 65–99)
HCT VFR BLD AUTO: 42.6 % (ref 37.5–51)
HGB BLD-MCNC: 14.5 G/DL (ref 13–17.7)
IMM GRANULOCYTES # BLD AUTO: 0.13 10*3/MM3 (ref 0–0.05)
IMM GRANULOCYTES NFR BLD AUTO: 0.8 % (ref 0–0.5)
LYMPHOCYTES # BLD AUTO: 0.53 10*3/MM3 (ref 0.7–3.1)
LYMPHOCYTES NFR BLD AUTO: 3.3 % (ref 19.6–45.3)
MCH RBC QN AUTO: 34.1 PG (ref 26.6–33)
MCHC RBC AUTO-ENTMCNC: 34 G/DL (ref 31.5–35.7)
MCV RBC AUTO: 100.2 FL (ref 79–97)
MONOCYTES # BLD AUTO: 1.04 10*3/MM3 (ref 0.1–0.9)
MONOCYTES NFR BLD AUTO: 6.6 % (ref 5–12)
NEUTROPHILS NFR BLD AUTO: 14.13 10*3/MM3 (ref 1.7–7)
NEUTROPHILS NFR BLD AUTO: 89.1 % (ref 42.7–76)
NRBC BLD AUTO-RTO: 0 /100 WBC (ref 0–0.2)
PLATELET # BLD AUTO: 236 10*3/MM3 (ref 140–450)
PMV BLD AUTO: 9.2 FL (ref 6–12)
POTASSIUM SERPL-SCNC: 3.5 MMOL/L (ref 3.5–5.2)
RBC # BLD AUTO: 4.25 10*6/MM3 (ref 4.14–5.8)
SODIUM SERPL-SCNC: 133 MMOL/L (ref 136–145)
WBC # BLD AUTO: 15.85 10*3/MM3 (ref 3.4–10.8)

## 2020-12-11 PROCEDURE — A9270 NON-COVERED ITEM OR SERVICE: HCPCS | Performed by: SURGERY

## 2020-12-11 PROCEDURE — 25010000002 HEPARIN (PORCINE) PER 1000 UNITS: Performed by: SURGERY

## 2020-12-11 PROCEDURE — 63710000001 DOCUSATE SODIUM 100 MG CAPSULE: Performed by: SURGERY

## 2020-12-11 PROCEDURE — 25010000002 METOCLOPRAMIDE PER 10 MG: Performed by: SURGERY

## 2020-12-11 PROCEDURE — 63710000001 PROMETHAZINE PER 12.5 MG: Performed by: SURGERY

## 2020-12-11 PROCEDURE — 63710000001 TRAZODONE 50 MG TABLET: Performed by: SURGERY

## 2020-12-11 PROCEDURE — 63710000001 BISACODYL 5 MG TABLET DELAYED-RELEASE: Performed by: SURGERY

## 2020-12-11 PROCEDURE — 63710000001 OXYCODONE-ACETAMINOPHEN 5-325 MG TABLET: Performed by: SURGERY

## 2020-12-11 PROCEDURE — 25010000002 ONDANSETRON PER 1 MG: Performed by: SURGERY

## 2020-12-11 PROCEDURE — 80048 BASIC METABOLIC PNL TOTAL CA: CPT | Performed by: SURGERY

## 2020-12-11 PROCEDURE — 63710000001 SUCRALFATE 1 G TABLET: Performed by: SURGERY

## 2020-12-11 PROCEDURE — 63710000001 VENLAFAXINE 37.5 MG TABLET: Performed by: SURGERY

## 2020-12-11 PROCEDURE — 85025 COMPLETE CBC W/AUTO DIFF WBC: CPT | Performed by: SURGERY

## 2020-12-11 PROCEDURE — G0378 HOSPITAL OBSERVATION PER HR: HCPCS

## 2020-12-11 PROCEDURE — 63710000001 LEVETIRACETAM 500 MG TABLET: Performed by: SURGERY

## 2020-12-11 PROCEDURE — 63710000001 HYDROCHLOROTHIAZIDE 25 MG TABLET: Performed by: SURGERY

## 2020-12-11 PROCEDURE — 63710000001 FAMOTIDINE 20 MG TABLET: Performed by: SURGERY

## 2020-12-11 RX ORDER — SUCRALFATE 1 G/1
1 TABLET ORAL
Status: DISCONTINUED | OUTPATIENT
Start: 2020-12-11 | End: 2020-12-14 | Stop reason: HOSPADM

## 2020-12-11 RX ORDER — HYDROMORPHONE HYDROCHLORIDE 1 MG/ML
0.2 INJECTION, SOLUTION INTRAMUSCULAR; INTRAVENOUS; SUBCUTANEOUS
Status: DISCONTINUED | OUTPATIENT
Start: 2020-12-11 | End: 2020-12-14 | Stop reason: HOSPADM

## 2020-12-11 RX ORDER — METOCLOPRAMIDE HYDROCHLORIDE 5 MG/ML
10 INJECTION INTRAMUSCULAR; INTRAVENOUS EVERY 6 HOURS
Status: DISCONTINUED | OUTPATIENT
Start: 2020-12-11 | End: 2020-12-11

## 2020-12-11 RX ADMIN — FAMOTIDINE 20 MG: 20 TABLET, FILM COATED ORAL at 17:27

## 2020-12-11 RX ADMIN — FAMOTIDINE 20 MG: 20 TABLET, FILM COATED ORAL at 08:02

## 2020-12-11 RX ADMIN — TRAZODONE HYDROCHLORIDE 50 MG: 50 TABLET ORAL at 20:00

## 2020-12-11 RX ADMIN — SUCRALFATE 1 G: 1 TABLET ORAL at 20:00

## 2020-12-11 RX ADMIN — METOCLOPRAMIDE 10 MG: 5 INJECTION, SOLUTION INTRAMUSCULAR; INTRAVENOUS at 05:53

## 2020-12-11 RX ADMIN — ONDANSETRON 4 MG: 2 INJECTION INTRAMUSCULAR; INTRAVENOUS at 05:59

## 2020-12-11 RX ADMIN — HEPARIN SODIUM 5000 UNITS: 5000 INJECTION INTRAVENOUS; SUBCUTANEOUS at 13:34

## 2020-12-11 RX ADMIN — OXYCODONE HYDROCHLORIDE AND ACETAMINOPHEN 2 TABLET: 5; 325 TABLET ORAL at 18:49

## 2020-12-11 RX ADMIN — DOCUSATE SODIUM 100 MG: 100 CAPSULE, LIQUID FILLED ORAL at 10:27

## 2020-12-11 RX ADMIN — HEPARIN SODIUM 5000 UNITS: 5000 INJECTION INTRAVENOUS; SUBCUTANEOUS at 20:01

## 2020-12-11 RX ADMIN — METOCLOPRAMIDE 10 MG: 5 INJECTION, SOLUTION INTRAMUSCULAR; INTRAVENOUS at 11:47

## 2020-12-11 RX ADMIN — LEVETIRACETAM 1000 MG: 500 TABLET, FILM COATED ORAL at 20:00

## 2020-12-11 RX ADMIN — METOCLOPRAMIDE 10 MG: 5 INJECTION, SOLUTION INTRAMUSCULAR; INTRAVENOUS at 17:26

## 2020-12-11 RX ADMIN — BISACODYL 10 MG: 5 TABLET, COATED ORAL at 10:26

## 2020-12-11 RX ADMIN — VENLAFAXINE 75 MG: 37.5 TABLET ORAL at 08:02

## 2020-12-11 RX ADMIN — HYDROCHLOROTHIAZIDE 25 MG: 25 TABLET ORAL at 10:27

## 2020-12-11 RX ADMIN — SODIUM CHLORIDE, POTASSIUM CHLORIDE, SODIUM LACTATE AND CALCIUM CHLORIDE 150 ML/HR: 600; 310; 30; 20 INJECTION, SOLUTION INTRAVENOUS at 09:11

## 2020-12-11 RX ADMIN — HEPARIN SODIUM 5000 UNITS: 5000 INJECTION INTRAVENOUS; SUBCUTANEOUS at 05:53

## 2020-12-11 RX ADMIN — PROMETHAZINE HYDROCHLORIDE 12.5 MG: 12.5 TABLET ORAL at 09:38

## 2020-12-11 RX ADMIN — DOCUSATE SODIUM 100 MG: 100 CAPSULE, LIQUID FILLED ORAL at 20:00

## 2020-12-11 RX ADMIN — LEVETIRACETAM 500 MG: 500 TABLET, FILM COATED ORAL at 08:02

## 2020-12-11 RX ADMIN — LEVETIRACETAM 500 MG: 500 TABLET, FILM COATED ORAL at 13:34

## 2020-12-11 RX ADMIN — SUCRALFATE 1 G: 1 TABLET ORAL at 17:27

## 2020-12-11 RX ADMIN — SUCRALFATE 1 G: 1 TABLET ORAL at 11:47

## 2020-12-11 NOTE — PROGRESS NOTES
"Patient Name:  Jamison Edward  YOB: 1970  [id number]    Surgery Progress Note    Date of visit: 12/11/2020    Subjective   Subjective: Had some nausea and vomiting last night, better now. Pain controlled.         Objective     Objective:     /81 (BP Location: Right arm, Patient Position: Lying)   Pulse 98   Temp 98.1 °F (36.7 °C) (Oral)   Resp 18   Ht 167.6 cm (65.98\")   Wt 72.7 kg (160 lb 4.4 oz)   SpO2 98%   BMI 25.88 kg/m²     Intake/Output Summary (Last 24 hours) at 12/11/2020 0724  Last data filed at 12/11/2020 0202  Gross per 24 hour   Intake 1960 ml   Output 750 ml   Net 1210 ml       CV:  Rhythm  regular and rate regular   L:  Clear  to auscultation bilaterally   Abd:  Bowel sounds positive, soft, appropriately tender. Dressings c/d/i  Ext:  No cyanosis, clubbing, edema    Recent labs that are back at this time have been reviewed.        Assessment/Plan     Assessment/ Plan:    Hospital Problem List     * (Principal) Incisional hernia, without obstruction or gangrene- Stable after repair. Increase mobility, wean O2. Will manage nausea and add bowel regimen.      Gastroesophageal reflux disease    Overview Signed 1/24/2018  6:05 AM by Rupa Cook PA-C     Added automatically from request for surgery [id number]         Dystonia    Incisional hernia              Herbert Umanzor MD  12/11/2020  07:24 EST      "

## 2020-12-11 NOTE — PLAN OF CARE
Goal Outcome Evaluation:  Plan of Care Reviewed With: patient  Progress: improving  Patient ambulating, passing gas, tolerating po.

## 2020-12-11 NOTE — PLAN OF CARE
Problem: Adult Inpatient Plan of Care  Goal: Plan of Care Review  Outcome: Ongoing, Progressing  Flowsheets  Taken 12/11/2020 0311 by Mil Morgan, RN  Progress: improving  Outcome Summary: VSS. Resting well on shift. Emesis 2x at shift start. MD called for additional PRNs, not needed. I/O cath for retention. Pt now making adq UOP on his own. UP ad aron. ABD Binder on. Tolerating clears for latter half of shift. Continue to monitor.  Taken 12/10/2020 0643 by Anna Cobian, RN  Plan of Care Reviewed With: patient   Goal Outcome Evaluation:  Plan of Care Reviewed With: patient  Progress: improving  Outcome Summary: VSS. Resting well on shift. Emesis 2x at shift start. MD called for additional PRNs, not needed. I/O cath for retention. Pt now making adq UOP on his own. UP ad aron. ABD Binder on. Tolerating clears for latter half of shift. Continue to monitor.

## 2020-12-12 LAB
ANION GAP SERPL CALCULATED.3IONS-SCNC: 11 MMOL/L (ref 5–15)
BUN SERPL-MCNC: 3 MG/DL (ref 6–20)
BUN/CREAT SERPL: 6.1 (ref 7–25)
CALCIUM SPEC-SCNC: 9.2 MG/DL (ref 8.6–10.5)
CHLORIDE SERPL-SCNC: 91 MMOL/L (ref 98–107)
CO2 SERPL-SCNC: 31 MMOL/L (ref 22–29)
CREAT SERPL-MCNC: 0.49 MG/DL (ref 0.76–1.27)
DEPRECATED RDW RBC AUTO: 40.6 FL (ref 37–54)
ERYTHROCYTE [DISTWIDTH] IN BLOOD BY AUTOMATED COUNT: 11.2 % (ref 12.3–15.4)
GFR SERPL CREATININE-BSD FRML MDRD: >150 ML/MIN/1.73
GLUCOSE SERPL-MCNC: 132 MG/DL (ref 65–99)
HCT VFR BLD AUTO: 41.1 % (ref 37.5–51)
HGB BLD-MCNC: 14 G/DL (ref 13–17.7)
MAGNESIUM SERPL-MCNC: 1.6 MG/DL (ref 1.6–2.6)
MCH RBC QN AUTO: 33.3 PG (ref 26.6–33)
MCHC RBC AUTO-ENTMCNC: 34.1 G/DL (ref 31.5–35.7)
MCV RBC AUTO: 97.6 FL (ref 79–97)
PHOSPHATE SERPL-MCNC: 2 MG/DL (ref 2.5–4.5)
PLATELET # BLD AUTO: 229 10*3/MM3 (ref 140–450)
PMV BLD AUTO: 9.2 FL (ref 6–12)
POTASSIUM SERPL-SCNC: 2.6 MMOL/L (ref 3.5–5.2)
POTASSIUM SERPL-SCNC: 3 MMOL/L (ref 3.5–5.2)
RBC # BLD AUTO: 4.21 10*6/MM3 (ref 4.14–5.8)
SODIUM SERPL-SCNC: 133 MMOL/L (ref 136–145)
WBC # BLD AUTO: 12.16 10*3/MM3 (ref 3.4–10.8)

## 2020-12-12 PROCEDURE — 85027 COMPLETE CBC AUTOMATED: CPT | Performed by: SURGERY

## 2020-12-12 PROCEDURE — 63710000001 VENLAFAXINE 37.5 MG TABLET: Performed by: SURGERY

## 2020-12-12 PROCEDURE — 63710000001 ACETAMINOPHEN 325 MG TABLET: Performed by: SURGERY

## 2020-12-12 PROCEDURE — A9270 NON-COVERED ITEM OR SERVICE: HCPCS | Performed by: SURGERY

## 2020-12-12 PROCEDURE — 63710000001 POTASSIUM CHLORIDE 10 MEQ CAPSULE CONTROLLED-RELEASE: Performed by: SURGERY

## 2020-12-12 PROCEDURE — 63710000001 DOCUSATE SODIUM 100 MG CAPSULE: Performed by: SURGERY

## 2020-12-12 PROCEDURE — 25010000003 MAGNESIUM SULFATE 4 GM/100ML SOLUTION: Performed by: SURGERY

## 2020-12-12 PROCEDURE — 63710000001 SUCRALFATE 1 G TABLET: Performed by: SURGERY

## 2020-12-12 PROCEDURE — 63710000001 BACLOFEN 10 MG TABLET: Performed by: SURGERY

## 2020-12-12 PROCEDURE — 63710000001 FAMOTIDINE 20 MG TABLET: Performed by: SURGERY

## 2020-12-12 PROCEDURE — 84132 ASSAY OF SERUM POTASSIUM: CPT | Performed by: SURGERY

## 2020-12-12 PROCEDURE — 25010000002 METHYLNALTREXONE 12 MG/0.6ML SOLUTION: Performed by: SURGERY

## 2020-12-12 PROCEDURE — 63710000001 BISACODYL 5 MG TABLET DELAYED-RELEASE: Performed by: SURGERY

## 2020-12-12 PROCEDURE — 63710000001 OXYCODONE-ACETAMINOPHEN 5-325 MG TABLET: Performed by: SURGERY

## 2020-12-12 PROCEDURE — 25010000002 HEPARIN (PORCINE) PER 1000 UNITS: Performed by: SURGERY

## 2020-12-12 PROCEDURE — 63710000001 LEVETIRACETAM 500 MG TABLET: Performed by: SURGERY

## 2020-12-12 PROCEDURE — 63710000001 BISACODYL 10 MG SUPPOSITORY: Performed by: SURGERY

## 2020-12-12 PROCEDURE — 80048 BASIC METABOLIC PNL TOTAL CA: CPT | Performed by: SURGERY

## 2020-12-12 PROCEDURE — 25010000002 METOCLOPRAMIDE PER 10 MG: Performed by: SURGERY

## 2020-12-12 PROCEDURE — 84100 ASSAY OF PHOSPHORUS: CPT | Performed by: SURGERY

## 2020-12-12 PROCEDURE — G0378 HOSPITAL OBSERVATION PER HR: HCPCS

## 2020-12-12 PROCEDURE — 63710000001 TRAZODONE 50 MG TABLET: Performed by: SURGERY

## 2020-12-12 PROCEDURE — 63710000001 HYDROCHLOROTHIAZIDE 25 MG TABLET: Performed by: SURGERY

## 2020-12-12 PROCEDURE — 83735 ASSAY OF MAGNESIUM: CPT | Performed by: SURGERY

## 2020-12-12 PROCEDURE — 63710000001 POTASSIUM CHLORIDE 20 MEQ PACK: Performed by: SURGERY

## 2020-12-12 RX ORDER — MAGNESIUM SULFATE HEPTAHYDRATE 40 MG/ML
4 INJECTION, SOLUTION INTRAVENOUS AS NEEDED
Status: DISCONTINUED | OUTPATIENT
Start: 2020-12-12 | End: 2020-12-14 | Stop reason: HOSPADM

## 2020-12-12 RX ORDER — POTASSIUM CHLORIDE 7.45 MG/ML
10 INJECTION INTRAVENOUS
Status: DISCONTINUED | OUTPATIENT
Start: 2020-12-12 | End: 2020-12-14 | Stop reason: HOSPADM

## 2020-12-12 RX ORDER — POTASSIUM CHLORIDE 750 MG/1
40 CAPSULE, EXTENDED RELEASE ORAL AS NEEDED
Status: DISCONTINUED | OUTPATIENT
Start: 2020-12-12 | End: 2020-12-14 | Stop reason: HOSPADM

## 2020-12-12 RX ORDER — DEXTROSE, SODIUM CHLORIDE, AND POTASSIUM CHLORIDE 5; .45; .15 G/100ML; G/100ML; G/100ML
50 INJECTION INTRAVENOUS CONTINUOUS
Status: DISCONTINUED | OUTPATIENT
Start: 2020-12-12 | End: 2020-12-13

## 2020-12-12 RX ORDER — POTASSIUM CHLORIDE 1.5 G/1.77G
40 POWDER, FOR SOLUTION ORAL AS NEEDED
Status: DISCONTINUED | OUTPATIENT
Start: 2020-12-12 | End: 2020-12-14 | Stop reason: HOSPADM

## 2020-12-12 RX ORDER — MAGNESIUM SULFATE HEPTAHYDRATE 40 MG/ML
2 INJECTION, SOLUTION INTRAVENOUS AS NEEDED
Status: DISCONTINUED | OUTPATIENT
Start: 2020-12-12 | End: 2020-12-14 | Stop reason: HOSPADM

## 2020-12-12 RX ORDER — BISACODYL 10 MG
10 SUPPOSITORY, RECTAL RECTAL EVERY 8 HOURS
Status: DISCONTINUED | OUTPATIENT
Start: 2020-12-12 | End: 2020-12-14 | Stop reason: HOSPADM

## 2020-12-12 RX ADMIN — POTASSIUM CHLORIDE, DEXTROSE MONOHYDRATE AND SODIUM CHLORIDE 50 ML/HR: 150; 5; 450 INJECTION, SOLUTION INTRAVENOUS at 10:12

## 2020-12-12 RX ADMIN — BACLOFEN 10 MG: 10 TABLET ORAL at 12:56

## 2020-12-12 RX ADMIN — OXYCODONE HYDROCHLORIDE AND ACETAMINOPHEN 2 TABLET: 5; 325 TABLET ORAL at 16:30

## 2020-12-12 RX ADMIN — OXYCODONE HYDROCHLORIDE AND ACETAMINOPHEN 2 TABLET: 5; 325 TABLET ORAL at 12:20

## 2020-12-12 RX ADMIN — OXYCODONE HYDROCHLORIDE AND ACETAMINOPHEN 2 TABLET: 5; 325 TABLET ORAL at 07:44

## 2020-12-12 RX ADMIN — POTASSIUM CHLORIDE 40 MEQ: 1.5 FOR SOLUTION ORAL at 17:44

## 2020-12-12 RX ADMIN — METOCLOPRAMIDE 10 MG: 5 INJECTION, SOLUTION INTRAMUSCULAR; INTRAVENOUS at 05:58

## 2020-12-12 RX ADMIN — POTASSIUM CHLORIDE 40 MEQ: 10 CAPSULE, COATED, EXTENDED RELEASE ORAL at 10:08

## 2020-12-12 RX ADMIN — LEVETIRACETAM 500 MG: 500 TABLET, FILM COATED ORAL at 07:38

## 2020-12-12 RX ADMIN — VENLAFAXINE 75 MG: 37.5 TABLET ORAL at 07:37

## 2020-12-12 RX ADMIN — SUCRALFATE 1 G: 1 TABLET ORAL at 07:37

## 2020-12-12 RX ADMIN — DOCUSATE SODIUM 100 MG: 100 CAPSULE, LIQUID FILLED ORAL at 22:01

## 2020-12-12 RX ADMIN — TRAZODONE HYDROCHLORIDE 50 MG: 50 TABLET ORAL at 22:01

## 2020-12-12 RX ADMIN — FAMOTIDINE 20 MG: 20 TABLET, FILM COATED ORAL at 07:37

## 2020-12-12 RX ADMIN — OXYCODONE HYDROCHLORIDE AND ACETAMINOPHEN 2 TABLET: 5; 325 TABLET ORAL at 22:01

## 2020-12-12 RX ADMIN — LEVETIRACETAM 1000 MG: 500 TABLET, FILM COATED ORAL at 22:01

## 2020-12-12 RX ADMIN — FAMOTIDINE 20 MG: 20 TABLET, FILM COATED ORAL at 17:43

## 2020-12-12 RX ADMIN — OXYCODONE HYDROCHLORIDE AND ACETAMINOPHEN 2 TABLET: 5; 325 TABLET ORAL at 04:19

## 2020-12-12 RX ADMIN — BISACODYL 10 MG: 10 SUPPOSITORY RECTAL at 17:44

## 2020-12-12 RX ADMIN — SODIUM CHLORIDE, POTASSIUM CHLORIDE, SODIUM LACTATE AND CALCIUM CHLORIDE 150 ML/HR: 600; 310; 30; 20 INJECTION, SOLUTION INTRAVENOUS at 01:48

## 2020-12-12 RX ADMIN — METOCLOPRAMIDE 10 MG: 5 INJECTION, SOLUTION INTRAMUSCULAR; INTRAVENOUS at 00:29

## 2020-12-12 RX ADMIN — METHYLNALTREXONE BROMIDE 4 MG: 12 INJECTION, SOLUTION SUBCUTANEOUS at 12:20

## 2020-12-12 RX ADMIN — SUCRALFATE 1 G: 1 TABLET ORAL at 17:43

## 2020-12-12 RX ADMIN — VENLAFAXINE 75 MG: 37.5 TABLET ORAL at 17:50

## 2020-12-12 RX ADMIN — VENLAFAXINE 75 MG: 37.5 TABLET ORAL at 00:29

## 2020-12-12 RX ADMIN — HYDROCHLOROTHIAZIDE 25 MG: 25 TABLET ORAL at 07:38

## 2020-12-12 RX ADMIN — SUCRALFATE 1 G: 1 TABLET ORAL at 22:01

## 2020-12-12 RX ADMIN — HEPARIN SODIUM 5000 UNITS: 5000 INJECTION INTRAVENOUS; SUBCUTANEOUS at 05:58

## 2020-12-12 RX ADMIN — BISACODYL 10 MG: 5 TABLET, COATED ORAL at 10:07

## 2020-12-12 RX ADMIN — DOCUSATE SODIUM 100 MG: 100 CAPSULE, LIQUID FILLED ORAL at 10:08

## 2020-12-12 RX ADMIN — SUCRALFATE 1 G: 1 TABLET ORAL at 12:20

## 2020-12-12 RX ADMIN — MAGNESIUM SULFATE HEPTAHYDRATE 4 G: 40 INJECTION, SOLUTION INTRAVENOUS at 12:50

## 2020-12-12 RX ADMIN — LEVETIRACETAM 500 MG: 500 TABLET, FILM COATED ORAL at 12:20

## 2020-12-12 RX ADMIN — ACETAMINOPHEN 650 MG: 325 TABLET, FILM COATED ORAL at 10:08

## 2020-12-12 RX ADMIN — POTASSIUM CHLORIDE 40 MEQ: 1.5 FOR SOLUTION ORAL at 14:13

## 2020-12-12 RX ADMIN — HEPARIN SODIUM 5000 UNITS: 5000 INJECTION INTRAVENOUS; SUBCUTANEOUS at 22:02

## 2020-12-12 RX ADMIN — SODIUM CHLORIDE, POTASSIUM CHLORIDE, SODIUM LACTATE AND CALCIUM CHLORIDE 150 ML/HR: 600; 310; 30; 20 INJECTION, SOLUTION INTRAVENOUS at 04:44

## 2020-12-12 RX ADMIN — BISACODYL 10 MG: 10 SUPPOSITORY RECTAL at 10:08

## 2020-12-12 NOTE — PLAN OF CARE
Problem: Adult Inpatient Plan of Care  Goal: Plan of Care Review  Outcome: Ongoing, Progressing  Flowsheets  Taken 12/12/2020 0615 by Kaitlin Luis, RN  Progress: improving  Outcome Summary: VSS. Adequate UOP. Amblated 100 ft x2. Medicated for pain x1. c/o being confused at times and anxiety. Is interested in seeing a psychiatrist. Possible DC in am.

## 2020-12-12 NOTE — PROGRESS NOTES
"Patient Name:  Jamison Edward  YOB: 1970  [id number]    Surgery Progress Note    Date of visit: 12/12/2020    Subjective   Subjective: Complains of some pain, nausea slightly improved.  He reports that he still gets very anxious and has panic attacks.         Objective     Objective:     /90 (BP Location: Left arm, Patient Position: Sitting)   Pulse 120   Temp 98.4 °F (36.9 °C) (Oral)   Resp 22   Ht 167.6 cm (65.98\")   Wt 72.7 kg (160 lb 4.4 oz)   SpO2 92%   BMI 25.88 kg/m²     Intake/Output Summary (Last 24 hours) at 12/12/2020 0840  Last data filed at 12/12/2020 0600  Gross per 24 hour   Intake 3251 ml   Output 2850 ml   Net 401 ml       CV:  Rhythm regular and rate occasionally tachycardic.   L:  Clear  to auscultation bilaterally   Abd:  Bowel sounds positive , soft, minimally tender on palpation with no guarding.  Incisions clean dry and intact.  Ext:  No cyanosis, clubbing, edema    Recent labs that are back at this time have been reviewed.  Return exam normalizing with hemoglobin of 14, white count of 12,000.         Assessment/Plan     Assessment/ Plan:    Hospital Problem List     * (Principal) Incisional hernia, without obstruction or gangrene- Overall I think he is doing well, but we are awaiting return of bowel function, which sometimes can be delayed in patients after laparoscopic ventral hernia repair.  I suspect some of his tachycardia is also due to his psychiatric issues and panic attacks.  I will increase his bowel regimen, and work on mobilization.  We will drop his IV fluids as well.      Gastroesophageal reflux disease    Overview Signed 1/24/2018  6:05 AM by Rupa Cook PA-C     Added automatically from request for surgery [id number]         Dystonia    Incisional hernia              Herbert Umanzor MD  12/12/2020  08:40 EST      "

## 2020-12-12 NOTE — PLAN OF CARE
Goal Outcome Evaluation:  Plan of Care Reviewed With: patient, spouse  Progress: improving  Outcome Summary: Pt up in trujillo and room today. One bm reported. He does not like the diet and states he wants real food. Heart rate remains in 120s today. Potassium replaced and magnesium being replaced currently. Bowel sounds present. Requiring pain medications q 4 hours. Reports history of anxiety and panic attacks. He does not want to take heparin bc wife says this caused his electrolyte problems last hospitalization. Also requesting nicotine patch and states he stopped smoking 3 months ago. IV fluids continue.

## 2020-12-13 LAB
ANION GAP SERPL CALCULATED.3IONS-SCNC: 10 MMOL/L (ref 5–15)
BUN SERPL-MCNC: 3 MG/DL (ref 6–20)
BUN/CREAT SERPL: 5.2 (ref 7–25)
CALCIUM SPEC-SCNC: 9.2 MG/DL (ref 8.6–10.5)
CHLORIDE SERPL-SCNC: 93 MMOL/L (ref 98–107)
CO2 SERPL-SCNC: 31 MMOL/L (ref 22–29)
CREAT SERPL-MCNC: 0.58 MG/DL (ref 0.76–1.27)
DEPRECATED RDW RBC AUTO: 41.8 FL (ref 37–54)
ERYTHROCYTE [DISTWIDTH] IN BLOOD BY AUTOMATED COUNT: 11.2 % (ref 12.3–15.4)
GFR SERPL CREATININE-BSD FRML MDRD: 147 ML/MIN/1.73
GLUCOSE SERPL-MCNC: 116 MG/DL (ref 65–99)
HCT VFR BLD AUTO: 42.1 % (ref 37.5–51)
HGB BLD-MCNC: 14.5 G/DL (ref 13–17.7)
MCH RBC QN AUTO: 34.8 PG (ref 26.6–33)
MCHC RBC AUTO-ENTMCNC: 34.4 G/DL (ref 31.5–35.7)
MCV RBC AUTO: 101 FL (ref 79–97)
PHOSPHATE SERPL-MCNC: 2.3 MG/DL (ref 2.5–4.5)
PLATELET # BLD AUTO: 231 10*3/MM3 (ref 140–450)
PMV BLD AUTO: 9.1 FL (ref 6–12)
POTASSIUM SERPL-SCNC: 4.2 MMOL/L (ref 3.5–5.2)
RBC # BLD AUTO: 4.17 10*6/MM3 (ref 4.14–5.8)
SODIUM SERPL-SCNC: 134 MMOL/L (ref 136–145)
WBC # BLD AUTO: 11.41 10*3/MM3 (ref 3.4–10.8)

## 2020-12-13 PROCEDURE — 85027 COMPLETE CBC AUTOMATED: CPT | Performed by: SURGERY

## 2020-12-13 PROCEDURE — A9270 NON-COVERED ITEM OR SERVICE: HCPCS | Performed by: SURGERY

## 2020-12-13 PROCEDURE — 63710000001 OXYCODONE-ACETAMINOPHEN 5-325 MG TABLET: Performed by: SURGERY

## 2020-12-13 PROCEDURE — 84100 ASSAY OF PHOSPHORUS: CPT | Performed by: SURGERY

## 2020-12-13 PROCEDURE — 63710000001 POTASSIUM CHLORIDE 20 MEQ PACK: Performed by: SURGERY

## 2020-12-13 PROCEDURE — 63710000001 BISACODYL 5 MG TABLET DELAYED-RELEASE: Performed by: SURGERY

## 2020-12-13 PROCEDURE — 63710000001 BISACODYL 10 MG SUPPOSITORY: Performed by: SURGERY

## 2020-12-13 PROCEDURE — 63710000001 HYDROCHLOROTHIAZIDE 25 MG TABLET: Performed by: SURGERY

## 2020-12-13 PROCEDURE — 25010000002 HEPARIN (PORCINE) PER 1000 UNITS: Performed by: SURGERY

## 2020-12-13 PROCEDURE — 63710000001 POTASSIUM CHLORIDE 10 MEQ CAPSULE CONTROLLED-RELEASE: Performed by: SURGERY

## 2020-12-13 PROCEDURE — 63710000001 TRAZODONE 50 MG TABLET: Performed by: SURGERY

## 2020-12-13 PROCEDURE — 63710000001 DOCUSATE SODIUM 100 MG CAPSULE: Performed by: SURGERY

## 2020-12-13 PROCEDURE — 63710000001 SUCRALFATE 1 G TABLET: Performed by: SURGERY

## 2020-12-13 PROCEDURE — G0378 HOSPITAL OBSERVATION PER HR: HCPCS

## 2020-12-13 PROCEDURE — 63710000001 LEVETIRACETAM 500 MG TABLET: Performed by: SURGERY

## 2020-12-13 PROCEDURE — 63710000001 CARISOPRODOL 350 MG TABLET: Performed by: SURGERY

## 2020-12-13 PROCEDURE — 63710000001 VENLAFAXINE 37.5 MG TABLET: Performed by: SURGERY

## 2020-12-13 PROCEDURE — 80048 BASIC METABOLIC PNL TOTAL CA: CPT | Performed by: SURGERY

## 2020-12-13 PROCEDURE — 63710000001 FAMOTIDINE 20 MG TABLET: Performed by: SURGERY

## 2020-12-13 PROCEDURE — 63710000001 BACLOFEN 10 MG TABLET: Performed by: SURGERY

## 2020-12-13 RX ADMIN — CARISOPRODOL 350 MG: 350 TABLET ORAL at 20:15

## 2020-12-13 RX ADMIN — BISACODYL 10 MG: 10 SUPPOSITORY RECTAL at 08:47

## 2020-12-13 RX ADMIN — SUCRALFATE 1 G: 1 TABLET ORAL at 20:15

## 2020-12-13 RX ADMIN — BISACODYL 10 MG: 5 TABLET, COATED ORAL at 08:46

## 2020-12-13 RX ADMIN — LEVETIRACETAM 500 MG: 500 TABLET, FILM COATED ORAL at 08:46

## 2020-12-13 RX ADMIN — OXYCODONE HYDROCHLORIDE AND ACETAMINOPHEN 2 TABLET: 5; 325 TABLET ORAL at 08:55

## 2020-12-13 RX ADMIN — HEPARIN SODIUM 5000 UNITS: 5000 INJECTION INTRAVENOUS; SUBCUTANEOUS at 21:38

## 2020-12-13 RX ADMIN — BACLOFEN 10 MG: 10 TABLET ORAL at 20:17

## 2020-12-13 RX ADMIN — VENLAFAXINE 75 MG: 37.5 TABLET ORAL at 18:36

## 2020-12-13 RX ADMIN — FAMOTIDINE 20 MG: 20 TABLET, FILM COATED ORAL at 08:46

## 2020-12-13 RX ADMIN — HEPARIN SODIUM 5000 UNITS: 5000 INJECTION INTRAVENOUS; SUBCUTANEOUS at 13:17

## 2020-12-13 RX ADMIN — SUCRALFATE 1 G: 1 TABLET ORAL at 08:48

## 2020-12-13 RX ADMIN — SUCRALFATE 1 G: 1 TABLET ORAL at 11:17

## 2020-12-13 RX ADMIN — FAMOTIDINE 20 MG: 20 TABLET, FILM COATED ORAL at 17:48

## 2020-12-13 RX ADMIN — DOCUSATE SODIUM 100 MG: 100 CAPSULE, LIQUID FILLED ORAL at 20:16

## 2020-12-13 RX ADMIN — ENALAPRILAT 1.25 MG: 1.25 INJECTION INTRAVENOUS at 20:16

## 2020-12-13 RX ADMIN — HEPARIN SODIUM 5000 UNITS: 5000 INJECTION INTRAVENOUS; SUBCUTANEOUS at 05:37

## 2020-12-13 RX ADMIN — BISACODYL 10 MG: 10 SUPPOSITORY RECTAL at 01:16

## 2020-12-13 RX ADMIN — POTASSIUM CHLORIDE 40 MEQ: 10 CAPSULE, COATED, EXTENDED RELEASE ORAL at 01:14

## 2020-12-13 RX ADMIN — POTASSIUM PHOSPHATE, MONOBASIC AND POTASSIUM PHOSPHATE, DIBASIC 15 MMOL: 224; 236 INJECTION, SOLUTION, CONCENTRATE INTRAVENOUS at 01:42

## 2020-12-13 RX ADMIN — DOCUSATE SODIUM 100 MG: 100 CAPSULE, LIQUID FILLED ORAL at 08:46

## 2020-12-13 RX ADMIN — BACLOFEN 10 MG: 10 TABLET ORAL at 13:25

## 2020-12-13 RX ADMIN — POTASSIUM CHLORIDE 40 MEQ: 10 CAPSULE, COATED, EXTENDED RELEASE ORAL at 05:24

## 2020-12-13 RX ADMIN — SUCRALFATE 1 G: 1 TABLET ORAL at 17:48

## 2020-12-13 RX ADMIN — POTASSIUM CHLORIDE 40 MEQ: 1.5 FOR SOLUTION ORAL at 08:54

## 2020-12-13 RX ADMIN — SODIUM PHOSPHATE, MONOBASIC, MONOHYDRATE AND SODIUM PHOSPHATE, DIBASIC, ANHYDROUS 15 MMOL: 276; 142 INJECTION, SOLUTION INTRAVENOUS at 13:18

## 2020-12-13 RX ADMIN — TRAZODONE HYDROCHLORIDE 50 MG: 50 TABLET ORAL at 20:15

## 2020-12-13 RX ADMIN — BISACODYL 10 MG: 10 SUPPOSITORY RECTAL at 17:48

## 2020-12-13 RX ADMIN — OXYCODONE HYDROCHLORIDE AND ACETAMINOPHEN 2 TABLET: 5; 325 TABLET ORAL at 17:48

## 2020-12-13 RX ADMIN — OXYCODONE HYDROCHLORIDE AND ACETAMINOPHEN 2 TABLET: 5; 325 TABLET ORAL at 05:22

## 2020-12-13 RX ADMIN — POTASSIUM CHLORIDE, DEXTROSE MONOHYDRATE AND SODIUM CHLORIDE 50 ML/HR: 150; 5; 450 INJECTION, SOLUTION INTRAVENOUS at 01:34

## 2020-12-13 RX ADMIN — VENLAFAXINE 75 MG: 37.5 TABLET ORAL at 08:45

## 2020-12-13 RX ADMIN — LEVETIRACETAM 500 MG: 500 TABLET, FILM COATED ORAL at 13:17

## 2020-12-13 RX ADMIN — HYDROCHLOROTHIAZIDE 25 MG: 25 TABLET ORAL at 08:46

## 2020-12-13 RX ADMIN — LEVETIRACETAM 1000 MG: 500 TABLET, FILM COATED ORAL at 20:16

## 2020-12-13 RX ADMIN — OXYCODONE HYDROCHLORIDE AND ACETAMINOPHEN 2 TABLET: 5; 325 TABLET ORAL at 13:25

## 2020-12-13 NOTE — PLAN OF CARE
Problem: Adult Inpatient Plan of Care  Goal: Plan of Care Review  Outcome: Ongoing, Progressing  Flowsheets (Taken 12/13/2020 1416)  Progress: improving  Plan of Care Reviewed With: patient  Outcome Summary: HR remains tachy. Bm x1. Potassium recheck is 3. K replaced. Phos replaced. Pain meds x2. Ambulated trujillo.  Diet advanced-ate half of a hamburger and tolerated it well.

## 2020-12-13 NOTE — PROGRESS NOTES
"Patient Name:  Jamison Edward  YOB: 1970  [id number]    Surgery Progress Note    Date of visit: 12/13/2020    Subjective   Subjective: He is feeling much better this morning.  He is tolerated hamburger last night and is having bowel function.  His pain is well controlled on oral pain medications.         Objective     Objective:     /79 (BP Location: Right arm, Patient Position: Lying)   Pulse 103   Temp 97.6 °F (36.4 °C) (Oral)   Resp 20   Ht 167.6 cm (65.98\")   Wt 72.7 kg (160 lb 4.4 oz)   SpO2 93%   BMI 25.88 kg/m²     Intake/Output Summary (Last 24 hours) at 12/13/2020 0849  Last data filed at 12/13/2020 0601  Gross per 24 hour   Intake 2299 ml   Output 750 ml   Net 1549 ml       CV:  Rhythm  regular and rate regular   L:  Clear  to auscultation bilaterally   Abd:  Bowel sounds positive , soft, appropriately tender, dressings clean dry and intact  Ext:  No cyanosis, clubbing, edema    Recent labs that are back at this time have been reviewed.        Assessment/Plan     Assessment/ Plan:    Hospital Problem List     * (Principal) Incisional hernia, without obstruction or gangrene- Much improved.  We will continue to replace his electrolytes and Hep-Lock his IV.  We will hopefully plan for discharge tomorrow morning.  The patient may shower.      Gastroesophageal reflux disease    Overview Signed 1/24/2018  6:05 AM by Rupa Cook PA-C     Added automatically from request for surgery [id number]         Dystonia    Incisional hernia              Herbert Umanzor MD  12/13/2020  08:49 EST      "

## 2020-12-14 VITALS
DIASTOLIC BLOOD PRESSURE: 81 MMHG | SYSTOLIC BLOOD PRESSURE: 127 MMHG | TEMPERATURE: 99 F | HEIGHT: 66 IN | BODY MASS INDEX: 25.76 KG/M2 | HEART RATE: 119 BPM | RESPIRATION RATE: 16 BRPM | WEIGHT: 160.27 LBS | OXYGEN SATURATION: 94 %

## 2020-12-14 LAB
DEPRECATED RDW RBC AUTO: 41.6 FL (ref 37–54)
ERYTHROCYTE [DISTWIDTH] IN BLOOD BY AUTOMATED COUNT: 11.2 % (ref 12.3–15.4)
HCT VFR BLD AUTO: 42.4 % (ref 37.5–51)
HGB BLD-MCNC: 14.5 G/DL (ref 13–17.7)
MCH RBC QN AUTO: 34.3 PG (ref 26.6–33)
MCHC RBC AUTO-ENTMCNC: 34.2 G/DL (ref 31.5–35.7)
MCV RBC AUTO: 100.2 FL (ref 79–97)
PLATELET # BLD AUTO: 254 10*3/MM3 (ref 140–450)
PMV BLD AUTO: 9.6 FL (ref 6–12)
RBC # BLD AUTO: 4.23 10*6/MM3 (ref 4.14–5.8)
WBC # BLD AUTO: 8.48 10*3/MM3 (ref 3.4–10.8)

## 2020-12-14 PROCEDURE — 63710000001 FAMOTIDINE 20 MG TABLET: Performed by: SURGERY

## 2020-12-14 PROCEDURE — A9270 NON-COVERED ITEM OR SERVICE: HCPCS | Performed by: SURGERY

## 2020-12-14 PROCEDURE — G0378 HOSPITAL OBSERVATION PER HR: HCPCS

## 2020-12-14 PROCEDURE — 85027 COMPLETE CBC AUTOMATED: CPT | Performed by: SURGERY

## 2020-12-14 PROCEDURE — 63710000001 VENLAFAXINE 37.5 MG TABLET: Performed by: SURGERY

## 2020-12-14 PROCEDURE — 63710000001 SUCRALFATE 1 G TABLET: Performed by: SURGERY

## 2020-12-14 PROCEDURE — 63710000001 BISACODYL 10 MG SUPPOSITORY: Performed by: SURGERY

## 2020-12-14 PROCEDURE — 63710000001 LEVETIRACETAM 500 MG TABLET: Performed by: SURGERY

## 2020-12-14 PROCEDURE — 25010000002 HEPARIN (PORCINE) PER 1000 UNITS: Performed by: SURGERY

## 2020-12-14 PROCEDURE — 63710000001 OXYCODONE-ACETAMINOPHEN 5-325 MG TABLET: Performed by: SURGERY

## 2020-12-14 PROCEDURE — 63710000001 HYDROCHLOROTHIAZIDE 25 MG TABLET: Performed by: SURGERY

## 2020-12-14 PROCEDURE — 63710000001 DOCUSATE SODIUM 100 MG CAPSULE: Performed by: SURGERY

## 2020-12-14 RX ORDER — DOCUSATE SODIUM 100 MG/1
100 CAPSULE, LIQUID FILLED ORAL 2 TIMES DAILY
Qty: 20 CAPSULE | Refills: 0 | Status: SHIPPED | OUTPATIENT
Start: 2020-12-14 | End: 2021-05-25

## 2020-12-14 RX ORDER — OXYCODONE HYDROCHLORIDE AND ACETAMINOPHEN 5; 325 MG/1; MG/1
2 TABLET ORAL EVERY 4 HOURS PRN
Qty: 17 TABLET | Refills: 0 | Status: SHIPPED | OUTPATIENT
Start: 2020-12-14 | End: 2021-05-25

## 2020-12-14 RX ADMIN — HYDROCHLOROTHIAZIDE 25 MG: 25 TABLET ORAL at 08:45

## 2020-12-14 RX ADMIN — BISACODYL 10 MG: 10 SUPPOSITORY RECTAL at 00:53

## 2020-12-14 RX ADMIN — SUCRALFATE 1 G: 1 TABLET ORAL at 08:45

## 2020-12-14 RX ADMIN — FAMOTIDINE 20 MG: 20 TABLET, FILM COATED ORAL at 08:45

## 2020-12-14 RX ADMIN — VENLAFAXINE 75 MG: 37.5 TABLET ORAL at 08:45

## 2020-12-14 RX ADMIN — OXYCODONE HYDROCHLORIDE AND ACETAMINOPHEN 2 TABLET: 5; 325 TABLET ORAL at 05:48

## 2020-12-14 RX ADMIN — LEVETIRACETAM 500 MG: 500 TABLET, FILM COATED ORAL at 08:45

## 2020-12-14 RX ADMIN — DOCUSATE SODIUM 100 MG: 100 CAPSULE, LIQUID FILLED ORAL at 08:45

## 2020-12-14 RX ADMIN — HEPARIN SODIUM 5000 UNITS: 5000 INJECTION INTRAVENOUS; SUBCUTANEOUS at 05:48

## 2020-12-14 NOTE — PLAN OF CARE
Goal Outcome Evaluation:  Plan of Care Reviewed With: patient  Progress: improving  Outcome Summary: Patient to be discharged, all instructions reviewed.

## 2020-12-14 NOTE — PLAN OF CARE
Goal Outcome Evaluation:  Plan of Care Reviewed With: patient  Progress: improving  Outcome Summary: pt having bowel movements, ambulating, tolerating diet, rested well

## 2020-12-14 NOTE — PROGRESS NOTES
"Patient Name:  Jamison Edward  YOB: 1970  [id number]    Surgery Progress Note    Date of visit: 12/14/2020    Subjective   Subjective: Feeling better.  Tolerating diet, having bowel movements.  Pain controlled.         Objective     Objective:     /81 (BP Location: Right arm, Patient Position: Lying)   Pulse 106   Temp 98.3 °F (36.8 °C) (Oral)   Resp 16   Ht 167.6 cm (65.98\")   Wt 72.7 kg (160 lb 4.4 oz)   SpO2 94%   BMI 25.88 kg/m²     Intake/Output Summary (Last 24 hours) at 12/14/2020 0634  Last data filed at 12/13/2020 1317  Gross per 24 hour   Intake 610 ml   Output --   Net 610 ml       CV:  Rhythm  regular and rate regular   L:  Clear  to auscultation bilaterally   Abd:  Bowel sounds positive , soft, minimally tender.  Incisions clean dry and intact.  Ext:  No cyanosis, clubbing, edema    Recent labs that are back at this time have been reviewed.        Assessment/Plan     Assessment/ Plan:    Hospital Problem List     * (Principal) Incisional hernia, without obstruction or gangrene- Doing well after Lap repair. D/C home. RTC with me in 2 weeks. No lifting > 30 lbs until RTC.  May wear abdominal binder when up and about      Gastroesophageal reflux disease    Overview Signed 1/24/2018  6:05 AM by Rupa Cook PA-C     Added automatically from request for surgery [id number]         Dystonia    Incisional hernia              Herbert Umanzor MD  12/14/2020  06:34 EST      "

## 2020-12-14 NOTE — DISCHARGE SUMMARY
Discharge Summary    Patient Name:  Jamison Edward  YOB: 1970  [id number]      DATE OF ADMISSION: 12/10/2020    DATE OF DISCHARGE: 12/14/2020       FINAL DIAGNOSES:   Patient Active Problem List   Diagnosis   • Chest pain   • Weight loss, abnormal   • Right upper quadrant abdominal pain   • Epigastric pain   • History of bleeding ulcers   • Nausea   • Malaise and fatigue   • Acute gastric ulcer without hemorrhage or perforation   • Poor appetite   • Duodenal ulcer   • Gastroesophageal reflux disease   • Dysphagia   • Iron deficiency anemia   • Malabsorption   • Gastric ulcer without hemorrhage or perforation   • Dystonia   • Peptic ulcer without hemorrhage or perforation   • Gastric outlet obstruction   • Incisional hernia, without obstruction or gangrene   • Incisional hernia   - Hypokalemia  - Hypophosphatemia    CONSULTING SERVICES: None      PROCEDURES PERFORMED:   1.  Laparoscopic incisional hernia pair with mesh performed on 12/10/2020.    HISTORY: The patient is a very pleasant 50 y.o. male with a history of an incisional hernia after previous antrectomy.  The risk and benefits of laparoscopic repair were discussed at length with the patient and his family, and they agreed to proceed.      HOSPITAL COURSE: Patient came in as an outpatient, underwent his operative procedure, was transferred to floor.  Postoperatively he did well.  His pain was initially controlled on IV PCA and transition to oral pain medication.  He initially had some issues with hypokalemia that was treated with replacement.  Electrolyte abnormalities were corrected.  He initially had some nausea but this resolved with time, and he had return of bowel function.  He continued to improve, and was ready for discharge home on 12/14/2020.     CONDITION: At the time of discharge, the patient is tolerating a regular diet without difficulty. he is having normal bowel and bladder function. his incisions are clean, dry and intact.   His laboratory exam is normalizing.     DISCHARGE MEDICATIONS:   1. All previous home medications.   2. Percocet  5 - 10 mg PO  Q4h  PRN pain  3. Colace 100mg PO BID       DISCHARGE INSTRUCTIONS:  1. The patient is instructed to follow up with Dr. Umanzor in 2 weeks’ time.  2. he is instructed to refrain from lifting more than 15 or 20 pounds until their return to clinic.   3. If the patient has worsening problems with fever or chills nausea or vomiting he is to contact Dr. Umanzor's office and a contact card has been provided.  4.  He is to wear his abdominal binder when up and about.      Herbert Umanzor MD  12/14/2020  06:36 EST

## 2021-03-18 ENCOUNTER — BULK ORDERING (OUTPATIENT)
Dept: CASE MANAGEMENT | Facility: OTHER | Age: 51
End: 2021-03-18

## 2021-03-18 DIAGNOSIS — Z23 IMMUNIZATION DUE: ICD-10-CM

## 2021-05-12 ENCOUNTER — APPOINTMENT (OUTPATIENT)
Dept: GENERAL RADIOLOGY | Facility: HOSPITAL | Age: 51
End: 2021-05-12

## 2021-05-12 ENCOUNTER — APPOINTMENT (OUTPATIENT)
Dept: CT IMAGING | Facility: HOSPITAL | Age: 51
End: 2021-05-12

## 2021-05-12 ENCOUNTER — HOSPITAL ENCOUNTER (EMERGENCY)
Facility: HOSPITAL | Age: 51
Discharge: HOME | End: 2021-05-12
Attending: STUDENT IN AN ORGANIZED HEALTH CARE EDUCATION/TRAINING PROGRAM | Admitting: STUDENT IN AN ORGANIZED HEALTH CARE EDUCATION/TRAINING PROGRAM

## 2021-05-12 VITALS
DIASTOLIC BLOOD PRESSURE: 90 MMHG | RESPIRATION RATE: 18 BRPM | BODY MASS INDEX: 24.59 KG/M2 | SYSTOLIC BLOOD PRESSURE: 147 MMHG | TEMPERATURE: 98 F | HEIGHT: 66 IN | OXYGEN SATURATION: 98 % | WEIGHT: 153 LBS | HEART RATE: 84 BPM

## 2021-05-12 DIAGNOSIS — R07.9 CHEST PAIN, UNSPECIFIED TYPE: Primary | ICD-10-CM

## 2021-05-12 DIAGNOSIS — K22.719 BARRETT'S ESOPHAGUS WITH DYSPLASIA: ICD-10-CM

## 2021-05-12 DIAGNOSIS — K22.4 ESOPHAGEAL SPASM: ICD-10-CM

## 2021-05-12 LAB
ALBUMIN SERPL-MCNC: 4.23 G/DL (ref 3.5–5.2)
ALBUMIN/GLOB SERPL: 1.5 G/DL
ALP SERPL-CCNC: 174 U/L (ref 39–117)
ALT SERPL W P-5'-P-CCNC: 57 U/L (ref 1–41)
ANION GAP SERPL CALCULATED.3IONS-SCNC: 15.5 MMOL/L (ref 5–15)
APTT PPP: 24.8 SECONDS (ref 25.6–35.3)
AST SERPL-CCNC: 75 U/L (ref 1–40)
BASOPHILS # BLD AUTO: 0.03 10*3/MM3 (ref 0–0.2)
BASOPHILS NFR BLD AUTO: 0.4 % (ref 0–1.5)
BILIRUB SERPL-MCNC: 0.7 MG/DL (ref 0–1.2)
BUN SERPL-MCNC: 6 MG/DL (ref 6–20)
BUN/CREAT SERPL: 7.3 (ref 7–25)
CALCIUM SPEC-SCNC: 9.5 MG/DL (ref 8.6–10.5)
CHLORIDE SERPL-SCNC: 95 MMOL/L (ref 98–107)
CO2 SERPL-SCNC: 27.5 MMOL/L (ref 22–29)
CREAT SERPL-MCNC: 0.82 MG/DL (ref 0.76–1.27)
DEPRECATED RDW RBC AUTO: 44.3 FL (ref 37–54)
EOSINOPHIL # BLD AUTO: 0.04 10*3/MM3 (ref 0–0.4)
EOSINOPHIL NFR BLD AUTO: 0.5 % (ref 0.3–6.2)
ERYTHROCYTE [DISTWIDTH] IN BLOOD BY AUTOMATED COUNT: 11.8 % (ref 12.3–15.4)
FLUAV RNA RESP QL NAA+PROBE: NOT DETECTED
FLUBV RNA RESP QL NAA+PROBE: NOT DETECTED
GFR SERPL CREATININE-BSD FRML MDRD: 99 ML/MIN/1.73
GLOBULIN UR ELPH-MCNC: 2.8 GM/DL
GLUCOSE SERPL-MCNC: 128 MG/DL (ref 65–99)
HCT VFR BLD AUTO: 46.4 % (ref 37.5–51)
HGB BLD-MCNC: 16.3 G/DL (ref 13–17.7)
IMM GRANULOCYTES # BLD AUTO: 0.07 10*3/MM3 (ref 0–0.05)
IMM GRANULOCYTES NFR BLD AUTO: 0.9 % (ref 0–0.5)
INR PPP: 0.8 (ref 0.9–1.1)
LYMPHOCYTES # BLD AUTO: 0.73 10*3/MM3 (ref 0.7–3.1)
LYMPHOCYTES NFR BLD AUTO: 9.2 % (ref 19.6–45.3)
MAGNESIUM SERPL-MCNC: 1.6 MG/DL (ref 1.6–2.6)
MCH RBC QN AUTO: 35.5 PG (ref 26.6–33)
MCHC RBC AUTO-ENTMCNC: 35.1 G/DL (ref 31.5–35.7)
MCV RBC AUTO: 101.1 FL (ref 79–97)
MONOCYTES # BLD AUTO: 0.78 10*3/MM3 (ref 0.1–0.9)
MONOCYTES NFR BLD AUTO: 9.8 % (ref 5–12)
NEUTROPHILS NFR BLD AUTO: 6.27 10*3/MM3 (ref 1.7–7)
NEUTROPHILS NFR BLD AUTO: 79.2 % (ref 42.7–76)
NRBC BLD AUTO-RTO: 0 /100 WBC (ref 0–0.2)
NT-PROBNP SERPL-MCNC: 52.6 PG/ML (ref 0–900)
PHOSPHATE SERPL-MCNC: 3.5 MG/DL (ref 2.5–4.5)
PLATELET # BLD AUTO: 226 10*3/MM3 (ref 140–450)
PMV BLD AUTO: 9.7 FL (ref 6–12)
POTASSIUM SERPL-SCNC: 4 MMOL/L (ref 3.5–5.2)
PROT SERPL-MCNC: 7 G/DL (ref 6–8.5)
PROTHROMBIN TIME: 10.9 SECONDS (ref 11.9–14.1)
QT INTERVAL: 290 MS
QT INTERVAL: 332 MS
QTC INTERVAL: 410 MS
QTC INTERVAL: 426 MS
RBC # BLD AUTO: 4.59 10*6/MM3 (ref 4.14–5.8)
SARS-COV-2 RNA RESP QL NAA+PROBE: NOT DETECTED
SODIUM SERPL-SCNC: 138 MMOL/L (ref 136–145)
TROPONIN T SERPL-MCNC: <0.01 NG/ML (ref 0–0.03)
TROPONIN T SERPL-MCNC: <0.01 NG/ML (ref 0–0.03)
TSH SERPL DL<=0.05 MIU/L-ACNC: 1.12 UIU/ML (ref 0.27–4.2)
WBC # BLD AUTO: 7.92 10*3/MM3 (ref 3.4–10.8)

## 2021-05-12 PROCEDURE — 87636 SARSCOV2 & INF A&B AMP PRB: CPT | Performed by: STUDENT IN AN ORGANIZED HEALTH CARE EDUCATION/TRAINING PROGRAM

## 2021-05-12 PROCEDURE — 84443 ASSAY THYROID STIM HORMONE: CPT | Performed by: STUDENT IN AN ORGANIZED HEALTH CARE EDUCATION/TRAINING PROGRAM

## 2021-05-12 PROCEDURE — 71045 X-RAY EXAM CHEST 1 VIEW: CPT | Performed by: RADIOLOGY

## 2021-05-12 PROCEDURE — 99284 EMERGENCY DEPT VISIT MOD MDM: CPT

## 2021-05-12 PROCEDURE — 96374 THER/PROPH/DIAG INJ IV PUSH: CPT

## 2021-05-12 PROCEDURE — 83735 ASSAY OF MAGNESIUM: CPT | Performed by: STUDENT IN AN ORGANIZED HEALTH CARE EDUCATION/TRAINING PROGRAM

## 2021-05-12 PROCEDURE — 83880 ASSAY OF NATRIURETIC PEPTIDE: CPT | Performed by: STUDENT IN AN ORGANIZED HEALTH CARE EDUCATION/TRAINING PROGRAM

## 2021-05-12 PROCEDURE — 74176 CT ABD & PELVIS W/O CONTRAST: CPT | Performed by: RADIOLOGY

## 2021-05-12 PROCEDURE — 71045 X-RAY EXAM CHEST 1 VIEW: CPT

## 2021-05-12 PROCEDURE — 93005 ELECTROCARDIOGRAM TRACING: CPT | Performed by: STUDENT IN AN ORGANIZED HEALTH CARE EDUCATION/TRAINING PROGRAM

## 2021-05-12 PROCEDURE — 85730 THROMBOPLASTIN TIME PARTIAL: CPT | Performed by: STUDENT IN AN ORGANIZED HEALTH CARE EDUCATION/TRAINING PROGRAM

## 2021-05-12 PROCEDURE — 84484 ASSAY OF TROPONIN QUANT: CPT | Performed by: STUDENT IN AN ORGANIZED HEALTH CARE EDUCATION/TRAINING PROGRAM

## 2021-05-12 PROCEDURE — 74176 CT ABD & PELVIS W/O CONTRAST: CPT

## 2021-05-12 PROCEDURE — 93010 ELECTROCARDIOGRAM REPORT: CPT | Performed by: INTERNAL MEDICINE

## 2021-05-12 PROCEDURE — 80053 COMPREHEN METABOLIC PANEL: CPT | Performed by: STUDENT IN AN ORGANIZED HEALTH CARE EDUCATION/TRAINING PROGRAM

## 2021-05-12 PROCEDURE — 84100 ASSAY OF PHOSPHORUS: CPT | Performed by: STUDENT IN AN ORGANIZED HEALTH CARE EDUCATION/TRAINING PROGRAM

## 2021-05-12 PROCEDURE — 85025 COMPLETE CBC W/AUTO DIFF WBC: CPT | Performed by: STUDENT IN AN ORGANIZED HEALTH CARE EDUCATION/TRAINING PROGRAM

## 2021-05-12 PROCEDURE — 85610 PROTHROMBIN TIME: CPT | Performed by: STUDENT IN AN ORGANIZED HEALTH CARE EDUCATION/TRAINING PROGRAM

## 2021-05-12 RX ORDER — FAMOTIDINE 10 MG/ML
40 INJECTION, SOLUTION INTRAVENOUS ONCE
Status: COMPLETED | OUTPATIENT
Start: 2021-05-12 | End: 2021-05-12

## 2021-05-12 RX ORDER — ASPIRIN 81 MG/1
324 TABLET, CHEWABLE ORAL ONCE
Status: DISCONTINUED | OUTPATIENT
Start: 2021-05-12 | End: 2021-05-12

## 2021-05-12 RX ORDER — SODIUM CHLORIDE 0.9 % (FLUSH) 0.9 %
10 SYRINGE (ML) INJECTION AS NEEDED
Status: DISCONTINUED | OUTPATIENT
Start: 2021-05-12 | End: 2021-05-12 | Stop reason: HOSPADM

## 2021-05-12 RX ORDER — ETOMIDATE 2 MG/ML
30 INJECTION INTRAVENOUS ONCE
Status: DISCONTINUED | OUTPATIENT
Start: 2021-05-12 | End: 2021-05-12

## 2021-05-12 RX ADMIN — FAMOTIDINE 40 MG: 10 INJECTION INTRAVENOUS at 12:34

## 2021-05-13 ENCOUNTER — EPISODE CHANGES (OUTPATIENT)
Dept: CASE MANAGEMENT | Facility: OTHER | Age: 51
End: 2021-05-13

## 2021-05-19 ENCOUNTER — OFFICE VISIT (OUTPATIENT)
Dept: GASTROENTEROLOGY | Facility: CLINIC | Age: 51
End: 2021-05-19

## 2021-05-19 VITALS
WEIGHT: 153.6 LBS | SYSTOLIC BLOOD PRESSURE: 128 MMHG | TEMPERATURE: 97.1 F | HEIGHT: 66 IN | HEART RATE: 121 BPM | OXYGEN SATURATION: 96 % | DIASTOLIC BLOOD PRESSURE: 86 MMHG | BODY MASS INDEX: 24.68 KG/M2

## 2021-05-19 DIAGNOSIS — R07.89 ATYPICAL CHEST PAIN: Primary | ICD-10-CM

## 2021-05-19 DIAGNOSIS — R11.0 NAUSEA: ICD-10-CM

## 2021-05-19 PROCEDURE — 99214 OFFICE O/P EST MOD 30 MIN: CPT | Performed by: INTERNAL MEDICINE

## 2021-05-19 RX ORDER — SUCRALFATE 1 G/1
1 TABLET ORAL 4 TIMES DAILY
COMMUNITY
End: 2021-06-02 | Stop reason: HOSPADM

## 2021-05-19 NOTE — PROGRESS NOTES
Subjective   Jamison Edward is a 50 y.o. male who presents to the office today as a follow up appointment regarding Abdominal Pain and Johnson's esophagus      History of Present Illness:  The patient states he was seen in the ER last Wednesday for chest pain.  He states the pain was sharp and went down both arms.  He was told it was his esophagus and not his heart.  He has a history of GBS due to PUD and GOO. This was performed by Dr. Herbert Umanzor  in [facility] in Sept 2019.  He did well until last year at which time he developed a hernia.  In Dec 2020, he underwent lap  Incisional hernia repair.  He did well up until last Wednesday.  The pain hit suddenly.  He does have heartburn and reflux.  He takes Protonix and Carafate.  He still has pain in his chest.  No weight loss.  His appetite is good.  He admits to nausea but no vomiting. He has had a CCY.   A CT scan on May 12, 2021 revealed stable post surgical changes from gastrectomy and gastrojejunostomy, prior appendectomy and cholecystectomy, diffuse fatty infiltration of liver, mild constipation and no bowel obstruction. He underwent a screening colonoscopy in May 2017.  It was recommended at that time he undergo a 10 yr repeat.      Review of Systems:  Review of Systems   Constitutional: Negative.    HENT: Positive for sore throat and trouble swallowing.    Eyes: Negative.    Respiratory: Negative for chest tightness.    Cardiovascular: Negative for chest pain.   Gastrointestinal: Positive for nausea. Negative for abdominal distention, abdominal pain, anal bleeding, blood in stool, constipation, diarrhea, rectal pain and vomiting.   Endocrine: Negative.    Genitourinary: Negative for difficulty urinating.   Musculoskeletal: Positive for back pain and neck pain.   Skin: Negative.    Allergic/Immunologic: Negative for environmental allergies and food allergies.   Neurological: Positive for headaches. Negative for dizziness and seizures.    Hematological: Does not bruise/bleed easily.   Psychiatric/Behavioral: Positive for sleep disturbance.       Past Medical History:  Past Medical History:   Diagnosis Date   • Johnson esophagus    • COPD (chronic obstructive pulmonary disease) (CMS/HCC)    • DDD (degenerative disc disease), thoracic    • Degenerative disc disease, lumbar    • Dystonia    • GERD (gastroesophageal reflux disease)    • Hypertension    • Migraines    • Peptic ulcer disease    • TIA (transient ischemic attack)    • Wears dentures     upper only       Past Surgical History:  Past Surgical History:   Procedure Laterality Date   • APPENDECTOMY     • CHOLECYSTECTOMY N/A 4/22/2017    Procedure: CHOLECYSTECTOMY LAPAROSCOPIC;  Surgeon: Jaqueline Guaman MD;  Location:  [facility];  Service:    • COLONOSCOPY N/A 5/8/2017    Procedure: COLONOSCOPY  CPTCODE:19696;  Surgeon: Nicho Richey III, MD;  Location:  [facility];  Service:    • ENDOSCOPY     • ENDOSCOPY N/A 5/8/2017    Procedure: ESOPHAGOGASTRODUODENOSCOPY WITH BIOPSY  CPTCODE:61383;  Surgeon: Nicho Richey III, MD;  Location:  [facility];  Service:    • ENDOSCOPY N/A 11/3/2017    Procedure: ESOPHAGOGASTRODUODENOSCOPY WITH BIOPSY  CPTCODE: 88434;  Surgeon: Nicho Richey III, MD;  Location:  [facility];  Service:    • ENDOSCOPY N/A 2/20/2018    Procedure: ESOPHAGOGASTRODUODENOSCOPY WITH DILATATION CPT CODE: 50105;  Surgeon: Nicho Richey III, MD;  Location:  [facility];  Service:    • ENDOSCOPY N/A 6/5/2018    Procedure: ESOPHAGOGASTRODUODENOSCOPY WITH BIOPSY CPT CODE: 49062;  Surgeon: Nicho Richey III, MD;  Location:  [facility];  Service: Gastroenterology   • GASTRECTOMY N/A 9/17/2019    Procedure: OPEN VAGOTOMY, ANTRECTOMY,REVISION- Y GASTROJEJUNOSOTOMY;  Surgeon: Herbert Umanzor MD;  Location:  [facility];  Service: General   • UPPER GASTROINTESTINAL ENDOSCOPY     • VENTRAL/INCISIONAL HERNIA REPAIR N/A 12/10/2020    Procedure: INCISIONAL HERNIA REPAIR LAPAROSCOPIC  WITH MESH;  Surgeon: Herbert Umanzor MD;  Location: [facility];  Service: General;  Laterality: N/A;       Family History:  Family History   Problem Relation Age of Onset   • No Known Problems Mother    • Hypertension Father    • No Known Problems Sister    • No Known Problems Brother    • Drug abuse Brother    • No Known Problems Daughter        Social History:  Social History     Socioeconomic History   • Marital status:      Spouse name: Not on file   • Number of children: Not on file   • Years of education: Not on file   • Highest education level: Not on file   Tobacco Use   • Smoking status: Current Every Day Smoker     Packs/day: 1.00     Years: 35.00     Pack years: 35.00     Types: Cigarettes     Last attempt to quit: 2020     Years since quittin.7   • Smokeless tobacco: Never Used   Vaping Use   • Vaping Use: Never used   Substance and Sexual Activity   • Alcohol use: No   • Drug use: No   • Sexual activity: Defer       Current Medication List:    Current Outpatient Medications:   •  baclofen (LIORESAL) 20 MG tablet, Take 10-20 mg by mouth 3 (Three) Times a Day As Needed., Disp: , Rfl:   •  esomeprazole (nexIUM) 40 MG capsule, Take 1 capsule by mouth 2 (Two) Times a Day Before Meals. (Patient taking differently: Take 40 mg by mouth Daily.), Disp: 60 capsule, Rfl: 3  •  hydrochlorothiazide (HYDRODIURIL) 25 MG tablet, Take 25 mg by mouth Daily., Disp: , Rfl:   •  levETIRAcetam (KEPPRA) 500 MG tablet, Take 1,000 mg by mouth Every Night., Disp: , Rfl:   •  levETIRAcetam (KEPPRA) 750 MG tablet, Take 750 mg by mouth 3 (Three) Times a Day. Patient takes one tablet in the morning and afternoon and takes two tablets at bedtime., Disp: , Rfl:   •  sucralfate (CARAFATE) 1 g tablet, Take 1 g by mouth 4 (Four) Times a Day., Disp: , Rfl:   •  traZODone (DESYREL) 50 MG tablet, Take 1 tablet by mouth Every Night. (Patient taking differently: Take 100 mg by mouth Every Night.), Disp: 30 tablet, Rfl: 2  •   "venlafaxine (EFFEXOR) 75 MG tablet, Take 75 mg by mouth every night at bedtime., Disp: , Rfl:   •  docusate sodium (COLACE) 100 MG capsule, Take 1 capsule by mouth 2 (Two) Times a Day., Disp: 20 capsule, Rfl: 0  •  oxyCODONE-acetaminophen (PERCOCET) 5-325 MG per tablet, Take 2 tablets by mouth Every 4 (Four) Hours As Needed for Severe Pain ., Disp: 17 tablet, Rfl: 0    Allergies:   Contrast dye, Protonix [pantoprazole sodium], Prilosec [omeprazole], and Flagyl [metronidazole]    Vitals:  /86   Pulse (!) 121   Temp 97.1 °F (36.2 °C)   Ht 167.6 cm (66\")   Wt 69.7 kg (153 lb 9.6 oz)   SpO2 96%   BMI 24.79 kg/m²     Physical Exam:  Physical Exam  Constitutional:       Appearance: He is normal weight.   HENT:      Head: Normocephalic and atraumatic.      Nose: Nose normal. No congestion or rhinorrhea.   Eyes:      General: No scleral icterus.     Extraocular Movements: Extraocular movements intact.      Conjunctiva/sclera: Conjunctivae normal.      Pupils: Pupils are equal, round, and reactive to light.   Cardiovascular:      Rate and Rhythm: Normal rate and regular rhythm.      Pulses: Normal pulses.      Heart sounds: Normal heart sounds.   Pulmonary:      Effort: Pulmonary effort is normal.      Breath sounds: Normal breath sounds.   Abdominal:      General: Abdomen is flat. Bowel sounds are normal. There is no distension.      Palpations: Abdomen is soft. There is no shifting dullness, fluid wave, hepatomegaly, splenomegaly, mass or pulsatile mass.      Tenderness: There is no abdominal tenderness. There is no guarding or rebound.      Hernia: No hernia is present.   Musculoskeletal:         General: No swelling or tenderness.      Cervical back: Normal range of motion and neck supple.   Skin:     General: Skin is warm and dry.      Coloration: Skin is not jaundiced.   Neurological:      General: No focal deficit present.      Mental Status: He is alert and oriented to person, place, and time. "   Psychiatric:         Mood and Affect: Mood normal.         Behavior: Behavior normal.         Results Review:  Lab Results:   Admission on 05/12/2021, Discharged on 05/12/2021   Component Date Value Ref Range Status   • QT Interval 05/12/2021 290  ms Final   • QTC Interval 05/12/2021 426  ms Final   • Glucose 05/12/2021 128* 65 - 99 mg/dL Final   • BUN 05/12/2021 6  6 - 20 mg/dL Final   • Creatinine 05/12/2021 0.82  0.76 - 1.27 mg/dL Final   • Sodium 05/12/2021 138  136 - 145 mmol/L Final   • Potassium 05/12/2021 4.0  3.5 - 5.2 mmol/L Final   • Chloride 05/12/2021 95* 98 - 107 mmol/L Final   • CO2 05/12/2021 27.5  22.0 - 29.0 mmol/L Final   • Calcium 05/12/2021 9.5  8.6 - 10.5 mg/dL Final   • Total Protein 05/12/2021 7.0  6.0 - 8.5 g/dL Final   • Albumin 05/12/2021 4.23  3.50 - 5.20 g/dL Final   • ALT (SGPT) 05/12/2021 57* 1 - 41 U/L Final   • AST (SGOT) 05/12/2021 75* 1 - 40 U/L Final   • Alkaline Phosphatase 05/12/2021 174* 39 - 117 U/L Final   • Total Bilirubin 05/12/2021 0.7  0.0 - 1.2 mg/dL Final   • eGFR Non African Amer 05/12/2021 99  >60 mL/min/1.73 Final   • Globulin 05/12/2021 2.8  gm/dL Final   • A/G Ratio 05/12/2021 1.5  g/dL Final   • BUN/Creatinine Ratio 05/12/2021 7.3  7.0 - 25.0 Final   • Anion Gap 05/12/2021 15.5* 5.0 - 15.0 mmol/L Final   • COVID19 05/12/2021 Not Detected  Not Detected - Ref. Range Final   • Influenza A PCR 05/12/2021 Not Detected  Not Detected Final   • Influenza B PCR 05/12/2021 Not Detected  Not Detected Final   • Protime 05/12/2021 10.9* 11.9 - 14.1 Seconds Final    Note new Reference Range   • INR 05/12/2021 0.80* 0.90 - 1.10 Final   • PTT 05/12/2021 24.8* 25.6 - 35.3 seconds Final    Note new Reference Range   • Troponin T 05/12/2021 <0.010  0.000 - 0.030 ng/mL Final   • Troponin T 05/12/2021 <0.010  0.000 - 0.030 ng/mL Final   • proBNP 05/12/2021 52.6  0.0 - 900.0 pg/mL Final   • Magnesium 05/12/2021 1.6  1.6 - 2.6 mg/dL Final   • Phosphorus 05/12/2021 3.5  2.5 - 4.5 mg/dL  Final   • TSH 05/12/2021 1.120  0.270 - 4.200 uIU/mL Final   • QT Interval 05/12/2021 332  ms Final   • QTC Interval 05/12/2021 410  ms Final   • WBC 05/12/2021 7.92  3.40 - 10.80 10*3/mm3 Final   • RBC 05/12/2021 4.59  4.14 - 5.80 10*6/mm3 Final   • Hemoglobin 05/12/2021 16.3  13.0 - 17.7 g/dL Final   • Hematocrit 05/12/2021 46.4  37.5 - 51.0 % Final   • MCV 05/12/2021 101.1* 79.0 - 97.0 fL Final   • MCH 05/12/2021 35.5* 26.6 - 33.0 pg Final   • MCHC 05/12/2021 35.1  31.5 - 35.7 g/dL Final   • RDW 05/12/2021 11.8* 12.3 - 15.4 % Final   • RDW-SD 05/12/2021 44.3  37.0 - 54.0 fl Final   • MPV 05/12/2021 9.7  6.0 - 12.0 fL Final   • Platelets 05/12/2021 226  140 - 450 10*3/mm3 Final   • Neutrophil % 05/12/2021 79.2* 42.7 - 76.0 % Final   • Lymphocyte % 05/12/2021 9.2* 19.6 - 45.3 % Final   • Monocyte % 05/12/2021 9.8  5.0 - 12.0 % Final   • Eosinophil % 05/12/2021 0.5  0.3 - 6.2 % Final   • Basophil % 05/12/2021 0.4  0.0 - 1.5 % Final   • Immature Grans % 05/12/2021 0.9* 0.0 - 0.5 % Final   • Neutrophils, Absolute 05/12/2021 6.27  1.70 - 7.00 10*3/mm3 Final   • Lymphocytes, Absolute 05/12/2021 0.73  0.70 - 3.10 10*3/mm3 Final   • Monocytes, Absolute 05/12/2021 0.78  0.10 - 0.90 10*3/mm3 Final   • Eosinophils, Absolute 05/12/2021 0.04  0.00 - 0.40 10*3/mm3 Final   • Basophils, Absolute 05/12/2021 0.03  0.00 - 0.20 10*3/mm3 Final   • Immature Grans, Absolute 05/12/2021 0.07* 0.00 - 0.05 10*3/mm3 Final   • nRBC 05/12/2021 0.0  0.0 - 0.2 /100 WBC Final       Assessment/Plan     Visit Diagnoses:    ICD-10-CM ICD-9-CM   1. Atypical chest pain  R07.89 786.59   2. Nausea  R11.0 787.02       Plan:  I will proceed with EGD due to atypical chest pain.  The patient has h/o GBS due to PUD with GOO.  He is on Protonix and Carafate with some relief but still has chest pain.  He will follow up after his EGD.    ESOPHAGOGASTRODUODENOSCOPY WITH BIOPSY (N/A)      MEDICATION ISSUES:  Discussed medication options and treatment plan of  prescribed medication as well as the risks, benefits, and side effects including potential falls, possible impaired driving and metabolic adversities among others. Patient is agreeable to call the office with any worsening of symptoms or onset of side effects. Patient is agreeable to call 911 or go to the nearest ER should he/she begin having SI/HI.     MEDS ORDERED DURING VISIT:  No orders of the defined types were placed in this encounter.      No follow-ups on file.             This document has been electronically signed by Julieta Hebert MD   May 19, 2021 15:39 EDT      Part of this note may be an electronic transcription/translation of spoken language to printed text using the Dragon Dictation System.

## 2021-05-20 DIAGNOSIS — Z01.818 PREOPERATIVE CLEARANCE: Primary | ICD-10-CM

## 2021-05-20 DIAGNOSIS — R07.89 ATYPICAL CHEST PAIN: ICD-10-CM

## 2021-05-20 DIAGNOSIS — R11.0 NAUSEA: ICD-10-CM

## 2021-05-24 ENCOUNTER — LAB (OUTPATIENT)
Dept: LAB | Facility: HOSPITAL | Age: 51
End: 2021-05-24

## 2021-05-24 DIAGNOSIS — R11.0 NAUSEA: ICD-10-CM

## 2021-05-24 DIAGNOSIS — Z01.818 PREOPERATIVE CLEARANCE: ICD-10-CM

## 2021-05-24 DIAGNOSIS — R07.89 ATYPICAL CHEST PAIN: ICD-10-CM

## 2021-05-24 LAB — SARS-COV-2 RNA NOSE QL NAA+PROBE: NOT DETECTED

## 2021-05-24 PROCEDURE — C9803 HOPD COVID-19 SPEC COLLECT: HCPCS

## 2021-05-24 PROCEDURE — U0004 COV-19 TEST NON-CDC HGH THRU: HCPCS

## 2021-05-24 PROCEDURE — U0005 INFEC AGEN DETEC AMPLI PROBE: HCPCS

## 2021-05-24 NOTE — PROGRESS NOTES
Jamison Edward  [id number]  1970  5/25/2021      Referring Provider:   Kenton Boogie MD    Primary Physician:  Lázaro Heart MD    Reason for Follow Up:   Normocytic anemia  Iron deficiency  B12 deficiency    Chief Complaint:   Iron and B12 deficiency      History of Present Illness:  Jamison Edward is a very pleasant 50 y.o.  male who presents in follow up appointment for further management and evaluation of normocytic anemia secondary to iron and B12 deficiency.    Mr. Edward has been following with Dr. Richey for history of peptic ulcer disease and hematemesis. He was placed on oral iron once daily several months ago and experienced some constipation with the medication however self discontinued this one month prior to his initial consultation. He has had gradual weight loss where he previously weighed 205 pounds two years ago and is currently down to 140 pounds. He states that he continues to have a good appetite and eat normally. He recently had an EGD in November 2017 for weight loss, hematemesis and abdominal pain which was significant for gastric and duodenal ulcers. He does have history of peptic ulcer disease and in the past and has also been followed by Dr. Starr for Johnson's esophagus. He also had an EGD in May 2017 that showed normal duodenal biopsies, negative H pylori, along with normal random colon biopsies. He also had a CT abd/pelvis performed in 11/2017 which showed possible colitis versus under distension of the right colon. He is currently on ASA 81mg daily due to a TIA in the past however denies of any other NSAID use. He also experiences chest pain every night and has been evaluated by cardiology and as per patient was felt to be non cardiac related. He underwent an EGD with Dr. Richey and was found to have gastric and duodenal ulcers and is to continue current treatment for the ulcers and was to undergo repeat endoscopy. He has also been following with  gastroenterology in [place] with Dr. Moran for further evaluation. He underwent an MRCP in August that was essentially unremarkable as well as an abdominal duplex. He had a vagotomy, antrectomy, Steven-en-Y gastrojejunostomy for peptic ulcer with gastric outlet obstruction that was performed on 9/17/2019.      Interim History:  He has not required IV iron in for some time and no longer takes B12 injections. Since his last visit he was admitted at the end of 2020 and underwent hernia repair. He has been having increasing epigastric/chest pain which is mildly improved with Carafate. He was recently evaluated by gastroenterology and has an appointment tomorrow for endoscopic evaluation.         The following portions of the patient's history were reviewed and updated as appropriate: allergies, current medications, past family history, past medical history, past social history, past surgical history and problem list.    Allergies   Allergen Reactions   • Contrast Dye Other (See Comments)     Shortness of breath   • Protonix [Pantoprazole Sodium] Palpitations     Patient states that protonix causes chest pain.  Shruthi Pérez, [facility]  4/23/2017  11:19 AM  Pt states he can tolerate pepcid with no problem     • Prilosec [Omeprazole] Other (See Comments)     Chest pain  Pt states he can take pepcid with no problem   • Flagyl [Metronidazole] Nausea And Vomiting       Past Medical History:   Diagnosis Date   • Johnson esophagus    • COPD (chronic obstructive pulmonary disease) (CMS/[facility])    • DDD (degenerative disc disease), thoracic    • Degenerative disc disease, lumbar    • Dystonia    • GERD (gastroesophageal reflux disease)    • Hypertension    • Migraines    • Peptic ulcer disease    • TIA (transient ischemic attack)    • Wears dentures     upper only       Past Surgical History:   Procedure Laterality Date   • APPENDECTOMY     • CHOLECYSTECTOMY N/A 4/22/2017    Procedure: CHOLECYSTECTOMY LAPAROSCOPIC;  Surgeon:  Jaqueline Guaman MD;  [facility]:  [facility];  Service:    • COLONOSCOPY N/A 2017    Procedure: COLONOSCOPY  CPTCODE:75745;  Surgeon: Nicho Richey III, MD;  [facility]:  [facility];  Service:    • ENDOSCOPY     • ENDOSCOPY N/A 2017    Procedure: ESOPHAGOGASTRODUODENOSCOPY WITH BIOPSY  CPTCODE:90233;  Surgeon: Nicho Richey III, MD;  [facility]:  [facility];  Service:    • ENDOSCOPY N/A 11/3/2017    Procedure: ESOPHAGOGASTRODUODENOSCOPY WITH BIOPSY  CPTCODE: 21749;  Surgeon: Nicho Richey III, MD;  [facility]  [facility];  Service:    • ENDOSCOPY N/A 2018    Procedure: ESOPHAGOGASTRODUODENOSCOPY WITH DILATATION CPT CODE: 11002;  Surgeon: Nicho Richey III, MD;  [facility]  [facility];  Service:    • ENDOSCOPY N/A 2018    Procedure: ESOPHAGOGASTRODUODENOSCOPY WITH BIOPSY CPT CODE: 49985;  Surgeon: Nicho Richey III, MD;  [facility]:  [facility];  Service: Gastroenterology   • GASTRECTOMY N/A 2019    Procedure: OPEN VAGOTOMY, ANTRECTOMY,REVISION- Y GASTROJEJUNOSOTOMY;  Surgeon: Herbert Umanzor MD;  Location:  [facility];  Service: General   • UPPER GASTROINTESTINAL ENDOSCOPY     • VENTRAL/INCISIONAL HERNIA REPAIR N/A 12/10/2020    Procedure: INCISIONAL HERNIA REPAIR LAPAROSCOPIC WITH MESH;  Surgeon: Herbert Umanzor MD;  Location:  [facility];  Service: General;  Laterality: N/A;       Social History     Socioeconomic History   • Marital status:      Spouse name: Not on file   • Number of children: Not on file   • Years of education: Not on file   • Highest education level: Not on file   Tobacco Use   • Smoking status: Current Every Day Smoker     Packs/day: 1.00     Years: 35.00     Pack years: 35.00     Types: Cigarettes     Last attempt to quit: 2020     Years since quittin.7   • Smokeless tobacco: Never Used   Vaping Use   • Vaping Use: Never used   Substance and Sexual Activity   • Alcohol use: No   • Drug use: No   • Sexual activity: Defer       Family History    Problem Relation Age of Onset   • No Known Problems Mother    • Hypertension Father    • No Known Problems Sister    • No Known Problems Brother    • Drug abuse Brother    • No Known Problems Daughter    Maternal GF - H&N cancer  Paternal GF - Prostate cancer          Current Outpatient Medications:   •  baclofen (LIORESAL) 20 MG tablet, Take 10-20 mg by mouth 3 (Three) Times a Day As Needed., Disp: , Rfl:   •  esomeprazole (nexIUM) 40 MG capsule, Take 1 capsule by mouth 2 (Two) Times a Day Before Meals. (Patient taking differently: Take 40 mg by mouth Daily.), Disp: 60 capsule, Rfl: 3  •  hydrochlorothiazide (HYDRODIURIL) 25 MG tablet, Take 25 mg by mouth Daily., Disp: , Rfl:   •  levETIRAcetam (KEPPRA) 750 MG tablet, Take 750 mg by mouth 3 (Three) Times a Day. Patient takes one tablet in the morning and afternoon and takes two tablets at bedtime., Disp: , Rfl:   •  sucralfate (CARAFATE) 1 g tablet, Take 1 g by mouth 4 (Four) Times a Day., Disp: , Rfl:   •  traZODone (DESYREL) 50 MG tablet, Take 1 tablet by mouth Every Night. (Patient taking differently: Take 100 mg by mouth Every Night.), Disp: 30 tablet, Rfl: 2  •  venlafaxine (EFFEXOR) 75 MG tablet, Take 75 mg by mouth every night at bedtime., Disp: , Rfl:   •  docusate sodium (COLACE) 100 MG capsule, Take 1 capsule by mouth 2 (Two) Times a Day., Disp: 20 capsule, Rfl: 0  •  levETIRAcetam (KEPPRA) 500 MG tablet, Take 1,000 mg by mouth Every Night., Disp: , Rfl:   •  oxyCODONE-acetaminophen (PERCOCET) 5-325 MG per tablet, Take 2 tablets by mouth Every 4 (Four) Hours As Needed for Severe Pain ., Disp: 17 tablet, Rfl: 0        Review of Systems  A comprehensive 14 point review of systems was performed.  Significant findings as mentioned above.  All other systems reviewed and are negative. Positive epigastric/chest pain      Physical Exam  Vital Signs: These were reviewed and listed as per patient’s electronic medical chart  Vitals:    05/25/21 1215   BP: 123/85    Pulse: 120   Resp: 18   Temp: 98.7 °F (37.1 °C)   SpO2: 98%     General: Patient is awake, alert, and in no acute distress.  Head: Normocephalic, atraumatic  Eyes: EOMI. Conjunctivae and sclerae normal.  Ears: Ears appear intact with no abnormalities noted.   Neck: Trachea midline. No obvious JVD.  Lungs: Respirations appear to be regular, even and unlabored with no signs of respiratory distress. No audible wheezing.  Abdomen: No obvious abdominal distension.  MS: Muscle tone appears normal. No gross deformities.  Extremities: No clubbing, cyanosis or edema noted.  Skin: No visible bleeding, bruising, or rash.  Neurologic: Alert and oriented x3. No gross focal deficits.        Labs / Studies:    Office Visit on 05/25/2021   Component Date Value   • Iron 05/25/2021 65    • Iron Saturation 05/25/2021 21    • Transferrin 05/25/2021 205    • TIBC 05/25/2021 305    • Ferritin 05/25/2021 468.10*   • Phosphorus 05/25/2021 3.6    • WBC 05/25/2021 7.00    • RBC 05/25/2021 4.50    • Hemoglobin 05/25/2021 15.8    • Hematocrit 05/25/2021 46.0    • MCV 05/25/2021 102.2*   • MCH 05/25/2021 35.1*   • MCHC 05/25/2021 34.3    • RDW 05/25/2021 11.3*   • RDW-SD 05/25/2021 42.8    • MPV 05/25/2021 8.9    • Platelets 05/25/2021 202    • Neutrophil % 05/25/2021 72.8    • Lymphocyte % 05/25/2021 13.9*   • Monocyte % 05/25/2021 11.4    • Eosinophil % 05/25/2021 0.9    • Basophil % 05/25/2021 0.6    • Immature Grans % 05/25/2021 0.4    • Neutrophils, Absolute 05/25/2021 5.10    • Lymphocytes, Absolute 05/25/2021 0.97    • Monocytes, Absolute 05/25/2021 0.80    • Eosinophils, Absolute 05/25/2021 0.06    • Basophils, Absolute 05/25/2021 0.04    • Immature Grans, Absolute 05/25/2021 0.03    • nRBC 05/25/2021 0.0    Lab on 05/24/2021   Component Date Value   • SARS-CoV-2 LENORA 05/24/2021 Not Detected    Admission on 05/12/2021, Discharged on 05/12/2021   Component Date Value   • QT Interval 05/12/2021 290    • QTC Interval 05/12/2021 426    •  Glucose 05/12/2021 128*   • BUN 05/12/2021 6    • Creatinine 05/12/2021 0.82    • Sodium 05/12/2021 138    • Potassium 05/12/2021 4.0    • Chloride 05/12/2021 95*   • CO2 05/12/2021 27.5    • Calcium 05/12/2021 9.5    • Total Protein 05/12/2021 7.0    • Albumin 05/12/2021 4.23    • ALT (SGPT) 05/12/2021 57*   • AST (SGOT) 05/12/2021 75*   • Alkaline Phosphatase 05/12/2021 174*   • Total Bilirubin 05/12/2021 0.7    • eGFR Non African Amer 05/12/2021 99    • Globulin 05/12/2021 2.8    • A/G Ratio 05/12/2021 1.5    • BUN/Creatinine Ratio 05/12/2021 7.3    • Anion Gap 05/12/2021 15.5*   • COVID19 05/12/2021 Not Detected    • Influenza A PCR 05/12/2021 Not Detected    • Influenza B PCR 05/12/2021 Not Detected    • Protime 05/12/2021 10.9*   • INR 05/12/2021 0.80*   • PTT 05/12/2021 24.8*   • Troponin T 05/12/2021 <0.010    • Troponin T 05/12/2021 <0.010    • proBNP 05/12/2021 52.6    • Magnesium 05/12/2021 1.6    • Phosphorus 05/12/2021 3.5    • TSH 05/12/2021 1.120    • QT Interval 05/12/2021 332    • QTC Interval 05/12/2021 410    • WBC 05/12/2021 7.92    • RBC 05/12/2021 4.59    • Hemoglobin 05/12/2021 16.3    • Hematocrit 05/12/2021 46.4    • MCV 05/12/2021 101.1*   • MCH 05/12/2021 35.5*   • MCHC 05/12/2021 35.1    • RDW 05/12/2021 11.8*   • RDW-SD 05/12/2021 44.3    • MPV 05/12/2021 9.7    • Platelets 05/12/2021 226    • Neutrophil % 05/12/2021 79.2*   • Lymphocyte % 05/12/2021 9.2*   • Monocyte % 05/12/2021 9.8    • Eosinophil % 05/12/2021 0.5    • Basophil % 05/12/2021 0.4    • Immature Grans % 05/12/2021 0.9*   • Neutrophils, Absolute 05/12/2021 6.27    • Lymphocytes, Absolute 05/12/2021 0.73    • Monocytes, Absolute 05/12/2021 0.78    • Eosinophils, Absolute 05/12/2021 0.04    • Basophils, Absolute 05/12/2021 0.03    • Immature Grans, Absolute 05/12/2021 0.07*   • nRBC 05/12/2021 0.0    Admission on 12/10/2020, Discharged on 12/14/2020   Component Date Value   • Glucose 12/11/2020 144*   • BUN 12/11/2020 6    •  Creatinine 12/11/2020 0.48*   • Sodium 12/11/2020 133*   • Potassium 12/11/2020 3.5    • Chloride 12/11/2020 93*   • CO2 12/11/2020 30.0*   • Calcium 12/11/2020 9.5    • eGFR Non African Amer 12/11/2020 >150    • BUN/Creatinine Ratio 12/11/2020 12.5    • Anion Gap 12/11/2020 10.0    • WBC 12/11/2020 15.85*   • RBC 12/11/2020 4.25    • Hemoglobin 12/11/2020 14.5    • Hematocrit 12/11/2020 42.6    • MCV 12/11/2020 100.2*   • MCH 12/11/2020 34.1*   • MCHC 12/11/2020 34.0    • RDW 12/11/2020 11.3*   • RDW-SD 12/11/2020 41.8    • MPV 12/11/2020 9.2    • Platelets 12/11/2020 236    • Neutrophil % 12/11/2020 89.1*   • Lymphocyte % 12/11/2020 3.3*   • Monocyte % 12/11/2020 6.6    • Eosinophil % 12/11/2020 0.1*   • Basophil % 12/11/2020 0.1    • Immature Grans % 12/11/2020 0.8*   • Neutrophils, Absolute 12/11/2020 14.13*   • Lymphocytes, Absolute 12/11/2020 0.53*   • Monocytes, Absolute 12/11/2020 1.04*   • Eosinophils, Absolute 12/11/2020 0.01    • Basophils, Absolute 12/11/2020 0.01    • Immature Grans, Absolute 12/11/2020 0.13*   • nRBC 12/11/2020 0.0    • WBC 12/12/2020 12.16*   • RBC 12/12/2020 4.21    • Hemoglobin 12/12/2020 14.0    • Hematocrit 12/12/2020 41.1    • MCV 12/12/2020 97.6*   • MCH 12/12/2020 33.3*   • MCHC 12/12/2020 34.1    • RDW 12/12/2020 11.2*   • RDW-SD 12/12/2020 40.6    • MPV 12/12/2020 9.2    • Platelets 12/12/2020 229    • Glucose 12/12/2020 132*   • BUN 12/12/2020 3*   • Creatinine 12/12/2020 0.49*   • Sodium 12/12/2020 133*   • Potassium 12/12/2020 2.6*   • Chloride 12/12/2020 91*   • CO2 12/12/2020 31.0*   • Calcium 12/12/2020 9.2    • eGFR Non African Amer 12/12/2020 >150    • BUN/Creatinine Ratio 12/12/2020 6.1*   • Anion Gap 12/12/2020 11.0    • Magnesium 12/12/2020 1.6    • Phosphorus 12/12/2020 2.0*   • Potassium 12/12/2020 3.0*   • Glucose 12/13/2020 116*   • BUN 12/13/2020 3*   • Creatinine 12/13/2020 0.58*   • Sodium 12/13/2020 134*   • Potassium 12/13/2020 4.2    • Chloride 12/13/2020  93*   • CO2 2020 31.0*   • Calcium 2020 9.2    • eGFR Non African Amer 2020 147    • BUN/Creatinine Ratio 2020 5.2*   • Anion Gap 2020 10.0    • WBC 2020 11.41*   • RBC 2020 4.17    • Hemoglobin 2020 14.5    • Hematocrit 2020 42.1    • MCV 2020 101.0*   • MCH 2020 34.8*   • MCHC 2020 34.4    • RDW 2020 11.2*   • RDW-SD 2020 41.8    • MPV 2020 9.1    • Platelets 2020 231    • Phosphorus 2020 2.3*   • WBC 2020 8.48    • RBC 2020 4.23    • Hemoglobin 2020 14.5    • Hematocrit 2020 42.4    • MCV 2020 100.2*   • MCH 2020 34.3*   • MCHC 2020 34.2    • RDW 2020 11.2*   • RDW-SD 2020 41.6    • MPV 2020 9.6    • Platelets 2020 254    Appointment on 2020   Component Date Value   • Hemoglobin A1C 2020 5.10    • WBC 2020 9.23    • RBC 2020 4.97    • Hemoglobin 2020 17.4    • Hematocrit 2020 49.7    • MCV 2020 100.0*   • MCH 2020 35.0*   • MCHC 2020 35.0    • RDW 2020 11.2*   • RDW-SD 2020 41.6    • MPV 2020 9.1    • Platelets 2020 268    • Glucose 2020 111*   • BUN 2020 7    • Creatinine 2020 0.66*   • Sodium 2020 134*   • Potassium 2020 4.0    • Chloride 2020 96*   • CO2 2020 26.0    • Calcium 2020 9.7    • eGFR Non  Amer 2020 127    • BUN/Creatinine Ratio 2020 10.6    • Anion Gap 2020 12.0    • QT Interval 2020 330    • QTC Interval 2020 421    Appointment on 2020   Component Date Value   • SARS-CoV-2 LENORA 2020 Not Detected           IMAGIN18: CT CHEST WO CONTRAST: LUNGS: MODERATE BILATERAL LUNG EMPHYSEMA. HEART: Unremarkable. PERICARDIUM: No effusion. MEDIASTINUM: No masses. No enlarged lymph nodes.  No fluid collections. PLEURA: No pleural effusion. No pleural mass or abnormal  calcification. MAJOR AIRWAYS: Clear. No intrinsic mass. VASCULATURE: No evidence of aneurysm. VISUALIZED UPPER ABDOMEN: LIVER: Homogeneous. No focal hepatic mass or ductal dilatation. SPLEEN: Homogeneous. No splenomegaly.  ADRENALS: No mass. KIDNEYS: No mass. No obstructive uropathy.  No evidence of Urolithiasis. GI TRACT: Non-dilated. No definite wall thickening. PERITONEUM: No free air. No free fluid or loculated fluid Collections. ABDOMINAL WALL: No focal hernia or mass. OTHER: None. BONES: No acute bony abnormality. Impression: 1. Unremarkable exam.  No source for the patient symptoms. 2. Other incidental/non-acute findings as above.    04/13/18: MRI ABDOMEN WO CONTRAST MRCP: multiple axial gradient-echo T1 and T2-weighted images were obtained. Oblique coronal T2-weighted images were  acquired to evaluate the bile ducts. The common bile duct and common hepatic duct and main intrahepatic bile ducts are well-demonstrated and appear within normal limits. No gallstones are identified and there are no strictures. There is no bile duct dilatation. The main pancreatic duct was also fairly well demonstrated and appears within normal limits. The liver, spleen, pancreas, and adrenal glands appear within normal limits. IMPRESSION: Unremarkable MRCP imaging of the abdomen.     10/29/20: CT ABDOMEN PELVIS WO CONTRAST: The lung bases are clear. Body wall soft tissues demonstrate a 4 cm defect in the ventral abdominal wall near the umbilicus containing noninflamed nondilated segments of bowel. The liver, spleen, pancreas and bilateral adrenal glands demonstrate homogeneous attenuation without evidence of focal lesion. Unremarkable bilateral kidneys. No free fluid or pneumoperitoneum. No acute fracture or aggressive osseous lesion. Small and large bowel loops are nondilated. No suspicious focal bowel wall thickening. The pelvic viscera are unremarkable. No retroperitoneal adenopathy. Normal caliber atherosclerotic abdominal  aorta. IMPRESSION: 4 cm ventral body wall hernia adjacent to the umbilicus containing mildly narrowed but otherwise nondilated and noninflamed segments of bowel. No additional acute findings in the abdomen and pelvis.        PATHOLOGY:  01/16/18: Peripheral Smear:                Assessment/Plan   Jamison Edward is a very pleasant 50 y.o.  male who presents in follow up appointment for further management and evaluation of normocytic anemia secondary to iron and B12 deficiency.    Normocytic anemia  Iron deficiency   B12 deficiency  - His hemoglobin is normal today along with normal iron panel.   - During his initial consultation the iron panel and ferritin were consistent with iron deficiency and was restarted on ferrous sulfate three times daily. He tolerated this well, however, continued to be iron deficient and was started on IV Injectafer as he likely has malabsorption to the oral iron. After his last dose he experienced severe hypophosphatemia and required phosphorus supplementation for quite some time which later eventually normalized. Therefore will change this to Feraheme should he require IV iron in the future. Phosphorus level today is normal.   - I have also obtained Zinc, Copper levels as well as a tissues transglutaminase level which were normal.  - He was following with gastroenterology closely and previously had an EGD which was significant for gastric and duodenal ulcers without malignancy and underwent surgical resection with improvement in his symptoms September 2019.   - Folate has been low normal therefore will repeat today - he is currently not on folate supplementation. B12 was low normal and he was started on B12 injections and is no longer on these.  - ROWENA was normal, LDH, retic count normal going against hemolysis, and haptoglobin was high. TSH normal. Peripheral smear as above.  - Hemoglobin and hematocrit are normal today along with iron panel and he does not require further  iron, ferritin is elevated and likely due to underlying inflammation. Will continue to monitor and repeat labs in six months. He has an appointment tomorrow for endoscopic evaluation.    ACO / CONRAD/Other  Quality measures  -  Mr. Edward is a current smoker and we discussed smoking cessation. He understands the risks of smoking such as polycythemia, cancer, COPD. At present he is trying to quit but would prefer to quit on his own and has cut back to <1/2ppd.   -  Jamison Edward received 2020 flu vaccine. in our clinic.  -  Jamison Edward reports a pain score of 0.  Given his pain assessment as noted, treatment options were discussed and the following options were decided upon as a follow-up plan to address the patient's pain: continuation of current treatment plan for pain.  -  Current outpatient and discharge medications have been reconciled for the patient.  Reviewed by: Cami Gustafson MD      I will have the patient return for repeat laboratory evaluation and follow up appointment in six months. He understands that should he have any questions or concerns prior to his appointment he should give us a call at any time and I would be happy to see him sooner. It was a pleasure to see this patient in clinic today, thank you for allowing me to participate in the care of this patient.      Cami Gustafson MD  05/25/2021  15:06 EDT

## 2021-05-25 ENCOUNTER — OFFICE VISIT (OUTPATIENT)
Dept: ONCOLOGY | Facility: CLINIC | Age: 51
End: 2021-05-25

## 2021-05-25 VITALS
TEMPERATURE: 98.7 F | HEART RATE: 120 BPM | OXYGEN SATURATION: 98 % | SYSTOLIC BLOOD PRESSURE: 123 MMHG | BODY MASS INDEX: 24.31 KG/M2 | WEIGHT: 150.6 LBS | RESPIRATION RATE: 18 BRPM | DIASTOLIC BLOOD PRESSURE: 85 MMHG

## 2021-05-25 DIAGNOSIS — E53.8 FOLATE DEFICIENCY: ICD-10-CM

## 2021-05-25 DIAGNOSIS — E53.8 B12 DEFICIENCY: ICD-10-CM

## 2021-05-25 DIAGNOSIS — D50.9 IRON DEFICIENCY ANEMIA, UNSPECIFIED IRON DEFICIENCY ANEMIA TYPE: Primary | ICD-10-CM

## 2021-05-25 DIAGNOSIS — D51.9 ANEMIA DUE TO VITAMIN B12 DEFICIENCY, UNSPECIFIED B12 DEFICIENCY TYPE: ICD-10-CM

## 2021-05-25 DIAGNOSIS — E83.39 HYPOPHOSPHATEMIA: ICD-10-CM

## 2021-05-25 LAB
BASOPHILS # BLD AUTO: 0.04 10*3/MM3 (ref 0–0.2)
BASOPHILS NFR BLD AUTO: 0.6 % (ref 0–1.5)
DEPRECATED RDW RBC AUTO: 42.8 FL (ref 37–54)
EOSINOPHIL # BLD AUTO: 0.06 10*3/MM3 (ref 0–0.4)
EOSINOPHIL NFR BLD AUTO: 0.9 % (ref 0.3–6.2)
ERYTHROCYTE [DISTWIDTH] IN BLOOD BY AUTOMATED COUNT: 11.3 % (ref 12.3–15.4)
FERRITIN SERPL-MCNC: 468.1 NG/ML (ref 30–400)
HCT VFR BLD AUTO: 46 % (ref 37.5–51)
HGB BLD-MCNC: 15.8 G/DL (ref 13–17.7)
IMM GRANULOCYTES # BLD AUTO: 0.03 10*3/MM3 (ref 0–0.05)
IMM GRANULOCYTES NFR BLD AUTO: 0.4 % (ref 0–0.5)
IRON 24H UR-MRATE: 65 MCG/DL (ref 59–158)
IRON SATN MFR SERPL: 21 % (ref 20–50)
LYMPHOCYTES # BLD AUTO: 0.97 10*3/MM3 (ref 0.7–3.1)
LYMPHOCYTES NFR BLD AUTO: 13.9 % (ref 19.6–45.3)
MCH RBC QN AUTO: 35.1 PG (ref 26.6–33)
MCHC RBC AUTO-ENTMCNC: 34.3 G/DL (ref 31.5–35.7)
MCV RBC AUTO: 102.2 FL (ref 79–97)
MONOCYTES # BLD AUTO: 0.8 10*3/MM3 (ref 0.1–0.9)
MONOCYTES NFR BLD AUTO: 11.4 % (ref 5–12)
NEUTROPHILS NFR BLD AUTO: 5.1 10*3/MM3 (ref 1.7–7)
NEUTROPHILS NFR BLD AUTO: 72.8 % (ref 42.7–76)
NRBC BLD AUTO-RTO: 0 /100 WBC (ref 0–0.2)
PHOSPHATE SERPL-MCNC: 3.6 MG/DL (ref 2.5–4.5)
PLATELET # BLD AUTO: 202 10*3/MM3 (ref 140–450)
PMV BLD AUTO: 8.9 FL (ref 6–12)
RBC # BLD AUTO: 4.5 10*6/MM3 (ref 4.14–5.8)
TIBC SERPL-MCNC: 305 MCG/DL (ref 298–536)
TRANSFERRIN SERPL-MCNC: 205 MG/DL (ref 200–360)
WBC # BLD AUTO: 7 10*3/MM3 (ref 3.4–10.8)

## 2021-05-25 PROCEDURE — 99214 OFFICE O/P EST MOD 30 MIN: CPT | Performed by: INTERNAL MEDICINE

## 2021-05-25 PROCEDURE — 82607 VITAMIN B-12: CPT | Performed by: INTERNAL MEDICINE

## 2021-05-25 PROCEDURE — 84466 ASSAY OF TRANSFERRIN: CPT | Performed by: INTERNAL MEDICINE

## 2021-05-25 PROCEDURE — 83540 ASSAY OF IRON: CPT | Performed by: INTERNAL MEDICINE

## 2021-05-25 PROCEDURE — 82746 ASSAY OF FOLIC ACID SERUM: CPT | Performed by: INTERNAL MEDICINE

## 2021-05-25 PROCEDURE — 85025 COMPLETE CBC W/AUTO DIFF WBC: CPT | Performed by: INTERNAL MEDICINE

## 2021-05-25 PROCEDURE — 82728 ASSAY OF FERRITIN: CPT | Performed by: INTERNAL MEDICINE

## 2021-05-25 PROCEDURE — 84100 ASSAY OF PHOSPHORUS: CPT | Performed by: INTERNAL MEDICINE

## 2021-05-25 PROCEDURE — 36415 COLL VENOUS BLD VENIPUNCTURE: CPT | Performed by: INTERNAL MEDICINE

## 2021-05-26 ENCOUNTER — ANESTHESIA (OUTPATIENT)
Dept: PERIOP | Facility: HOSPITAL | Age: 51
End: 2021-05-26

## 2021-05-26 ENCOUNTER — HOSPITAL ENCOUNTER (OUTPATIENT)
Facility: HOSPITAL | Age: 51
Setting detail: HOSPITAL OUTPATIENT SURGERY
Discharge: HOME | End: 2021-05-26
Attending: INTERNAL MEDICINE | Admitting: INTERNAL MEDICINE

## 2021-05-26 ENCOUNTER — ANESTHESIA EVENT (OUTPATIENT)
Dept: PERIOP | Facility: HOSPITAL | Age: 51
End: 2021-05-26

## 2021-05-26 VITALS
SYSTOLIC BLOOD PRESSURE: 147 MMHG | RESPIRATION RATE: 20 BRPM | OXYGEN SATURATION: 98 % | WEIGHT: 150 LBS | HEART RATE: 87 BPM | HEIGHT: 66 IN | BODY MASS INDEX: 24.11 KG/M2 | DIASTOLIC BLOOD PRESSURE: 85 MMHG | TEMPERATURE: 97.5 F

## 2021-05-26 DIAGNOSIS — R07.89 ATYPICAL CHEST PAIN: ICD-10-CM

## 2021-05-26 LAB
FOLATE SERPL-MCNC: 6.97 NG/ML (ref 4.78–24.2)
VIT B12 BLD-MCNC: 448 PG/ML (ref 211–946)

## 2021-05-26 PROCEDURE — 25010000002 METOCLOPRAMIDE PER 10 MG: Performed by: INTERNAL MEDICINE

## 2021-05-26 PROCEDURE — 43239 EGD BIOPSY SINGLE/MULTIPLE: CPT | Performed by: INTERNAL MEDICINE

## 2021-05-26 PROCEDURE — 25010000002 PROPOFOL 10 MG/ML EMULSION: Performed by: NURSE ANESTHETIST, CERTIFIED REGISTERED

## 2021-05-26 RX ORDER — LIDOCAINE HYDROCHLORIDE 20 MG/ML
INJECTION, SOLUTION INFILTRATION; PERINEURAL AS NEEDED
Status: DISCONTINUED | OUTPATIENT
Start: 2021-05-26 | End: 2021-05-26 | Stop reason: SURG

## 2021-05-26 RX ORDER — SODIUM CHLORIDE, SODIUM LACTATE, POTASSIUM CHLORIDE, CALCIUM CHLORIDE 600; 310; 30; 20 MG/100ML; MG/100ML; MG/100ML; MG/100ML
100 INJECTION, SOLUTION INTRAVENOUS ONCE AS NEEDED
Status: DISCONTINUED | OUTPATIENT
Start: 2021-05-26 | End: 2021-05-26 | Stop reason: HOSPADM

## 2021-05-26 RX ORDER — PROPOFOL 10 MG/ML
VIAL (ML) INTRAVENOUS AS NEEDED
Status: DISCONTINUED | OUTPATIENT
Start: 2021-05-26 | End: 2021-05-26 | Stop reason: SURG

## 2021-05-26 RX ORDER — METOCLOPRAMIDE 10 MG/1
10 TABLET ORAL
Qty: 60 TABLET | Refills: 3 | Status: SHIPPED | OUTPATIENT
Start: 2021-05-26 | End: 2021-12-01

## 2021-05-26 RX ORDER — KETOROLAC TROMETHAMINE 30 MG/ML
30 INJECTION, SOLUTION INTRAMUSCULAR; INTRAVENOUS EVERY 6 HOURS PRN
Status: DISCONTINUED | OUTPATIENT
Start: 2021-05-26 | End: 2021-05-26 | Stop reason: HOSPADM

## 2021-05-26 RX ORDER — SODIUM CHLORIDE, SODIUM LACTATE, POTASSIUM CHLORIDE, CALCIUM CHLORIDE 600; 310; 30; 20 MG/100ML; MG/100ML; MG/100ML; MG/100ML
125 INJECTION, SOLUTION INTRAVENOUS ONCE
Status: COMPLETED | OUTPATIENT
Start: 2021-05-26 | End: 2021-05-26

## 2021-05-26 RX ORDER — IPRATROPIUM BROMIDE AND ALBUTEROL SULFATE 2.5; .5 MG/3ML; MG/3ML
3 SOLUTION RESPIRATORY (INHALATION) ONCE AS NEEDED
Status: DISCONTINUED | OUTPATIENT
Start: 2021-05-26 | End: 2021-05-26 | Stop reason: HOSPADM

## 2021-05-26 RX ORDER — DROPERIDOL 2.5 MG/ML
0.62 INJECTION, SOLUTION INTRAMUSCULAR; INTRAVENOUS ONCE AS NEEDED
Status: DISCONTINUED | OUTPATIENT
Start: 2021-05-26 | End: 2021-05-26 | Stop reason: HOSPADM

## 2021-05-26 RX ORDER — MEPERIDINE HYDROCHLORIDE 25 MG/ML
12.5 INJECTION INTRAMUSCULAR; INTRAVENOUS; SUBCUTANEOUS
Status: DISCONTINUED | OUTPATIENT
Start: 2021-05-26 | End: 2021-05-26 | Stop reason: HOSPADM

## 2021-05-26 RX ORDER — OXYCODONE HYDROCHLORIDE AND ACETAMINOPHEN 5; 325 MG/1; MG/1
1 TABLET ORAL ONCE AS NEEDED
Status: DISCONTINUED | OUTPATIENT
Start: 2021-05-26 | End: 2021-05-26 | Stop reason: HOSPADM

## 2021-05-26 RX ORDER — METOCLOPRAMIDE HYDROCHLORIDE 5 MG/ML
10 INJECTION INTRAMUSCULAR; INTRAVENOUS ONCE
Status: COMPLETED | OUTPATIENT
Start: 2021-05-26 | End: 2021-05-26

## 2021-05-26 RX ORDER — SODIUM CHLORIDE 0.9 % (FLUSH) 0.9 %
10 SYRINGE (ML) INJECTION EVERY 12 HOURS SCHEDULED
Status: DISCONTINUED | OUTPATIENT
Start: 2021-05-26 | End: 2021-05-26 | Stop reason: HOSPADM

## 2021-05-26 RX ORDER — SODIUM CHLORIDE 0.9 % (FLUSH) 0.9 %
10 SYRINGE (ML) INJECTION AS NEEDED
Status: DISCONTINUED | OUTPATIENT
Start: 2021-05-26 | End: 2021-05-26 | Stop reason: HOSPADM

## 2021-05-26 RX ORDER — MIDAZOLAM HYDROCHLORIDE 1 MG/ML
1 INJECTION INTRAMUSCULAR; INTRAVENOUS
Status: DISCONTINUED | OUTPATIENT
Start: 2021-05-26 | End: 2021-05-26 | Stop reason: HOSPADM

## 2021-05-26 RX ORDER — ONDANSETRON 2 MG/ML
4 INJECTION INTRAMUSCULAR; INTRAVENOUS AS NEEDED
Status: DISCONTINUED | OUTPATIENT
Start: 2021-05-26 | End: 2021-05-26 | Stop reason: HOSPADM

## 2021-05-26 RX ORDER — FENTANYL CITRATE 50 UG/ML
50 INJECTION, SOLUTION INTRAMUSCULAR; INTRAVENOUS
Status: DISCONTINUED | OUTPATIENT
Start: 2021-05-26 | End: 2021-05-26 | Stop reason: HOSPADM

## 2021-05-26 RX ADMIN — PROPOFOL 30 MG: 10 INJECTION, EMULSION INTRAVENOUS at 10:13

## 2021-05-26 RX ADMIN — PROPOFOL 150 MCG/KG/MIN: 10 INJECTION, EMULSION INTRAVENOUS at 10:13

## 2021-05-26 RX ADMIN — METOCLOPRAMIDE 10 MG: 5 INJECTION, SOLUTION INTRAMUSCULAR; INTRAVENOUS at 10:51

## 2021-05-26 RX ADMIN — LIDOCAINE HYDROCHLORIDE 30 MG: 20 INJECTION, SOLUTION INFILTRATION; PERINEURAL at 10:13

## 2021-05-26 RX ADMIN — SODIUM CHLORIDE, POTASSIUM CHLORIDE, SODIUM LACTATE AND CALCIUM CHLORIDE: 600; 310; 30; 20 INJECTION, SOLUTION INTRAVENOUS at 10:13

## 2021-05-26 NOTE — ANESTHESIA PREPROCEDURE EVALUATION
Anesthesia Evaluation     Patient summary reviewed and Nursing notes reviewed   no history of anesthetic complications:  NPO Solid Status: > 8 hours  NPO Liquid Status: > 8 hours           Airway   Mallampati: I  TM distance: >3 FB  Neck ROM: full  No difficulty expected  Dental    (+) upper dentures    Pulmonary    (+) a smoker (17.5PY hx, 09/2020 quit) Current, COPD, shortness of breath (2/2 COPD, unchanged), decreased breath sounds,   (-) no home oxygen  Cardiovascular - normal exam  Exercise tolerance: good (4-7 METS)    ECG reviewed    (+) hypertension,   (-) past MI, CAD, dysrhythmias, angina, CHF, BROWN, cardiac stents    ROS comment: 4/30/17 Echo:  LVEF = 55%.  no significant valvular abnormalities noted. no evidence of pericardial effusion.  04/2017-stress test nl , lvef 64%.  12/8/20 ekg - nsr.      Neuro/Psych  (+) TIA (2 years ago no residual symptoms), headaches,     (-) seizures  GI/Hepatic/Renal/Endo    (+)  GERD, PUD, GI bleeding ,   (-) hepatitis, liver disease, no renal disease, diabetes    ROS Comment: hooper's esophagus    Musculoskeletal     (+) back pain,   Abdominal    Substance History      OB/GYN          Other   arthritis,      ROS/Med Hx Other: 9/17/19 vagotomy - gr1v mac3 7.0 ett; hct 49.7 k 4.0                    Anesthesia Plan    ASA 3     general   (  )  intravenous induction     Anesthetic plan, all risks, benefits, and alternatives have been provided, discussed and informed consent has been obtained with: patient.    Plan discussed with CRNA.

## 2021-05-26 NOTE — ANESTHESIA POSTPROCEDURE EVALUATION
Patient: Jamison Edward    Procedure Summary     Date: 05/26/21 Room / Location:  [facility] /  [facility]    Anesthesia Start: 1013 Anesthesia Stop: 1030    Procedure: ESOPHAGOGASTRODUODENOSCOPY WITH BIOPSY (N/A Esophagus) Diagnosis:       Atypical chest pain      (Atypical chest pain [R07.89])    Surgeons: Julieta Hebert MD Provider: Surya Ledezma MD    Anesthesia Type: general ASA Status: 3          Anesthesia Type: general    Vitals  Vitals Value Taken Time   /87 05/26/21 1042   Temp 97.6 °F (36.4 °C) 05/26/21 1032   Pulse 90 05/26/21 1042   Resp 20 05/26/21 1042   SpO2 97 % 05/26/21 1042           Post Anesthesia Care and Evaluation    Patient location during evaluation: PHASE II  Patient participation: complete - patient participated  Level of consciousness: awake and alert  Pain score: 0  Pain management: adequate  Airway patency: patent  Anesthetic complications: No anesthetic complications    Cardiovascular status: acceptable  Respiratory status: acceptable  Hydration status: acceptable

## 2021-05-26 NOTE — OP NOTE
ESOPHAGOGASTRODUODENOSCOPY PROCEDURE REPORT    Jamison Edward  5/26/2021    GASTROENTEROLOGIST:  Julieta Hebert MD     PRE-PROCEDURE DIAGNOSIS:  Atypical chest pain [R07.89]    POST-PROCEDURE DIAGNOSIS:  1.  Evidence of gastric bypass  2.  Gastric bezoar  3.  Hiatal hernia    Procedure(s):  ESOPHAGOGASTRODUODENOSCOPY WITH BIOPSY    ANESTHESIA:  Propofol administered by anesthesia.  See anesthesia notes for ASA classification    STAFF:  Circulator: Balbina Alvarez RN  Scrub Person: Colleen Pollard  Endo Technician: Suzette Jackman    FINDINGS:  1.  There was evidence of gastric bypass surgery.  The patient had a huge gastric bezoar occluding the the anastomosis.  I could not evaluate the anastomosis.  I did not enter the efferent limb of the gastric bypass due to the amount of food bezoar in the patient's gastric pouch.  2.  Small hiatal hernia.    OPERATIVE PROCEDURE:  After proper informed consent was obtained, patient was transferred to the OR/endoscopy suite.  Patient was then placed in left lateral decubitus position. The Olympus 180 series video gastroscope was inserted orally under direct visualization.  Esophagus and gastric pouch were inspected.  The endoscope was passed to the gastric pouch.  Scope was retroflexed for visualization of the cardia and incisura.  The endoscope was then withdrawn. Patient tolerated the procedure well. There were no immediate complications.    ESTIMATED BLOOD LOSS:  None    COMPLICATIONS;  None    RECOMMENDATIONS/ PLAN:  1.  Due to the large size of the gastric bezoar, I was unable to evaluate the anastomosis.  The patient may have stenosis of the anastomosis.  I could not fully evaluate this.  I would like for the patient to go on a full liquid diet for 3 days prior to repeat upper endoscopy.  I will start him on Reglan twice daily.    Julieta Hebert MD     05/26/21 10:36 EDT

## 2021-05-28 ENCOUNTER — TELEPHONE (OUTPATIENT)
Dept: GASTROENTEROLOGY | Facility: CLINIC | Age: 51
End: 2021-05-28

## 2021-05-28 DIAGNOSIS — R07.89 ATYPICAL CHEST PAIN: ICD-10-CM

## 2021-05-28 DIAGNOSIS — R11.0 NAUSEA: ICD-10-CM

## 2021-05-28 DIAGNOSIS — T18.2XXD GASTRIC BEZOAR, SUBSEQUENT ENCOUNTER: Primary | ICD-10-CM

## 2021-05-28 DIAGNOSIS — Z01.818 PREOPERATIVE CLEARANCE: ICD-10-CM

## 2021-05-28 DIAGNOSIS — R07.89 ATYPICAL CHEST PAIN: Primary | ICD-10-CM
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
## 2021-05-28 PROBLEM — T18.2XXA GASTRIC BEZOAR: Status: ACTIVE | Noted: 2021-05-28

## 2021-05-28 NOTE — TELEPHONE ENCOUNTER
Can you please put a case request in for patient to have a EGD on 6-2-21 with Dr. Ovalles? He is doing a 3 day liquid diet and patient understands instructions (per Dr. Ovalles)      Thank you

## 2021-05-31 ENCOUNTER — LAB (OUTPATIENT)
Dept: LAB | Facility: HOSPITAL | Age: 51
End: 2021-05-31

## 2021-05-31 DIAGNOSIS — Z01.818 PREOPERATIVE CLEARANCE: ICD-10-CM

## 2021-05-31 DIAGNOSIS — R07.89 ATYPICAL CHEST PAIN: ICD-10-CM

## 2021-05-31 DIAGNOSIS — R11.0 NAUSEA: ICD-10-CM

## 2021-05-31 LAB — SARS-COV-2 RNA RESP QL NAA+PROBE: NOT DETECTED

## 2021-05-31 PROCEDURE — C9803 HOPD COVID-19 SPEC COLLECT: HCPCS

## 2021-05-31 PROCEDURE — U0005 INFEC AGEN DETEC AMPLI PROBE: HCPCS

## 2021-05-31 PROCEDURE — U0003 INFECTIOUS AGENT DETECTION BY NUCLEIC ACID (DNA OR RNA); SEVERE ACUTE RESPIRATORY SYNDROME CORONAVIRUS 2 (SARS-COV-2) (CORONAVIRUS DISEASE [COVID-19]), AMPLIFIED PROBE TECHNIQUE, MAKING USE OF HIGH THROUGHPUT TECHNOLOGIES AS DESCRIBED BY CMS-2020-01-R: HCPCS

## 2021-06-02 ENCOUNTER — ANESTHESIA EVENT (OUTPATIENT)
Dept: PERIOP | Facility: HOSPITAL | Age: 51
End: 2021-06-02

## 2021-06-02 ENCOUNTER — HOSPITAL ENCOUNTER (OUTPATIENT)
Facility: HOSPITAL | Age: 51
Setting detail: HOSPITAL OUTPATIENT SURGERY
Discharge: HOME | End: 2021-06-02
Attending: INTERNAL MEDICINE | Admitting: INTERNAL MEDICINE

## 2021-06-02 ENCOUNTER — ANESTHESIA (OUTPATIENT)
Dept: PERIOP | Facility: HOSPITAL | Age: 51
End: 2021-06-02

## 2021-06-02 VITALS
RESPIRATION RATE: 18 BRPM | TEMPERATURE: 97.9 F | SYSTOLIC BLOOD PRESSURE: 118 MMHG | HEART RATE: 90 BPM | BODY MASS INDEX: 24.11 KG/M2 | OXYGEN SATURATION: 97 % | HEIGHT: 66 IN | DIASTOLIC BLOOD PRESSURE: 72 MMHG | WEIGHT: 150 LBS

## 2021-06-02 DIAGNOSIS — R07.89 ATYPICAL CHEST PAIN: ICD-10-CM

## 2021-06-02 DIAGNOSIS — T18.2XXD GASTRIC BEZOAR, SUBSEQUENT ENCOUNTER: ICD-10-CM

## 2021-06-02 PROCEDURE — 25010000002 PROPOFOL 10 MG/ML EMULSION: Performed by: NURSE ANESTHETIST, CERTIFIED REGISTERED

## 2021-06-02 PROCEDURE — 88305 TISSUE EXAM BY PATHOLOGIST: CPT | Performed by: INTERNAL MEDICINE

## 2021-06-02 PROCEDURE — 43239 EGD BIOPSY SINGLE/MULTIPLE: CPT | Performed by: INTERNAL MEDICINE

## 2021-06-02 RX ORDER — MEPERIDINE HYDROCHLORIDE 25 MG/ML
12.5 INJECTION INTRAMUSCULAR; INTRAVENOUS; SUBCUTANEOUS
Status: DISCONTINUED | OUTPATIENT
Start: 2021-06-02 | End: 2021-06-02 | Stop reason: HOSPADM

## 2021-06-02 RX ORDER — LIDOCAINE HYDROCHLORIDE 20 MG/ML
INJECTION, SOLUTION INFILTRATION; PERINEURAL AS NEEDED
Status: DISCONTINUED | OUTPATIENT
Start: 2021-06-02 | End: 2021-06-02 | Stop reason: SURG

## 2021-06-02 RX ORDER — DROPERIDOL 2.5 MG/ML
0.62 INJECTION, SOLUTION INTRAMUSCULAR; INTRAVENOUS ONCE AS NEEDED
Status: DISCONTINUED | OUTPATIENT
Start: 2021-06-02 | End: 2021-06-02 | Stop reason: HOSPADM

## 2021-06-02 RX ORDER — SODIUM CHLORIDE, SODIUM LACTATE, POTASSIUM CHLORIDE, CALCIUM CHLORIDE 600; 310; 30; 20 MG/100ML; MG/100ML; MG/100ML; MG/100ML
125 INJECTION, SOLUTION INTRAVENOUS ONCE
Status: COMPLETED | OUTPATIENT
Start: 2021-06-02 | End: 2021-06-02

## 2021-06-02 RX ORDER — MIDAZOLAM HYDROCHLORIDE 1 MG/ML
1 INJECTION INTRAMUSCULAR; INTRAVENOUS
Status: DISCONTINUED | OUTPATIENT
Start: 2021-06-02 | End: 2021-06-02 | Stop reason: HOSPADM

## 2021-06-02 RX ORDER — FENTANYL CITRATE 50 UG/ML
50 INJECTION, SOLUTION INTRAMUSCULAR; INTRAVENOUS
Status: DISCONTINUED | OUTPATIENT
Start: 2021-06-02 | End: 2021-06-02 | Stop reason: HOSPADM

## 2021-06-02 RX ORDER — SODIUM CHLORIDE 0.9 % (FLUSH) 0.9 %
10 SYRINGE (ML) INJECTION EVERY 12 HOURS SCHEDULED
Status: DISCONTINUED | OUTPATIENT
Start: 2021-06-02 | End: 2021-06-02 | Stop reason: HOSPADM

## 2021-06-02 RX ORDER — IPRATROPIUM BROMIDE AND ALBUTEROL SULFATE 2.5; .5 MG/3ML; MG/3ML
3 SOLUTION RESPIRATORY (INHALATION) ONCE AS NEEDED
Status: DISCONTINUED | OUTPATIENT
Start: 2021-06-02 | End: 2021-06-02 | Stop reason: HOSPADM

## 2021-06-02 RX ORDER — SODIUM CHLORIDE 0.9 % (FLUSH) 0.9 %
10 SYRINGE (ML) INJECTION AS NEEDED
Status: DISCONTINUED | OUTPATIENT
Start: 2021-06-02 | End: 2021-06-02 | Stop reason: HOSPADM

## 2021-06-02 RX ORDER — PROPOFOL 10 MG/ML
VIAL (ML) INTRAVENOUS AS NEEDED
Status: DISCONTINUED | OUTPATIENT
Start: 2021-06-02 | End: 2021-06-02 | Stop reason: SURG

## 2021-06-02 RX ORDER — ONDANSETRON 2 MG/ML
4 INJECTION INTRAMUSCULAR; INTRAVENOUS AS NEEDED
Status: DISCONTINUED | OUTPATIENT
Start: 2021-06-02 | End: 2021-06-02 | Stop reason: HOSPADM

## 2021-06-02 RX ORDER — SODIUM CHLORIDE, SODIUM LACTATE, POTASSIUM CHLORIDE, CALCIUM CHLORIDE 600; 310; 30; 20 MG/100ML; MG/100ML; MG/100ML; MG/100ML
100 INJECTION, SOLUTION INTRAVENOUS ONCE AS NEEDED
Status: DISCONTINUED | OUTPATIENT
Start: 2021-06-02 | End: 2021-06-02 | Stop reason: HOSPADM

## 2021-06-02 RX ORDER — KETOROLAC TROMETHAMINE 30 MG/ML
30 INJECTION, SOLUTION INTRAMUSCULAR; INTRAVENOUS EVERY 6 HOURS PRN
Status: DISCONTINUED | OUTPATIENT
Start: 2021-06-02 | End: 2021-06-02 | Stop reason: HOSPADM

## 2021-06-02 RX ADMIN — SODIUM CHLORIDE, POTASSIUM CHLORIDE, SODIUM LACTATE AND CALCIUM CHLORIDE: 600; 310; 30; 20 INJECTION, SOLUTION INTRAVENOUS at 10:36

## 2021-06-02 RX ADMIN — PROPOFOL 30 MG: 10 INJECTION, EMULSION INTRAVENOUS at 10:36

## 2021-06-02 RX ADMIN — PROPOFOL 150 MCG/KG/MIN: 10 INJECTION, EMULSION INTRAVENOUS at 10:36

## 2021-06-02 RX ADMIN — LIDOCAINE HYDROCHLORIDE 40 MG: 20 INJECTION, SOLUTION INFILTRATION; PERINEURAL at 10:36

## 2021-06-02 NOTE — ANESTHESIA PREPROCEDURE EVALUATION
Anesthesia Evaluation     Patient summary reviewed and Nursing notes reviewed   no history of anesthetic complications:  NPO Solid Status: > 8 hours  NPO Liquid Status: > 8 hours           Airway   Mallampati: II  TM distance: >3 FB  Neck ROM: full  No difficulty expected  Dental    (+) edentulous    Pulmonary - normal exam    breath sounds clear to auscultation  (+) a smoker Current Abstained day of surgery, COPD mild,   Cardiovascular - normal exam    ECG reviewed  Rhythm: regular  Rate: normal    (+) hypertension,       Neuro/Psych  (+) TIA, CVA (>4 years ago. no deficits), headaches (severe HA improved with Keppra),     (-) seizures  GI/Hepatic/Renal/Endo    (+)  GERD, PUD, GI bleeding upper resolved,     Musculoskeletal     Abdominal    Substance History - negative use     OB/GYN negative ob/gyn ROS         Other   arthritis,                      Anesthesia Plan    ASA 2     general   total IV anesthesia  intravenous induction     Anesthetic plan, all risks, benefits, and alternatives have been provided, discussed and informed consent has been obtained with: patient and spouse/significant other.    Plan discussed with CRNA.

## 2021-06-02 NOTE — OP NOTE
ESOPHAGOGASTRODUODENOSCOPY PROCEDURE REPORT    Jamison Edward  6/2/2021    GASTROENTEROLOGIST:  Julieta Hebert MD     PRE-PROCEDURE DIAGNOSIS:  Gastric bezoar, subsequent encounter [T18.2XXD]  Atypical chest pain [R07.89]    POST-PROCEDURE DIAGNOSIS:  1.  Evidence of gastric bypass  2.  Normal-appearing anastomosis    Procedure(s):  ESOPHAGOGASTRODUODENOSCOPY WITH BIOPSY    ANESTHESIA:  Propofol administered by anesthesia.  See anesthesia notes for ASA classification    STAFF:  Circulator: Balbina Alvarez RN  Scrub Person: Colleen Pollard    FINDINGS:  1.  Normal-appearing esophagus.  Biopsies were taken of the distal esophagus.  2.  Evidence of gastric bypass surgery.  No evidence of bezoar.  3.  Normal-appearing anastomosis which is wide open.  4.  Normal-appearing efferent limb of the jejunum    OPERATIVE PROCEDURE:  After proper informed consent was obtained, patient was transferred to the OR/endoscopy suite.  Patient was then placed in left lateral decubitus position. The Olympus 180 series video gastroscope was inserted orally under direct visualization.  Esophagus, gastric pouch, anastomosis, and jejunum were inspected.  The endoscope was passed into the efferent limb of the jejunum.  The scope was retroflexed for visualization of the cardia and incisura.  The endoscope was then withdrawn. Patient tolerated the procedure well. There were no immediate complications.    ESTIMATED BLOOD LOSS:  None    COMPLICATIONS;  None    RECOMMENDATIONS/ PLAN:  1.  Begin Reglan 10 mg twice daily 30 minutes prior to eating.  2.  Follow-up in GI clinic.  3.  Discontinue Carafate.    Julieta Hebert MD     06/02/21 10:43 EDT

## 2021-06-02 NOTE — ANESTHESIA POSTPROCEDURE EVALUATION
Patient: Jamison Edward    Procedure Summary     Date: 06/02/21 Room / Location:  [facility]  /  [facility]    Anesthesia Start: 1033 Anesthesia Stop: 1044    Procedure: ESOPHAGOGASTRODUODENOSCOPY WITH BIOPSY (N/A Esophagus) Diagnosis:       Gastric bezoar, subsequent encounter      Atypical chest pain      (Gastric bezoar, subsequent encounter [T18.2XXD])      (Atypical chest pain [R07.89])    Surgeons: Julieta Hebert MD Provider: Surya Morris DO    Anesthesia Type: general ASA Status: 2          Anesthesia Type: general    Vitals  Vitals Value Taken Time   /68 06/02/21 1050   Temp 97.9 °F (36.6 °C) 06/02/21 1045   Pulse 85 06/02/21 1050   Resp 18 06/02/21 1050   SpO2 98 % 06/02/21 1050           Post Anesthesia Care and Evaluation    Patient location during evaluation: PHASE II  Patient participation: complete - patient participated  Level of consciousness: awake and alert  Pain score: 0  Pain management: adequate  Airway patency: patent  Anesthetic complications: No anesthetic complications    Cardiovascular status: acceptable  Respiratory status: acceptable  Hydration status: acceptable

## 2021-06-07 LAB — LAB AP CASE REPORT: NORMAL

## 2021-06-07 NOTE — PROGRESS NOTES
Biopsies of your esophagus showed mild inflammation from acid reflux.  Continue your Nexium.  Please continue your Reglan (metoclopramide) for slowed motility of your gastric pouch.  Hopefully, this will control your symptoms.  Please keep your follow-up appointments.

## 2021-06-10 ENCOUNTER — TELEPHONE (OUTPATIENT)
Dept: GASTROENTEROLOGY | Facility: CLINIC | Age: 51
End: 2021-06-10

## 2021-06-10 DIAGNOSIS — R07.9 CHEST PAIN, UNSPECIFIED TYPE: Primary | ICD-10-CM

## 2021-06-10 NOTE — TELEPHONE ENCOUNTER
Patient requesting a referral to a cardiologist, he is still having chest pain. Advised patient to go to ER if worsen, also that his pcp would be the one that normally refers to specialist. Patient still wanted me to ask.

## 2021-06-10 NOTE — TELEPHONE ENCOUNTER
Spoke with patient and let him know we would send referral. I let him know to please go to the ER with chest pain if it got worse. He voiced understanding.

## 2021-06-10 NOTE — TELEPHONE ENCOUNTER
I agree with you.  He should go to the ER if he is having chest pain.  I have made a referral to cardiology.  However,  this is not something to wait around for if he is having chest pain and he should go to the ER.

## 2021-08-10 ENCOUNTER — OFFICE VISIT (OUTPATIENT)
Dept: CARDIOLOGY | Facility: CLINIC | Age: 51
End: 2021-08-10

## 2021-08-10 VITALS
OXYGEN SATURATION: 99 % | WEIGHT: 151.6 LBS | TEMPERATURE: 97 F | BODY MASS INDEX: 24.36 KG/M2 | HEIGHT: 66 IN | HEART RATE: 100 BPM | SYSTOLIC BLOOD PRESSURE: 115 MMHG | DIASTOLIC BLOOD PRESSURE: 76 MMHG

## 2021-08-10 DIAGNOSIS — F17.210 CIGARETTE SMOKER: ICD-10-CM

## 2021-08-10 DIAGNOSIS — Z87.11 HISTORY OF BLEEDING ULCERS: ICD-10-CM

## 2021-08-10 DIAGNOSIS — I10 ESSENTIAL HYPERTENSION: ICD-10-CM

## 2021-08-10 DIAGNOSIS — R07.2 PRECORDIAL CHEST PAIN: Primary | ICD-10-CM

## 2021-08-10 DIAGNOSIS — M54.6 CHRONIC MIDLINE THORACIC BACK PAIN: ICD-10-CM

## 2021-08-10 DIAGNOSIS — G89.29 CHRONIC MIDLINE THORACIC BACK PAIN: ICD-10-CM

## 2021-08-10 PROBLEM — M54.9 CHRONIC BACK PAIN: Status: ACTIVE | Noted: 2021-08-10

## 2021-08-10 PROCEDURE — 99204 OFFICE O/P NEW MOD 45 MIN: CPT | Performed by: SPECIALIST

## 2021-08-10 PROCEDURE — 93000 ELECTROCARDIOGRAM COMPLETE: CPT | Performed by: SPECIALIST

## 2021-08-10 RX ORDER — OLMESARTAN MEDOXOMIL 20 MG/1
20 TABLET ORAL DAILY
COMMUNITY
End: 2021-09-30 | Stop reason: SDUPTHER

## 2021-08-10 RX ORDER — SUCRALFATE 1 G/1
1 TABLET ORAL 4 TIMES DAILY
COMMUNITY
End: 2022-05-12

## 2021-08-10 NOTE — PROGRESS NOTES
Subjective   Initial consultation, chest pain  Jamison Edward is a 50 y.o. male who presents to day for Establish Care, Med Management (med list. ), Chest Pain, and Dizziness.    CHIEF COMPLIANT  Chief Complaint   Patient presents with   • Establish Care   • Med Management     med list.    • Chest Pain   • Dizziness       Active Problems:  Problem List Items Addressed This Visit        Cardiac and Vasculature    Precordial chest pain - Primary    Relevant Orders    Stress Test With Myocardial Perfusion One Day    Adult Transthoracic Echo Complete w/ Color, Spectral and Contrast if necessary per protocol    CBC (No Diff)    Essential hypertension    Relevant Medications    olmesartan (BENICAR) 20 MG tablet    Other Relevant Orders    Lipid Panel    Comprehensive Metabolic Panel    TSH       Gastrointestinal Abdominal     History of bleeding ulcers       Musculoskeletal and Injuries    Chronic back pain       Tobacco    Cigarette smoker          HPI  HPI  He started having chest pains on and off since May of this year they are nonexertional much much worse at night usually especially when he lays flat the pain would last for few minutes and sometimes he will need nitroglycerin but takes quite a while before the effect of the nitroglycerin works for him the pain has been happening daily he denies any shortness of breath no edema no palpitations but usually his heart rate goes fast when he is having a lot of pain he has chronic back pain.  Smoked for at least 25 years 1 pack/day and he has history of hypertension no history of diabetes history of hyperlipidemia family history of heart disease  PRIOR MEDS  Current Outpatient Medications on File Prior to Visit   Medication Sig Dispense Refill   • baclofen (LIORESAL) 20 MG tablet Take 10-20 mg by mouth 3 (Three) Times a Day As Needed.     • esomeprazole (nexIUM) 40 MG capsule Take 1 capsule by mouth 2 (Two) Times a Day Before Meals. (Patient taking differently: Take  40 mg by mouth Daily.) 60 capsule 3   • hydrochlorothiazide (HYDRODIURIL) 25 MG tablet Take 25 mg by mouth Daily.     • levETIRAcetam (KEPPRA) 750 MG tablet Take 750 mg by mouth 3 (Three) Times a Day. Patient takes one tablet in the morning and afternoon and takes two tablets at bedtime.     • metoclopramide (REGLAN) 10 MG tablet Take 1 tablet by mouth 2 (Two) Times a Day Before Meals. 60 tablet 3   • olmesartan (BENICAR) 20 MG tablet Take 20 mg by mouth Daily.     • sucralfate (CARAFATE) 1 g tablet Take 1 g by mouth 4 (Four) Times a Day.     • traZODone (DESYREL) 50 MG tablet Take 1 tablet by mouth Every Night. (Patient taking differently: Take 50 mg by mouth Every Night. 2 tabs at bedtime.) 30 tablet 2   • venlafaxine (EFFEXOR) 75 MG tablet Take 75 mg by mouth every night at bedtime.       No current facility-administered medications on file prior to visit.       ALLERGIES  Contrast dye, Protonix [pantoprazole sodium], Prilosec [omeprazole], Flagyl [metronidazole], and Topamax [topiramate]    HISTORY  Past Medical History:   Diagnosis Date   • Johnson esophagus    • COPD (chronic obstructive pulmonary disease) (CMS/[facility])    • DDD (degenerative disc disease), thoracic    • Degenerative disc disease, lumbar    • Dystonia    • GERD (gastroesophageal reflux disease)    • Hypertension    • Migraines    • Peptic ulcer disease    • Stroke (CMS/[facility])     TIA   • TIA (transient ischemic attack)    • Wears dentures     upper only       Social History     Socioeconomic History   • Marital status:      Spouse name: Not on file   • Number of children: Not on file   • Years of education: Not on file   • Highest education level: Not on file   Tobacco Use   • Smoking status: Current Every Day Smoker     Packs/day: 1.00     Years: 35.00     Pack years: 35.00     Types: Cigarettes     Last attempt to quit: 2020     Years since quittin.9   • Smokeless tobacco: Never Used   Vaping Use   • Vaping Use: Never used  "  Substance and Sexual Activity   • Alcohol use: No   • Drug use: No   • Sexual activity: Defer       Family History   Problem Relation Age of Onset   • No Known Problems Mother    • Hypertension Father    • No Known Problems Sister    • No Known Problems Brother    • Drug abuse Brother    • No Known Problems Daughter        Review of Systems   Constitutional: Negative.    Eyes: Negative.    Respiratory: Positive for chest tightness. Negative for apnea, cough, choking, shortness of breath, wheezing and stridor.    Cardiovascular: Positive for chest pain. Negative for leg swelling.   Gastrointestinal: Positive for abdominal pain, nausea and vomiting.   Endocrine: Negative.    Genitourinary: Negative.    Musculoskeletal: Positive for back pain and joint swelling.   Neurological: Positive for dizziness, numbness and headaches.   Hematological: Negative.    Psychiatric/Behavioral: Negative.        Objective     VITALS: /76 (BP Location: Right arm, Patient Position: Sitting, Cuff Size: Adult)   Pulse 100   Temp 97 °F (36.1 °C) (Infrared)   Ht 167.6 cm (66\")   Wt 68.8 kg (151 lb 9.6 oz)   SpO2 99%   BMI 24.47 kg/m²     LABS:   Lab Results (most recent)     None          IMAGING:   CT Abdomen Pelvis Without Contrast    Result Date: 5/12/2021  1.  Stable post surgical changes from gastrectomy and gastrojejunostomy. 2.  Prior appendectomy and cholecystectomy. 3.  Diffuse fatty infiltration of liver. 4.  Mild constipation. No bowel obstruction. 5.  Other nonacute findings as above which appear stable.  This report was finalized on 5/12/2021 12:27 PM by Dr. Phi Franks MD.      XR Chest 1 View    Result Date: 5/12/2021    Unremarkable exam. No acute cardiopulmonary findings identified.  This report was finalized on 5/12/2021 12:25 PM by Dr. Phi Franks MD.        EXAM:  Physical Exam  Constitutional:       Appearance: He is well-developed.   HENT:      Head: Normocephalic and atraumatic.   Eyes:      Pupils: " Pupils are equal, round, and reactive to light.   Neck:      Thyroid: No thyromegaly.      Vascular: No JVD.   Cardiovascular:      Rate and Rhythm: Normal rate and regular rhythm.      Chest Wall: PMI is not displaced.      Pulses: Normal pulses.      Heart sounds: Normal heart sounds, S1 normal and S2 normal. No murmur heard.   No friction rub. No gallop.    Pulmonary:      Effort: Pulmonary effort is normal. No respiratory distress.      Breath sounds: Normal breath sounds. No stridor. No wheezing or rales.   Chest:      Chest wall: No tenderness.   Abdominal:      General: Bowel sounds are normal. There is no distension.      Palpations: Abdomen is soft. There is no mass.      Tenderness: There is no abdominal tenderness. There is no guarding or rebound.   Musculoskeletal:      Cervical back: Neck supple. No edema.   Skin:     General: Skin is warm and dry.      Coloration: Skin is not pale.      Findings: No erythema or rash.   Neurological:      Mental Status: He is alert and oriented to person, place, and time.      Cranial Nerves: No cranial nerve deficit.      Coordination: Coordination normal.   Psychiatric:         Behavior: Behavior normal.         Procedure     ECG 12 Lead    Date/Time: 8/10/2021 9:52 AM  Performed by: Natalee Henning MD  Authorized by: Natalee Henning MD           EKG: Sinus tachycardia heart rate of 105 beats per minute otherwise unremarkable EKG compared with EKG on May 12, 2021 sinus tachycardia is new and a marked sinus arrhythmia was seen back in May is not seen today       Assessment/Plan     Diagnoses and all orders for this visit:    1. Precordial chest pain (Primary)  -     Stress Test With Myocardial Perfusion One Day; Future  -     Adult Transthoracic Echo Complete w/ Color, Spectral and Contrast if necessary per protocol; Future  -     CBC (No Diff); Future    2. History of bleeding ulcers    3. Essential hypertension  -     Lipid Panel; Future  -     Comprehensive Metabolic  Panel; Future  -     TSH; Future    4. Cigarette smoker    5. Chronic midline thoracic back pain    Other orders  -     ECG 12 Lead    1.  Chest pain is typical atypical features the patient has chronic back pain extending from his cervical spine to all of the spine and this could be definitely contributing also had possible gastric surgery with history of peptic ulcer disease he does have risk factors for coronary disease so we will proceed with stress testing as he has significant back pain he will not be able to walk on a treadmill proceed with chemical stress testing for assessment of ischemia, will also get an echocardiogram to assess his cardiac function wall motion and valve morphology  2.  Do not have any lipid profile for him I will get lipid profile prior to the next visit  3.  Because of his sinus tachycardia I will also want to check his TSH most recent back in May was unremarkable also CBC as well as BMP  4.  His blood pressure is well controlled continue current medications  5.  We had a long talk about tobacco abuse I strongly advised him to quit he said he will try to quit    Return after stress test.             MEDS ORDERED DURING VISIT:  No orders of the defined types were placed in this encounter.      As always, Leticia Martinez APRN  I appreciate very much the opportunity to participate in the cardiovascular care of your patients. Please do not hesitate to call me with any questions with regards to Jamison Edward evaluation and management.         This document has been electronically signed by Natalee Henning MD  August 10, 2021 10:46 EDT

## 2021-09-02 ENCOUNTER — APPOINTMENT (OUTPATIENT)
Dept: CARDIOLOGY | Facility: HOSPITAL | Age: 51
End: 2021-09-02

## 2021-09-02 ENCOUNTER — APPOINTMENT (OUTPATIENT)
Dept: NUCLEAR MEDICINE | Facility: HOSPITAL | Age: 51
End: 2021-09-02

## 2021-09-10 ENCOUNTER — PATIENT ROUNDING (BHMG ONLY) (OUTPATIENT)
Dept: CARDIOLOGY | Facility: CLINIC | Age: 51
End: 2021-09-10

## 2021-09-10 NOTE — PROGRESS NOTES
"September 10, 2021    Hello, may I speak with Jamison Edward?    My name is Julieta Ragnel.     I am  with [facility]  [facility]  [address] [address] [phone].    Before we get started may I verify your date of birth? 1970    I am calling to officially welcome you to our practice and ask about your recent visit. Is this a good time to talk? yes    Tell me about your visit with us. What things went well?    \"Everything went good.  I wouldn't change anything.\"       We're always looking for ways to make our patients' experiences even better. Do you have recommendations on ways we may improve?  no    Overall were you satisfied with your first visit to our practice? yes       I appreciate you taking the time to speak with me today. Is there anything else I can do for you? no      Thank you, and have a great day.      "

## 2021-09-22 ENCOUNTER — HOSPITAL ENCOUNTER (OUTPATIENT)
Dept: CARDIOLOGY | Facility: HOSPITAL | Age: 51
Discharge: HOME | End: 2021-09-22

## 2021-09-22 ENCOUNTER — HOSPITAL ENCOUNTER (OUTPATIENT)
Dept: NUCLEAR MEDICINE | Facility: HOSPITAL | Age: 51
Discharge: HOME | End: 2021-09-22

## 2021-09-22 DIAGNOSIS — R07.2 PRECORDIAL CHEST PAIN: ICD-10-CM

## 2021-09-22 LAB
BH CV ECHO MEAS - % IVS THICK: 12.6 %
BH CV ECHO MEAS - % LVPW THICK: 20 %
BH CV ECHO MEAS - ACS: 2 CM
BH CV ECHO MEAS - AO MAX PG: 10 MMHG
BH CV ECHO MEAS - AO MEAN PG: 5 MMHG
BH CV ECHO MEAS - AO ROOT AREA (BSA CORRECTED): 1.7
BH CV ECHO MEAS - AO ROOT AREA: 7.1 CM^2
BH CV ECHO MEAS - AO ROOT DIAM: 3 CM
BH CV ECHO MEAS - AO V2 MAX: 158 CM/SEC
BH CV ECHO MEAS - AO V2 MEAN: 103 CM/SEC
BH CV ECHO MEAS - AO V2 VTI: 25 CM
BH CV ECHO MEAS - BSA(HAYCOCK): 1.8 M^2
BH CV ECHO MEAS - BSA: 1.8 M^2
BH CV ECHO MEAS - BZI_BMI: 24.4 KILOGRAMS/M^2
BH CV ECHO MEAS - BZI_METRIC_HEIGHT: 167.6 CM
BH CV ECHO MEAS - BZI_METRIC_WEIGHT: 68.5 KG
BH CV ECHO MEAS - EDV(CUBED): 64.2 ML
BH CV ECHO MEAS - EDV(MOD-SP4): 77.6 ML
BH CV ECHO MEAS - EDV(TEICH): 70.2 ML
BH CV ECHO MEAS - EF(CUBED): 67.8 %
BH CV ECHO MEAS - EF(MOD-SP4): 62.8 %
BH CV ECHO MEAS - EF(TEICH): 59.9 %
BH CV ECHO MEAS - ESV(CUBED): 20.7 ML
BH CV ECHO MEAS - ESV(MOD-SP4): 28.9 ML
BH CV ECHO MEAS - ESV(TEICH): 28.1 ML
BH CV ECHO MEAS - FS: 31.5 %
BH CV ECHO MEAS - IVS/LVPW: 0.99
BH CV ECHO MEAS - IVSD: 0.99 CM
BH CV ECHO MEAS - IVSS: 1.1 CM
BH CV ECHO MEAS - LA DIMENSION: 3 CM
BH CV ECHO MEAS - LA/AO: 1
BH CV ECHO MEAS - LV DIASTOLIC VOL/BSA (35-75): 43.7 ML/M^2
BH CV ECHO MEAS - LV MASS(C)D: 125.3 GRAMS
BH CV ECHO MEAS - LV MASS(C)DI: 70.6 GRAMS/M^2
BH CV ECHO MEAS - LV MASS(C)S: 90.2 GRAMS
BH CV ECHO MEAS - LV MASS(C)SI: 50.8 GRAMS/M^2
BH CV ECHO MEAS - LV SYSTOLIC VOL/BSA (12-30): 16.3 ML/M^2
BH CV ECHO MEAS - LVIDD: 4 CM
BH CV ECHO MEAS - LVIDS: 2.7 CM
BH CV ECHO MEAS - LVLD AP4: 7.2 CM
BH CV ECHO MEAS - LVLS AP4: 6.4 CM
BH CV ECHO MEAS - LVOT AREA (M): 4.2 CM^2
BH CV ECHO MEAS - LVOT AREA: 4.2 CM^2
BH CV ECHO MEAS - LVOT DIAM: 2.3 CM
BH CV ECHO MEAS - LVPWD: 0.99 CM
BH CV ECHO MEAS - LVPWS: 1.2 CM
BH CV ECHO MEAS - MV A MAX VEL: 118 CM/SEC
BH CV ECHO MEAS - MV E MAX VEL: 97.4 CM/SEC
BH CV ECHO MEAS - MV E/A: 0.83
BH CV ECHO MEAS - PA ACC TIME: 0.1 SEC
BH CV ECHO MEAS - PA PR(ACCEL): 33.1 MMHG
BH CV ECHO MEAS - SI(AO): 99.6 ML/M^2
BH CV ECHO MEAS - SI(CUBED): 24.5 ML/M^2
BH CV ECHO MEAS - SI(MOD-SP4): 27.4 ML/M^2
BH CV ECHO MEAS - SI(TEICH): 23.7 ML/M^2
BH CV ECHO MEAS - SV(AO): 176.7 ML
BH CV ECHO MEAS - SV(CUBED): 43.6 ML
BH CV ECHO MEAS - SV(MOD-SP4): 48.7 ML
BH CV ECHO MEAS - SV(TEICH): 42.1 ML
BH CV NUCLEAR PRIOR STUDY: 3
BH CV REST NUCLEAR ISOTOPE DOSE: 9.4 MCI
BH CV STRESS BP STAGE 1: NORMAL
BH CV STRESS BP STAGE 2: NORMAL
BH CV STRESS COMMENTS STAGE 1: NORMAL
BH CV STRESS COMMENTS STAGE 2: NORMAL
BH CV STRESS DOSE REGADENOSON STAGE 1: 0.4
BH CV STRESS DURATION MIN STAGE 1: 0
BH CV STRESS DURATION MIN STAGE 2: 4
BH CV STRESS DURATION SEC STAGE 1: 10
BH CV STRESS DURATION SEC STAGE 2: 0
BH CV STRESS HR STAGE 1: 117
BH CV STRESS HR STAGE 2: 121
BH CV STRESS NUCLEAR ISOTOPE DOSE: 29.2 MCI
BH CV STRESS PROTOCOL 1: NORMAL
BH CV STRESS RECOVERY BP: NORMAL MMHG
BH CV STRESS RECOVERY HR: 106 BPM
BH CV STRESS STAGE 1: 1
BH CV STRESS STAGE 2: 2
LV EF NUC BP: 72 %
MAXIMAL PREDICTED HEART RATE: 167 BPM
PERCENT MAX PREDICTED HR: 72.46 %
STRESS BASELINE BP: NORMAL MMHG
STRESS BASELINE HR: 96 BPM
STRESS PERCENT HR: 85 %
STRESS POST PEAK BP: NORMAL MMHG
STRESS POST PEAK HR: 121 BPM
STRESS TARGET HR: 142 BPM

## 2021-09-22 PROCEDURE — 78452 HT MUSCLE IMAGE SPECT MULT: CPT | Performed by: SPECIALIST

## 2021-09-22 PROCEDURE — 93018 CV STRESS TEST I&R ONLY: CPT | Performed by: SPECIALIST

## 2021-09-22 PROCEDURE — 0 TECHNETIUM SESTAMIBI: Performed by: SPECIALIST

## 2021-09-22 PROCEDURE — A9500 TC99M SESTAMIBI: HCPCS | Performed by: SPECIALIST

## 2021-09-22 PROCEDURE — 93306 TTE W/DOPPLER COMPLETE: CPT

## 2021-09-22 PROCEDURE — 93017 CV STRESS TEST TRACING ONLY: CPT

## 2021-09-22 PROCEDURE — 93306 TTE W/DOPPLER COMPLETE: CPT | Performed by: SPECIALIST

## 2021-09-22 PROCEDURE — 78452 HT MUSCLE IMAGE SPECT MULT: CPT

## 2021-09-22 PROCEDURE — 25010000002 REGADENOSON 0.4 MG/5ML SOLUTION: Performed by: SPECIALIST

## 2021-09-22 RX ADMIN — REGADENOSON 0.4 MG: 0.08 INJECTION, SOLUTION INTRAVENOUS at 09:50

## 2021-09-22 RX ADMIN — TECHNETIUM TC 99M SESTAMIBI 1 DOSE: 1 INJECTION INTRAVENOUS at 08:50

## 2021-09-22 RX ADMIN — TECHNETIUM TC 99M SESTAMIBI 1 DOSE: 1 INJECTION INTRAVENOUS at 09:50

## 2021-09-30 ENCOUNTER — OFFICE VISIT (OUTPATIENT)
Dept: CARDIOLOGY | Facility: CLINIC | Age: 51
End: 2021-09-30

## 2021-09-30 VITALS
RESPIRATION RATE: 16 BRPM | WEIGHT: 157.6 LBS | HEART RATE: 110 BPM | DIASTOLIC BLOOD PRESSURE: 72 MMHG | BODY MASS INDEX: 25.33 KG/M2 | TEMPERATURE: 97.5 F | SYSTOLIC BLOOD PRESSURE: 107 MMHG | HEIGHT: 66 IN

## 2021-09-30 DIAGNOSIS — I10 ESSENTIAL HYPERTENSION: ICD-10-CM

## 2021-09-30 DIAGNOSIS — R07.2 PRECORDIAL CHEST PAIN: Primary | ICD-10-CM

## 2021-09-30 DIAGNOSIS — R00.0 SINUS TACHYCARDIA: ICD-10-CM

## 2021-09-30 DIAGNOSIS — F17.210 CIGARETTE SMOKER: ICD-10-CM

## 2021-09-30 PROCEDURE — 99214 OFFICE O/P EST MOD 30 MIN: CPT | Performed by: SPECIALIST

## 2021-09-30 RX ORDER — METOPROLOL SUCCINATE 25 MG/1
25 TABLET, EXTENDED RELEASE ORAL DAILY
Qty: 90 TABLET | Refills: 1 | Status: SHIPPED | OUTPATIENT
Start: 2021-09-30 | End: 2021-12-01 | Stop reason: SDUPTHER

## 2021-09-30 RX ORDER — OLMESARTAN MEDOXOMIL 20 MG/1
20 TABLET ORAL DAILY
Qty: 90 TABLET | Refills: 1 | Status: SHIPPED | OUTPATIENT
Start: 2021-09-30 | End: 2022-05-12 | Stop reason: SDUPTHER

## 2021-09-30 NOTE — PROGRESS NOTES
Subjective   Follow up, stress test result  Jamison Edward is a 51 y.o. male who presents to day for Results (stress and echo findings, labs not yet done), Chest Pain (unchanged), Palpitations (races), and Med Management (verbal).    CHIEF COMPLIANT  Chief Complaint   Patient presents with   • Results     stress and echo findings, labs not yet done   • Chest Pain     unchanged   • Palpitations     races   • Med Management     verbal       Active Problems:  Problem List Items Addressed This Visit        Cardiac and Vasculature    Precordial chest pain - Primary    Essential hypertension    Relevant Medications    metoprolol succinate XL (TOPROL-XL) 25 MG 24 hr tablet    olmesartan (BENICAR) 20 MG tablet    Sinus tachycardia       Tobacco    Cigarette smoker          HPI  HPI  No significant change since last seen he still having chest pain radiating to the back most of the days also had intermittent swallowing problems over the solids also his heart is most the time going fast he is in a lot of back pain he was told that he has severe degenerative back disease he was seen by the neurosurgeons in the past and he was told that he cannot be held for surgery at that point no edema he still smokes  PRIOR MEDS  Current Outpatient Medications on File Prior to Visit   Medication Sig Dispense Refill   • baclofen (LIORESAL) 20 MG tablet Take 10-20 mg by mouth 3 (Three) Times a Day As Needed.     • esomeprazole (nexIUM) 40 MG capsule Take 1 capsule by mouth 2 (Two) Times a Day Before Meals. (Patient taking differently: Take 40 mg by mouth Daily.) 60 capsule 3   • levETIRAcetam (KEPPRA) 750 MG tablet Take 750 mg by mouth 3 (Three) Times a Day. Patient takes one tablet in the morning and afternoon and takes two tablets at bedtime.     • metoclopramide (REGLAN) 10 MG tablet Take 1 tablet by mouth 2 (Two) Times a Day Before Meals. 60 tablet 3   • sucralfate (CARAFATE) 1 g tablet Take 1 g by mouth 4 (Four) Times a Day.     •  traZODone (DESYREL) 50 MG tablet Take 1 tablet by mouth Every Night. (Patient taking differently: Take 50 mg by mouth Every Night. 2 tabs at bedtime.) 30 tablet 2   • venlafaxine (EFFEXOR) 75 MG tablet Take 75 mg by mouth every night at bedtime.     • [DISCONTINUED] hydrochlorothiazide (HYDRODIURIL) 25 MG tablet Take 25 mg by mouth Daily.     • [DISCONTINUED] olmesartan (BENICAR) 20 MG tablet Take 20 mg by mouth Daily.       No current facility-administered medications on file prior to visit.       ALLERGIES  Contrast dye, Protonix [pantoprazole sodium], Prilosec [omeprazole], Flagyl [metronidazole], and Topamax [topiramate]    HISTORY  Past Medical History:   Diagnosis Date   • Johnson esophagus    • COPD (chronic obstructive pulmonary disease) (CMS/[facility])    • DDD (degenerative disc disease), thoracic    • Degenerative disc disease, lumbar    • Dystonia    • GERD (gastroesophageal reflux disease)    • Hypertension    • Migraines    • Peptic ulcer disease    • Stroke (CMS/[facility])     TIA   • TIA (transient ischemic attack)    • Wears dentures     upper only       Social History     Socioeconomic History   • Marital status:      Spouse name: Not on file   • Number of children: Not on file   • Years of education: Not on file   • Highest education level: Not on file   Tobacco Use   • Smoking status: Current Every Day Smoker     Packs/day: 1.00     Years: 35.00     Pack years: 35.00     Types: Cigarettes     Last attempt to quit: 2020     Years since quittin.0   • Smokeless tobacco: Never Used   Vaping Use   • Vaping Use: Never used   Substance and Sexual Activity   • Alcohol use: No   • Drug use: No   • Sexual activity: Defer       Family History   Problem Relation Age of Onset   • No Known Problems Mother    • Hypertension Father    • No Known Problems Sister    • No Known Problems Brother    • Drug abuse Brother    • No Known Problems Daughter        Review of Systems   Respiratory: Positive for chest  "tightness. Negative for apnea, cough, choking, shortness of breath, wheezing and stridor.    Cardiovascular: Positive for chest pain and palpitations. Negative for leg swelling.       Objective     VITALS: /72   Pulse 110   Temp 97.5 °F (36.4 °C)   Resp 16   Ht 167.6 cm (66\")   Wt 71.5 kg (157 lb 9.6 oz)   BMI 25.44 kg/m²     LABS:   Lab Results (most recent)     None          IMAGING:   No Images in the past 120 days found..    EXAM:  Physical Exam  Constitutional:       Appearance: He is well-developed.   HENT:      Head: Normocephalic and atraumatic.   Eyes:      Pupils: Pupils are equal, round, and reactive to light.   Neck:      Thyroid: No thyromegaly.      Vascular: No JVD.   Cardiovascular:      Rate and Rhythm: Regular rhythm. Tachycardia present.      Chest Wall: PMI is not displaced.      Pulses: Normal pulses.      Heart sounds: Normal heart sounds, S1 normal and S2 normal. No murmur heard.   No friction rub. No gallop.    Pulmonary:      Effort: Pulmonary effort is normal. No respiratory distress.      Breath sounds: Normal breath sounds. No stridor. No wheezing or rales.   Chest:      Chest wall: No tenderness.   Abdominal:      General: Bowel sounds are normal. There is no distension.      Palpations: Abdomen is soft. There is no mass.      Tenderness: There is no abdominal tenderness. There is no guarding or rebound.   Musculoskeletal:      Cervical back: Neck supple. No edema.   Skin:     General: Skin is warm and dry.      Coloration: Skin is not pale.      Findings: No erythema or rash.   Neurological:      Mental Status: He is alert and oriented to person, place, and time.      Cranial Nerves: No cranial nerve deficit.      Coordination: Coordination normal.   Psychiatric:         Behavior: Behavior normal.         Procedure   Procedures       Assessment/Plan     Diagnoses and all orders for this visit:    1. Precordial chest pain (Primary)    2. Essential hypertension    3. Sinus " tachycardia    4. Cigarette smoker    Other orders  -     metoprolol succinate XL (TOPROL-XL) 25 MG 24 hr tablet; Take 1 tablet by mouth Daily.  Dispense: 90 tablet; Refill: 1  -     olmesartan (BENICAR) 20 MG tablet; Take 1 tablet by mouth Daily.  Dispense: 90 tablet; Refill: 1    1.  We discussed the results of the stress test with no ischemia I suspect the pain is probably related to referred back pain he has severe arthritis of his back he is going to see a neurologist for further assessment  2.  He is continued to be tachycardic he said he is in a lot of pain I am going to stop the HCTZ and add Toprol instead to try to slow down the heartbeat check his TSH  3.  His blood pressure is well controlled as mentioned above but will DC the HCTZ because he want to add Toprol  4.  We will get lipid profile prior to next visit  5.  Talked about quitting smoking he will think about    Return in about 3 months (around 12/30/2021).           MEDS ORDERED DURING VISIT:  New Medications Ordered This Visit   Medications   • metoprolol succinate XL (TOPROL-XL) 25 MG 24 hr tablet     Sig: Take 1 tablet by mouth Daily.     Dispense:  90 tablet     Refill:  1   • olmesartan (BENICAR) 20 MG tablet     Sig: Take 1 tablet by mouth Daily.     Dispense:  90 tablet     Refill:  1       As always, Leticia Martinez APRN  I appreciate very much the opportunity to participate in the cardiovascular care of your patients. Please do not hesitate to call me with any questions with regards to Jamison Edward evaluation and management.         This document has been electronically signed by Natalee Henning MD  September 30, 2021 12:57 EDT

## 2021-11-29 ENCOUNTER — LAB (OUTPATIENT)
Dept: LAB | Facility: HOSPITAL | Age: 51
End: 2021-11-29

## 2021-11-29 DIAGNOSIS — I10 ESSENTIAL HYPERTENSION: ICD-10-CM

## 2021-11-29 DIAGNOSIS — R07.2 PRECORDIAL CHEST PAIN: ICD-10-CM

## 2021-11-29 LAB
ALBUMIN SERPL-MCNC: 4.05 G/DL (ref 3.5–5.2)
ALBUMIN/GLOB SERPL: 1.7 G/DL
ALP SERPL-CCNC: 198 U/L (ref 39–117)
ALT SERPL W P-5'-P-CCNC: 90 U/L (ref 1–41)
ANION GAP SERPL CALCULATED.3IONS-SCNC: 9.4 MMOL/L (ref 5–15)
AST SERPL-CCNC: 118 U/L (ref 1–40)
BILIRUB SERPL-MCNC: 0.6 MG/DL (ref 0–1.2)
BUN SERPL-MCNC: 4 MG/DL (ref 6–20)
BUN/CREAT SERPL: 5.2 (ref 7–25)
CALCIUM SPEC-SCNC: 8.9 MG/DL (ref 8.6–10.5)
CHLORIDE SERPL-SCNC: 104 MMOL/L (ref 98–107)
CHOLEST SERPL-MCNC: 145 MG/DL (ref 0–200)
CO2 SERPL-SCNC: 25.6 MMOL/L (ref 22–29)
CREAT SERPL-MCNC: 0.77 MG/DL (ref 0.76–1.27)
DEPRECATED RDW RBC AUTO: 47.1 FL (ref 37–54)
ERYTHROCYTE [DISTWIDTH] IN BLOOD BY AUTOMATED COUNT: 12.4 % (ref 12.3–15.4)
GFR SERPL CREATININE-BSD FRML MDRD: 106 ML/MIN/1.73
GLOBULIN UR ELPH-MCNC: 2.5 GM/DL
GLUCOSE SERPL-MCNC: 110 MG/DL (ref 65–99)
HCT VFR BLD AUTO: 44.8 % (ref 37.5–51)
HDLC SERPL-MCNC: 87 MG/DL (ref 40–60)
HGB BLD-MCNC: 14.9 G/DL (ref 13–17.7)
LDLC SERPL CALC-MCNC: 38 MG/DL (ref 0–100)
LDLC/HDLC SERPL: 0.4 {RATIO}
MCH RBC QN AUTO: 33.8 PG (ref 26.6–33)
MCHC RBC AUTO-ENTMCNC: 33.3 G/DL (ref 31.5–35.7)
MCV RBC AUTO: 101.6 FL (ref 79–97)
PLATELET # BLD AUTO: 212 10*3/MM3 (ref 140–450)
PMV BLD AUTO: 9.1 FL (ref 6–12)
POTASSIUM SERPL-SCNC: 4.9 MMOL/L (ref 3.5–5.2)
PROT SERPL-MCNC: 6.5 G/DL (ref 6–8.5)
RBC # BLD AUTO: 4.41 10*6/MM3 (ref 4.14–5.8)
SODIUM SERPL-SCNC: 139 MMOL/L (ref 136–145)
TRIGL SERPL-MCNC: 115 MG/DL (ref 0–150)
TSH SERPL DL<=0.05 MIU/L-ACNC: 0.72 UIU/ML (ref 0.27–4.2)
VLDLC SERPL-MCNC: 20 MG/DL (ref 5–40)
WBC NRBC COR # BLD: 8.31 10*3/MM3 (ref 3.4–10.8)

## 2021-11-29 PROCEDURE — 80061 LIPID PANEL: CPT

## 2021-11-29 PROCEDURE — 36415 COLL VENOUS BLD VENIPUNCTURE: CPT

## 2021-11-29 PROCEDURE — 85027 COMPLETE CBC AUTOMATED: CPT

## 2021-11-29 PROCEDURE — 84443 ASSAY THYROID STIM HORMONE: CPT

## 2021-11-29 PROCEDURE — 80053 COMPREHEN METABOLIC PANEL: CPT

## 2021-12-01 ENCOUNTER — OFFICE VISIT (OUTPATIENT)
Dept: CARDIOLOGY | Facility: CLINIC | Age: 51
End: 2021-12-01

## 2021-12-01 VITALS
WEIGHT: 155 LBS | TEMPERATURE: 96 F | OXYGEN SATURATION: 99 % | BODY MASS INDEX: 24.91 KG/M2 | SYSTOLIC BLOOD PRESSURE: 116 MMHG | HEIGHT: 66 IN | DIASTOLIC BLOOD PRESSURE: 72 MMHG | HEART RATE: 87 BPM

## 2021-12-01 DIAGNOSIS — I10 ESSENTIAL HYPERTENSION: Primary | ICD-10-CM

## 2021-12-01 DIAGNOSIS — R00.0 SINUS TACHYCARDIA: ICD-10-CM

## 2021-12-01 DIAGNOSIS — M54.6 CHRONIC MIDLINE THORACIC BACK PAIN: ICD-10-CM

## 2021-12-01 DIAGNOSIS — Z72.0 TOBACCO USE: ICD-10-CM

## 2021-12-01 DIAGNOSIS — G89.29 CHRONIC MIDLINE THORACIC BACK PAIN: ICD-10-CM

## 2021-12-01 DIAGNOSIS — R07.89 CHEST PAIN, ATYPICAL: ICD-10-CM

## 2021-12-01 PROCEDURE — 99213 OFFICE O/P EST LOW 20 MIN: CPT | Performed by: NURSE PRACTITIONER

## 2021-12-01 RX ORDER — NITROGLYCERIN 0.4 MG/1
0.4 TABLET SUBLINGUAL
Qty: 25 TABLET | Refills: 2 | Status: SHIPPED | OUTPATIENT
Start: 2021-12-01 | End: 2022-03-28

## 2021-12-01 RX ORDER — METOPROLOL SUCCINATE 25 MG/1
25 TABLET, EXTENDED RELEASE ORAL DAILY
Qty: 90 TABLET | Refills: 5 | Status: SHIPPED | OUTPATIENT
Start: 2021-12-01 | End: 2022-04-14 | Stop reason: SDUPTHER

## 2021-12-01 RX ORDER — NITROGLYCERIN 0.4 MG/1
0.4 TABLET SUBLINGUAL
COMMUNITY
End: 2021-12-01 | Stop reason: SDUPTHER

## 2021-12-01 NOTE — PROGRESS NOTES
Leticia Martinez, EILEEN  Jamison Edward  1970  12/01/2021    Patient Active Problem List   Diagnosis   • Precordial chest pain   • Weight loss, abnormal   • Right upper quadrant abdominal pain   • Epigastric pain   • History of bleeding ulcers   • Nausea   • Malaise and fatigue   • Acute gastric ulcer without hemorrhage or perforation   • Poor appetite   • Duodenal ulcer   • Gastroesophageal reflux disease   • Dysphagia   • Iron deficiency anemia   • Malabsorption   • Gastric ulcer without hemorrhage or perforation   • Dystonia   • Peptic ulcer without hemorrhage or perforation   • Gastric outlet obstruction   • Incisional hernia, without obstruction or gangrene   • Incisional hernia   • Chest pain, atypical   • Gastric bezoar   • Essential hypertension   • Cigarette smoker   • Chronic back pain   • Sinus tachycardia   • Tobacco use       Dear Leticia Martinez APRN:    Subjective     Chief Complaint   Patient presents with   • Med Management     list    • Chest Pain   • Dizziness   • Palpitations     History of Present Illness:    Jamison Edward is a 51 y.o. male with a history of tobacco abuse, essential hypertension, chronic back pain, and Wegeners granulomatosis.  Patient presents today for routine cardiology follow-up.  Patient states since his last visit he has been doing some better.  He continues to have back pain.  Also continue to have intermittent sharp pain in the middle of his chest that lasts for a few hours and then resolves with nitroglycerin.  He feels like the chest pain is related to his back pain. Blood pressure has been controlled at home.  He states that he is following with rheumatology and neurosurgery for his other issues.  Nuclear stress test showed no evidence of ischemia in August 2021.  Echocardiogram showed normal LV function.  TSH, CBC and lipid panel essentially unremarkable.  Did have mildly elevated liver enzymes.  Patient denies any history of coronary artery disease.  He  is currently smoking a pack a day for last 30 years.    Allergies   Allergen Reactions   • Contrast Dye Other (See Comments)     Shortness of breath   • Protonix [Pantoprazole Sodium] Palpitations     Patient states that protonix causes chest pain.  Shruthi Pérez, [facility]  4/23/2017  11:19 AM  Pt states he can tolerate pepcid with no problem     • Prilosec [Omeprazole] Other (See Comments)     Chest pain  Pt states he can take pepcid with no problem   • Flagyl [Metronidazole] Nausea And Vomiting   • Topamax [Topiramate] Anxiety   :      Current Outpatient Medications:   •  baclofen (LIORESAL) 20 MG tablet, Take 10-20 mg by mouth 3 (Three) Times a Day As Needed., Disp: , Rfl:   •  esomeprazole (nexIUM) 40 MG capsule, Take 1 capsule by mouth 2 (Two) Times a Day Before Meals. (Patient taking differently: Take 40 mg by mouth Daily.), Disp: 60 capsule, Rfl: 3  •  levETIRAcetam (KEPPRA) 750 MG tablet, Take 750 mg by mouth 3 (Three) Times a Day. Patient takes one tablet in the morning and afternoon and takes two tablets at bedtime., Disp: , Rfl:   •  metoprolol succinate XL (TOPROL-XL) 25 MG 24 hr tablet, Take 1 tablet by mouth Daily., Disp: 90 tablet, Rfl: 5  •  nitroglycerin (NITROSTAT) 0.4 MG SL tablet, Place 1 tablet under the tongue Every 5 (Five) Minutes As Needed for Chest Pain. Take no more than 3 doses in 15 minutes., Disp: 25 tablet, Rfl: 2  •  olmesartan (BENICAR) 20 MG tablet, Take 1 tablet by mouth Daily., Disp: 90 tablet, Rfl: 1  •  sucralfate (CARAFATE) 1 g tablet, Take 1 g by mouth 4 (Four) Times a Day., Disp: , Rfl:   •  traZODone (DESYREL) 50 MG tablet, Take 1 tablet by mouth Every Night. (Patient taking differently: Take 50 mg by mouth Every Night. 2 tabs at bedtime.), Disp: 30 tablet, Rfl: 2  •  venlafaxine (EFFEXOR) 75 MG tablet, Take 75 mg by mouth every night at bedtime., Disp: , Rfl:       The following portions of the patient's history were reviewed today and updated today as appropriate:  "allergies, current medications, past family history, past medical history, past social history, past surgical history and problem list.    Social History     Tobacco Use   • Smoking status: Current Every Day Smoker     Packs/day: 1.00     Years: 35.00     Pack years: 35.00     Types: Cigarettes     Last attempt to quit: 2020     Years since quittin.2   • Smokeless tobacco: Never Used   Vaping Use   • Vaping Use: Never used   Substance Use Topics   • Alcohol use: No   • Drug use: No     Objective   Vitals:    21 1403   BP: 116/72   BP Location: Left arm   Patient Position: Sitting   Cuff Size: Adult   Pulse: 87   Temp: 96 °F (35.6 °C)   TempSrc: Infrared   SpO2: 99%   Weight: 70.3 kg (155 lb)   Height: 167.6 cm (66\")     Body mass index is 25.02 kg/m².      Constitutional:       Appearance: Healthy appearance. Well-developed.   Eyes:      Pupils: Pupils are equal, round, and reactive to light.   HENT:      Head: Normocephalic.   Neck:      Lymphadenopathy: No cervical adenopathy.   Pulmonary:      Effort: Pulmonary effort is normal. No respiratory distress.      Breath sounds: Normal breath sounds. No wheezing.   Cardiovascular:      Normal rate. Regular rhythm. Normal S1. Normal S2.      . No S3 and S4 gallop.   Pulses:     Intact distal pulses.   Abdominal:      General: Bowel sounds are normal. There is no distension.      Palpations: Abdomen is soft.      Tenderness: There is no abdominal tenderness.   Musculoskeletal:      Cervical back: Normal range of motion. Skin:     General: Skin is warm and dry.      Findings: No erythema.   Neurological:      Mental Status: Alert, oriented to person, place, and time and oriented to person, place and time.   Psychiatric:         Attention and Perception: Attention normal.         Mood and Affect: Mood normal.         Behavior: Behavior normal.         Thought Content: Thought content normal.         Lab Results   Component Value Date     2021    K " 4.9 11/29/2021     11/29/2021    CO2 25.6 11/29/2021    BUN 4 (L) 11/29/2021    CREATININE 0.77 11/29/2021    GLUCOSE 110 (H) 11/29/2021    CALCIUM 8.9 11/29/2021     (H) 11/29/2021    ALT 90 (H) 11/29/2021    ALKPHOS 198 (H) 11/29/2021     Lab Results   Component Value Date    CKTOTAL 69 04/29/2017     Lab Results   Component Value Date    WBC 8.31 11/29/2021    HGB 14.9 11/29/2021    HCT 44.8 11/29/2021     11/29/2021     Lab Results   Component Value Date    INR 0.80 (L) 05/12/2021    INR 0.84 (L) 07/02/2018    INR 0.95 10/25/2017     Lab Results   Component Value Date    MG 1.6 05/12/2021     Lab Results   Component Value Date    TSH 0.718 11/29/2021    TRIG 115 11/29/2021    HDL 87 (H) 11/29/2021    LDL 38 11/29/2021        Assessment/Plan    Diagnosis Plan   1. Essential hypertension     2. Sinus tachycardia     3. Chronic midline thoracic back pain     4. Chest pain, atypical     5. Tobacco use         Recommendations:  1. Essential hypertension/tachycardia  · Blood pressure has been controlled since changing HCTZ to Toprol-XL.  Patient reports improvement of his symptoms.  Continue with Toprol-XL.    2.  Chronic back pain/chest pain  · Patient still having intermittent chest pains that are atypical.  He does have chronic back pain and is following up with neurosurgery in the next coming weeks.  He also was diagnosed with Wegeners disease and is supposed to follow-up with rheumatology also.  Patient thinks that his chest pain is related to his rheumatology issues and his chronic back pain.  · Nuclear stress test was negative for ischemia recently.  Echocardiogram showed normal LV function.  We will continue to monitor for now.  Discussed with patient that if he continues to have chest pain despite interventions for his rheumatology issues and chronic back pain will consider further work-up future.  He is agreeable to this.  · Continue with statin.  Lipid panel reviewed and was  acceptable.    3.  Tobacco abuse  · Patient continues to smoke.  Discussed with him with importance of smoking cessation to reduce his risk of cardiovascular disease.      Return in about 3 months (around 3/1/2022) for Next scheduled follow up.    As always, I appreciate very much the opportunity to participate in the cardiovascular care of your patients.      With Best Regards,          EILEEN Coronado

## 2021-12-06 ENCOUNTER — OFFICE VISIT (OUTPATIENT)
Dept: ONCOLOGY | Facility: CLINIC | Age: 51
End: 2021-12-06

## 2021-12-06 VITALS
BODY MASS INDEX: 24.18 KG/M2 | SYSTOLIC BLOOD PRESSURE: 105 MMHG | WEIGHT: 149.8 LBS | RESPIRATION RATE: 18 BRPM | OXYGEN SATURATION: 95 % | HEART RATE: 105 BPM | DIASTOLIC BLOOD PRESSURE: 70 MMHG | TEMPERATURE: 97.5 F

## 2021-12-06 DIAGNOSIS — E83.39 HYPOPHOSPHATEMIA: ICD-10-CM

## 2021-12-06 DIAGNOSIS — D50.9 IRON DEFICIENCY ANEMIA, UNSPECIFIED IRON DEFICIENCY ANEMIA TYPE: Primary | ICD-10-CM

## 2021-12-06 DIAGNOSIS — E53.8 FOLATE DEFICIENCY: ICD-10-CM

## 2021-12-06 DIAGNOSIS — E53.8 B12 DEFICIENCY: ICD-10-CM

## 2021-12-06 LAB
FERRITIN SERPL-MCNC: 556 NG/ML (ref 30–400)
IRON 24H UR-MRATE: 105 MCG/DL (ref 59–158)
IRON SATN MFR SERPL: 37 % (ref 20–50)
TIBC SERPL-MCNC: 288 MCG/DL (ref 298–536)
TRANSFERRIN SERPL-MCNC: 193 MG/DL (ref 200–360)

## 2021-12-06 PROCEDURE — 99214 OFFICE O/P EST MOD 30 MIN: CPT | Performed by: INTERNAL MEDICINE

## 2021-12-06 PROCEDURE — 82607 VITAMIN B-12: CPT | Performed by: INTERNAL MEDICINE

## 2021-12-06 PROCEDURE — 82728 ASSAY OF FERRITIN: CPT | Performed by: INTERNAL MEDICINE

## 2021-12-06 PROCEDURE — 82746 ASSAY OF FOLIC ACID SERUM: CPT | Performed by: INTERNAL MEDICINE

## 2021-12-06 PROCEDURE — 83540 ASSAY OF IRON: CPT | Performed by: INTERNAL MEDICINE

## 2021-12-06 PROCEDURE — 84466 ASSAY OF TRANSFERRIN: CPT | Performed by: INTERNAL MEDICINE

## 2021-12-06 NOTE — PROGRESS NOTES
Jamison Edward  [id number]  1970  12/6/2021      Referring Provider:   Kenton Boogie MD    Primary Physician:  Lázaro Heart MD    Reason for Follow Up:   Normocytic anemia  Iron deficiency  B12 deficiency    Chief Complaint:   Iron and B12 deficiency      History of Present Illness:  Jamison Edward is a very pleasant 51 y.o.  male who presents in follow up appointment for further management and evaluation of normocytic anemia secondary to iron and B12 deficiency.    Mr. Edward has been following with Dr. Richey for history of peptic ulcer disease and hematemesis. He was placed on oral iron once daily several months ago and experienced some constipation with the medication however self discontinued this one month prior to his initial consultation. He has had gradual weight loss where he previously weighed 205 pounds two years ago and is currently down to 140 pounds. He states that he continues to have a good appetite and eat normally. He recently had an EGD in November 2017 for weight loss, hematemesis and abdominal pain which was significant for gastric and duodenal ulcers. He does have history of peptic ulcer disease and in the past and has also been followed by Dr. Starr for Johnson's esophagus. He also had an EGD in May 2017 that showed normal duodenal biopsies, negative H pylori, along with normal random colon biopsies. He also had a CT abd/pelvis performed in 11/2017 which showed possible colitis versus under distension of the right colon. He is currently on ASA 81mg daily due to a TIA in the past however denies of any other NSAID use. He also experiences chest pain every night and has been evaluated by cardiology and as per patient was felt to be non cardiac related. He underwent an EGD with Dr. Richey and was found to have gastric and duodenal ulcers and is to continue current treatment for the ulcers and was to undergo repeat endoscopy. He has also been following with  gastroenterology in [place] with Dr. Moran for further evaluation. He underwent an MRCP in August that was essentially unremarkable as well as an abdominal duplex. He had a vagotomy, antrectomy, Steven-en-Y gastrojejunostomy for peptic ulcer with gastric outlet obstruction that was performed on 9/17/2019.      Interim History:  He has not required IV iron in for some time and no longer takes B12 injections. Since his last visit he was admitted at the end of 2020 and underwent hernia repair. He has been having increasing epigastric/chest pain which is mildly improved with Carafate. He was evaluated by gastroenterology earlier this year and was noted to have reflux and inflammation. He continues to experience some pain and denies of any bleeding.         The following portions of the patient's history were reviewed and updated as appropriate: allergies, current medications, past family history, past medical history, past social history, past surgical history and problem list.    Allergies   Allergen Reactions   • Contrast Dye Other (See Comments)     Shortness of breath   • Protonix [Pantoprazole Sodium] Palpitations     Patient states that protonix causes chest pain.  Shruthi Pérez, [facility]  4/23/2017  11:19 AM  Pt states he can tolerate pepcid with no problem     • Prilosec [Omeprazole] Other (See Comments)     Chest pain  Pt states he can take pepcid with no problem   • Flagyl [Metronidazole] Nausea And Vomiting   • Topamax [Topiramate] Anxiety       Past Medical History:   Diagnosis Date   • Johnson esophagus    • COPD (chronic obstructive pulmonary disease) (HCC)    • DDD (degenerative disc disease), thoracic    • Degenerative disc disease, lumbar    • Dystonia    • GERD (gastroesophageal reflux disease)    • Hypertension    • Migraines    • Peptic ulcer disease    • Stroke (HCC)     TIA   • TIA (transient ischemic attack)    • Wears dentures     upper only       Past Surgical History:   Procedure Laterality  Date   • APPENDECTOMY     • CHOLECYSTECTOMY N/A 4/22/2017    Procedure: CHOLECYSTECTOMY LAPAROSCOPIC;  Surgeon: Jaqueline Guaman MD;  Location:  [facility];  Service:    • COLONOSCOPY N/A 5/8/2017    Procedure: COLONOSCOPY  CPTCODE:31919;  Surgeon: Nicho Richey III, MD;  Location:  [facility];  Service:    • ENDOSCOPY     • ENDOSCOPY N/A 5/8/2017    Procedure: ESOPHAGOGASTRODUODENOSCOPY WITH BIOPSY  CPTCODE:53636;  Surgeon: Nicho Richey III, MD;  Location:  [facility];  Service:    • ENDOSCOPY N/A 11/3/2017    Procedure: ESOPHAGOGASTRODUODENOSCOPY WITH BIOPSY  CPTCODE: 38931;  Surgeon: Nicho Richey III, MD;  Location:  [facility];  Service:    • ENDOSCOPY N/A 2/20/2018    Procedure: ESOPHAGOGASTRODUODENOSCOPY WITH DILATATION CPT CODE: 41856;  Surgeon: Nicho Richey III, MD;  Location:  [facility];  Service:    • ENDOSCOPY N/A 6/5/2018    Procedure: ESOPHAGOGASTRODUODENOSCOPY WITH BIOPSY CPT CODE: 26239;  Surgeon: Nicho Richey III, MD;  Location:  [facility];  Service: Gastroenterology   • ENDOSCOPY N/A 5/26/2021    Procedure: ESOPHAGOGASTRODUODENOSCOPY WITH BIOPSY;  Surgeon: Julieta Hebert MD;  Location:  [facility];  Service: Gastroenterology;  Laterality: N/A;   • ENDOSCOPY N/A 6/2/2021    Procedure: ESOPHAGOGASTRODUODENOSCOPY WITH BIOPSY;  Surgeon: Julieta Hebert MD;  Location:  [facility];  Service: Gastroenterology;  Laterality: N/A;   • GASTRECTOMY N/A 9/17/2019    Procedure: OPEN VAGOTOMY, ANTRECTOMY,REVISION- Y GASTROJEJUNOSOTOMY;  Surgeon: Herbert Umanzor MD;  Location:  [facility];  Service: General   • GASTRIC BYPASS     • UPPER GASTROINTESTINAL ENDOSCOPY     • VENTRAL/INCISIONAL HERNIA REPAIR N/A 12/10/2020    Procedure: INCISIONAL HERNIA REPAIR LAPAROSCOPIC WITH MESH;  Surgeon: Herbert Umanzor MD;  Location:  [facility];  Service: General;  Laterality: N/A;       Social History     Socioeconomic History   • Marital status:    Tobacco Use   • Smoking  status: Current Every Day Smoker     Packs/day: 1.00     Years: 35.00     Pack years: 35.00     Types: Cigarettes     Last attempt to quit: 2020     Years since quittin.2   • Smokeless tobacco: Never Used   Vaping Use   • Vaping Use: Never used   Substance and Sexual Activity   • Alcohol use: No   • Drug use: No   • Sexual activity: Defer       Family History   Problem Relation Age of Onset   • No Known Problems Mother    • Hypertension Father    • No Known Problems Sister    • No Known Problems Brother    • Drug abuse Brother    • No Known Problems Daughter    Maternal GF - H&N cancer  Paternal GF - Prostate cancer          Current Outpatient Medications:   •  baclofen (LIORESAL) 20 MG tablet, Take 10-20 mg by mouth 3 (Three) Times a Day As Needed., Disp: , Rfl:   •  esomeprazole (nexIUM) 40 MG capsule, Take 1 capsule by mouth 2 (Two) Times a Day Before Meals. (Patient taking differently: Take 40 mg by mouth Daily.), Disp: 60 capsule, Rfl: 3  •  levETIRAcetam (KEPPRA) 750 MG tablet, Take 750 mg by mouth 3 (Three) Times a Day. Patient takes one tablet in the morning and afternoon and takes two tablets at bedtime., Disp: , Rfl:   •  metoprolol succinate XL (TOPROL-XL) 25 MG 24 hr tablet, Take 1 tablet by mouth Daily., Disp: 90 tablet, Rfl: 5  •  nitroglycerin (NITROSTAT) 0.4 MG SL tablet, Place 1 tablet under the tongue Every 5 (Five) Minutes As Needed for Chest Pain. Take no more than 3 doses in 15 minutes., Disp: 25 tablet, Rfl: 2  •  olmesartan (BENICAR) 20 MG tablet, Take 1 tablet by mouth Daily., Disp: 90 tablet, Rfl: 1  •  sucralfate (CARAFATE) 1 g tablet, Take 1 g by mouth 4 (Four) Times a Day., Disp: , Rfl:   •  traZODone (DESYREL) 50 MG tablet, Take 1 tablet by mouth Every Night. (Patient taking differently: Take 50 mg by mouth Every Night. 2 tabs at bedtime.), Disp: 30 tablet, Rfl: 2  •  venlafaxine (EFFEXOR) 75 MG tablet, Take 75 mg by mouth every night at bedtime., Disp: , Rfl:           Review of  Systems  A comprehensive 14 point review of systems was performed.  Significant findings as mentioned above.  All other systems reviewed and are negative. No bleeding.           Physical Exam  Vital Signs: These were reviewed and listed as per patient’s electronic medical chart  Vitals:    12/06/21 1201   BP: 105/70   Pulse: 105   Resp: 18   Temp: 97.5 °F (36.4 °C)   SpO2: 95%     General: Awake, alert and oriented, in no distress  HEENT: Head is atraumatic, normocephalic, extraocular movements full, no scleral icterus  Neck: supple, no jvd, lymphadenopathy or masses  Cardiovascular: regular rate and rhythm without murmurs, rubs or gallops  Pulmonary: non-labored, clear to auscultation bilaterally, no wheezing  Abdomen: soft, non-tender, non-distended, normal active bowel sounds present, no organomegaly  Extremities: No clubbing, cyanosis or edema  Lymph: No cervical, supraclavicular adenopathy  Neurologic: Mental status as above, alert, awake and oriented, grossly non-focal exam  Skin: warm, dry, intact          Labs / Studies:    Office Visit on 12/06/2021   Component Date Value   • Iron 12/06/2021 105    • Iron Saturation 12/06/2021 37    • Transferrin 12/06/2021 193*   • TIBC 12/06/2021 288*   • Ferritin 12/06/2021 556.00*   Lab on 11/29/2021   Component Date Value   • Total Cholesterol 11/29/2021 145    • Triglycerides 11/29/2021 115    • HDL Cholesterol 11/29/2021 87*   • LDL Cholesterol  11/29/2021 38    • VLDL Cholesterol 11/29/2021 20    • LDL/HDL Ratio 11/29/2021 0.40    • Glucose 11/29/2021 110*   • BUN 11/29/2021 4*   • Creatinine 11/29/2021 0.77    • Sodium 11/29/2021 139    • Potassium 11/29/2021 4.9    • Chloride 11/29/2021 104    • CO2 11/29/2021 25.6    • Calcium 11/29/2021 8.9    • Total Protein 11/29/2021 6.5    • Albumin 11/29/2021 4.05    • ALT (SGPT) 11/29/2021 90*   • AST (SGOT) 11/29/2021 118*   • Alkaline Phosphatase 11/29/2021 198*   • Total Bilirubin 11/29/2021 0.6    • eGFR Non   Amer 11/29/2021 106    • Globulin 11/29/2021 2.5    • A/G Ratio 11/29/2021 1.7    • BUN/Creatinine Ratio 11/29/2021 5.2*   • Anion Gap 11/29/2021 9.4    • WBC 11/29/2021 8.31    • RBC 11/29/2021 4.41    • Hemoglobin 11/29/2021 14.9    • Hematocrit 11/29/2021 44.8    • MCV 11/29/2021 101.6*   • MCH 11/29/2021 33.8*   • MCHC 11/29/2021 33.3    • RDW 11/29/2021 12.4    • RDW-SD 11/29/2021 47.1    • MPV 11/29/2021 9.1    • Platelets 11/29/2021 212    • TSH 11/29/2021 0.718    Hospital Outpatient Visit on 09/22/2021   Component Date Value   • Target HR (85%) 09/22/2021 142    • Max. Pred. HR (100%) 09/22/2021 167    • Nuclear Prior Study 09/22/2021 3    • BH CV REST NUCLEAR ISOTO* 09/22/2021 9.4    • BH CV STRESS NUCLEAR ISO* 09/22/2021 29.2    •  CV STRESS PROTOCOL 1 09/22/2021 Pharmacologic    • Stage 1 09/22/2021 1    • HR Stage 1 09/22/2021 117    • BP Stage 1 09/22/2021 131/72    • Duration Min Stage 1 09/22/2021 0    • Duration Sec Stage 1 09/22/2021 10    • Stress Dose Regadenoson * 09/22/2021 0.4    • Stress Comments Stage 1 09/22/2021 10 sec bolus injection    • Stage 2 09/22/2021 2    • HR Stage 2 09/22/2021 121    • BP Stage 2 09/22/2021 120/69    • Duration Min Stage 2 09/22/2021 4    • Duration Sec Stage 2 09/22/2021 0    • Stress Comments Stage 2 09/22/2021 recovery    • Baseline HR 09/22/2021 96    • Baseline BP 09/22/2021 130/72    • Peak HR 09/22/2021 121    • Percent Max Pred HR 09/22/2021 72.46    • Percent Target HR 09/22/2021 85    • Peak BP 09/22/2021 120/69    • Recovery HR 09/22/2021 106    • Recovery BP 09/22/2021 119/70    • Nuc Stress EF 09/22/2021 72    Hospital Outpatient Visit on 09/22/2021   Component Date Value   • BSA 09/22/2021 1.8    • IVSd 09/22/2021 0.99    • IVSs 09/22/2021 1.1    • LVIDd 09/22/2021 4.0    • LVIDs 09/22/2021 2.7    • LVPWd 09/22/2021 0.99    • BH CV ECHO SAYRA - LVPWS 09/22/2021 1.2    • IVS/LVPW 09/22/2021 0.99    • FS 09/22/2021 31.5    • EDV(Teich) 09/22/2021  70.2    • ESV(Teich) 2021 28.1    • EF(Teich) 2021 59.9    • EDV(cubed) 2021 64.2    • ESV(cubed) 2021 20.7    • EF(cubed) 2021 67.8    • % IVS thick 2021 12.6    • % LVPW thick 2021 20.0    • LV mass(C)d 2021 125.3    • LV mass(C)dI 2021 70.6    • LV mass(C)s 2021 90.2    • LV mass(C)sI 2021 50.8    • SV(Teich) 2021 42.1    • SI(Teich) 2021 23.7    • SV(cubed) 2021 43.6    • SI(cubed) 2021 24.5    • Ao root diam 2021 3.0    • Ao root area 2021 7.1    • ACS 2021 2.0    • LA dimension 2021 3.0    • LA/Ao 2021 1.0    • LVOT diam 2021 2.3    • LVOT area 2021 4.2    • LVOT area(traced) 2021 4.2    • LVLd ap4 2021 7.2    • EDV(MOD-sp4) 2021 77.6    • LVLs ap4 2021 6.4    • ESV(MOD-sp4) 2021 28.9    • EF(MOD-sp4) 2021 62.8    • SV(MOD-sp4) 2021 48.7    • SI(MOD-sp4) 2021 27.4    • Ao root area (BSA correc* 2021 1.7    • LV Lord Vol (BSA correct* 2021 43.7    • LV Sys Vol (BSA correcte* 2021 16.3    • MV E max aparna 2021 97.4    • MV A max aparna 2021 118.0    • MV E/A 2021 0.83    • Ao pk aparna 2021 158.0    • Ao max PG 2021 10.0    • Ao V2 mean 2021 103.0    • Ao mean PG 2021 5.0    • Ao V2 VTI 2021 25.0    • SV(Ao) 2021 176.7    • SI(Ao) 2021 99.6    • PA acc time 2021 0.1    • PA pr(Accel) 2021 33.1    • BH CV ECHO SAYRA - BZI_BMI 2021 24.4    • BH CV ECHO SAYRA - BSA(HA* 2021 1.8    • BH CV ECHO SAYRA - BZI_ME* 2021 68.5    • BH CV ECHO SAYRA - BZI_ME* 2021 167.6           IMAGIN18: CT CHEST WO CONTRAST: LUNGS: MODERATE BILATERAL LUNG EMPHYSEMA. HEART: Unremarkable. PERICARDIUM: No effusion. MEDIASTINUM: No masses. No enlarged lymph nodes.  No fluid collections. PLEURA: No pleural effusion. No pleural mass or abnormal  calcification. MAJOR AIRWAYS: Clear. No intrinsic mass. VASCULATURE: No evidence of aneurysm. VISUALIZED UPPER ABDOMEN: LIVER: Homogeneous. No focal hepatic mass or ductal dilatation. SPLEEN: Homogeneous. No splenomegaly.  ADRENALS: No mass. KIDNEYS: No mass. No obstructive uropathy.  No evidence of Urolithiasis. GI TRACT: Non-dilated. No definite wall thickening. PERITONEUM: No free air. No free fluid or loculated fluid Collections. ABDOMINAL WALL: No focal hernia or mass. OTHER: None. BONES: No acute bony abnormality. Impression: 1. Unremarkable exam.  No source for the patient symptoms. 2. Other incidental/non-acute findings as above.    04/13/18: MRI ABDOMEN WO CONTRAST MRCP: multiple axial gradient-echo T1 and T2-weighted images were obtained. Oblique coronal T2-weighted images were  acquired to evaluate the bile ducts. The common bile duct and common hepatic duct and main intrahepatic bile ducts are well-demonstrated and appear within normal limits. No gallstones are identified and there are no strictures. There is no bile duct dilatation. The main pancreatic duct was also fairly well demonstrated and appears within normal limits. The liver, spleen, pancreas, and adrenal glands appear within normal limits. IMPRESSION: Unremarkable MRCP imaging of the abdomen.     10/29/20: CT ABDOMEN PELVIS WO CONTRAST: The lung bases are clear. Body wall soft tissues demonstrate a 4 cm defect in the ventral abdominal wall near the umbilicus containing noninflamed nondilated segments of bowel. The liver, spleen, pancreas and bilateral adrenal glands demonstrate homogeneous attenuation without evidence of focal lesion. Unremarkable bilateral kidneys. No free fluid or pneumoperitoneum. No acute fracture or aggressive osseous lesion. Small and large bowel loops are nondilated. No suspicious focal bowel wall thickening. The pelvic viscera are unremarkable. No retroperitoneal adenopathy. Normal caliber atherosclerotic abdominal  aorta. IMPRESSION: 4 cm ventral body wall hernia adjacent to the umbilicus containing mildly narrowed but otherwise nondilated and noninflamed segments of bowel. No additional acute findings in the abdomen and pelvis.        PATHOLOGY:  01/16/18: Peripheral Smear:                Assessment/Plan   Jamison Edward is a very pleasant 51 y.o.  male who presents in follow up appointment for further management and evaluation of normocytic anemia secondary to iron and B12 deficiency.    Normocytic anemia  Iron deficiency   B12 deficiency  - His hemoglobin is normal today along with normal iron panel. He has an elevated ferritin level and likely secondary to underlying inflammation.   - During his initial consultation the iron panel and ferritin were consistent with iron deficiency and was restarted on ferrous sulfate three times daily. He tolerated this well, however, continued to be iron deficient and was started on IV Injectafer as he likely has malabsorption to the oral iron. After his last dose he experienced severe hypophosphatemia and required phosphorus supplementation for quite some time which later eventually normalized. Therefore will change this to Feraheme should he require IV iron in the future. Phosphorus level has been normal thereafter.   - I have also obtained Zinc, Copper levels as well as a tissues transglutaminase level which were normal.  - He was following with gastroenterology closely and previously had an EGD which was significant for gastric and duodenal ulcers without malignancy and underwent surgical resection with improvement in his symptoms September 2019.   - Folate has been low normal therefore will repeat today - he is currently not on folate supplementation. B12 was low normal and he was started on B12 injections which have now been discontinued. Levels pending today  - ROWENA was normal, LDH, retic count normal going against hemolysis, and haptoglobin was high. TSH normal.  Peripheral smear as above.  - Hemoglobin and hematocrit are normal today along with iron panel and he does not require further iron, ferritin is elevated and likely due to underlying inflammation. Given that his hemoglobin has normalized and labs have been stable, patient can continue to follow with his primary provider with periodic complete blood count checks every six months and return to clinic as needed should any hematological concerns arise.     ACO / CONRAD/Other  Quality measures  -  Mr. Edward is a current smoker and we previously discussed smoking cessation. He understands the risks of smoking such as polycythemia, cancer, COPD. At present he has been trying to quit but would prefer to quit on his own and has cut back to <1/2ppd.   -  Jamison Edward did not receive 2021 flu vaccine. Declined.   -  Jamison Edward reports a pain score of 0.  Given his pain assessment as noted, treatment options were discussed and the following options were decided upon as a follow-up plan to address the patient's pain: continuation of current treatment plan for pain.  -  Current outpatient and discharge medications have been reconciled for the patient.  Reviewed by: Cami Gustafson MD        I will have the patient return on an as needed basis. He understands that should he have any hematological questions or concerns he should give us a call at any time and I would be happy to re-evaluate him. It was a pleasure to see this patient in clinic today, thank you for allowing me to participate in the care of this patient.      Cami Gustafson MD  12/06/2021  13:20 EST

## 2021-12-07 LAB
FOLATE SERPL-MCNC: 7.71 NG/ML (ref 4.78–24.2)
VIT B12 BLD-MCNC: 375 PG/ML (ref 211–946)

## 2022-03-18 ENCOUNTER — TELEPHONE (OUTPATIENT)
Dept: CARDIOLOGY | Facility: CLINIC | Age: 52
End: 2022-03-18

## 2022-03-28 RX ORDER — NITROGLYCERIN 0.4 MG/1
TABLET SUBLINGUAL
Qty: 25 TABLET | Refills: 2 | Status: SHIPPED | OUTPATIENT
Start: 2022-03-28 | End: 2022-05-12 | Stop reason: SDUPTHER

## 2022-04-14 ENCOUNTER — OFFICE VISIT (OUTPATIENT)
Dept: GASTROENTEROLOGY | Facility: CLINIC | Age: 52
End: 2022-04-14

## 2022-04-14 VITALS
DIASTOLIC BLOOD PRESSURE: 81 MMHG | SYSTOLIC BLOOD PRESSURE: 134 MMHG | BODY MASS INDEX: 23.78 KG/M2 | HEART RATE: 72 BPM | WEIGHT: 148 LBS | HEIGHT: 66 IN

## 2022-04-14 DIAGNOSIS — R07.89 ATYPICAL CHEST PAIN: ICD-10-CM

## 2022-04-14 DIAGNOSIS — K21.00 GASTROESOPHAGEAL REFLUX DISEASE WITH ESOPHAGITIS WITHOUT HEMORRHAGE: Primary | ICD-10-CM

## 2022-04-14 PROCEDURE — 99214 OFFICE O/P EST MOD 30 MIN: CPT | Performed by: INTERNAL MEDICINE

## 2022-04-14 RX ORDER — QUETIAPINE FUMARATE 50 MG/1
50 TABLET, FILM COATED ORAL NIGHTLY
COMMUNITY
Start: 2022-04-11 | End: 2023-01-05 | Stop reason: HOSPADM

## 2022-04-14 RX ORDER — METOCLOPRAMIDE 10 MG/1
10 TABLET ORAL
Qty: 60 TABLET | Refills: 5 | Status: SHIPPED | OUTPATIENT
Start: 2022-04-14 | End: 2023-01-05 | Stop reason: HOSPADM

## 2022-04-14 RX ORDER — LEVETIRACETAM 500 MG/1
1 TABLET ORAL 2 TIMES DAILY
COMMUNITY
End: 2022-04-14 | Stop reason: SDUPTHER

## 2022-04-14 RX ORDER — BACLOFEN 20 MG/1
TABLET ORAL EVERY 8 HOURS
COMMUNITY
End: 2022-06-01 | Stop reason: SDUPTHER

## 2022-04-14 NOTE — PROGRESS NOTES
Subjective   Jamison Edward is a 51 y.o. male who presents to the office today as a follow up appointment regarding EGD FOLLOW-UP       History of Present Illness:  Previous history: The patient states he was seen in the ER last Wednesday for chest pain.  He states the pain was sharp and went down both arms.  He was told it was his esophagus and not his heart.  He has a history of GBS due to PUD and GOO. This was performed by Dr. Herbert Umanzor  in [facility] in Sept 2019.  He did well until last year at which time he developed a hernia.  In Dec 2020, he underwent lap  Incisional hernia repair.  He did well up until last Wednesday.  The pain hit suddenly.  He does have heartburn and reflux.  He takes Protonix and Carafate.  He still has pain in his chest.  No weight loss.  His appetite is good.  He admits to nausea but no vomiting. He has had a CCY.   A CT scan on May 12, 2021 revealed stable post surgical changes from gastrectomy and gastrojejunostomy, prior appendectomy and cholecystectomy, diffuse fatty infiltration of liver, mild constipation and no bowel obstruction. He underwent a screening colonoscopy in May 2017.  It was recommended at that time he undergo a 10 yr repeat.    Interval History:  The patient presents for follow up EGD which revealed evidence of gastric bypass surgery.  He was placed on Nexium and Reglan.  He has noted improvement on the Reglan.  He is not have acid reflux any more.  He feels like the nausea is improved on the Reglan as well.  He still has chest pain every day.  He is not sure where is is coming from.  Nitroglycerin helps the chest pain.  He has had an extensive cardiac work-up per his report.  The patient is also on baclofen which he states helps his GI discomfort.  Overall, he states that he is significantly improved.  He has gained weight.  His appetite is improved as well.  His wife states that he is gone from a size 30-32 and his pant size.  In respect to Reglan, he  denies any irregular movements of the mouth and tongue.  He has had no irregular eminence of his extremities.  There have been no signs of tardive dyskinesia.      Review of Systems:  Review of Systems   Constitutional: Negative.    HENT: Positive for trouble swallowing. Negative for sore throat.    Eyes: Negative.    Respiratory: Negative for chest tightness.    Cardiovascular: Negative for chest pain.   Gastrointestinal: Positive for nausea. Negative for abdominal distention, abdominal pain, anal bleeding, blood in stool, constipation, diarrhea, rectal pain and vomiting.   Endocrine: Negative.    Genitourinary: Negative for difficulty urinating.   Musculoskeletal: Positive for back pain and neck pain.   Skin: Negative.    Allergic/Immunologic: Negative for environmental allergies and food allergies.   Neurological: Positive for headaches. Negative for dizziness and seizures.   Hematological: Does not bruise/bleed easily.   Psychiatric/Behavioral: Positive for sleep disturbance.       Past Medical History:  Past Medical History:   Diagnosis Date   • Anemia    • Johnson esophagus    • Cholelithiasis    • COPD (chronic obstructive pulmonary disease) (HCC)    • DDD (degenerative disc disease), thoracic    • Degenerative disc disease, lumbar    • Dystonia    • GERD (gastroesophageal reflux disease)    • GI (gastrointestinal bleed)    • Hypertension    • Irritable bowel syndrome    • Migraines    • Peptic ulcer disease    • Stroke (HCC)     TIA   • TIA (transient ischemic attack)    • Ulcerative colitis (HCC)    • Wears dentures     upper only       Past Surgical History:  Past Surgical History:   Procedure Laterality Date   • ABDOMINAL SURGERY  09/17/2021   • APPENDECTOMY     • CHOLECYSTECTOMY N/A 04/22/2017    Procedure: CHOLECYSTECTOMY LAPAROSCOPIC;  Surgeon: Jaqueline Guaman MD;  Location: [facility];  Service:    • COLONOSCOPY N/A 05/08/2017    Procedure: COLONOSCOPY  CPTCODE:74870;  Surgeon: Nicho Richey  III, MD;  Location:  [facility];  Service:    • ENDOSCOPY     • ENDOSCOPY N/A 05/08/2017    Procedure: ESOPHAGOGASTRODUODENOSCOPY WITH BIOPSY  CPTCODE:80802;  Surgeon: Nicho Richey III, MD;  Location:  [facility];  Service:    • ENDOSCOPY N/A 11/03/2017    Procedure: ESOPHAGOGASTRODUODENOSCOPY WITH BIOPSY  CPTCODE: 47067;  Surgeon: Nicho Richey III, MD;  Location:  [facility];  Service:    • ENDOSCOPY N/A 02/20/2018    Procedure: ESOPHAGOGASTRODUODENOSCOPY WITH DILATATION CPT CODE: 73896;  Surgeon: Nicho Richey III, MD;  Location:  [facility];  Service:    • ENDOSCOPY N/A 06/05/2018    Procedure: ESOPHAGOGASTRODUODENOSCOPY WITH BIOPSY CPT CODE: 34577;  Surgeon: Nicho Richey III, MD;  Location:  [facility];  Service: Gastroenterology   • ENDOSCOPY N/A 05/26/2021    Procedure: ESOPHAGOGASTRODUODENOSCOPY WITH BIOPSY;  Surgeon: Julieta Hebert MD;  Location:  [facility];  Service: Gastroenterology;  Laterality: N/A;   • ENDOSCOPY N/A 06/02/2021    Procedure: ESOPHAGOGASTRODUODENOSCOPY WITH BIOPSY;  Surgeon: Julieta Hebert MD;  Location:  [facility];  Service: Gastroenterology;  Laterality: N/A;   • GASTRECTOMY N/A 09/17/2019    Procedure: OPEN VAGOTOMY, ANTRECTOMY,REVISION- Y GASTROJEJUNOSOTOMY;  Surgeon: Herbert Umanzor MD;  Location:  [facility];  Service: General   • GASTRIC BYPASS     • UPPER GASTROINTESTINAL ENDOSCOPY     • VENTRAL/INCISIONAL HERNIA REPAIR N/A 12/10/2020    Procedure: INCISIONAL HERNIA REPAIR LAPAROSCOPIC WITH MESH;  Surgeon: Herbert Umanzor MD;  Location:  [facility];  Service: General;  Laterality: N/A;       Family History:  Family History   Problem Relation Age of Onset   • No Known Problems Mother    • Hypertension Father    • No Known Problems Sister    • No Known Problems Brother    • Drug abuse Brother    • No Known Problems Daughter        Social History:  Social History     Socioeconomic History   • Marital status:    Tobacco Use   • Smoking  status: Current Every Day Smoker     Packs/day: 1.00     Years: 35.00     Pack years: 35.00     Types: Cigarettes     Last attempt to quit: 2020     Years since quittin.6   • Smokeless tobacco: Never Used   • Tobacco comment: Been smoking 20 years   Vaping Use   • Vaping Use: Never used   Substance and Sexual Activity   • Alcohol use: No   • Drug use: No   • Sexual activity: Yes     Partners: Female       Current Medication List:    Current Outpatient Medications:   •  baclofen (LIORESAL) 20 MG tablet, Take 10-20 mg by mouth 3 (Three) Times a Day As Needed., Disp: , Rfl:   •  baclofen (LIORESAL) 20 MG tablet, Take  by mouth Every 8 (Eight) Hours., Disp: , Rfl:   •  esomeprazole (nexIUM) 40 MG capsule, Take 1 capsule by mouth 2 (Two) Times a Day Before Meals. (Patient taking differently: Take 40 mg by mouth Daily.), Disp: 60 capsule, Rfl: 3  •  levETIRAcetam (KEPPRA) 750 MG tablet, Take 750 mg by mouth 3 (Three) Times a Day. Patient takes one tablet in the morning and afternoon and takes two tablets at bedtime., Disp: , Rfl:   •  metoprolol tartrate (LOPRESSOR) 25 MG tablet, Take 1 tablet by mouth Daily., Disp: , Rfl:   •  nitroglycerin (NITROSTAT) 0.4 MG SL tablet, DISSOLVE 1 TABLET UNDER TONGUE AS NEEDED FOR CHEST PAIN - IF PAIN REMAINS AFTER 5 MIN, CALL 911 AND REPEAT DOSE. MAX 3 TABLETS IN 15 MINUTES, Disp: 25 tablet, Rfl: 2  •  olmesartan (BENICAR) 20 MG tablet, Take 1 tablet by mouth Daily., Disp: 90 tablet, Rfl: 1  •  QUEtiapine (SEROquel) 50 MG tablet, take 1 tablet by oral route every night, Disp: , Rfl:   •  sucralfate (CARAFATE) 1 g tablet, Take 1 g by mouth 4 (Four) Times a Day., Disp: , Rfl:   •  venlafaxine (EFFEXOR) 75 MG tablet, Take 75 mg by mouth every night at bedtime., Disp: , Rfl:   •  metoclopramide (REGLAN) 10 MG tablet, Take 1 tablet by mouth 2 (Two) Times a Day Before Meals., Disp: 60 tablet, Rfl: 5    Allergies:   Contrast dye, Protonix [pantoprazole sodium], Prilosec [omeprazole],  "Flagyl [metronidazole], and Topamax [topiramate]    Vitals:  /81   Pulse 72   Ht 167.6 cm (65.98\")   Wt 67.1 kg (148 lb)   BMI 23.90 kg/m²     Physical Exam:  Physical Exam  Constitutional:       Appearance: He is normal weight.   HENT:      Head: Normocephalic and atraumatic.      Nose: Nose normal. No congestion or rhinorrhea.   Eyes:      General: No scleral icterus.     Extraocular Movements: Extraocular movements intact.      Conjunctiva/sclera: Conjunctivae normal.      Pupils: Pupils are equal, round, and reactive to light.   Cardiovascular:      Rate and Rhythm: Normal rate and regular rhythm.      Pulses: Normal pulses.      Heart sounds: Normal heart sounds.   Pulmonary:      Effort: Pulmonary effort is normal.      Breath sounds: Normal breath sounds.   Abdominal:      General: Abdomen is flat. Bowel sounds are normal. There is no distension.      Palpations: Abdomen is soft. There is no shifting dullness, fluid wave, hepatomegaly, splenomegaly, mass or pulsatile mass.      Tenderness: There is no abdominal tenderness. There is no guarding or rebound.      Hernia: No hernia is present.   Musculoskeletal:         General: No swelling or tenderness.      Cervical back: Normal range of motion and neck supple.   Skin:     General: Skin is warm and dry.      Coloration: Skin is not jaundiced.   Neurological:      General: No focal deficit present.      Mental Status: He is alert and oriented to person, place, and time.   Psychiatric:         Mood and Affect: Mood normal.         Behavior: Behavior normal.         Results Review:  Lab Results:   No visits with results within 1 Month(s) from this visit.   Latest known visit with results is:   Office Visit on 12/06/2021   Component Date Value Ref Range Status   • Vitamin B-12 12/06/2021 375  211 - 946 pg/mL Final   • Folate 12/06/2021 7.71  4.78 - 24.20 ng/mL Final   • Iron 12/06/2021 105  59 - 158 mcg/dL Final   • Iron Saturation 12/06/2021 37  20 - 50 " % Final   • Transferrin 12/06/2021 193 (A) 200 - 360 mg/dL Final   • TIBC 12/06/2021 288 (A) 298 - 536 mcg/dL Final   • Ferritin 12/06/2021 556.00 (A) 30.00 - 400.00 ng/mL Final       Assessment/Plan     Visit Diagnoses:    ICD-10-CM ICD-9-CM   1. Gastroesophageal reflux disease with esophagitis without hemorrhage  K21.00 530.81     530.10   2. Atypical chest pain  R07.89 786.59       Plan:  Orders Placed This Encounter   Procedures   • FL Upper GI With Air Contrast       I am going to have the patient undergo upper GI series to get a rough idea as to whether or not esophageal spasm as the source of his chest pain.  Currently, we do not have esophageal manometry available.  The patient will continue his current medications of Reglan and Protonix.  Although he still has some nausea, this is significantly improved.      MEDS ORDERED DURING VISIT:  New Medications Ordered This Visit   Medications   • metoclopramide (REGLAN) 10 MG tablet     Sig: Take 1 tablet by mouth 2 (Two) Times a Day Before Meals.     Dispense:  60 tablet     Refill:  5       Return in about 8 weeks (around 6/9/2022).             This document has been electronically signed by Julieta Hebert MD   April 15, 2022 13:07 EDT      Part of this note may be an electronic transcription/translation of spoken language to printed text using the Dragon Dictation System.

## 2022-04-20 ENCOUNTER — HOSPITAL ENCOUNTER (OUTPATIENT)
Dept: GENERAL RADIOLOGY | Facility: HOSPITAL | Age: 52
Discharge: HOME | End: 2022-04-20
Admitting: INTERNAL MEDICINE

## 2022-04-20 DIAGNOSIS — K21.00 GASTROESOPHAGEAL REFLUX DISEASE WITH ESOPHAGITIS WITHOUT HEMORRHAGE: ICD-10-CM

## 2022-04-20 DIAGNOSIS — R07.89 ATYPICAL CHEST PAIN: ICD-10-CM

## 2022-04-20 PROCEDURE — 74246 X-RAY XM UPR GI TRC 2CNTRST: CPT | Performed by: RADIOLOGY

## 2022-04-20 PROCEDURE — 74246 X-RAY XM UPR GI TRC 2CNTRST: CPT

## 2022-04-26 NOTE — PROGRESS NOTES
Your recent upper GI series revealed partial gastrectomy.  The lower esophageal sphincter is not closing appropriately and allowing reflux into the distal esophagus.  There was no evidence of gastric outlet obstruction.  Please continue your current medications.  Please keep your follow-up appointment.
no

## 2022-05-12 ENCOUNTER — OFFICE VISIT (OUTPATIENT)
Dept: CARDIOLOGY | Facility: CLINIC | Age: 52
End: 2022-05-12

## 2022-05-12 VITALS
BODY MASS INDEX: 26.39 KG/M2 | OXYGEN SATURATION: 99 % | HEIGHT: 66 IN | HEART RATE: 101 BPM | SYSTOLIC BLOOD PRESSURE: 114 MMHG | TEMPERATURE: 97.1 F | DIASTOLIC BLOOD PRESSURE: 73 MMHG | WEIGHT: 164.2 LBS

## 2022-05-12 DIAGNOSIS — F17.210 CIGARETTE SMOKER: ICD-10-CM

## 2022-05-12 DIAGNOSIS — R07.2 PRECORDIAL CHEST PAIN: ICD-10-CM

## 2022-05-12 DIAGNOSIS — I10 ESSENTIAL HYPERTENSION: Primary | ICD-10-CM

## 2022-05-12 DIAGNOSIS — R00.0 SINUS TACHYCARDIA: ICD-10-CM

## 2022-05-12 PROCEDURE — 99214 OFFICE O/P EST MOD 30 MIN: CPT | Performed by: SPECIALIST

## 2022-05-12 PROCEDURE — 93000 ELECTROCARDIOGRAM COMPLETE: CPT | Performed by: SPECIALIST

## 2022-05-12 RX ORDER — NITROGLYCERIN 0.4 MG/1
0.4 TABLET SUBLINGUAL
Qty: 100 TABLET | Refills: 2 | Status: SHIPPED | OUTPATIENT
Start: 2022-05-12 | End: 2023-01-05 | Stop reason: HOSPADM

## 2022-05-12 RX ORDER — OLMESARTAN MEDOXOMIL 20 MG/1
20 TABLET ORAL DAILY
Qty: 90 TABLET | Refills: 1 | Status: SHIPPED | OUTPATIENT
Start: 2022-05-12 | End: 2022-10-26

## 2022-05-12 RX ORDER — ISOSORBIDE MONONITRATE 30 MG/1
30 TABLET, EXTENDED RELEASE ORAL DAILY
Qty: 30 TABLET | Refills: 11 | Status: SHIPPED | OUTPATIENT
Start: 2022-05-12 | End: 2023-01-05 | Stop reason: HOSPADM

## 2022-05-12 NOTE — PROGRESS NOTES
Subjective    Follow up, hypertension  Jamison Edward is a 51 y.o. male who presents to day for Follow-up.    CHIEF COMPLIANT  Chief Complaint   Patient presents with   • Follow-up       Active Problems:  Problem List Items Addressed This Visit        Cardiac and Vasculature    Precordial chest pain    Relevant Medications    isosorbide mononitrate (IMDUR) 30 MG 24 hr tablet    nitroglycerin (NITROSTAT) 0.4 MG SL tablet    Essential hypertension - Primary    Relevant Medications    metoprolol tartrate (LOPRESSOR) 25 MG tablet    olmesartan (BENICAR) 20 MG tablet    Sinus tachycardia       Tobacco    Cigarette smoker          HPI  HPI  Has stable shortness of breath to moderate exertion, and occasional chest pain, first thing in the morning, not during rest of the day, no palpitations, no edema, still smoking  PRIOR MEDS  Current Outpatient Medications on File Prior to Visit   Medication Sig Dispense Refill   • baclofen (LIORESAL) 20 MG tablet Take 10-20 mg by mouth 3 (Three) Times a Day As Needed.     • baclofen (LIORESAL) 20 MG tablet Take  by mouth Every 8 (Eight) Hours.     • esomeprazole (nexIUM) 40 MG capsule Take 1 capsule by mouth 2 (Two) Times a Day Before Meals. (Patient taking differently: Take 40 mg by mouth Daily.) 60 capsule 3   • levETIRAcetam (KEPPRA) 750 MG tablet Take 750 mg by mouth 3 (Three) Times a Day. Patient takes one tablet in the morning and afternoon and takes two tablets at bedtime.     • metoclopramide (REGLAN) 10 MG tablet Take 1 tablet by mouth 2 (Two) Times a Day Before Meals. 60 tablet 5   • QUEtiapine (SEROquel) 50 MG tablet take 1 tablet by oral route every night     • venlafaxine (EFFEXOR) 75 MG tablet Take 75 mg by mouth every night at bedtime.     • [DISCONTINUED] metoprolol tartrate (LOPRESSOR) 25 MG tablet Take 1 tablet by mouth Daily.     • [DISCONTINUED] nitroglycerin (NITROSTAT) 0.4 MG SL tablet DISSOLVE 1 TABLET UNDER TONGUE AS NEEDED FOR CHEST PAIN - IF PAIN REMAINS  AFTER 5 MIN, CALL 911 AND REPEAT DOSE. MAX 3 TABLETS IN 15 MINUTES 25 tablet 2   • [DISCONTINUED] olmesartan (BENICAR) 20 MG tablet Take 1 tablet by mouth Daily. 90 tablet 1   • [DISCONTINUED] sucralfate (CARAFATE) 1 g tablet Take 1 g by mouth 4 (Four) Times a Day.       No current facility-administered medications on file prior to visit.       ALLERGIES  Contrast dye, Protonix [pantoprazole sodium], Prilosec [omeprazole], Flagyl [metronidazole], and Topamax [topiramate]    HISTORY  Past Medical History:   Diagnosis Date   • Anemia    • Johnson esophagus    • Cholelithiasis    • COPD (chronic obstructive pulmonary disease) (HCC)    • DDD (degenerative disc disease), thoracic    • Degenerative disc disease, lumbar    • Dystonia    • GERD (gastroesophageal reflux disease)    • GI (gastrointestinal bleed)    • Hypertension    • Irritable bowel syndrome    • Migraines    • Peptic ulcer disease    • Stroke (HCC)     TIA   • TIA (transient ischemic attack)    • Ulcerative colitis (HCC)    • Wears dentures     upper only       Social History     Socioeconomic History   • Marital status:    Tobacco Use   • Smoking status: Current Every Day Smoker     Packs/day: 1.00     Years: 35.00     Pack years: 35.00     Types: Cigarettes     Last attempt to quit: 2020     Years since quittin.6   • Smokeless tobacco: Never Used   • Tobacco comment: Been smoking 20 years   Vaping Use   • Vaping Use: Never used   Substance and Sexual Activity   • Alcohol use: No   • Drug use: No   • Sexual activity: Yes     Partners: Female       Family History   Problem Relation Age of Onset   • No Known Problems Mother    • Hypertension Father    • No Known Problems Sister    • No Known Problems Brother    • Drug abuse Brother    • No Known Problems Daughter        Review of Systems   Respiratory: Positive for chest tightness and shortness of breath. Negative for apnea, cough, choking, wheezing and stridor.    Cardiovascular: Negative for  "chest pain, palpitations and leg swelling.       Objective     VITALS: /73   Pulse 101   Temp 97.1 °F (36.2 °C)   Ht 167.6 cm (66\")   Wt 74.5 kg (164 lb 3.2 oz)   SpO2 99%   BMI 26.50 kg/m²     LABS:   Lab Results (most recent)     None          IMAGING:   FL Upper GI Double Contrast    Result Date: 4/20/2022  Status post partial gastrectomy with a Billroth I type anastomosis. There is a patulous lower esophageal sphincter with reflux into the distal esophagus. There is no evidence of mechanical gastric outlet obstruction.  This report was finalized on 4/20/2022 10:36 AM by Dr. Curtis Hedrick II, MD.        EXAM:  Physical Exam  Constitutional:       Appearance: He is well-developed.   HENT:      Head: Normocephalic and atraumatic.   Eyes:      Pupils: Pupils are equal, round, and reactive to light.   Neck:      Thyroid: No thyromegaly.      Vascular: No JVD.   Cardiovascular:      Rate and Rhythm: Normal rate and regular rhythm.      Chest Wall: PMI is not displaced.      Pulses: Normal pulses.      Heart sounds: Normal heart sounds, S1 normal and S2 normal. No murmur heard.    No friction rub. No gallop.   Pulmonary:      Effort: Pulmonary effort is normal. No respiratory distress.      Breath sounds: Normal breath sounds. No stridor. No wheezing or rales.   Chest:      Chest wall: No tenderness.   Abdominal:      General: Bowel sounds are normal. There is no distension.      Palpations: Abdomen is soft. There is no mass.      Tenderness: There is no abdominal tenderness. There is no guarding or rebound.   Musculoskeletal:      Cervical back: Neck supple. No edema.   Skin:     General: Skin is warm and dry.      Coloration: Skin is not pale.      Findings: No erythema or rash.   Neurological:      Mental Status: He is alert and oriented to person, place, and time.      Cranial Nerves: No cranial nerve deficit.      Coordination: Coordination normal.   Psychiatric:         Behavior: Behavior normal. "         Procedure     ECG 12 Lead    Date/Time: 5/12/2022 8:53 AM  Performed by: Natalee Henning MD  Authorized by: Natalee Henning MD            EKG: Sinus tachycardia, otherwise within normal limits, no change compare with EKG  On 8/10/21       Assessment & Plan     Diagnoses and all orders for this visit:    1. Essential hypertension (Primary)  -     metoprolol tartrate (LOPRESSOR) 25 MG tablet; Take 1 tablet by mouth Daily.  Dispense: 90 tablet; Refill: 1  -     olmesartan (BENICAR) 20 MG tablet; Take 1 tablet by mouth Daily.  Dispense: 90 tablet; Refill: 1    2. Precordial chest pain  -     isosorbide mononitrate (IMDUR) 30 MG 24 hr tablet; Take 1 tablet by mouth Daily.  Dispense: 30 tablet; Refill: 11  -     nitroglycerin (NITROSTAT) 0.4 MG SL tablet; Place 1 tablet under the tongue Every 5 (Five) Minutes As Needed for Chest Pain. Take no more than 3 doses in 15 minutes.  Dispense: 100 tablet; Refill: 2    3. Sinus tachycardia    4. Cigarette smoker    Other orders  -     ECG 12 Lead    1.  His blood pressure is well controlled we will continue with the olmesartan and metoprolol  2.  He still having occasional chest pain mostly in the morning I will try to get him long-acting nitroglycerin to see if this will help his stress test was negative for ischemia  3.  His sinus tachycardia his TSH is unremarkable apparently according to him and his heart rate has been going relatively fast for many many years  4.  We talked about quitting smoking how important this he will think about it  5.  His labs from November showed excellent lipid profile    Return in about 6 months (around 11/12/2022).             MEDS ORDERED DURING VISIT:  New Medications Ordered This Visit   Medications   • isosorbide mononitrate (IMDUR) 30 MG 24 hr tablet     Sig: Take 1 tablet by mouth Daily.     Dispense:  30 tablet     Refill:  11   • metoprolol tartrate (LOPRESSOR) 25 MG tablet     Sig: Take 1 tablet by mouth Daily.     Dispense:  90  tablet     Refill:  1   • olmesartan (BENICAR) 20 MG tablet     Sig: Take 1 tablet by mouth Daily.     Dispense:  90 tablet     Refill:  1   • nitroglycerin (NITROSTAT) 0.4 MG SL tablet     Sig: Place 1 tablet under the tongue Every 5 (Five) Minutes As Needed for Chest Pain. Take no more than 3 doses in 15 minutes.     Dispense:  100 tablet     Refill:  2       As always, Leticia Martinez APRN  I appreciate very much the opportunity to participate in the cardiovascular care of your patients. Please do not hesitate to call me with any questions with regards to Jamison Edward evaluation and management.         This document has been electronically signed by Natalee Henning MD  May 12, 2022 09:30 EDT

## 2022-05-16 ENCOUNTER — TELEPHONE (OUTPATIENT)
Dept: CARDIOLOGY | Facility: CLINIC | Age: 52
End: 2022-05-16

## 2022-05-16 NOTE — TELEPHONE ENCOUNTER
Patient was previously on Toprol XL 25 mg and metoprolol tartrate 25 qd was sent in.  Did he need switched or does this need re sent sent in as succinate or tartae BID?

## 2022-05-17 RX ORDER — METOPROLOL SUCCINATE 25 MG/1
25 TABLET, EXTENDED RELEASE ORAL DAILY
Qty: 90 TABLET | Refills: 3 | Status: SHIPPED | OUTPATIENT
Start: 2022-05-17 | End: 2023-01-05 | Stop reason: HOSPADM

## 2022-06-01 ENCOUNTER — OFFICE VISIT (OUTPATIENT)
Dept: ORTHOPEDIC SURGERY | Facility: CLINIC | Age: 52
End: 2022-06-01

## 2022-06-01 VITALS
HEIGHT: 66 IN | WEIGHT: 157 LBS | HEART RATE: 93 BPM | SYSTOLIC BLOOD PRESSURE: 118 MMHG | DIASTOLIC BLOOD PRESSURE: 79 MMHG | BODY MASS INDEX: 25.23 KG/M2

## 2022-06-01 DIAGNOSIS — G56.22 CUBITAL TUNNEL SYNDROME, LEFT: Primary | ICD-10-CM

## 2022-06-01 DIAGNOSIS — Z01.818 PREOPERATIVE TESTING: ICD-10-CM

## 2022-06-01 PROBLEM — G56.00 MILD CARPAL TUNNEL SYNDROME: Status: ACTIVE | Noted: 2022-06-01

## 2022-06-01 PROBLEM — G56.23 CUBITAL TUNNEL SYNDROME, BILATERAL: Status: ACTIVE | Noted: 2022-06-01

## 2022-06-01 PROCEDURE — 99204 OFFICE O/P NEW MOD 45 MIN: CPT | Performed by: ORTHOPAEDIC SURGERY

## 2022-06-01 NOTE — PROGRESS NOTES
History & Physical      Patient: Jamison Edward  YOB: 1970  Date of Encounter: 06/01/2022        Chief Complaint:   Chief Complaint   Patient presents with   • Right Elbow - Initial Evaluation, Pain, Numbness   • Left Elbow - Initial Evaluation, Pain, Numbness           HPI:   Jamison Edward, 51 y.o. male, referred by Ezequiel Noel MD presents for evaluation of bilateral upper extremity pain left greater than right.  He reports symptoms about 2 years duration restively worsening.  He localizes the majority of his pain to his elbow and forearms and describes numbness in his fourth and fifth fingers left greater than right with flexion of his elbows.  His past medical history includes dystonia, TIA and chronic back pain, degenerative disc disease lumbar and thoracic.        Active Problem List:  Patient Active Problem List   Diagnosis   • Precordial chest pain   • Weight loss, abnormal   • Right upper quadrant abdominal pain   • Epigastric pain   • History of bleeding ulcers   • Nausea   • Malaise and fatigue   • Acute gastric ulcer without hemorrhage or perforation   • Poor appetite   • Duodenal ulcer   • Gastroesophageal reflux disease   • Dysphagia   • Iron deficiency anemia   • Malabsorption   • Gastric ulcer without hemorrhage or perforation   • Dystonia   • Peptic ulcer without hemorrhage or perforation   • Gastric outlet obstruction   • Incisional hernia, without obstruction or gangrene   • Incisional hernia   • Chest pain, atypical   • Gastric bezoar   • Essential hypertension   • Cigarette smoker   • Chronic back pain   • Sinus tachycardia   • Tobacco use   • Cubital tunnel syndrome, left   • Mild carpal tunnel syndrome           Past Medical History:  Past Medical History:   Diagnosis Date   • Anemia    • Arthritis of back     not sure   • Johnson esophagus    • Cholelithiasis    • COPD (chronic obstructive pulmonary disease) (HCC)    • CTS (carpal tunnel syndrome)    • DDD  (degenerative disc disease), thoracic    • Degenerative disc disease, lumbar    • Dystonia    • GERD (gastroesophageal reflux disease)    • GI (gastrointestinal bleed)    • Hypertension    • Irritable bowel syndrome    • Low back strain    • Lumbosacral disc disease    • Migraines    • Peptic ulcer disease    • Stroke (HCC)     TIA   • TIA (transient ischemic attack)    • Ulcerative colitis (HCC)    • Wears dentures     upper only           Past Surgical History:  Past Surgical History:   Procedure Laterality Date   • ABDOMINAL SURGERY  09/17/2021   • APPENDECTOMY     • CHOLECYSTECTOMY N/A 04/22/2017    Procedure: CHOLECYSTECTOMY LAPAROSCOPIC;  Surgeon: Jaqueline Guaman MD;  Location:  [facility];  Service:    • COLONOSCOPY N/A 05/08/2017    Procedure: COLONOSCOPY  CPTCODE:49590;  Surgeon: Nicho Richey III, MD;  Location:  [facility];  Service:    • ENDOSCOPY     • ENDOSCOPY N/A 05/08/2017    Procedure: ESOPHAGOGASTRODUODENOSCOPY WITH BIOPSY  CPTCODE:96244;  Surgeon: Nicho Richey III, MD;  Location:  [facility];  Service:    • ENDOSCOPY N/A 11/03/2017    Procedure: ESOPHAGOGASTRODUODENOSCOPY WITH BIOPSY  CPTCODE: 26393;  Surgeon: Nicho Richey III, MD;  Location:  [facility];  Service:    • ENDOSCOPY N/A 02/20/2018    Procedure: ESOPHAGOGASTRODUODENOSCOPY WITH DILATATION CPT CODE: 73033;  Surgeon: Nicho Richey III, MD;  Location:  [facility];  Service:    • ENDOSCOPY N/A 06/05/2018    Procedure: ESOPHAGOGASTRODUODENOSCOPY WITH BIOPSY CPT CODE: 34693;  Surgeon: Nicho Richey III, MD;  Location:  [facility];  Service: Gastroenterology   • ENDOSCOPY N/A 05/26/2021    Procedure: ESOPHAGOGASTRODUODENOSCOPY WITH BIOPSY;  Surgeon: Julieta Hebert MD;  Location:  [facility];  Service: Gastroenterology;  Laterality: N/A;   • ENDOSCOPY N/A 06/02/2021    Procedure: ESOPHAGOGASTRODUODENOSCOPY WITH BIOPSY;  Surgeon: Julieta Hebert MD;  Location:  [facility];  Service: Gastroenterology;   Laterality: N/A;   • GASTRECTOMY N/A 2019    Procedure: OPEN VAGOTOMY, ANTRECTOMY,REVISION- Y GASTROJEJUNOSOTOMY;  Surgeon: Herbert Umanzor MD;  Location: [facility] OR;  Service: General   • GASTRIC BYPASS     • UPPER GASTROINTESTINAL ENDOSCOPY     • VENTRAL/INCISIONAL HERNIA REPAIR N/A 12/10/2020    Procedure: INCISIONAL HERNIA REPAIR LAPAROSCOPIC WITH MESH;  Surgeon: Herbert Umanzor MD;  Location:  [facility];  Service: General;  Laterality: N/A;           Family History:  Family History   Problem Relation Age of Onset   • Osteoporosis Mother    • Hypertension Father    • Osteoporosis Father    • No Known Problems Sister    • No Known Problems Brother    • Drug abuse Brother    • No Known Problems Daughter    • Diabetes Sister          Social History:  Social History     Socioeconomic History   • Marital status:    Tobacco Use   • Smoking status: Current Every Day Smoker     Packs/day: 1.00     Years: 35.00     Pack years: 35.00     Types: Cigarettes     Last attempt to quit: 2020     Years since quittin.7   • Smokeless tobacco: Never Used   • Tobacco comment: Been smoking 20 years   Vaping Use   • Vaping Use: Never used   Substance and Sexual Activity   • Alcohol use: No   • Drug use: No   • Sexual activity: Yes     Partners: Female     Birth control/protection: None     Body mass index is 25.34 kg/m².      Medications:  Current Outpatient Medications   Medication Sig Dispense Refill   • baclofen (LIORESAL) 20 MG tablet Take 10-20 mg by mouth 3 (Three) Times a Day As Needed.     • esomeprazole (nexIUM) 40 MG capsule Take 1 capsule by mouth 2 (Two) Times a Day Before Meals. (Patient taking differently: Take 40 mg by mouth Daily.) 60 capsule 3   • isosorbide mononitrate (IMDUR) 30 MG 24 hr tablet Take 1 tablet by mouth Daily. 30 tablet 11   • levETIRAcetam (KEPPRA) 750 MG tablet Take 750 mg by mouth 3 (Three) Times a Day. Patient takes one tablet in the morning and afternoon and takes two  tablets at bedtime.     • metoclopramide (REGLAN) 10 MG tablet Take 1 tablet by mouth 2 (Two) Times a Day Before Meals. 60 tablet 5   • metoprolol succinate XL (TOPROL-XL) 25 MG 24 hr tablet Take 1 tablet by mouth Daily. 90 tablet 3   • nitroglycerin (NITROSTAT) 0.4 MG SL tablet Place 1 tablet under the tongue Every 5 (Five) Minutes As Needed for Chest Pain. Take no more than 3 doses in 15 minutes. 100 tablet 2   • olmesartan (BENICAR) 20 MG tablet Take 1 tablet by mouth Daily. 90 tablet 1   • QUEtiapine (SEROquel) 50 MG tablet take 1 tablet by oral route every night     • venlafaxine (EFFEXOR) 75 MG tablet Take 75 mg by mouth every night at bedtime.       No current facility-administered medications for this visit.         Allergies:  Allergies   Allergen Reactions   • Contrast Dye Other (See Comments)     Shortness of breath   • Prilosec [Omeprazole] Other (See Comments)     Chest pain  Pt states he can take pepcid with no problem   • Protonix [Pantoprazole Sodium] Palpitations     Patient states that protonix causes chest pain.  Shruthi Pérez, [facility]  4/23/2017  11:19 AM  Pt states he can tolerate pepcid with no problem     • Dilantin [Phenytoin] Delirium   • Flagyl [Metronidazole] Nausea And Vomiting   • Topamax [Topiramate] Anxiety         Review of Systems:   Review of Systems   HENT: Negative.    Eyes: Negative.    Respiratory: Positive for cough.    Cardiovascular: Positive for chest pain.   Gastrointestinal: Negative.    Endocrine: Negative.    Genitourinary: Negative.    Musculoskeletal: Positive for arthralgias, back pain, joint swelling and neck pain.   Skin: Positive for rash.   Allergic/Immunologic: Negative.    Neurological: Positive for weakness and numbness.   Hematological: Negative.    Psychiatric/Behavioral: Negative.          Physical Exam:   Physical Exam  Vitals and nursing note reviewed.   Constitutional:       Appearance: He is well-developed.   HENT:      Head: Normocephalic and  "atraumatic.   Eyes:      Conjunctiva/sclera: Conjunctivae normal.      Pupils: Pupils are equal, round, and reactive to light.   Neck:      Thyroid: No thyromegaly.      Vascular: No JVD.   Cardiovascular:      Rate and Rhythm: Normal rate and regular rhythm.      Heart sounds: Normal heart sounds. No murmur heard.    No friction rub. No gallop.   Pulmonary:      Effort: Pulmonary effort is normal. No respiratory distress.      Breath sounds: Normal breath sounds. No wheezing or rales.   Chest:      Chest wall: No tenderness.   Abdominal:      General: Bowel sounds are normal.   Musculoskeletal:      Cervical back: Normal range of motion and neck supple.   Lymphadenopathy:      Cervical: No cervical adenopathy.   Skin:     General: Skin is warm and dry.   Neurological:      Mental Status: He is alert and oriented to person, place, and time.       GENERAL: 51 y.o. male, alert and oriented X 3 in no acute distress.   Visit Vitals  /79   Pulse 93   Ht 167.6 cm (66\")   Wt 71.2 kg (157 lb)   BMI 25.34 kg/m²         Musculoskeletal:   Examination left upper extremity compared to the right reveals moderately positive Tinel's left elbow greater than right.  Diminished sensation to his fourth and fifth fingers.  Normal thenar tone right and left Phalen sign is mildly positive left greater than right.  Neurovascular examination is intact.      EMG/NCS:  Nerve conduction studies identified moderate ulnar neuropathy bilaterally mild median neuropathy left wrist.    Assessment & Plan:   51 y.o. male presents fairly severe neuropathy left elbow greater than right reported by clinical findings and nerve conduction studies.  Discussed his options.  His medical history was reviewed he has had cervical spine radiographs in the past which were normal.  It is reasonable to proceed with release ulnar nerve left elbow.  We discussed carpal tunnel release but given that his symptoms are mild and not manifest clinically we will " proceed with release ulnar nerve left elbow possible transposition surgery is scheduled [facility] June 16, 2022.        ICD-10-CM ICD-9-CM   1. Cubital tunnel syndrome, left  G56.22 354.2   2. Preoperative testing  Z01.818 V72.84             Cc:   Leticia Martinez APRN                This document has been electronically signed by Alec Hough MD   June 1, 2022 13:20 EDT

## 2022-06-14 ENCOUNTER — HOSPITAL ENCOUNTER (OUTPATIENT)
Dept: GENERAL RADIOLOGY | Facility: HOSPITAL | Age: 52
Discharge: HOME | End: 2022-06-14

## 2022-06-14 ENCOUNTER — LAB (OUTPATIENT)
Dept: LAB | Facility: HOSPITAL | Age: 52
End: 2022-06-14

## 2022-06-14 ENCOUNTER — PRE-ADMISSION TESTING (OUTPATIENT)
Dept: PREADMISSION TESTING | Facility: HOSPITAL | Age: 52
End: 2022-06-14

## 2022-06-14 DIAGNOSIS — G56.22 CUBITAL TUNNEL SYNDROME, LEFT: ICD-10-CM

## 2022-06-14 DIAGNOSIS — Z01.818 PREOPERATIVE TESTING: ICD-10-CM

## 2022-06-14 LAB
ANION GAP SERPL CALCULATED.3IONS-SCNC: 13.2 MMOL/L (ref 5–15)
BUN SERPL-MCNC: 7 MG/DL (ref 6–20)
BUN/CREAT SERPL: 10 (ref 7–25)
CALCIUM SPEC-SCNC: 8.8 MG/DL (ref 8.6–10.5)
CHLORIDE SERPL-SCNC: 97 MMOL/L (ref 98–107)
CO2 SERPL-SCNC: 21.8 MMOL/L (ref 22–29)
CREAT SERPL-MCNC: 0.7 MG/DL (ref 0.76–1.27)
DEPRECATED RDW RBC AUTO: 45.2 FL (ref 37–54)
EGFRCR SERPLBLD CKD-EPI 2021: 110.2 ML/MIN/1.73
ERYTHROCYTE [DISTWIDTH] IN BLOOD BY AUTOMATED COUNT: 11.9 % (ref 12.3–15.4)
GLUCOSE SERPL-MCNC: 133 MG/DL (ref 65–99)
HCT VFR BLD AUTO: 42.5 % (ref 37.5–51)
HGB BLD-MCNC: 14.6 G/DL (ref 13–17.7)
MCH RBC QN AUTO: 35.3 PG (ref 26.6–33)
MCHC RBC AUTO-ENTMCNC: 34.4 G/DL (ref 31.5–35.7)
MCV RBC AUTO: 102.7 FL (ref 79–97)
PLATELET # BLD AUTO: 179 10*3/MM3 (ref 140–450)
PMV BLD AUTO: 9.8 FL (ref 6–12)
POTASSIUM SERPL-SCNC: 4.1 MMOL/L (ref 3.5–5.2)
RBC # BLD AUTO: 4.14 10*6/MM3 (ref 4.14–5.8)
SODIUM SERPL-SCNC: 132 MMOL/L (ref 136–145)
WBC NRBC COR # BLD: 9.7 10*3/MM3 (ref 3.4–10.8)

## 2022-06-14 PROCEDURE — 80048 BASIC METABOLIC PNL TOTAL CA: CPT

## 2022-06-14 PROCEDURE — 71046 X-RAY EXAM CHEST 2 VIEWS: CPT

## 2022-06-14 PROCEDURE — U0004 COV-19 TEST NON-CDC HGH THRU: HCPCS

## 2022-06-14 PROCEDURE — U0005 INFEC AGEN DETEC AMPLI PROBE: HCPCS

## 2022-06-14 PROCEDURE — 36415 COLL VENOUS BLD VENIPUNCTURE: CPT

## 2022-06-14 PROCEDURE — 71046 X-RAY EXAM CHEST 2 VIEWS: CPT | Performed by: RADIOLOGY

## 2022-06-14 PROCEDURE — 85027 COMPLETE CBC AUTOMATED: CPT

## 2022-06-14 PROCEDURE — C9803 HOPD COVID-19 SPEC COLLECT: HCPCS

## 2022-06-14 NOTE — DISCHARGE INSTRUCTIONS
You are scheduled for your procedure on 06/16/22.  Your physician's office will notify you of your arrival time.    TAKE the following medications the morning of surgery:    All heart or blood pressure medications    Please discontinue all blood thinners and anticoagulants (except aspirin) prior to surgery as per your surgeon and cardiologist instructions.  Aspirin may be continued up to the day prior to surgery.    HOLD all diabetic medications the morning of surgery as order by physician.    Please follow instructions on use of prep cloths provided by nurse. Return instruction sheet to pre-op nurse on day of surgery.    Arrival time for surgery on 6/16/22 will be given to you by 's Office.    A RESPONSIBLE PERSON MUST REMAIN IN THE WAITING ROOM DURING YOUR PROCEDURE AND A RESPONSIBLE  MUST BE AVAILABLE UPON YOUR DISCHARGE.    General Instructions:  Do NOT eat or drink after midnight 6/15/22 which includes water, mints, or gum.  You may brush your teeth. Dental appliances that are removable must be taken out day of surgery.  Do NOT smoke, chew tobacco, or drink alcohol within 24 hours prior to surgery.  Bring medications in original bottles, any inhalers and if applicable your C-PAP/BI-PAP machine  Bring any papers given to you in the doctor’s office  Wear clean, comfortable clothes and socks  Do NOT wear contact lenses or make-up or dark nail polish.  Bring a case for your glasses if applicable.  Bring crutches or walker if applicable  Leave all other valuables and jewelry at home  If you were given a blood bank armband, continue to wear it until discharged.    Preventing a Surgical Site Infection:  Shower the night before surgery (unless instructed otherwise) using a fresh bar of anti-bacterial soap (such as Dial) and clean washcloth.  Dry with a clean towel and dress in clean clothing.  For 2 to 3 days before surgery, avoid shaving with a razor near where you will have surgery because the razor  can irritate skin and make it easier to develop an infection.  Ask your surgeon if you will be receiving antibiotics prior to surgery.  Make sure you, your family, and all healthcare providers clean their hands with soap and water or an alcohol-based hand  before caring for you or your wound.  If at all possible, quit smoking as many days before surgery as you can.    Day of Surgery:  Upon arrival, a pre-op nurse and anesthesiologist will review your health history, obtain vital signs, and answer questions you may have.  The only belongings needed at this time will be your home medications and if applicable you C-PAP/BI-PAP machine.  If you are staying overnight, your family can leave the rest of your belongings in the car and bring them to your room later.  A pre-op nurse will start an IV and you may receive medication in preparation for surgery.  Due to patient privacy and limited space, only one member of your family will be able to accompany you in the pre-op area.  While you are in surgery your family should notify the waiting room  if they leave the waiting room area and provide a contact number.  Please be aware that surgery does come with discomfort.  We want to make every effort to control your discomfort so please discuss any uncontrolled symptoms with your nurse.  Your doctor will most likely have prescribed pain medications.  If you are going home after surgery you will receive individualized written care instructions before being discharged.  A responsible adult must drive you to and from the hospital on the day of surgery and stay with you for 24 hours.  If you are staying overnight following surgery, you will be transported to your hospital room following the recovery period.

## 2022-06-15 ENCOUNTER — TELEPHONE (OUTPATIENT)
Dept: ORTHOPEDIC SURGERY | Facility: CLINIC | Age: 52
End: 2022-06-15

## 2022-06-15 LAB — SARS-COV-2 RNA PNL SPEC NAA+PROBE: NOT DETECTED

## 2022-06-15 NOTE — TELEPHONE ENCOUNTER
Patient's wife (Lena) called, patient is sick and is unable to proceed with the surgery tomorrow 06/16/2022.   Notified Dr. Hough patient was taken off the surgery schedule and will be rescheduled at a later date.

## 2022-07-05 ENCOUNTER — TELEPHONE (OUTPATIENT)
Dept: ORTHOPEDIC SURGERY | Facility: CLINIC | Age: 52
End: 2022-07-05

## 2022-07-05 NOTE — TELEPHONE ENCOUNTER
Provider: DR TORRES   Caller: BRIAN MUNIZ  Relationship to Patient: PATIENT     Phone Number: [phone]  Reason for Call: PATIENT READY TO GO FORWARD WITH ULNAR NERVE DECOMPRESSION LEFT SX     When was the patient last seen: 06/01/2022

## 2022-07-13 ENCOUNTER — TELEPHONE (OUTPATIENT)
Dept: ORTHOPEDIC SURGERY | Facility: CLINIC | Age: 52
End: 2022-07-13

## 2022-07-13 NOTE — TELEPHONE ENCOUNTER
Provider: DR TORRES     Caller: BRIAN MUNIZ     Relationship to Patient: SELF     Phone Number: [phone]     Reason for Call: PATIENT CALLED AND WAS TRYING TO RETURN A CALL TO THE SURGERY SCHEDULER ANNA UNABLE TO WARM TRANSFER PLEASE ADVISE

## 2022-07-14 ENCOUNTER — OFFICE VISIT (OUTPATIENT)
Dept: GASTROENTEROLOGY | Facility: CLINIC | Age: 52
End: 2022-07-14

## 2022-07-14 VITALS
HEIGHT: 66 IN | WEIGHT: 160 LBS | SYSTOLIC BLOOD PRESSURE: 87 MMHG | BODY MASS INDEX: 25.71 KG/M2 | OXYGEN SATURATION: 96 % | HEART RATE: 96 BPM | DIASTOLIC BLOOD PRESSURE: 56 MMHG

## 2022-07-14 DIAGNOSIS — K21.00 GASTROESOPHAGEAL REFLUX DISEASE WITH ESOPHAGITIS WITHOUT HEMORRHAGE: Primary | ICD-10-CM

## 2022-07-14 DIAGNOSIS — K31.84 GASTROPARESIS: ICD-10-CM

## 2022-07-14 PROCEDURE — 99213 OFFICE O/P EST LOW 20 MIN: CPT | Performed by: INTERNAL MEDICINE

## 2022-07-14 NOTE — PROGRESS NOTES
Subjective   Jamison Edward is a 51 y.o. male who presents to the office today as a follow up appointment regarding Heartburn      History of Present Illness:  The patient presents for follow up EGD. He was found to have a gastric bezoar.  An EGD and UGI series showed evidence of patent gastric bypass.  He did undergo repeat EGD secondary to inability to adequately view the gastric pouch and anastomosis due to the large amount of food in his stomach.  A repeat EGD did show normal appearing gastric bypass.  The patient had undergone gastric bypass surgery secondary to gastric outlet obstruction caused by peptic ulcer disease.  He is now on Reglan twice a day.  He denies irregular movement of the lips and tongue on the Reglan.  The Reglan does not make him fatigued.  His appetite is improved.  He has gained weight.  He now weighs 160 lbs.  He had gotten down to 135 lbs prior to the endoscopy.  He is also on Nexium which has helped.  Occasional nausea but no vomiting.  He feels like the heat makes the nausea worse.       Review of Systems:  Review of Systems   Constitutional: Negative.    HENT: Positive for trouble swallowing. Negative for sore throat.    Eyes: Negative.    Respiratory: Negative for chest tightness.    Cardiovascular: Negative for chest pain.   Gastrointestinal: Negative for abdominal distention, abdominal pain, anal bleeding, blood in stool, constipation, diarrhea, nausea, rectal pain and vomiting.   Endocrine: Negative.    Genitourinary: Negative for difficulty urinating.   Musculoskeletal: Positive for back pain and neck pain.   Skin: Negative.    Allergic/Immunologic: Negative for environmental allergies and food allergies.   Neurological: Positive for headaches. Negative for dizziness and seizures.   Hematological: Does not bruise/bleed easily.   Psychiatric/Behavioral: Positive for sleep disturbance.       Past Medical History:  Past Medical History:   Diagnosis Date   • Anemia    •  Arthritis of back     not sure   • Johnson esophagus    • Cholelithiasis    • COPD (chronic obstructive pulmonary disease) (HCC)    • CTS (carpal tunnel syndrome)    • DDD (degenerative disc disease), thoracic    • Degenerative disc disease, lumbar    • Dystonia    • GERD (gastroesophageal reflux disease)    • GI (gastrointestinal bleed)    • Hypertension    • Irritable bowel syndrome    • Low back strain    • Lumbosacral disc disease    • Migraines    • Peptic ulcer disease    • Stroke (HCC)     TIA   • TIA (transient ischemic attack)    • Ulcerative colitis (HCC)    • Wears dentures     upper only       Past Surgical History:  Past Surgical History:   Procedure Laterality Date   • ABDOMINAL SURGERY  09/17/2021   • APPENDECTOMY     • CHOLECYSTECTOMY N/A 04/22/2017    Procedure: CHOLECYSTECTOMY LAPAROSCOPIC;  Surgeon: Jaqueline Guaman MD;  Location: [facility] OR;  Service:    • COLONOSCOPY N/A 05/08/2017    Procedure: COLONOSCOPY  CPTCODE:59092;  Surgeon: Nicho Richey III, MD;  Location: [facility] OR;  Service:    • ENDOSCOPY     • ENDOSCOPY N/A 05/08/2017    Procedure: ESOPHAGOGASTRODUODENOSCOPY WITH BIOPSY  CPTCODE:29188;  Surgeon: Nicho Richey III, MD;  Location:  [facility] OR;  Service:    • ENDOSCOPY N/A 11/03/2017    Procedure: ESOPHAGOGASTRODUODENOSCOPY WITH BIOPSY  CPTCODE: 35679;  Surgeon: Nicho Richey III, MD;  Location:  [facility] OR;  Service:    • ENDOSCOPY N/A 02/20/2018    Procedure: ESOPHAGOGASTRODUODENOSCOPY WITH DILATATION CPT CODE: 97610;  Surgeon: Nicho Richey III, MD;  Location:  [facility] OR;  Service:    • ENDOSCOPY N/A 06/05/2018    Procedure: ESOPHAGOGASTRODUODENOSCOPY WITH BIOPSY CPT CODE: 47276;  Surgeon: Nicho Richey III, MD;  Location:  [facility] OR;  Service: Gastroenterology   • ENDOSCOPY N/A 05/26/2021    Procedure: ESOPHAGOGASTRODUODENOSCOPY WITH BIOPSY;  Surgeon: Julieta Hebert MD;  Location: [facility] OR;  Service: Gastroenterology;  Laterality: N/A;   •  ENDOSCOPY N/A 2021    Procedure: ESOPHAGOGASTRODUODENOSCOPY WITH BIOPSY;  Surgeon: Julieta Hebert MD;  Location:  [facility] OR;  Service: Gastroenterology;  Laterality: N/A;   • GASTRECTOMY N/A 2019    Procedure: OPEN VAGOTOMY, ANTRECTOMY,REVISION- Y GASTROJEJUNOSOTOMY;  Surgeon: Herbert Umanzor MD;  Location:  [facility] OR;  Service: General   • UPPER GASTROINTESTINAL ENDOSCOPY     • VENTRAL/INCISIONAL HERNIA REPAIR N/A 12/10/2020    Procedure: INCISIONAL HERNIA REPAIR LAPAROSCOPIC WITH MESH;  Surgeon: Herbert Umanzor MD;  Location:  [facility] OR;  Service: General;  Laterality: N/A;       Family History:  Family History   Problem Relation Age of Onset   • Osteoporosis Mother    • Hypertension Father    • Osteoporosis Father    • No Known Problems Sister    • No Known Problems Brother    • Drug abuse Brother    • No Known Problems Daughter    • Diabetes Sister        Social History:  Social History     Socioeconomic History   • Marital status:    Tobacco Use   • Smoking status: Current Every Day Smoker     Packs/day: 1.00     Years: 35.00     Pack years: 35.00     Types: Cigarettes     Last attempt to quit: 2020     Years since quittin.8   • Smokeless tobacco: Never Used   • Tobacco comment: Been smoking 20 years   Vaping Use   • Vaping Use: Never used   Substance and Sexual Activity   • Alcohol use: Yes   • Drug use: No   • Sexual activity: Yes     Partners: Female     Birth control/protection: None       Current Medication List:    Current Outpatient Medications:   •  baclofen (LIORESAL) 20 MG tablet, Take 10-20 mg by mouth 3 (Three) Times a Day As Needed., Disp: , Rfl:   •  esomeprazole (nexIUM) 40 MG capsule, Take 1 capsule by mouth 2 (Two) Times a Day Before Meals. (Patient taking differently: Take 40 mg by mouth Daily.), Disp: 60 capsule, Rfl: 3  •  isosorbide mononitrate (IMDUR) 30 MG 24 hr tablet, Take 1 tablet by mouth Daily., Disp: 30 tablet, Rfl: 11  •  levETIRAcetam  "(KEPPRA) 750 MG tablet, Take 750 mg by mouth 3 (Three) Times a Day. Patient takes one tablet in the morning and afternoon and takes two tablets at bedtime., Disp: , Rfl:   •  metoclopramide (REGLAN) 10 MG tablet, Take 1 tablet by mouth 2 (Two) Times a Day Before Meals., Disp: 60 tablet, Rfl: 5  •  metoprolol succinate XL (TOPROL-XL) 25 MG 24 hr tablet, Take 1 tablet by mouth Daily., Disp: 90 tablet, Rfl: 3  •  nitroglycerin (NITROSTAT) 0.4 MG SL tablet, Place 1 tablet under the tongue Every 5 (Five) Minutes As Needed for Chest Pain. Take no more than 3 doses in 15 minutes., Disp: 100 tablet, Rfl: 2  •  olmesartan (BENICAR) 20 MG tablet, Take 1 tablet by mouth Daily., Disp: 90 tablet, Rfl: 1  •  QUEtiapine (SEROquel) 50 MG tablet, take 1 tablet by oral route every night, Disp: , Rfl:   •  venlafaxine (EFFEXOR) 75 MG tablet, Take 75 mg by mouth every night at bedtime., Disp: , Rfl:     Allergies:   Contrast dye, Prilosec [omeprazole], Protonix [pantoprazole sodium], Dilantin [phenytoin], Dilaudid [hydromorphone], Flagyl [metronidazole], and Topamax [topiramate]    Vitals:  BP (!) 87/56   Pulse 96   Ht 167.6 cm (66\")   Wt 72.6 kg (160 lb)   SpO2 96%   BMI 25.82 kg/m²     Physical Exam:  Physical Exam  Constitutional:       Appearance: He is normal weight.   HENT:      Head: Normocephalic and atraumatic.      Nose: Nose normal. No congestion or rhinorrhea.   Eyes:      General: No scleral icterus.     Extraocular Movements: Extraocular movements intact.      Conjunctiva/sclera: Conjunctivae normal.      Pupils: Pupils are equal, round, and reactive to light.   Cardiovascular:      Rate and Rhythm: Normal rate and regular rhythm.      Pulses: Normal pulses.      Heart sounds: Normal heart sounds.   Pulmonary:      Effort: Pulmonary effort is normal.      Breath sounds: Normal breath sounds.   Abdominal:      General: Abdomen is flat. Bowel sounds are normal. There is no distension.      Palpations: Abdomen is soft. " There is no shifting dullness, fluid wave, hepatomegaly, splenomegaly, mass or pulsatile mass.      Tenderness: There is no abdominal tenderness. There is no guarding or rebound.      Hernia: No hernia is present.   Musculoskeletal:         General: No swelling or tenderness.      Cervical back: Normal range of motion and neck supple.   Skin:     General: Skin is warm and dry.      Coloration: Skin is not jaundiced.   Neurological:      General: No focal deficit present.      Mental Status: He is alert and oriented to person, place, and time.   Psychiatric:         Mood and Affect: Mood normal.         Behavior: Behavior normal.         Results Review:  Lab Results:   No visits with results within 1 Month(s) from this visit.   Latest known visit with results is:   Lab on 06/14/2022   Component Date Value Ref Range Status   • COVID19 06/14/2022 Not Detected  Not Detected - Ref. Range Final       Assessment & Plan     Visit Diagnoses:    ICD-10-CM ICD-9-CM   1. Gastroesophageal reflux disease with esophagitis without hemorrhage  K21.00 530.81     530.10   2. Gastroparesis  K31.84 536.3       Plan:  The patient is doing well on Reglan.  He has gained weight.  Prior to starting the Reglan he was losing weight and experiencing extreme nausea atypical chest pain.  He is no longer experiencing chest pain.  Overall the patient is improved and feels much better.  He will continue the Reglan.  He will be monitored closely on Reglan.  The patient will follow-up in 6 months.    * Surgery not found *      MEDS ORDERED DURING VISIT:  No orders of the defined types were placed in this encounter.      Return in about 6 months (around 1/14/2023).             This document has been electronically signed by Julieta Hebert MD   July 15, 2022 09:08 EDT      Part of this note may be an electronic transcription/translation of spoken language to printed text using the Dragon Dictation System.

## 2022-08-17 ENCOUNTER — OFFICE VISIT (OUTPATIENT)
Dept: ORTHOPEDIC SURGERY | Facility: CLINIC | Age: 52
End: 2022-08-17

## 2022-08-17 VITALS
HEIGHT: 66 IN | DIASTOLIC BLOOD PRESSURE: 74 MMHG | SYSTOLIC BLOOD PRESSURE: 110 MMHG | BODY MASS INDEX: 25.71 KG/M2 | WEIGHT: 160 LBS | HEART RATE: 107 BPM

## 2022-08-17 DIAGNOSIS — G56.22 CUBITAL TUNNEL SYNDROME, LEFT: Primary | ICD-10-CM

## 2022-08-17 PROCEDURE — 99214 OFFICE O/P EST MOD 30 MIN: CPT | Performed by: ORTHOPAEDIC SURGERY

## 2022-08-17 NOTE — PROGRESS NOTES
History and Physical      Patient: Jamison Edward  YOB: 1970  Date of Encounter: 08/17/2022      Chief Complaint:   Chief Complaint   Patient presents with   • Left Elbow - Follow-up, Numbness, Pain, Tingling           HPI:   Jamison Edward, 51 y.o. male, presents for follow-up pain and numbness to his left upper extremity.  He has been symptomatic about 2-1/2 years..  He has had no trauma.  He describes numbness to his fourth and fifth fingers with minimal numbness to the index and middle fingers.  He is known to have degenerative disc disease lumbar and thoracic spine.  He has had a TIA in the past.  Previous EMG nerve conduction studies have identified moderate ulnar neuropathy and mild median neuropathy left wrist.  Cervical radiculopathy was not identified by nerve conduction studies.  Denies neck pain and has had radiographs in the past which were negative.  He presents today wishing to schedule proposed cubital tunnel release left elbow.        Active Problem List:  Patient Active Problem List   Diagnosis   • Precordial chest pain   • Weight loss, abnormal   • Right upper quadrant abdominal pain   • Epigastric pain   • History of bleeding ulcers   • Nausea   • Malaise and fatigue   • Acute gastric ulcer without hemorrhage or perforation   • Poor appetite   • Duodenal ulcer   • Gastroesophageal reflux disease   • Dysphagia   • Iron deficiency anemia   • Malabsorption   • Gastric ulcer without hemorrhage or perforation   • Dystonia   • Peptic ulcer without hemorrhage or perforation   • Gastric outlet obstruction   • Incisional hernia, without obstruction or gangrene   • Incisional hernia   • Chest pain, atypical   • Gastric bezoar   • Essential hypertension   • Cigarette smoker   • Chronic back pain   • Sinus tachycardia   • Tobacco use   • Cubital tunnel syndrome, left   • Mild carpal tunnel syndrome           Past Medical History:  Past Medical History:   Diagnosis Date   • Anemia     • Arthritis of back     not sure   • Johnson esophagus    • Cholelithiasis    • COPD (chronic obstructive pulmonary disease) (HCC)    • CTS (carpal tunnel syndrome)    • DDD (degenerative disc disease), thoracic    • Degenerative disc disease, lumbar    • Dystonia    • GERD (gastroesophageal reflux disease)    • GI (gastrointestinal bleed)    • Hypertension    • Irritable bowel syndrome    • Low back strain    • Lumbosacral disc disease    • Migraines    • Peptic ulcer disease    • Stroke (HCC)     TIA   • TIA (transient ischemic attack)    • Ulcerative colitis (HCC)    • Wears dentures     upper only           Past Surgical History:  Past Surgical History:   Procedure Laterality Date   • ABDOMINAL SURGERY  09/17/2021   • APPENDECTOMY     • CHOLECYSTECTOMY N/A 04/22/2017    Procedure: CHOLECYSTECTOMY LAPAROSCOPIC;  Surgeon: Jaqueline Guaman MD;  Location:  [facility] OR;  Service:    • COLONOSCOPY N/A 05/08/2017    Procedure: COLONOSCOPY  CPTCODE:38796;  Surgeon: Nicho Richey III, MD;  Location:  [facility] OR;  Service:    • ENDOSCOPY     • ENDOSCOPY N/A 05/08/2017    Procedure: ESOPHAGOGASTRODUODENOSCOPY WITH BIOPSY  CPTCODE:71571;  Surgeon: Nicho Richey III, MD;  Location:  [facility];  Service:    • ENDOSCOPY N/A 11/03/2017    Procedure: ESOPHAGOGASTRODUODENOSCOPY WITH BIOPSY  CPTCODE: 75732;  Surgeon: Nicho Richey III, MD;  Location:  [facility];  Service:    • ENDOSCOPY N/A 02/20/2018    Procedure: ESOPHAGOGASTRODUODENOSCOPY WITH DILATATION CPT CODE: 91073;  Surgeon: Nicho Richey III, MD;  Location:  [facility];  Service:    • ENDOSCOPY N/A 06/05/2018    Procedure: ESOPHAGOGASTRODUODENOSCOPY WITH BIOPSY CPT CODE: 48189;  Surgeon: Nicho Richey III, MD;  Location:  [facility];  Service: Gastroenterology   • ENDOSCOPY N/A 05/26/2021    Procedure: ESOPHAGOGASTRODUODENOSCOPY WITH BIOPSY;  Surgeon: Julieta Hebert MD;  Location:  [facility];  Service: Gastroenterology;  Laterality: N/A;    • ENDOSCOPY N/A 2021    Procedure: ESOPHAGOGASTRODUODENOSCOPY WITH BIOPSY;  Surgeon: Julieta Hebert MD;  Location:  [facility];  Service: Gastroenterology;  Laterality: N/A;   • GASTRECTOMY N/A 2019    Procedure: OPEN VAGOTOMY, ANTRECTOMY,REVISION- Y GASTROJEJUNOSOTOMY;  Surgeon: Herbert Umanzor MD;  Location:  [facility] OR;  Service: General   • UPPER GASTROINTESTINAL ENDOSCOPY     • VENTRAL/INCISIONAL HERNIA REPAIR N/A 12/10/2020    Procedure: INCISIONAL HERNIA REPAIR LAPAROSCOPIC WITH MESH;  Surgeon: Herbert Umanzor MD;  Location:  [facility] OR;  Service: General;  Laterality: N/A;           Family History:  Family History   Problem Relation Age of Onset   • Osteoporosis Mother    • Hypertension Father    • Osteoporosis Father    • No Known Problems Sister    • No Known Problems Brother    • Drug abuse Brother    • No Known Problems Daughter    • Diabetes Sister            Social History:  Social History     Socioeconomic History   • Marital status:    Tobacco Use   • Smoking status: Current Every Day Smoker     Packs/day: 1.00     Years: 35.00     Pack years: 35.00     Types: Cigarettes     Last attempt to quit: 2020     Years since quittin.9   • Smokeless tobacco: Never Used   • Tobacco comment: Been smoking 20 years   Vaping Use   • Vaping Use: Never used   Substance and Sexual Activity   • Alcohol use: Yes   • Drug use: No   • Sexual activity: Yes     Partners: Female     Birth control/protection: None     Body mass index is 25.82 kg/m².        Medications:  Current Outpatient Medications   Medication Sig Dispense Refill   • baclofen (LIORESAL) 20 MG tablet Take 10-20 mg by mouth 3 (Three) Times a Day As Needed.     • esomeprazole (nexIUM) 40 MG capsule Take 1 capsule by mouth 2 (Two) Times a Day Before Meals. (Patient taking differently: Take 40 mg by mouth Daily.) 60 capsule 3   • isosorbide mononitrate (IMDUR) 30 MG 24 hr tablet Take 1 tablet by mouth Daily. 30 tablet  11   • levETIRAcetam (KEPPRA) 750 MG tablet Take 750 mg by mouth 3 (Three) Times a Day. Patient takes one tablet in the morning and afternoon and takes two tablets at bedtime.     • metoclopramide (REGLAN) 10 MG tablet Take 1 tablet by mouth 2 (Two) Times a Day Before Meals. 60 tablet 5   • metoprolol succinate XL (TOPROL-XL) 25 MG 24 hr tablet Take 1 tablet by mouth Daily. 90 tablet 3   • nitroglycerin (NITROSTAT) 0.4 MG SL tablet Place 1 tablet under the tongue Every 5 (Five) Minutes As Needed for Chest Pain. Take no more than 3 doses in 15 minutes. 100 tablet 2   • olmesartan (BENICAR) 20 MG tablet Take 1 tablet by mouth Daily. 90 tablet 1   • QUEtiapine (SEROquel) 50 MG tablet take 1 tablet by oral route every night     • venlafaxine (EFFEXOR) 75 MG tablet Take 75 mg by mouth every night at bedtime.       No current facility-administered medications for this visit.           Allergies:  Allergies   Allergen Reactions   • Contrast Dye Other (See Comments)     Shortness of breath   • Prilosec [Omeprazole] Other (See Comments)     Chest pain  Pt states he can take pepcid with no problem   • Protonix [Pantoprazole Sodium] Palpitations     Patient states that protonix causes chest pain.  Shruthi Pérez, [facility]  4/23/2017  11:19 AM  Pt states he can tolerate pepcid with no problem     • Dilantin [Phenytoin] Delirium   • Dilaudid [Hydromorphone] Hallucinations   • Flagyl [Metronidazole] Nausea And Vomiting   • Topamax [Topiramate] Anxiety           Review of Systems:   Review of Systems   Constitutional: Negative.    HENT: Negative.    Eyes: Negative.    Respiratory: Negative.    Cardiovascular: Negative.    Gastrointestinal: Negative.    Endocrine: Negative.    Genitourinary: Negative.    Musculoskeletal: Positive for arthralgias, back pain and neck pain.   Skin: Negative.    Allergic/Immunologic: Negative.    Neurological: Positive for numbness.   Hematological: Negative.    Psychiatric/Behavioral: Negative.   "          Physical Exam:   Physical Exam  Vitals and nursing note reviewed.   Constitutional:       Appearance: He is well-developed.   HENT:      Head: Normocephalic and atraumatic.   Eyes:      Conjunctiva/sclera: Conjunctivae normal.      Pupils: Pupils are equal, round, and reactive to light.   Neck:      Thyroid: No thyromegaly.      Vascular: No JVD.   Cardiovascular:      Rate and Rhythm: Normal rate and regular rhythm.      Heart sounds: Normal heart sounds. No murmur heard.    No friction rub. No gallop.   Pulmonary:      Effort: Pulmonary effort is normal. No respiratory distress.      Breath sounds: Normal breath sounds. No wheezing or rales.   Chest:      Chest wall: No tenderness.   Abdominal:      General: Bowel sounds are normal.   Musculoskeletal:      Cervical back: Normal range of motion and neck supple.   Lymphadenopathy:      Cervical: No cervical adenopathy.   Skin:     General: Skin is warm and dry.   Neurological:      Mental Status: He is alert and oriented to person, place, and time.       GENERAL: 51 y.o. male, alert and oriented X 3 in no acute distress.   Visit Vitals  /74   Pulse 107   Ht 167.6 cm (66\")   Wt 72.6 kg (160 lb)   BMI 25.82 kg/m²         Musculoskeletal:   Examination left upper extremity demonstrates moderately positive Tinel's sign left elbow with decreased sensation to the fourth and fifth fingers. He demonstrates normal thenar tone with Phalen sign producing minimal discomfort.  Vascular examination is intact.      Assessment & Plan:   51 y.o. male presents in follow-up with persistent pain left hand with numbness fourth and fifth fingers nerve conduction studies identifying ulnar neuropathy left.  Have discussed his options he wishes to proceed with proposed ulnar nerve release left elbow. He is scheduled at [facility] September 1, 2022.      ICD-10-CM ICD-9-CM   1. Cubital tunnel syndrome, left  G56.22 354.2           Cc:   Leticia Martinez, " APRN              This document has been electronically signed by Alec Hough MD   August 19, 2022 16:28 EDT

## 2022-08-24 ENCOUNTER — APPOINTMENT (OUTPATIENT)
Dept: CT IMAGING | Facility: HOSPITAL | Age: 52
End: 2022-08-24

## 2022-08-24 ENCOUNTER — HOSPITAL ENCOUNTER (EMERGENCY)
Facility: HOSPITAL | Age: 52
Discharge: HOME | End: 2022-08-24
Attending: STUDENT IN AN ORGANIZED HEALTH CARE EDUCATION/TRAINING PROGRAM | Admitting: STUDENT IN AN ORGANIZED HEALTH CARE EDUCATION/TRAINING PROGRAM

## 2022-08-24 ENCOUNTER — APPOINTMENT (OUTPATIENT)
Dept: GENERAL RADIOLOGY | Facility: HOSPITAL | Age: 52
End: 2022-08-24

## 2022-08-24 VITALS
RESPIRATION RATE: 18 BRPM | WEIGHT: 154 LBS | HEART RATE: 107 BPM | OXYGEN SATURATION: 100 % | DIASTOLIC BLOOD PRESSURE: 76 MMHG | BODY MASS INDEX: 24.75 KG/M2 | TEMPERATURE: 97.6 F | SYSTOLIC BLOOD PRESSURE: 106 MMHG | HEIGHT: 66 IN

## 2022-08-24 DIAGNOSIS — I95.1 ORTHOSTATIC HYPOTENSION: Primary | ICD-10-CM

## 2022-08-24 LAB
ALBUMIN SERPL-MCNC: 3.51 G/DL (ref 3.5–5.2)
ALBUMIN/GLOB SERPL: 1.5 G/DL
ALP SERPL-CCNC: 313 U/L (ref 39–117)
ALT SERPL W P-5'-P-CCNC: 111 U/L (ref 1–41)
ANION GAP SERPL CALCULATED.3IONS-SCNC: 14.3 MMOL/L (ref 5–15)
AST SERPL-CCNC: 131 U/L (ref 1–40)
BASOPHILS # BLD AUTO: 0.04 10*3/MM3 (ref 0–0.2)
BASOPHILS NFR BLD AUTO: 0.7 % (ref 0–1.5)
BILIRUB SERPL-MCNC: 1 MG/DL (ref 0–1.2)
BUN SERPL-MCNC: 9 MG/DL (ref 6–20)
BUN/CREAT SERPL: 8.3 (ref 7–25)
CALCIUM SPEC-SCNC: 9 MG/DL (ref 8.6–10.5)
CHLORIDE SERPL-SCNC: 106 MMOL/L (ref 98–107)
CO2 SERPL-SCNC: 16.7 MMOL/L (ref 22–29)
CREAT SERPL-MCNC: 1.09 MG/DL (ref 0.76–1.27)
DEPRECATED RDW RBC AUTO: 55.3 FL (ref 37–54)
EGFRCR SERPLBLD CKD-EPI 2021: 81.2 ML/MIN/1.73
EOSINOPHIL # BLD AUTO: 0.09 10*3/MM3 (ref 0–0.4)
EOSINOPHIL NFR BLD AUTO: 1.5 % (ref 0.3–6.2)
ERYTHROCYTE [DISTWIDTH] IN BLOOD BY AUTOMATED COUNT: 13.6 % (ref 12.3–15.4)
GLOBULIN UR ELPH-MCNC: 2.3 GM/DL
GLUCOSE SERPL-MCNC: 101 MG/DL (ref 65–99)
HCT VFR BLD AUTO: 41.9 % (ref 37.5–51)
HGB BLD-MCNC: 13.8 G/DL (ref 13–17.7)
HOLD SPECIMEN: NORMAL
HOLD SPECIMEN: NORMAL
IMM GRANULOCYTES # BLD AUTO: 0.04 10*3/MM3 (ref 0–0.05)
IMM GRANULOCYTES NFR BLD AUTO: 0.7 % (ref 0–0.5)
LYMPHOCYTES # BLD AUTO: 1.38 10*3/MM3 (ref 0.7–3.1)
LYMPHOCYTES NFR BLD AUTO: 22.6 % (ref 19.6–45.3)
MCH RBC QN AUTO: 35.8 PG (ref 26.6–33)
MCHC RBC AUTO-ENTMCNC: 32.9 G/DL (ref 31.5–35.7)
MCV RBC AUTO: 108.8 FL (ref 79–97)
MONOCYTES # BLD AUTO: 0.61 10*3/MM3 (ref 0.1–0.9)
MONOCYTES NFR BLD AUTO: 10 % (ref 5–12)
NEUTROPHILS NFR BLD AUTO: 3.94 10*3/MM3 (ref 1.7–7)
NEUTROPHILS NFR BLD AUTO: 64.5 % (ref 42.7–76)
NRBC BLD AUTO-RTO: 0 /100 WBC (ref 0–0.2)
PLATELET # BLD AUTO: 146 10*3/MM3 (ref 140–450)
PMV BLD AUTO: 9.5 FL (ref 6–12)
POTASSIUM SERPL-SCNC: 4.7 MMOL/L (ref 3.5–5.2)
PROT SERPL-MCNC: 5.8 G/DL (ref 6–8.5)
RBC # BLD AUTO: 3.85 10*6/MM3 (ref 4.14–5.8)
SODIUM SERPL-SCNC: 137 MMOL/L (ref 136–145)
TROPONIN T SERPL-MCNC: <0.01 NG/ML (ref 0–0.03)
TROPONIN T SERPL-MCNC: <0.01 NG/ML (ref 0–0.03)
WBC NRBC COR # BLD: 6.1 10*3/MM3 (ref 3.4–10.8)
WHOLE BLOOD HOLD COAG: NORMAL
WHOLE BLOOD HOLD SPECIMEN: NORMAL

## 2022-08-24 PROCEDURE — 80053 COMPREHEN METABOLIC PANEL: CPT | Performed by: STUDENT IN AN ORGANIZED HEALTH CARE EDUCATION/TRAINING PROGRAM

## 2022-08-24 PROCEDURE — 70450 CT HEAD/BRAIN W/O DYE: CPT

## 2022-08-24 PROCEDURE — 96374 THER/PROPH/DIAG INJ IV PUSH: CPT

## 2022-08-24 PROCEDURE — 93010 ELECTROCARDIOGRAM REPORT: CPT | Performed by: INTERNAL MEDICINE

## 2022-08-24 PROCEDURE — 84484 ASSAY OF TROPONIN QUANT: CPT | Performed by: STUDENT IN AN ORGANIZED HEALTH CARE EDUCATION/TRAINING PROGRAM

## 2022-08-24 PROCEDURE — 71045 X-RAY EXAM CHEST 1 VIEW: CPT | Performed by: RADIOLOGY

## 2022-08-24 PROCEDURE — 36415 COLL VENOUS BLD VENIPUNCTURE: CPT

## 2022-08-24 PROCEDURE — 99284 EMERGENCY DEPT VISIT MOD MDM: CPT

## 2022-08-24 PROCEDURE — 71045 X-RAY EXAM CHEST 1 VIEW: CPT

## 2022-08-24 PROCEDURE — 93005 ELECTROCARDIOGRAM TRACING: CPT | Performed by: STUDENT IN AN ORGANIZED HEALTH CARE EDUCATION/TRAINING PROGRAM

## 2022-08-24 PROCEDURE — 70450 CT HEAD/BRAIN W/O DYE: CPT | Performed by: RADIOLOGY

## 2022-08-24 PROCEDURE — 85025 COMPLETE CBC W/AUTO DIFF WBC: CPT | Performed by: STUDENT IN AN ORGANIZED HEALTH CARE EDUCATION/TRAINING PROGRAM

## 2022-08-24 RX ORDER — METOPROLOL TARTRATE 5 MG/5ML
2.5 INJECTION INTRAVENOUS ONCE
Status: COMPLETED | OUTPATIENT
Start: 2022-08-24 | End: 2022-08-24

## 2022-08-24 RX ORDER — SODIUM CHLORIDE 0.9 % (FLUSH) 0.9 %
10 SYRINGE (ML) INJECTION AS NEEDED
Status: DISCONTINUED | OUTPATIENT
Start: 2022-08-24 | End: 2022-08-24 | Stop reason: HOSPADM

## 2022-08-24 RX ADMIN — SODIUM CHLORIDE 1000 ML: 9 INJECTION, SOLUTION INTRAVENOUS at 10:02

## 2022-08-24 RX ADMIN — METOPROLOL TARTRATE 2.5 MG: 1 INJECTION, SOLUTION INTRAVENOUS at 14:00

## 2022-08-24 RX ADMIN — SODIUM CHLORIDE 500 ML: 9 INJECTION, SOLUTION INTRAVENOUS at 12:57

## 2022-08-24 NOTE — ED PROVIDER NOTES
Subjective   51-year-old male presents to the ER due to concerns for increasing fatigue and dizziness.  Patient noted symptoms started earlier this morning.  Patient does take multiple blood pressure medications on a daily basis.  Patient denied chest pain or shortness of breath.  Patient noted dizziness upon standing.  Patient noted symptoms are worsened with ambulation.  Denied headache or vision changes.  Denied focal neurological deficit.  Denied dysarthria or dysphagia.  Symptoms alleviated with rest.  Vitals stable          Review of Systems   Constitutional: Positive for fatigue.   Neurological: Positive for dizziness.   All other systems reviewed and are negative.      Past Medical History:   Diagnosis Date   • Anemia    • Arthritis of back     not sure   • Johnson esophagus    • Cholelithiasis    • COPD (chronic obstructive pulmonary disease) (HCC)    • CTS (carpal tunnel syndrome)    • DDD (degenerative disc disease), thoracic    • Degenerative disc disease, lumbar    • Dystonia    • GERD (gastroesophageal reflux disease)    • GI (gastrointestinal bleed)    • Hypertension    • Irritable bowel syndrome    • Low back strain    • Lumbosacral disc disease    • Migraines    • Peptic ulcer disease    • Stroke (HCC)     TIA   • TIA (transient ischemic attack)    • Ulcerative colitis ([facility])    • Wears dentures     upper only       Allergies   Allergen Reactions   • Contrast Dye Other (See Comments)     Shortness of breath   • Prilosec [Omeprazole] Other (See Comments)     Chest pain  Pt states he can take pepcid with no problem   • Protonix [Pantoprazole Sodium] Palpitations     Patient states that protonix causes chest pain.  Shruthi Pérze, [facility]  4/23/2017  11:19 AM  Pt states he can tolerate pepcid with no problem     • Dilantin [Phenytoin] Delirium   • Dilaudid [Hydromorphone] Hallucinations   • Flagyl [Metronidazole] Nausea And Vomiting   • Topamax [Topiramate] Anxiety       Past Surgical History:    Procedure Laterality Date   • ABDOMINAL SURGERY  09/17/2021   • APPENDECTOMY     • CHOLECYSTECTOMY N/A 04/22/2017    Procedure: CHOLECYSTECTOMY LAPAROSCOPIC;  Surgeon: Jaqueline Guaman MD;  [facility]  [facility];  Service:    • COLONOSCOPY N/A 05/08/2017    Procedure: COLONOSCOPY  CPTCODE:28595;  Surgeon: Nicho Richey III, MD;  [facility];  Service:    • ENDOSCOPY     • ENDOSCOPY N/A 05/08/2017    Procedure: ESOPHAGOGASTRODUODENOSCOPY WITH BIOPSY  CPTCODE:71747;  Surgeon: Nicho Richey III, MD;  [facility];  Service:    • ENDOSCOPY N/A 11/03/2017    Procedure: ESOPHAGOGASTRODUODENOSCOPY WITH BIOPSY  CPTCODE: 49906;  Surgeon: Nicho Richey III, MD;  [facility];  Service:    • ENDOSCOPY N/A 02/20/2018    Procedure: ESOPHAGOGASTRODUODENOSCOPY WITH DILATATION CPT CODE: 97867;  Surgeon: Nicho Richey III, MD;  [facility];  Service:    • ENDOSCOPY N/A 06/05/2018    Procedure: ESOPHAGOGASTRODUODENOSCOPY WITH BIOPSY CPT CODE: 94630;  Surgeon: Nicho Richey III, MD;  [facility];  Service: Gastroenterology   • ENDOSCOPY N/A 05/26/2021    Procedure: ESOPHAGOGASTRODUODENOSCOPY WITH BIOPSY;  Surgeon: Julieta Hebert MD;  [facility];  Service: Gastroenterology;  Laterality: N/A;   • ENDOSCOPY N/A 06/02/2021    Procedure: ESOPHAGOGASTRODUODENOSCOPY WITH BIOPSY;  Surgeon: Julieta Hebert MD;  [facility];  Service: Gastroenterology;  Laterality: N/A;   • GASTRECTOMY N/A 09/17/2019    Procedure: OPEN VAGOTOMY, ANTRECTOMY,REVISION- Y GASTROJEJUNOSOTOMY;  Surgeon: Herbert Umanzor MD;  Location:  [facility];  Service: General   • UPPER GASTROINTESTINAL ENDOSCOPY     • VENTRAL/INCISIONAL HERNIA REPAIR N/A 12/10/2020    Procedure: INCISIONAL HERNIA REPAIR LAPAROSCOPIC WITH MESH;  Surgeon: Herbert Umanzor MD;  Location:  [facility];  Service: General;  Laterality: N/A;       Family History   Problem Relation Age of Onset    • Osteoporosis Mother    • Hypertension Father    • Osteoporosis Father    • No Known Problems Sister    • No Known Problems Brother    • Drug abuse Brother    • No Known Problems Daughter    • Diabetes Sister        Social History     Socioeconomic History   • Marital status:    Tobacco Use   • Smoking status: Current Every Day Smoker     Packs/day: 1.00     Years: 35.00     Pack years: 35.00     Types: Cigarettes     Last attempt to quit: 2020     Years since quittin.9   • Smokeless tobacco: Never Used   • Tobacco comment: Been smoking 20 years   Vaping Use   • Vaping Use: Never used   Substance and Sexual Activity   • Alcohol use: Yes   • Drug use: No   • Sexual activity: Yes     Partners: Female     Birth control/protection: None           Objective   Physical Exam  Constitutional:       General: He is not in acute distress.     Appearance: He is well-developed. He is not ill-appearing.   HENT:      Head: Normocephalic and atraumatic.   Eyes:      Extraocular Movements: Extraocular movements intact.      Pupils: Pupils are equal, round, and reactive to light.   Neck:      Vascular: No JVD.   Cardiovascular:      Rate and Rhythm: Normal rate and regular rhythm.      Heart sounds: Normal heart sounds. No murmur heard.  Pulmonary:      Effort: No tachypnea, accessory muscle usage or respiratory distress.      Breath sounds: Normal breath sounds. No stridor. No decreased breath sounds, wheezing, rhonchi or rales.   Chest:      Chest wall: No deformity, tenderness or crepitus.   Abdominal:      General: Bowel sounds are normal.      Palpations: Abdomen is soft.      Tenderness: There is no abdominal tenderness. There is no guarding or rebound.   Musculoskeletal:         General: Normal range of motion.      Cervical back: Normal range of motion and neck supple.      Right lower leg: No tenderness. No edema.      Left lower leg: No tenderness. No edema.   Lymphadenopathy:      Cervical: No cervical  adenopathy.   Skin:     General: Skin is warm and dry.      Coloration: Skin is not cyanotic.      Findings: No ecchymosis or erythema.   Neurological:      General: No focal deficit present.      Mental Status: He is alert and oriented to person, place, and time.      Cranial Nerves: No cranial nerve deficit.      Motor: No weakness.   Psychiatric:         Mood and Affect: Mood normal. Mood is not anxious.         Behavior: Behavior normal. Behavior is not agitated.         Procedures           ED Course  ED Course as of 08/24/22 1319   Wed Aug 24, 2022   0951 EKG noted sinus tachycardia.  120 bpm.  QRS 78.  QTc 424.  No acute ST elevation [SF]   1317 Patient's orthostatic vitals were positive for orthostatic hypotension.  Elevated liver enzymes noted.  However, these are chronic.  Decreased bicarbonate noted.  Patient received aggressive IV fluid resuscitation.  Decreased bicarbonate likely secondary to dehydration.  No acute kidney injury noted.  CBC unremarkable.  Troponin trended x2 and within normal limits.  Head CT unremarkable.  Chest x-ray unremarkable.  EKG noted no acute abnormality.  Orthostatic hypotension likely.  Work up and results were discussed throughly with the patient.  The patient will be discharged for further monitoring and management with their PCP.  Red flags, warning signs, worsening symptoms, and when to return to the ER discussed with and understood by the patient.  Patient will follow up with their PCP in a timely manner.  Vitals stable at discharge. [SF]      ED Course User Index  [SF] Mihir Cruz,                                            [facility]    Final diagnoses:   Orthostatic hypotension       ED Disposition  ED Disposition     ED Disposition   Discharge    Condition   Stable    Comment   --             Leticia Martinez, APRN  [address] [facility] [address]  [phone]    In 1 week      [facility]  [address] [place] [address]  [id number]  [phone]    If symptoms worsen         Medication List      Changed    esomeprazole 40 MG capsule  Commonly known as: nexIUM  Take 1 capsule by mouth 2 (Two) Times a Day Before Meals.  What changed: when to take this             Mihir Cruz DO  08/24/22 [id number]

## 2022-08-25 LAB
QT INTERVAL: 290 MS
QTC INTERVAL: 424 MS

## 2022-08-29 ENCOUNTER — PRE-ADMISSION TESTING (OUTPATIENT)
Dept: PREADMISSION TESTING | Facility: HOSPITAL | Age: 52
End: 2022-08-29

## 2022-08-29 DIAGNOSIS — G56.22 CUBITAL TUNNEL SYNDROME, LEFT: ICD-10-CM

## 2022-09-01 ENCOUNTER — ANESTHESIA (OUTPATIENT)
Dept: PERIOP | Facility: HOSPITAL | Age: 52
End: 2022-09-01

## 2022-09-01 ENCOUNTER — HOSPITAL ENCOUNTER (OUTPATIENT)
Facility: HOSPITAL | Age: 52
Setting detail: HOSPITAL OUTPATIENT SURGERY
Discharge: HOME | End: 2022-09-01
Attending: ORTHOPAEDIC SURGERY | Admitting: ORTHOPAEDIC SURGERY

## 2022-09-01 ENCOUNTER — ANESTHESIA EVENT (OUTPATIENT)
Dept: PERIOP | Facility: HOSPITAL | Age: 52
End: 2022-09-01

## 2022-09-01 VITALS
DIASTOLIC BLOOD PRESSURE: 88 MMHG | OXYGEN SATURATION: 97 % | HEIGHT: 62 IN | WEIGHT: 156 LBS | BODY MASS INDEX: 28.71 KG/M2 | HEART RATE: 79 BPM | TEMPERATURE: 97.6 F | RESPIRATION RATE: 18 BRPM | SYSTOLIC BLOOD PRESSURE: 145 MMHG

## 2022-09-01 DIAGNOSIS — G56.22 CUBITAL TUNNEL SYNDROME, LEFT: ICD-10-CM

## 2022-09-01 PROCEDURE — 0 LIDOCAINE 1 % SOLUTION: Performed by: ORTHOPAEDIC SURGERY

## 2022-09-01 PROCEDURE — 25010000002 PROPOFOL 10 MG/ML EMULSION: Performed by: NURSE ANESTHETIST, CERTIFIED REGISTERED

## 2022-09-01 PROCEDURE — 25010000002 KETOROLAC TROMETHAMINE PER 15 MG: Performed by: NURSE ANESTHETIST, CERTIFIED REGISTERED

## 2022-09-01 PROCEDURE — 25010000002 FENTANYL CITRATE (PF) 50 MCG/ML SOLUTION: Performed by: NURSE ANESTHETIST, CERTIFIED REGISTERED

## 2022-09-01 PROCEDURE — 64718 REVISE ULNAR NERVE AT ELBOW: CPT | Performed by: ORTHOPAEDIC SURGERY

## 2022-09-01 PROCEDURE — 25010000002 ONDANSETRON PER 1 MG: Performed by: NURSE ANESTHETIST, CERTIFIED REGISTERED

## 2022-09-01 PROCEDURE — 25010000002 MIDAZOLAM PER 1 MG: Performed by: NURSE ANESTHETIST, CERTIFIED REGISTERED

## 2022-09-01 RX ORDER — MIDAZOLAM HYDROCHLORIDE 1 MG/ML
INJECTION INTRAMUSCULAR; INTRAVENOUS AS NEEDED
Status: DISCONTINUED | OUTPATIENT
Start: 2022-09-01 | End: 2022-09-01 | Stop reason: SURG

## 2022-09-01 RX ORDER — MEPERIDINE HYDROCHLORIDE 25 MG/ML
12.5 INJECTION INTRAMUSCULAR; INTRAVENOUS; SUBCUTANEOUS
Status: DISCONTINUED | OUTPATIENT
Start: 2022-09-01 | End: 2022-09-01 | Stop reason: HOSPADM

## 2022-09-01 RX ORDER — FAMOTIDINE 10 MG/ML
INJECTION, SOLUTION INTRAVENOUS AS NEEDED
Status: DISCONTINUED | OUTPATIENT
Start: 2022-09-01 | End: 2022-09-01 | Stop reason: SURG

## 2022-09-01 RX ORDER — PROPOFOL 10 MG/ML
VIAL (ML) INTRAVENOUS AS NEEDED
Status: DISCONTINUED | OUTPATIENT
Start: 2022-09-01 | End: 2022-09-01 | Stop reason: SURG

## 2022-09-01 RX ORDER — BUPIVACAINE HYDROCHLORIDE 5 MG/ML
INJECTION, SOLUTION PERINEURAL AS NEEDED
Status: DISCONTINUED | OUTPATIENT
Start: 2022-09-01 | End: 2022-09-01 | Stop reason: HOSPADM

## 2022-09-01 RX ORDER — KETOROLAC TROMETHAMINE 30 MG/ML
INJECTION, SOLUTION INTRAMUSCULAR; INTRAVENOUS AS NEEDED
Status: DISCONTINUED | OUTPATIENT
Start: 2022-09-01 | End: 2022-09-01 | Stop reason: SURG

## 2022-09-01 RX ORDER — MAGNESIUM HYDROXIDE 1200 MG/15ML
LIQUID ORAL AS NEEDED
Status: DISCONTINUED | OUTPATIENT
Start: 2022-09-01 | End: 2022-09-01 | Stop reason: HOSPADM

## 2022-09-01 RX ORDER — SODIUM CHLORIDE 0.9 % (FLUSH) 0.9 %
10 SYRINGE (ML) INJECTION AS NEEDED
Status: DISCONTINUED | OUTPATIENT
Start: 2022-09-01 | End: 2022-09-01 | Stop reason: HOSPADM

## 2022-09-01 RX ORDER — OXYCODONE HYDROCHLORIDE AND ACETAMINOPHEN 5; 325 MG/1; MG/1
1 TABLET ORAL ONCE AS NEEDED
Status: DISCONTINUED | OUTPATIENT
Start: 2022-09-01 | End: 2022-09-01 | Stop reason: HOSPADM

## 2022-09-01 RX ORDER — FENTANYL CITRATE 50 UG/ML
50 INJECTION, SOLUTION INTRAMUSCULAR; INTRAVENOUS
Status: DISCONTINUED | OUTPATIENT
Start: 2022-09-01 | End: 2022-09-01 | Stop reason: HOSPADM

## 2022-09-01 RX ORDER — FENTANYL CITRATE 50 UG/ML
INJECTION, SOLUTION INTRAMUSCULAR; INTRAVENOUS AS NEEDED
Status: DISCONTINUED | OUTPATIENT
Start: 2022-09-01 | End: 2022-09-01 | Stop reason: SURG

## 2022-09-01 RX ORDER — SODIUM CHLORIDE, SODIUM LACTATE, POTASSIUM CHLORIDE, CALCIUM CHLORIDE 600; 310; 30; 20 MG/100ML; MG/100ML; MG/100ML; MG/100ML
125 INJECTION, SOLUTION INTRAVENOUS ONCE
Status: COMPLETED | OUTPATIENT
Start: 2022-09-01 | End: 2022-09-01

## 2022-09-01 RX ORDER — ONDANSETRON 2 MG/ML
4 INJECTION INTRAMUSCULAR; INTRAVENOUS AS NEEDED
Status: DISCONTINUED | OUTPATIENT
Start: 2022-09-01 | End: 2022-09-01 | Stop reason: HOSPADM

## 2022-09-01 RX ORDER — SODIUM CHLORIDE, SODIUM LACTATE, POTASSIUM CHLORIDE, CALCIUM CHLORIDE 600; 310; 30; 20 MG/100ML; MG/100ML; MG/100ML; MG/100ML
100 INJECTION, SOLUTION INTRAVENOUS ONCE AS NEEDED
Status: DISCONTINUED | OUTPATIENT
Start: 2022-09-01 | End: 2022-09-01 | Stop reason: HOSPADM

## 2022-09-01 RX ORDER — OXYCODONE HYDROCHLORIDE AND ACETAMINOPHEN 5; 325 MG/1; MG/1
1 TABLET ORAL EVERY 4 HOURS PRN
Qty: 10 TABLET | Refills: 0 | Status: SHIPPED | OUTPATIENT
Start: 2022-09-01 | End: 2022-10-26

## 2022-09-01 RX ORDER — SODIUM CHLORIDE 0.9 % (FLUSH) 0.9 %
10 SYRINGE (ML) INJECTION EVERY 12 HOURS SCHEDULED
Status: DISCONTINUED | OUTPATIENT
Start: 2022-09-01 | End: 2022-09-01 | Stop reason: HOSPADM

## 2022-09-01 RX ORDER — MIDAZOLAM HYDROCHLORIDE 1 MG/ML
1 INJECTION INTRAMUSCULAR; INTRAVENOUS
Status: DISCONTINUED | OUTPATIENT
Start: 2022-09-01 | End: 2022-09-01 | Stop reason: HOSPADM

## 2022-09-01 RX ORDER — LIDOCAINE HYDROCHLORIDE 10 MG/ML
INJECTION, SOLUTION INFILTRATION; PERINEURAL AS NEEDED
Status: DISCONTINUED | OUTPATIENT
Start: 2022-09-01 | End: 2022-09-01 | Stop reason: HOSPADM

## 2022-09-01 RX ORDER — IPRATROPIUM BROMIDE AND ALBUTEROL SULFATE 2.5; .5 MG/3ML; MG/3ML
3 SOLUTION RESPIRATORY (INHALATION) ONCE AS NEEDED
Status: DISCONTINUED | OUTPATIENT
Start: 2022-09-01 | End: 2022-09-01 | Stop reason: HOSPADM

## 2022-09-01 RX ORDER — ONDANSETRON 2 MG/ML
INJECTION INTRAMUSCULAR; INTRAVENOUS AS NEEDED
Status: DISCONTINUED | OUTPATIENT
Start: 2022-09-01 | End: 2022-09-01 | Stop reason: SURG

## 2022-09-01 RX ORDER — SODIUM CHLORIDE, SODIUM LACTATE, POTASSIUM CHLORIDE, CALCIUM CHLORIDE 600; 310; 30; 20 MG/100ML; MG/100ML; MG/100ML; MG/100ML
INJECTION, SOLUTION INTRAVENOUS CONTINUOUS PRN
Status: DISCONTINUED | OUTPATIENT
Start: 2022-09-01 | End: 2022-09-01 | Stop reason: SURG

## 2022-09-01 RX ADMIN — ONDANSETRON 4 MG: 2 INJECTION INTRAMUSCULAR; INTRAVENOUS at 07:28

## 2022-09-01 RX ADMIN — MIDAZOLAM HYDROCHLORIDE 2 MG: 1 INJECTION, SOLUTION INTRAMUSCULAR; INTRAVENOUS at 07:33

## 2022-09-01 RX ADMIN — FAMOTIDINE 20 MG: 10 INJECTION INTRAVENOUS at 07:28

## 2022-09-01 RX ADMIN — FENTANYL CITRATE 100 MCG: 50 INJECTION INTRAMUSCULAR; INTRAVENOUS at 07:28

## 2022-09-01 RX ADMIN — SODIUM CHLORIDE, POTASSIUM CHLORIDE, SODIUM LACTATE AND CALCIUM CHLORIDE: 600; 310; 30; 20 INJECTION, SOLUTION INTRAVENOUS at 07:28

## 2022-09-01 RX ADMIN — SODIUM CHLORIDE, POTASSIUM CHLORIDE, SODIUM LACTATE AND CALCIUM CHLORIDE 125 ML/HR: 600; 310; 30; 20 INJECTION, SOLUTION INTRAVENOUS at 07:09

## 2022-09-01 RX ADMIN — MIDAZOLAM HYDROCHLORIDE 2 MG: 1 INJECTION, SOLUTION INTRAMUSCULAR; INTRAVENOUS at 07:28

## 2022-09-01 RX ADMIN — KETOROLAC TROMETHAMINE 30 MG: 30 INJECTION, SOLUTION INTRAMUSCULAR at 07:33

## 2022-09-01 RX ADMIN — PROPOFOL 100 MG: 10 INJECTION, EMULSION INTRAVENOUS at 08:13

## 2022-09-01 RX ADMIN — PROPOFOL 120 MG: 10 INJECTION, EMULSION INTRAVENOUS at 07:58

## 2022-09-01 RX ADMIN — PROPOFOL 80 MG: 10 INJECTION, EMULSION INTRAVENOUS at 07:43

## 2022-09-01 NOTE — OP NOTE
CUBITAL TUNNEL RELEASE  Procedure Note    Jamison Edward  9/1/2022    Pre-op Diagnosis:   Cubital tunnel syndrome, left [G56.22]    Post-op Diagnosis:     Post-Op Diagnosis Codes:     * Cubital tunnel syndrome, left [G56.22]    Procedure(s):  CUBITAL TUNNEL RELEASE LEFT    Surgeon(s):  Alec Hough MD    Anesthesia: General/local    Operative technique: With patient in the operating theatre under general anesthesia with left upper extremity and tourniquet left arm sterilely prepped and draped in usual manner.  Medial aspect left elbow infiltrated with 5 cc of 0.5 Marcaine.  The extremity was exsanguinated tourniquet inflated to 200 mmHg.  Gently curved incision was made lightly posterior to medial epicondyle.  With skin divided sharply blunt dissection was carried down the ulnar nerve identified proximally and then released from proximal to distal with the release carried to the anconeus.  No obvious pathology identified within the cubital tunnel.  There was not undue tension on the nerve with the arm fully extended.  Tourniquet was deflated hemostasis was obtained the wound closed in layers with running 4-0 subcuticular for final closure with skin bond system position and sterile dressing applied he was taken to recovery room in stable condition.    Staff:   Circulator: Charlette Nguyen RN  Scrub Person: Suzette Jackman  Assistant: John Araujo    Estimated Blood Loss: none    Specimens:   none               Implants/Grafts: none      Drains: None    Complications: none    Tourniquet time: 19 min    Alec Hough MD     Date: 9/1/2022  Time: 08:34 EDT    Cc: Leticia Martinez APRN

## 2022-09-01 NOTE — ANESTHESIA PREPROCEDURE EVALUATION
Anesthesia Evaluation     Patient summary reviewed and Nursing notes reviewed   no history of anesthetic complications:  NPO Solid Status: > 8 hours  NPO Liquid Status: > 8 hours           Airway   Mallampati: II  TM distance: >3 FB  Neck ROM: full  No difficulty expected  Dental    (+) edentulous    Pulmonary - normal exam    breath sounds clear to auscultation  (+) a smoker Current Abstained day of surgery, COPD mild,   Cardiovascular - normal exam    ECG reviewed  Rhythm: regular  Rate: normal    (+) hypertension,       Neuro/Psych  (+) TIA, CVA (>4 years ago. no deficits), headaches (severe HA improved with Keppra),    (-) seizures  GI/Hepatic/Renal/Endo    (+)  GERD, PUD, GI bleeding upper resolved,     Musculoskeletal     Abdominal    Substance History - negative use     OB/GYN negative ob/gyn ROS         Other   arthritis,                        Anesthesia Plan    ASA 2     general   total IV anesthesia  intravenous induction     Anesthetic plan, risks, benefits, and alternatives have been provided, discussed and informed consent has been obtained with: patient and spouse/significant other.    Plan discussed with CRNA.

## 2022-09-01 NOTE — ANESTHESIA POSTPROCEDURE EVALUATION
Patient: Jamison Edward    Procedure Summary     Date: 09/01/22 Room / Location: [facility] OR 03 /  [facility]    Anesthesia Start: 0728 Anesthesia Stop: 0830    Procedure: CUBITAL TUNNEL RELEASE LEFT (Left Wrist) Diagnosis:       Cubital tunnel syndrome, left      (Cubital tunnel syndrome, left [G56.22])    Surgeons: Alec Hough MD Provider: Surya Ledezma MD    Anesthesia Type: general ASA Status: 2          Anesthesia Type: general    Vitals  No vitals data found for the desired time range.          Post Anesthesia Care and Evaluation    Patient location during evaluation: PHASE II  Patient participation: complete - patient participated  Level of consciousness: awake and alert  Pain score: 0  Pain management: adequate    Airway patency: patent  Anesthetic complications: No anesthetic complications    Cardiovascular status: acceptable  Respiratory status: acceptable  Hydration status: acceptable

## 2022-09-12 ENCOUNTER — OFFICE VISIT (OUTPATIENT)
Dept: ORTHOPEDIC SURGERY | Facility: CLINIC | Age: 52
End: 2022-09-12

## 2022-09-12 VITALS — WEIGHT: 156.09 LBS | HEIGHT: 62 IN | BODY MASS INDEX: 28.72 KG/M2

## 2022-09-12 DIAGNOSIS — Z09 POSTOP CHECK: Primary | ICD-10-CM

## 2022-09-12 PROCEDURE — 99024 POSTOP FOLLOW-UP VISIT: CPT | Performed by: ORTHOPAEDIC SURGERY

## 2022-09-12 NOTE — PROGRESS NOTES
Patient: Jamison Edward  YOB: 1970  Date of Encounter: 09/12/2022      Chief Complaint:   Chief Complaint   Patient presents with   • Left Elbow - Follow-up, Post-op     Procedure(s):09/01/2022  CUBITAL TUNNEL RELEASE LEFT     Surgeon(s):  Alec Hough MD               HPI:  Jamison Edward, 52 y.o. male returns in postoperative follow-up ulnar nerve release left elbow he is doing well he reports his pain is doing well and he has no further numbness or tingling to his fingers.      Medical History:  Patient Active Problem List   Diagnosis   • Precordial chest pain   • Weight loss, abnormal   • Right upper quadrant abdominal pain   • Epigastric pain   • History of bleeding ulcers   • Nausea   • Malaise and fatigue   • Acute gastric ulcer without hemorrhage or perforation   • Poor appetite   • Duodenal ulcer   • Gastroesophageal reflux disease   • Dysphagia   • Iron deficiency anemia   • Malabsorption   • Gastric ulcer without hemorrhage or perforation   • Dystonia   • Peptic ulcer without hemorrhage or perforation   • Gastric outlet obstruction   • Incisional hernia, without obstruction or gangrene   • Incisional hernia   • Chest pain, atypical   • Gastric bezoar   • Essential hypertension   • Cigarette smoker   • Chronic back pain   • Sinus tachycardia   • Tobacco use   • Mild carpal tunnel syndrome     Past Medical History:   Diagnosis Date   • Anemia    • Arthritis of back     not sure   • Johnson esophagus    • Cholelithiasis    • COPD (chronic obstructive pulmonary disease) ([facility])    • CTS (carpal tunnel syndrome)    • DDD (degenerative disc disease), thoracic    • Degenerative disc disease, lumbar    • Dystonia    • GERD (gastroesophageal reflux disease)    • GI (gastrointestinal bleed)    • Hypertension    • Irritable bowel syndrome    • Low back strain    • Lumbosacral disc disease    • Migraines    • Peptic ulcer disease    • Stroke ([facility])     TIA   • TIA (transient ischemic  attack)    • Ulcerative colitis (HCC)    • Wears dentures     upper only         Surgical History:  Past Surgical History:   Procedure Laterality Date   • ABDOMINAL SURGERY  09/17/2021   • APPENDECTOMY     • CHOLECYSTECTOMY N/A 04/22/2017    Procedure: CHOLECYSTECTOMY LAPAROSCOPIC;  Surgeon: Jaqueline Guaman MD;  [facility];  Service:    • COLONOSCOPY N/A 05/08/2017    Procedure: COLONOSCOPY  CPTCODE:89623;  Surgeon: Nicho Richey III, MD;  [facility];  Service:    • CUBITAL TUNNEL RELEASE Left 9/1/2022    Procedure: CUBITAL TUNNEL RELEASE LEFT;  Surgeon: Alec Hough MD;  [facility];  Service: Orthopedics;  Laterality: Left;   • ENDOSCOPY     • ENDOSCOPY N/A 05/08/2017    Procedure: ESOPHAGOGASTRODUODENOSCOPY WITH BIOPSY  CPTCODE:[id number];  Surgeon: Nicho Richey III, MD;  [facility];  Service:    • ENDOSCOPY N/A 11/03/2017    Procedure: ESOPHAGOGASTRODUODENOSCOPY WITH BIOPSY  CPTCODE: 69855;  Surgeon: Nicho Richey III, MD;  [facility];  Service:    • ENDOSCOPY N/A 02/20/2018    Procedure: ESOPHAGOGASTRODUODENOSCOPY WITH DILATATION CPT CODE: 39795;  Surgeon: Nicho Richey III, MD;  [facility];  Service:    • ENDOSCOPY N/A 06/05/2018    Procedure: ESOPHAGOGASTRODUODENOSCOPY WITH BIOPSY CPT CODE: 97460;  Surgeon: Nicho Richey III, MD;  [facility];  Service: Gastroenterology   • ENDOSCOPY N/A 05/26/2021    Procedure: ESOPHAGOGASTRODUODENOSCOPY WITH BIOPSY;  Surgeon: Julieta Hebert MD;  [facility];  Service: Gastroenterology;  Laterality: N/A;   • ENDOSCOPY N/A 06/02/2021    Procedure: ESOPHAGOGASTRODUODENOSCOPY WITH BIOPSY;  Surgeon: Julieta Hebert MD;  [facility];  Service: Gastroenterology;  Laterality: N/A;   • GASTRECTOMY N/A 09/17/2019    Procedure: OPEN VAGOTOMY, ANTRECTOMY,REVISION- Y GASTROJEJUNOSOTOMY;  Surgeon: Herbert Umanzor MD;  Location:  [facility];  Service:  General   • UPPER GASTROINTESTINAL ENDOSCOPY     • VENTRAL/INCISIONAL HERNIA REPAIR N/A 12/10/2020    Procedure: INCISIONAL HERNIA REPAIR LAPAROSCOPIC WITH MESH;  Surgeon: Herbert Umanzor MD;  Location: [facility];  Service: General;  Laterality: N/A;         Examination:  Examination left elbow reveals medial incision intact.  Sensation intact to the fourth and fifth fingers.      Assessment & Plan:  52 y.o. male presents in follow-up ulnar nerve release left elbow doing well.  We will resume her regular activity slowly he will follow-up in the future with any residual problems.     Diagnosis Plan   1. Postop check             Cc:  Leticia Martinez APRN              This document has been electronically signed by Alec Hough MD   September 15, 2022 17:23 EDT

## 2022-10-26 ENCOUNTER — OFFICE VISIT (OUTPATIENT)
Dept: CARDIOLOGY | Facility: CLINIC | Age: 52
End: 2022-10-26

## 2022-10-26 VITALS
WEIGHT: 157.4 LBS | SYSTOLIC BLOOD PRESSURE: 135 MMHG | HEART RATE: 117 BPM | HEIGHT: 62 IN | OXYGEN SATURATION: 100 % | BODY MASS INDEX: 28.97 KG/M2 | DIASTOLIC BLOOD PRESSURE: 90 MMHG

## 2022-10-26 DIAGNOSIS — I95.1 ORTHOSTATIC HYPOTENSION: ICD-10-CM

## 2022-10-26 DIAGNOSIS — R55 PRE-SYNCOPE: ICD-10-CM

## 2022-10-26 DIAGNOSIS — R00.2 PALPITATIONS: ICD-10-CM

## 2022-10-26 DIAGNOSIS — R42 DIZZINESS: Primary | ICD-10-CM

## 2022-10-26 DIAGNOSIS — I10 ESSENTIAL HYPERTENSION: ICD-10-CM

## 2022-10-26 DIAGNOSIS — R00.0 SINUS TACHYCARDIA: ICD-10-CM

## 2022-10-26 DIAGNOSIS — Z72.0 TOBACCO USE: ICD-10-CM

## 2022-10-26 PROCEDURE — 99214 OFFICE O/P EST MOD 30 MIN: CPT | Performed by: NURSE PRACTITIONER

## 2022-10-26 NOTE — PROGRESS NOTES
[facility]             EvaEILEEN Ng Theresa, APRN Anthreece ELLIS Cheyanne  1970  10/26/2022    Patient Active Problem List   Diagnosis   • Precordial chest pain   • Weight loss, abnormal   • Right upper quadrant abdominal pain   • Epigastric pain   • History of bleeding ulcers   • Nausea   • Malaise and fatigue   • Acute gastric ulcer without hemorrhage or perforation   • Poor appetite   • Duodenal ulcer   • Gastroesophageal reflux disease   • Dysphagia   • Iron deficiency anemia   • Malabsorption   • Gastric ulcer without hemorrhage or perforation   • Dystonia   • Peptic ulcer without hemorrhage or perforation   • Gastric outlet obstruction   • Incisional hernia, without obstruction or gangrene   • Incisional hernia   • Chest pain, atypical   • Gastric bezoar   • Essential hypertension   • Cigarette smoker   • Chronic back pain   • Sinus tachycardia   • Tobacco use   • Mild carpal tunnel syndrome   • Orthostatic hypotension   • Palpitations       Dear Leticia Martinez APRN:    Subjective     Chief Complaint   Patient presents with   • Follow-up     6 mth   • Med Management     list       HPI:     This is a 52 y.o. male with known past medical history of orthostatic hypotension, tobacco abuse and sinus tachycardia     Jamison Edward presents today for routine cardiology follow up. He states that he was recently seen in the ED for fatigue and dizziness. He was diagnosed with orthostatic hypotension and taken off his benicar with some improvement in symptoms. Reports he does have some intermittent palpitations with associated dizziness and near syncope. He continues to smoke. CMP was essentially unremarkable. CBC was stable. Stress test in September 2021 showed no evidence of ischemia. Reports long standing history of sinus tachycardia.     • Diagnostic Testing  1. Nuclear stress test 5/2017: No evidence of ischemia  2. Echocardiogram 9/2021: Ef  61-65%  3. Nuclear stress test 9/2021: No evidence of ischemia     All other systems were reviewed and were negative.    Patient Active Problem List   Diagnosis   • Precordial chest pain   • Weight loss, abnormal   • Right upper quadrant abdominal pain   • Epigastric pain   • History of bleeding ulcers   • Nausea   • Malaise and fatigue   • Acute gastric ulcer without hemorrhage or perforation   • Poor appetite   • Duodenal ulcer   • Gastroesophageal reflux disease   • Dysphagia   • Iron deficiency anemia   • Malabsorption   • Gastric ulcer without hemorrhage or perforation   • Dystonia   • Peptic ulcer without hemorrhage or perforation   • Gastric outlet obstruction   • Incisional hernia, without obstruction or gangrene   • Incisional hernia   • Chest pain, atypical   • Gastric bezoar   • Essential hypertension   • Cigarette smoker   • Chronic back pain   • Sinus tachycardia   • Tobacco use   • Mild carpal tunnel syndrome   • Orthostatic hypotension   • Palpitations       family history includes Diabetes in his sister; Drug abuse in his brother; Hypertension in his father; No Known Problems in his brother, daughter, and sister; Osteoporosis in his father and mother.     reports that he has been smoking cigarettes. He has a 35.00 pack-year smoking history. He has never used smokeless tobacco. He reports current alcohol use of about 4.0 standard drinks per week. He reports that he does not use drugs.    Allergies   Allergen Reactions   • Contrast Dye Other (See Comments)     Shortness of breath   • Prilosec [Omeprazole] Other (See Comments)     Chest pain  Pt states he can take pepcid with no problem   • Protonix [Pantoprazole Sodium] Palpitations     Patient states that protonix causes chest pain.  Shruthi Pérez, [facility]  4/23/2017  11:19 AM  Pt states he can tolerate pepcid with no problem     • Dilantin [Phenytoin] Delirium   • Dilaudid [Hydromorphone] Hallucinations   • Flagyl [Metronidazole] Nausea And Vomiting    • Topamax [Topiramate] Anxiety         Current Outpatient Medications:   •  baclofen (LIORESAL) 20 MG tablet, Take 10-20 mg by mouth 3 (Three) Times a Day As Needed., Disp: , Rfl:   •  esomeprazole (nexIUM) 40 MG capsule, Take 1 capsule by mouth 2 (Two) Times a Day Before Meals. (Patient taking differently: Take 1 capsule by mouth Daily.), Disp: 60 capsule, Rfl: 3  •  isosorbide mononitrate (IMDUR) 30 MG 24 hr tablet, Take 1 tablet by mouth Daily., Disp: 30 tablet, Rfl: 11  •  levETIRAcetam (KEPPRA) 750 MG tablet, Take 850 mg by mouth 3 (Three) Times a Day. Patient takes one tablet in the morning and afternoon and takes two tablets at bedtime., Disp: , Rfl:   •  metoclopramide (REGLAN) 10 MG tablet, Take 1 tablet by mouth 2 (Two) Times a Day Before Meals., Disp: 60 tablet, Rfl: 5  •  metoprolol succinate XL (TOPROL-XL) 25 MG 24 hr tablet, Take 1 tablet by mouth Daily., Disp: 90 tablet, Rfl: 3  •  nitroglycerin (NITROSTAT) 0.4 MG SL tablet, Place 1 tablet under the tongue Every 5 (Five) Minutes As Needed for Chest Pain. Take no more than 3 doses in 15 minutes., Disp: 100 tablet, Rfl: 2  •  QUEtiapine (SEROquel) 50 MG tablet, take 1 tablet by oral route every night, Disp: , Rfl:   •  venlafaxine (EFFEXOR) 75 MG tablet, Take 75 mg by mouth every night at bedtime., Disp: , Rfl:       Physical Exam:  I have reviewed the patient's current vital signs as documented in the patient's EMR.   Vitals:    10/26/22 1450   BP: 135/90   Pulse: 117   SpO2: 100%     Body mass index is 28.78 kg/m².       10/26/22  1450   Weight: 71.4 kg (157 lb 6.4 oz)      Physical Exam  Constitutional:       General: He is not in acute distress.     Appearance: Normal appearance. He is well-developed.   HENT:      Head: Normocephalic and atraumatic.      Mouth/Throat:      Mouth: Mucous membranes are moist.   Eyes:      Extraocular Movements: Extraocular movements intact.      Pupils: Pupils are equal, round, and reactive to light.   Neck:       Vascular: No JVD.   Cardiovascular:      Rate and Rhythm: Regular rhythm. Tachycardia present.      Pulses:           Dorsalis pedis pulses are 2+ on the right side and 2+ on the left side.        Posterior tibial pulses are 2+ on the right side and 2+ on the left side.      Heart sounds: Normal heart sounds. No murmur heard.    No S3 or S4 sounds.   Pulmonary:      Effort: Pulmonary effort is normal. No respiratory distress.      Breath sounds: Normal breath sounds. No wheezing.   Abdominal:      General: Bowel sounds are normal. There is no distension.      Palpations: Abdomen is soft. There is no hepatomegaly.      Tenderness: There is no abdominal tenderness.   Musculoskeletal:         General: Normal range of motion.      Cervical back: Normal range of motion and neck supple.   Skin:     General: Skin is warm and dry.      Coloration: Skin is not jaundiced or pale.   Neurological:      General: No focal deficit present.      Mental Status: He is alert and oriented to person, place, and time. Mental status is at baseline.   Psychiatric:         Mood and Affect: Mood normal.         Behavior: Behavior normal.         Thought Content: Thought content normal.         Judgment: Judgment normal.         DATA REVIEWED:     TTE/ANIYA:  Results for orders placed during the hospital encounter of 09/22/21    Adult Transthoracic Echo Complete w/ Color, Spectral and Contrast if necessary per protocol    Interpretation Summary  · Left ventricular ejection fraction appears to be 61 - 65%. Left ventricular systolic function is normal.  · Left ventricular diastolic function is consistent with (grade I) impaired relaxation.      Laboratory evaluations:    Lab Results   Component Value Date    GLUCOSE 101 (H) 08/24/2022    BUN 9 08/24/2022    CREATININE 1.09 08/24/2022    EGFRIFNONA 106 11/29/2021    BCR 8.3 08/24/2022    K 4.7 08/24/2022    CO2 16.7 (L) 08/24/2022    CALCIUM 9.0 08/24/2022    ALBUMIN 3.51 08/24/2022     (H)  08/24/2022     (H) 08/24/2022     Lab Results   Component Value Date    WBC 6.10 08/24/2022    HGB 13.8 08/24/2022    HCT 41.9 08/24/2022    .8 (H) 08/24/2022     08/24/2022     Lab Results   Component Value Date    CHOL 145 11/29/2021    TRIG 115 11/29/2021    HDL 87 (H) 11/29/2021    LDL 38 11/29/2021     Lab Results   Component Value Date    TSH 0.718 11/29/2021     Lab Results   Component Value Date    HGBA1C 5.10 12/08/2020     Lab Results   Component Value Date     (H) 08/24/2022     Lab Results   Component Value Date    HGBA1C 5.10 12/08/2020    HGBA1C 5.10 09/12/2019     Lab Results   Component Value Date    CREATININE 1.09 08/24/2022     Lab Results   Component Value Date    IRON 105 12/06/2021    TIBC 288 (L) 12/06/2021    FERRITIN 556.00 (H) 12/06/2021     Lab Results   Component Value Date    INR 0.80 (L) 05/12/2021    INR 0.84 (L) 07/02/2018    INR 0.95 10/25/2017    PROTIME 10.9 (L) 05/12/2021    PROTIME 11.7 07/02/2018    PROTIME 12.8 10/25/2017           --------------------------------------------------------------------------------------------------------------------------    ASSESSMENT/PLAN:      Diagnosis Plan   1. Dizziness  Cardiac Event Monitor      2. Pre-syncope  Cardiac Event Monitor      3. Tobacco use        4. Sinus tachycardia        5. Orthostatic hypotension        6. Essential hypertension        7. Palpitations            1. Dizziness  2. Presyncope  3. Orthostatic hypotension  4. Essential hypertension  5. Palpitations  • Patient was recently seen in the ED for which he was diagnosed with orthostatic hypotension.  He was recently taken off his Benicar with some improvement of his dizziness and presyncope.  Blood pressure elevated in the office today.  Discussed with patient about adequate water intake and moving slowly from sitting to standing position.  He does report intermittent palpitations with dizziness and presyncope therefore will order cardiac  event monitor to rule out any arrhythmias.  For now continue with metoprolol at current dosing.     6.  Tobacco abuse  · Discussed with patient about importance of smoking cessation to reduce his risk of cardiovascular and pulmonary disease.      This document has been @Electronically signed by EILEEN Coronado, 10/26/22, 3:53 PM EDT.       Dictated Utilizing Dragon Dictation: Part of this note may be an electronic transcription/translation of spoken language to printed text using the Dragon Dictation System.    Follow-up appointment and medication changes provided in hand delivered After Visit Summary as well as reviewed in the room.

## 2022-11-15 ENCOUNTER — APPOINTMENT (OUTPATIENT)
Dept: ULTRASOUND IMAGING | Facility: HOSPITAL | Age: 52
End: 2022-11-15

## 2022-11-15 ENCOUNTER — HOSPITAL ENCOUNTER (EMERGENCY)
Facility: HOSPITAL | Age: 52
Discharge: HOME | End: 2022-11-16
Attending: EMERGENCY MEDICINE | Admitting: EMERGENCY MEDICINE

## 2022-11-15 ENCOUNTER — APPOINTMENT (OUTPATIENT)
Dept: CT IMAGING | Facility: HOSPITAL | Age: 52
End: 2022-11-15

## 2022-11-15 DIAGNOSIS — E80.6 HYPERBILIRUBINEMIA: Primary | ICD-10-CM

## 2022-11-15 LAB
ALBUMIN SERPL-MCNC: 2.98 G/DL (ref 3.5–5.2)
ALBUMIN/GLOB SERPL: 1.2 G/DL
ALP SERPL-CCNC: 385 U/L (ref 39–117)
ALT SERPL W P-5'-P-CCNC: 46 U/L (ref 1–41)
AMPHET+METHAMPHET UR QL: NEGATIVE
AMPHETAMINES UR QL: NEGATIVE
ANION GAP SERPL CALCULATED.3IONS-SCNC: 13.4 MMOL/L (ref 5–15)
ANISOCYTOSIS BLD QL: NORMAL
APTT PPP: 29.5 SECONDS (ref 26.5–34.5)
AST SERPL-CCNC: 82 U/L (ref 1–40)
BACTERIA UR QL AUTO: ABNORMAL /HPF
BARBITURATES UR QL SCN: NEGATIVE
BASOPHILS # BLD AUTO: 0.02 10*3/MM3 (ref 0–0.2)
BASOPHILS NFR BLD AUTO: 0.2 % (ref 0–1.5)
BENZODIAZ UR QL SCN: NEGATIVE
BILIRUB SERPL-MCNC: 6.1 MG/DL (ref 0–1.2)
BILIRUB UR QL STRIP: ABNORMAL
BUN SERPL-MCNC: 7 MG/DL (ref 6–20)
BUN/CREAT SERPL: 9.7 (ref 7–25)
BUPRENORPHINE SERPL-MCNC: NEGATIVE NG/ML
CALCIUM SPEC-SCNC: 8.8 MG/DL (ref 8.6–10.5)
CANNABINOIDS SERPL QL: NEGATIVE
CHLORIDE SERPL-SCNC: 96 MMOL/L (ref 98–107)
CLARITY UR: CLEAR
CO2 SERPL-SCNC: 25.6 MMOL/L (ref 22–29)
COCAINE UR QL: NEGATIVE
COLOR UR: ABNORMAL
CREAT SERPL-MCNC: 0.72 MG/DL (ref 0.76–1.27)
DEPRECATED RDW RBC AUTO: 64.3 FL (ref 37–54)
EGFRCR SERPLBLD CKD-EPI 2021: 108.6 ML/MIN/1.73
EOSINOPHIL # BLD AUTO: 0.02 10*3/MM3 (ref 0–0.4)
EOSINOPHIL NFR BLD AUTO: 0.2 % (ref 0.3–6.2)
ERYTHROCYTE [DISTWIDTH] IN BLOOD BY AUTOMATED COUNT: 15.6 % (ref 12.3–15.4)
ETHANOL BLD-MCNC: <10 MG/DL (ref 0–10)
ETHANOL UR QL: <0.01 %
GLOBULIN UR ELPH-MCNC: 2.4 GM/DL
GLUCOSE SERPL-MCNC: 120 MG/DL (ref 65–99)
GLUCOSE UR STRIP-MCNC: NEGATIVE MG/DL
HAV IGM SERPL QL IA: NORMAL
HBV CORE IGM SERPL QL IA: NORMAL
HBV SURFACE AG SERPL QL IA: NORMAL
HCT VFR BLD AUTO: 35.4 % (ref 37.5–51)
HCV AB SER DONR QL: NORMAL
HGB BLD-MCNC: 12.4 G/DL (ref 13–17.7)
HGB UR QL STRIP.AUTO: NEGATIVE
HOLD SPECIMEN: NORMAL
HOLD SPECIMEN: NORMAL
HYALINE CASTS UR QL AUTO: ABNORMAL /LPF
IMM GRANULOCYTES # BLD AUTO: 0.1 10*3/MM3 (ref 0–0.05)
IMM GRANULOCYTES NFR BLD AUTO: 1.2 % (ref 0–0.5)
INR PPP: 1.05 (ref 0.9–1.1)
KETONES UR QL STRIP: NEGATIVE
LEUKOCYTE ESTERASE UR QL STRIP.AUTO: ABNORMAL
LIPASE SERPL-CCNC: 9 U/L (ref 13–60)
LYMPHOCYTES # BLD AUTO: 0.59 10*3/MM3 (ref 0.7–3.1)
LYMPHOCYTES NFR BLD AUTO: 7 % (ref 19.6–45.3)
MACROCYTES BLD QL SMEAR: NORMAL
MAGNESIUM SERPL-MCNC: 1.6 MG/DL (ref 1.6–2.6)
MCH RBC QN AUTO: 39 PG (ref 26.6–33)
MCHC RBC AUTO-ENTMCNC: 35 G/DL (ref 31.5–35.7)
MCV RBC AUTO: 111.3 FL (ref 79–97)
METHADONE UR QL SCN: NEGATIVE
MONOCYTES # BLD AUTO: 0.86 10*3/MM3 (ref 0.1–0.9)
MONOCYTES NFR BLD AUTO: 10.3 % (ref 5–12)
MUCOUS THREADS URNS QL MICRO: ABNORMAL /HPF
NEUTROPHILS NFR BLD AUTO: 6.8 10*3/MM3 (ref 1.7–7)
NEUTROPHILS NFR BLD AUTO: 81.1 % (ref 42.7–76)
NITRITE UR QL STRIP: POSITIVE
NRBC BLD AUTO-RTO: 0 /100 WBC (ref 0–0.2)
OPIATES UR QL: NEGATIVE
OXYCODONE UR QL SCN: NEGATIVE
PCP UR QL SCN: NEGATIVE
PH UR STRIP.AUTO: 6 [PH] (ref 5–8)
PLATELET # BLD AUTO: 89 10*3/MM3 (ref 140–450)
PMV BLD AUTO: 10.4 FL (ref 6–12)
POTASSIUM SERPL-SCNC: 4.3 MMOL/L (ref 3.5–5.2)
PROPOXYPH UR QL: NEGATIVE
PROT SERPL-MCNC: 5.4 G/DL (ref 6–8.5)
PROT UR QL STRIP: ABNORMAL
PROTHROMBIN TIME: 13.9 SECONDS (ref 12.1–14.7)
RBC # BLD AUTO: 3.18 10*6/MM3 (ref 4.14–5.8)
RBC # UR STRIP: ABNORMAL /HPF
REF LAB TEST METHOD: ABNORMAL
SMALL PLATELETS BLD QL SMEAR: NORMAL
SODIUM SERPL-SCNC: 135 MMOL/L (ref 136–145)
SP GR UR STRIP: 1.02 (ref 1–1.03)
SQUAMOUS #/AREA URNS HPF: ABNORMAL /HPF
STOMATOCYTES BLD QL SMEAR: NORMAL
TRICYCLICS UR QL SCN: POSITIVE
UROBILINOGEN UR QL STRIP: ABNORMAL
WBC # UR STRIP: ABNORMAL /HPF
WBC NRBC COR # BLD: 8.39 10*3/MM3 (ref 3.4–10.8)
WHOLE BLOOD HOLD COAG: NORMAL
WHOLE BLOOD HOLD SPECIMEN: NORMAL

## 2022-11-15 PROCEDURE — 82077 ASSAY SPEC XCP UR&BREATH IA: CPT | Performed by: PHYSICIAN ASSISTANT

## 2022-11-15 PROCEDURE — 80053 COMPREHEN METABOLIC PANEL: CPT | Performed by: PHYSICIAN ASSISTANT

## 2022-11-15 PROCEDURE — 80074 ACUTE HEPATITIS PANEL: CPT | Performed by: EMERGENCY MEDICINE

## 2022-11-15 PROCEDURE — 74176 CT ABD & PELVIS W/O CONTRAST: CPT

## 2022-11-15 PROCEDURE — 80306 DRUG TEST PRSMV INSTRMNT: CPT | Performed by: PHYSICIAN ASSISTANT

## 2022-11-15 PROCEDURE — 36415 COLL VENOUS BLD VENIPUNCTURE: CPT

## 2022-11-15 PROCEDURE — 85730 THROMBOPLASTIN TIME PARTIAL: CPT | Performed by: PHYSICIAN ASSISTANT

## 2022-11-15 PROCEDURE — 85610 PROTHROMBIN TIME: CPT | Performed by: PHYSICIAN ASSISTANT

## 2022-11-15 PROCEDURE — 99284 EMERGENCY DEPT VISIT MOD MDM: CPT

## 2022-11-15 PROCEDURE — 85025 COMPLETE CBC W/AUTO DIFF WBC: CPT | Performed by: PHYSICIAN ASSISTANT

## 2022-11-15 PROCEDURE — 81001 URINALYSIS AUTO W/SCOPE: CPT | Performed by: PHYSICIAN ASSISTANT

## 2022-11-15 PROCEDURE — 76705 ECHO EXAM OF ABDOMEN: CPT

## 2022-11-15 PROCEDURE — 83735 ASSAY OF MAGNESIUM: CPT | Performed by: PHYSICIAN ASSISTANT

## 2022-11-15 PROCEDURE — 85007 BL SMEAR W/DIFF WBC COUNT: CPT | Performed by: PHYSICIAN ASSISTANT

## 2022-11-15 PROCEDURE — 83690 ASSAY OF LIPASE: CPT | Performed by: PHYSICIAN ASSISTANT

## 2022-11-15 RX ORDER — SODIUM CHLORIDE 0.9 % (FLUSH) 0.9 %
10 SYRINGE (ML) INJECTION AS NEEDED
Status: DISCONTINUED | OUTPATIENT
Start: 2022-11-15 | End: 2022-11-16 | Stop reason: HOSPADM

## 2022-11-15 RX ADMIN — SODIUM CHLORIDE 1000 ML: 9 INJECTION, SOLUTION INTRAVENOUS at 21:11

## 2022-11-15 NOTE — ED NOTES
MEDICAL SCREENING:    Reason for Visit: pt was sent from PCP for possible liver failure. Pt has been vomiting. At PCP office increased bilirubin. ETOH daily. Hx of fatty liver.     Patient initially seen in triage.  The patient was advised further evaluation and diagnostic testing will be needed, some of the treatment and testing will be initiated in the lobby in order to begin the process.  The patient will be returned to the waiting area for the time being and possibly be re-assessed by a subsequent ED provider.  The patient will be brought back to the treatment area in as timely manner as possible.         Pam Mata PA  11/15/22 1718

## 2022-11-16 VITALS
OXYGEN SATURATION: 97 % | HEIGHT: 66 IN | HEART RATE: 88 BPM | TEMPERATURE: 97.6 F | WEIGHT: 155 LBS | BODY MASS INDEX: 24.91 KG/M2 | RESPIRATION RATE: 20 BRPM | DIASTOLIC BLOOD PRESSURE: 88 MMHG | SYSTOLIC BLOOD PRESSURE: 123 MMHG

## 2022-11-16 NOTE — ED PROVIDER NOTES
Subjective     History provided by:  Patient   used: No    Weakness - Generalized  Severity:  Mild  Onset quality:  Gradual  Timing:  Sporadic  Progression:  Waxing and waning  Chronicity:  New  Context: not alcohol use, not allergies, not change in medication, not decreased sleep, not dehydration, not drug use, not increased activity, not pinched nerve, not recent infection, not stress and not urinary tract infection    Relieved by:  Nothing  Worsened by:  Nothing  Ineffective treatments:  None tried  Associated symptoms: no abdominal pain, no anorexia, no aphasia, no arthralgias, no ataxia, no chest pain, no cough, no diarrhea, no difficulty walking, no dizziness, no drooling, no dysphagia, no dysuria, no numbness in extremities, no falls, no fever, no foul-smelling urine, no frequency, no headaches, no hematochezia, no lethargy, no loss of consciousness, no melena, no myalgias, no nausea, no near-syncope, no seizures, no sensory-motor deficit, no shortness of breath, no stroke symptoms, no syncope, no urgency, no vision change and no vomiting    Risk factors: anemia    Risk factors: no congestive heart failure, no coronary artery disease, no diabetes, no excessive menstruation, no family hx of stroke, no heart disease, no neurologic disease, no new medications and no recent stressors        Review of Systems   Constitutional: Negative for activity change, appetite change, chills, diaphoresis, fatigue and fever.   HENT: Negative for congestion, drooling, ear pain and sore throat.    Eyes: Negative for redness.   Respiratory: Negative for cough, chest tightness, shortness of breath and wheezing.    Cardiovascular: Negative for chest pain, palpitations, leg swelling, syncope and near-syncope.   Gastrointestinal: Negative for abdominal pain, anorexia, diarrhea, dysphagia, hematochezia, melena, nausea and vomiting.   Genitourinary: Negative for dysuria, frequency and urgency.   Musculoskeletal:  Negative for arthralgias, back pain, falls, myalgias and neck pain.   Skin: Negative for pallor, rash and wound.   Neurological: Negative for dizziness, seizures, loss of consciousness, speech difficulty, weakness and headaches.   Psychiatric/Behavioral: Negative for agitation, behavioral problems, confusion and decreased concentration.   All other systems reviewed and are negative.      Past Medical History:   Diagnosis Date   • Anemia    • Arthritis of back     not sure   • Johnson esophagus    • Cholelithiasis    • COPD (chronic obstructive pulmonary disease) ([facility])    • CTS (carpal tunnel syndrome)    • DDD (degenerative disc disease), thoracic    • Degenerative disc disease, lumbar    • Dystonia    • GERD (gastroesophageal reflux disease)    • GI (gastrointestinal bleed)    • Hypertension    • Irritable bowel syndrome    • Low back strain    • Lumbosacral disc disease    • Migraines    • Peptic ulcer disease    • Stroke ([facility])     TIA   • TIA (transient ischemic attack)    • Ulcerative colitis ([facility])    • Wears dentures     upper only       Allergies   Allergen Reactions   • Contrast Dye Other (See Comments)     Shortness of breath   • Prilosec [Omeprazole] Other (See Comments)     Chest pain  Pt states he can take pepcid with no problem   • Protonix [Pantoprazole Sodium] Palpitations     Patient states that protonix causes chest pain.  Shruthi Rina Pérez, [facility]  4/23/2017  11:19 AM  Pt states he can tolerate pepcid with no problem     • Dilantin [Phenytoin] Delirium   • Dilaudid [Hydromorphone] Hallucinations   • Flagyl [Metronidazole] Nausea And Vomiting   • Topamax [Topiramate] Anxiety       Past Surgical History:   Procedure Laterality Date   • ABDOMINAL SURGERY  09/17/2021   • APPENDECTOMY     • CHOLECYSTECTOMY N/A 04/22/2017    Procedure: CHOLECYSTECTOMY LAPAROSCOPIC;  Surgeon: Jaqueline Guaman MD;  Location: [facility];  Service:    • COLONOSCOPY N/A 05/08/2017    Procedure: COLONOSCOPY  CPTCODE:59865;   Surgeon: Nicho Richey III, MD;  Location:  [facility];  Service:    • CUBITAL TUNNEL RELEASE Left 9/1/2022    Procedure: CUBITAL TUNNEL RELEASE LEFT;  Surgeon: Alec Hough MD;  Location:  [facility];  Service: Orthopedics;  Laterality: Left;   • ENDOSCOPY     • ENDOSCOPY N/A 05/08/2017    Procedure: ESOPHAGOGASTRODUODENOSCOPY WITH BIOPSY  CPTCODE:85437;  Surgeon: Nicho Richey III, MD;  Location:  [facility];  Service:    • ENDOSCOPY N/A 11/03/2017    Procedure: ESOPHAGOGASTRODUODENOSCOPY WITH BIOPSY  CPTCODE: 48655;  Surgeon: Nicho Richey III, MD;  Location:  [facility];  Service:    • ENDOSCOPY N/A 02/20/2018    Procedure: ESOPHAGOGASTRODUODENOSCOPY WITH DILATATION CPT CODE: 04143;  Surgeon: Nicho Richey III, MD;  Location:  [facility];  Service:    • ENDOSCOPY N/A 06/05/2018    Procedure: ESOPHAGOGASTRODUODENOSCOPY WITH BIOPSY CPT CODE: 38089;  Surgeon: Nicho Richey III, MD;  Location:  [facility];  Service: Gastroenterology   • ENDOSCOPY N/A 05/26/2021    Procedure: ESOPHAGOGASTRODUODENOSCOPY WITH BIOPSY;  Surgeon: Julieta Hebert MD;  Location:  [facility];  Service: Gastroenterology;  Laterality: N/A;   • ENDOSCOPY N/A 06/02/2021    Procedure: ESOPHAGOGASTRODUODENOSCOPY WITH BIOPSY;  Surgeon: Julieta Hebert MD;  Location:  [facility];  Service: Gastroenterology;  Laterality: N/A;   • GASTRECTOMY N/A 09/17/2019    Procedure: OPEN VAGOTOMY, ANTRECTOMY,REVISION- Y GASTROJEJUNOSOTOMY;  Surgeon: Herbert Umanzor MD;  Location:  [facility];  Service: General   • UPPER GASTROINTESTINAL ENDOSCOPY     • VENTRAL/INCISIONAL HERNIA REPAIR N/A 12/10/2020    Procedure: INCISIONAL HERNIA REPAIR LAPAROSCOPIC WITH MESH;  Surgeon: Herbert Umanzor MD;  Location:  [facility];  Service: General;  Laterality: N/A;       Family History   Problem Relation Age of Onset   • Osteoporosis Mother    • Hypertension Father    • Osteoporosis Father    • No Known Problems Sister    • No  Known Problems Brother    • Drug abuse Brother    • No Known Problems Daughter    • Diabetes Sister        Social History     Socioeconomic History   • Marital status:    Tobacco Use   • Smoking status: Every Day     Packs/day: 1.00     Years: 35.00     Pack years: 35.00     Types: Cigarettes     Last attempt to quit: 2020     Years since quittin.2   • Smokeless tobacco: Never   • Tobacco comments:     Been smoking 20 years   Vaping Use   • Vaping Use: Never used   Substance and Sexual Activity   • Alcohol use: Yes     Alcohol/week: 4.0 standard drinks     Types: 4 Cans of beer per week   • Drug use: No   • Sexual activity: Yes     Partners: Female     Birth control/protection: None           Objective   Physical Exam  Vitals and nursing note reviewed.   Constitutional:       General: He is not in acute distress.     Appearance: Normal appearance. He is well-developed. He is not toxic-appearing or diaphoretic.   HENT:      Head: Normocephalic and atraumatic.      Right Ear: External ear normal.      Left Ear: External ear normal.      Nose: Nose normal.      Mouth/Throat:      Pharynx: No oropharyngeal exudate.      Tonsils: No tonsillar exudate.   Eyes:      General: Lids are normal.      Conjunctiva/sclera: Conjunctivae normal.      Pupils: Pupils are equal, round, and reactive to light.   Neck:      Thyroid: No thyromegaly.   Cardiovascular:      Rate and Rhythm: Normal rate and regular rhythm.      Pulses: Normal pulses.      Heart sounds: Normal heart sounds, S1 normal and S2 normal.   Pulmonary:      Effort: Pulmonary effort is normal. No tachypnea or respiratory distress.      Breath sounds: Normal breath sounds. No decreased breath sounds, wheezing or rales.   Chest:      Chest wall: No tenderness.   Abdominal:      General: Bowel sounds are normal. There is no distension.      Palpations: Abdomen is soft.      Tenderness: There is no abdominal tenderness. There is no guarding or rebound.    Musculoskeletal:         General: No tenderness or deformity. Normal range of motion.      Cervical back: Full passive range of motion without pain, normal range of motion and neck supple.   Lymphadenopathy:      Cervical: No cervical adenopathy.   Skin:     General: Skin is warm and dry.      Coloration: Skin is not pale.      Findings: No erythema or rash.   Neurological:      Mental Status: He is alert and oriented to person, place, and time.      GCS: GCS eye subscore is 4. GCS verbal subscore is 5. GCS motor subscore is 6.      Cranial Nerves: No cranial nerve deficit.      Sensory: No sensory deficit.   Psychiatric:         Speech: Speech normal.         Behavior: Behavior normal.         Thought Content: Thought content normal.         Judgment: Judgment normal.         Procedures           ED Course  ED Course as of 11/16/22 0426   Wed Nov 16, 2022   0008 US Gallbladder  FINDINGS:  Limited sonographic evaluation performed of the right upper quadrant. There is hepatic steatosis. Common duct is normal at 2 mm internal diameter. Gallbladder is surgically absent. No free fluid.     IMPRESSION:  Cholecystectomy without biliary obstruction. Fatty liver. [ES]   0008 CT Abdomen Pelvis Without Contrast  IMPRESSION:     1. Severe diffuse fatty infiltration liver showing interval worsening since 5/12/2021.  2. Multiple postoperative changes including GE junction stomach and small bowel in the upper abdomen. Patient is also post cholecystectomy and intra-abdominal wall hernia repair. The appendix is not seen and may also be surgically absent. Please  correlate with the surgical history.  3. No evidence for bowel obstruction or free air.  4. Bladder is partly contracted with possible wall thickening. Please correlate for any clinical evidence of cystitis.  5. Study is limited by lack of IV contrast media for evaluation for pathology other than urinary tract calculus disease. [ES]   0425 Discussed case with on-call  hospitalist, they recommend outpatient follow-up at this time.  Patient has no symptoms at this time and reports he is at his baseline.  He was informed to immediately return the emergency department with any worsening symptoms. [ES]      ED Course User Index  [ES] Rodrigo Muñiz MD                                           MDM  Number of Diagnoses or Management Options  Hyperbilirubinemia: new and requires workup     Amount and/or Complexity of Data Reviewed  Clinical lab tests: reviewed and ordered  Tests in the radiology section of CPT®: reviewed and ordered  Tests in the medicine section of CPT®: ordered and reviewed  Review and summarize past medical records: yes  Independent visualization of images, tracings, or specimens: yes    Risk of Complications, Morbidity, and/or Mortality  Presenting problems: moderate  Diagnostic procedures: moderate  Management options: moderate    Patient Progress  Patient progress: stable      Final diagnoses:   Hyperbilirubinemia       ED Disposition  ED Disposition     ED Disposition   Discharge    Condition   Stable    Comment   --             Julieta Hebert MD  [address]  [facility] [address]  [phone]    Schedule an appointment as soon as possible for a visit in 1 day  EVALUATE         Medication List      Changed    esomeprazole 40 MG capsule  Commonly known as: nexIUM  Take 1 capsule by mouth 2 (Two) Times a Day Before Meals.  What changed: when to take this             Rodrigo Muñiz MD  11/16/22 1392

## 2022-11-18 ENCOUNTER — HOSPITAL ENCOUNTER (OUTPATIENT)
Dept: MRI IMAGING | Facility: HOSPITAL | Age: 52
Discharge: HOME | End: 2022-11-18

## 2022-11-18 ENCOUNTER — LAB (OUTPATIENT)
Dept: LAB | Facility: HOSPITAL | Age: 52
End: 2022-11-18

## 2022-11-18 ENCOUNTER — OFFICE VISIT (OUTPATIENT)
Dept: GASTROENTEROLOGY | Facility: CLINIC | Age: 52
End: 2022-11-18

## 2022-11-18 VITALS
HEIGHT: 66 IN | DIASTOLIC BLOOD PRESSURE: 73 MMHG | SYSTOLIC BLOOD PRESSURE: 111 MMHG | WEIGHT: 156.2 LBS | BODY MASS INDEX: 25.1 KG/M2 | HEART RATE: 98 BPM

## 2022-11-18 DIAGNOSIS — K70.0 ALCOHOLIC FATTY LIVER: ICD-10-CM

## 2022-11-18 DIAGNOSIS — R17 ELEVATED BILIRUBIN: Primary | ICD-10-CM

## 2022-11-18 DIAGNOSIS — K76.0 FATTY (CHANGE OF) LIVER, NOT ELSEWHERE CLASSIFIED: ICD-10-CM

## 2022-11-18 DIAGNOSIS — E78.49 OTHER HYPERLIPIDEMIA: ICD-10-CM

## 2022-11-18 DIAGNOSIS — R17 ELEVATED BILIRUBIN: ICD-10-CM

## 2022-11-18 LAB
ALBUMIN SERPL-MCNC: 2.82 G/DL (ref 3.5–5.2)
ALBUMIN/GLOB SERPL: 1 G/DL
ALP SERPL-CCNC: 346 U/L (ref 39–117)
ALT SERPL W P-5'-P-CCNC: 43 U/L (ref 1–41)
AMMONIA BLD-SCNC: 57 UMOL/L (ref 16–60)
ANION GAP SERPL CALCULATED.3IONS-SCNC: 13.4 MMOL/L (ref 5–15)
AST SERPL-CCNC: 62 U/L (ref 1–40)
BASOPHILS # BLD AUTO: 0.02 10*3/MM3 (ref 0–0.2)
BASOPHILS NFR BLD AUTO: 0.2 % (ref 0–1.5)
BILIRUB SERPL-MCNC: 4.8 MG/DL (ref 0–1.2)
BUN SERPL-MCNC: 5 MG/DL (ref 6–20)
BUN/CREAT SERPL: 8.3 (ref 7–25)
CALCIUM SPEC-SCNC: 8.4 MG/DL (ref 8.6–10.5)
CERULOPLASMIN SERPL-MCNC: 11 MG/DL (ref 16–31)
CHLORIDE SERPL-SCNC: 102 MMOL/L (ref 98–107)
CO2 SERPL-SCNC: 23.6 MMOL/L (ref 22–29)
CREAT SERPL-MCNC: 0.6 MG/DL (ref 0.76–1.27)
DEPRECATED RDW RBC AUTO: 58.8 FL (ref 37–54)
EGFRCR SERPLBLD CKD-EPI 2021: 114.7 ML/MIN/1.73
EOSINOPHIL # BLD AUTO: 0.07 10*3/MM3 (ref 0–0.4)
EOSINOPHIL NFR BLD AUTO: 0.8 % (ref 0.3–6.2)
ERYTHROCYTE [DISTWIDTH] IN BLOOD BY AUTOMATED COUNT: 14.9 % (ref 12.3–15.4)
FERRITIN SERPL-MCNC: 1422 NG/ML (ref 30–400)
GLOBULIN UR ELPH-MCNC: 2.8 GM/DL
GLUCOSE SERPL-MCNC: 124 MG/DL (ref 65–99)
HCT VFR BLD AUTO: 34.7 % (ref 37.5–51)
HGB BLD-MCNC: 12.5 G/DL (ref 13–17.7)
IMM GRANULOCYTES # BLD AUTO: 0.08 10*3/MM3 (ref 0–0.05)
IMM GRANULOCYTES NFR BLD AUTO: 0.9 % (ref 0–0.5)
IRON 24H UR-MRATE: 73 MCG/DL (ref 59–158)
IRON SATN MFR SERPL: 63 % (ref 20–50)
LYMPHOCYTES # BLD AUTO: 0.9 10*3/MM3 (ref 0.7–3.1)
LYMPHOCYTES NFR BLD AUTO: 10.6 % (ref 19.6–45.3)
MCH RBC QN AUTO: 38.8 PG (ref 26.6–33)
MCHC RBC AUTO-ENTMCNC: 36 G/DL (ref 31.5–35.7)
MCV RBC AUTO: 107.8 FL (ref 79–97)
MONOCYTES # BLD AUTO: 1.02 10*3/MM3 (ref 0.1–0.9)
MONOCYTES NFR BLD AUTO: 12 % (ref 5–12)
NEUTROPHILS NFR BLD AUTO: 6.39 10*3/MM3 (ref 1.7–7)
NEUTROPHILS NFR BLD AUTO: 75.5 % (ref 42.7–76)
NRBC BLD AUTO-RTO: 0.1 /100 WBC (ref 0–0.2)
PLATELET # BLD AUTO: 123 10*3/MM3 (ref 140–450)
PMV BLD AUTO: 11.2 FL (ref 6–12)
POTASSIUM SERPL-SCNC: 3.4 MMOL/L (ref 3.5–5.2)
PROT SERPL-MCNC: 5.6 G/DL (ref 6–8.5)
RBC # BLD AUTO: 3.22 10*6/MM3 (ref 4.14–5.8)
SODIUM SERPL-SCNC: 139 MMOL/L (ref 136–145)
TIBC SERPL-MCNC: 116 MCG/DL (ref 298–536)
TRANSFERRIN SERPL-MCNC: 78 MG/DL (ref 200–360)
WBC NRBC COR # BLD: 8.48 10*3/MM3 (ref 3.4–10.8)

## 2022-11-18 PROCEDURE — 85025 COMPLETE CBC W/AUTO DIFF WBC: CPT | Performed by: PHYSICIAN ASSISTANT

## 2022-11-18 PROCEDURE — 83010 ASSAY OF HAPTOGLOBIN QUANT: CPT | Performed by: PHYSICIAN ASSISTANT

## 2022-11-18 PROCEDURE — 84478 ASSAY OF TRIGLYCERIDES: CPT | Performed by: PHYSICIAN ASSISTANT

## 2022-11-18 PROCEDURE — 82977 ASSAY OF GGT: CPT | Performed by: PHYSICIAN ASSISTANT

## 2022-11-18 PROCEDURE — 80053 COMPREHEN METABOLIC PANEL: CPT | Performed by: PHYSICIAN ASSISTANT

## 2022-11-18 PROCEDURE — 82390 ASSAY OF CERULOPLASMIN: CPT | Performed by: PHYSICIAN ASSISTANT

## 2022-11-18 PROCEDURE — 82525 ASSAY OF COPPER: CPT | Performed by: PHYSICIAN ASSISTANT

## 2022-11-18 PROCEDURE — 82140 ASSAY OF AMMONIA: CPT | Performed by: PHYSICIAN ASSISTANT

## 2022-11-18 PROCEDURE — 74181 MRI ABDOMEN W/O CONTRAST: CPT

## 2022-11-18 PROCEDURE — 99213 OFFICE O/P EST LOW 20 MIN: CPT | Performed by: PHYSICIAN ASSISTANT

## 2022-11-18 PROCEDURE — 74181 MRI ABDOMEN W/O CONTRAST: CPT | Performed by: RADIOLOGY

## 2022-11-18 PROCEDURE — 82465 ASSAY BLD/SERUM CHOLESTEROL: CPT | Performed by: PHYSICIAN ASSISTANT

## 2022-11-18 PROCEDURE — 82172 ASSAY OF APOLIPOPROTEIN: CPT | Performed by: PHYSICIAN ASSISTANT

## 2022-11-18 PROCEDURE — 82728 ASSAY OF FERRITIN: CPT | Performed by: PHYSICIAN ASSISTANT

## 2022-11-18 PROCEDURE — 83540 ASSAY OF IRON: CPT | Performed by: PHYSICIAN ASSISTANT

## 2022-11-18 PROCEDURE — 84466 ASSAY OF TRANSFERRIN: CPT | Performed by: PHYSICIAN ASSISTANT

## 2022-11-18 PROCEDURE — 36415 COLL VENOUS BLD VENIPUNCTURE: CPT | Performed by: PHYSICIAN ASSISTANT

## 2022-11-18 PROCEDURE — 83883 ASSAY NEPHELOMETRY NOT SPEC: CPT | Performed by: PHYSICIAN ASSISTANT

## 2022-11-18 NOTE — PROGRESS NOTES
Chief Complaint   Patient presents with   • Heartburn       Jamison Edward is a 52 y.o. male who presents to the office today for evaluation of Heartburn  .    HPI   Jamison Edward is a 52 y.o. male who presents to the office today for a follow-up evaluation of heartburn. The patient reports he was last seen in the clinic on 07/2022for GERD. He reports he had an EGD and an upper GI series performed by Dr. Ovalles he has a history of gastric bypass surgery secondary to gastric outlet obstruction and he is currently on Reglan twice daily. The patient reports he was last seen in the ER on 11/15/2022  in which he was diagnosed with hyperbilirubinemia. He states an ultrasound of the gallbladder was performed that showed fatty liver, the common bile duct was normal at 2 mm in internal diameter, the gallbladder is surgically absent and there is no biliary obstruction noted. He had labs performed in the ED in which his lipase level was decreased at 9 and CMP result showed Increase in liver enzymes at ALT is 46, AST is 82, alkaline phosphatase is 385, his total bilirubin is at 6.1. He states he had jaundice in his eyes that started on Saturday. He reports it looks like he had blood in his urine on Tuesday. He denies any changes in the last 2 to 3 weeks. He admits he does drink alcohol but states he stopped when he saw the yellow in his eyes. He admits he was drinking quite a bit. He states he tested a low positive twice for lupus at the  rheumatologist.      Review of Systems   Constitutional: Negative.    HENT: Positive for trouble swallowing. Negative for sore throat.    Eyes: Negative.    Respiratory: Negative for chest tightness.    Cardiovascular: Negative for chest pain.   Gastrointestinal: Positive for diarrhea, nausea and vomiting. Negative for abdominal distention, abdominal pain, anal bleeding, blood in stool, constipation and rectal pain.   Endocrine: Negative.    Genitourinary: Negative for difficulty  urinating.   Musculoskeletal: Positive for back pain and neck pain.   Skin: Negative.    Allergic/Immunologic: Negative for environmental allergies and food allergies.   Neurological: Positive for headaches. Negative for dizziness and seizures.   Hematological: Does not bruise/bleed easily.   Psychiatric/Behavioral: Positive for sleep disturbance.       ACTIVE PROBLEMS:   Specialty Problems        Gastroenterology Problems    Acute gastric ulcer without hemorrhage or perforation        Duodenal ulcer        Dysphagia        Gastroesophageal reflux disease        Malabsorption        Gastric ulcer without hemorrhage or perforation        Gastric outlet obstruction        Peptic ulcer without hemorrhage or perforation        Gastric bezoar           PAST MEDICAL HISTORY:  Past Medical History:   Diagnosis Date   • Anemia    • Arthritis of back     not sure   • Johnson esophagus    • Cholelithiasis    • COPD (chronic obstructive pulmonary disease) (HCC)    • CTS (carpal tunnel syndrome)    • DDD (degenerative disc disease), thoracic    • Degenerative disc disease, lumbar    • Dystonia    • GERD (gastroesophageal reflux disease)    • GI (gastrointestinal bleed)    • Hypertension    • Irritable bowel syndrome    • Low back strain    • Lumbosacral disc disease    • Migraines    • Peptic ulcer disease    • Stroke (HCC)     TIA   • TIA (transient ischemic attack)    • Ulcerative colitis (HCC)    • Wears dentures     upper only       SURGICAL HISTORY:  Past Surgical History:   Procedure Laterality Date   • ABDOMINAL SURGERY  09/17/2021   • APPENDECTOMY     • CHOLECYSTECTOMY N/A 04/22/2017    Procedure: CHOLECYSTECTOMY LAPAROSCOPIC;  Surgeon: Jaqueline Guaman MD;  Location: [facility] OR;  Service:    • COLONOSCOPY N/A 05/08/2017    Procedure: COLONOSCOPY  CPTCODE:60007;  Surgeon: Nicho Richey III, MD;  Location: [facility] OR;  Service:    • CUBITAL TUNNEL RELEASE Left 9/1/2022    Procedure: CUBITAL TUNNEL RELEASE LEFT;   Surgeon: Alec Hough MD;  Location:  [facility];  Service: Orthopedics;  Laterality: Left;   • ENDOSCOPY     • ENDOSCOPY N/A 05/08/2017    Procedure: ESOPHAGOGASTRODUODENOSCOPY WITH BIOPSY  CPTCODE:39477;  Surgeon: Nicho Richey III, MD;  Location:  [facility];  Service:    • ENDOSCOPY N/A 11/03/2017    Procedure: ESOPHAGOGASTRODUODENOSCOPY WITH BIOPSY  CPTCODE: 44427;  Surgeon: Nicho Richey III, MD;  Location:  [facility];  Service:    • ENDOSCOPY N/A 02/20/2018    Procedure: ESOPHAGOGASTRODUODENOSCOPY WITH DILATATION CPT CODE: 82546;  Surgeon: Nicho Richey III, MD;  Location:  [facility];  Service:    • ENDOSCOPY N/A 06/05/2018    Procedure: ESOPHAGOGASTRODUODENOSCOPY WITH BIOPSY CPT CODE: 83567;  Surgeon: Nicho Richey III, MD;  Location:  [facility];  Service: Gastroenterology   • ENDOSCOPY N/A 05/26/2021    Procedure: ESOPHAGOGASTRODUODENOSCOPY WITH BIOPSY;  Surgeon: Julieta Hebert MD;  Location:  [facility];  Service: Gastroenterology;  Laterality: N/A;   • ENDOSCOPY N/A 06/02/2021    Procedure: ESOPHAGOGASTRODUODENOSCOPY WITH BIOPSY;  Surgeon: Julieta Hebert MD;  Location:  [facility];  Service: Gastroenterology;  Laterality: N/A;   • GASTRECTOMY N/A 09/17/2019    Procedure: OPEN VAGOTOMY, ANTRECTOMY,REVISION- Y GASTROJEJUNOSOTOMY;  Surgeon: Herbert Umanzor MD;  Location:  [facility];  Service: General   • UPPER GASTROINTESTINAL ENDOSCOPY     • VENTRAL/INCISIONAL HERNIA REPAIR N/A 12/10/2020    Procedure: INCISIONAL HERNIA REPAIR LAPAROSCOPIC WITH MESH;  Surgeon: Herbert Umanzor MD;  Location:  [facility];  Service: General;  Laterality: N/A;       FAMILY HISTORY:  Family History   Problem Relation Age of Onset   • Osteoporosis Mother    • Hypertension Father    • Osteoporosis Father    • No Known Problems Sister    • No Known Problems Brother    • Drug abuse Brother    • No Known Problems Daughter    • Diabetes Sister        SOCIAL HISTORY:  Social History  "    Tobacco Use   • Smoking status: Every Day     Packs/day: 1.00     Years: 35.00     Pack years: 35.00     Types: Cigarettes     Last attempt to quit: 2020     Years since quittin.2   • Smokeless tobacco: Never   • Tobacco comments:     Been smoking 20 years   Substance Use Topics   • Alcohol use: Yes     Alcohol/week: 4.0 standard drinks     Types: 4 Cans of beer per week       CURRENT MEDICATION:    Current Outpatient Medications:   •  baclofen (LIORESAL) 20 MG tablet, Take 10-20 mg by mouth 3 (Three) Times a Day As Needed., Disp: , Rfl:   •  esomeprazole (nexIUM) 40 MG capsule, Take 1 capsule by mouth 2 (Two) Times a Day Before Meals. (Patient taking differently: Take 1 capsule by mouth Daily.), Disp: 60 capsule, Rfl: 3  •  isosorbide mononitrate (IMDUR) 30 MG 24 hr tablet, Take 1 tablet by mouth Daily., Disp: 30 tablet, Rfl: 11  •  levETIRAcetam (KEPPRA) 750 MG tablet, Take 850 mg by mouth 3 (Three) Times a Day. Patient takes one tablet in the morning and afternoon and takes two tablets at bedtime., Disp: , Rfl:   •  metoclopramide (REGLAN) 10 MG tablet, Take 1 tablet by mouth 2 (Two) Times a Day Before Meals., Disp: 60 tablet, Rfl: 5  •  metoprolol succinate XL (TOPROL-XL) 25 MG 24 hr tablet, Take 1 tablet by mouth Daily., Disp: 90 tablet, Rfl: 3  •  nitroglycerin (NITROSTAT) 0.4 MG SL tablet, Place 1 tablet under the tongue Every 5 (Five) Minutes As Needed for Chest Pain. Take no more than 3 doses in 15 minutes., Disp: 100 tablet, Rfl: 2  •  QUEtiapine (SEROquel) 50 MG tablet, take 1 tablet by oral route every night, Disp: , Rfl:   •  venlafaxine (EFFEXOR) 75 MG tablet, Take 75 mg by mouth every night at bedtime., Disp: , Rfl:     ALLERGIES:  Contrast dye, Prilosec [omeprazole], Protonix [pantoprazole sodium], Dilantin [phenytoin], Dilaudid [hydromorphone], Flagyl [metronidazole], and Topamax [topiramate]    VISIT VITALS:  /73   Pulse 98   Ht 167.6 cm (66\")   Wt 70.9 kg (156 lb 3.2 oz)   " BMI 25.21 kg/m²   Physical Exam  Constitutional:       General: He is not in acute distress.     Appearance: Normal appearance. He is well-developed.   HENT:      Head: Normocephalic and atraumatic.   Eyes:      Pupils: Pupils are equal, round, and reactive to light.   Cardiovascular:      Rate and Rhythm: Normal rate and regular rhythm.      Heart sounds: Normal heart sounds.   Pulmonary:      Effort: Pulmonary effort is normal. No respiratory distress.      Breath sounds: Normal breath sounds. No wheezing, rhonchi or rales.   Abdominal:      General: Abdomen is flat. Bowel sounds are normal. There is no distension.      Palpations: Abdomen is soft. There is no mass.      Tenderness: There is no abdominal tenderness. There is no guarding or rebound.      Hernia: No hernia is present.   Musculoskeletal:         General: No swelling. Normal range of motion.      Cervical back: Normal range of motion and neck supple.      Right lower leg: No edema.      Left lower leg: No edema.   Skin:     General: Skin is warm and dry.   Neurological:      Mental Status: He is alert and oriented to person, place, and time.   Psychiatric:         Attention and Perception: Attention normal.         Mood and Affect: Mood normal.         Speech: Speech normal.         Behavior: Behavior normal. Behavior is cooperative.         Thought Content: Thought content normal.         Assessment      1.)Elevated Bilirubin  Will get him scheduled for a MRCP and then an ERCP if neccessary.   Will recheck labs  Patient told to avoid all alcohol consumption      Diagnosis Plan   1. Elevated bilirubin  Comprehensive Metabolic Panel    Ceruloplasmin    CBC & Differential    NEGRETE Fibrosure    Ferritin    Iron Profile    Ammonia    MRI abdomen wo contrast mrcp    Copper, Serum      2. Fatty (change of) liver, not elsewhere classified  NEGRETE Fibrosure      3. Other hyperlipidemia  NEGRETE Fibrosure      4. Alcoholic fatty liver  Ferritin    Iron Profile           Return in about 4 weeks (around 12/16/2022).                   This document has been electronically signed by Melo Browning PA-C  November 27, 2022 20:32 EST    Part of this note may be an electronic transcription/translation of spoken language to printed text using the Dragon Dictation System.      Transcribed from ambient dictation for Melo Browning PA-C by Lauren Jessica.  11/18/22   13:05 EST    Patient or patient representative verbalized consent to the visit recording.

## 2022-11-21 LAB — COPPER SERPL-MCNC: 51 UG/DL (ref 69–132)

## 2022-11-22 ENCOUNTER — TELEPHONE (OUTPATIENT)
Dept: UROLOGY | Facility: CLINIC | Age: 52
End: 2022-11-22

## 2022-11-22 ENCOUNTER — LAB (OUTPATIENT)
Dept: LAB | Facility: HOSPITAL | Age: 52
End: 2022-11-22

## 2022-11-22 DIAGNOSIS — R17 ELEVATED BILIRUBIN: Primary | ICD-10-CM

## 2022-11-22 LAB
A2 MACROGLOB SERPL-MCNC: 195 MG/DL (ref 110–276)
ALT SERPL W P-5'-P-CCNC: 51 IU/L (ref 0–55)
APO A-I SERPL-MCNC: 59 MG/DL (ref 101–178)
AST SERPL W P-5'-P-CCNC: 71 IU/L (ref 0–40)
BILIRUB SERPL-MCNC: 4.2 MG/DL (ref 0–1.2)
CHOLEST SERPL-MCNC: 108 MG/DL (ref 100–199)
FIBROSIS SCORING:: ABNORMAL
FIBROSIS STAGE SERPL QL: ABNORMAL
GGT SERPL-CCNC: 1054 IU/L (ref 0–65)
GLUCOSE SERPL-MCNC: 123 MG/DL (ref 70–99)
HAPTOGLOB SERPL-MCNC: 81 MG/DL (ref 29–370)
INTERPRETATIONS: (REFERENCE): ABNORMAL
LABORATORY COMMENT REPORT: ABNORMAL
LIVER FIBR SCORE SERPL CALC.FIBROSURE: 0.96 (ref 0–0.21)
NASH SCORING (REFERENCE): ABNORMAL
NECROINFLAMMATORY ACT GRADE SERPL QL: ABNORMAL
NECROINFLAMMATORY ACT SCORE SERPL: 0.5
SERVICE CMNT-IMP: ABNORMAL
STEATOSIS GRADE (REFERENCE): ABNORMAL
STEATOSIS GRADING (REFERENCE): ABNORMAL
STEATOSIS SCORE (REFERENCE): 0.9 (ref 0–0.3)
TRIGL SERPL-MCNC: 114 MG/DL (ref 0–149)

## 2022-11-22 PROCEDURE — 80053 COMPREHEN METABOLIC PANEL: CPT | Performed by: PHYSICIAN ASSISTANT

## 2022-11-22 PROCEDURE — 36415 COLL VENOUS BLD VENIPUNCTURE: CPT | Performed by: PHYSICIAN ASSISTANT

## 2022-11-22 NOTE — TELEPHONE ENCOUNTER
Contacted patient who voiced understanding of the results of his labs and MRCP.  I put in a new order for a CMP to be conducted today in which patient was agreeable.  His bilirubin levels are trending downwards and he is abstaining from all alcohol use which is most likely the cause of his significant increase.  Patient will be contacted with updated results when available

## 2022-11-23 LAB
ALBUMIN SERPL-MCNC: 2.8 G/DL (ref 3.5–5.2)
ALBUMIN/GLOB SERPL: 1.2 G/DL
ALP SERPL-CCNC: 260 U/L (ref 39–117)
ALT SERPL W P-5'-P-CCNC: 39 U/L (ref 1–41)
ANION GAP SERPL CALCULATED.3IONS-SCNC: 13.8 MMOL/L (ref 5–15)
AST SERPL-CCNC: 55 U/L (ref 1–40)
BILIRUB SERPL-MCNC: 3.1 MG/DL (ref 0–1.2)
BUN SERPL-MCNC: 8 MG/DL (ref 6–20)
BUN/CREAT SERPL: 13.3 (ref 7–25)
CALCIUM SPEC-SCNC: 8.5 MG/DL (ref 8.6–10.5)
CHLORIDE SERPL-SCNC: 104 MMOL/L (ref 98–107)
CO2 SERPL-SCNC: 24.2 MMOL/L (ref 22–29)
CREAT SERPL-MCNC: 0.6 MG/DL (ref 0.76–1.27)
EGFRCR SERPLBLD CKD-EPI 2021: 114.7 ML/MIN/1.73
GLOBULIN UR ELPH-MCNC: 2.4 GM/DL
GLUCOSE SERPL-MCNC: 113 MG/DL (ref 65–99)
POTASSIUM SERPL-SCNC: 3.5 MMOL/L (ref 3.5–5.2)
PROT SERPL-MCNC: 5.2 G/DL (ref 6–8.5)
SODIUM SERPL-SCNC: 142 MMOL/L (ref 136–145)

## 2022-11-29 ENCOUNTER — TREATMENT (OUTPATIENT)
Dept: CARDIOLOGY | Facility: CLINIC | Age: 52
End: 2022-11-29

## 2022-11-29 DIAGNOSIS — R55 PRE-SYNCOPE: ICD-10-CM

## 2022-11-29 DIAGNOSIS — R42 DIZZINESS: ICD-10-CM

## 2022-11-30 PROCEDURE — 93228 REMOTE 30 DAY ECG REV/REPORT: CPT | Performed by: SPECIALIST

## 2022-12-02 ENCOUNTER — TELEPHONE (OUTPATIENT)
Dept: GASTROENTEROLOGY | Facility: CLINIC | Age: 52
End: 2022-12-02

## 2022-12-02 DIAGNOSIS — R17 ELEVATED BILIRUBIN: Primary | ICD-10-CM

## 2022-12-02 NOTE — TELEPHONE ENCOUNTER
Please call patient and have him complete another CMP to make sure bilirubin continues going down.

## 2022-12-12 ENCOUNTER — LAB (OUTPATIENT)
Dept: LAB | Facility: HOSPITAL | Age: 52
End: 2022-12-12

## 2022-12-12 LAB
ALBUMIN SERPL-MCNC: 2.3 G/DL (ref 3.5–5.2)
ALBUMIN/GLOB SERPL: 0.9 G/DL
ALP SERPL-CCNC: 249 U/L (ref 39–117)
ALT SERPL W P-5'-P-CCNC: 22 U/L (ref 1–41)
ANION GAP SERPL CALCULATED.3IONS-SCNC: 13.2 MMOL/L (ref 5–15)
AST SERPL-CCNC: 35 U/L (ref 1–40)
BILIRUB SERPL-MCNC: 2.9 MG/DL (ref 0–1.2)
BUN SERPL-MCNC: 4 MG/DL (ref 6–20)
BUN/CREAT SERPL: 6.3 (ref 7–25)
CALCIUM SPEC-SCNC: 8.2 MG/DL (ref 8.6–10.5)
CHLORIDE SERPL-SCNC: 104 MMOL/L (ref 98–107)
CO2 SERPL-SCNC: 24.8 MMOL/L (ref 22–29)
CREAT SERPL-MCNC: 0.63 MG/DL (ref 0.76–1.27)
EGFRCR SERPLBLD CKD-EPI 2021: 113 ML/MIN/1.73
GLOBULIN UR ELPH-MCNC: 2.6 GM/DL
GLUCOSE SERPL-MCNC: 101 MG/DL (ref 65–99)
POTASSIUM SERPL-SCNC: 3.5 MMOL/L (ref 3.5–5.2)
PROT SERPL-MCNC: 4.9 G/DL (ref 6–8.5)
SODIUM SERPL-SCNC: 142 MMOL/L (ref 136–145)

## 2022-12-12 PROCEDURE — 80053 COMPREHEN METABOLIC PANEL: CPT | Performed by: PHYSICIAN ASSISTANT

## 2022-12-12 PROCEDURE — 36415 COLL VENOUS BLD VENIPUNCTURE: CPT | Performed by: PHYSICIAN ASSISTANT

## 2022-12-14 DIAGNOSIS — K44.9 HIATAL HERNIA: Primary | ICD-10-CM

## 2022-12-14 NOTE — TELEPHONE ENCOUNTER
Patients wife called stating that she is afraid he may have another hernia or that he is leaking where he had one repaired. She said that when he stands up his abdomen is hard but when he lays down it is soft.

## 2022-12-20 ENCOUNTER — APPOINTMENT (OUTPATIENT)
Dept: CT IMAGING | Facility: HOSPITAL | Age: 52
DRG: 870 | End: 2022-12-20
Payer: MEDICARE

## 2022-12-20 ENCOUNTER — OFFICE VISIT (OUTPATIENT)
Dept: CARDIOLOGY | Facility: CLINIC | Age: 52
End: 2022-12-20

## 2022-12-20 ENCOUNTER — APPOINTMENT (OUTPATIENT)
Dept: GENERAL RADIOLOGY | Facility: HOSPITAL | Age: 52
DRG: 870 | End: 2022-12-20
Payer: MEDICARE

## 2022-12-20 ENCOUNTER — HOSPITAL ENCOUNTER (INPATIENT)
Facility: HOSPITAL | Age: 52
LOS: 16 days | Discharge: NURSING, REHAB OR LONG-TERM CARE | DRG: 870 | End: 2023-01-05
Attending: STUDENT IN AN ORGANIZED HEALTH CARE EDUCATION/TRAINING PROGRAM | Admitting: HOSPITALIST
Payer: MEDICARE

## 2022-12-20 ENCOUNTER — APPOINTMENT (OUTPATIENT)
Dept: MRI IMAGING | Facility: HOSPITAL | Age: 52
DRG: 870 | End: 2022-12-20
Payer: MEDICARE

## 2022-12-20 VITALS
HEIGHT: 66 IN | SYSTOLIC BLOOD PRESSURE: 109 MMHG | DIASTOLIC BLOOD PRESSURE: 78 MMHG | BODY MASS INDEX: 26.68 KG/M2 | HEART RATE: 125 BPM | WEIGHT: 166 LBS | OXYGEN SATURATION: 99 %

## 2022-12-20 DIAGNOSIS — J96.01 ACUTE RESPIRATORY FAILURE WITH HYPOXIA: ICD-10-CM

## 2022-12-20 DIAGNOSIS — Z72.0 TOBACCO USE: ICD-10-CM

## 2022-12-20 DIAGNOSIS — R00.0 SINUS TACHYCARDIA: Primary | ICD-10-CM

## 2022-12-20 DIAGNOSIS — K74.60 CIRRHOSIS OF LIVER WITH ASCITES, UNSPECIFIED HEPATIC CIRRHOSIS TYPE: ICD-10-CM

## 2022-12-20 DIAGNOSIS — R18.8 CIRRHOSIS OF LIVER WITH ASCITES, UNSPECIFIED HEPATIC CIRRHOSIS TYPE: ICD-10-CM

## 2022-12-20 DIAGNOSIS — R60.0 LOWER EXTREMITY EDEMA: ICD-10-CM

## 2022-12-20 DIAGNOSIS — R19.00 ABDOMINAL SWELLING: ICD-10-CM

## 2022-12-20 DIAGNOSIS — M54.16 LUMBAR RADICULOPATHY: ICD-10-CM

## 2022-12-20 DIAGNOSIS — J18.9 PNEUMONIA DUE TO INFECTIOUS ORGANISM, UNSPECIFIED LATERALITY, UNSPECIFIED PART OF LUNG: Primary | ICD-10-CM

## 2022-12-20 LAB
A-A DO2: 45.6 MMHG (ref 0–300)
ALBUMIN SERPL-MCNC: 2.14 G/DL (ref 3.5–5.2)
ALBUMIN/GLOB SERPL: 0.8 G/DL
ALP SERPL-CCNC: 243 U/L (ref 39–117)
ALT SERPL W P-5'-P-CCNC: 20 U/L (ref 1–41)
AMMONIA BLD-SCNC: 63 UMOL/L (ref 16–60)
AMPHET+METHAMPHET UR QL: NEGATIVE
AMPHETAMINES UR QL: NEGATIVE
ANION GAP SERPL CALCULATED.3IONS-SCNC: 9.9 MMOL/L (ref 5–15)
ARTERIAL PATENCY WRIST A: ABNORMAL
AST SERPL-CCNC: 28 U/L (ref 1–40)
ATMOSPHERIC PRESS: 735 MMHG
BACTERIA UR QL AUTO: ABNORMAL /HPF
BARBITURATES UR QL SCN: NEGATIVE
BASE EXCESS BLDA CALC-SCNC: 3.1 MMOL/L (ref 0–2)
BASOPHILS # BLD AUTO: 0.01 10*3/MM3 (ref 0–0.2)
BASOPHILS NFR BLD AUTO: 0.1 % (ref 0–1.5)
BDY SITE: ABNORMAL
BENZODIAZ UR QL SCN: NEGATIVE
BILIRUB SERPL-MCNC: 3 MG/DL (ref 0–1.2)
BILIRUB UR QL STRIP: ABNORMAL
BODY TEMPERATURE: 0 C
BUN SERPL-MCNC: 8 MG/DL (ref 6–20)
BUN/CREAT SERPL: 11.3 (ref 7–25)
BUPRENORPHINE SERPL-MCNC: NEGATIVE NG/ML
CALCIUM SPEC-SCNC: 8 MG/DL (ref 8.6–10.5)
CANNABINOIDS SERPL QL: NEGATIVE
CHLORIDE SERPL-SCNC: 101 MMOL/L (ref 98–107)
CLARITY UR: CLEAR
CO2 BLDA-SCNC: 26.8 MMOL/L (ref 22–33)
CO2 SERPL-SCNC: 25.1 MMOL/L (ref 22–29)
COCAINE UR QL: NEGATIVE
COD CRY URNS QL: ABNORMAL /HPF
COHGB MFR BLD: 1.2 % (ref 0–5)
COLOR UR: ABNORMAL
CREAT SERPL-MCNC: 0.71 MG/DL (ref 0.76–1.27)
CRP SERPL-MCNC: 6.78 MG/DL (ref 0–0.5)
D-LACTATE SERPL-SCNC: 3.2 MMOL/L (ref 0.5–2)
D-LACTATE SERPL-SCNC: 4.2 MMOL/L (ref 0.5–2)
DEPRECATED RDW RBC AUTO: 50.3 FL (ref 37–54)
EGFRCR SERPLBLD CKD-EPI 2021: 109 ML/MIN/1.73
EOSINOPHIL # BLD AUTO: 0.03 10*3/MM3 (ref 0–0.4)
EOSINOPHIL NFR BLD AUTO: 0.3 % (ref 0.3–6.2)
ERYTHROCYTE [DISTWIDTH] IN BLOOD BY AUTOMATED COUNT: 13.6 % (ref 12.3–15.4)
ETHANOL BLD-MCNC: <10 MG/DL (ref 0–10)
ETHANOL UR QL: <0.01 %
GLOBULIN UR ELPH-MCNC: 2.7 GM/DL
GLUCOSE SERPL-MCNC: 124 MG/DL (ref 65–99)
GLUCOSE UR STRIP-MCNC: NEGATIVE MG/DL
HCO3 BLDA-SCNC: 25.8 MMOL/L (ref 20–26)
HCT VFR BLD AUTO: 35.4 % (ref 37.5–51)
HCT VFR BLD CALC: 35.5 % (ref 38–51)
HGB BLD-MCNC: 12.3 G/DL (ref 13–17.7)
HGB BLDA-MCNC: 11.6 G/DL (ref 14–18)
HGB UR QL STRIP.AUTO: NEGATIVE
HYALINE CASTS UR QL AUTO: ABNORMAL /LPF
IMM GRANULOCYTES # BLD AUTO: 0.04 10*3/MM3 (ref 0–0.05)
IMM GRANULOCYTES NFR BLD AUTO: 0.4 % (ref 0–0.5)
INHALED O2 CONCENTRATION: 21 %
KETONES UR QL STRIP: NEGATIVE
LEUKOCYTE ESTERASE UR QL STRIP.AUTO: ABNORMAL
LYMPHOCYTES # BLD AUTO: 0.97 10*3/MM3 (ref 0.7–3.1)
LYMPHOCYTES NFR BLD AUTO: 9.9 % (ref 19.6–45.3)
Lab: ABNORMAL
MAGNESIUM SERPL-MCNC: 1.9 MG/DL (ref 1.6–2.6)
MCH RBC QN AUTO: 35.2 PG (ref 26.6–33)
MCHC RBC AUTO-ENTMCNC: 34.7 G/DL (ref 31.5–35.7)
MCV RBC AUTO: 101.4 FL (ref 79–97)
METHADONE UR QL SCN: NEGATIVE
METHGB BLD QL: 0.2 % (ref 0–3)
MODALITY: ABNORMAL
MONOCYTES # BLD AUTO: 0.97 10*3/MM3 (ref 0.1–0.9)
MONOCYTES NFR BLD AUTO: 9.9 % (ref 5–12)
NEUTROPHILS NFR BLD AUTO: 7.74 10*3/MM3 (ref 1.7–7)
NEUTROPHILS NFR BLD AUTO: 79.4 % (ref 42.7–76)
NITRITE UR QL STRIP: POSITIVE
NOTE: ABNORMAL
NOTIFIED BY: ABNORMAL
NOTIFIED WHO: ABNORMAL
NRBC BLD AUTO-RTO: 0 /100 WBC (ref 0–0.2)
NT-PROBNP SERPL-MCNC: 178.2 PG/ML (ref 0–900)
OPIATES UR QL: NEGATIVE
OXYCODONE UR QL SCN: NEGATIVE
OXYHGB MFR BLDV: 90.3 % (ref 94–99)
PCO2 BLDA: 32.4 MM HG (ref 35–45)
PCO2 TEMP ADJ BLD: ABNORMAL MM[HG]
PCP UR QL SCN: NEGATIVE
PH BLDA: 7.51 PH UNITS (ref 7.35–7.45)
PH UR STRIP.AUTO: 6 [PH] (ref 5–8)
PH, TEMP CORRECTED: ABNORMAL
PLATELET # BLD AUTO: 105 10*3/MM3 (ref 140–450)
PMV BLD AUTO: 10.8 FL (ref 6–12)
PO2 BLDA: 62.4 MM HG (ref 83–108)
PO2 TEMP ADJ BLD: ABNORMAL MM[HG]
POTASSIUM SERPL-SCNC: 3.2 MMOL/L (ref 3.5–5.2)
PROCALCITONIN SERPL-MCNC: 1.69 NG/ML (ref 0–0.25)
PROPOXYPH UR QL: NEGATIVE
PROT SERPL-MCNC: 4.8 G/DL (ref 6–8.5)
PROT UR QL STRIP: ABNORMAL
RBC # BLD AUTO: 3.49 10*6/MM3 (ref 4.14–5.8)
RBC # UR STRIP: ABNORMAL /HPF
REF LAB TEST METHOD: ABNORMAL
SAO2 % BLDCOA: 91.6 % (ref 94–99)
SODIUM SERPL-SCNC: 136 MMOL/L (ref 136–145)
SP GR UR STRIP: 1.02 (ref 1–1.03)
SQUAMOUS #/AREA URNS HPF: ABNORMAL /HPF
T4 FREE SERPL-MCNC: 0.94 NG/DL (ref 0.93–1.7)
TRICYCLICS UR QL SCN: POSITIVE
TROPONIN T SERPL-MCNC: <0.01 NG/ML (ref 0–0.03)
TSH SERPL DL<=0.05 MIU/L-ACNC: 1.6 UIU/ML (ref 0.27–4.2)
UROBILINOGEN UR QL STRIP: ABNORMAL
VENTILATOR MODE: ABNORMAL
WBC # UR STRIP: ABNORMAL /HPF
WBC NRBC COR # BLD: 9.76 10*3/MM3 (ref 3.4–10.8)

## 2022-12-20 PROCEDURE — 84443 ASSAY THYROID STIM HORMONE: CPT | Performed by: EMERGENCY MEDICINE

## 2022-12-20 PROCEDURE — 82805 BLOOD GASES W/O2 SATURATION: CPT

## 2022-12-20 PROCEDURE — 93000 ELECTROCARDIOGRAM COMPLETE: CPT | Performed by: NURSE PRACTITIONER

## 2022-12-20 PROCEDURE — 84439 ASSAY OF FREE THYROXINE: CPT | Performed by: EMERGENCY MEDICINE

## 2022-12-20 PROCEDURE — 83050 HGB METHEMOGLOBIN QUAN: CPT

## 2022-12-20 PROCEDURE — 87040 BLOOD CULTURE FOR BACTERIA: CPT | Performed by: EMERGENCY MEDICINE

## 2022-12-20 PROCEDURE — 82375 ASSAY CARBOXYHB QUANT: CPT

## 2022-12-20 PROCEDURE — 86140 C-REACTIVE PROTEIN: CPT | Performed by: EMERGENCY MEDICINE

## 2022-12-20 PROCEDURE — 74177 CT ABD & PELVIS W/CONTRAST: CPT

## 2022-12-20 PROCEDURE — 25010000002 DIPHENHYDRAMINE PER 50 MG: Performed by: EMERGENCY MEDICINE

## 2022-12-20 PROCEDURE — 84484 ASSAY OF TROPONIN QUANT: CPT | Performed by: STUDENT IN AN ORGANIZED HEALTH CARE EDUCATION/TRAINING PROGRAM

## 2022-12-20 PROCEDURE — 85610 PROTHROMBIN TIME: CPT | Performed by: HOSPITALIST

## 2022-12-20 PROCEDURE — 71045 X-RAY EXAM CHEST 1 VIEW: CPT | Performed by: RADIOLOGY

## 2022-12-20 PROCEDURE — 70551 MRI BRAIN STEM W/O DYE: CPT

## 2022-12-20 PROCEDURE — 83605 ASSAY OF LACTIC ACID: CPT | Performed by: STUDENT IN AN ORGANIZED HEALTH CARE EDUCATION/TRAINING PROGRAM

## 2022-12-20 PROCEDURE — 85025 COMPLETE CBC W/AUTO DIFF WBC: CPT | Performed by: STUDENT IN AN ORGANIZED HEALTH CARE EDUCATION/TRAINING PROGRAM

## 2022-12-20 PROCEDURE — 99285 EMERGENCY DEPT VISIT HI MDM: CPT

## 2022-12-20 PROCEDURE — 99214 OFFICE O/P EST MOD 30 MIN: CPT | Performed by: NURSE PRACTITIONER

## 2022-12-20 PROCEDURE — 71045 X-RAY EXAM CHEST 1 VIEW: CPT

## 2022-12-20 PROCEDURE — 99284 EMERGENCY DEPT VISIT MOD MDM: CPT

## 2022-12-20 PROCEDURE — 25010000002 CEFTRIAXONE PER 250 MG: Performed by: EMERGENCY MEDICINE

## 2022-12-20 PROCEDURE — 72148 MRI LUMBAR SPINE W/O DYE: CPT

## 2022-12-20 PROCEDURE — 80143 DRUG ASSAY ACETAMINOPHEN: CPT | Performed by: HOSPITALIST

## 2022-12-20 PROCEDURE — 83880 ASSAY OF NATRIURETIC PEPTIDE: CPT | Performed by: STUDENT IN AN ORGANIZED HEALTH CARE EDUCATION/TRAINING PROGRAM

## 2022-12-20 PROCEDURE — 80306 DRUG TEST PRSMV INSTRMNT: CPT | Performed by: STUDENT IN AN ORGANIZED HEALTH CARE EDUCATION/TRAINING PROGRAM

## 2022-12-20 PROCEDURE — 36415 COLL VENOUS BLD VENIPUNCTURE: CPT

## 2022-12-20 PROCEDURE — 87150 DNA/RNA AMPLIFIED PROBE: CPT | Performed by: EMERGENCY MEDICINE

## 2022-12-20 PROCEDURE — 82077 ASSAY SPEC XCP UR&BREATH IA: CPT | Performed by: STUDENT IN AN ORGANIZED HEALTH CARE EDUCATION/TRAINING PROGRAM

## 2022-12-20 PROCEDURE — 82140 ASSAY OF AMMONIA: CPT | Performed by: STUDENT IN AN ORGANIZED HEALTH CARE EDUCATION/TRAINING PROGRAM

## 2022-12-20 PROCEDURE — 87147 CULTURE TYPE IMMUNOLOGIC: CPT | Performed by: EMERGENCY MEDICINE

## 2022-12-20 PROCEDURE — 70450 CT HEAD/BRAIN W/O DYE: CPT

## 2022-12-20 PROCEDURE — 83735 ASSAY OF MAGNESIUM: CPT | Performed by: EMERGENCY MEDICINE

## 2022-12-20 PROCEDURE — 84145 PROCALCITONIN (PCT): CPT | Performed by: EMERGENCY MEDICINE

## 2022-12-20 PROCEDURE — 81001 URINALYSIS AUTO W/SCOPE: CPT | Performed by: STUDENT IN AN ORGANIZED HEALTH CARE EDUCATION/TRAINING PROGRAM

## 2022-12-20 PROCEDURE — 72131 CT LUMBAR SPINE W/O DYE: CPT

## 2022-12-20 PROCEDURE — 85730 THROMBOPLASTIN TIME PARTIAL: CPT | Performed by: HOSPITALIST

## 2022-12-20 PROCEDURE — 0 IOPAMIDOL PER 1 ML: Performed by: EMERGENCY MEDICINE

## 2022-12-20 PROCEDURE — 71275 CT ANGIOGRAPHY CHEST: CPT

## 2022-12-20 PROCEDURE — 25010000002 METHYLPREDNISOLONE PER 125 MG: Performed by: EMERGENCY MEDICINE

## 2022-12-20 PROCEDURE — 80053 COMPREHEN METABOLIC PANEL: CPT | Performed by: STUDENT IN AN ORGANIZED HEALTH CARE EDUCATION/TRAINING PROGRAM

## 2022-12-20 PROCEDURE — 36600 WITHDRAWAL OF ARTERIAL BLOOD: CPT

## 2022-12-20 RX ORDER — METOCLOPRAMIDE 10 MG/1
10 TABLET ORAL
Status: CANCELLED | OUTPATIENT
Start: 2022-12-21

## 2022-12-20 RX ORDER — LEVETIRACETAM 750 MG/1
1500 TABLET ORAL NIGHTLY
Status: ON HOLD | COMMUNITY
End: 2023-01-16

## 2022-12-20 RX ORDER — VENLAFAXINE 37.5 MG/1
75 TABLET ORAL
Status: CANCELLED | OUTPATIENT
Start: 2022-12-20

## 2022-12-20 RX ORDER — ESOMEPRAZOLE MAGNESIUM 40 MG/1
40 CAPSULE, DELAYED RELEASE ORAL 2 TIMES DAILY
COMMUNITY
End: 2023-01-05 | Stop reason: HOSPADM

## 2022-12-20 RX ORDER — SODIUM CHLORIDE 9 MG/ML
125 INJECTION, SOLUTION INTRAVENOUS CONTINUOUS
Status: DISCONTINUED | OUTPATIENT
Start: 2022-12-20 | End: 2022-12-20

## 2022-12-20 RX ORDER — DIPHENHYDRAMINE HYDROCHLORIDE 50 MG/ML
25 INJECTION INTRAMUSCULAR; INTRAVENOUS ONCE
Status: COMPLETED | OUTPATIENT
Start: 2022-12-20 | End: 2022-12-20

## 2022-12-20 RX ORDER — FAMOTIDINE 10 MG/ML
20 INJECTION, SOLUTION INTRAVENOUS ONCE
Status: COMPLETED | OUTPATIENT
Start: 2022-12-20 | End: 2022-12-20

## 2022-12-20 RX ORDER — DOXYCYCLINE 100 MG/1
100 CAPSULE ORAL 2 TIMES DAILY
Qty: 14 CAPSULE | Refills: 0 | Status: SHIPPED | OUTPATIENT
Start: 2022-12-20 | End: 2023-01-05

## 2022-12-20 RX ORDER — POTASSIUM CHLORIDE 20 MEQ/1
40 TABLET, EXTENDED RELEASE ORAL ONCE
Status: COMPLETED | OUTPATIENT
Start: 2022-12-20 | End: 2022-12-21

## 2022-12-20 RX ORDER — METHYLPREDNISOLONE SODIUM SUCCINATE 125 MG/2ML
125 INJECTION, POWDER, LYOPHILIZED, FOR SOLUTION INTRAMUSCULAR; INTRAVENOUS ONCE
Status: COMPLETED | OUTPATIENT
Start: 2022-12-20 | End: 2022-12-20

## 2022-12-20 RX ADMIN — FAMOTIDINE 20 MG: 10 INJECTION, SOLUTION INTRAVENOUS at 21:10

## 2022-12-20 RX ADMIN — SODIUM CHLORIDE 125 ML/HR: 9 INJECTION, SOLUTION INTRAVENOUS at 20:37

## 2022-12-20 RX ADMIN — METHYLPREDNISOLONE SODIUM SUCCINATE 125 MG: 125 INJECTION, POWDER, FOR SOLUTION INTRAMUSCULAR; INTRAVENOUS at 21:10

## 2022-12-20 RX ADMIN — SODIUM CHLORIDE 1000 ML: 9 INJECTION, SOLUTION INTRAVENOUS at 17:42

## 2022-12-20 RX ADMIN — CEFTRIAXONE 2 G: 2 INJECTION, POWDER, FOR SOLUTION INTRAMUSCULAR; INTRAVENOUS at 21:10

## 2022-12-20 RX ADMIN — IOPAMIDOL 80 ML: 755 INJECTION, SOLUTION INTRAVENOUS at 22:27

## 2022-12-20 RX ADMIN — DIPHENHYDRAMINE HYDROCHLORIDE 25 MG: 50 INJECTION, SOLUTION INTRAMUSCULAR; INTRAVENOUS at 21:10

## 2022-12-20 RX ADMIN — DOXYCYCLINE 100 MG: 100 INJECTION, POWDER, LYOPHILIZED, FOR SOLUTION INTRAVENOUS at 21:09

## 2022-12-20 RX ADMIN — SODIUM CHLORIDE 1259 ML: 9 INJECTION, SOLUTION INTRAVENOUS at 20:37

## 2022-12-20 NOTE — PROGRESS NOTES
[facility]             EvaEILEEN Ng Theresa, APRN [facility]  1970  12/20/2022    Patient Active Problem List   Diagnosis   • Precordial chest pain   • Weight loss, abnormal   • Right upper quadrant abdominal pain   • Epigastric pain   • History of bleeding ulcers   • Nausea   • Malaise and fatigue   • Acute gastric ulcer without hemorrhage or perforation   • Poor appetite   • Duodenal ulcer   • Gastroesophageal reflux disease   • Dysphagia   • Iron deficiency anemia   • Malabsorption   • Gastric ulcer without hemorrhage or perforation   • Dystonia   • Peptic ulcer without hemorrhage or perforation   • Gastric outlet obstruction   • Incisional hernia, without obstruction or gangrene   • Incisional hernia   • Chest pain, atypical   • Gastric bezoar   • Essential hypertension   • Cigarette smoker   • Chronic back pain   • Sinus tachycardia   • Tobacco use   • Mild carpal tunnel syndrome   • Orthostatic hypotension   • Palpitations   • Abdominal swelling   • Lower extremity edema       Dear Leticia Martinez APRN:    Subjective     Chief Complaint   Patient presents with   • Follow-up     ROUTINE   • Altered Mental Status   • Difficulty Walking       HPI:     This is a 52 y.o. male with known past medical history of orthostatic hypotension, tobacco abuse, alcohol abuse and sinus tachycardia.    Jamison Edward presents today for routine cardiology follow up.  Daughter is present with patient today and aids in history.  Daughter states that over the last 4 days patient has had worsening confusion, lower extremity edema with left leg weakness and abdominal swelling.  Denies any shortness of breath or chest pain.  EKG shows sinus tachycardia in the 120s. Patient was seen in the ED in November 2020 for generalized weakness and underwent CT of the abdomen which showed severe diffuse fatty infiltration of the liver.  Patient followed up with GI  who ordered repeat lab work given patient had started abstaining from alcohol.  Repeat lab work showed improvement of ALT and AST.  Daughter reports he is undergoing a upper GI study on Thursday.  Cardiac event monitor showed normal sinus rhythm with episodes of sinus tachycardia and sinus bradycardia with no arrhythmias noted    • Diagnostic Testing  1. Nuclear stress test 5/2017: No evidence of ischemia  2. Echocardiogram 9/2021: Ef 61-65%  3. Nuclear stress test 9/2021: No evidence of ischemia  4. Cardiac event monitor 10/2022: Normal sinus rhythm with episodes of sinus tachycardia and sinus bradycardia with no arrhythmias noted     All other systems were reviewed and were negative.    Patient Active Problem List   Diagnosis   • Precordial chest pain   • Weight loss, abnormal   • Right upper quadrant abdominal pain   • Epigastric pain   • History of bleeding ulcers   • Nausea   • Malaise and fatigue   • Acute gastric ulcer without hemorrhage or perforation   • Poor appetite   • Duodenal ulcer   • Gastroesophageal reflux disease   • Dysphagia   • Iron deficiency anemia   • Malabsorption   • Gastric ulcer without hemorrhage or perforation   • Dystonia   • Peptic ulcer without hemorrhage or perforation   • Gastric outlet obstruction   • Incisional hernia, without obstruction or gangrene   • Incisional hernia   • Chest pain, atypical   • Gastric bezoar   • Essential hypertension   • Cigarette smoker   • Chronic back pain   • Sinus tachycardia   • Tobacco use   • Mild carpal tunnel syndrome   • Orthostatic hypotension   • Palpitations   • Abdominal swelling   • Lower extremity edema       family history includes Diabetes in his sister; Drug abuse in his brother; Hypertension in his father; No Known Problems in his brother, daughter, and sister; Osteoporosis in his father and mother.     reports that he has been smoking cigarettes. He has a 35.00 pack-year smoking history. He has never used smokeless tobacco. He  reports current alcohol use of about 4.0 standard drinks per week. He reports that he does not use drugs.    Allergies   Allergen Reactions   • Contrast Dye Other (See Comments)     Shortness of breath   • Prilosec [Omeprazole] Other (See Comments)     Chest pain  Pt states he can take pepcid with no problem   • Protonix [Pantoprazole Sodium] Palpitations     Patient states that protonix causes chest pain.  Shruthi Pérez, [facility]  4/23/2017  11:19 AM  Pt states he can tolerate pepcid with no problem     • Dilantin [Phenytoin] Delirium   • Dilaudid [Hydromorphone] Hallucinations   • Flagyl [Metronidazole] Nausea And Vomiting   • Topamax [Topiramate] Anxiety         Current Outpatient Medications:   •  baclofen (LIORESAL) 20 MG tablet, Take 10-20 mg by mouth 3 (Three) Times a Day As Needed., Disp: , Rfl:   •  esomeprazole (nexIUM) 40 MG capsule, Take 1 capsule by mouth 2 (Two) Times a Day Before Meals. (Patient taking differently: Take 40 mg by mouth Daily.), Disp: 60 capsule, Rfl: 3  •  isosorbide mononitrate (IMDUR) 30 MG 24 hr tablet, Take 1 tablet by mouth Daily., Disp: 30 tablet, Rfl: 11  •  levETIRAcetam (KEPPRA) 750 MG tablet, Take 850 mg by mouth 3 (Three) Times a Day. Patient takes one tablet in the morning and afternoon and takes two tablets at bedtime., Disp: , Rfl:   •  metoclopramide (REGLAN) 10 MG tablet, Take 1 tablet by mouth 2 (Two) Times a Day Before Meals., Disp: 60 tablet, Rfl: 5  •  metoprolol succinate XL (TOPROL-XL) 25 MG 24 hr tablet, Take 1 tablet by mouth Daily., Disp: 90 tablet, Rfl: 3  •  nitroglycerin (NITROSTAT) 0.4 MG SL tablet, Place 1 tablet under the tongue Every 5 (Five) Minutes As Needed for Chest Pain. Take no more than 3 doses in 15 minutes., Disp: 100 tablet, Rfl: 2  •  QUEtiapine (SEROquel) 50 MG tablet, take 1 tablet by oral route every night, Disp: , Rfl:   •  venlafaxine (EFFEXOR) 75 MG tablet, Take 75 mg by mouth every night at bedtime., Disp: , Rfl:       Physical Exam:  I  have reviewed the patient's current vital signs as documented in the patient's EMR.   Vitals:    12/20/22 1415   BP: 109/78   Pulse: (!) 125   SpO2: 99%     Body mass index is 26.79 kg/m².       12/20/22  1415   Weight: 75.3 kg (166 lb)      Physical Exam  Constitutional:       General: He is not in acute distress.     Appearance: Normal appearance. He is well-developed. He is ill-appearing.   HENT:      Head: Normocephalic and atraumatic.      Mouth/Throat:      Mouth: Mucous membranes are moist.   Eyes:      Extraocular Movements: Extraocular movements intact.      Pupils: Pupils are equal, round, and reactive to light.   Neck:      Vascular: No JVD.   Cardiovascular:      Rate and Rhythm: Regular rhythm. Tachycardia present.      Pulses:           Dorsalis pedis pulses are 2+ on the right side and 2+ on the left side.        Posterior tibial pulses are 2+ on the right side and 2+ on the left side.      Heart sounds: Normal heart sounds. No murmur heard.    No S3 or S4 sounds.   Pulmonary:      Effort: Pulmonary effort is normal. No respiratory distress.      Breath sounds: Normal breath sounds. No wheezing.   Abdominal:      General: Bowel sounds are normal. There is distension.      Palpations: Abdomen is soft. There is no hepatomegaly.      Tenderness: There is no abdominal tenderness.   Musculoskeletal:         General: Normal range of motion.      Cervical back: Normal range of motion and neck supple.      Right lower leg: Edema present.      Left lower leg: Edema present.   Skin:     General: Skin is warm and dry.      Coloration: Skin is not jaundiced or pale.   Neurological:      General: No focal deficit present.      Mental Status: He is alert and oriented to person, place, and time. Mental status is at baseline.   Psychiatric:         Mood and Affect: Mood normal.         Behavior: Behavior normal.         Thought Content: Thought content normal.         Judgment: Judgment normal.            DATA  REVIEWED:     TTE/ANIYA:  Results for orders placed during the hospital encounter of 09/22/21    Adult Transthoracic Echo Complete w/ Color, Spectral and Contrast if necessary per protocol    Interpretation Summary  · Left ventricular ejection fraction appears to be 61 - 65%. Left ventricular systolic function is normal.  · Left ventricular diastolic function is consistent with (grade I) impaired relaxation.      Laboratory evaluations:    Lab Results   Component Value Date    GLUCOSE 101 (H) 12/12/2022    BUN 4 (L) 12/12/2022    CREATININE 0.63 (L) 12/12/2022    EGFRIFNONA 106 11/29/2021    BCR 6.3 (L) 12/12/2022    K 3.5 12/12/2022    CO2 24.8 12/12/2022    CALCIUM 8.2 (L) 12/12/2022    ALBUMIN 2.30 (L) 12/12/2022    AST 35 12/12/2022    ALT 22 12/12/2022     Lab Results   Component Value Date    WBC 8.48 11/18/2022    HGB 12.5 (L) 11/18/2022    HCT 34.7 (L) 11/18/2022    .8 (H) 11/18/2022     (L) 11/18/2022     Lab Results   Component Value Date    CHOL 145 11/29/2021    TRIG 114 11/18/2022    HDL 87 (H) 11/29/2021    LDL 38 11/29/2021     Lab Results   Component Value Date    TSH 0.718 11/29/2021     Lab Results   Component Value Date    HGBA1C 5.10 12/08/2020     Lab Results   Component Value Date    ALT 22 12/12/2022     Lab Results   Component Value Date    HGBA1C 5.10 12/08/2020    HGBA1C 5.10 09/12/2019     Lab Results   Component Value Date    CREATININE 0.63 (L) 12/12/2022     Lab Results   Component Value Date    IRON 73 11/18/2022    TIBC 116 (L) 11/18/2022    FERRITIN 1,422.00 (H) 11/18/2022     Lab Results   Component Value Date    INR 1.05 11/15/2022    INR 0.80 (L) 05/12/2021    INR 0.84 (L) 07/02/2018    PROTIME 13.9 11/15/2022    PROTIME 10.9 (L) 05/12/2021    PROTIME 11.7 07/02/2018           ECG 12 Lead    Date/Time: 12/20/2022 3:15 PM  Performed by: Eva Michele APRN  Authorized by: Eva Michele APRN   Comparison: compared with previous ECG   Rhythm: sinus  tachycardia  ST Segments: ST segments normal  T Waves: T waves normal    Clinical impression: non-specific ECG          --------------------------------------------------------------------------------------------------------------------------    ASSESSMENT/PLAN:      Diagnosis Plan   1. Sinus tachycardia        2. Tobacco use        3. Abdominal swelling        4. Lower extremity edema            1. Sinus tachycardia  2. Abdominal swelling  3. Lower extremity edema  • EKG in the office today shows sinus tachycardia in the 120s.  Cardiac event monitor showed normal sinus rhythm with episodes of intermittent sinus tachycardia and sinus bradycardia.  Daughter reports patient has had worsening confusion over the last 4 days along with lower extremity edema, abdominal swelling and weakness of his left leg.  Does appear to have mild jaundice on examination today.  Given patient's recent issues with his liver I have advised patient to go to the ED for further evaluation given his worsening symptoms over the last 4 days.  They are agreeable and will proceed to ED.    This document has been @Electronically signed by EILEEN Coronado, 12/20/22, 1:09 PM EST.       Dictated Utilizing Dragon Dictation: Part of this note may be an electronic transcription/translation of spoken language to printed text using the Dragon Dictation System.    Follow-up appointment and medication changes provided in hand delivered After Visit Summary as well as reviewed in the room.

## 2022-12-20 NOTE — ED NOTES
Patient asked to provide urine sample again, and told we could do an in and out cath if needed. Patient declined and stated he would try again soon.

## 2022-12-21 ENCOUNTER — APPOINTMENT (OUTPATIENT)
Dept: CT IMAGING | Facility: HOSPITAL | Age: 52
DRG: 870 | End: 2022-12-21
Payer: MEDICARE

## 2022-12-21 LAB
ALBUMIN SERPL-MCNC: 2.25 G/DL (ref 3.5–5.2)
ALBUMIN/GLOB SERPL: 0.8 G/DL
ALP SERPL-CCNC: 249 U/L (ref 39–117)
ALT SERPL W P-5'-P-CCNC: 20 U/L (ref 1–41)
AMMONIA BLD-SCNC: 54 UMOL/L (ref 16–60)
ANION GAP SERPL CALCULATED.3IONS-SCNC: 13.6 MMOL/L (ref 5–15)
APAP SERPL-MCNC: 12.6 MCG/ML (ref 0–30)
APAP SERPL-MCNC: <5 MCG/ML (ref 0–30)
APTT PPP: 36.6 SECONDS (ref 26.5–34.5)
AST SERPL-CCNC: 32 U/L (ref 1–40)
B PARAPERT DNA SPEC QL NAA+PROBE: NOT DETECTED
B PERT DNA SPEC QL NAA+PROBE: NOT DETECTED
BACTERIA BLD CULT: ABNORMAL
BASOPHILS # BLD AUTO: 0.01 10*3/MM3 (ref 0–0.2)
BASOPHILS NFR BLD AUTO: 0.1 % (ref 0–1.5)
BILIRUB SERPL-MCNC: 2.6 MG/DL (ref 0–1.2)
BUN SERPL-MCNC: 7 MG/DL (ref 6–20)
BUN/CREAT SERPL: 14.6 (ref 7–25)
C PNEUM DNA NPH QL NAA+NON-PROBE: NOT DETECTED
CALCIUM SPEC-SCNC: 7.7 MG/DL (ref 8.6–10.5)
CHLORIDE SERPL-SCNC: 103 MMOL/L (ref 98–107)
CO2 SERPL-SCNC: 20.4 MMOL/L (ref 22–29)
CREAT SERPL-MCNC: 0.48 MG/DL (ref 0.76–1.27)
D-LACTATE SERPL-SCNC: 2.8 MMOL/L (ref 0.5–2)
D-LACTATE SERPL-SCNC: 4.4 MMOL/L (ref 0.5–2)
DEPRECATED RDW RBC AUTO: 56 FL (ref 37–54)
EGFRCR SERPLBLD CKD-EPI 2021: 122.7 ML/MIN/1.73
EOSINOPHIL # BLD AUTO: 0 10*3/MM3 (ref 0–0.4)
EOSINOPHIL NFR BLD AUTO: 0 % (ref 0.3–6.2)
ERYTHROCYTE [DISTWIDTH] IN BLOOD BY AUTOMATED COUNT: 13.8 % (ref 12.3–15.4)
FLUAV RNA RESP QL NAA+PROBE: NOT DETECTED
FLUAV SUBTYP SPEC NAA+PROBE: NOT DETECTED
FLUBV RNA ISLT QL NAA+PROBE: NOT DETECTED
FLUBV RNA ISLT QL NAA+PROBE: NOT DETECTED
FOLATE SERPL-MCNC: 8.19 NG/ML (ref 4.78–24.2)
GLOBULIN UR ELPH-MCNC: 2.9 GM/DL
GLUCOSE SERPL-MCNC: 111 MG/DL (ref 65–99)
HADV DNA SPEC NAA+PROBE: NOT DETECTED
HCOV 229E RNA SPEC QL NAA+PROBE: NOT DETECTED
HCOV HKU1 RNA SPEC QL NAA+PROBE: NOT DETECTED
HCOV NL63 RNA SPEC QL NAA+PROBE: NOT DETECTED
HCOV OC43 RNA SPEC QL NAA+PROBE: NOT DETECTED
HCT VFR BLD AUTO: 43.2 % (ref 37.5–51)
HGB BLD-MCNC: 13.6 G/DL (ref 13–17.7)
HMPV RNA NPH QL NAA+NON-PROBE: NOT DETECTED
HPIV1 RNA ISLT QL NAA+PROBE: NOT DETECTED
HPIV2 RNA SPEC QL NAA+PROBE: NOT DETECTED
HPIV3 RNA NPH QL NAA+PROBE: NOT DETECTED
HPIV4 P GENE NPH QL NAA+PROBE: NOT DETECTED
IMM GRANULOCYTES # BLD AUTO: 0.05 10*3/MM3 (ref 0–0.05)
IMM GRANULOCYTES NFR BLD AUTO: 0.6 % (ref 0–0.5)
INR PPP: 1.36 (ref 0.9–1.1)
IRON 24H UR-MRATE: 49 MCG/DL (ref 59–158)
IRON SATN MFR SERPL: 70 % (ref 20–50)
LYMPHOCYTES # BLD AUTO: 0.38 10*3/MM3 (ref 0.7–3.1)
LYMPHOCYTES NFR BLD AUTO: 4.5 % (ref 19.6–45.3)
M PNEUMO IGG SER IA-ACNC: NOT DETECTED
MCH RBC QN AUTO: 34.2 PG (ref 26.6–33)
MCHC RBC AUTO-ENTMCNC: 31.5 G/DL (ref 31.5–35.7)
MCV RBC AUTO: 108.5 FL (ref 79–97)
MONOCYTES # BLD AUTO: 0.18 10*3/MM3 (ref 0.1–0.9)
MONOCYTES NFR BLD AUTO: 2.2 % (ref 5–12)
NEUTROPHILS NFR BLD AUTO: 7.74 10*3/MM3 (ref 1.7–7)
NEUTROPHILS NFR BLD AUTO: 92.6 % (ref 42.7–76)
NRBC BLD AUTO-RTO: 0 /100 WBC (ref 0–0.2)
PLATELET # BLD AUTO: 99 10*3/MM3 (ref 140–450)
PMV BLD AUTO: 10.9 FL (ref 6–12)
POTASSIUM SERPL-SCNC: 3.2 MMOL/L (ref 3.5–5.2)
POTASSIUM SERPL-SCNC: 4.2 MMOL/L (ref 3.5–5.2)
PROT SERPL-MCNC: 5.1 G/DL (ref 6–8.5)
PROTHROMBIN TIME: 17.1 SECONDS (ref 12.1–14.7)
RBC # BLD AUTO: 3.98 10*6/MM3 (ref 4.14–5.8)
RHINOVIRUS RNA SPEC NAA+PROBE: NOT DETECTED
RSV RNA NPH QL NAA+NON-PROBE: NOT DETECTED
SARS-COV-2 RNA NPH QL NAA+NON-PROBE: NOT DETECTED
SARS-COV-2 RNA PNL SPEC NAA+PROBE: NOT DETECTED
SODIUM SERPL-SCNC: 137 MMOL/L (ref 136–145)
TIBC SERPL-MCNC: 70 MCG/DL (ref 298–536)
TRANSFERRIN SERPL-MCNC: 47 MG/DL (ref 200–360)
VIT B12 BLD-MCNC: 1380 PG/ML (ref 211–946)
WBC NRBC COR # BLD: 8.36 10*3/MM3 (ref 3.4–10.8)

## 2022-12-21 PROCEDURE — 83605 ASSAY OF LACTIC ACID: CPT | Performed by: HOSPITALIST

## 2022-12-21 PROCEDURE — P9047 ALBUMIN (HUMAN), 25%, 50ML: HCPCS | Performed by: HOSPITALIST

## 2022-12-21 PROCEDURE — 83605 ASSAY OF LACTIC ACID: CPT | Performed by: STUDENT IN AN ORGANIZED HEALTH CARE EDUCATION/TRAINING PROGRAM

## 2022-12-21 PROCEDURE — 99223 1ST HOSP IP/OBS HIGH 75: CPT | Performed by: PHYSICIAN ASSISTANT

## 2022-12-21 PROCEDURE — 93010 ELECTROCARDIOGRAM REPORT: CPT | Performed by: INTERNAL MEDICINE

## 2022-12-21 PROCEDURE — 83540 ASSAY OF IRON: CPT | Performed by: PHYSICIAN ASSISTANT

## 2022-12-21 PROCEDURE — 87636 SARSCOV2 & INF A&B AMP PRB: CPT | Performed by: EMERGENCY MEDICINE

## 2022-12-21 PROCEDURE — 82140 ASSAY OF AMMONIA: CPT | Performed by: HOSPITALIST

## 2022-12-21 PROCEDURE — 92610 EVALUATE SWALLOWING FUNCTION: CPT

## 2022-12-21 PROCEDURE — 80143 DRUG ASSAY ACETAMINOPHEN: CPT | Performed by: HOSPITALIST

## 2022-12-21 PROCEDURE — 97161 PT EVAL LOW COMPLEX 20 MIN: CPT

## 2022-12-21 PROCEDURE — 25010000002 CEFTRIAXONE PER 250 MG: Performed by: HOSPITALIST

## 2022-12-21 PROCEDURE — 97166 OT EVAL MOD COMPLEX 45 MIN: CPT

## 2022-12-21 PROCEDURE — 80053 COMPREHEN METABOLIC PANEL: CPT | Performed by: HOSPITALIST

## 2022-12-21 PROCEDURE — 25010000002 ALBUMIN HUMAN 25% PER 50 ML: Performed by: HOSPITALIST

## 2022-12-21 PROCEDURE — 93005 ELECTROCARDIOGRAM TRACING: CPT | Performed by: PHYSICIAN ASSISTANT

## 2022-12-21 PROCEDURE — 70450 CT HEAD/BRAIN W/O DYE: CPT

## 2022-12-21 PROCEDURE — 82746 ASSAY OF FOLIC ACID SERUM: CPT | Performed by: PHYSICIAN ASSISTANT

## 2022-12-21 PROCEDURE — 25010000002 PROCHLORPERAZINE 10 MG/2ML SOLUTION: Performed by: STUDENT IN AN ORGANIZED HEALTH CARE EDUCATION/TRAINING PROGRAM

## 2022-12-21 PROCEDURE — 0202U NFCT DS 22 TRGT SARS-COV-2: CPT | Performed by: PHYSICIAN ASSISTANT

## 2022-12-21 PROCEDURE — 82607 VITAMIN B-12: CPT | Performed by: PHYSICIAN ASSISTANT

## 2022-12-21 PROCEDURE — 94799 UNLISTED PULMONARY SVC/PX: CPT

## 2022-12-21 PROCEDURE — 84466 ASSAY OF TRANSFERRIN: CPT | Performed by: PHYSICIAN ASSISTANT

## 2022-12-21 PROCEDURE — 85025 COMPLETE CBC W/AUTO DIFF WBC: CPT | Performed by: HOSPITALIST

## 2022-12-21 PROCEDURE — 84132 ASSAY OF SERUM POTASSIUM: CPT | Performed by: STUDENT IN AN ORGANIZED HEALTH CARE EDUCATION/TRAINING PROGRAM

## 2022-12-21 PROCEDURE — 25010000002 HEPARIN (PORCINE) PER 1000 UNITS: Performed by: HOSPITALIST

## 2022-12-21 RX ORDER — ALBUMIN (HUMAN) 12.5 G/50ML
25 SOLUTION INTRAVENOUS ONCE
Status: COMPLETED | OUTPATIENT
Start: 2022-12-21 | End: 2022-12-22

## 2022-12-21 RX ORDER — POTASSIUM CHLORIDE 20 MEQ/1
40 TABLET, EXTENDED RELEASE ORAL EVERY 4 HOURS
Status: COMPLETED | OUTPATIENT
Start: 2022-12-21 | End: 2022-12-21

## 2022-12-21 RX ORDER — MAGNESIUM SULFATE HEPTAHYDRATE 40 MG/ML
4 INJECTION, SOLUTION INTRAVENOUS AS NEEDED
Status: DISCONTINUED | OUTPATIENT
Start: 2022-12-21 | End: 2023-01-05 | Stop reason: HOSPADM

## 2022-12-21 RX ORDER — PROCHLORPERAZINE EDISYLATE 5 MG/ML
5 INJECTION INTRAMUSCULAR; INTRAVENOUS EVERY 8 HOURS PRN
Status: DISCONTINUED | OUTPATIENT
Start: 2022-12-21 | End: 2022-12-29

## 2022-12-21 RX ORDER — SODIUM CHLORIDE 0.9 % (FLUSH) 0.9 %
10 SYRINGE (ML) INJECTION AS NEEDED
Status: DISCONTINUED | OUTPATIENT
Start: 2022-12-21 | End: 2023-01-05 | Stop reason: HOSPADM

## 2022-12-21 RX ORDER — QUETIAPINE FUMARATE 25 MG/1
50 TABLET, FILM COATED ORAL NIGHTLY
Status: DISCONTINUED | OUTPATIENT
Start: 2022-12-21 | End: 2022-12-29

## 2022-12-21 RX ORDER — POTASSIUM CHLORIDE 1.5 G/1.77G
40 POWDER, FOR SOLUTION ORAL AS NEEDED
Status: DISCONTINUED | OUTPATIENT
Start: 2022-12-21 | End: 2023-01-05 | Stop reason: HOSPADM

## 2022-12-21 RX ORDER — ISOSORBIDE MONONITRATE 30 MG/1
30 TABLET, EXTENDED RELEASE ORAL DAILY
Status: DISCONTINUED | OUTPATIENT
Start: 2022-12-21 | End: 2022-12-30

## 2022-12-21 RX ORDER — MAGNESIUM SULFATE HEPTAHYDRATE 40 MG/ML
2 INJECTION, SOLUTION INTRAVENOUS AS NEEDED
Status: DISCONTINUED | OUTPATIENT
Start: 2022-12-21 | End: 2023-01-05 | Stop reason: HOSPADM

## 2022-12-21 RX ORDER — NITROGLYCERIN 0.4 MG/1
0.4 TABLET SUBLINGUAL
Status: DISCONTINUED | OUTPATIENT
Start: 2022-12-21 | End: 2022-12-30

## 2022-12-21 RX ORDER — SODIUM CHLORIDE 0.9 % (FLUSH) 0.9 %
10 SYRINGE (ML) INJECTION EVERY 12 HOURS SCHEDULED
Status: DISCONTINUED | OUTPATIENT
Start: 2022-12-21 | End: 2023-01-05 | Stop reason: HOSPADM

## 2022-12-21 RX ORDER — PANTOPRAZOLE SODIUM 40 MG/1
40 TABLET, DELAYED RELEASE ORAL
Status: DISCONTINUED | OUTPATIENT
Start: 2022-12-21 | End: 2022-12-22

## 2022-12-21 RX ORDER — MAGNESIUM SULFATE 1 G/100ML
1 INJECTION INTRAVENOUS AS NEEDED
Status: DISCONTINUED | OUTPATIENT
Start: 2022-12-21 | End: 2023-01-05 | Stop reason: HOSPADM

## 2022-12-21 RX ORDER — LEVETIRACETAM 500 MG/1
1500 TABLET ORAL NIGHTLY
Status: DISCONTINUED | OUTPATIENT
Start: 2022-12-21 | End: 2022-12-30

## 2022-12-21 RX ORDER — HEPARIN SODIUM 5000 [USP'U]/ML
5000 INJECTION, SOLUTION INTRAVENOUS; SUBCUTANEOUS EVERY 12 HOURS SCHEDULED
Status: DISCONTINUED | OUTPATIENT
Start: 2022-12-21 | End: 2022-12-30

## 2022-12-21 RX ORDER — POTASSIUM CHLORIDE 1500 MG/1
40 TABLET, FILM COATED, EXTENDED RELEASE ORAL AS NEEDED
Status: DISCONTINUED | OUTPATIENT
Start: 2022-12-21 | End: 2023-01-05 | Stop reason: HOSPADM

## 2022-12-21 RX ORDER — BACLOFEN 10 MG/1
10 TABLET ORAL 3 TIMES DAILY PRN
Status: DISCONTINUED | OUTPATIENT
Start: 2022-12-21 | End: 2022-12-30

## 2022-12-21 RX ORDER — SODIUM CHLORIDE 9 MG/ML
40 INJECTION, SOLUTION INTRAVENOUS AS NEEDED
Status: DISCONTINUED | OUTPATIENT
Start: 2022-12-21 | End: 2023-01-05 | Stop reason: HOSPADM

## 2022-12-21 RX ORDER — OLANZAPINE 5 MG/1
5 TABLET, ORALLY DISINTEGRATING ORAL ONCE
Status: DISCONTINUED | OUTPATIENT
Start: 2022-12-21 | End: 2022-12-29

## 2022-12-21 RX ORDER — METOPROLOL SUCCINATE 25 MG/1
25 TABLET, EXTENDED RELEASE ORAL DAILY
Status: DISCONTINUED | OUTPATIENT
Start: 2022-12-21 | End: 2022-12-30

## 2022-12-21 RX ADMIN — QUETIAPINE FUMARATE 50 MG: 25 TABLET, FILM COATED ORAL at 20:28

## 2022-12-21 RX ADMIN — LEVETIRACETAM 750 MG: 500 TABLET, FILM COATED ORAL at 15:43

## 2022-12-21 RX ADMIN — LEVETIRACETAM 1500 MG: 500 TABLET, FILM COATED ORAL at 03:04

## 2022-12-21 RX ADMIN — Medication 10 ML: at 03:05

## 2022-12-21 RX ADMIN — SODIUM CHLORIDE 250 ML: 9 INJECTION, SOLUTION INTRAVENOUS at 19:44

## 2022-12-21 RX ADMIN — ISOSORBIDE MONONITRATE 30 MG: 30 TABLET, EXTENDED RELEASE ORAL at 10:33

## 2022-12-21 RX ADMIN — LEVETIRACETAM 1500 MG: 500 TABLET, FILM COATED ORAL at 20:32

## 2022-12-21 RX ADMIN — POTASSIUM CHLORIDE 40 MEQ: 20 TABLET, EXTENDED RELEASE ORAL at 10:33

## 2022-12-21 RX ADMIN — METOPROLOL SUCCINATE 25 MG: 25 TABLET, EXTENDED RELEASE ORAL at 09:10

## 2022-12-21 RX ADMIN — HEPARIN SODIUM 5000 UNITS: 5000 INJECTION INTRAVENOUS; SUBCUTANEOUS at 20:32

## 2022-12-21 RX ADMIN — POTASSIUM CHLORIDE 40 MEQ: 20 TABLET, EXTENDED RELEASE ORAL at 00:14

## 2022-12-21 RX ADMIN — POTASSIUM CHLORIDE 40 MEQ: 20 TABLET, EXTENDED RELEASE ORAL at 06:52

## 2022-12-21 RX ADMIN — NICOTINE 1 PATCH: 7 PATCH, EXTENDED RELEASE TRANSDERMAL at 03:04

## 2022-12-21 RX ADMIN — LEVETIRACETAM 750 MG: 500 TABLET, FILM COATED ORAL at 06:52

## 2022-12-21 RX ADMIN — PROCHLORPERAZINE EDISYLATE 5 MG: 5 INJECTION INTRAMUSCULAR; INTRAVENOUS at 17:23

## 2022-12-21 RX ADMIN — CEFTRIAXONE 1 G: 1 INJECTION, POWDER, FOR SOLUTION INTRAMUSCULAR; INTRAVENOUS at 20:24

## 2022-12-21 RX ADMIN — DOXYCYCLINE 100 MG: 100 INJECTION, POWDER, LYOPHILIZED, FOR SOLUTION INTRAVENOUS at 20:38

## 2022-12-21 RX ADMIN — Medication 10 ML: at 09:11

## 2022-12-21 RX ADMIN — DOXYCYCLINE 100 MG: 100 INJECTION, POWDER, LYOPHILIZED, FOR SOLUTION INTRAVENOUS at 09:10

## 2022-12-21 RX ADMIN — HEPARIN SODIUM 5000 UNITS: 5000 INJECTION INTRAVENOUS; SUBCUTANEOUS at 03:05

## 2022-12-21 RX ADMIN — PANTOPRAZOLE SODIUM 40 MG: 40 TABLET, DELAYED RELEASE ORAL at 09:10

## 2022-12-21 NOTE — THERAPY EVALUATION
Acute Care - Speech Language Pathology   Swallow Initial Evaluation [facility]   CLINICAL DYSPHAGIA ASSESSMENT     Patient Name: Jamison Edward  : 1970  MRN: [id number]  Today's Date: 2022             Admit Date: 2022    Jamison Edward  is seen at bedside this am on [id number] to assess safety/efficacy of swallowing fnx, determine safest/least restrictive diet tolerance. Mr. Edward's wife is present at bedside and shared pt's history regarding prior gi difficulty including esophageal dilatation and history of globus sensation. Mr. Edward reviewed breakfast menu and po intake; no difficulty reported. He reported taking am medications w/water only w/o difficulty.      Mr. Edward has a past medical history significant for essential hypertension, COPD, tobacco abuse, multilevel DDD, GERD, Jonhson's esophagus, peptic ulcer disease, ulcerative colitis, history of TIA/CVA, and alcohol use. Mr. Edward was being seen for a follow-up visit at his cardiologist for syncope and has been wearing a cardiac event monitor. Patient's daughter attended appointment with patient a noted that the past 4 days he has had worsening confusion, lower extremity edema, left leg weakness, and abdominal swelling. His cardiac event monitor showed normal sinus rhythm with episodes of sinus tachycardia and sinus bradycardia with no arrhythmia's noted. Patient was advised to come to the ED for further evaluation of his symptoms.     He was seen in the ED in 2022 for generalized weakness and had CT abdomen/pelvis performed which showed diffuse fatty infiltration of the liver which had worsened since prior exam on 2021. He followed up with GI and had repeat lab work which showed improvement of liver enzymes. He is supposed to have upper GI study on Thursday (22).      Mr. Edward reports that he started noticing swelling of his abdomen over the past 2 weeks which has gradually worsened. He  reports he does have some abdominal pain localized in his right lower quadrant. Patient reports that he has noticed his skin is jaundiced and has yellowing of his sclera. He does report nausea and nonbloody nonbilious vomiting everyday since symptom onset. He denies any diarrhea. He reports that he stopped drinking alcohol 5 weeks ago, but used to drink a 6 pack daily. He reports that he does use a lot of tylenol stating that he takes approximately 9 per day for the past several years. He has never been diagnosed with liver cirrhosis in the past. He denies any fevers or chills. He does report a nonproductive cough. He denies any shortness of breath. He denies any nasal drainage or congestion. He denies any urinary symptoms.      Upon arrival to the ED, Mr. Edward was evaluated and admitted for further evaluation and monitoring of pneumonia due to infectious organism, unspecified laterality, unspecified part of lung.     Mr. Edawrd was observed on room air w/o difficulty.    Mr. Edward is repositioned upright and centered in bed to accept multiple po presentations of ice chips solid cracker, puree and thin liquids via spoon, cup and straw.  He is able to self feed. Mr. Edward presents w/ generalized weakness.    Facial/oral structures are symmetrical upon observation. Lingual protrusion reveals no deviation. Oral mucosa are moist, pink, and clean. Secretions are clear, thin, and well controlled. OROM/SHALONDA is wfl to imitate oral postures. Gag is not assessed. Volitional cough is intact w/ slightly weak intensity, confestive in quality, non-productive. Voice is slightly weak in intensity, clear in quality w/ intelligible speech. Mr. Edward is partially edentulous; upper teeth intact.      Upon po presentations, adequate bolus anticipation and acceptance w/ good labial seal for bolus clearance via spoon bowl, cup rim stability and suction via straw. Bolus formation, manipulation and control are wfl w/  rotary mastication pattern. A-p transit is timely w/o oral residue.     Pharyngeal swallow is timely w/ adequate hyolaryngeal elevation per palpation. No overt s/s aspiration evidenced across this evaluation. No silent aspiration suspected as pt is w/o changes in vocal quality, respirations or secretions post po presentations. Pt denies odynophagia. Intermittent coughing observed a/c this session w/ no direct correlation w/ po presentation.     Impression: Based upon this assessment, Mr. Edward presents w/ wfl oropharyngeal swallow w/o s/s aspiration. No s/s indicative of silent aspiration.  No odynophagia reported.       He is felt to most benefit from continuing his premorbid least restrictive regular diet w/thin liquids.     Per chart review, it was evidenced that Mr. Edward's medicine list included Protonix (ordered but not given). This medication is also listed as an allergy. SLP made RN aware.       SLP Recommendation and Plan  1. Regular diet/ thin liquids  2. Meds whole in puree/thins.   3. Upright and centered for all po intake  4. Universal aspiration precautions.  5. SHANNON precautions.  6. Oral care protocol.    No further formal SLP f/u warranted at this time.    D/w pt results and recommendations w/ verbal agreement.    D/w RN results and recommendations w/ verbal agreement.    Thank you for allowing me to participate in the care of your patient-  Fifi Tejeda M.A., CCC/SLP    EDUCATION  The patient has been educated in the following areas:   Dysphagia (Swallowing Impairment) Oral Care/Hydration.       Visit Dx:     ICD-10-CM ICD-9-CM   1. Pneumonia due to infectious organism, unspecified laterality, unspecified part of lung  J18.9 486   2. Cirrhosis of liver with ascites, unspecified hepatic cirrhosis type (HCC)  K74.60 571.5    R18.8    3. Lumbar radiculopathy  M54.16 724.4     Patient Active Problem List   Diagnosis   • Precordial chest pain   • Weight loss, abnormal   • Right upper quadrant  abdominal pain   • Epigastric pain   • History of bleeding ulcers   • Nausea   • Malaise and fatigue   • Acute gastric ulcer without hemorrhage or perforation   • Poor appetite   • Duodenal ulcer   • Gastroesophageal reflux disease   • Dysphagia   • Iron deficiency anemia   • Malabsorption   • Gastric ulcer without hemorrhage or perforation   • Dystonia   • Peptic ulcer without hemorrhage or perforation   • Gastric outlet obstruction   • Incisional hernia, without obstruction or gangrene   • Incisional hernia   • Chest pain, atypical   • Gastric bezoar   • Essential hypertension   • Cigarette smoker   • Chronic back pain   • Sinus tachycardia   • Tobacco use   • Mild carpal tunnel syndrome   • Orthostatic hypotension   • Palpitations   • Abdominal swelling   • Lower extremity edema   • Pneumonia due to infectious organism, unspecified laterality, unspecified part of lung     Past Medical History:   Diagnosis Date   • Anemia    • Arthritis of back     not sure   • Johnson esophagus    • Cholelithiasis    • COPD (chronic obstructive pulmonary disease) (HCC)    • CTS (carpal tunnel syndrome)    • DDD (degenerative disc disease), thoracic    • Degenerative disc disease, lumbar    • Dystonia    • GERD (gastroesophageal reflux disease)    • GI (gastrointestinal bleed)    • Hypertension    • Irritable bowel syndrome    • Low back strain    • Lumbosacral disc disease    • Migraines    • Peptic ulcer disease    • Stroke (HCC)     TIA   • TIA (transient ischemic attack)    • Ulcerative colitis (HCC)    • Wears dentures     upper only     Past Surgical History:   Procedure Laterality Date   • ABDOMINAL SURGERY  09/17/2021   • APPENDECTOMY     • CHOLECYSTECTOMY N/A 04/22/2017    Procedure: CHOLECYSTECTOMY LAPAROSCOPIC;  Surgeon: Jaqueline Guaman MD;  Location: [facility];  Service:    • COLONOSCOPY N/A 05/08/2017    Procedure: COLONOSCOPY  CPTCODE:08355;  Surgeon: Nicho Richey III, MD;  Location: [facility];  Service:     • CUBITAL TUNNEL RELEASE Left 9/1/2022    Procedure: CUBITAL TUNNEL RELEASE LEFT;  Surgeon: Alec Hough MD;  Location:  [facility];  Service: Orthopedics;  Laterality: Left;   • ENDOSCOPY     • ENDOSCOPY N/A 05/08/2017    Procedure: ESOPHAGOGASTRODUODENOSCOPY WITH BIOPSY  CPTCODE:24073;  Surgeon: Nicho Richey III, MD;  Location:  [facility];  Service:    • ENDOSCOPY N/A 11/03/2017    Procedure: ESOPHAGOGASTRODUODENOSCOPY WITH BIOPSY  CPTCODE: 37359;  Surgeon: Nicho Richey III, MD;  Location:  [facility];  Service:    • ENDOSCOPY N/A 02/20/2018    Procedure: ESOPHAGOGASTRODUODENOSCOPY WITH DILATATION CPT CODE: 38341;  Surgeon: Nicho Richey III, MD;  Location:  [facility];  Service:    • ENDOSCOPY N/A 06/05/2018    Procedure: ESOPHAGOGASTRODUODENOSCOPY WITH BIOPSY CPT CODE: 66774;  Surgeon: Nicho Richey III, MD;  Location:  [facility];  Service: Gastroenterology   • ENDOSCOPY N/A 05/26/2021    Procedure: ESOPHAGOGASTRODUODENOSCOPY WITH BIOPSY;  Surgeon: Julieta Hebert MD;  Location:  [facility];  Service: Gastroenterology;  Laterality: N/A;   • ENDOSCOPY N/A 06/02/2021    Procedure: ESOPHAGOGASTRODUODENOSCOPY WITH BIOPSY;  Surgeon: Julieta Hebert MD;  Location:  [facility];  Service: Gastroenterology;  Laterality: N/A;   • GASTRECTOMY N/A 09/17/2019    Procedure: OPEN VAGOTOMY, ANTRECTOMY,REVISION- Y GASTROJEJUNOSOTOMY;  Surgeon: Herbert Umanzor MD;  Location:  [facility];  Service: General   • UPPER GASTROINTESTINAL ENDOSCOPY     • VENTRAL/INCISIONAL HERNIA REPAIR N/A 12/10/2020    Procedure: INCISIONAL HERNIA REPAIR LAPAROSCOPIC WITH MESH;  Surgeon: Herbert Umanzor MD;  Location:  [facility];  Service: General;  Laterality: N/A;       Social History     Socioeconomic History   • Marital status:    Tobacco Use   • Smoking status: Every Day     Packs/day: 1.00     Years: 35.00     Pack years: 35.00     Types: Cigarettes     Last attempt to quit: 9/1/2020      Years since quittin.3   • Smokeless tobacco: Never   • Tobacco comments:     Been smoking 20 years   Vaping Use   • Vaping Use: Never used   Substance and Sexual Activity   • Alcohol use: Yes     Alcohol/week: 4.0 standard drinks     Types: 4 Cans of beer per week   • Drug use: No   • Sexual activity: Yes     Partners: Female     Birth control/protection: None      CT CHEST PULMONARY EMBOLISM W CONTRAST     INDICATION:   Peripheral edema. Interstitial airway disease on chest x-ray.     TECHNIQUE:   CT angiogram of the chest with 100 cc of Isovue-300 IV contrast. 3-D reconstructions were obtained and reviewed.   Radiation dose reduction techniques included automated exposure control or exposure modulation based on body size. Count of known CT and  cardiac nuc med studies performed in previous 12 months: 4.      COMPARISON:        FINDINGS:   Chest images at mediastinal window show no filling defects in the pulmonary arteries. No acute changes are seen in the aorta. No adenopathy or effusions are seen. The CT angiographic reformatted images also show no evidence of emboli.     Chest images at lung window show underlying emphysema. There are bilateral mixed interstitial and groundglass infiltrates accompanied by leg like atelectasis at both lung bases, all new since the previous scan. The infiltrates are nonspecific. The  basilar consolidation most likely represents atelectasis. The other infiltrates are suspicious for pneumonia or multifocal pneumonitis. Commonly reported imaging features of COVID-19 pneumonia are present. Other processes such as influenza pneumonia and  organizing pneumonia can cause a similar imaging pattern.     IMPRESSION:  Bilateral multifocal groundglass infiltrates as well as platelike atelectasis in the lower lung fields, all new since the previous examination. Underlying emphysema. No evidence of pulmonary embolism.    Signer Name: Zachery Cano MD   Signed: 2022 10:42 PM    Workstation Name: [id number]    [facility]     Narrative & Impression   EXAM:    XR Chest, 1 View     EXAM DATE:    12/20/2022 4:23 PM     CLINICAL HISTORY:    dyspnea     TECHNIQUE:    Frontal view of the chest.     COMPARISON:    08/24/2022     FINDINGS:    Lungs:  Development of left greater than right patchy and interstitial  airspace disease which may represent changes of bibasilar pneumonia.    Pleural space:  Unremarkable.  No pneumothorax.    Heart:  Unremarkable.  No cardiomegaly.    Mediastinum:  Unremarkable.    Bones/joints:  Unremarkable.     IMPRESSION:    Development of left greater than right patchy and interstitial basilar  airspace disease which may represent changes of bibasilar pneumonia.     This report was finalized on 12/20/2022 4:39 PM by Dr. Phi Franks MD.       Diet Orders (active) (From admission, onward)     Start     Ordered    12/21/22 0107  Diet: Regular/House Diet; Texture: Regular Texture (IDDSI 7); Fluid Consistency: Thin (IDDSI 0)  Diet Effective Now         12/21/22 0106              Time Calculation:       Therapy Charges for Today     Code Description Service Date Service Provider Modifiers Qty    [id number] HC ST EVAL ORAL PHARYNG SWALLOW 4 12/21/2022 Fifi May MA,CCC-SLP GN 1               Fifi May MA,CCC-SLP  12/21/2022

## 2022-12-21 NOTE — PLAN OF CARE
Goal Outcome Evaluation:              Outcome Evaluation: Pt seen for PT evaluation with good mobility, pt does present as fall risk. D/C rec for home with assist, supervision, home health to work on balance/ambulation, strength PRN.

## 2022-12-21 NOTE — CASE MANAGEMENT/SOCIAL WORK
Discharge Planning Assessment  [facility]     Patient Name: Jamison Edward  MRN: [id number]  Today's Date: 12/21/2022    Admit Date: 12/20/2022    Plan: KATHLEEN spoke with pt and his spouse at the bedside. Pt lives at home in [facility] [facility] with spouse and plans to return home at d/c. Pt has a shower bench and b/p cuff at home and requests a rolling walker at d/c. He does not have a preference for provider. Pt does not have HH and denies any needs. PCP is Leticia ARELLANO and gets rx filled at [facility] on [address] in [place]. Pt has Medicare and Medicaid and denies any issues with copays. Pt's spouse will transport at d/c if the weather is not too bad. CM will follow.   Discharge Needs Assessment     Row Name 12/21/22 1139       Living Environment    People in Home spouse    Name(s) of People in Home Spouse Lena    Current Living Arrangements home    Primary Care Provided by self;spouse/significant other    Provides Primary Care For no one, unable/limited ability to care for self    Family Caregiver if Needed none    Quality of Family Relationships helpful;supportive    Able to Return to Prior Arrangements yes       Resource/Environmental Concerns    Resource/Environmental Concerns none    Transportation Concerns none       Transition Planning    Patient/Family Anticipates Transition to home    Patient/Family Anticipated Services at Transition durable medical equipment    Transportation Anticipated family or friend will provide       Discharge Needs Assessment    Readmission Within the Last 30 Days no previous admission in last 30 days    Equipment Currently Used at Home shower chair;bp cuff  Via unknown provider    Anticipated Changes Related to Illness none    Equipment Needed After Discharge walker, rolling    Patient's Choice of Community Agency(s) Pt does not have HH and denies any needs at this time.               Discharge Plan     Row Name 12/21/22 8148       Plan    Plan KATHLEEN spoke with pt and his spouse at  the bedside. Pt lives at home in [place] [facility] with spouse and plans to return home at d/c. Pt has a shower bench and b/p cuff at home and requests a rolling walker at d/c. He does not have a preference for provider. Pt does not have HH and denies any needs. PCP is Leticia ARELLANO and gets rx filled at [facility] on [address] in [place]. Pt has Medicare and Medicaid and denies any issues with copays. Pt stated he is independent with his care at home. Pt's spouse will transport at d/c if the weather is not too bad. CM will follow.    Patient/Family in Agreement with Plan yes    Plan Comments Sepsis, PNA  12/21: Admit MS, from Cardiology office for worsening confusion, LE edema, abd swelling and sinus tach 120's, hx alcohol abuse, cirrhosis w/ mod ascites, PNA, Rocephin, Doxy IV, KCL repl, IVF's, WBC 8.36, INR 1.36              Expected Discharge Date and Time     Expected Discharge Date Expected Discharge Time    Dec 24, 2022         Fadumo Petersen RN

## 2022-12-21 NOTE — H&P
[facility] Medicine Services  History & Physical    Patient Identification:  Name:  Jamison Edward  Age:  52 y.o.  Sex:  male  :  1970  MRN:  [id number]   Visit Number:  [id number]  Primary Care Physician:  Leticia Martinez APRN    Subjective     2022   Chief complaint:   Chief Complaint   Patient presents with   • Leg Swelling     History of presenting illness:      Jamison Edward is a 52 y.o. male with past medical history significant for essential hypertension, COPD, tobacco abuse, multilevel DDD, GERD, Johnson's esophagus, peptic ulcer disease, ulcerative colitis, history of TIA/CVA, and alcohol use    Patient was being seen for a follow-up visit at his cardiologist for syncope and has been wearing a cardiac event monitor. Patient's daughter attended appointment with patient a noted that the past 4 days he has had worsening confusion, lower extremity edema, left leg weakness, and abdominal swelling. His cardiac event monitor showed normal sinus rhythm with episodes of sinus tachycardia and sinus bradycardia with no arrhythmia's noted. Patient was advised to come to the ED for further evaluation of his symptoms.     He was seen in the ED in 2022 for generalized weakness and had CT abdomen/pelvis performed which showed diffuse fatty infiltration of the liver which had worsened since prior exam on 2021. He followed up with GI and had repeat lab work which showed improvement of liver enzymes. He is supposed to have upper GI study on Thursday (22).     Patient reports that he started noticing swelling of his abdomen over the past 2 weeks which has gradually worsened. He reports he does have some abdominal pain localized in his right lower quadrant. Patient reports that he has noticed his skin is jaundiced and has yellowing of his sclera. He does report nausea and nonbloody nonbilious vomiting everyday since symptom onset. He denies any diarrhea. He  reports that he stopped drinking alcohol 5 weeks ago, but used to drink a 6 pack daily. He reports that he does use a lot of tylenol stating that he takes approximately 9 per day for the past several years. He has never been diagnosed with liver cirrhosis in the past. He denies any fevers or chills. He does report a nonproductive cough. He denies any shortness of breath. He denies any nasal drainage or congestion. He denies any urinary symptoms.     Upon arrival to the ED, vital signs were temperature 97.9, heart rate 105, respirations 18, blood pressure 126/83, SPO2 90% on room air.  ABG with pH 7.510, PCO2 32.4, PO2 62.4, HCO3 25.8, oxygen saturation 91.6 on room air.  CMP with glucose 124, potassium 3.2, creatinine 0.71, calcium 8.0, alkaline phosphatase 243, total protein 4.8, total bilirubin 3.0, albumin 2.4, otherwise unremarkable.  CBC with RBC 3.49, hemoglobin 12.3, hematocrit 35.4, .4, MCH 35.2, platelets 105, otherwise unremarkable.  Troponin T negative x1.  proBNP 178.2.  TSH 1.60.  Free T4 0.94.  Ammonia 63.  CRP 6.78.  Lactate 3.2.  Magnesium 1.9.  Procalcitonin 1.69.  Acetaminophen level 12.6.  Urinalysis with positive nitrites, trace leukocytes, trace protein, 2+ bilirubin, 2 urobilinogen, 3-5 WBC, trace bacteria.  Ethanol level undetectable.  Urine drug screen positive for TCAs.  Pending peripheral blood cultures x2.  COVID-19 and influenza A/B swab negative.  Chest x-ray with development of left greater than right patchy and interstitial basilar airspace disease.  CT PE protocol with bilateral multifocal groundglass infiltrates as well as platelike atelectasis in the lower lung fields with underlying emphysema, no evidence of PE.  CT abdomen/pelvis shows markedly abnormal appearance of the liver dramatically change from previous CT in November with patchy abnormal irregular enhancement suggesting diffuse hepatocellular disease which is accompanied by moderate volume of ascites which is also  new, mucosal edema in the colon and small bowel noted, acute colitis/enteritis not excluded.  CT head without acute intracranial abnormality, cerebral volume loss greater than expected for patient's age.  CT lumbar spine with no acute fracture or chronic subluxation, multilevel lumbar spondylosis, partially visualized intra-abdominal ascites new compared to recent CT abdomen 11/15/2022.  MRI lumbar spine with degenerative changes most significant at L5-S1 and L3-4 with approximately moderate canal stenosis at L5-S1 and mild to moderate canal stenosis at L3-4.  MRI brain with nonspecific periventricular white matter lesions seen to mild extent bilaterally which most likely represents mild chronic deep white matter ischemic change with chronic inflammatory changes seen in the mastoid air cells bilaterally.    Known Emergency Department medications received prior to my evaluation included IV Rocephin, IV doxycycline, IV Pepcid + IV Benadryl 25 mg + IV solumedrol for prophylaxis for IV contrast, potassium chloride 40 mEq, fluid bolus 2259 mL. Emergency Department Room location at the time of my evaluation was 115.     Patient will be admitted to the medical surgical floor for further evaluation and monitoring.     ---------------------------------------------------------------------------------------------------------------------   Review of Systems   Constitutional: Negative for activity change, chills and fever.   HENT: Negative for congestion and rhinorrhea.    Eyes: Negative for discharge and redness.   Respiratory: Positive for cough (nonproductive). Negative for apnea and shortness of breath.    Cardiovascular: Positive for leg swelling. Negative for chest pain and palpitations.   Gastrointestinal: Positive for abdominal distention (over the past 2 weeks), abdominal pain (RLQ), nausea and vomiting. Negative for diarrhea.   Genitourinary: Negative for difficulty urinating, dysuria, frequency and urgency.    Musculoskeletal: Negative for arthralgias and myalgias.   Skin: Negative for color change and wound.        Reports juandice   Neurological: Positive for weakness (primarily of left lower leg). Negative for dizziness, syncope and light-headedness.   Psychiatric/Behavioral: Positive for confusion (Reported by daughter). Negative for agitation and behavioral problems.        ---------------------------------------------------------------------------------------------------------------------   Past Medical History:   Diagnosis Date   • Anemia    • Arthritis of back     not sure   • Johnson esophagus    • Cholelithiasis    • COPD (chronic obstructive pulmonary disease) (HCC)    • CTS (carpal tunnel syndrome)    • DDD (degenerative disc disease), thoracic    • Degenerative disc disease, lumbar    • Dystonia    • GERD (gastroesophageal reflux disease)    • GI (gastrointestinal bleed)    • Hypertension    • Irritable bowel syndrome    • Low back strain    • Lumbosacral disc disease    • Migraines    • Peptic ulcer disease    • Stroke (HCC)     TIA   • TIA (transient ischemic attack)    • Ulcerative colitis (HCC)    • Wears dentures     upper only     Past Surgical History:   Procedure Laterality Date   • ABDOMINAL SURGERY  09/17/2021   • APPENDECTOMY     • CHOLECYSTECTOMY N/A 04/22/2017    Procedure: CHOLECYSTECTOMY LAPAROSCOPIC;  Surgeon: Jaqueline Guaman MD;  Location: [facility];  Service:    • COLONOSCOPY N/A 05/08/2017    Procedure: COLONOSCOPY  CPTCODE:75920;  Surgeon: Nicho Richey III, MD;  Location: [facility];  Service:    • CUBITAL TUNNEL RELEASE Left 9/1/2022    Procedure: CUBITAL TUNNEL RELEASE LEFT;  Surgeon: Alec Hough MD;  Location: [facility];  Service: Orthopedics;  Laterality: Left;   • ENDOSCOPY     • ENDOSCOPY N/A 05/08/2017    Procedure: ESOPHAGOGASTRODUODENOSCOPY WITH BIOPSY  CPTCODE:20703;  Surgeon: Nicho Richey III, MD;  Location: [facility];  Service:    • ENDOSCOPY N/A  2017    Procedure: ESOPHAGOGASTRODUODENOSCOPY WITH BIOPSY  CPTCODE: 62322;  Surgeon: Nicho Richey III, MD;  Location:  [facility];  Service:    • ENDOSCOPY N/A 2018    Procedure: ESOPHAGOGASTRODUODENOSCOPY WITH DILATATION CPT CODE: 65767;  Surgeon: Nicho Richey III, MD;  Location:  [facility];  Service:    • ENDOSCOPY N/A 2018    Procedure: ESOPHAGOGASTRODUODENOSCOPY WITH BIOPSY CPT CODE: 40816;  Surgeon: Nicho Richey III, MD;  Location:  [facility];  Service: Gastroenterology   • ENDOSCOPY N/A 2021    Procedure: ESOPHAGOGASTRODUODENOSCOPY WITH BIOPSY;  Surgeon: Julieta Hebert MD;  Location:  [facility];  Service: Gastroenterology;  Laterality: N/A;   • ENDOSCOPY N/A 2021    Procedure: ESOPHAGOGASTRODUODENOSCOPY WITH BIOPSY;  Surgeon: Julieta Hebert MD;  Location:  [facility];  Service: Gastroenterology;  Laterality: N/A;   • GASTRECTOMY N/A 2019    Procedure: OPEN VAGOTOMY, ANTRECTOMY,REVISION- Y GASTROJEJUNOSOTOMY;  Surgeon: Herbert Umanzor MD;  Location:  [facility];  Service: General   • UPPER GASTROINTESTINAL ENDOSCOPY     • VENTRAL/INCISIONAL HERNIA REPAIR N/A 12/10/2020    Procedure: INCISIONAL HERNIA REPAIR LAPAROSCOPIC WITH MESH;  Surgeon: Herbert Umanzor MD;  Location:  [facility];  Service: General;  Laterality: N/A;     Family History   Problem Relation Age of Onset   • Osteoporosis Mother    • Hypertension Father    • Osteoporosis Father    • No Known Problems Sister    • No Known Problems Brother    • Drug abuse Brother    • No Known Problems Daughter    • Diabetes Sister      Social History     Socioeconomic History   • Marital status:    Tobacco Use   • Smoking status: Every Day     Packs/day: 1.00     Years: 35.00     Pack years: 35.00     Types: Cigarettes     Last attempt to quit: 2020     Years since quittin.3   • Smokeless tobacco: Never   • Tobacco comments:     Been smoking 20 years   Vaping Use   • Vaping  Use: Never used   Substance and Sexual Activity   • Alcohol use: Yes     Alcohol/week: 4.0 standard drinks     Types: 4 Cans of beer per week   • Drug use: No   • Sexual activity: Yes     Partners: Female     Birth control/protection: None     ---------------------------------------------------------------------------------------------------------------------   Allergies:  Contrast dye, Prilosec [omeprazole], Protonix [pantoprazole sodium], Dilantin [phenytoin], Dilaudid [hydromorphone], Flagyl [metronidazole], and Topamax [topiramate]  ---------------------------------------------------------------------------------------------------------------------   Home medications:    Medications below are reported home medications pulling from within the system; at this time, these medications have not been reconciled unless otherwise specified and are in the verification process for further verifcation as current home medications.  (Not in a hospital admission)      Hospital Scheduled Meds:  cefTRIAXone, 1 g, Intravenous, Q24H  doxycycline, 100 mg, Intravenous, Q12H  potassium chloride, 40 mEq, Oral, Once           Current listed hospital scheduled medications may not yet reflect those currently placed in orders that are signed and held awaiting patient's arrival to floor.   ---------------------------------------------------------------------------------------------------------------------     Objective     Vital Signs:  Temp:  [97.9 °F (36.6 °C)] 97.9 °F (36.6 °C)  Heart Rate:  [103-125] 105  Resp:  [18] 18  BP: (109-142)/(78-91) 137/89      12/20/22  1545   Weight: 75.3 kg (166 lb)     Body mass index is 26.79 kg/m².  ---------------------------------------------------------------------------------------------------------------------       Physical Exam  Constitutional:       General: He is awake.      Appearance: Normal appearance. He is overweight.      Comments: Resting in bed upon arrival. Appears in no acute  distress.    HENT:      Head: Normocephalic and atraumatic.      Right Ear: External ear normal.      Left Ear: External ear normal.      Nose: Nose normal.      Mouth/Throat:      Mouth: Mucous membranes are moist.      Pharynx: Oropharynx is clear.   Eyes:      General: Scleral icterus (bilateral) present.      Extraocular Movements: Extraocular movements intact.      Conjunctiva/sclera: Conjunctivae normal.      Pupils: Pupils are equal, round, and reactive to light.   Cardiovascular:      Rate and Rhythm: Normal rate and regular rhythm.      Pulses: Normal pulses.           Dorsalis pedis pulses are 2+ on the right side and 2+ on the left side.        Posterior tibial pulses are 2+ on the right side and 2+ on the left side.      Heart sounds: Normal heart sounds. No murmur heard.    No friction rub. No gallop.   Abdominal:      General: Abdomen is flat. A surgical scar is present. Bowel sounds are normal. There is distension.      Palpations: Abdomen is soft. There is no fluid wave.      Tenderness: There is abdominal tenderness (RLQ) in the right lower quadrant. There is no guarding.      Hernia: No hernia is present.   Musculoskeletal:         General: Normal range of motion.      Cervical back: Normal range of motion and neck supple.      Right lower le+ Pitting Edema present.      Left lower le+ Pitting Edema present.   Skin:     General: Skin is warm and dry.      Coloration: Skin is not jaundiced.      Findings: No erythema or rash.   Neurological:      General: No focal deficit present.      Mental Status: He is alert and oriented to person, place, and time. Mental status is at baseline.      Comments: Currently alert and oriented to self, date of birth, year, place, and president. No facial asymmetry.  strength equal bilaterally. No upper extremity drift. Able to life bilateral lower extremities off bed.   Psychiatric:         Mood and Affect: Mood normal.         Behavior: Behavior normal.  Behavior is cooperative.         Thought Content: Thought content normal.         ---------------------------------------------------------------------------------------------------------------------  EKG:    Obtain baseline EKG for admission  I have personally looked at both the EKG and the telemetry strips.  ---------------------------------------------------------------------------------------------------------------------   Results from last 7 days   Lab Units 12/20/22  2352 12/20/22 1928 12/20/22  1629   CRP mg/dL  --   --  6.78*   LACTATE mmol/L  --  4.2* 3.2*   WBC 10*3/mm3  --   --  9.76   HEMOGLOBIN g/dL  --   --  12.3*   HEMATOCRIT %  --   --  35.4*   MCV fL  --   --  101.4*   MCHC g/dL  --   --  34.7   PLATELETS 10*3/mm3  --   --  105*   INR  1.36*  --   --      Results from last 7 days   Lab Units 12/20/22  2054   PH, ARTERIAL pH units 7.510*   PO2 ART mm Hg 62.4*   PCO2, ARTERIAL mm Hg 32.4*   HCO3 ART mmol/L 25.8     Results from last 7 days   Lab Units 12/20/22  1629   SODIUM mmol/L 136   POTASSIUM mmol/L 3.2*   MAGNESIUM mg/dL 1.9   CHLORIDE mmol/L 101   CO2 mmol/L 25.1   BUN mg/dL 8   CREATININE mg/dL 0.71*   CALCIUM mg/dL 8.0*   GLUCOSE mg/dL 124*   ALBUMIN g/dL 2.14*   BILIRUBIN mg/dL 3.0*   ALK PHOS U/L 243*   AST (SGOT) U/L 28   ALT (SGPT) U/L 20   Estimated Creatinine Clearance: 126.7 mL/min (A) (by C-G formula based on SCr of 0.71 mg/dL (L)).  Ammonia   Date Value Ref Range Status   12/20/2022 63 (H) 16 - 60 umol/L Final     Results from last 7 days   Lab Units 12/20/22  1629   TROPONIN T ng/mL <0.010     Results from last 7 days   Lab Units 12/20/22  1629   PROBNP pg/mL 178.2     Lab Results   Component Value Date    HGBA1C 5.10 12/08/2020     Lab Results   Component Value Date    TSH 1.600 12/20/2022    FREET4 0.94 12/20/2022     No results found for: PREGTESTUR, PREGSERUM, HCG, HCGQUANT  Pain Management Panel     Pain Management Panel Latest Ref Rng & Units 12/20/2022 11/15/2022     AMPHETAMINES SCREEN, URINE Negative Negative Negative    BARBITURATES SCREEN Negative Negative Negative    BENZODIAZEPINE SCREEN, URINE Negative Negative Negative    BUPRENORPHINEUR Negative Negative Negative    COCAINE SCREEN, URINE Negative Negative Negative    METHADONE SCREEN, URINE Negative Negative Negative    METHAMPHETAMINEUR Negative Negative Negative      ---------------------------------------------------------------------------------------------------------------------  Imaging Results (Last 7 Days)     Procedure Component Value Units Date/Time    CT Angiogram Chest Pulmonary Embolism [[id number]] Collected: 12/20/22 2242     Updated: 12/20/22 2244    Narrative:      CT CHEST PULMONARY EMBOLISM W CONTRAST    INDICATION:   Peripheral edema. Interstitial airway disease on chest x-ray.    TECHNIQUE:   CT angiogram of the chest with 100 cc of Isovue-300 IV contrast. 3-D reconstructions were obtained and reviewed.   Radiation dose reduction techniques included automated exposure control or exposure modulation based on body size. Count of known CT and  cardiac nuc med studies performed in previous 12 months: 4.     COMPARISON:   7-18    FINDINGS:   Chest images at mediastinal window show no filling defects in the pulmonary arteries. No acute changes are seen in the aorta. No adenopathy or effusions are seen. The CT angiographic reformatted images also show no evidence of emboli.    Chest images at lung window show underlying emphysema. There are bilateral mixed interstitial and groundglass infiltrates accompanied by leg like atelectasis at both lung bases, all new since the previous scan. The infiltrates are nonspecific. The  basilar consolidation most likely represents atelectasis. The other infiltrates are suspicious for pneumonia or multifocal pneumonitis. Commonly reported imaging features of COVID-19 pneumonia are present. Other processes such as influenza pneumonia and  organizing pneumonia can cause a  similar imaging pattern.      Impression:      Bilateral multifocal groundglass infiltrates as well as platelike atelectasis in the lower lung fields, all new since the previous examination. Underlying emphysema. No evidence of pulmonary embolism.    Signer Name: Zachery Cano MD   Signed: 12/20/2022 10:42 PM   Workstation Name: [id number]    [facility]    CT Abdomen Pelvis With Contrast [[id number]] Collected: 12/20/22 2236     Updated: 12/20/22 2238    Narrative:      CT Abdomen Pelvis W    INDICATION:   Elevated liver enzymes    TECHNIQUE:   CT of the abdomen and pelvis with 100 cc of Isovue-300 IV contrast. Coronal and sagittal reconstructions were obtained.  Radiation dose reduction techniques included automated exposure control or exposure modulation based on body size. Count of known CT  and cardiac nuc med studies performed in previous 12 months: 4.     COMPARISON:   11/15/2022    FINDINGS:  Abdomen: There is moderate platelike atelectasis at both lung bases with nonspecific patchy groundglass infiltrates noted as well. This is new since the previous scan in November.    There is a moderate volume of ascites, new since the previous examination. The liver demonstrates irregular patchy enhancement throughout, new since the previous examination. Fatty infiltration seen on the previous scan is again noted. The portal and  hepatic veins are patent. The spleen and pancreas are unremarkable. The kidneys and adrenals are unremarkable. There is diffuse mucosal thickening seen in the transverse colon with mildly thickened small bowel folds. No evidence of bowel obstruction. No  evidence of retroperitoneal adenopathy.    Pelvis: No evidence pelvic adenopathy or mass.      Impression:      Markedly abnormal appearance to the liver, dramatically changed from the previous CT in November. There is patchy abnormal irregular enhancement suggesting diffuse hepatocellular disease. This is accompanied  by a moderate volume of ascites that is also  new. Mucosal edema in the colon and small bowel is noted. This may be on the basis of liver disease and hypoalbuminemia. Acute colitis/enteritis is not excluded. This is accompanied by bibasilar atelectasis and nonspecific patchy infiltrates at both lung  bases.          Signer Name: Zachery Cano MD   Signed: 12/20/2022 10:36 PM   Workstation Name: [id number]    [facility]    MRI Lumbar Spine Without Contrast [[id number]] Collected: 12/20/22 2219     Updated: 12/20/22 2221    Narrative:      MRI Spine Lumbar WO    HISTORY:  Swelling in lower extremities. Left leg weakness and pneumonia.    TECHNIQUE:  Multiplanar multisequence imaging of the lumbar spine acquired without IV contrast utilizing a standard protocol.    COMPARISON: Earlier CT lumbar spine today    FINDINGS:    For the purposes of this dictation is presumed to be 5 lumbar-type vertebra with the last fully formed disc space representing L5-S1.    Sagittal alignment is normal. The intervertebral discs are mildly desiccated in general. There is severe loss of disc height at L3-4 and L5-S1 with mild endplate spondylosis. There is mild marrow edema at L3-4 posterior endplate and moderate marrow edema  at L5-S1 endplates. The conus medullaris terminates at L2 and is normal.    Level by level:    L1-2: No significant abnormality.    L2-3: Mild bilateral facet degenerative change. No canal or foraminal compromise.    L3-4: Mild bilateral facet degenerative change. There is a concentric disc bulge with a superimposed broad posterior extrusion extending above and below the level the disc. It measures about 11 mm SI dimension by 4 mm AP dimension and is broad in the ML  dimension. Its most prominent in a right subarticular location and extends into the left greater than right foramen. There is approximately moderate left and mild right foraminal narrowing. There is approximately mild to  moderate canal stenosis with mass  effect on right greater than left lateral recess.    L4-5: Mild bilateral facet degenerative change and ligamentum flavum thickening. Broad-based posterior protrusion. No canal stenosis. Mild bilateral foraminal narrowing. Disc material abuts the exiting roots within the foramina.    L5-S1: There is mild facet degenerative change. There is a concentric disc bulge with a superimposed broad posterior extrusion that extends above and below the level of the disc. It measures about 12 mm SI dimension by 6 mm AP dimension and is broad in  the ML dimension, about 16 mm. It is asymmetrically larger to the left than right of midline and contributes to moderate canal stenosis. There is mass effect on left greater than right lateral recess. This correlate for left S1 radicular symptoms. Disc  material also extends into the foramina with mild right and at least moderate left foraminal/extraforaminal impingement.    OTHER: Unremarkable      Impression:      1.  Bard degenerative changes are described in detail above most significant appearing radiographically at L5-S1 and L3-4. There is approximately moderate canal stenosis at L5-S1 and mild to moderate canal stenosis at L3-4. Please refer to the level by  level description of lateral recess and foraminal compromise and correlate with radicular symptoms.    Signer Name: Dejah Krishna MD   Signed: 12/20/2022 10:19 PM   Workstation Name: [email or web address]    [facility]    MRI Brain Without Contrast [[id number]] Collected: 12/20/22 2211     Updated: 12/20/22 2213    Narrative:      MRI Brain WO    INDICATION:   Left leg weakness    TECHNIQUE:   MRI of the brain without IV contrast.    COMPARISON:    None available.    FINDINGS:  There is no evidence of recent infarct on diffusion-weighted imaging. Routine brain images show scattered foci of bright FLAIR signal around the ventricles bilaterally to a mild degree. Differential  diagnosis includes chronic deep white matter ischemic  change as well as a sequela of previous infection, inflammation or trauma. The appearance is atypical for demyelinating disease. There is no evidence of mass lesion, hemorrhage or edema. The temporal lobes are symmetric. The extra-axial structures are  remarkable for mastoid air cell opacification bilaterally.      Impression:      Nonspecific periventricular white matter lesions are seen to a mild extent bilaterally. This most likely represents mild chronic deep white matter ischemic change. Chronic inflammatory changes are seen in the mastoid air cells bilaterally. Otherwise  negative.    Signer Name: Zachery Cano MD   Signed: 12/20/2022 10:11 PM   Workstation Name: [id number]    [facility] System    CT Lumbar Spine Without Contrast [[id number]] Collected: 12/20/22 1751     Updated: 12/20/22 1753    Narrative:      CT Spine Lumbar WO    INDICATION:    Left leg numbness    TECHNIQUE:   CT of the lumbar spine without IV contrast. Coronal and sagittal reconstructions were obtained.  Radiation dose reduction techniques included automated exposure control or exposure modulation based on body size. Count of known CT and cardiac nuc med  studies performed in previous 12 months: 2.     COMPARISON:    CT abdomen and pelvis 11/15/2022.    FINDINGS:  Lumbosacral transitional anatomy with lumbarized S1. Vertebral body height and alignment are maintained. No acute fracture or traumatic subluxation. Multilevel disc degeneration most pronounced at L3-4 and L5-S1.    At L3-4, diffuse disc bulge and ligamentum flavum degeneration results in mild spinal canal stenosis and mild bilateral neural foraminal narrowing.  At L5-S1, diffuse disc bulge and ligamentum flavum degeneration results in mild-to-moderate spinal canal stenosis and moderate left and mild right neural foraminal narrowing.    Mild disc degeneration at additional levels without significant spinal  canal stenosis or high-grade neural foraminal narrowing. Evaluation of intraspinal contents is limited on CT.    Partially visualized intra-abdominal ascites, new compared to CT abdomen 11/15/2022.      Impression:      1.  No acute fracture or chronic subluxation.  2.  Multilevel lumbar spondylosis as described.  3.  Partially visualized intra-abdominal ascites, new compared to recent CT abdomen 11/15/2022.    Signer Name: Tra Lewis MD   Signed: 12/20/2022 5:51 PM   Workstation Name[id number]    Radiology [facility]    CT Head Without Contrast [[id number]] Collected: 12/20/22 1742     Updated: 12/20/22 1744    Narrative:      CT Head WO    HISTORY: left leg weakness    COMPARISON: CT brain 8/24/2022    TECHNIQUE: CT of the brain without IV contrast. Coronal and sagittal reconstructions were obtained.  Radiation dose reduction techniques included automated exposure control or exposure modulation based on body size. Count of known CT and cardiac nuc med  studies performed in previous 12 months: 2.     Time of Scan: 5:29 PM    FINDINGS:    No acute intracranial hemorrhage, mass effect, midline shift, or hydrocephalus.     Gray-white matter differentiation is within normal limits.     Ventricles and cortical sulci are mildly prominent compatible with cerebral volume loss, greater than expected for patient's age. Basal cisterns are clear.     Calvarium is intact.     Visualized paranasal sinuses are clear. Visualized mastoid air cells are clear.      Impression:        1.  No acute intracranial abnormality.  2.  Cerebral volume loss, greater than expected for patient's age.    Signer Name: Tra Lewis MD   Signed: 12/20/2022 5:42 PM   Workstation Name: [id number]    Radiology [facility]    XR Chest AP [[id number]] Collected: 12/20/22 1639     Updated: 12/20/22 1644    Narrative:      EXAM:    XR Chest, 1 View     EXAM DATE:    12/20/2022 4:23 PM     CLINICAL HISTORY:    dyspnea      TECHNIQUE:    Frontal view of the chest.     COMPARISON:    08/24/2022     FINDINGS:    Lungs:  Development of left greater than right patchy and interstitial  airspace disease which may represent changes of bibasilar pneumonia.    Pleural space:  Unremarkable.  No pneumothorax.    Heart:  Unremarkable.  No cardiomegaly.    Mediastinum:  Unremarkable.    Bones/joints:  Unremarkable.       Impression:        Development of left greater than right patchy and interstitial basilar  airspace disease which may represent changes of bibasilar pneumonia.     This report was finalized on 12/20/2022 4:39 PM by Dr. Phi Franks MD.           Last echocardiogram:  Results for orders placed during the hospital encounter of 09/22/21    Adult Transthoracic Echo Complete w/ Color, Spectral and Contrast if necessary per protocol    Interpretation Summary  · Left ventricular ejection fraction appears to be 61 - 65%. Left ventricular systolic function is normal.  · Left ventricular diastolic function is consistent with (grade I) impaired relaxation.    I have personally reviewed the above radiology images and read the final radiology report on 12/21/22  ---------------------------------------------------------------------------------------------------------------------  Assessment / Plan     Active Hospital Problems    Diagnosis  POA   • **Pneumonia due to infectious organism, unspecified laterality, unspecified part of lung [J18.9]  Yes       ASSESSMENT/PLAN:  -Bilateral multifocal pneumonia, concerning for CAP, POA  -Septic shock criteria with HR >90, lactate >4, C-RP >2, procalcitonin 1.69 2/2 above, POA  -CT PE protocol with bilateral multifocal groundglass infiltrates as well as platelike atelectasis in lower lung fields  -Initial ABG on room air appears stable.  -Received IV doxycycline and IV Rocephin in ED; will continue on admission.  -Obtain respiratory culture, respiratory panel PCR, and urine S pneumo  antigen.  -Received sepsis bolus (2259 mL) in ED.   -Trend lactate until normalized.  -Pending peripheral blood cultures x2.  -Repeat a.m. CBC and C-RP.     -Liver cirrhosis of unknown etiology  -Moderate volume ascites  -Hyperbilirubinemia  -Hyperammoniemia   -Chronically elevated alkaline phosphatase  -Hypoalbuminemia  -Pseudohypocalcemia 2/2 hypoalbuminemia   -CT abdomen/pelvis reviewed with markedly abnormal appearance of liver dramatically changed from previous CT in November with patchy abnormal irregular enhancement suggesting diffuse hepatocellular disease accompanied by moderate volume ascites.  -Upon arrival labs consisted of normal liver enzymes, elevated bilirubin 3.0 (elevated since 11/2022, highest 6.0), alkaline phosphatase 243, albumin 2.14.  -Possible differentials include liver cirrhosis 2/2 alcohol abuse, hemachromatosis with recent elevated ferritin level (11/18/22 1422), sclerosing cholangitis in setting of history of ulcerative colitis.   -Reports cessation of alcohol use 5 weeks ago; prior to that used to drink 6 pack/day.   -Follows with outpatient GI; scheduled for upper GI 12/22/22.  -Ethanol level undetectable, acetaminophen level 12.6, hepatitis panel 11/15/22 non-reactive.  -Avoid hepatotoxins as able.   -Repeat a.m. CMP.     -Positive urine drug screen  -UDS positive for TCA's; inconsistent with home medication list.   -Denies any current drug use.     -Reported left leg weakness  -Multilevel DDD  -History of TIA/CVA  -MRI brain with nonspecific periventricular white matter lesions noted bilaterally likely representing mild chronic deep white matter ischemic change, chronic inflammatory changes seen in the mastoid air cells bilaterally, otherwise negative.  -MRI lumbar spine with degenerative changes noted in L5-S1 and L3-L4 with moderate canal stenosis at L5-S1 and mild to moderate canal stenosis at L3-L4  -Neurochecks every 4 hours.  -Up with assistance, fall precautions  -PT OT  consults ordered.    -Acute hypokalemia  -Borderline hypomagnesemia  -Received potassium chloride 40 mEq in ED.   -Replacement protocol ordered.   -Repeat a.m. CMP and a.m. magnesium level.     -Chronic macrocytic anemia  -Chronic thrombocytopenia  -Followed with hematology in the past for MASSIMO and B12 deficiency; last follow-up appointment 12/2021; received IV injectafer and later changed to Feraheme and also started on B12 injections.   -Obtain iron, vitamin B12, and folate levels.   -Repeat a.m. CBC.     -Essential hypertension  -Blood pressure currently stable  -Monitor per hospital protocol  -Continue home metoprolol    -GERD  -Johnson's esophagus  -PPI    -Ulcerative colitis  -Supportive care    -Tobacco abuse  -Nicotine patch ordered.      -------------------------------  DVT prophylaxis: SQ Heparin   Activity: Up with assistance, fall precautions  -------------------------------  CODE STATUS:  Level Of Support Discussed With: Patient  Code Status (Patient has no pulse and is not breathing): CPR (Attempt to Resuscitate)  Medical Interventions (Patient has pulse or is breathing): Full Support    High risk secondary to bilateral multifocal pneumonia, septic shock criteria, hepatocellular disease of unknown etiology  -------------------------------  Expected length of stay:  INPATIENT status due to the need for care which can only be reasonably provided in an hospital setting such as aggressive/expedited ancillary services and/or consultation services, the necessity for IV medications, close physician monitoring and/or the possible need for procedures.  In such, I feel patient's risk for adverse outcomes and need for care warrant INPATIENT evaluation and predict the patient's care encounter to likely last beyond 2 midnights.     Disposition: Pending clinical course    Vijaya Amador PA-C  12/21/22  00:08  EST    ---------------------------------------------------------------------------------------------------------------------

## 2022-12-21 NOTE — THERAPY EVALUATION
Acute Care - Physical Therapy Initial Evaluation   [facility]     Patient Name: Jamison Edward  : 1970  MRN: [id number]  Today's Date: 2022   Onset of Illness/Injury or Date of Surgery: 22 (admission date)  Visit Dx:     ICD-10-CM ICD-9-CM   1. Pneumonia due to infectious organism, unspecified laterality, unspecified part of lung  J18.9 486   2. Cirrhosis of liver with ascites, unspecified hepatic cirrhosis type (HCC)  K74.60 571.5    R18.8    3. Lumbar radiculopathy  M54.16 724.4     Patient Active Problem List   Diagnosis   • Precordial chest pain   • Weight loss, abnormal   • Right upper quadrant abdominal pain   • Epigastric pain   • History of bleeding ulcers   • Nausea   • Malaise and fatigue   • Acute gastric ulcer without hemorrhage or perforation   • Poor appetite   • Duodenal ulcer   • Gastroesophageal reflux disease   • Dysphagia   • Iron deficiency anemia   • Malabsorption   • Gastric ulcer without hemorrhage or perforation   • Dystonia   • Peptic ulcer without hemorrhage or perforation   • Gastric outlet obstruction   • Incisional hernia, without obstruction or gangrene   • Incisional hernia   • Chest pain, atypical   • Gastric bezoar   • Essential hypertension   • Cigarette smoker   • Chronic back pain   • Sinus tachycardia   • Tobacco use   • Mild carpal tunnel syndrome   • Orthostatic hypotension   • Palpitations   • Abdominal swelling   • Lower extremity edema   • Pneumonia due to infectious organism, unspecified laterality, unspecified part of lung     Past Medical History:   Diagnosis Date   • Anemia    • Arthritis of back     not sure   • Johnson esophagus    • Cholelithiasis    • COPD (chronic obstructive pulmonary disease) (HCC)    • CTS (carpal tunnel syndrome)    • DDD (degenerative disc disease), thoracic    • Degenerative disc disease, lumbar    • Dystonia    • GERD (gastroesophageal reflux disease)    • GI (gastrointestinal bleed)    • Hypertension    • Irritable  bowel syndrome    • Low back strain    • Lumbosacral disc disease    • Migraines    • Peptic ulcer disease    • Stroke (HCC)     TIA   • TIA (transient ischemic attack)    • Ulcerative colitis (HCC)    • Wears dentures     upper only     Past Surgical History:   Procedure Laterality Date   • ABDOMINAL SURGERY  09/17/2021   • APPENDECTOMY     • CHOLECYSTECTOMY N/A 04/22/2017    Procedure: CHOLECYSTECTOMY LAPAROSCOPIC;  Surgeon: Jaqueline Guaman MD;  Location:  [facility];  Service:    • COLONOSCOPY N/A 05/08/2017    Procedure: COLONOSCOPY  CPTCODE:50491;  Surgeon: Nicho Richey III, MD;  Location:  [facility];  Service:    • CUBITAL TUNNEL RELEASE Left 9/1/2022    Procedure: CUBITAL TUNNEL RELEASE LEFT;  Surgeon: Alec Hough MD;  Location:  [facility];  Service: Orthopedics;  Laterality: Left;   • ENDOSCOPY     • ENDOSCOPY N/A 05/08/2017    Procedure: ESOPHAGOGASTRODUODENOSCOPY WITH BIOPSY  CPTCODE:89896;  Surgeon: Nicho Richey III, MD;  Location:  [facility];  Service:    • ENDOSCOPY N/A 11/03/2017    Procedure: ESOPHAGOGASTRODUODENOSCOPY WITH BIOPSY  CPTCODE: 79501;  Surgeon: Nicho Richey III, MD;  Location:  [facility];  Service:    • ENDOSCOPY N/A 02/20/2018    Procedure: ESOPHAGOGASTRODUODENOSCOPY WITH DILATATION CPT CODE: 63782;  Surgeon: Nicho Richey III, MD;  Location:  [facility];  Service:    • ENDOSCOPY N/A 06/05/2018    Procedure: ESOPHAGOGASTRODUODENOSCOPY WITH BIOPSY CPT CODE: 43970;  Surgeon: Nicho Richey III, MD;  Location:  [facility];  Service: Gastroenterology   • ENDOSCOPY N/A 05/26/2021    Procedure: ESOPHAGOGASTRODUODENOSCOPY WITH BIOPSY;  Surgeon: Julieta Hebert MD;  Location:  [facility];  Service: Gastroenterology;  Laterality: N/A;   • ENDOSCOPY N/A 06/02/2021    Procedure: ESOPHAGOGASTRODUODENOSCOPY WITH BIOPSY;  Surgeon: Julieta Hebert MD;  Location:  [facility];  Service: Gastroenterology;  Laterality: N/A;   • GASTRECTOMY N/A  09/17/2019    Procedure: OPEN VAGOTOMY, ANTRECTOMY,REVISION- Y GASTROJEJUNOSOTOMY;  Surgeon: Herbert Umanzor MD;  Location:  [facility];  Service: General   • UPPER GASTROINTESTINAL ENDOSCOPY     • VENTRAL/INCISIONAL HERNIA REPAIR N/A 12/10/2020    Procedure: INCISIONAL HERNIA REPAIR LAPAROSCOPIC WITH MESH;  Surgeon: Herbert Umanzor MD;  Location:  [facility];  Service: General;  Laterality: N/A;     PT Assessment (last 12 hours)     PT Evaluation and Treatment     Row Name 12/21/22 0925          Physical Therapy Time and Intention    Document Type evaluation  -     Mode of Treatment physical therapy  -     Patient Effort adequate  -     Comment Pt seen for evaluation this date, states he lives at home with wife. He used a walking stick with ambulation outside. He has 5 steps to enter back porch and 2 to enter the front with no HR. Pt does have history of frequent falls  -     Row Name 12/21/22 0925          General Information    Patient Profile Reviewed yes  -     Onset of Illness/Injury or Date of Surgery 12/20/22  admission date  -     Existing Precautions/Restrictions fall  -     Equipment Issued to Patient gait belt  -     Row Name 12/21/22 0925          Living Environment    Current Living Arrangements home  -     Home Accessibility stairs to enter home  -     People in Home spouse  -     Row Name 12/21/22 0925          Home Main Entrance    Number of Stairs, Main Entrance two;five  -     Row Name 12/21/22 0925          Range of Motion Comprehensive    Comment, General Range of Motion A/PROM grossly WFL  -     Row Name 12/21/22 0925          Strength Comprehensive (MMT)    Comment, General Manual Muscle Testing (MMT) Assessment Gross MMT: PF 3 to 4/5, Knee flex 4 to 4+, hip flex 3 to 3+, knee ext 4+ to 5/5, hip abd 4, other planes 5/5  -     Row Name 12/21/22 0925          Bed Mobility    Bed Mobility supine-sit  -     Supine-Sit [place] (Bed Mobility) standby  assist;contact guard  -     Row Name 12/21/22 0925          Transfers    Transfers sit-stand transfer;stand-sit transfer  -     Row Name 12/21/22 0925          Sit-Stand Transfer    Sit-Stand [place] (Transfers) standby assist;contact guard  -     Assistive Device (Sit-Stand Transfers) walker, front-wheeled  -     Row Name 12/21/22 0925          Stand-Sit Transfer    Stand-Sit [place] (Transfers) standby assist;contact guard  -     Assistive Device (Stand-Sit Transfers) walker, front-wheeled  -     Row Name 12/21/22 0925          Gait/Stairs (Locomotion)    Gait/Stairs Locomotion gait/ambulation assistive device  -     [place] Level (Gait) contact guard;standby assist  -     Assistive Device (Gait) walker, front-wheeled  -     Distance in Feet (Gait) Grossly 30'  -     Row Name 12/21/22 0925          Balance    Balance Assessment standing static balance  -     Static Standing Balance standby assist;contact guard  -     Row Name 12/21/22 0925          Plan of Care Review    Outcome Evaluation Pt seen for PT evaluation with good mobility, pt does present as fall risk. D/C rec for home with assist, supervision, home health to work on balance/ambulation, strength PRN.  -     Row Name 12/21/22 0925          Positioning and Restraints    Pre-Treatment Position in bed  -     Post Treatment Position chair  -     In Chair sitting;with family/caregiver;notified nsg  -     Row Name 12/21/22 0925          Therapy Assessment/Plan (PT)    Functional Level at Time of Evaluation (PT) SBA/CGA grossly  -     PT Diagnosis (PT) Fall risk, mild balance deficit/impairment  -     Rehab Potential (PT) --  fair/good  -     Criteria for Skilled Interventions Met (PT) yes  -     Therapy Frequency (PT) 2 times/wk  2-5x/wk  -     Predicted Duration of Therapy Intervention (PT) length of stay  -     Row Name 12/21/22 0925          Physical Therapy Goals    Gait Training Goal Selection  (PT) gait training, PT goal 1  -     Row Name 12/21/22 0925          Gait Training Goal 1 (PT)    Activity/Assistive Device (Gait Training Goal 1, PT) gait (walking locomotion);assistive device use;walker, rolling;cane, quad  -     [place] Level (Gait Training Goal 1, PT) modified independence  -     Distance (Gait Training Goal 1, PT) 50'  -     Time Frame (Gait Training Goal 1, PT) long term goal (LTG)  -           User Key  (r) = Recorded By, (t) = Taken By, (c) = Cosigned By    Initials Name Provider Type    Sol Diane, PT Physical Therapist                  PT Recommendation and Plan  Anticipated Discharge Disposition (PT): home with supervision, home with assist, home with home health  Planned Therapy Interventions (PT): gait training, balance training, strengthening, transfer training  Therapy Frequency (PT): 2 times/wk (2-5x/wk)  Outcome Evaluation: Pt seen for PT evaluation with good mobility, pt does present as fall risk. D/C rec for home with assist, supervision, home health to work on balance/ambulation, strength PRN.       Time Calculation:    PT Charges     Row Name 12/21/22 1606             Time Calculation    Start Time 0935  -      PT Received On 12/21/22  -      PT Goal Re-Cert Due Date 01/04/23  -            User Key  (r) = Recorded By, (t) = Taken By, (c) = Cosigned By    Initials Name Provider Type    Sol Diane PT Physical Therapist              Therapy Charges for Today     Code Description Service Date Service Provider Modifiers Qty    54642210691 HC PT EVAL LOW COMPLEXITY 4 12/21/2022 Sol Wallis, PT GP 1          PT G-Codes  AM-PAC 6 Clicks Score (PT): 15    Sol Wallis, PT  12/21/2022

## 2022-12-21 NOTE — PLAN OF CARE
Goal Outcome Evaluation:  Plan of Care Reviewed With: patient           Outcome Evaluation: Pt has been anxious most of the shift. Wife at bedside. Pt has had some confusion but is A&O.  Worked with PT today. Will continue plan of care.

## 2022-12-21 NOTE — PLAN OF CARE
Goal Outcome Evaluation:  Plan of Care Reviewed With: patient        Progress: no change  Outcome Evaluation: Pt admitted from ED in stable condition. VSS on RA. Pt oriented to unit, denies needs at this time. Pt provided with urinal for urine sample and sputum collection container for sputum sample. Will continue to follow plan of care.

## 2022-12-21 NOTE — ED PROVIDER NOTES
Subjective   History of Present Illness    Review of Systems    Past Medical History:   Diagnosis Date   • Anemia    • Arthritis of back     not sure   • Johnson esophagus    • Cholelithiasis    • COPD (chronic obstructive pulmonary disease) ([facility])    • CTS (carpal tunnel syndrome)    • DDD (degenerative disc disease), thoracic    • Degenerative disc disease, lumbar    • Dystonia    • GERD (gastroesophageal reflux disease)    • GI (gastrointestinal bleed)    • Hypertension    • Irritable bowel syndrome    • Low back strain    • Lumbosacral disc disease    • Migraines    • Peptic ulcer disease    • Stroke (HCC)     TIA   • TIA (transient ischemic attack)    • Ulcerative colitis (HCC)    • Wears dentures     upper only       Allergies   Allergen Reactions   • Contrast Dye Other (See Comments)     Shortness of breath   • Prilosec [Omeprazole] Other (See Comments)     Chest pain  Pt states he can take pepcid with no problem   • Protonix [Pantoprazole Sodium] Palpitations     Patient states that protonix causes chest pain.  Shruthi Rina Pérez, [facility] ED  4/23/2017  11:19 AM  Pt states he can tolerate pepcid with no problem     • Dilantin [Phenytoin] Delirium   • Dilaudid [Hydromorphone] Hallucinations   • Flagyl [Metronidazole] Nausea And Vomiting   • Topamax [Topiramate] Anxiety       Past Surgical History:   Procedure Laterality Date   • ABDOMINAL SURGERY  09/17/2021   • APPENDECTOMY     • CHOLECYSTECTOMY N/A 04/22/2017    Procedure: CHOLECYSTECTOMY LAPAROSCOPIC;  Surgeon: Jaqueline Guaman MD;  Location: [facility];  Service:    • COLONOSCOPY N/A 05/08/2017    Procedure: COLONOSCOPY  CPTCODE:63128;  Surgeon: Nicho Richey III, MD;  Location: [facility] OR;  Service:    • CUBITAL TUNNEL RELEASE Left 9/1/2022    Procedure: CUBITAL TUNNEL RELEASE LEFT;  Surgeon: Alec Hough MD;  Location: [facility];  Service: Orthopedics;  Laterality: Left;   • ENDOSCOPY     • ENDOSCOPY N/A 05/08/2017    Procedure:  ESOPHAGOGASTRODUODENOSCOPY WITH BIOPSY  CPTCODE:46864;  Surgeon: Nicho Richey III, MD;  [facility];  Service:    • ENDOSCOPY N/A 11/03/2017    Procedure: ESOPHAGOGASTRODUODENOSCOPY WITH BIOPSY  CPTCODE: 85511;  Surgeon: Nicho Richey III, MD;  [facility];  Service:    • ENDOSCOPY N/A 02/20/2018    Procedure: ESOPHAGOGASTRODUODENOSCOPY WITH DILATATION CPT CODE: 41419;  Surgeon: Nicho Richey III, MD;  [facility];  Service:    • ENDOSCOPY N/A 06/05/2018    Procedure: ESOPHAGOGASTRODUODENOSCOPY WITH BIOPSY CPT CODE: 55680;  Surgeon: Nicho Richey III, MD;  [facility];  Service: Gastroenterology   • ENDOSCOPY N/A 05/26/2021    Procedure: ESOPHAGOGASTRODUODENOSCOPY WITH BIOPSY;  Surgeon: Julieta Hebert MD;  [facility];  Service: Gastroenterology;  Laterality: N/A;   • ENDOSCOPY N/A 06/02/2021    Procedure: ESOPHAGOGASTRODUODENOSCOPY WITH BIOPSY;  Surgeon: Julieta Hebert MD;  [facility];  Service: Gastroenterology;  Laterality: N/A;   • GASTRECTOMY N/A 09/17/2019    Procedure: OPEN VAGOTOMY, ANTRECTOMY,REVISION- Y GASTROJEJUNOSOTOMY;  Surgeon: Herbert Umanzor MD;  Location:  [facility];  Service: General   • UPPER GASTROINTESTINAL ENDOSCOPY     • VENTRAL/INCISIONAL HERNIA REPAIR N/A 12/10/2020    Procedure: INCISIONAL HERNIA REPAIR LAPAROSCOPIC WITH MESH;  Surgeon: Herbert Umanzor MD;  Location:  [facility];  Service: General;  Laterality: N/A;       Family History   Problem Relation Age of Onset   • Osteoporosis Mother    • Hypertension Father    • Osteoporosis Father    • No Known Problems Sister    • No Known Problems Brother    • Drug abuse Brother    • No Known Problems Daughter    • Diabetes Sister        Social History     Socioeconomic History   • Marital status:    Tobacco Use   • Smoking status: Every Day     Packs/day: 1.00     Years: 35.00     Pack years: 35.00     Types: Cigarettes     Last attempt  to quit: 2020     Years since quittin.3   • Smokeless tobacco: Never   • Tobacco comments:     Been smoking 20 years   Vaping Use   • Vaping Use: Never used   Substance and Sexual Activity   • Alcohol use: Yes     Alcohol/week: 4.0 standard drinks     Types: 4 Cans of beer per week   • Drug use: No   • Sexual activity: Yes     Partners: Female     Birth control/protection: None           Objective   Physical Exam    Procedures           ED Course  ED Course as of 22 2347   Tue Dec 20, 2022   1826 Mr. Swift is a 52-year-old male w.[place] for (L) sciatica and chronic lower back pain presenting to the emergency department from primary care's office due to swelling of the abdomen and lower extremity as well as LLE weakness (daughter reports he drags his left leg). Patient reports symptom onset a day and a half prior. LLE weakness, likely radicular in nature however out of window for tpa/thrombectomy regardless. Patient ambulatory prior to arrival.  Patient has no focal neurological deficits on exam. Workup including Basic labs, ammonia, BNP, troponin are remarkable thus far for lactic acid 3.2, normal BNP, creatinine of .71, normal liver enzymes, bilirubin of 3 (baseline). Urine pending. CT head, CT lumbar spine non actionable, new ascites. Not enough to tap, however patient reports non-abdominal tenderness. CXR shows possible pneumonia. Of Note patient reports he was seen by cardiologist over the last few weeks and informed swelling was not 2/2 to heart. Concern likely liver given alcoholism history and chronically elevated bilirubin. Urine pending and repeat lactic acid. LLE weakness likely 2/2 radiculopathy given chronic degenerative changes of spine. Does not warrant emergent MRI as patient has no red flag findings. No urinary retention, no fecal incontinence, no saddle anesthesia and ambulatory on arrival. Patient care handed off to [place].  [LS]    I assumed patient's care from Dr. Khan at shift  change please see her documentation above.  Patient seems to be doing well, he is in no apparent acute distress is alert and well-oriented.  Lungs are clear to auscultation throughout.  Respirations are unlabored abdomen is soft and nontender to palpation throughout.  Patient does not appear to be significantly edematous.  There is no objective focal numbness or weakness.  There is a negative straight leg raise test bilaterally.  Patient's repeat lactic acid level went up.  I have ordered the rest of his sepsis bolus as well as IV antibiotics.  I have ordered MRI of the brain and lumbar spine.  Currently heart rate is 105, pulse ox is 98% on room air, blood pressure is 130/79. [CM]   2037 Hospitalist paged for admission [CM]   2050 Case discussed with Dr. Mullins he advises add CT of the chest abdomen pelvis.  We will discuss the case further when all of the studies that I have added have resulted. [CM]   2055 Dr. Mullins had advised contrasted CTs of the chest abdomen pelvis.  Patient has an allergy listed to contrast dye.  He states that when he was 11 years old, 41 years ago, he had IV contrast and felt short of breath.  We discussed this, that today's IV contrast is totally different and that it is likely that he would not have a reaction to this.  He wishes to proceed with the IV contrast versus doing noncontrasted studies.  I will pretreat with Benadryl, Solu-Medrol, Pepcid [CM]   2320 The remainder of the patient's studies are all resulted now.  I have notified Dr. Mullins of this.  Patient reports that he quit drinking 5 weeks ago, has had no alcohol since [CM]   2346 Patient is doing well, appears comfortable.  Currently heart rate is 108, pulse ox is 100% on room air, blood pressure is 132/81.  Case discussed once again with Dr. Mullins.  He is admitting patient to the hospitalist service.  Electronically signed by Alexandre Houston MD, 12/20/22, 11:46 PM EST.   [CM]      ED Course User Index  [CM]  Alexandre Houston MD  [LS] Bella Khan MD                                           [facility]    Final diagnoses:   Pneumonia due to infectious organism, unspecified laterality, unspecified part of lung   Cirrhosis of liver with ascites, unspecified hepatic cirrhosis type (HCC)   Lumbar radiculopathy       ED Disposition  ED Disposition     ED Disposition   Decision to Admit    Condition   --    Comment   --                      Alexandre Houston MD  12/21/22 0158

## 2022-12-21 NOTE — THERAPY EVALUATION
Patient Name: Jamison Edward  : 1970    MRN: [id number]                              Today's Date: 2022       Admit Date: 2022    Visit Dx:     ICD-10-CM ICD-9-CM   1. Pneumonia due to infectious organism, unspecified laterality, unspecified part of lung  J18.9 486   2. Cirrhosis of liver with ascites, unspecified hepatic cirrhosis type (HCC)  K74.60 571.5    R18.8    3. Lumbar radiculopathy  M54.16 724.4     Patient Active Problem List   Diagnosis   • Precordial chest pain   • Weight loss, abnormal   • Right upper quadrant abdominal pain   • Epigastric pain   • History of bleeding ulcers   • Nausea   • Malaise and fatigue   • Acute gastric ulcer without hemorrhage or perforation   • Poor appetite   • Duodenal ulcer   • Gastroesophageal reflux disease   • Dysphagia   • Iron deficiency anemia   • Malabsorption   • Gastric ulcer without hemorrhage or perforation   • Dystonia   • Peptic ulcer without hemorrhage or perforation   • Gastric outlet obstruction   • Incisional hernia, without obstruction or gangrene   • Incisional hernia   • Chest pain, atypical   • Gastric bezoar   • Essential hypertension   • Cigarette smoker   • Chronic back pain   • Sinus tachycardia   • Tobacco use   • Mild carpal tunnel syndrome   • Orthostatic hypotension   • Palpitations   • Abdominal swelling   • Lower extremity edema   • Pneumonia due to infectious organism, unspecified laterality, unspecified part of lung     Past Medical History:   Diagnosis Date   • Anemia    • Arthritis of back     not sure   • Johnson esophagus    • Cholelithiasis    • COPD (chronic obstructive pulmonary disease) (HCC)    • CTS (carpal tunnel syndrome)    • DDD (degenerative disc disease), thoracic    • Degenerative disc disease, lumbar    • Dystonia    • GERD (gastroesophageal reflux disease)    • GI (gastrointestinal bleed)    • Hypertension    • Irritable bowel syndrome    • Low back strain    • Lumbosacral disc disease    •  Migraines    • Peptic ulcer disease    • Stroke (HCC)     TIA   • TIA (transient ischemic attack)    • Ulcerative colitis (HCC)    • Wears dentures     upper only     Past Surgical History:   Procedure Laterality Date   • ABDOMINAL SURGERY  09/17/2021   • APPENDECTOMY     • CHOLECYSTECTOMY N/A 04/22/2017    Procedure: CHOLECYSTECTOMY LAPAROSCOPIC;  Surgeon: Jaqueline Guaman MD;  [facility];  Service:    • COLONOSCOPY N/A 05/08/2017    Procedure: COLONOSCOPY  CPTCODE:48874;  Surgeon: Nicho Richey III, MD;  [facility];  Service:    • CUBITAL TUNNEL RELEASE Left 9/1/2022    Procedure: CUBITAL TUNNEL RELEASE LEFT;  Surgeon: Alec Hough MD;  [facility];  Service: Orthopedics;  Laterality: Left;   • ENDOSCOPY     • ENDOSCOPY N/A 05/08/2017    Procedure: ESOPHAGOGASTRODUODENOSCOPY WITH BIOPSY  CPTCODE:34745;  Surgeon: Nicho Richey III, MD;  [facility];  Service:    • ENDOSCOPY N/A 11/03/2017    Procedure: ESOPHAGOGASTRODUODENOSCOPY WITH BIOPSY  CPTCODE: 27919;  Surgeon: Nicho Richey III, MD;  [facility];  Service:    • ENDOSCOPY N/A 02/20/2018    Procedure: ESOPHAGOGASTRODUODENOSCOPY WITH DILATATION CPT CODE: 68805;  Surgeon: Nicho Richey III, MD;  [facility];  Service:    • ENDOSCOPY N/A 06/05/2018    Procedure: ESOPHAGOGASTRODUODENOSCOPY WITH BIOPSY CPT CODE: 99842;  Surgeon: Nicho Richey III, MD;  [facility];  Service: Gastroenterology   • ENDOSCOPY N/A 05/26/2021    Procedure: ESOPHAGOGASTRODUODENOSCOPY WITH BIOPSY;  Surgeon: Julieta Hebert MD;  [facility];  Service: Gastroenterology;  Laterality: N/A;   • ENDOSCOPY N/A 06/02/2021    Procedure: ESOPHAGOGASTRODUODENOSCOPY WITH BIOPSY;  Surgeon: Julieta Hebert MD;  [facility];  Service: Gastroenterology;  Laterality: N/A;   • GASTRECTOMY N/A 09/17/2019    Procedure: OPEN VAGOTOMY, ANTRECTOMY,REVISION- Y  GASTROJEJUNOSOTOMY;  Surgeon: Herbert Umanzor MD;  Location:  [facility];  Service: General   • UPPER GASTROINTESTINAL ENDOSCOPY     • VENTRAL/INCISIONAL HERNIA REPAIR N/A 12/10/2020    Procedure: INCISIONAL HERNIA REPAIR LAPAROSCOPIC WITH MESH;  Surgeon: Herbert Umanzor MD;  Location:  [facility];  Service: General;  Laterality: N/A;      General Information     Row Name 12/21/22 1248          OT Time and Intention    Document Type evaluation  -LA     Mode of Treatment occupational therapy  -LA     Row Name 12/21/22 1248          General Information    Patient Profile Reviewed yes  -LA     Prior Level of Function min assist:;ADL's  Patient reported that spouse did assist him with dressing/bathing tasks before hospitalization. Patient does have walking stick/cane but didn't typically use for home mobility.  -LA     Existing Precautions/Restrictions fall  -LA     Barriers to Rehab previous functional deficit;cognitive status  Confusion noted during visit. nursing also reported that patient attempted to get OOB multiple times without assist.  -LA     Row Name 12/21/22 1248          Occupational Profile    Reason for Services/Referral (Occupational Profile) OT to assess for changes in level of independence/safety with ADLs.  -LA     Patient Goals (Occupational Profile) Return home with spouse  -LA     Row Name 12/21/22 1248          Living Environment    People in Home spouse  -LA     Row Name 12/21/22 1248          Home Main Entrance    Number of Stairs, Main Entrance two;five  5 steps to back of home and 2 to front of home. No handrail  -LA     Stair Railings, Main Entrance none  -LA     Row Name 12/21/22 1248          Stairs Within Home, Primary    Stair Railings, Within Home, Primary none  -LA     Row Name 12/21/22 1248          Cognition    Orientation Status (Cognition) oriented x 3  -LA     Row Name 12/21/22 1248          Safety Issues, Functional Mobility    Safety Issues Affecting Function (Mobility) safety  precaution awareness;safety precautions follow-through/compliance;insight into deficits/self-awareness  -LA     Impairments Affecting Function (Mobility) balance;coordination;endurance/activity tolerance;strength  -LA           User Key  (r) = Recorded By, (t) = Taken By, (c) = Cosigned By    Initials Name Provider Type    Brandie Ji OT Occupational Therapist                 Mobility/ADL's     Row Name 12/21/22 1254          Bed Mobility    Bed Mobility bed mobility (all) activities  -LA     All Activities, Tobias (Bed Mobility) minimum assist (75% patient effort)  -LA     Row Name 12/21/22 1254          Transfers    Transfers bed-chair transfer;toilet transfer  -LA     Row Name 12/21/22 1254          Bed-Chair Transfer    Bed-Chair Tobias (Transfers) minimum assist (75% patient effort)  -LA     Assistive Device (Bed-Chair Transfers) walker, front-wheeled  -LA     Row Name 12/21/22 1254          Toilet Transfer    Type (Toilet Transfer) stand pivot/stand step  -LA     [place] Level (Toilet Transfer) minimum assist (75% patient effort)  -LA     Assistive Device (Toilet Transfer) commode  -[facility] Name 12/21/22 1254          Functional Mobility    Functional Mobility- Ind. Level minimum assist (75% patient effort)  -LA     Functional Mobility- Device walker, front-wheeled  -[facility] Name 12/21/22 1254          Activities of Daily Living    BADL Assessment/Intervention bathing;upper body dressing;lower body dressing;grooming;feeding;toileting  -LA     Row Name 12/21/22 1254          Bathing Assessment/Intervention    [place] Level (Bathing) maximum assist (25% patient effort)  -LA     Row Name 12/21/22 1254          Upper Body Dressing Assessment/Training    [place] Level (Upper Body Dressing) minimum assist (75% patient effort)  -LA     Row Name 12/21/22 1254          Lower Body Dressing Assessment/Training    [place] Level (Lower Body Dressing) maximum assist (25% patient  effort)  -[facility] Name 12/21/22 1254          Grooming Assessment/Training    [place] Level (Grooming) minimum assist (75% patient effort)  -[facility] Name 12/21/22 1254          Self-Feeding Assessment/Training    [place] Level (Feeding) set up  -LA     Row Name 12/21/22 1254          Toileting Assessment/Training    [place] Level (Toileting) minimum assist (75% patient effort);moderate assist (50% patient effort)  -LA           User Key  (r) = Recorded By, (t) = Taken By, (c) = Cosigned By    Initials Name Provider Type    Brandie Ji OT Occupational Therapist               Obj/Interventions     Row Name 12/21/22 1255          Sensory Assessment (Somatosensory)    Sensory Assessment (Somatosensory) right UE;left UE  -LA     Left UE Sensory Assessment impaired  -LA     Right UE Sensory Assessment impaired  -LA     Sensory Subjective Reports numbness;tingling  Patient reports numbness/tingling BUE constant. Decreased sensation reported prior to hospitalization. Sciatic pain also reported down left leg  -LA     Row Name 12/21/22 1255          Vision Assessment/Intervention    Visual Impairment/Limitations WFL  -LA     Row Name 12/21/22 1255          Range of Motion Comprehensive    General Range of Motion no range of motion deficits identified  -[facility] 12/21/22 1255          Strength Comprehensive (MMT)    General Manual Muscle Testing (MMT) Assessment upper extremity strength deficits identified  -LA     Comment, General Manual Muscle Testing (MMT) Assessment BUE grossly 3+/5  -LA     Row Name 12/21/22 1255          Upper Extremity (Manual Muscle Testing)    Upper Extremity: Manual Muscle Testing (MMT) left shoulder strength deficit;right shoulder strength deficit  -[facility] 12/21/22 1255          Motor Skills    Motor Skills coordination;functional endurance  -LA     Coordination fine motor deficit;upper extremity;bilateral;minimal impairment  -LA     Functional Endurance  fair  -LA     Row Name 12/21/22 1255          Balance    Balance Assessment sitting static balance;sitting dynamic balance  -LA     Static Sitting Balance standby assist  -LA     Dynamic Sitting Balance minimal assist;moderate assist  -LA           User Key  (r) = Recorded By, (t) = Taken By, (c) = Cosigned By    Initials Name Provider Type    Brandie Ji OT Occupational Therapist               Goals/Plan     Row Name 12/21/22 1306          Transfer Goal 1 (OT)    Activity/Assistive Device (Transfer Goal 1, OT) bed-to-chair/chair-to-bed  -LA     [place] Level/Cues Needed (Transfer Goal 1, OT) modified independence  -LA     Time Frame (Transfer Goal 1, OT) by discharge  -LA     Row Name 12/21/22 1306          Dressing Goal 1 (OT)    Activity/Device (Dressing Goal 1, OT) lower body dressing  -LA     [place]/Cues Needed (Dressing Goal 1, OT) minimum assist (75% or more patient effort)  -LA     Time Frame (Dressing Goal 1, OT) by discharge  -LA     Row Name 12/21/22 1306          Strength Goal 1 (OT)    Strength Goal 1 (OT) Increases UB strength to 4/5 to promote increased safeyt and independence with ADLs.  -LA     Time Frame (Strength Goal 1, OT) by discharge  -LA     Row Name 12/21/22 1306          Therapy Assessment/Plan (OT)    Planned Therapy Interventions (OT) activity tolerance training;patient/caregiver education/training;adaptive equipment training;BADL retraining;ROM/therapeutic exercise;occupation/activity based interventions;strengthening exercise;transfer/mobility retraining  -LA           User Key  (r) = Recorded By, (t) = Taken By, (c) = Cosigned By    Initials Name Provider Type    Brandie Ji OT Occupational Therapist               Clinical Impression     Row Name 12/21/22 [facility]          Plan of Care Review    Outcome Evaluation OT evaluation completed this date. patient with noted decline in safety/independence with ADLs and fx mobility. Patient would benefit from skilled OT  services to address deficits. OT recommended d/c disposition is home with home health and 24/7 assist.  -LA     Row Name 12/21/22 1257          Therapy Assessment/Plan (OT)    Patient/Family Therapy Goal Statement (OT) Patient reports he would like to return home with spouse.  -LA     Rehab Potential (OT) good, to achieve stated therapy goals  -LA     Criteria for Skilled Therapeutic Interventions Met (OT) meets criteria;skilled treatment is necessary;yes  -LA     Therapy Frequency (OT) other (see comments)  PRN to follow for changes/progress toward goals.  -LA     Row Name 12/21/22 1257          Therapy Plan Review/Discharge Plan (OT)    Equipment Needs Upon Discharge (OT) walker, rolling;commode chair  -LA     Anticipated Discharge Disposition (OT) home with 24/7 care;home with home health;home with assist  -LA     Row Name 12/21/22 1257          Positioning and Restraints    Pre-Treatment Position in bed  -LA     Post Treatment Position chair  -LA     In Chair notified nsg;with family/caregiver;sitting;call light within reach  -LA           User Key  (r) = Recorded By, (t) = Taken By, (c) = Cosigned By    Initials Name Provider Type    Brandie Ji, ALICIA Occupational Therapist               Outcome Measures     Row Name 12/21/22 0900          How much help from another person do you currently need...    Turning from your back to your side while in flat bed without using bedrails? 4  -SJ     Moving from lying on back to sitting on the side of a flat bed without bedrails? 2  -SJ     Moving to and from a bed to a chair (including a wheelchair)? 2  -SJ     Standing up from a chair using your arms (e.g., wheelchair, bedside chair)? 3  -SJ     Climbing 3-5 steps with a railing? 2  -SJ     To walk in hospital room? 2  -SJ     AM-PAC 6 Clicks Score (PT) 15  -SJ     Highest level of mobility 4 --> Transferred to chair/commode  -SJ           User Key  (r) = Recorded By, (t) = Taken By, (c) = Cosigned By    Initials Name  Provider Type     Ayana Archibald, RN Registered Nurse                  OT Recommendation and Plan  Planned Therapy Interventions (OT): activity tolerance training, patient/caregiver education/training, adaptive equipment training, BADL retraining, ROM/therapeutic exercise, occupation/activity based interventions, strengthening exercise, transfer/mobility retraining  Therapy Frequency (OT): other (see comments) (PRN to follow for changes/progress toward goals.)  Plan of Care Review  Outcome Evaluation: OT evaluation completed this date. patient with noted decline in safety/independence with ADLs and fx mobility. Patient would benefit from skilled OT services to address deficits. OT recommended d/c disposition is home with home health and 24/7 assist.     Time Calculation:     Therapy Charges for Today     Code Description Service Date Service Provider Modifiers Qty    [id number] HC OT EVAL MOD COMPLEXITY 4 12/21/2022 Brandie Stafford OT GO 1               Brandie Stafford OT  12/21/2022

## 2022-12-22 ENCOUNTER — APPOINTMENT (OUTPATIENT)
Dept: GENERAL RADIOLOGY | Facility: HOSPITAL | Age: 52
End: 2022-12-22

## 2022-12-22 LAB
AMMONIA BLD-SCNC: 65 UMOL/L (ref 16–60)
ANION GAP SERPL CALCULATED.3IONS-SCNC: 12.5 MMOL/L (ref 5–15)
BUN SERPL-MCNC: 9 MG/DL (ref 6–20)
BUN/CREAT SERPL: 16.1 (ref 7–25)
CALCIUM SPEC-SCNC: 8.2 MG/DL (ref 8.6–10.5)
CHLORIDE SERPL-SCNC: 105 MMOL/L (ref 98–107)
CO2 SERPL-SCNC: 17.5 MMOL/L (ref 22–29)
CREAT SERPL-MCNC: 0.56 MG/DL (ref 0.76–1.27)
D-LACTATE SERPL-SCNC: 1.6 MMOL/L (ref 0.5–2)
DEPRECATED RDW RBC AUTO: 60.5 FL (ref 37–54)
EGFRCR SERPLBLD CKD-EPI 2021: 117.1 ML/MIN/1.73
ERYTHROCYTE [DISTWIDTH] IN BLOOD BY AUTOMATED COUNT: 14.1 % (ref 12.3–15.4)
GLUCOSE SERPL-MCNC: 117 MG/DL (ref 65–99)
HCT VFR BLD AUTO: 39.5 % (ref 37.5–51)
HGB BLD-MCNC: 12 G/DL (ref 13–17.7)
MCH RBC QN AUTO: 35.1 PG (ref 26.6–33)
MCHC RBC AUTO-ENTMCNC: 30.4 G/DL (ref 31.5–35.7)
MCV RBC AUTO: 115.5 FL (ref 79–97)
PLATELET # BLD AUTO: 94 10*3/MM3 (ref 140–450)
PMV BLD AUTO: 10.3 FL (ref 6–12)
POTASSIUM SERPL-SCNC: 4.4 MMOL/L (ref 3.5–5.2)
RBC # BLD AUTO: 3.42 10*6/MM3 (ref 4.14–5.8)
S PNEUM AG SPEC QL LA: NEGATIVE
SODIUM SERPL-SCNC: 135 MMOL/L (ref 136–145)
WBC NRBC COR # BLD: 16.56 10*3/MM3 (ref 3.4–10.8)

## 2022-12-22 PROCEDURE — 25010000002 ALBUMIN HUMAN 25% PER 50 ML: Performed by: HOSPITALIST

## 2022-12-22 PROCEDURE — P9047 ALBUMIN (HUMAN), 25%, 50ML: HCPCS | Performed by: HOSPITALIST

## 2022-12-22 PROCEDURE — 25010000002 CEFTRIAXONE PER 250 MG: Performed by: HOSPITALIST

## 2022-12-22 PROCEDURE — 25010000002 HEPARIN (PORCINE) PER 1000 UNITS: Performed by: HOSPITALIST

## 2022-12-22 PROCEDURE — 83605 ASSAY OF LACTIC ACID: CPT | Performed by: HOSPITALIST

## 2022-12-22 PROCEDURE — 82140 ASSAY OF AMMONIA: CPT | Performed by: PHYSICIAN ASSISTANT

## 2022-12-22 PROCEDURE — 85027 COMPLETE CBC AUTOMATED: CPT | Performed by: STUDENT IN AN ORGANIZED HEALTH CARE EDUCATION/TRAINING PROGRAM

## 2022-12-22 PROCEDURE — 94799 UNLISTED PULMONARY SVC/PX: CPT

## 2022-12-22 PROCEDURE — 87040 BLOOD CULTURE FOR BACTERIA: CPT | Performed by: STUDENT IN AN ORGANIZED HEALTH CARE EDUCATION/TRAINING PROGRAM

## 2022-12-22 PROCEDURE — 99232 SBSQ HOSP IP/OBS MODERATE 35: CPT | Performed by: STUDENT IN AN ORGANIZED HEALTH CARE EDUCATION/TRAINING PROGRAM

## 2022-12-22 PROCEDURE — 87899 AGENT NOS ASSAY W/OPTIC: CPT | Performed by: PHYSICIAN ASSISTANT

## 2022-12-22 PROCEDURE — 80048 BASIC METABOLIC PNL TOTAL CA: CPT | Performed by: STUDENT IN AN ORGANIZED HEALTH CARE EDUCATION/TRAINING PROGRAM

## 2022-12-22 RX ORDER — LACTULOSE 10 G/15ML
10 SOLUTION ORAL 2 TIMES DAILY
Status: DISCONTINUED | OUTPATIENT
Start: 2022-12-22 | End: 2022-12-30

## 2022-12-22 RX ORDER — ACETAMINOPHEN 325 MG/1
650 TABLET ORAL 2 TIMES DAILY PRN
Status: DISCONTINUED | OUTPATIENT
Start: 2022-12-22 | End: 2022-12-30

## 2022-12-22 RX ORDER — FAMOTIDINE 20 MG/1
20 TABLET, FILM COATED ORAL
Status: DISCONTINUED | OUTPATIENT
Start: 2022-12-22 | End: 2023-01-05 | Stop reason: HOSPADM

## 2022-12-22 RX ADMIN — DOXYCYCLINE 100 MG: 100 INJECTION, POWDER, LYOPHILIZED, FOR SOLUTION INTRAVENOUS at 09:06

## 2022-12-22 RX ADMIN — PANTOPRAZOLE SODIUM 40 MG: 40 TABLET, DELAYED RELEASE ORAL at 09:06

## 2022-12-22 RX ADMIN — FAMOTIDINE 20 MG: 20 TABLET ORAL at 11:38

## 2022-12-22 RX ADMIN — HEPARIN SODIUM 5000 UNITS: 5000 INJECTION INTRAVENOUS; SUBCUTANEOUS at 09:08

## 2022-12-22 RX ADMIN — LEVETIRACETAM 750 MG: 500 TABLET, FILM COATED ORAL at 06:14

## 2022-12-22 RX ADMIN — METOPROLOL SUCCINATE 25 MG: 25 TABLET, EXTENDED RELEASE ORAL at 09:08

## 2022-12-22 RX ADMIN — LACTULOSE 10 G: 20 SOLUTION ORAL at 21:40

## 2022-12-22 RX ADMIN — ISOSORBIDE MONONITRATE 30 MG: 30 TABLET, EXTENDED RELEASE ORAL at 09:08

## 2022-12-22 RX ADMIN — DOXYCYCLINE 100 MG: 100 INJECTION, POWDER, LYOPHILIZED, FOR SOLUTION INTRAVENOUS at 21:28

## 2022-12-22 RX ADMIN — ACETAMINOPHEN 650 MG: 325 TABLET ORAL at 13:30

## 2022-12-22 RX ADMIN — BACLOFEN 10 MG: 10 TABLET ORAL at 21:28

## 2022-12-22 RX ADMIN — FAMOTIDINE 20 MG: 20 TABLET ORAL at 17:31

## 2022-12-22 RX ADMIN — ALBUMIN (HUMAN) 25 G: 0.25 INJECTION, SOLUTION INTRAVENOUS at 00:04

## 2022-12-22 RX ADMIN — LEVETIRACETAM 750 MG: 500 TABLET, FILM COATED ORAL at 17:31

## 2022-12-22 RX ADMIN — Medication 10 ML: at 09:07

## 2022-12-22 RX ADMIN — QUETIAPINE FUMARATE 50 MG: 25 TABLET, FILM COATED ORAL at 20:20

## 2022-12-22 RX ADMIN — HEPARIN SODIUM 5000 UNITS: 5000 INJECTION INTRAVENOUS; SUBCUTANEOUS at 20:21

## 2022-12-22 RX ADMIN — LEVETIRACETAM 1500 MG: 500 TABLET, FILM COATED ORAL at 20:20

## 2022-12-22 RX ADMIN — CEFTRIAXONE 1 G: 1 INJECTION, POWDER, FOR SOLUTION INTRAMUSCULAR; INTRAVENOUS at 21:28

## 2022-12-22 RX ADMIN — LACTULOSE 10 G: 20 SOLUTION ORAL at 17:32

## 2022-12-22 NOTE — PLAN OF CARE
Goal Outcome Evaluation:  Plan of Care Reviewed With: patient        Progress: no change  Outcome Evaluation: Pt seems to be resting well today, c/o headache, gave tylenol prn, pt is showing an allergy to protonix, dr has prescribed pepcid instead, pt is showing no signs of anxiety per the [facility] protocol, will continue to monitor

## 2022-12-22 NOTE — NURSING NOTE
"Pt refused Albumin IV. He had ABX running and they had completed. I flushed his IV and when I tried to hook up his albumin, he refused it, saying that I told him I had to give him something to \"reverse the damage that I did earlier.\" Pt is confused, I explained that the flush I gave him was just to clear old medicine out of his line so I could give him the albumin. He insisted that normal saline is the equivalent of meth. I told him it was merely salt water, I squirted some in my mouth to show him it wasn't dangerous, but he still refused to take the albumin. I notified SUE Arvizu, who ordered him a one time dose of zyprexa. She said to let the xyprexa take effect then retry the albumin. Awaiting on zyprexa from pharmacy.   "

## 2022-12-22 NOTE — PLAN OF CARE
Goal Outcome Evaluation:  Plan of Care Reviewed With: patient        Progress: no change     CIWA scale added to assessment. Pt confused, fell this shift, and lactic acid increased (see nursing note. ) Care plan updated to reflect confusion and anxiety. Pt now has bedside sitter (see nursing notes.) Will continue to monitor.

## 2022-12-22 NOTE — NURSING NOTE
Notified SUE Arvizu of pt's CIWA score and that is c/o severe headache, severe anxiety. He states he's having visual hallucinations, but cannot tell me what he's seeing. He is denying auditory hallucinations. Vijaya is now on unit assessing patient.

## 2022-12-22 NOTE — NURSING NOTE
Pt is having tremors and is still confused. He told sitter that he quit drinking alcohol 4 days ago and asked sitter for a beer (it was charted that he told physicians he quit drinking alcohol approximately 4 weeks ago.). Notified SUE Arvizu. She is placing CIWA assessment orders. Will continue to monitor and follow CIWA scale.

## 2022-12-22 NOTE — PROGRESS NOTES
[facility] HOSPITALIST PROGRESS NOTE     Patient Identification:  Name:  Jamison Edward  Age:  52 y.o.  Sex:  male  :  1970  MRN:  [id number]  Visit Number:  [id number]  ROOM: [address] [place] [address]     Primary Care Provider:  Leticia Martinez APRN    Length of stay in inpatient status:  2    Subjective     Chief Compliant:    Chief Complaint   Patient presents with   • Leg Swelling       History of Presenting Illness:    Patient seen and examined with his daughter and a sitter present at bedside, and I also discussed with his wife over the phone while in the room. Patient reports he is feeling a little better. Still reports cough that is nonproductive, but says shortness of breath has improved. Denies abdominal pain.     Objective     Current Hospital Meds:cefTRIAXone, 1 g, Intravenous, Q24H  doxycycline, 100 mg, Intravenous, Q12H  famotidine, 20 mg, Oral, BID AC  heparin (porcine), 5,000 Units, Subcutaneous, Q12H  isosorbide mononitrate, 30 mg, Oral, Daily  lactulose, 10 g, Oral, BID  levETIRAcetam, 1,500 mg, Oral, Nightly  levETIRAcetam, 750 mg, Oral, BID  metoprolol succinate XL, 25 mg, Oral, Daily  nicotine, 1 patch, Transdermal, Q24H  OLANZapine zydis, 5 mg, Oral, Once  QUEtiapine, 50 mg, Oral, Nightly  sodium chloride, 10 mL, Intravenous, Q12H         Current Antimicrobial Therapy:  Anti-Infectives (From admission, onward)    Ordered     Dose/Rate Route Frequency Start Stop    22  cefTRIAXone (ROCEPHIN) 1 g in sodium chloride 0.9 % 100 mL IVPB-VTB        Ordering Provider: Dimitris Mullins MD    1 g  200 mL/hr over 30 Minutes Intravenous Every 24 Hours 22 2000 22 19522 233  doxycycline (VIBRAMYCIN) 100 mg in sodium chloride 0.9 % 100 mL IVPB-VTB        Ordering Provider: Dimitris Mullins MD    100 mg Intravenous Every 12 Hours 22 0800 22 0759    22  cefTRIAXone (ROCEPHIN) 2 g in sodium chloride 0.9 % 100 mL IVPB-VTB         Ordering Provider: Alexandre Houston MD    2 g  200 mL/hr over 30 Minutes Intravenous Once 12/20/22 2026 12/20/22 2140 12/20/22 2024  doxycycline (VIBRAMYCIN) 100 mg in sodium chloride 0.9 % 100 mL IVPB-VTB        Ordering Provider: Alexandre Houston MD    100 mg  over 60 Minutes Intravenous Once 12/20/22 2026 12/20/22 2209 12/20/22 1931  doxycycline (MONODOX) 100 MG capsule        Ordering Provider: Bella Khan MD    100 mg Oral 2 Times Daily 12/20/22 0000 12/27/22 4540        Current Diuretic Therapy:  Diuretics (From admission, onward)    None        ----------------------------------------------------------------------------------------------------------------------  Vital Signs:  Temp:  [98 °F (36.7 °C)-98.8 °F (37.1 °C)] 98.8 °F (37.1 °C)  Heart Rate:  [] 93  Resp:  [18-22] 18  BP: (105-132)/(62-74) 105/67  SpO2:  [95 %-97 %] 95 %  on   ;   Device (Oxygen Therapy): room air  Body mass index is 27.33 kg/m².    Wt Readings from Last 3 Encounters:   12/22/22 76.8 kg (169 lb 4.8 oz)   12/20/22 75.3 kg (166 lb)   11/18/22 70.9 kg (156 lb 3.2 oz)     Intake & Output (last 3 days)       12/19 0701  12/20 0700 12/20 0701 12/21 0700 12/21 0701  12/22 0700 12/22 0701  12/23 0700    P.O.   360 960    IV Piggyback  2459      Total Intake(mL/kg)  2459 (31.7) 360 (4.7) 960 (12.5)    Urine (mL/kg/hr)   350 (0.2)     Total Output   350     Net  +2459 +10 +960            Urine Unmeasured Occurrence   1 x 1 x    Stool Unmeasured Occurrence   2 x 1 x        Diet: Regular/House Diet; Texture: Regular Texture (IDDSI 7); Fluid Consistency: Thin (IDDSI 0)  ----------------------------------------------------------------------------------------------------------------------  Physical exam:   Constitutional:  Well-developed and well-nourished.  No acute distress.      HENT:  Head:  Normocephalic and atraumatic.    Cardiovascular:  Normal rate, regular rhythm and normal heart sounds with no  murmur.  Pulmonary/Chest:  No respiratory distress, normal rate and effort. Breath sounds clear in anterior lung fields, mild crackles heard in lateral lung fields bilaterally L>R.  Abdominal:  Soft. No tenderness. Normal bowel sounds.  Musculoskeletal:  No deformity.    Neurological:  Awake, alert, no focal deficit on gross examination. No slurred speech or facial droop.   Skin:  Skin is warm and dry. Bruising around his peripheral IV on right forearm noted.  Peripheral vascular:  No cyanosis  Psychiatric: Appropriate mood and affect  Edited by: Margo Khan DO at 12/22/2022 1513  ----------------------------------------------------------------------------------------------------------------------  Results from last 7 days   Lab Units 12/22/22  0720 12/22/22  0047 12/21/22  1858 12/21/22  0422 12/21/22  0315 12/20/22  2352 12/20/22  1928 12/20/22  1629   CRP mg/dL  --   --   --   --   --   --   --  6.78*   LACTATE mmol/L 1.6  --  4.4* 2.8*  --   --    < > 3.2*   WBC 10*3/mm3  --  16.56*  --   --  8.36  --   --  9.76   HEMOGLOBIN g/dL  --  12.0*  --   --  13.6  --   --  12.3*   HEMATOCRIT %  --  39.5  --   --  43.2  --   --  35.4*   MCV fL  --  115.5*  --   --  108.5*  --   --  101.4*   MCHC g/dL  --  30.4*  --   --  31.5  --   --  34.7   PLATELETS 10*3/mm3  --  94*  --   --  99*  --   --  105*   INR   --   --   --   --   --  1.36*  --   --     < > = values in this interval not displayed.     Results from last 7 days   Lab Units 12/20/22 2054   PH, ARTERIAL pH units 7.510*   PO2 ART mm Hg 62.4*   PCO2, ARTERIAL mm Hg 32.4*   HCO3 ART mmol/L 25.8     Results from last 7 days   Lab Units 12/22/22  0047 12/21/22  1519 12/21/22  0315 12/20/22  1629   SODIUM mmol/L 135*  --  137 136   POTASSIUM mmol/L 4.4 4.2 3.2* 3.2*   MAGNESIUM mg/dL  --   --   --  1.9   CHLORIDE mmol/L 105  --  103 101   CO2 mmol/L 17.5*  --  20.4* 25.1   BUN mg/dL 9  --  7 8   CREATININE mg/dL 0.56*  --  0.48* 0.71*   CALCIUM mg/dL 8.2*  --   7.7* 8.0*   GLUCOSE mg/dL 117*  --  111* 124*   ALBUMIN g/dL  --   --  2.25* 2.14*   BILIRUBIN mg/dL  --   --  2.6* 3.0*   ALK PHOS U/L  --   --  249* 243*   AST (SGOT) U/L  --   --  32 28   ALT (SGPT) U/L  --   --  20 20   Estimated Creatinine Clearance: 147.2 mL/min (A) (by C-G formula based on SCr of 0.56 mg/dL (L)).  Ammonia   Date Value Ref Range Status   12/22/2022 65 (H) 16 - 60 umol/L Final   12/21/2022 54 16 - 60 umol/L Final   12/20/2022 63 (H) 16 - 60 umol/L Final     Results from last 7 days   Lab Units 12/20/22  1629   TROPONIN T ng/mL <0.010     Results from last 7 days   Lab Units 12/20/22  1629   PROBNP pg/mL 178.2         No results found for: HGBA1C, POCGLU  Lab Results   Component Value Date    TSH 1.600 12/20/2022    FREET4 0.94 12/20/2022     No results found for: PREGTESTUR, PREGSERUM, HCG, HCGQUANT  Pain Management Panel     Pain Management Panel Latest Ref Rng & Units 12/20/2022 11/15/2022    AMPHETAMINES SCREEN, URINE Negative Negative Negative    BARBITURATES SCREEN Negative Negative Negative    BENZODIAZEPINE SCREEN, URINE Negative Negative Negative    BUPRENORPHINEUR Negative Negative Negative    COCAINE SCREEN, URINE Negative Negative Negative    METHADONE SCREEN, URINE Negative Negative Negative    METHAMPHETAMINEUR Negative Negative Negative        Brief Urine Lab Results  (Last result in the past 365 days)      Color   Clarity   Blood   Leuk Est   Nitrite   Protein   CREAT   Urine HCG        12/20/22 1849 Orange   Clear   Negative   Trace   Positive   Trace               Blood Culture   Date Value Ref Range Status   12/20/2022 Growth present, too young to evaluate  Preliminary   12/20/2022 No growth at 24 hours  Preliminary     No results found for: URINECX  No results found for: WOUNDCX  No results found for: STOOLCX  No results found for: RESPCX  No results found for: AFBCX  Results from last 7 days   Lab Units 12/22/22  0720 12/21/22  1858 12/21/22  0422 12/20/22  1928  12/20/22  1629   PROCALCITONIN ng/mL  --   --   --   --  1.69*   LACTATE mmol/L 1.6 4.4* 2.8* 4.2* 3.2*   CRP mg/dL  --   --   --   --  6.78*       I have personally looked at the labs and they are summarized above.  ----------------------------------------------------------------------------------------------------------------------  Detailed radiology reports for the last 24 hours:  Imaging Results (Last 24 Hours)     Procedure Component Value Units Date/Time    CT Head Without Contrast [[id number]] Collected: 12/21/22 2332     Updated: 12/21/22 2334    Narrative:      CT Head WO    HISTORY: Hit head during fall, confused;Pneumonia, unspecified organism;Unspecified cirrhosis of liver;Other ascites;Radiculopathy, lumbar region    COMPARISON: CTA and MR brain 12/20/2022    TECHNIQUE: CT of the brain without IV contrast. Coronal and sagittal reconstructions were obtained.  Radiation dose reduction techniques included automated exposure control or exposure modulation based on body size. Count of known CT and cardiac nuc med  studies performed in previous 12 months: 6.     Time of Scan: 11:20 PM    FINDINGS:    No acute intracranial hemorrhage, mass effect, midline shift, or hydrocephalus.     Gray-white matter differentiation is within normal limits.     Ventricles and cortical sulci are mildly prominent, in keeping with cerebral volume loss. Basal cisterns are clear.     Calvarium is intact.     Visualized paranasal sinuses are clear. Visualized mastoid air cells are clear.      Impression:        1.  No acute intracranial abnormality. No significant change compared to CT brain yesterday.  2.  Cerebral volume loss, greater than expected for patient's age.    Signer Name: Tra Lewis MD   Signed: 12/21/2022 11:32 PM   Workstation Name: [id number]    [facility]        Assessment & Plan      #Bilateral multifocal community acquired pneumonia, present on admission  #Septic shock criteria met with  HR>90, lactate>4, CRP>2, and elevated procalcitonin secondary to pneumonia  - Improving after starting IV antibiotics  - Elevated lactate has now normalized. Respiratory status remains stable on room air.  - Follow up blood culture results, no updates made to results yet  - Respiratory panel PCR negative, strep pneumo antigen in process  - Continue doxycycline and ceftriaxone    #Mild confusion  #Alcohol withdrawal  - Patient's confusion is likely multifactorial, secondary to alcohol withdrawal, elevated ammonia level, and acute illness. Continue treating infection as noted above. CIWA scoring ordered and his scores have been 5 or less. If symptoms worsen then add prn ativan. Ammonia level was 65, just above normal, so unlikely that this is the main cause of his confusion but we will treat with lactulose to see if it improves his symptoms.    #Liver cirrhosis, unknown etiology  #Moderate volume ascites  #Hyperbilirubinemia  #Hyperammonemia  #Chronically elevated alkaline phosphatase  #Hypoalbuminemia  #Pseudohypocalcemia 2/2 hypoalbuminemia  - CT findings suggestive of cirrhosis on admission, significantly worse than previous CT in November.  - Etiology unclear, but differential diagnosis favors alcohol +/- acetaminophen induced over hemochromatosis. Less likely primary sclerosing cholangitis as he had a normal MRCP one month ago, but it is still on the differential. May need to repeat MRCP. Patient initially reported he stopped drinking about 4-5 weeks ago, but after having a fall last night and becoming anxious he reported to nursing staff he actually stopped drinking 4 days ago (2 days prior to admission). He appeared to be having withdrawal symptoms so CIWA protocol was ordered. Charted ciwa scores so far have been 5 or less.  - Follow up with outpatient GI as scheduled  - Avoid hepatotoxins as able.  - Repeat CMP in AM.    #Positive urine drug screen  - UDS positive for TCAs at admission (inconsistent with home  medication list). He denied any current drug use.    #Reported left leg weakness  #Multilevel degenerative disc disease  #History of TIA/CVA  - Nonspecific periventricular white matter lesions noted on MRI brain likely representing chronic deep white matter ischemic change, no acute abnormalities noted. MRI lumbar spine showed multilevel degenerative changes with moderate canal stenosis at L5-S1 and mild to moderate canal stenosis at L3-L4.   - Continue neurochecks, fall precautions  - Up with assistance  - PT/OT consulted, appreciate assistance    #Acute hypokalemia  #Borderline hypomagnesemia  - Improved  - Replace per protocol  - Repeat CMP and magnesium level in a.m.     #Chronic macrocytic anemia  #Chronic thrombocytopenia  - Followed with hematology in the past, last appointment in 12/2021  - Iron low, iron saturation elevated, transferrin low, TIBC low on iron studies. Ferritin not obtained so will order this to be added to a.m. labs tomorrow.  - Hgb decreased slightly from 13.6 on admission to 12.0 today, no signs of acute bleeding noted but he does have some bruising on exam. INR on 12/20 was mildly elevated at 1.36.   - Repeat PT/INR and PTT tomorrow, monitor for acute bleeding    #Hypertension   - BP is at goal. Monitor per protocol. Continue home metoprolol.     #GERD  #Johnson's esophagus  - Continue PPI    #Ulcerative colitis  - No signs/symptoms consistent with a flare right now. Continue supportive care.    #Tobacco use  - Nicotine patch made available      Edited by: Margo Khan DO at 12/22/2022 1513    VTE Prophylaxis:   Mechanical Order History:     None      Pharmalogical Order History:      Ordered     Dose Route Frequency Stop    12/21/22 0106  heparin (porcine) 5000 UNIT/ML injection 5,000 Units         5,000 Units SC Every 12 Hours Scheduled --                Dispo:  likely home at discharge pending clinical improvement     Margo Khan DO  [facility]  Hospitalist  12/22/22  15:13 EST

## 2022-12-22 NOTE — NURSING NOTE
Transitional Care Note    Enrolled in [facility] Transitional Care Note    Enrolled in [facility] Transitional Care Services under theTCM Model to be followed for 6 weeks post discharge.  [facility] will assist with support and education at time of transition home from hospital.  Hospital  will follow throughout the stay at [facility].  Home  will visit within 48 hours of discharge and follow with home vitals and telephone contact for 6 weeks.      Patient admitted to [facility] via Emergency Department, complaints of leg swelling.   Admitted for further treatment of pneumonia.      Explained transition to home program and Patient is agreeable to home visits.  Home  will be Rose Knutson RN

## 2022-12-22 NOTE — DISCHARGE INSTRUCTIONS
You have been enrolled into the transition from hospital to home program.  A nurse (Rose )will be contacting you after you discharge to home to set up a time to come out to your home for a follow-up visit.  If you have any questions or concerns you can call this number anytime and a nurse will contact you:  [facility]  [phone].

## 2022-12-22 NOTE — NURSING NOTE
Pt's bed alarm was sounding, pt was found on floor. He stated he hit his ear, and nurse who found him said his head was on the IV pole. Notified SUE Arvizu- a STAT CT of the head was ordered. Pt was taken down for CT, awaiting result. Family was notified of pt's fall.

## 2022-12-22 NOTE — NURSING NOTE
Pt's lactic acid is 4.4. Notified Dr. Mullins, he will place orders. Pt also keeps trying to get out of bed, called his wife and she is speaking to him on the phone to calm him down.

## 2022-12-23 LAB
ALBUMIN SERPL-MCNC: 2.25 G/DL (ref 3.5–5.2)
ALBUMIN/GLOB SERPL: 1.1 G/DL
ALP SERPL-CCNC: 179 U/L (ref 39–117)
ALT SERPL W P-5'-P-CCNC: 20 U/L (ref 1–41)
AMMONIA BLD-SCNC: 42 UMOL/L (ref 16–60)
ANION GAP SERPL CALCULATED.3IONS-SCNC: 6.3 MMOL/L (ref 5–15)
AST SERPL-CCNC: 44 U/L (ref 1–40)
BACTERIA SPEC AEROBE CULT: ABNORMAL
BILIRUB SERPL-MCNC: 2.2 MG/DL (ref 0–1.2)
BUN SERPL-MCNC: 10 MG/DL (ref 6–20)
BUN/CREAT SERPL: 20.4 (ref 7–25)
CALCIUM SPEC-SCNC: 7.7 MG/DL (ref 8.6–10.5)
CHLORIDE SERPL-SCNC: 108 MMOL/L (ref 98–107)
CO2 SERPL-SCNC: 22.7 MMOL/L (ref 22–29)
CREAT SERPL-MCNC: 0.49 MG/DL (ref 0.76–1.27)
DEPRECATED RDW RBC AUTO: 54.4 FL (ref 37–54)
EGFRCR SERPLBLD CKD-EPI 2021: 121.9 ML/MIN/1.73
ERYTHROCYTE [DISTWIDTH] IN BLOOD BY AUTOMATED COUNT: 13.9 % (ref 12.3–15.4)
FERRITIN SERPL-MCNC: 1305 NG/ML (ref 30–400)
GLOBULIN UR ELPH-MCNC: 2.1 GM/DL
GLUCOSE SERPL-MCNC: 92 MG/DL (ref 65–99)
GRAM STN SPEC: ABNORMAL
HCT VFR BLD AUTO: 36.5 % (ref 37.5–51)
HGB BLD-MCNC: 11.7 G/DL (ref 13–17.7)
ISOLATED FROM: ABNORMAL
MCH RBC QN AUTO: 33.9 PG (ref 26.6–33)
MCHC RBC AUTO-ENTMCNC: 32.1 G/DL (ref 31.5–35.7)
MCV RBC AUTO: 105.8 FL (ref 79–97)
PLATELET # BLD AUTO: 75 10*3/MM3 (ref 140–450)
PMV BLD AUTO: 10.3 FL (ref 6–12)
POTASSIUM SERPL-SCNC: 3.5 MMOL/L (ref 3.5–5.2)
POTASSIUM SERPL-SCNC: 4.7 MMOL/L (ref 3.5–5.2)
PROT SERPL-MCNC: 4.3 G/DL (ref 6–8.5)
QT INTERVAL: 340 MS
QTC INTERVAL: 474 MS
RBC # BLD AUTO: 3.45 10*6/MM3 (ref 4.14–5.8)
SODIUM SERPL-SCNC: 137 MMOL/L (ref 136–145)
WBC NRBC COR # BLD: 7.56 10*3/MM3 (ref 3.4–10.8)

## 2022-12-23 PROCEDURE — 25010000002 HEPARIN (PORCINE) PER 1000 UNITS: Performed by: HOSPITALIST

## 2022-12-23 PROCEDURE — 25010000002 CEFTRIAXONE PER 250 MG: Performed by: HOSPITALIST

## 2022-12-23 PROCEDURE — 99232 SBSQ HOSP IP/OBS MODERATE 35: CPT | Performed by: STUDENT IN AN ORGANIZED HEALTH CARE EDUCATION/TRAINING PROGRAM

## 2022-12-23 PROCEDURE — 82140 ASSAY OF AMMONIA: CPT | Performed by: STUDENT IN AN ORGANIZED HEALTH CARE EDUCATION/TRAINING PROGRAM

## 2022-12-23 PROCEDURE — 84132 ASSAY OF SERUM POTASSIUM: CPT | Performed by: STUDENT IN AN ORGANIZED HEALTH CARE EDUCATION/TRAINING PROGRAM

## 2022-12-23 PROCEDURE — 82728 ASSAY OF FERRITIN: CPT | Performed by: STUDENT IN AN ORGANIZED HEALTH CARE EDUCATION/TRAINING PROGRAM

## 2022-12-23 PROCEDURE — 85027 COMPLETE CBC AUTOMATED: CPT | Performed by: STUDENT IN AN ORGANIZED HEALTH CARE EDUCATION/TRAINING PROGRAM

## 2022-12-23 PROCEDURE — 80053 COMPREHEN METABOLIC PANEL: CPT | Performed by: STUDENT IN AN ORGANIZED HEALTH CARE EDUCATION/TRAINING PROGRAM

## 2022-12-23 PROCEDURE — 25010000002 LORAZEPAM PER 2 MG

## 2022-12-23 RX ORDER — LORAZEPAM 2 MG/ML
1 INJECTION INTRAMUSCULAR
Status: DISCONTINUED | OUTPATIENT
Start: 2022-12-23 | End: 2022-12-30

## 2022-12-23 RX ORDER — LORAZEPAM 2 MG/ML
1 INJECTION INTRAMUSCULAR EVERY 8 HOURS
Status: DISCONTINUED | OUTPATIENT
Start: 2022-12-24 | End: 2022-12-23

## 2022-12-23 RX ORDER — LORAZEPAM 1 MG/1
1 TABLET ORAL
Status: DISCONTINUED | OUTPATIENT
Start: 2022-12-23 | End: 2022-12-30

## 2022-12-23 RX ORDER — LORAZEPAM 2 MG/ML
1 INJECTION INTRAMUSCULAR ONCE
Status: COMPLETED | OUTPATIENT
Start: 2022-12-23 | End: 2022-12-23

## 2022-12-23 RX ORDER — LORAZEPAM 2 MG/ML
1 INJECTION INTRAMUSCULAR EVERY 6 HOURS
Status: DISCONTINUED | OUTPATIENT
Start: 2022-12-23 | End: 2022-12-23

## 2022-12-23 RX ORDER — LORAZEPAM 2 MG/ML
1 INJECTION INTRAMUSCULAR EVERY 6 HOURS SCHEDULED
Status: DISCONTINUED | OUTPATIENT
Start: 2022-12-23 | End: 2022-12-23

## 2022-12-23 RX ORDER — LORAZEPAM 2 MG/ML
2 INJECTION INTRAMUSCULAR
Status: DISCONTINUED | OUTPATIENT
Start: 2022-12-23 | End: 2022-12-30

## 2022-12-23 RX ORDER — LORAZEPAM 2 MG/ML
INJECTION INTRAMUSCULAR
Status: COMPLETED
Start: 2022-12-23 | End: 2022-12-23

## 2022-12-23 RX ORDER — POTASSIUM CHLORIDE 20 MEQ/1
40 TABLET, EXTENDED RELEASE ORAL EVERY 4 HOURS
Status: DISPENSED | OUTPATIENT
Start: 2022-12-23 | End: 2022-12-23

## 2022-12-23 RX ORDER — LORAZEPAM 2 MG/1
2 TABLET ORAL
Status: DISCONTINUED | OUTPATIENT
Start: 2022-12-23 | End: 2022-12-30

## 2022-12-23 RX ADMIN — FAMOTIDINE 20 MG: 20 TABLET ORAL at 08:18

## 2022-12-23 RX ADMIN — POTASSIUM CHLORIDE 40 MEQ: 20 TABLET, EXTENDED RELEASE ORAL at 08:18

## 2022-12-23 RX ADMIN — LEVETIRACETAM 750 MG: 500 TABLET, FILM COATED ORAL at 06:00

## 2022-12-23 RX ADMIN — NICOTINE 1 PATCH: 7 PATCH, EXTENDED RELEASE TRANSDERMAL at 08:20

## 2022-12-23 RX ADMIN — FAMOTIDINE 20 MG: 20 TABLET ORAL at 16:38

## 2022-12-23 RX ADMIN — ISOSORBIDE MONONITRATE 30 MG: 30 TABLET, EXTENDED RELEASE ORAL at 08:18

## 2022-12-23 RX ADMIN — LEVETIRACETAM 750 MG: 500 TABLET, FILM COATED ORAL at 15:08

## 2022-12-23 RX ADMIN — LORAZEPAM 1 MG: 2 INJECTION INTRAMUSCULAR at 13:13

## 2022-12-23 RX ADMIN — Medication 10 ML: at 08:20

## 2022-12-23 RX ADMIN — METOPROLOL SUCCINATE 25 MG: 25 TABLET, EXTENDED RELEASE ORAL at 08:18

## 2022-12-23 RX ADMIN — LACTULOSE 10 G: 20 SOLUTION ORAL at 08:20

## 2022-12-23 RX ADMIN — LACTULOSE 10 G: 20 SOLUTION ORAL at 20:17

## 2022-12-23 RX ADMIN — LEVETIRACETAM 1500 MG: 500 TABLET, FILM COATED ORAL at 20:17

## 2022-12-23 RX ADMIN — LORAZEPAM 1 MG: 2 INJECTION INTRAMUSCULAR; INTRAVENOUS at 13:13

## 2022-12-23 RX ADMIN — QUETIAPINE FUMARATE 50 MG: 25 TABLET, FILM COATED ORAL at 20:17

## 2022-12-23 RX ADMIN — HEPARIN SODIUM 5000 UNITS: 5000 INJECTION INTRAVENOUS; SUBCUTANEOUS at 20:17

## 2022-12-23 RX ADMIN — Medication 10 ML: at 20:18

## 2022-12-23 RX ADMIN — DOXYCYCLINE 100 MG: 100 INJECTION, POWDER, LYOPHILIZED, FOR SOLUTION INTRAVENOUS at 08:18

## 2022-12-23 RX ADMIN — CEFTRIAXONE 1 G: 1 INJECTION, POWDER, FOR SOLUTION INTRAMUSCULAR; INTRAVENOUS at 20:16

## 2022-12-23 RX ADMIN — DOXYCYCLINE 100 MG: 100 INJECTION, POWDER, LYOPHILIZED, FOR SOLUTION INTRAVENOUS at 20:17

## 2022-12-23 NOTE — CASE MANAGEMENT/SOCIAL WORK
Continued Stay Note  WARNER Saldivar     Patient Name: Jamison Edward  MRN: [id number]  Today's Date: 12/23/2022    Admit Date: 12/20/2022    Plan: Pt lives at home with spouse and plans to return. He has a shower bench and b/p cuff. He requests RW at d/c. He does not have preference for provider. Pt does not have HH. Spouse will transport.   Discharge Plan     Row Name 12/23/22 1122       Plan    Plan Pt lives at home with spouse and plans to return. He has a shower bench and b/p cuff. He requests RW at d/c. He does not have preference for provider. Pt does not have HH. Spouse will transport.    Patient/Family in Agreement with Plan yes    Plan Comments 12/23: SOB improved, some confusion, cont Rocephin, Doxy IV, CIWA prot, Lactulose, BM x1, sat 99% ra, reg diet, bld cx ne                Expected Discharge Date and Time     Expected Discharge Date Expected Discharge Time    Dec 24, 2022         Fadumo Petersen RN

## 2022-12-23 NOTE — PLAN OF CARE
Goal Outcome Evaluation:  Plan of Care Reviewed With: patient           Outcome Evaluation: Pt confused. Pt getting out of bed and pulling out IVs earlier this shift. Now resting at this time. Sitter at bedside. Will continue plan of care .

## 2022-12-23 NOTE — PLAN OF CARE
Problem: Adult Inpatient Plan of Care  Goal: Plan of Care Review  Outcome: Ongoing, Progressing  Flowsheets  Taken 12/23/2022 0325 by Mona Grover RN  Progress: no change  Outcome Evaluation: Patient appears to have rested well throughout the night. Woke up confused, patient reoriented. No new complaints  Taken 12/22/2022 1656 by Lacie Craven RN  Plan of Care Reviewed With: patient  Goal: Absence of Hospital-Acquired Illness or Injury  Intervention: Identify and Manage Fall Risk  Recent Flowsheet Documentation  Taken 12/23/2022 0300 by Mona Grover RN  Safety Promotion/Fall Prevention: safety round/check completed  Taken 12/23/2022 0100 by Mona Grover RN  Safety Promotion/Fall Prevention: safety round/check completed  Taken 12/22/2022 2300 by Mona Grover RN  Safety Promotion/Fall Prevention: safety round/check completed  Taken 12/22/2022 2100 by Mona Grover RN  Safety Promotion/Fall Prevention: safety round/check completed  Taken 12/22/2022 2023 by Mona Grover RN  Safety Promotion/Fall Prevention: safety round/check completed  Taken 12/22/2022 1900 by Mona Grover RN  Safety Promotion/Fall Prevention: safety round/check completed  Intervention: Prevent and Manage VTE (Venous Thromboembolism) Risk  Recent Flowsheet Documentation  Taken 12/22/2022 2023 by Mona Grover RN  VTE Prevention/Management: (see MAR) --  Goal: Optimal Comfort and Wellbeing  Intervention: Provide Person-Centered Care  Recent Flowsheet Documentation  Taken 12/22/2022 2023 by Mona Grover RN  Trust Relationship/Rapport:   care explained   choices provided   emotional support provided   questions answered   empathic listening provided   questions encouraged   thoughts/feelings acknowledged   reassurance provided   Goal Outcome Evaluation:           Progress: no change  Outcome Evaluation: Patient appears to have rested well throughout the night.  Woke up confused, patient reoriented. No new complaints

## 2022-12-23 NOTE — SIGNIFICANT NOTE
12/23/22 1430   Rehab Time/Intention   Session Not Performed other (see comments);unable to treat, medical status change   Recommendation   PT - Next Appointment   (Per RN and sitter, pt has been pulling out IV's/telemetry and was given medication, advised to not wake pt at this time.)

## 2022-12-23 NOTE — PROGRESS NOTES
Antimicrobial Length of Therapy:    Day 4/6 Rocephin  Day 4/7 Doxycycline    Thank you,    Jazmine Khan, PharmD

## 2022-12-23 NOTE — PROGRESS NOTES
"    [facility] HOSPITALIST PROGRESS NOTE     Patient Identification:  Name:  Jamison Edward  Age:  52 y.o.  Sex:  male  :  1970  MRN:  [id number]  Visit Number:  [id number]  ROOM: [address] [place] [address]     Primary Care Provider:  Leticia Martinez APRN    Length of stay in inpatient status:  3    Subjective     Chief Compliant:    Chief Complaint   Patient presents with   • Leg Swelling       History of Presenting Illness:    Patient seen and examined with sitter present. At time of my exam patient was standing up looking out the window. He had taken his telemetry leads off. While we were talking he pulled out his IV and threw it in the garbage can. His sitter reported that patient had been talking to his roommate and trying to sell him his telemetry box as a radio, and that he had stated he heard people talking to him through it. He became agitated and left his room, saying he had to \"go downstairs and get out of here before they attack.\" He was not cooperative with exam or history gathering.    Objective     Current Hospital Meds:cefTRIAXone, 1 g, Intravenous, Q24H  doxycycline, 100 mg, Intravenous, Q12H  famotidine, 20 mg, Oral, BID AC  heparin (porcine), 5,000 Units, Subcutaneous, Q12H  isosorbide mononitrate, 30 mg, Oral, Daily  lactulose, 10 g, Oral, BID  levETIRAcetam, 1,500 mg, Oral, Nightly  levETIRAcetam, 750 mg, Oral, BID  metoprolol succinate XL, 25 mg, Oral, Daily  nicotine, 1 patch, Transdermal, Q24H  OLANZapine zydis, 5 mg, Oral, Once  potassium chloride, 40 mEq, Oral, Q4H  QUEtiapine, 50 mg, Oral, Nightly  sodium chloride, 10 mL, Intravenous, Q12H         Current Antimicrobial Therapy:  Anti-Infectives (From admission, onward)    Ordered     Dose/Rate Route Frequency Start Stop    22 2351  cefTRIAXone (ROCEPHIN) 1 g in sodium chloride 0.9 % 100 mL IVPB-VTB        Ordering Provider: Dimitris Mullins MD    1 g  200 mL/hr over 30 Minutes Intravenous Every 24 Hours 22 " 12/26/22 1959 12/20/22 2338  doxycycline (VIBRAMYCIN) 100 mg in sodium chloride 0.9 % 100 mL IVPB-VTB        Ordering Provider: Dimitris Mullins MD    100 mg Intravenous Every 12 Hours 12/21/22 0800 12/28/22 0759    12/20/22 2024  cefTRIAXone (ROCEPHIN) 2 g in sodium chloride 0.9 % 100 mL IVPB-VTB        Ordering Provider: Alexandre Houston MD    2 g  200 mL/hr over 30 Minutes Intravenous Once 12/20/22 2026 12/20/22 2140 12/20/22 2024  doxycycline (VIBRAMYCIN) 100 mg in sodium chloride 0.9 % 100 mL IVPB-VTB        Ordering Provider: Alexandre Houston MD    100 mg  over 60 Minutes Intravenous Once 12/20/22 2026 12/20/22 2209 12/20/22 1931  doxycycline (MONODOX) 100 MG capsule        Ordering Provider: Bella Khan MD    100 mg Oral 2 Times Daily 12/20/22 0000 12/27/22 2359        Current Diuretic Therapy:  Diuretics (From admission, onward)    None        ----------------------------------------------------------------------------------------------------------------------  Vital Signs:  Temp:  [98.1 °F (36.7 °C)-98.6 °F (37 °C)] 98.6 °F (37 °C)  Heart Rate:  [95-98] 95  Resp:  [16] 16  BP: ()/(59-71) 96/59  SpO2:  [99 %] 99 %  on   ;   Device (Oxygen Therapy): room air  Body mass index is 27.33 kg/m².    Wt Readings from Last 3 Encounters:   12/22/22 76.8 kg (169 lb 4.8 oz)   12/20/22 75.3 kg (166 lb)   11/18/22 70.9 kg (156 lb 3.2 oz)     Intake & Output (last 3 days)       12/20 0701  12/21 0700 12/21 0701 12/22 0700 12/22 0701 12/23 0700 12/23 0701  12/24 0700    P.O.  360 960     I.V. (mL/kg)   729.3 (9.5)     IV Piggyback 2459       Total Intake(mL/kg) 2459 (31.7) 360 (4.7) 1689.3 (22)     Urine (mL/kg/hr)  350 (0.2)      Total Output  350      Net +2459 +10 +1689.3             Urine Unmeasured Occurrence  1 x 1 x     Stool Unmeasured Occurrence  2 x 1 x         Diet: Regular/House Diet; Texture: Regular Texture (IDDSI 7); Fluid Consistency: Thin (IDDSI  "0)  ----------------------------------------------------------------------------------------------------------------------  Physical exam:   Constitutional:  Well-developed and well-nourished.  Agitated and appears disheveled.  HENT:  Head:  Normocephalic and atraumatic.    Pulmonary/Chest:  No respiratory distress, normal rate and effort.   Musculoskeletal:  No deformity.    Neurological:  Awake, alert, no focal deficit on gross examination. No slurred speech or facial droop.   Skin:  Skin is warm and dry. Bruising around his peripheral IV on right forearm noted.  Peripheral vascular:  No cyanosis  Psychiatric: Mildly agitated, voices paranoid delusions (\"I have to go downstairs and get out of here before they attack\")  Edited by: Margo Khan DO at 12/23/2022 1545  ----------------------------------------------------------------------------------------------------------------------  Results from last 7 days   Lab Units 12/23/22  0430 12/22/22  0720 12/22/22  0047 12/21/22  1858 12/21/22  0422 12/21/22  0315 12/20/22  2352 12/20/22  1928 12/20/22  1629   CRP mg/dL  --   --   --   --   --   --   --   --  6.78*   LACTATE mmol/L  --  1.6  --  4.4* 2.8*  --   --    < > 3.2*   WBC 10*3/mm3 7.56  --  16.56*  --   --  8.36  --   --  9.76   HEMOGLOBIN g/dL 11.7*  --  12.0*  --   --  13.6  --   --  12.3*   HEMATOCRIT % 36.5*  --  39.5  --   --  43.2  --   --  35.4*   MCV fL 105.8*  --  115.5*  --   --  108.5*  --   --  101.4*   MCHC g/dL 32.1  --  30.4*  --   --  31.5  --   --  34.7   PLATELETS 10*3/mm3 75*  --  94*  --   --  99*  --   --  105*   INR   --   --   --   --   --   --  1.36*  --   --     < > = values in this interval not displayed.     Results from last 7 days   Lab Units 12/20/22 2054   PH, ARTERIAL pH units 7.510*   PO2 ART mm Hg 62.4*   PCO2, ARTERIAL mm Hg 32.4*   HCO3 ART mmol/L 25.8     Results from last 7 days   Lab Units 12/23/22  0430 12/22/22  0047 12/21/22  1519 12/21/22  0315 12/20/22  1629 "   SODIUM mmol/L 137 135*  --  137 136   POTASSIUM mmol/L 3.5 4.4 4.2 3.2* 3.2*   MAGNESIUM mg/dL  --   --   --   --  1.9   CHLORIDE mmol/L 108* 105  --  103 101   CO2 mmol/L 22.7 17.5*  --  20.4* 25.1   BUN mg/dL 10 9  --  7 8   CREATININE mg/dL 0.49* 0.56*  --  0.48* 0.71*   CALCIUM mg/dL 7.7* 8.2*  --  7.7* 8.0*   GLUCOSE mg/dL 92 117*  --  111* 124*   ALBUMIN g/dL 2.25*  --   --  2.25* 2.14*   BILIRUBIN mg/dL 2.2*  --   --  2.6* 3.0*   ALK PHOS U/L 179*  --   --  249* 243*   AST (SGOT) U/L 44*  --   --  32 28   ALT (SGPT) U/L 20  --   --  20 20   Estimated Creatinine Clearance: 168.2 mL/min (A) (by C-G formula based on SCr of 0.49 mg/dL (L)).  Ammonia   Date Value Ref Range Status   12/23/2022 42 16 - 60 umol/L Final   12/22/2022 65 (H) 16 - 60 umol/L Final   12/21/2022 54 16 - 60 umol/L Final   12/20/2022 63 (H) 16 - 60 umol/L Final     Results from last 7 days   Lab Units 12/20/22  1629   TROPONIN T ng/mL <0.010     Results from last 7 days   Lab Units 12/20/22  1629   PROBNP pg/mL 178.2         No results found for: HGBA1C, POCGLU  Lab Results   Component Value Date    TSH 1.600 12/20/2022    FREET4 0.94 12/20/2022     No results found for: PREGTESTUR, PREGSERUM, HCG, HCGQUANT  Pain Management Panel     Pain Management Panel Latest Ref Rng & Units 12/20/2022 11/15/2022    AMPHETAMINES SCREEN, URINE Negative Negative Negative    BARBITURATES SCREEN Negative Negative Negative    BENZODIAZEPINE SCREEN, URINE Negative Negative Negative    BUPRENORPHINEUR Negative Negative Negative    COCAINE SCREEN, URINE Negative Negative Negative    METHADONE SCREEN, URINE Negative Negative Negative    METHAMPHETAMINEUR Negative Negative Negative        Brief Urine Lab Results  (Last result in the past 365 days)      Color   Clarity   Blood   Leuk Est   Nitrite   Protein   CREAT   Urine HCG        12/20/22 1849 Orange   Clear   Negative   Trace   Positive   Trace               Blood Culture   Date Value Ref Range Status    12/22/2022 No growth at 24 hours  Preliminary   12/22/2022 No growth at 24 hours  Preliminary   12/20/2022 Staphylococcus, coagulase negative (C)  Final   12/20/2022 No growth at 2 days  Preliminary     No results found for: URINECX  No results found for: WOUNDCX  No results found for: STOOLCX  No results found for: RESPCX  No results found for: AFBCX  Results from last 7 days   Lab Units 12/22/22  0720 12/21/22  1858 12/21/22  0422 12/20/22  1928 12/20/22  1629   PROCALCITONIN ng/mL  --   --   --   --  1.69*   LACTATE mmol/L 1.6 4.4* 2.8* 4.2* 3.2*   CRP mg/dL  --   --   --   --  6.78*       I have personally looked at the labs and they are summarized above.  ----------------------------------------------------------------------------------------------------------------------  Detailed radiology reports for the last 24 hours:  Imaging Results (Last 24 Hours)     ** No results found for the last 24 hours. **        Assessment & Plan      #Bilateral multifocal community acquired pneumonia, present on admission  #Septic shock criteria met with HR>90, lactate>4, CRP>2, and elevated procalcitonin secondary to pneumonia  - Improving after starting IV antibiotics  - Elevated lactate has now normalized. Respiratory status remains stable on room air.  - Blood cultures from 12/20/22 showed coagulase negative staph in 1/2 bottles, thought likely to be contaminant. Repeat blood cultures show no growth at 24 hours. Continue to follow blood culture results.  - Respiratory panel PCR negative, strep pneumo antigen negative  - Continue doxycycline and ceftriaxone    #Confusion  #Alcohol withdrawal  - Patient's confusion is likely multifactorial, secondary to alcohol withdrawal, elevated ammonia level, and acute illness. Continue treating infection as noted above. Confusion significantly worsened today, with patient having paranoid delusions and agitation. CIWA scores are higher today and he has required ativan for worsening  confusion/agitation. Ammonia level improved after starting lactulose. Continue CIWA scoring and prn ativan. If symptoms worsen he may need transferred to PCU for more aggressive interventions such as precedex drip. If symptoms do not improve with treatment of acute alcohol withdrawal we will plan to consult psychiatry for evaluation.    #Liver cirrhosis, unknown etiology  #Moderate volume ascites  #Hyperbilirubinemia  #Hyperammonemia  #Chronically elevated alkaline phosphatase  #Hypoalbuminemia  #Pseudohypocalcemia 2/2 hypoalbuminemia  - CT findings suggestive of cirrhosis on admission, significantly worse than previous CT in November.  - Etiology unclear, but differential diagnosis favors alcohol +/- acetaminophen induced over hemochromatosis. Less likely primary sclerosing cholangitis as he had a normal MRCP one month ago, but it is still on the differential. Patient initially reported he stopped drinking about 4-5 weeks ago, but after having a fall and becoming anxious he reported to nursing staff he actually stopped drinking 2 days prior to admission. Alcohol withdrawal discussed above.  - Follow up with outpatient GI as scheduled  - Avoid hepatotoxins as able.  - Repeat CMP in AM.    #Positive urine drug screen  - UDS positive for TCAs at admission (inconsistent with home medication list). He denied any current drug use.    #Reported left leg weakness  #Multilevel degenerative disc disease  #History of TIA/CVA  - Nonspecific periventricular white matter lesions noted on MRI brain likely representing chronic deep white matter ischemic change, no acute abnormalities noted. MRI lumbar spine showed multilevel degenerative changes with moderate canal stenosis at L5-S1 and mild to moderate canal stenosis at L3-L4.   - Continue neurochecks, fall precautions  - Up with assistance  - PT/OT consulted, appreciate assistance    #Acute hypokalemia  #Borderline hypomagnesemia  - Improved  - Replace per protocol  - Monitor  with chemistry panel periodically     #Chronic macrocytic anemia  #Chronic thrombocytopenia  - Followed with hematology in the past, last appointment in 12/2021  - Iron low, iron saturation elevated, transferrin low, TIBC low on iron studies. Ferritin not obtained so will order this to be added to a.m. labs tomorrow.  - Hgb decreased slightly from 13.6 on admission to 11.7 today, no signs of acute bleeding noted but he does have some mild bruising on exam. INR on 12/20 was mildly elevated at 1.36.   - Monitor for development of signs/symptoms of acute bleeding    #Hypertension   - BP is at goal. Monitor per protocol. Continue home metoprolol.     #GERD  #Johnson's esophagus  - Continue PPI    #Ulcerative colitis  - No signs/symptoms consistent with a flare right now. Continue supportive care.    #Tobacco use  - Nicotine patch made available   Edited by: Margo Khan DO at 12/23/2022 9132    VTE Prophylaxis:   Mechanical Order History:     None      Pharmalogical Order History:      Ordered     Dose Route Frequency Stop    12/21/22 0106  heparin (porcine) 5000 UNIT/ML injection 5,000 Units         5,000 Units SC Every 12 Hours Scheduled --                Dispo: pending clinical course     Margo Khan DO  [facility]  12/23/22  15:45 EST

## 2022-12-24 ENCOUNTER — APPOINTMENT (OUTPATIENT)
Dept: CT IMAGING | Facility: HOSPITAL | Age: 52
DRG: 870 | End: 2022-12-24
Payer: MEDICARE

## 2022-12-24 LAB
ALBUMIN SERPL-MCNC: 2.15 G/DL (ref 3.5–5.2)
ALBUMIN/GLOB SERPL: 1 G/DL
ALP SERPL-CCNC: 168 U/L (ref 39–117)
ALT SERPL W P-5'-P-CCNC: 22 U/L (ref 1–41)
ANION GAP SERPL CALCULATED.3IONS-SCNC: 13.7 MMOL/L (ref 5–15)
APTT PPP: 39.9 SECONDS (ref 26.5–34.5)
AST SERPL-CCNC: 46 U/L (ref 1–40)
BILIRUB SERPL-MCNC: 2 MG/DL (ref 0–1.2)
BUN SERPL-MCNC: 10 MG/DL (ref 6–20)
BUN/CREAT SERPL: 22.7 (ref 7–25)
CALCIUM SPEC-SCNC: 7.9 MG/DL (ref 8.6–10.5)
CHLORIDE SERPL-SCNC: 110 MMOL/L (ref 98–107)
CO2 SERPL-SCNC: 18.3 MMOL/L (ref 22–29)
CREAT SERPL-MCNC: 0.44 MG/DL (ref 0.76–1.27)
DEPRECATED RDW RBC AUTO: 52.5 FL (ref 37–54)
EGFRCR SERPLBLD CKD-EPI 2021: 126 ML/MIN/1.73
ERYTHROCYTE [DISTWIDTH] IN BLOOD BY AUTOMATED COUNT: 13.8 % (ref 12.3–15.4)
GLOBULIN UR ELPH-MCNC: 2.2 GM/DL
GLUCOSE SERPL-MCNC: 88 MG/DL (ref 65–99)
HCT VFR BLD AUTO: 34.6 % (ref 37.5–51)
HGB BLD-MCNC: 11.4 G/DL (ref 13–17.7)
INR PPP: 1.44 (ref 0.9–1.1)
MAGNESIUM SERPL-MCNC: 1.8 MG/DL (ref 1.6–2.6)
MCH RBC QN AUTO: 34.1 PG (ref 26.6–33)
MCHC RBC AUTO-ENTMCNC: 32.9 G/DL (ref 31.5–35.7)
MCV RBC AUTO: 103.6 FL (ref 79–97)
PLATELET # BLD AUTO: 74 10*3/MM3 (ref 140–450)
PMV BLD AUTO: 10.9 FL (ref 6–12)
POTASSIUM SERPL-SCNC: 4 MMOL/L (ref 3.5–5.2)
PROT SERPL-MCNC: 4.3 G/DL (ref 6–8.5)
PROTHROMBIN TIME: 17.9 SECONDS (ref 12.1–14.7)
RBC # BLD AUTO: 3.34 10*6/MM3 (ref 4.14–5.8)
SODIUM SERPL-SCNC: 142 MMOL/L (ref 136–145)
WBC NRBC COR # BLD: 6.93 10*3/MM3 (ref 3.4–10.8)

## 2022-12-24 PROCEDURE — 83735 ASSAY OF MAGNESIUM: CPT | Performed by: STUDENT IN AN ORGANIZED HEALTH CARE EDUCATION/TRAINING PROGRAM

## 2022-12-24 PROCEDURE — 85027 COMPLETE CBC AUTOMATED: CPT | Performed by: STUDENT IN AN ORGANIZED HEALTH CARE EDUCATION/TRAINING PROGRAM

## 2022-12-24 PROCEDURE — 80053 COMPREHEN METABOLIC PANEL: CPT | Performed by: STUDENT IN AN ORGANIZED HEALTH CARE EDUCATION/TRAINING PROGRAM

## 2022-12-24 PROCEDURE — 25010000002 IOPAMIDOL 61 % SOLUTION: Performed by: STUDENT IN AN ORGANIZED HEALTH CARE EDUCATION/TRAINING PROGRAM

## 2022-12-24 PROCEDURE — 25010000002 METHYLPREDNISOLONE PER 40 MG: Performed by: STUDENT IN AN ORGANIZED HEALTH CARE EDUCATION/TRAINING PROGRAM

## 2022-12-24 PROCEDURE — 74177 CT ABD & PELVIS W/CONTRAST: CPT

## 2022-12-24 PROCEDURE — 25010000002 CEFTRIAXONE PER 250 MG: Performed by: HOSPITALIST

## 2022-12-24 PROCEDURE — 25010000002 HEPARIN (PORCINE) PER 1000 UNITS: Performed by: HOSPITALIST

## 2022-12-24 PROCEDURE — 85730 THROMBOPLASTIN TIME PARTIAL: CPT | Performed by: STUDENT IN AN ORGANIZED HEALTH CARE EDUCATION/TRAINING PROGRAM

## 2022-12-24 PROCEDURE — 25010000002 MAGNESIUM SULFATE 2 GM/50ML SOLUTION: Performed by: STUDENT IN AN ORGANIZED HEALTH CARE EDUCATION/TRAINING PROGRAM

## 2022-12-24 PROCEDURE — 99232 SBSQ HOSP IP/OBS MODERATE 35: CPT | Performed by: STUDENT IN AN ORGANIZED HEALTH CARE EDUCATION/TRAINING PROGRAM

## 2022-12-24 PROCEDURE — 85610 PROTHROMBIN TIME: CPT | Performed by: STUDENT IN AN ORGANIZED HEALTH CARE EDUCATION/TRAINING PROGRAM

## 2022-12-24 PROCEDURE — 25010000002 DIPHENHYDRAMINE PER 50 MG: Performed by: STUDENT IN AN ORGANIZED HEALTH CARE EDUCATION/TRAINING PROGRAM

## 2022-12-24 RX ORDER — DIPHENHYDRAMINE HYDROCHLORIDE 50 MG/ML
50 INJECTION INTRAMUSCULAR; INTRAVENOUS
Status: COMPLETED | OUTPATIENT
Start: 2022-12-24 | End: 2022-12-24

## 2022-12-24 RX ORDER — METHION/INOS/CHOL BT/B COM/LIV 110MG-86MG
100 CAPSULE ORAL DAILY
Status: DISCONTINUED | OUTPATIENT
Start: 2022-12-24 | End: 2023-01-05 | Stop reason: HOSPADM

## 2022-12-24 RX ORDER — MAGNESIUM SULFATE HEPTAHYDRATE 40 MG/ML
2 INJECTION, SOLUTION INTRAVENOUS ONCE
Status: COMPLETED | OUTPATIENT
Start: 2022-12-24 | End: 2022-12-24

## 2022-12-24 RX ORDER — METHYLPREDNISOLONE SODIUM SUCCINATE 40 MG/ML
40 INJECTION, POWDER, LYOPHILIZED, FOR SOLUTION INTRAMUSCULAR; INTRAVENOUS EVERY 4 HOURS
Status: DISCONTINUED | OUTPATIENT
Start: 2022-12-24 | End: 2022-12-29

## 2022-12-24 RX ADMIN — DOXYCYCLINE 100 MG: 100 INJECTION, POWDER, LYOPHILIZED, FOR SOLUTION INTRAVENOUS at 08:54

## 2022-12-24 RX ADMIN — LACTULOSE 10 G: 20 SOLUTION ORAL at 08:40

## 2022-12-24 RX ADMIN — METOPROLOL SUCCINATE 25 MG: 25 TABLET, EXTENDED RELEASE ORAL at 08:42

## 2022-12-24 RX ADMIN — METHYLPREDNISOLONE SODIUM SUCCINATE 40 MG: 40 INJECTION, POWDER, FOR SOLUTION INTRAMUSCULAR; INTRAVENOUS at 20:05

## 2022-12-24 RX ADMIN — HEPARIN SODIUM 5000 UNITS: 5000 INJECTION INTRAVENOUS; SUBCUTANEOUS at 08:42

## 2022-12-24 RX ADMIN — Medication 100 MG: at 16:38

## 2022-12-24 RX ADMIN — DOXYCYCLINE 100 MG: 100 INJECTION, POWDER, LYOPHILIZED, FOR SOLUTION INTRAVENOUS at 20:06

## 2022-12-24 RX ADMIN — NICOTINE 1 PATCH: 7 PATCH, EXTENDED RELEASE TRANSDERMAL at 08:47

## 2022-12-24 RX ADMIN — CEFTRIAXONE 1 G: 1 INJECTION, POWDER, FOR SOLUTION INTRAMUSCULAR; INTRAVENOUS at 20:06

## 2022-12-24 RX ADMIN — DIPHENHYDRAMINE HYDROCHLORIDE 50 MG: 50 INJECTION INTRAMUSCULAR; INTRAVENOUS at 12:07

## 2022-12-24 RX ADMIN — METHYLPREDNISOLONE SODIUM SUCCINATE 40 MG: 40 INJECTION, POWDER, FOR SOLUTION INTRAMUSCULAR; INTRAVENOUS at 12:06

## 2022-12-24 RX ADMIN — QUETIAPINE FUMARATE 50 MG: 25 TABLET, FILM COATED ORAL at 20:05

## 2022-12-24 RX ADMIN — IOPAMIDOL 80 ML: 612 INJECTION, SOLUTION INTRAVENOUS at 13:44

## 2022-12-24 RX ADMIN — FAMOTIDINE 20 MG: 20 TABLET ORAL at 08:41

## 2022-12-24 RX ADMIN — LEVETIRACETAM 750 MG: 500 TABLET, FILM COATED ORAL at 06:07

## 2022-12-24 RX ADMIN — METHYLPREDNISOLONE SODIUM SUCCINATE 40 MG: 40 INJECTION, POWDER, FOR SOLUTION INTRAMUSCULAR; INTRAVENOUS at 16:39

## 2022-12-24 RX ADMIN — Medication 10 ML: at 08:47

## 2022-12-24 RX ADMIN — HEPARIN SODIUM 5000 UNITS: 5000 INJECTION INTRAVENOUS; SUBCUTANEOUS at 20:05

## 2022-12-24 RX ADMIN — LEVETIRACETAM 1500 MG: 500 TABLET, FILM COATED ORAL at 20:05

## 2022-12-24 RX ADMIN — ISOSORBIDE MONONITRATE 30 MG: 30 TABLET, EXTENDED RELEASE ORAL at 08:47

## 2022-12-24 RX ADMIN — MAGNESIUM SULFATE HEPTAHYDRATE 2 G: 2 INJECTION, SOLUTION INTRAVENOUS at 06:08

## 2022-12-24 RX ADMIN — LEVETIRACETAM 750 MG: 500 TABLET, FILM COATED ORAL at 16:38

## 2022-12-24 RX ADMIN — FAMOTIDINE 20 MG: 20 TABLET ORAL at 16:38

## 2022-12-24 NOTE — PROGRESS NOTES
Nutrition Services    Patient Name:  Jamison Edward  YOB: 1970  MRN: [id number]  Admit Date:  12/20/2022    Recommend MD consider thiamine supplementation due to ETOH w/d.      Electronically signed by:  Lily Fischer RD  12/24/22 11:16 EST

## 2022-12-24 NOTE — PROGRESS NOTES
[facility] HOSPITALIST PROGRESS NOTE     Patient Identification:  Name:  Jamison Edward  Age:  52 y.o.  Sex:  male  :  1970  MRN:  [id number]  Visit Number:  [id number]  ROOM: [address] [place] [address]     Primary Care Provider:  Leticia Martinez APRN    Length of stay in inpatient status:  4    Subjective     Chief Compliant:    Chief Complaint   Patient presents with   • Leg Swelling       History of Presenting Illness:    Patient seen and examined with sitter present at bedside and I discussed his care with his wife outside the room at her request. He reports his breathing is good today, with mild cough but no shortness of breath. He reports abdominal pain in the middle of his abdomen, no aggravating/alleviating factors. He is disoriented and tells me we are in [place] and the date is 2023. He is more cooperative and calm today.     Objective     Current Hospital Meds:cefTRIAXone, 1 g, Intravenous, Q24H  doxycycline, 100 mg, Intravenous, Q12H  famotidine, 20 mg, Oral, BID AC  heparin (porcine), 5,000 Units, Subcutaneous, Q12H  isosorbide mononitrate, 30 mg, Oral, Daily  lactulose, 10 g, Oral, BID  levETIRAcetam, 1,500 mg, Oral, Nightly  levETIRAcetam, 750 mg, Oral, BID  methylPREDNISolone sodium succinate, 40 mg, Intravenous, Q4H  metoprolol succinate XL, 25 mg, Oral, Daily  nicotine, 1 patch, Transdermal, Q24H  OLANZapine zydis, 5 mg, Oral, Once  QUEtiapine, 50 mg, Oral, Nightly  sodium chloride, 10 mL, Intravenous, Q12H  thiamine, 100 mg, Oral, Daily         Current Antimicrobial Therapy:  Anti-Infectives (From admission, onward)    Ordered     Dose/Rate Route Frequency Start Stop    22 2338  cefTRIAXone (ROCEPHIN) 1 g in sodium chloride 0.9 % 100 mL IVPB-VTB        Ordering Provider: Dimitris Mullins MD    1 g  200 mL/hr over 30 Minutes Intravenous Every 24 Hours 22 2338  doxycycline (VIBRAMYCIN) 100 mg in sodium chloride 0.9 % 100 mL  IVPB-VTB        Ordering Provider: Dimitris Mullins MD    100 mg Intravenous Every 12 Hours 12/21/22 0800 12/28/22 0759    12/20/22 2024  cefTRIAXone (ROCEPHIN) 2 g in sodium chloride 0.9 % 100 mL IVPB-VTB        Ordering Provider: Alexandre Houston MD    2 g  200 mL/hr over 30 Minutes Intravenous Once 12/20/22 2026 12/20/22 2140 12/20/22 2024  doxycycline (VIBRAMYCIN) 100 mg in sodium chloride 0.9 % 100 mL IVPB-VTB        Ordering Provider: Alexandre Houston MD    100 mg  over 60 Minutes Intravenous Once 12/20/22 2026 12/20/22 2209 12/20/22 1931  doxycycline (MONODOX) 100 MG capsule        Ordering Provider: Bella Khan MD    100 mg Oral 2 Times Daily 12/20/22 0000 12/27/22 3580        Current Diuretic Therapy:  Diuretics (From admission, onward)    None        ----------------------------------------------------------------------------------------------------------------------  Vital Signs:  Temp:  [98 °F (36.7 °C)-98.6 °F (37 °C)] 98 °F (36.7 °C)  Heart Rate:  [] 96  Resp:  [16-18] 18  BP: ()/(59-65) 111/63     on   ;   Device (Oxygen Therapy): room air  Body mass index is 27.34 kg/m².    Wt Readings from Last 3 Encounters:   12/24/22 76.8 kg (169 lb 6.4 oz)   12/20/22 75.3 kg (166 lb)   11/18/22 70.9 kg (156 lb 3.2 oz)     Intake & Output (last 3 days)       12/21 0701 12/22 0700 12/22 0701 12/23 0700 12/23 0701 12/24 0700 12/24 0701 12/25 0700    P.O. 360 960  110    I.V. (mL/kg)  729.3 (9.5)      IV Piggyback        Total Intake(mL/kg) 360 (4.7) 1689.3 (22)  110 (1.4)    Urine (mL/kg/hr) 350 (0.2)       Total Output 350       Net +10 +1689.3  +110            Urine Unmeasured Occurrence 1 x 1 x 2 x     Stool Unmeasured Occurrence 2 x 1 x 4 x         Diet: Regular/House Diet; Texture: Regular Texture (IDDSI 7); Fluid Consistency: Thin (IDDSI  0)  ----------------------------------------------------------------------------------------------------------------------  Physical exam:   Constitutional:  Well-developed and well-nourished. No acute distress. Appears chronically ill.  HENT:  Head:  Normocephalic and atraumatic.    Cardiac: Heart regular rate and rhythm, no murmur  Pulmonary/Chest:  No respiratory distress, normal rate and effort. Breath sounds clear to auscultation bilaterally.  Abdominal: Abdomen is moderately distended and diffusely tender, worst in periumbilical and LLQ regions. No rigidity but rebound tenderness is notable, with patient wincing and exclaiming in pain when rebound tenderness was tested.   Musculoskeletal:  No deformity.    Neurological:  Awake, alert, no focal deficit on gross examination. No slurred speech or facial droop. Oriented to name and birthday, but not location or current day/year.  Skin:  Skin is warm and dry. Bruising around his peripheral IV on right forearm noted.  Peripheral vascular:  No cyanosis  Psychiatric: Cooperative, flat affect  Edited by: Margo Khan DO at 12/24/2022 1319  ----------------------------------------------------------------------------------------------------------------------  Results from last 7 days   Lab Units 12/24/22  0134 12/23/22  0430 12/22/22  0720 12/22/22  0047 12/21/22  1858 12/21/22  0422 12/21/22  0315 12/20/22  2352 12/20/22  1928 12/20/22  1629   CRP mg/dL  --   --   --   --   --   --   --   --   --  6.78*   LACTATE mmol/L  --   --  1.6  --  4.4* 2.8*  --   --    < > 3.2*   WBC 10*3/mm3 6.93 7.56  --  16.56*  --   --    < >  --   --  9.76   HEMOGLOBIN g/dL 11.4* 11.7*  --  12.0*  --   --    < >  --   --  12.3*   HEMATOCRIT % 34.6* 36.5*  --  39.5  --   --    < >  --   --  35.4*   MCV fL 103.6* 105.8*  --  115.5*  --   --    < >  --   --  101.4*   MCHC g/dL 32.9 32.1  --  30.4*  --   --    < >  --   --  34.7   PLATELETS 10*3/mm3 74* 75*  --  94*  --   --    < >  --   --   105*   INR  1.44*  --   --   --   --   --   --  1.36*  --   --     < > = values in this interval not displayed.     Results from last 7 days   Lab Units 12/20/22 2054   PH, ARTERIAL pH units 7.510*   PO2 ART mm Hg 62.4*   PCO2, ARTERIAL mm Hg 32.4*   HCO3 ART mmol/L 25.8     Results from last 7 days   Lab Units 12/24/22  0134 12/23/22  1738 12/23/22  0430 12/22/22  0047 12/21/22  1519 12/21/22  0315 12/20/22  1629   SODIUM mmol/L 142  --  137 135*  --  137 136   POTASSIUM mmol/L 4.0 4.7 3.5 4.4   < > 3.2* 3.2*   MAGNESIUM mg/dL 1.8  --   --   --   --   --  1.9   CHLORIDE mmol/L 110*  --  108* 105  --  103 101   CO2 mmol/L 18.3*  --  22.7 17.5*  --  20.4* 25.1   BUN mg/dL 10  --  10 9  --  7 8   CREATININE mg/dL 0.44*  --  0.49* 0.56*  --  0.48* 0.71*   CALCIUM mg/dL 7.9*  --  7.7* 8.2*  --  7.7* 8.0*   GLUCOSE mg/dL 88  --  92 117*  --  111* 124*   ALBUMIN g/dL 2.15*  --  2.25*  --   --  2.25* 2.14*   BILIRUBIN mg/dL 2.0*  --  2.2*  --   --  2.6* 3.0*   ALK PHOS U/L 168*  --  179*  --   --  249* 243*   AST (SGOT) U/L 46*  --  44*  --   --  32 28   ALT (SGPT) U/L 22  --  20  --   --  20 20    < > = values in this interval not displayed.   Estimated Creatinine Clearance: 187.3 mL/min (A) (by C-G formula based on SCr of 0.44 mg/dL (L)).  Ammonia   Date Value Ref Range Status   12/23/2022 42 16 - 60 umol/L Final   12/22/2022 65 (H) 16 - 60 umol/L Final     Results from last 7 days   Lab Units 12/20/22  1629   TROPONIN T ng/mL <0.010     Results from last 7 days   Lab Units 12/20/22  1629   PROBNP pg/mL 178.2         No results found for: HGBA1C, POCGLU  Lab Results   Component Value Date    TSH 1.600 12/20/2022    FREET4 0.94 12/20/2022     No results found for: PREGTESTUR, PREGSERUM, HCG, HCGQUANT  Pain Management Panel     Pain Management Panel Latest Ref Rng & Units 12/20/2022 11/15/2022    AMPHETAMINES SCREEN, URINE Negative Negative Negative    BARBITURATES SCREEN Negative Negative Negative    BENZODIAZEPINE  SCREEN, URINE Negative Negative Negative    BUPRENORPHINEUR Negative Negative Negative    COCAINE SCREEN, URINE Negative Negative Negative    METHADONE SCREEN, URINE Negative Negative Negative    METHAMPHETAMINEUR Negative Negative Negative        Brief Urine Lab Results  (Last result in the past 365 days)      Color   Clarity   Blood   Leuk Est   Nitrite   Protein   CREAT   Urine HCG        12/20/22 1849 Orange   Clear   Negative   Trace   Positive   Trace               Blood Culture   Date Value Ref Range Status   12/22/2022 No growth at 24 hours  Preliminary   12/22/2022 No growth at 24 hours  Preliminary   12/20/2022 Staphylococcus, coagulase negative (C)  Final   12/20/2022 No growth at 3 days  Preliminary     No results found for: URINECX  No results found for: WOUNDCX  No results found for: STOOLCX  No results found for: RESPCX  No results found for: AFBCX  Results from last 7 days   Lab Units 12/22/22  0720 12/21/22  1858 12/21/22  0422 12/20/22  1928 12/20/22  1629   PROCALCITONIN ng/mL  --   --   --   --  1.69*   LACTATE mmol/L 1.6 4.4* 2.8* 4.2* 3.2*   CRP mg/dL  --   --   --   --  6.78*       I have personally looked at the labs and they are summarized above.  ----------------------------------------------------------------------------------------------------------------------  Detailed radiology reports for the last 24 hours:  Imaging Results (Last 24 Hours)     ** No results found for the last 24 hours. **        Assessment & Plan      #Bilateral multifocal community acquired pneumonia, present on admission  #Septic shock criteria met with HR>90, lactate>4, CRP>2, and elevated procalcitonin secondary to pneumonia  - Now appears resolved   - Elevated lactate has now normalized. Respiratory status remains stable on room air.  - Blood cultures from 12/20/22 showed coagulase negative staph in 1/2 bottles, thought likely to be contaminant. Repeat blood cultures still show no growth at 24 hours, no updates  made yet. Continue to follow blood culture results.  - Respiratory panel PCR negative, strep pneumo antigen negative  - He has received 4 days of both doxycycline and ceftriaxone. Since there is a rare risk of neurotoxicity related to cephalosporins we will discontinue ceftriaxone, although I would expect more likely to have seizure activity instead of delirium. Current literature points toward at least 3 days of antibiotics for mild-moderate community acquired pneumonia being non-inferior to a longer course, and since he has had significant improvement in respiratory symptoms I think benefit outweighs risk of discontinuing his antibiotics and monitoring closely.    #Confusion  #Alcohol withdrawal  - Etiology of confusion is not yet clear. Thought to be possibly multifactorial secondary to alcohol withdrawal, elevated ammonia level, and acute illness; however, his confusion has persisted despite resolution of elevated ammonia and treatment of infection. He received ativan for elevated CIWA score yesterday, and his wife is concerned that he has had worse confusion when given ativan in the past (though his confusion started before the ativan was given). She also believes it unlikely to be due to alcohol withdrawal, as she reports he has not had alcohol in about 4-5 weeks. She also thinks it unlikely to be psychiatric in origin and says he has had confusion when electrolytes have been abnormal in the past, although sodium and potassium are now WNL and magnesium is 1.8 and being replaced per protocol. Ceftriaxone discontinued as discussed above due to possible rare risk of neurotoxicity. Thiamine supplementation added.   - Continue monitoring closely. Consider Neurology or Psychiatry evaluation if symptoms do not improve with current interventions.    #Liver cirrhosis, unknown etiology  #Moderate volume ascites  #Hyperbilirubinemia  #Hyperammonemia  #Chronically elevated alkaline  phosphatase  #Hypoalbuminemia  #Pseudohypocalcemia 2/2 hypoalbuminemia  #Abdominal pain  - CT findings suggestive of cirrhosis on admission, significantly worse than previous CT in November.  - Etiology unclear, but differential diagnosis favors alcohol +/- acetaminophen induced over hemochromatosis. Less likely primary sclerosing cholangitis as he had a normal MRCP one month ago, but it is still on the differential. Patient initially reported he stopped drinking about 4-5 weeks ago, but after having a fall and becoming anxious he reported to nursing staff he actually stopped drinking 2 days prior to admission. Alcohol withdrawal discussed above.  - Follow up with outpatient GI as scheduled  - Avoid hepatotoxins as able.  - Repeat CMP in AM.  - Since his abdomen seems more distended and tender today I have ordered a repeat CT abdomen/pelvis with contrast after discussing with his wife. He will need premedication with solu-medrol and benadryl due to remote history of reaction to IV contrast and she is agreeable to this. Depending on the results of his CT he may need a repeat MRCP.    #Positive urine drug screen  - UDS positive for TCAs at admission (inconsistent with home medication list). He denied any current drug use.    #Reported left leg weakness  #Multilevel degenerative disc disease  #History of TIA/CVA  - Nonspecific periventricular white matter lesions noted on MRI brain likely representing chronic deep white matter ischemic change, no acute abnormalities noted. MRI lumbar spine showed multilevel degenerative changes with moderate canal stenosis at L5-S1 and mild to moderate canal stenosis at L3-L4.   - Continue neurochecks, fall precautions  - Up with assistance  - PT/OT consulted, appreciate assistance    #Acute hypokalemia  #Borderline hypomagnesemia  - Improved  - Replace per protocol  - Monitor with chemistry panel periodically     #Chronic macrocytic anemia  #Chronic thrombocytopenia  - Followed with  hematology in the past, last appointment in 12/2021  - Iron low, iron saturation elevated, transferrin low, TIBC low on iron studies. Ferritin not obtained so will order this to be added to a.m. labs tomorrow.  - Hgb decreased slightly from 13.6 on admission to 11.4 today, platelets have decreased from 105 to 74. No signs of acute bleeding noted but he does have some mild bruising on exam. INR has remained slightly elevated but stable around 1.3-1.4. Likely secondary to cirrhosis.   - Monitor for development of signs/symptoms of acute bleeding    #Hypertension   - BP is at goal. Monitor per protocol. Continue home metoprolol.     #GERD  #Johnson's esophagus  - Continue PPI    #Ulcerative colitis  - Abdominal pain being evaluated with CT as discussed above. Continue supportive care.    #Tobacco use  - Nicotine patch made available   Edited by: Margo Khan DO at 12/24/2022 1312    VTE Prophylaxis:   Mechanical Order History:     None      Pharmalogical Order History:      Ordered     Dose Route Frequency Stop    12/21/22 0106  heparin (porcine) 5000 UNIT/ML injection 5,000 Units         5,000 Units SC Every 12 Hours Scheduled --                Dispo: TBD based on clinical course     Margo Khan DO  [facility] Hospitalist  12/24/22  13:20 EST

## 2022-12-24 NOTE — NURSING NOTE
Pt's wife called to check on patient, and mentioned that patient becomes more confused when given Ativan. Made a sticky note to physician stating this. (PT had one time dose of Ativan earlier in the day.)

## 2022-12-24 NOTE — PLAN OF CARE
Goal Outcome Evaluation:  Plan of Care Reviewed With: patient        Progress: no change     Pt still confused, having hallucinations. Magnesium is 1.8, ordered replacement per protocol. New IV placed this shift. Sitter at bedside, he has been cooperative this shift. Will continue to monitor and follow current POC.

## 2022-12-24 NOTE — PLAN OF CARE
Goal Outcome Evaluation:      Patient confused this shift. Cooperative with nursing care and interventions. New IV placed for CT Abdomen Pelvis with Contrast, see results. Sitter at bedside. Pt is lying in bed with no S&S of distress. No complaints at this time. Will continue to monitor and follow plan of care.

## 2022-12-25 LAB
ALBUMIN SERPL-MCNC: 2.24 G/DL (ref 3.5–5.2)
ALBUMIN/GLOB SERPL: 0.9 G/DL
ALP SERPL-CCNC: 181 U/L (ref 39–117)
ALT SERPL W P-5'-P-CCNC: 24 U/L (ref 1–41)
ANION GAP SERPL CALCULATED.3IONS-SCNC: 11.8 MMOL/L (ref 5–15)
AST SERPL-CCNC: 42 U/L (ref 1–40)
BACTERIA SPEC AEROBE CULT: NORMAL
BILIRUB SERPL-MCNC: 2 MG/DL (ref 0–1.2)
BUN SERPL-MCNC: 10 MG/DL (ref 6–20)
BUN/CREAT SERPL: 21.3 (ref 7–25)
CALCIUM SPEC-SCNC: 7.9 MG/DL (ref 8.6–10.5)
CHLORIDE SERPL-SCNC: 105 MMOL/L (ref 98–107)
CO2 SERPL-SCNC: 19.2 MMOL/L (ref 22–29)
CREAT SERPL-MCNC: 0.47 MG/DL (ref 0.76–1.27)
DEPRECATED RDW RBC AUTO: 53.6 FL (ref 37–54)
EGFRCR SERPLBLD CKD-EPI 2021: 123.5 ML/MIN/1.73
ERYTHROCYTE [DISTWIDTH] IN BLOOD BY AUTOMATED COUNT: 13.8 % (ref 12.3–15.4)
GLOBULIN UR ELPH-MCNC: 2.6 GM/DL
GLUCOSE SERPL-MCNC: 132 MG/DL (ref 65–99)
HCT VFR BLD AUTO: 37.2 % (ref 37.5–51)
HGB BLD-MCNC: 12.1 G/DL (ref 13–17.7)
MAGNESIUM SERPL-MCNC: 2.2 MG/DL (ref 1.6–2.6)
MCH RBC QN AUTO: 34.5 PG (ref 26.6–33)
MCHC RBC AUTO-ENTMCNC: 32.5 G/DL (ref 31.5–35.7)
MCV RBC AUTO: 106 FL (ref 79–97)
PLATELET # BLD AUTO: 86 10*3/MM3 (ref 140–450)
PMV BLD AUTO: 11 FL (ref 6–12)
POTASSIUM SERPL-SCNC: 4.2 MMOL/L (ref 3.5–5.2)
PROT SERPL-MCNC: 4.8 G/DL (ref 6–8.5)
RBC # BLD AUTO: 3.51 10*6/MM3 (ref 4.14–5.8)
SODIUM SERPL-SCNC: 136 MMOL/L (ref 136–145)
WBC NRBC COR # BLD: 9.68 10*3/MM3 (ref 3.4–10.8)

## 2022-12-25 PROCEDURE — 25010000002 METHYLPREDNISOLONE PER 40 MG: Performed by: STUDENT IN AN ORGANIZED HEALTH CARE EDUCATION/TRAINING PROGRAM

## 2022-12-25 PROCEDURE — 80053 COMPREHEN METABOLIC PANEL: CPT | Performed by: STUDENT IN AN ORGANIZED HEALTH CARE EDUCATION/TRAINING PROGRAM

## 2022-12-25 PROCEDURE — 83735 ASSAY OF MAGNESIUM: CPT | Performed by: STUDENT IN AN ORGANIZED HEALTH CARE EDUCATION/TRAINING PROGRAM

## 2022-12-25 PROCEDURE — 25010000002 HEPARIN (PORCINE) PER 1000 UNITS: Performed by: HOSPITALIST

## 2022-12-25 PROCEDURE — 85027 COMPLETE CBC AUTOMATED: CPT | Performed by: STUDENT IN AN ORGANIZED HEALTH CARE EDUCATION/TRAINING PROGRAM

## 2022-12-25 PROCEDURE — 99231 SBSQ HOSP IP/OBS SF/LOW 25: CPT | Performed by: STUDENT IN AN ORGANIZED HEALTH CARE EDUCATION/TRAINING PROGRAM

## 2022-12-25 RX ADMIN — Medication 100 MG: at 08:29

## 2022-12-25 RX ADMIN — LACTULOSE 10 G: 20 SOLUTION ORAL at 21:02

## 2022-12-25 RX ADMIN — LACTULOSE 10 G: 20 SOLUTION ORAL at 08:34

## 2022-12-25 RX ADMIN — LEVETIRACETAM 750 MG: 500 TABLET, FILM COATED ORAL at 08:27

## 2022-12-25 RX ADMIN — NICOTINE 1 PATCH: 7 PATCH, EXTENDED RELEASE TRANSDERMAL at 08:45

## 2022-12-25 RX ADMIN — LEVETIRACETAM 750 MG: 500 TABLET, FILM COATED ORAL at 14:46

## 2022-12-25 RX ADMIN — FAMOTIDINE 20 MG: 20 TABLET ORAL at 08:29

## 2022-12-25 RX ADMIN — ISOSORBIDE MONONITRATE 30 MG: 30 TABLET, EXTENDED RELEASE ORAL at 08:28

## 2022-12-25 RX ADMIN — METHYLPREDNISOLONE SODIUM SUCCINATE 40 MG: 40 INJECTION, POWDER, FOR SOLUTION INTRAMUSCULAR; INTRAVENOUS at 21:02

## 2022-12-25 RX ADMIN — HEPARIN SODIUM 5000 UNITS: 5000 INJECTION INTRAVENOUS; SUBCUTANEOUS at 08:34

## 2022-12-25 RX ADMIN — FAMOTIDINE 20 MG: 20 TABLET ORAL at 17:09

## 2022-12-25 RX ADMIN — METOPROLOL SUCCINATE 25 MG: 25 TABLET, EXTENDED RELEASE ORAL at 08:28

## 2022-12-25 RX ADMIN — METHYLPREDNISOLONE SODIUM SUCCINATE 40 MG: 40 INJECTION, POWDER, FOR SOLUTION INTRAMUSCULAR; INTRAVENOUS at 17:09

## 2022-12-25 RX ADMIN — ACETAMINOPHEN 650 MG: 325 TABLET ORAL at 15:12

## 2022-12-25 RX ADMIN — DOXYCYCLINE 100 MG: 100 INJECTION, POWDER, LYOPHILIZED, FOR SOLUTION INTRAVENOUS at 08:35

## 2022-12-25 RX ADMIN — LEVETIRACETAM 1500 MG: 500 TABLET, FILM COATED ORAL at 21:03

## 2022-12-25 RX ADMIN — HEPARIN SODIUM 5000 UNITS: 5000 INJECTION INTRAVENOUS; SUBCUTANEOUS at 21:02

## 2022-12-25 RX ADMIN — QUETIAPINE FUMARATE 50 MG: 25 TABLET, FILM COATED ORAL at 21:02

## 2022-12-25 RX ADMIN — METHYLPREDNISOLONE SODIUM SUCCINATE 40 MG: 40 INJECTION, POWDER, FOR SOLUTION INTRAMUSCULAR; INTRAVENOUS at 08:36

## 2022-12-25 RX ADMIN — Medication 10 ML: at 08:35

## 2022-12-25 RX ADMIN — Medication 10 ML: at 21:03

## 2022-12-25 RX ADMIN — METHYLPREDNISOLONE SODIUM SUCCINATE 40 MG: 40 INJECTION, POWDER, FOR SOLUTION INTRAMUSCULAR; INTRAVENOUS at 11:42

## 2022-12-25 RX ADMIN — METHYLPREDNISOLONE SODIUM SUCCINATE 40 MG: 40 INJECTION, POWDER, FOR SOLUTION INTRAMUSCULAR; INTRAVENOUS at 04:37

## 2022-12-25 NOTE — PLAN OF CARE
Goal Outcome Evaluation:              Outcome Evaluation: Pt resting in bed. Pt confused and agitated at times. Pt refused meds at beginning of shift; wife was called to talk to pt and pt was agreeable to take medications. Mag was replaced last shift; mag recheck 2.2. No other acute changes noted. Sitter at bedside. Will cont POC

## 2022-12-25 NOTE — PLAN OF CARE
Goal Outcome Evaluation:           Progress: no change  Outcome Evaluation: pt resting in bed, family at bedside, pt confused at times, no acute changes in pt, will continue plan of care

## 2022-12-26 LAB
ALBUMIN SERPL-MCNC: 2.61 G/DL (ref 3.5–5.2)
ALBUMIN/GLOB SERPL: 1.1 G/DL
ALP SERPL-CCNC: 178 U/L (ref 39–117)
ALT SERPL W P-5'-P-CCNC: 32 U/L (ref 1–41)
ANION GAP SERPL CALCULATED.3IONS-SCNC: 11.6 MMOL/L (ref 5–15)
AST SERPL-CCNC: 58 U/L (ref 1–40)
BILIRUB SERPL-MCNC: 2.2 MG/DL (ref 0–1.2)
BUN SERPL-MCNC: 10 MG/DL (ref 6–20)
BUN/CREAT SERPL: 21.3 (ref 7–25)
CALCIUM SPEC-SCNC: 8.3 MG/DL (ref 8.6–10.5)
CHLORIDE SERPL-SCNC: 106 MMOL/L (ref 98–107)
CO2 SERPL-SCNC: 20.4 MMOL/L (ref 22–29)
CREAT SERPL-MCNC: 0.47 MG/DL (ref 0.76–1.27)
DEPRECATED RDW RBC AUTO: 53.1 FL (ref 37–54)
EGFRCR SERPLBLD CKD-EPI 2021: 123.5 ML/MIN/1.73
ERYTHROCYTE [DISTWIDTH] IN BLOOD BY AUTOMATED COUNT: 14 % (ref 12.3–15.4)
GLOBULIN UR ELPH-MCNC: 2.3 GM/DL
GLUCOSE SERPL-MCNC: 134 MG/DL (ref 65–99)
HCT VFR BLD AUTO: 38 % (ref 37.5–51)
HGB BLD-MCNC: 12.5 G/DL (ref 13–17.7)
MAGNESIUM SERPL-MCNC: 2.1 MG/DL (ref 1.6–2.6)
MCH RBC QN AUTO: 34.3 PG (ref 26.6–33)
MCHC RBC AUTO-ENTMCNC: 32.9 G/DL (ref 31.5–35.7)
MCV RBC AUTO: 104.4 FL (ref 79–97)
PLATELET # BLD AUTO: 103 10*3/MM3 (ref 140–450)
PMV BLD AUTO: 10.9 FL (ref 6–12)
POTASSIUM SERPL-SCNC: 4.2 MMOL/L (ref 3.5–5.2)
PROT SERPL-MCNC: 4.9 G/DL (ref 6–8.5)
QT INTERVAL: 342 MS
QTC INTERVAL: 449 MS
RBC # BLD AUTO: 3.64 10*6/MM3 (ref 4.14–5.8)
SODIUM SERPL-SCNC: 138 MMOL/L (ref 136–145)
WBC NRBC COR # BLD: 18.85 10*3/MM3 (ref 3.4–10.8)

## 2022-12-26 PROCEDURE — 93010 ELECTROCARDIOGRAM REPORT: CPT | Performed by: SPECIALIST

## 2022-12-26 PROCEDURE — 85027 COMPLETE CBC AUTOMATED: CPT | Performed by: STUDENT IN AN ORGANIZED HEALTH CARE EDUCATION/TRAINING PROGRAM

## 2022-12-26 PROCEDURE — 99232 SBSQ HOSP IP/OBS MODERATE 35: CPT | Performed by: INTERNAL MEDICINE

## 2022-12-26 PROCEDURE — 25010000002 ZIPRASIDONE MESYLATE PER 10 MG: Performed by: INTERNAL MEDICINE

## 2022-12-26 PROCEDURE — 80053 COMPREHEN METABOLIC PANEL: CPT | Performed by: STUDENT IN AN ORGANIZED HEALTH CARE EDUCATION/TRAINING PROGRAM

## 2022-12-26 PROCEDURE — 83735 ASSAY OF MAGNESIUM: CPT | Performed by: STUDENT IN AN ORGANIZED HEALTH CARE EDUCATION/TRAINING PROGRAM

## 2022-12-26 PROCEDURE — 93005 ELECTROCARDIOGRAM TRACING: CPT | Performed by: PHYSICIAN ASSISTANT

## 2022-12-26 PROCEDURE — 25010000002 METHYLPREDNISOLONE PER 40 MG: Performed by: STUDENT IN AN ORGANIZED HEALTH CARE EDUCATION/TRAINING PROGRAM

## 2022-12-26 PROCEDURE — 25010000002 HEPARIN (PORCINE) PER 1000 UNITS: Performed by: HOSPITALIST

## 2022-12-26 PROCEDURE — 25010000002 ZIPRASIDONE MESYLATE PER 10 MG: Performed by: PHYSICIAN ASSISTANT

## 2022-12-26 RX ADMIN — Medication 100 MG: at 09:01

## 2022-12-26 RX ADMIN — LACTULOSE 10 G: 20 SOLUTION ORAL at 09:05

## 2022-12-26 RX ADMIN — QUETIAPINE FUMARATE 50 MG: 25 TABLET, FILM COATED ORAL at 20:16

## 2022-12-26 RX ADMIN — LACTULOSE 10 G: 20 SOLUTION ORAL at 20:17

## 2022-12-26 RX ADMIN — HEPARIN SODIUM 5000 UNITS: 5000 INJECTION INTRAVENOUS; SUBCUTANEOUS at 20:16

## 2022-12-26 RX ADMIN — LEVETIRACETAM 1500 MG: 500 TABLET, FILM COATED ORAL at 20:16

## 2022-12-26 RX ADMIN — METHYLPREDNISOLONE SODIUM SUCCINATE 40 MG: 40 INJECTION, POWDER, FOR SOLUTION INTRAMUSCULAR; INTRAVENOUS at 11:59

## 2022-12-26 RX ADMIN — METHYLPREDNISOLONE SODIUM SUCCINATE 40 MG: 40 INJECTION, POWDER, FOR SOLUTION INTRAMUSCULAR; INTRAVENOUS at 04:47

## 2022-12-26 RX ADMIN — METOPROLOL SUCCINATE 25 MG: 25 TABLET, EXTENDED RELEASE ORAL at 09:01

## 2022-12-26 RX ADMIN — FAMOTIDINE 20 MG: 20 TABLET ORAL at 09:01

## 2022-12-26 RX ADMIN — LORAZEPAM 2 MG: 1 TABLET ORAL at 20:16

## 2022-12-26 RX ADMIN — METHYLPREDNISOLONE SODIUM SUCCINATE 40 MG: 40 INJECTION, POWDER, FOR SOLUTION INTRAMUSCULAR; INTRAVENOUS at 17:18

## 2022-12-26 RX ADMIN — METHYLPREDNISOLONE SODIUM SUCCINATE 40 MG: 40 INJECTION, POWDER, FOR SOLUTION INTRAMUSCULAR; INTRAVENOUS at 01:30

## 2022-12-26 RX ADMIN — METHYLPREDNISOLONE SODIUM SUCCINATE 40 MG: 40 INJECTION, POWDER, FOR SOLUTION INTRAMUSCULAR; INTRAVENOUS at 09:00

## 2022-12-26 RX ADMIN — ZIPRASIDONE MESYLATE 10 MG: 20 INJECTION, POWDER, LYOPHILIZED, FOR SOLUTION INTRAMUSCULAR at 17:13

## 2022-12-26 RX ADMIN — METHYLPREDNISOLONE SODIUM SUCCINATE 40 MG: 40 INJECTION, POWDER, FOR SOLUTION INTRAMUSCULAR; INTRAVENOUS at 23:52

## 2022-12-26 RX ADMIN — ZIPRASIDONE MESYLATE 10 MG: 20 INJECTION, POWDER, LYOPHILIZED, FOR SOLUTION INTRAMUSCULAR at 22:29

## 2022-12-26 RX ADMIN — METHYLPREDNISOLONE SODIUM SUCCINATE 40 MG: 40 INJECTION, POWDER, FOR SOLUTION INTRAMUSCULAR; INTRAVENOUS at 20:16

## 2022-12-26 RX ADMIN — HEPARIN SODIUM 5000 UNITS: 5000 INJECTION INTRAVENOUS; SUBCUTANEOUS at 09:00

## 2022-12-26 RX ADMIN — ZIPRASIDONE MESYLATE 10 MG: 20 INJECTION, POWDER, LYOPHILIZED, FOR SOLUTION INTRAMUSCULAR at 06:34

## 2022-12-26 RX ADMIN — NICOTINE 1 PATCH: 7 PATCH, EXTENDED RELEASE TRANSDERMAL at 09:02

## 2022-12-26 RX ADMIN — Medication 10 ML: at 09:02

## 2022-12-26 RX ADMIN — ISOSORBIDE MONONITRATE 30 MG: 30 TABLET, EXTENDED RELEASE ORAL at 09:00

## 2022-12-26 RX ADMIN — FAMOTIDINE 20 MG: 20 TABLET ORAL at 17:18

## 2022-12-26 RX ADMIN — LEVETIRACETAM 750 MG: 500 TABLET, FILM COATED ORAL at 14:58

## 2022-12-26 NOTE — NURSING NOTE
Called pt's wife and requested her to come and sit with pt. She said she can, and will be here by approximately 0800.

## 2022-12-26 NOTE — PROGRESS NOTES
[facility]     Patient Identification:  Name:  Jamison Edward  Age:  52 y.o.  Sex:  male  :  1970  MRN:  [id number]  Visit Number:  [id number]  Primary Care Provider:  Leticia Martinez APRN    Length of stay:  6    Chief complaint: None    Subjective:    Patient seen and examined at bedside with CNA/sitter present.  Patient doing well and was asleep at time of entering the room.  Patient was easily awaken but quickly falls back asleep.  Per nursing staff, patient did require Geodon overnight secondary to agitation.  ----------------------------------------------------------------------------------------------------------------------  [facility] Meds:  famotidine, 20 mg, Oral, BID AC  heparin (porcine), 5,000 Units, Subcutaneous, Q12H  isosorbide mononitrate, 30 mg, Oral, Daily  lactulose, 10 g, Oral, BID  levETIRAcetam, 1,500 mg, Oral, Nightly  levETIRAcetam, 750 mg, Oral, BID  methylPREDNISolone sodium succinate, 40 mg, Intravenous, Q4H  metoprolol succinate XL, 25 mg, Oral, Daily  nicotine, 1 patch, Transdermal, Q24H  OLANZapine zydis, 5 mg, Oral, Once  QUEtiapine, 50 mg, Oral, Nightly  sodium chloride, 10 mL, Intravenous, Q12H  thiamine, 100 mg, Oral, Daily  ziprasidone (GEODON) injection with sterile water, 10 mg, Intramuscular, Once         ----------------------------------------------------------------------------------------------------------------------  Vital Signs:         22  0500 22  0500 22  0500   Weight: 76.8 kg (169 lb 4.8 oz) 76.8 kg (169 lb 6.4 oz) 76.5 kg (168 lb 9.6 oz)     Body mass index is 27.21 kg/m².    Intake/Output Summary (Last 24 hours) at 2022  Last data filed at 2022  Gross per 24 hour   Intake 600 ml   Output --   Net 600 ml     Diet: Regular/House Diet; Texture: Regular Texture (IDDSI 7); Fluid Consistency: Thin (IDDSI  0)  ----------------------------------------------------------------------------------------------------------------------  Physical exam:  Constitutional: Well-nourished  male in no apparent distress.     HENT:  Head:  Normocephalic and atraumatic.  Mouth:  Moist mucous membranes.    Eyes:  Conjunctivae and EOM are normal.  Pupils are equal, round, and reactive to light.  No scleral icterus.    Neck:  Neck supple. No thyromegaly.  No JVD present.    Cardiovascular:  Regular rate and rhythm with no murmurs, rubs, clicks or gallops appreciated.  Pulmonary/Chest:  Clear to auscultation bilaterally with no crackles, wheezes or rhonchi appreciated.  Abdominal:  Soft. Nontender. Nondistended  Bowel sounds are normal in all four quadrants. No organomegally appreciated.   Musculoskeletal:  No edema, no tenderness, and no deformity.  No red or swollen joints anywhere.    Neurological:  Alert, Cranial nerves II-XII intact with no focal deficits.  No facial droop.  No slurred speech.   Skin:  Warm and dry to palpation with no rashes or lesions appreciated.  Peripheral vascular:  2+ radial and pedal pulses in bilateral upper and lower extremities.  ----------------------------------------------------------------------------------  ----------------------------------------------------------------------------------------------------------------------  Results from last 7 days   Lab Units 12/20/22  1629   TROPONIN T ng/mL <0.010     Results from last 7 days   Lab Units 12/26/22  0703 12/25/22  0007 12/24/22  0134 12/23/22  0430 12/22/22  0720 12/22/22  0047 12/21/22  1858 12/21/22  0422 12/21/22  0315 12/20/22  2352 12/20/22  1928 12/20/22  1629   CRP mg/dL  --   --   --   --   --   --   --   --   --   --   --  6.78*   LACTATE mmol/L  --   --   --   --  1.6  --  4.4* 2.8*  --   --    < > 3.2*   WBC 10*3/mm3 18.85* 9.68 6.93   < >  --    < >  --   --    < >  --   --  9.76   HEMOGLOBIN g/dL 12.5* 12.1* 11.4*   < >  --    <  >  --   --    < >  --   --  12.3*   HEMATOCRIT % 38.0 37.2* 34.6*   < >  --    < >  --   --    < >  --   --  35.4*   MCV fL 104.4* 106.0* 103.6*   < >  --    < >  --   --    < >  --   --  101.4*   MCHC g/dL 32.9 32.5 32.9   < >  --    < >  --   --    < >  --   --  34.7   PLATELETS 10*3/mm3 103* 86* 74*   < >  --    < >  --   --    < >  --   --  105*   INR   --   --  1.44*  --   --   --   --   --   --  1.36*  --   --     < > = values in this interval not displayed.     Results from last 7 days   Lab Units 12/20/22 2054   PH, ARTERIAL pH units 7.510*   PO2 ART mm Hg 62.4*   PCO2, ARTERIAL mm Hg 32.4*   HCO3 ART mmol/L 25.8     Results from last 7 days   Lab Units 12/26/22  0703 12/25/22  0042 12/25/22  0007 12/24/22  0134   SODIUM mmol/L 138  --  136 142   POTASSIUM mmol/L 4.2  --  4.2 4.0   MAGNESIUM mg/dL 2.1 2.2  --  1.8   CHLORIDE mmol/L 106  --  105 110*   CO2 mmol/L 20.4*  --  19.2* 18.3*   BUN mg/dL 10  --  10 10   CREATININE mg/dL 0.47*  --  0.47* 0.44*   CALCIUM mg/dL 8.3*  --  7.9* 7.9*   GLUCOSE mg/dL 134*  --  132* 88   ALBUMIN g/dL 2.61*  --  2.24* 2.15*   BILIRUBIN mg/dL 2.2*  --  2.0* 2.0*   ALK PHOS U/L 178*  --  181* 168*   AST (SGOT) U/L 58*  --  42* 46*   ALT (SGPT) U/L 32  --  24 22   Estimated Creatinine Clearance: 194.4 mL/min (A) (by C-G formula based on SCr of 0.47 mg/dL (L)).    No results found for: AMMONIA      Blood Culture   Date Value Ref Range Status   12/22/2022 No growth at 4 days  Preliminary   12/22/2022 No growth at 4 days  Preliminary   12/20/2022 Staphylococcus, coagulase negative (C)  Final   12/20/2022 No growth at 5 days  Final     No results found for: URINECX  No results found for: WOUNDCX  No results found for: STOOLCX    I have personally looked at the labs and they are summarized above.  ----------------------------------------------------------------------------------------------------------------------  Imaging Results (Last 24 Hours)     ** No results found for the  last 24 hours. **        ----------------------------------------------------------------------------------------------------------------------  Assessment and Plan:    1.  Bilateral multifocal pneumonia -patient has completed full course of antibiotic therapy with Rocephin and doxycycline.    2.  Septic shock (by CMS criteria) -patient had elevated heart rate with lactic acid greater than 4 on admission.  Patient never required vasopressor administration.  Septic shock secondary to bilateral pneumonia.  Patient has completed full course of antibiotic therapy.    3.  Altered mental status -etiology unclear and patient did require Geodon overnight.    4.  Hepatic cirrhosis -we will plan for outpatient GI follow-up after discharge.  Patient also with large volume ascites.  We will plan for paracentesis tomorrow.    5.  Hypokalemia -now resolved    6.  Chronic thrombocytopenia -no evidence of active bleeding at this time, will continue to monitor closely.      Disposition we will attempt paracentesis tomorrow depending on patient's mental status.    Adelfo Bautista,    12/26/22   16:52 EST
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
## 2022-12-26 NOTE — PLAN OF CARE
Goal Outcome Evaluation:  Plan of Care Reviewed With: patient        Progress: no change    Pt had an episode of confusion and combative behavior (see nursing note.) No other acute changes, continue to monitor.

## 2022-12-26 NOTE — PLAN OF CARE
Goal Outcome Evaluation:  Plan of Care Reviewed With: patient        Progress: no change  Outcome Evaluation: Pt's resting in bed, alert to self only, family at bedside. Pt became agitated afternoon, required one time dose of geodon, took all his medicine. VSS. Will con't to monitor.
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
## 2022-12-26 NOTE — SIGNIFICANT NOTE
12/26/22 1427   OTHER   Discipline physical therapist   Rehab Time/Intention   Session Not Performed other (see comments)   Recommendation   PT - Next Appointment   (RN advises to leave pt alone this date)

## 2022-12-27 LAB
AMMONIA BLD-SCNC: 37 UMOL/L (ref 16–60)
ANION GAP SERPL CALCULATED.3IONS-SCNC: 10.8 MMOL/L (ref 5–15)
BACTERIA SPEC AEROBE CULT: NORMAL
BACTERIA SPEC AEROBE CULT: NORMAL
BUN SERPL-MCNC: 11 MG/DL (ref 6–20)
BUN/CREAT SERPL: 22.4 (ref 7–25)
CALCIUM SPEC-SCNC: 8.7 MG/DL (ref 8.6–10.5)
CHLORIDE SERPL-SCNC: 108 MMOL/L (ref 98–107)
CO2 SERPL-SCNC: 22.2 MMOL/L (ref 22–29)
CREAT SERPL-MCNC: 0.49 MG/DL (ref 0.76–1.27)
DEPRECATED RDW RBC AUTO: 58.8 FL (ref 37–54)
EGFRCR SERPLBLD CKD-EPI 2021: 121.9 ML/MIN/1.73
ERYTHROCYTE [DISTWIDTH] IN BLOOD BY AUTOMATED COUNT: 14.4 % (ref 12.3–15.4)
GLUCOSE SERPL-MCNC: 138 MG/DL (ref 65–99)
HCT VFR BLD AUTO: 40.6 % (ref 37.5–51)
HGB BLD-MCNC: 12.7 G/DL (ref 13–17.7)
MCH RBC QN AUTO: 34.5 PG (ref 26.6–33)
MCHC RBC AUTO-ENTMCNC: 31.3 G/DL (ref 31.5–35.7)
MCV RBC AUTO: 110.3 FL (ref 79–97)
PLATELET # BLD AUTO: 87 10*3/MM3 (ref 140–450)
PMV BLD AUTO: 10.7 FL (ref 6–12)
POTASSIUM SERPL-SCNC: 4.7 MMOL/L (ref 3.5–5.2)
PROCALCITONIN SERPL-MCNC: 0.19 NG/ML (ref 0–0.25)
RBC # BLD AUTO: 3.68 10*6/MM3 (ref 4.14–5.8)
SODIUM SERPL-SCNC: 141 MMOL/L (ref 136–145)
T4 FREE SERPL-MCNC: 0.82 NG/DL (ref 0.93–1.7)
TSH SERPL DL<=0.05 MIU/L-ACNC: 1.81 UIU/ML (ref 0.27–4.2)
WBC NRBC COR # BLD: 13.49 10*3/MM3 (ref 3.4–10.8)

## 2022-12-27 PROCEDURE — 87040 BLOOD CULTURE FOR BACTERIA: CPT | Performed by: INTERNAL MEDICINE

## 2022-12-27 PROCEDURE — 80048 BASIC METABOLIC PNL TOTAL CA: CPT | Performed by: INTERNAL MEDICINE

## 2022-12-27 PROCEDURE — 84443 ASSAY THYROID STIM HORMONE: CPT | Performed by: INTERNAL MEDICINE

## 2022-12-27 PROCEDURE — 84145 PROCALCITONIN (PCT): CPT | Performed by: INTERNAL MEDICINE

## 2022-12-27 PROCEDURE — 82607 VITAMIN B-12: CPT | Performed by: INTERNAL MEDICINE

## 2022-12-27 PROCEDURE — 25010000002 HEPARIN (PORCINE) PER 1000 UNITS: Performed by: HOSPITALIST

## 2022-12-27 PROCEDURE — 84439 ASSAY OF FREE THYROXINE: CPT | Performed by: INTERNAL MEDICINE

## 2022-12-27 PROCEDURE — 99233 SBSQ HOSP IP/OBS HIGH 50: CPT | Performed by: INTERNAL MEDICINE

## 2022-12-27 PROCEDURE — 82746 ASSAY OF FOLIC ACID SERUM: CPT | Performed by: INTERNAL MEDICINE

## 2022-12-27 PROCEDURE — 82140 ASSAY OF AMMONIA: CPT | Performed by: INTERNAL MEDICINE

## 2022-12-27 PROCEDURE — 25010000002 METHYLPREDNISOLONE PER 40 MG: Performed by: STUDENT IN AN ORGANIZED HEALTH CARE EDUCATION/TRAINING PROGRAM

## 2022-12-27 PROCEDURE — 85027 COMPLETE CBC AUTOMATED: CPT | Performed by: INTERNAL MEDICINE

## 2022-12-27 PROCEDURE — 25010000002 PROCHLORPERAZINE 10 MG/2ML SOLUTION: Performed by: STUDENT IN AN ORGANIZED HEALTH CARE EDUCATION/TRAINING PROGRAM

## 2022-12-27 RX ADMIN — METHYLPREDNISOLONE SODIUM SUCCINATE 40 MG: 40 INJECTION, POWDER, FOR SOLUTION INTRAMUSCULAR; INTRAVENOUS at 16:31

## 2022-12-27 RX ADMIN — LACTULOSE 10 G: 20 SOLUTION ORAL at 20:50

## 2022-12-27 RX ADMIN — METHYLPREDNISOLONE SODIUM SUCCINATE 40 MG: 40 INJECTION, POWDER, FOR SOLUTION INTRAMUSCULAR; INTRAVENOUS at 12:36

## 2022-12-27 RX ADMIN — METHYLPREDNISOLONE SODIUM SUCCINATE 40 MG: 40 INJECTION, POWDER, FOR SOLUTION INTRAMUSCULAR; INTRAVENOUS at 20:50

## 2022-12-27 RX ADMIN — HEPARIN SODIUM 5000 UNITS: 5000 INJECTION INTRAVENOUS; SUBCUTANEOUS at 20:50

## 2022-12-27 RX ADMIN — LEVETIRACETAM 750 MG: 500 TABLET, FILM COATED ORAL at 16:31

## 2022-12-27 RX ADMIN — LEVETIRACETAM 1500 MG: 500 TABLET, FILM COATED ORAL at 20:50

## 2022-12-27 RX ADMIN — ISOSORBIDE MONONITRATE 30 MG: 30 TABLET, EXTENDED RELEASE ORAL at 08:00

## 2022-12-27 RX ADMIN — METHYLPREDNISOLONE SODIUM SUCCINATE 40 MG: 40 INJECTION, POWDER, FOR SOLUTION INTRAMUSCULAR; INTRAVENOUS at 04:12

## 2022-12-27 RX ADMIN — FAMOTIDINE 20 MG: 20 TABLET ORAL at 16:32

## 2022-12-27 RX ADMIN — LACTULOSE 10 G: 20 SOLUTION ORAL at 08:01

## 2022-12-27 RX ADMIN — METHYLPREDNISOLONE SODIUM SUCCINATE 40 MG: 40 INJECTION, POWDER, FOR SOLUTION INTRAMUSCULAR; INTRAVENOUS at 23:55

## 2022-12-27 RX ADMIN — QUETIAPINE FUMARATE 50 MG: 25 TABLET, FILM COATED ORAL at 20:50

## 2022-12-27 RX ADMIN — PROCHLORPERAZINE EDISYLATE 5 MG: 5 INJECTION INTRAMUSCULAR; INTRAVENOUS at 12:36

## 2022-12-27 RX ADMIN — Medication 10 ML: at 20:50

## 2022-12-27 RX ADMIN — METOPROLOL SUCCINATE 25 MG: 25 TABLET, EXTENDED RELEASE ORAL at 08:00

## 2022-12-27 RX ADMIN — LEVETIRACETAM 750 MG: 500 TABLET, FILM COATED ORAL at 06:01

## 2022-12-27 RX ADMIN — METHYLPREDNISOLONE SODIUM SUCCINATE 40 MG: 40 INJECTION, POWDER, FOR SOLUTION INTRAMUSCULAR; INTRAVENOUS at 08:00

## 2022-12-27 RX ADMIN — NICOTINE 1 PATCH: 7 PATCH, EXTENDED RELEASE TRANSDERMAL at 07:59

## 2022-12-27 RX ADMIN — Medication 100 MG: at 08:00

## 2022-12-27 RX ADMIN — FAMOTIDINE 20 MG: 20 TABLET ORAL at 08:00

## 2022-12-27 NOTE — CASE MANAGEMENT/SOCIAL WORK
Continued Stay Note   Yariel     Patient Name: Jamison Edward  MRN: [id number]  Today's Date: 12/27/2022    Admit Date: 12/20/2022    Plan: CM f/u with pt's spouse at the bedside. Pt still with confusion. Pt's spouse plans for him to return home at d/c. He has a shower bench and b/p cuff via unknown provider. She request a RW and does not have a preference for provider. He does not have HH. Pt's spouse will transport at d/c.   Discharge Plan     Row Name 12/27/22 1433       Plan    Plan CM f/u with pt's spouse at the bedside. Pt still with confusion. Pt's spouse plans for him to return home at d/c. He has a shower bench and b/p cuff via unknown provider. She request a RW and does not have a preference for provider. He does not have HH. Pt's spouse will transport at d/c.    Patient/Family in Agreement with Plan yes    Plan Comments 12/27: sitter at BS last pm, pt's wife and dtr at bedside today,  Tremor, agitation cont CIWA prot, Geodon x1 yesterday, alert oriented to self, plan poss paracentesis today              Expected Discharge Date and Time     Expected Discharge Date Expected Discharge Time    Dec 29, 2022         Fadumo Petersen RN

## 2022-12-27 NOTE — SIGNIFICANT NOTE
12/27/22 1104   OTHER   Discipline physical therapist   Rehab Time/Intention   Session Not Performed unable to treat, medical status change   Therapy Assessment/Plan (PT)   Criteria for Skilled Interventions Met (PT) other (see comments)   Recommendation   PT - Next Appointment   (PT followed up with pt who lately has been confused/combative. This AM pt is confused/disoriented and seemed to get slightly irritated with orientation questions, PT signing off for treatment at this time.)

## 2022-12-27 NOTE — PLAN OF CARE
Goal Outcome Evaluation:               Pt resting in bed with wife and daughter at bedside. Pt has been less agitated than in previous reporting from nightshift RN. Pt non combative so far this shift. No other complaints at this time.

## 2022-12-27 NOTE — PROGRESS NOTES
[facility]     Patient Identification:  Name:  Jamison Edward  Age:  52 y.o.  Sex:  male  :  1970  MRN:  [id number]  Visit Number:  [id number]  Primary Care Provider:  Leticia Martinez APRN    Length of stay:  7    Chief complaint: None    Subjective:    Patient seen and examined at bedside with patient's nurse present as well as patient's wife.  Patient was sitting up in a chair and was not responsive to questions but was acutely confused.  Per nursing staff, patient has been pleasantly confused throughout the day today.  He denies any fevers, chills, sweats, rigors, nausea or vomiting.  ----------------------------------------------------------------------------------------------------------------------  [facility] Meds:  famotidine, 20 mg, Oral, BID AC  heparin (porcine), 5,000 Units, Subcutaneous, Q12H  isosorbide mononitrate, 30 mg, Oral, Daily  lactulose, 10 g, Oral, BID  levETIRAcetam, 1,500 mg, Oral, Nightly  levETIRAcetam, 750 mg, Oral, BID  methylPREDNISolone sodium succinate, 40 mg, Intravenous, Q4H  metoprolol succinate XL, 25 mg, Oral, Daily  nicotine, 1 patch, Transdermal, Q24H  OLANZapine zydis, 5 mg, Oral, Once  QUEtiapine, 50 mg, Oral, Nightly  sodium chloride, 10 mL, Intravenous, Q12H  thiamine, 100 mg, Oral, Daily         ----------------------------------------------------------------------------------------------------------------------  Vital Signs:  Temp:  [97.1 °F (36.2 °C)-97.8 °F (36.6 °C)] 97.1 °F (36.2 °C)  Heart Rate:  [108-124] 108  Resp:  [18-20] 18  BP: (124-134)/(76-81) 134/80      22  0500 22  0500 22  0500   Weight: 76.8 kg (169 lb 4.8 oz) 76.8 kg (169 lb 6.4 oz) 76.5 kg (168 lb 9.6 oz)     Body mass index is 27.21 kg/m².    Intake/Output Summary (Last 24 hours) at 2022 1814  Last data filed at 2022 1230  Gross per 24 hour   Intake 240 ml   Output --   Net 240 ml     Diet: Regular/House Diet;  Texture: Regular Texture (IDDSI 7); Fluid Consistency: Thin (IDDSI 0)  ----------------------------------------------------------------------------------------------------------------------  Physical exam:  Constitutional: Well-nourished  male in no apparent distress.     HENT:  Head:  Normocephalic and atraumatic.  Mouth:  Moist mucous membranes.    Eyes:  Conjunctivae and EOM are normal.  Pupils are equal, round, and reactive to light.  No scleral icterus.    Neck:  Neck supple. No thyromegaly.  No JVD present.    Cardiovascular:  Regular rate and rhythm with no murmurs, rubs, clicks or gallops appreciated.  Pulmonary/Chest:  Clear to auscultation bilaterally with no crackles, wheezes or rhonchi appreciated.  Abdominal:  Soft. Nontender. Nondistended  Bowel sounds are normal in all four quadrants. No organomegally appreciated.   Musculoskeletal:  No edema, no tenderness, and no deformity.  No red or swollen joints anywhere.    Neurological:  Alert, Cranial nerves II-XII intact with no focal deficits.  No facial droop.  No slurred speech.   Skin:  Warm and dry to palpation with no rashes or lesions appreciated.  Peripheral vascular:  2+ radial and pedal pulses in bilateral upper and lower extremities.  ----------------------------------------------------------------------------------  ----------------------------------------------------------------------------------------------------------------------      Results from last 7 days   Lab Units 12/26/22  0703 12/25/22  0007 12/24/22  0134 12/23/22  0430 12/22/22  0720 12/22/22  0047 12/21/22  1858 12/21/22  0422 12/21/22  0315 12/20/22  2352   LACTATE mmol/L  --   --   --   --  1.6  --  4.4* 2.8*  --   --    WBC 10*3/mm3 18.85* 9.68 6.93   < >  --    < >  --   --    < >  --    HEMOGLOBIN g/dL 12.5* 12.1* 11.4*   < >  --    < >  --   --    < >  --    HEMATOCRIT % 38.0 37.2* 34.6*   < >  --    < >  --   --    < >  --    MCV fL 104.4* 106.0* 103.6*   < >  --     < >  --   --    < >  --    MCHC g/dL 32.9 32.5 32.9   < >  --    < >  --   --    < >  --    PLATELETS 10*3/mm3 103* 86* 74*   < >  --    < >  --   --    < >  --    INR   --   --  1.44*  --   --   --   --   --   --  1.36*    < > = values in this interval not displayed.     Results from last 7 days   Lab Units 12/20/22 2054   PH, ARTERIAL pH units 7.510*   PO2 ART mm Hg 62.4*   PCO2, ARTERIAL mm Hg 32.4*   HCO3 ART mmol/L 25.8     Results from last 7 days   Lab Units 12/26/22  0703 12/25/22  0042 12/25/22  0007 12/24/22  0134   SODIUM mmol/L 138  --  136 142   POTASSIUM mmol/L 4.2  --  4.2 4.0   MAGNESIUM mg/dL 2.1 2.2  --  1.8   CHLORIDE mmol/L 106  --  105 110*   CO2 mmol/L 20.4*  --  19.2* 18.3*   BUN mg/dL 10  --  10 10   CREATININE mg/dL 0.47*  --  0.47* 0.44*   CALCIUM mg/dL 8.3*  --  7.9* 7.9*   GLUCOSE mg/dL 134*  --  132* 88   ALBUMIN g/dL 2.61*  --  2.24* 2.15*   BILIRUBIN mg/dL 2.2*  --  2.0* 2.0*   ALK PHOS U/L 178*  --  181* 168*   AST (SGOT) U/L 58*  --  42* 46*   ALT (SGPT) U/L 32  --  24 22   Estimated Creatinine Clearance: 194.4 mL/min (A) (by C-G formula based on SCr of 0.47 mg/dL (L)).    No results found for: AMMONIA      Blood Culture   Date Value Ref Range Status   12/22/2022 No growth at 4 days  Preliminary   12/22/2022 No growth at 4 days  Preliminary   12/20/2022 Staphylococcus, coagulase negative (C)  Final   12/20/2022 No growth at 5 days  Final     No results found for: URINECX  No results found for: WOUNDCX  No results found for: STOOLCX    I have personally looked at the labs and they are summarized above.  ----------------------------------------------------------------------------------------------------------------------  Imaging Results (Last 24 Hours)     ** No results found for the last 24 hours. **        ----------------------------------------------------------------------------------------------------------------------  Assessment and Plan:    1.  Bilateral multifocal pneumonia  - patient has completed full course of antibiotic therapy with Rocephin and doxycycline.    2.  Septic shock (by CMS criteria) -patient had elevated heart rate with lactic acid greater than 4 on admission.  Patient never required vasopressor administration.  Septic shock secondary to bilateral pneumonia.  Patient has completed full course of antibiotic therapy.    3.  Altered mental status -patient still altered but in a pleasant mood.  Etiology of altered mental status is not quite clear.  In the setting of elevated white blood cell count, will repeat UA with culture, blood cultures x2 and chest x-ray.  We will also obtain ammonia, TSH, free T4, B12 and folate.    4.  Hepatic cirrhosis -we will plan for outpatient GI follow-up after discharge.  Patient also with large volume ascites.  We will plan for paracentesis tomorrow.    5.  Hypokalemia -now resolved    6.  Chronic thrombocytopenia -no evidence of active bleeding at this time, will continue to monitor closely.      Disposition we will attempt paracentesis tomorrow depending on patient's mental status.    Adelfo Bautista, DO   12/27/22   18:14 EST

## 2022-12-27 NOTE — PLAN OF CARE
Daily Note     Today's date: 2021  Patient name: Asif Field  : 1959  MRN: [id number]  Referring provider: Trisha Green PA-C  Dx:   Encounter Diagnosis     ICD-10-CM    1  Traumatic incomplete tear of left rotator cuff, initial encounter  S46 012A                   Subjective: Pt reports that he has not had any pain with use of his L arm  He states doing most activities this weekend without noticing any discomfort  Notes that only feeling some discomfort with scaption exercise  States that in the past week, notes feeling like he has made significant progress in his shoulder, noting his shoulder has felt much improved  Pt is following up with his orthopedic surgeon in October  Objective: See treatment diary below      Assessment: Pt is continuing well in the strengthening phase of RTC repair protocol with no complaints apart from some discomfort with scaption, expected secondary to likely weakness of supraspinatus, therefore gradually will continue loading with emphasis on endurance w/ repetitions  Progressions throughout session today as listed per chart with no complaints  As pt is doing well with strength based exercises, reduced sessions to 1x/week with continued strengthening at home with updates for upgrading strength exercises each week as needed; pt with verbal agreement to plan  Plan: Continue per plan of care  Precautions: RTC Repair DOS 3/9/21    Manuals   8/2    L shoulder PROM 15'  MO 10' DL 10' DL 10'  MO 10'  MO 10' DL 10' MO 8' MO 10' DL    STM deltoid/pec major   DL 10'  nv MO  5' DL + lat + lat  MO + lat   MO DL+lat    L shoulder AP, Inf Mob  Post capsule stretch             Rhythmic stabilization Flex @90*;  IR/ER  @45*  abd  2x30" ea Flex @90, @120; IR/ER @ 45° @90° ABD  2x30" each              Neuro Re-Ed             Scap Retractions Goal Outcome Evaluation:  Plan of Care Reviewed With: patient        Progress: no change  Outcome Evaluation: Pt resting in bed during assessment. Pt alert to self only at this time, pt also visibally shaking at this time. CIWA protocol followed. Pt also started to get agitated later during shift, given one time dose of geodon at this time. Pt took all meds during this shift. Vital signs stable, no other changes at this time. Will continue to monitor.   Ther Ex             S/l ER  2# x20    20x 2#  2x10 2# 30x 2# 30x 2# 30x np    Prone row ----            Prone ext 20x 1# 20x 1#   20x 20x 1# 20x 1# 20x 1# 30x 1# 2# 30x    Prone T 20x 20x 1#   20x 1# 20x 1# 20x 1# 30x 1# 2# 30x    D2 ext PNF      Blue 20x L Blue  20x L Blue 20x   L Blue 20x L    3 way arm lifts x20 1# 1# 20x each  10x ea  20x ea 2# 20x flex/scap   2# 20x  Flex/scap 2# 30x  Flex/scap L only 2# 30x ea      Tests/measures             TB extension Blue  2x10 Blue 3x10   Black   2x10 Black  2x10 Black 30x Black  30x Apollo  30# 20x Apollo 30# 30x    TB rows Blue  2x10 Black 3x10 Black 3x10 Black  3x10 Black  3x10 Apollo 40# 30x Apollo  40# 30x Apollo  40# 30x Apollo 50# 3x15    TB ER GTB  2x10 GTB 30x  GTB  30x GTB  30x GTB 40x Blue  2x10 Blue 30x   Blue 30x    Tband W (robbery)         Grn 20x    Horizontal abduction  RTB 15x           TB IR GTB  30x BTB 30x   GTB  30x Blue  30x   Blue 40x Black  2x10   Black 30x Black 3x15    Bent over Row 8# x20 NV   8#  2x10 8# 20x 8# 20x 8# 20x NV 10#    Bicep Curl 8# 2x10 NV   8#  2x10 8# 20x 8# 20x 8# 20x np    Body Blade  IR/ER @ 0° ABD (trial NV)       3x30" IR/ER @ 0°ABD     Pec stretch  30"x2 30"x3 30"x3 30"x3        Serratus punch  2# 20x   20x 2# 20x 3# 30x 3# 30x 3# 30x np    Ther Activity             UBE 3'/3' 3'/3' 3'/3' 3'/3' 3'/3' 3'/3' 3'/3' 3'/3' 3'/3'    Pushup  Against wall 2x10  2x10 2x10 2x10 wall 2x10  wall 2x10  wall counter 20x    Gait Training                                       Modalities             CP PRN

## 2022-12-28 ENCOUNTER — APPOINTMENT (OUTPATIENT)
Dept: GENERAL RADIOLOGY | Facility: HOSPITAL | Age: 52
DRG: 870 | End: 2022-12-28
Payer: MEDICARE

## 2022-12-28 ENCOUNTER — APPOINTMENT (OUTPATIENT)
Dept: ULTRASOUND IMAGING | Facility: HOSPITAL | Age: 52
DRG: 870 | End: 2022-12-28
Payer: MEDICARE

## 2022-12-28 LAB
ANION GAP SERPL CALCULATED.3IONS-SCNC: 10.2 MMOL/L (ref 5–15)
APPEARANCE FLD: ABNORMAL
BUN SERPL-MCNC: 11 MG/DL (ref 6–20)
BUN/CREAT SERPL: 23.9 (ref 7–25)
CALCIUM SPEC-SCNC: 8.4 MG/DL (ref 8.6–10.5)
CHLORIDE SERPL-SCNC: 107 MMOL/L (ref 98–107)
CO2 SERPL-SCNC: 22.8 MMOL/L (ref 22–29)
COLOR FLD: ABNORMAL
CREAT SERPL-MCNC: 0.46 MG/DL (ref 0.76–1.27)
DEPRECATED RDW RBC AUTO: 55.8 FL (ref 37–54)
EGFRCR SERPLBLD CKD-EPI 2021: 124.3 ML/MIN/1.73
ERYTHROCYTE [DISTWIDTH] IN BLOOD BY AUTOMATED COUNT: 14.3 % (ref 12.3–15.4)
FOLATE SERPL-MCNC: 5.71 NG/ML (ref 4.78–24.2)
GLUCOSE SERPL-MCNC: 130 MG/DL (ref 65–99)
HCT VFR BLD AUTO: 37.7 % (ref 37.5–51)
HGB BLD-MCNC: 12.6 G/DL (ref 13–17.7)
INR PPP: 1.33 (ref 0.9–1.1)
LYMPHOCYTES NFR FLD MANUAL: 10 %
MCH RBC QN AUTO: 35.7 PG (ref 26.6–33)
MCHC RBC AUTO-ENTMCNC: 33.4 G/DL (ref 31.5–35.7)
MCV RBC AUTO: 106.8 FL (ref 79–97)
MONOS+MACROS NFR FLD: 40 %
NEUTROPHILS NFR FLD MANUAL: 51 %
NUC CELL # FLD: 174 /MM3
PLATELET # BLD AUTO: 72 10*3/MM3 (ref 140–450)
PMV BLD AUTO: 10.7 FL (ref 6–12)
POTASSIUM SERPL-SCNC: 3.7 MMOL/L (ref 3.5–5.2)
PROTHROMBIN TIME: 16.8 SECONDS (ref 12.1–14.7)
RBC # BLD AUTO: 3.53 10*6/MM3 (ref 4.14–5.8)
RBC # FLD AUTO: ABNORMAL 10*3/UL
SODIUM SERPL-SCNC: 140 MMOL/L (ref 136–145)
VIT B12 BLD-MCNC: 1156 PG/ML (ref 211–946)
WBC NRBC COR # BLD: 10.41 10*3/MM3 (ref 3.4–10.8)

## 2022-12-28 PROCEDURE — 85610 PROTHROMBIN TIME: CPT | Performed by: RADIOLOGY

## 2022-12-28 PROCEDURE — 99233 SBSQ HOSP IP/OBS HIGH 50: CPT | Performed by: INTERNAL MEDICINE

## 2022-12-28 PROCEDURE — 71045 X-RAY EXAM CHEST 1 VIEW: CPT

## 2022-12-28 PROCEDURE — 97162 PT EVAL MOD COMPLEX 30 MIN: CPT

## 2022-12-28 PROCEDURE — 76942 ECHO GUIDE FOR BIOPSY: CPT

## 2022-12-28 PROCEDURE — 25010000002 HEPARIN (PORCINE) PER 1000 UNITS: Performed by: HOSPITALIST

## 2022-12-28 PROCEDURE — 80048 BASIC METABOLIC PNL TOTAL CA: CPT | Performed by: INTERNAL MEDICINE

## 2022-12-28 PROCEDURE — 49083 ABD PARACENTESIS W/IMAGING: CPT | Performed by: RADIOLOGY

## 2022-12-28 PROCEDURE — 89051 BODY FLUID CELL COUNT: CPT | Performed by: INTERNAL MEDICINE

## 2022-12-28 PROCEDURE — 25010000002 METHYLPREDNISOLONE PER 40 MG: Performed by: STUDENT IN AN ORGANIZED HEALTH CARE EDUCATION/TRAINING PROGRAM

## 2022-12-28 PROCEDURE — 87070 CULTURE OTHR SPECIMN AEROBIC: CPT | Performed by: INTERNAL MEDICINE

## 2022-12-28 PROCEDURE — 0W9G3ZZ DRAINAGE OF PERITONEAL CAVITY, PERCUTANEOUS APPROACH: ICD-10-PCS | Performed by: RADIOLOGY

## 2022-12-28 PROCEDURE — 87205 SMEAR GRAM STAIN: CPT | Performed by: INTERNAL MEDICINE

## 2022-12-28 PROCEDURE — 85027 COMPLETE CBC AUTOMATED: CPT | Performed by: INTERNAL MEDICINE

## 2022-12-28 PROCEDURE — 25010000002 ZIPRASIDONE MESYLATE PER 10 MG: Performed by: PHYSICIAN ASSISTANT

## 2022-12-28 RX ADMIN — METHYLPREDNISOLONE SODIUM SUCCINATE 40 MG: 40 INJECTION, POWDER, FOR SOLUTION INTRAMUSCULAR; INTRAVENOUS at 20:12

## 2022-12-28 RX ADMIN — METHYLPREDNISOLONE SODIUM SUCCINATE 40 MG: 40 INJECTION, POWDER, FOR SOLUTION INTRAMUSCULAR; INTRAVENOUS at 04:37

## 2022-12-28 RX ADMIN — HEPARIN SODIUM 5000 UNITS: 5000 INJECTION INTRAVENOUS; SUBCUTANEOUS at 08:38

## 2022-12-28 RX ADMIN — Medication 10 ML: at 20:13

## 2022-12-28 RX ADMIN — Medication 100 MG: at 08:37

## 2022-12-28 RX ADMIN — NICOTINE 1 PATCH: 7 PATCH, EXTENDED RELEASE TRANSDERMAL at 08:39

## 2022-12-28 RX ADMIN — HEPARIN SODIUM 5000 UNITS: 5000 INJECTION INTRAVENOUS; SUBCUTANEOUS at 20:12

## 2022-12-28 RX ADMIN — METHYLPREDNISOLONE SODIUM SUCCINATE 40 MG: 40 INJECTION, POWDER, FOR SOLUTION INTRAMUSCULAR; INTRAVENOUS at 13:58

## 2022-12-28 RX ADMIN — FAMOTIDINE 20 MG: 20 TABLET ORAL at 16:18

## 2022-12-28 RX ADMIN — LACTULOSE 10 G: 20 SOLUTION ORAL at 08:37

## 2022-12-28 RX ADMIN — METOPROLOL SUCCINATE 25 MG: 25 TABLET, EXTENDED RELEASE ORAL at 08:37

## 2022-12-28 RX ADMIN — METHYLPREDNISOLONE SODIUM SUCCINATE 40 MG: 40 INJECTION, POWDER, FOR SOLUTION INTRAMUSCULAR; INTRAVENOUS at 23:43

## 2022-12-28 RX ADMIN — QUETIAPINE FUMARATE 50 MG: 25 TABLET, FILM COATED ORAL at 20:12

## 2022-12-28 RX ADMIN — LEVETIRACETAM 750 MG: 500 TABLET, FILM COATED ORAL at 15:30

## 2022-12-28 RX ADMIN — ZIPRASIDONE MESYLATE 10 MG: 20 INJECTION, POWDER, LYOPHILIZED, FOR SOLUTION INTRAMUSCULAR at 22:49

## 2022-12-28 RX ADMIN — ISOSORBIDE MONONITRATE 30 MG: 30 TABLET, EXTENDED RELEASE ORAL at 08:37

## 2022-12-28 RX ADMIN — LEVETIRACETAM 750 MG: 500 TABLET, FILM COATED ORAL at 06:07

## 2022-12-28 RX ADMIN — METHYLPREDNISOLONE SODIUM SUCCINATE 40 MG: 40 INJECTION, POWDER, FOR SOLUTION INTRAMUSCULAR; INTRAVENOUS at 08:36

## 2022-12-28 RX ADMIN — FAMOTIDINE 20 MG: 20 TABLET ORAL at 08:38

## 2022-12-28 RX ADMIN — LACTULOSE 10 G: 20 SOLUTION ORAL at 20:12

## 2022-12-28 RX ADMIN — Medication 10 ML: at 08:38

## 2022-12-28 RX ADMIN — METHYLPREDNISOLONE SODIUM SUCCINATE 40 MG: 40 INJECTION, POWDER, FOR SOLUTION INTRAMUSCULAR; INTRAVENOUS at 15:36

## 2022-12-28 RX ADMIN — LEVETIRACETAM 1500 MG: 500 TABLET, FILM COATED ORAL at 20:12

## 2022-12-28 NOTE — PLAN OF CARE
Goal Outcome Evaluation:              Outcome Evaluation: Pt seen for evaluation this date with good cooperation. D/C recommendation for potential home with home health however unsure if pt has assist at home to provide max/depA without hurting themselves. Potential SNF/subacute care candidate

## 2022-12-28 NOTE — PROGRESS NOTES
"    [facility] HOSPITALIST PROGRESS NOTE    S: Patient seen and examined.  Denies any acute complaints.  Denies any chest pain or shortness of breath.  Intermittently confused.  Sitter at bedside.    O: /75 (BP Location: Right arm, Patient Position: Lying)   Pulse 92   Temp 98 °F (36.7 °C) (Oral)   Resp 18   Ht 167.6 cm (66\")   Wt 77.1 kg (170 lb)   SpO2 94%   BMI 27.44 kg/m²     GENERAL:   Well nourished, no acute distress  EARS,NOSE, MOUTH, THROAT:  MMM, no external lesions of ears and nose  EYES: PERRLA, EOMI  CV:  NL S1/S2  RESPIRATORY:  CTAB no w/c/r  GI:  S/NT/ND +BS  SKIN: No rash or lesions. No nodules.  INT: No edema. No joint deformity.  PSYCH:  Normal affect, intermittently confused  NEURO: Alert and oriented, grossly nonfocal.    Scheduled Meds:famotidine, 20 mg, Oral, BID AC  heparin (porcine), 5,000 Units, Subcutaneous, Q12H  isosorbide mononitrate, 30 mg, Oral, Daily  lactulose, 10 g, Oral, BID  levETIRAcetam, 1,500 mg, Oral, Nightly  levETIRAcetam, 750 mg, Oral, BID  methylPREDNISolone sodium succinate, 40 mg, Intravenous, Q4H  metoprolol succinate XL, 25 mg, Oral, Daily  nicotine, 1 patch, Transdermal, Q24H  OLANZapine zydis, 5 mg, Oral, Once  QUEtiapine, 50 mg, Oral, Nightly  sodium chloride, 10 mL, Intravenous, Q12H  thiamine, 100 mg, Oral, Daily      Continuous Infusions:   PRN Meds:.•  acetaminophen  •  baclofen  •  LORazepam **OR** LORazepam **OR** LORazepam **OR** LORazepam **OR** LORazepam **OR** LORazepam  •  magnesium sulfate **OR** magnesium sulfate in D5W 1g/100mL (PREMIX) **OR** magnesium sulfate  •  nitroglycerin  •  potassium chloride  •  potassium chloride  •  prochlorperazine  •  sodium chloride  •  sodium chloride  •  ziprasidone (GEODON) injection with sterile water    Labs: Laboratory information reviewed and reconciled.  Results from last 7 days   Lab Units 12/28/22  0708   WBC 10*3/mm3 10.41       Results from last 7 days   Lab Units 12/28/22  0708   CO2 " mmol/L 22.8   BUN mg/dL 11   CREATININE mg/dL 0.46*   GLUCOSE mg/dL 130*       Results from last 7 days   Lab Units 12/28/22  0937   INR  1.33*         Imaging (last 24 hours):    XR Chest 1 View    Result Date: 12/28/2022    No acute cardiopulmonary findings identified.  This report was finalized on 12/28/2022 8:23 AM by Dr. Fab Huff MD.      US Paracentesis    Result Date: 12/28/2022  1.  Small volume paracentesis for diagnostic fluid was performed with 22-gauge needle. 2.  Small ascites on today's study.  This report was finalized on 12/28/2022 1:45 PM by Dr. Fab Huff MD.       Assessment/Plan:  #Bilateral multifocal pneumonia: Patient has completed full course of Rocephin and doxycycline.    #Septic shock: Secondary to pneumonia.  Has completed course of antibiotic as above.  Continue monitor.    #Acute metabolic encephalopathy: Suspect secondary to acute illness.  Work-up for other etiology and so far unrevealing including TSH, ammonia, B12 and folate.  Continue to monitor.  Supportive care.      #Cirrhosis: Paracentesis performed December 28.  Monitor volume status.  Serial abdominal exams and serial labs.    #Chronic thrombocytopenia: No evidence of bleeding, serial labs.    #History of COPD: No acute exacerbation.  Monitor.      DVT Prophylaxis: Heparin subcu  Disposition: Continue to monitor  Discharge disposition: To be determined  Prognosis: Guarded    Bernabe Molina Jr, MD  [facility] Hospitalist  12/28/22  15:24 EST

## 2022-12-28 NOTE — THERAPY EVALUATION
Acute Care - Physical Therapy Initial Evaluation   [facility]     Patient Name: Jamison Edward  : 1970  MRN: [id number]  Today's Date: 2022   Onset of Illness/Injury or Date of Surgery: 22 (admission date)  Visit Dx:     ICD-10-CM ICD-9-CM   1. Pneumonia due to infectious organism, unspecified laterality, unspecified part of lung  J18.9 486   2. Cirrhosis of liver with ascites, unspecified hepatic cirrhosis type (HCC)  K74.60 571.5    R18.8    3. Lumbar radiculopathy  M54.16 724.4     Patient Active Problem List   Diagnosis   • Precordial chest pain   • Weight loss, abnormal   • Right upper quadrant abdominal pain   • Epigastric pain   • History of bleeding ulcers   • Nausea   • Malaise and fatigue   • Acute gastric ulcer without hemorrhage or perforation   • Poor appetite   • Duodenal ulcer   • Gastroesophageal reflux disease   • Dysphagia   • Iron deficiency anemia   • Malabsorption   • Gastric ulcer without hemorrhage or perforation   • Dystonia   • Peptic ulcer without hemorrhage or perforation   • Gastric outlet obstruction   • Incisional hernia, without obstruction or gangrene   • Incisional hernia   • Chest pain, atypical   • Gastric bezoar   • Essential hypertension   • Cigarette smoker   • Chronic back pain   • Sinus tachycardia   • Tobacco use   • Mild carpal tunnel syndrome   • Orthostatic hypotension   • Palpitations   • Abdominal swelling   • Lower extremity edema   • Pneumonia due to infectious organism, unspecified laterality, unspecified part of lung     Past Medical History:   Diagnosis Date   • Anemia    • Arthritis of back     not sure   • Johnson esophagus    • Cholelithiasis    • COPD (chronic obstructive pulmonary disease) (HCC)    • CTS (carpal tunnel syndrome)    • DDD (degenerative disc disease), thoracic    • Degenerative disc disease, lumbar    • Dystonia    • GERD (gastroesophageal reflux disease)    • GI (gastrointestinal bleed)    • Hypertension    • Irritable  bowel syndrome    • Low back strain    • Lumbosacral disc disease    • Migraines    • Peptic ulcer disease    • Stroke (HCC)     TIA   • TIA (transient ischemic attack)    • Ulcerative colitis (HCC)    • Wears dentures     upper only     Past Surgical History:   Procedure Laterality Date   • ABDOMINAL SURGERY  09/17/2021   • APPENDECTOMY     • CHOLECYSTECTOMY N/A 04/22/2017    Procedure: CHOLECYSTECTOMY LAPAROSCOPIC;  Surgeon: Jaqueline Guaman MD;  Location:  [facility];  Service:    • COLONOSCOPY N/A 05/08/2017    Procedure: COLONOSCOPY  CPTCODE:24837;  Surgeon: Nicho Richey III, MD;  [facility];  Service:    • CUBITAL TUNNEL RELEASE Left 9/1/2022    Procedure: CUBITAL TUNNEL RELEASE LEFT;  Surgeon: Alec Hough MD;  [facility];  Service: Orthopedics;  Laterality: Left;   • ENDOSCOPY     • ENDOSCOPY N/A 05/08/2017    Procedure: ESOPHAGOGASTRODUODENOSCOPY WITH BIOPSY  CPTCODE:06877;  Surgeon: Nicho Richey III, MD;  [facility];  Service:    • ENDOSCOPY N/A 11/03/2017    Procedure: ESOPHAGOGASTRODUODENOSCOPY WITH BIOPSY  CPTCODE: 92409;  Surgeon: Nicho Richey III, MD;  [facility];  Service:    • ENDOSCOPY N/A 02/20/2018    Procedure: ESOPHAGOGASTRODUODENOSCOPY WITH DILATATION CPT CODE: 19876;  Surgeon: Nicho Richey III, MD;  [facility];  Service:    • ENDOSCOPY N/A 06/05/2018    Procedure: ESOPHAGOGASTRODUODENOSCOPY WITH BIOPSY CPT CODE: 18264;  Surgeon: Nicho Richey III, MD;  [facility];  Service: Gastroenterology   • ENDOSCOPY N/A 05/26/2021    Procedure: ESOPHAGOGASTRODUODENOSCOPY WITH BIOPSY;  Surgeon: Julieta Hebert MD;  [facility];  Service: Gastroenterology;  Laterality: N/A;   • ENDOSCOPY N/A 06/02/2021    Procedure: ESOPHAGOGASTRODUODENOSCOPY WITH BIOPSY;  Surgeon: Julieta Hebert MD;  [facility];  Service: Gastroenterology;  Laterality: N/A;   • GASTRECTOMY N/A  09/17/2019    Procedure: OPEN VAGOTOMY, ANTRECTOMY,REVISION- Y GASTROJEJUNOSOTOMY;  Surgeon: Herbert Umanzor MD;  Location:  [facility];  Service: General   • UPPER GASTROINTESTINAL ENDOSCOPY     • VENTRAL/INCISIONAL HERNIA REPAIR N/A 12/10/2020    Procedure: INCISIONAL HERNIA REPAIR LAPAROSCOPIC WITH MESH;  Surgeon: Herbert Umanzor MD;  Location:  [facility];  Service: General;  Laterality: N/A;     PT Assessment (last 12 hours)     PT Evaluation and Treatment     Row Name 12/28/22 1100          Physical Therapy Time and Intention    Document Type evaluation  -     Mode of Treatment physical therapy  -     Patient Effort adequate  -     Comment Pt seen for evaluation this date with sitter and wife in room. Pt was grossly indepdent at home with using walking stick per prior PT evaluation. pt has been using shower chair per wife, Sunitha.  -     Row Name 12/28/22 1100          General Information    Patient Profile Reviewed yes  -     Patient Observations alert;cooperative  -     General Observations of Patient Pt seen in hospital bed this date, cooperative with therapy  -     Existing Precautions/Restrictions fall  -     Equipment Issued to Patient gait belt  -     Row Name 12/28/22 1100          Living Environment    Current Living Arrangements home  -     People in Home spouse  -     Row Name 12/28/22 1100          Range of Motion Comprehensive    Comment, General Range of Motion PROM hip flex on R < L however functional;  -     Row Name 12/28/22 1100          Strength Comprehensive (MMT)    Comment, General Manual Muscle Testing (MMT) Assessment Gross MMT 4 to 5/5 with <3/5 hip flexion BL  -     Row Name 12/28/22 1100          Bed Mobility    Bed Mobility supine-sit;sit-supine  -     Supine-Sit [place] (Bed Mobility) maximum assist (25% patient effort);dependent (less than 25% patient effort);2 person assist  -     Sit-Supine [place] (Bed Mobility) standby assist;contact  guard;minimum assist (75% patient effort)  -     Row Name 12/28/22 1100          Transfers    Transfers sit-stand transfer;stand-sit transfer  -     Row Name 12/28/22 1100          Sit-Stand Transfer    Sit-Stand [place] (Transfers) standby assist;supervision;contact guard  -     Assistive Device (Sit-Stand Transfers) walker, front-wheeled  -     Row Name 12/28/22 1100          Stand-Sit Transfer    Stand-Sit [place] (Transfers) standby assist;supervision;contact guard  -     Assistive Device (Stand-Sit Transfers) walker, front-wheeled  -     Row Name 12/28/22 1100          Gait/Stairs (Locomotion)    Gait/Stairs Locomotion gait/ambulation assistive device  -     [place] Level (Gait) contact guard;standby assist;minimum assist (75% patient effort)  -     Assistive Device (Gait) walker, front-wheeled  -     Distance in Feet (Gait) Grossly ~10-15'  -     Row Name 12/28/22 1100          Plan of Care Review    Outcome Evaluation Pt seen for evaluation this date with good cooperation. D/C recommendation for potential home with home health however unsure if pt has assist at home to provide max/depA without hurting themselves. Potential SNF/subacute care candidate  -     Row Name 12/28/22 1100          Positioning and Restraints    Pre-Treatment Position in bed  -     Post Treatment Position chair  -     In Chair sitting;with family/caregiver;with other staff  -     Row Name 12/28/22 1100          Therapy Assessment/Plan (PT)    Functional Level at Time of Evaluation (PT) max/depA; CGA/SBA  -     PT Diagnosis (PT) Decreased functional mobility  -     Rehab Potential (PT) other (see comments)  fair/guarded  -     Criteria for Skilled Interventions Met (PT) yes  -     Therapy Frequency (PT) 2 times/wk  2-5x/wk  -     Predicted Duration of Therapy Intervention (PT) length of stay  -     Row Name 12/28/22 1100          Physical Therapy Goals    Bed Mobility Goal Selection  (PT) bed mobility, PT goal 1  -KH     Transfer Goal Selection (PT) transfer, PT goal 1  -KH     Gait Training Goal Selection (PT) gait training, PT goal 1  -     Row Name 12/28/22 1100          Bed Mobility Goal 1 (PT)    Activity/Assistive Device (Bed Mobility Goal 1, PT) supine to sit  -KH     [place] Level/Cues Needed (Bed Mobility Goal 1, PT) modified independence  -KH     Time Frame (Bed Mobility Goal 1, PT) long term goal (LTG)  -     Row Name 12/28/22 1100          Transfer Goal 1 (PT)    Activity/Assistive Device (Transfer Goal 1, PT) sit-to-stand/stand-to-sit  -KH     [place] Level/Cues Needed (Transfer Goal 1, PT) independent;modified independence  -KH     Time Frame (Transfer Goal 1, PT) long term goal (LTG)  -     Row Name 12/28/22 1100          Gait Training Goal 1 (PT)    Activity/Assistive Device (Gait Training Goal 1, PT) gait (walking locomotion);assistive device use;walker, rolling  -KH     [place] Level (Gait Training Goal 1, PT) modified independence  -KH     Distance (Gait Training Goal 1, PT) 50'  -KH     Time Frame (Gait Training Goal 1, PT) long term goal (LTG)  -           User Key  (r) = Recorded By, (t) = Taken By, (c) = Cosigned By    Initials Name Provider Type    Sol Diane, PT Physical Therapist                  PT Recommendation and Plan  Anticipated Discharge Disposition (PT): home with supervision, home with assist, home with home health  Planned Therapy Interventions (PT): bed mobility training, gait training, stretching, transfer training  Therapy Frequency (PT): 2 times/wk (2-5x/wk)  Outcome Evaluation: Pt seen for evaluation this date with good cooperation. D/C recommendation for potential home with home health however unsure if pt has assist at home to provide max/depA without hurting themselves. Potential SNF/subacute care candidate       Time Calculation:    PT Charges     Row Name 12/28/22 1300             Time Calculation    Start Time 1100   -MK      PT Received On 12/28/22  -MK      PT Goal Re-Cert Due Date 01/11/23  -MK            User Key  (r) = Recorded By, (t) = Taken By, (c) = Cosigned By    Initials Name Provider Type    Sol Diane, PT Physical Therapist              Therapy Charges for Today     Code Description Service Date Service Provider Modifiers Qty    81619317142 HC PT EVAL MOD COMPLEXITY 4 12/28/2022 Sol Wallis, PT GP 1          PT G-Codes  AM-PAC 6 Clicks Score (PT): 19    Sol Wallis, PT  12/28/2022

## 2022-12-28 NOTE — PLAN OF CARE
Goal Outcome Evaluation:  Plan of Care Reviewed With: patient        Progress: no change  Outcome Evaluation: Pt resting in bed during assessment. Pt agitated at this time. Sitter at bedside, will continue to monitor.

## 2022-12-28 NOTE — PLAN OF CARE
Goal Outcome Evaluation:              Outcome Evaluation: Patient has done well today.  Sat in chair for a few hours.  Tolerated paracentesis well. Dressing clean dry and intact. Wife at bedside today.  Sitter has been at bedside this shift also.  Continue current plan of care.

## 2022-12-29 PROCEDURE — 99221 1ST HOSP IP/OBS SF/LOW 40: CPT | Performed by: PSYCHIATRY & NEUROLOGY

## 2022-12-29 PROCEDURE — 25010000002 DIPHENHYDRAMINE PER 50 MG: Performed by: INTERNAL MEDICINE

## 2022-12-29 PROCEDURE — 25010000002 METHYLPREDNISOLONE PER 40 MG: Performed by: STUDENT IN AN ORGANIZED HEALTH CARE EDUCATION/TRAINING PROGRAM

## 2022-12-29 PROCEDURE — 25010000002 HEPARIN (PORCINE) PER 1000 UNITS: Performed by: HOSPITALIST

## 2022-12-29 PROCEDURE — 99233 SBSQ HOSP IP/OBS HIGH 50: CPT | Performed by: INTERNAL MEDICINE

## 2022-12-29 PROCEDURE — 25010000002 LORAZEPAM PER 2 MG: Performed by: STUDENT IN AN ORGANIZED HEALTH CARE EDUCATION/TRAINING PROGRAM

## 2022-12-29 RX ORDER — DIPHENHYDRAMINE HYDROCHLORIDE 50 MG/ML
25 INJECTION INTRAMUSCULAR; INTRAVENOUS ONCE
Status: COMPLETED | OUTPATIENT
Start: 2022-12-29 | End: 2022-12-29

## 2022-12-29 RX ORDER — DIPHENHYDRAMINE HYDROCHLORIDE 50 MG/ML
25 INJECTION INTRAMUSCULAR; INTRAVENOUS EVERY 6 HOURS PRN
Status: DISCONTINUED | OUTPATIENT
Start: 2022-12-29 | End: 2023-01-05 | Stop reason: HOSPADM

## 2022-12-29 RX ADMIN — Medication 10 ML: at 20:53

## 2022-12-29 RX ADMIN — LORAZEPAM 1 MG: 2 INJECTION INTRAMUSCULAR; INTRAVENOUS at 03:20

## 2022-12-29 RX ADMIN — METHYLPREDNISOLONE SODIUM SUCCINATE 40 MG: 40 INJECTION, POWDER, FOR SOLUTION INTRAMUSCULAR; INTRAVENOUS at 05:09

## 2022-12-29 RX ADMIN — Medication 100 MG: at 08:47

## 2022-12-29 RX ADMIN — DIPHENHYDRAMINE HYDROCHLORIDE 25 MG: 50 INJECTION INTRAMUSCULAR; INTRAVENOUS at 10:09

## 2022-12-29 RX ADMIN — LEVETIRACETAM 750 MG: 500 TABLET, FILM COATED ORAL at 07:26

## 2022-12-29 RX ADMIN — HEPARIN SODIUM 5000 UNITS: 5000 INJECTION INTRAVENOUS; SUBCUTANEOUS at 20:45

## 2022-12-29 RX ADMIN — LORAZEPAM 2 MG: 2 INJECTION INTRAMUSCULAR; INTRAVENOUS at 06:54

## 2022-12-29 RX ADMIN — Medication 10 ML: at 10:09

## 2022-12-29 RX ADMIN — LORAZEPAM 2 MG: 2 INJECTION INTRAMUSCULAR; INTRAVENOUS at 20:45

## 2022-12-29 RX ADMIN — METOPROLOL SUCCINATE 25 MG: 25 TABLET, EXTENDED RELEASE ORAL at 08:48

## 2022-12-29 RX ADMIN — NICOTINE 1 PATCH: 7 PATCH, EXTENDED RELEASE TRANSDERMAL at 08:47

## 2022-12-29 NOTE — NURSING NOTE
PCT Andie called nurse stating that patient was extremely anxious and irritable being uncooperative with her directions. Ryley PATEL went into the room to assess the patient. Patient appeared diaphoretic, had hand tremors, was agitated and disoriented to place time and situation. Ryley pulled Ativan 2mg to give to the patient. Upon attempt to administer medication patient sat in the floor with water cup and was uncooperative with nurses directions for medication administration. At this time patient began to spit water at the nurse and PCT and refused to release water cup. Security was called to assist in getting the patient back to bed for his safety and the staffs. PT remained extremely agitated and combative with staff. Geodon 10 mg (0.5ml) was administered per order. Sitter remains at bedside, pt reoriented to time, place, and situation. Will continue to monitor for changes.

## 2022-12-29 NOTE — PLAN OF CARE
Goal Outcome Evaluation:               Pt has rested intermittently this shift. Dressing still clean and intact with stable vital signs. Pt had an episode this shift requiring assistance from security, please look at previous note for details. Otherwise no changes this shift, will continue POC

## 2022-12-29 NOTE — CONSULTS
Referring Provider: Dr. Molina  Reason for Consultation: Agitation, confusion      Chief complaint/Focus of Exam: Agitation, confusion    Subjective .     History of present illness:  Jamison Edward is a 52 y.o. male who was admitted on 12/20/2022 with complaints of leg swelling on 12/21/22 and was disovered to have pneumonia. The patient also has a history of liver cirrhosis considered to be due to heavy alcohol use and acetaminophen use and last use of alcohol is reported to be Nov 12, 2022 by his wife who is at his bedside. The patient has developed worsening confusing and agitation and this morning was found to have extrapyramidal symptoms with muscle rigidity and tremors. The patient is on scheduled quetiapine which his wife reports he is prescribed for sleep and also received a dose of olanzapine. Patient seen in his room with staff and he is confused and not able to provide a coherent history and at times has tried to climb out of bed. He received benadryl earlier and it helped him rest.     Review of Systems  Patient couldn't provide a coherent ROS.    History  Past Medical History:   Diagnosis Date   • Anemia    • Arthritis of back     not sure   • Johnson esophagus    • Cholelithiasis    • COPD (chronic obstructive pulmonary disease) (HCC)    • CTS (carpal tunnel syndrome)    • DDD (degenerative disc disease), thoracic    • Degenerative disc disease, lumbar    • Dystonia    • GERD (gastroesophageal reflux disease)    • GI (gastrointestinal bleed)    • Hypertension    • Irritable bowel syndrome    • Low back strain    • Lumbosacral disc disease    • Migraines    • Peptic ulcer disease    • Stroke (HCC)     TIA   • TIA (transient ischemic attack)    • Ulcerative colitis (HCC)    • Wears dentures     upper only   ,   Past Surgical History:   Procedure Laterality Date   • ABDOMINAL SURGERY  09/17/2021   • APPENDECTOMY     • CHOLECYSTECTOMY N/A 04/22/2017    Procedure: CHOLECYSTECTOMY LAPAROSCOPIC;   Surgeon: Jaqueline Guaman MD;  Location:  [facility];  Service:    • COLONOSCOPY N/A 05/08/2017    Procedure: COLONOSCOPY  CPTCODE:72313;  Surgeon: Nicho Richey III, MD;  [facility];  Service:    • CUBITAL TUNNEL RELEASE Left 9/1/2022    Procedure: CUBITAL TUNNEL RELEASE LEFT;  Surgeon: Alec Hough MD;  [facility];  Service: Orthopedics;  Laterality: Left;   • ENDOSCOPY     • ENDOSCOPY N/A 05/08/2017    Procedure: ESOPHAGOGASTRODUODENOSCOPY WITH BIOPSY  CPTCODE:91601;  Surgeon: Nicho Richey III, MD;  [facility];  Service:    • ENDOSCOPY N/A 11/03/2017    Procedure: ESOPHAGOGASTRODUODENOSCOPY WITH BIOPSY  CPTCODE: 77154;  Surgeon: Nicho Richey III, MD;  [facility];  Service:    • ENDOSCOPY N/A 02/20/2018    Procedure: ESOPHAGOGASTRODUODENOSCOPY WITH DILATATION CPT CODE: 69073;  Surgeon: Nicho Richey III, MD;  [facility];  Service:    • ENDOSCOPY N/A 06/05/2018    Procedure: ESOPHAGOGASTRODUODENOSCOPY WITH BIOPSY CPT CODE: 63548;  Surgeon: Nicho Richey III, MD;  [facility];  Service: Gastroenterology   • ENDOSCOPY N/A 05/26/2021    Procedure: ESOPHAGOGASTRODUODENOSCOPY WITH BIOPSY;  Surgeon: Julieta Hebert MD;  [facility];  Service: Gastroenterology;  Laterality: N/A;   • ENDOSCOPY N/A 06/02/2021    Procedure: ESOPHAGOGASTRODUODENOSCOPY WITH BIOPSY;  Surgeon: Julieta Hebert MD;  [facility];  Service: Gastroenterology;  Laterality: N/A;   • GASTRECTOMY N/A 09/17/2019    Procedure: OPEN VAGOTOMY, ANTRECTOMY,REVISION- Y GASTROJEJUNOSOTOMY;  Surgeon: Herbert Umanzor MD;  Location:  [facility];  Service: General   • UPPER GASTROINTESTINAL ENDOSCOPY     • VENTRAL/INCISIONAL HERNIA REPAIR N/A 12/10/2020    Procedure: INCISIONAL HERNIA REPAIR LAPAROSCOPIC WITH MESH;  Surgeon: Herbert Umanzor MD;  Location:  [facility];  Service: General;  Laterality: N/A;   ,   Family History    Problem Relation Age of Onset   • Osteoporosis Mother    • Hypertension Father    • Osteoporosis Father    • No Known Problems Sister    • No Known Problems Brother    • Drug abuse Brother    • No Known Problems Daughter    • Diabetes Sister    ,   Social History     Socioeconomic History   • Marital status:    Tobacco Use   • Smoking status: Every Day     Packs/day: 1.00     Years: 35.00     Pack years: 35.00     Types: Cigarettes     Last attempt to quit: 2020     Years since quittin.3   • Smokeless tobacco: Never   • Tobacco comments:     Been smoking 20 years   Vaping Use   • Vaping Use: Never used   Substance and Sexual Activity   • Alcohol use: Yes     Alcohol/week: 4.0 standard drinks     Types: 4 Cans of beer per week   • Drug use: No   • Sexual activity: Yes     Partners: Female     Birth control/protection: None     E-cigarette/Vaping   • E-cigarette/Vaping Use Never User      E-cigarette/Vaping Substances   • Nicotine No    • THC No    • CBD No    • Flavoring No      E-cigarette/Vaping Devices   • Disposable No    • Pre-filled or Refillable Cartridge No    • Refillable Tank No    • Pre-filled Pod No        ,   Medications Prior to Admission   Medication Sig Dispense Refill Last Dose   • baclofen (LIORESAL) 20 MG tablet Take 10-20 mg by mouth 3 (Three) Times a Day As Needed.   2022   • esomeprazole (nexIUM) 40 MG capsule Take 40 mg by mouth 2 (Two) Times a Day.   2022   • isosorbide mononitrate (IMDUR) 30 MG 24 hr tablet Take 1 tablet by mouth Daily. 30 tablet 11 2022   • levETIRAcetam (KEPPRA) 750 MG tablet Take 750 mg by mouth 2 (Two) Times a Day. Patient takes one tablet in the morning and afternoon and takes two tablets at bedtime.   2022   • levETIRAcetam (KEPPRA) 750 MG tablet Take 1,500 mg by mouth Every Night.   2022   • metoclopramide (REGLAN) 10 MG tablet Take 1 tablet by mouth 2 (Two) Times a Day Before Meals. 60 tablet 5 2022   • metoprolol  succinate XL (TOPROL-XL) 25 MG 24 hr tablet Take 1 tablet by mouth Daily. 90 tablet 3 12/20/2022   • nitroglycerin (NITROSTAT) 0.4 MG SL tablet Place 1 tablet under the tongue Every 5 (Five) Minutes As Needed for Chest Pain. Take no more than 3 doses in 15 minutes. 100 tablet 2 Past Week   • QUEtiapine (SEROquel) 50 MG tablet Take 50 mg by mouth Every Night.   12/19/2022   • venlafaxine (EFFEXOR) 75 MG tablet Take 75 mg by mouth every night at bedtime.   12/19/2022   , Scheduled Meds:  famotidine, 20 mg, Oral, BID AC  heparin (porcine), 5,000 Units, Subcutaneous, Q12H  isosorbide mononitrate, 30 mg, Oral, Daily  lactulose, 10 g, Oral, BID  levETIRAcetam, 1,500 mg, Oral, Nightly  levETIRAcetam, 750 mg, Oral, BID  metoprolol succinate XL, 25 mg, Oral, Daily  nicotine, 1 patch, Transdermal, Q24H  OLANZapine zydis, 5 mg, Oral, Once  QUEtiapine, 50 mg, Oral, Nightly  sodium chloride, 10 mL, Intravenous, Q12H  thiamine, 100 mg, Oral, Daily    , Continuous Infusions:   , PRN Meds:  •  acetaminophen  •  baclofen  •  diphenhydrAMINE  •  LORazepam **OR** LORazepam **OR** LORazepam **OR** LORazepam **OR** LORazepam **OR** LORazepam  •  magnesium sulfate **OR** magnesium sulfate in D5W 1g/100mL (PREMIX) **OR** magnesium sulfate  •  nitroglycerin  •  potassium chloride  •  potassium chloride  •  sodium chloride  •  sodium chloride and Allergies:  Contrast dye, Prilosec [omeprazole], Protonix [pantoprazole sodium], Dilantin [phenytoin], Dilaudid [hydromorphone], Flagyl [metronidazole], and Topamax [topiramate]    Objective     Vital Signs   Temp:  [97.3 °F (36.3 °C)-97.5 °F (36.4 °C)] 97.5 °F (36.4 °C)  Heart Rate:  [] 108  Resp:  [16-18] 18  BP: (107)/(81) 107/81    Mental Status Exam:   Mental Status Exam:    Hygiene:   fair  Cooperation:  Patient with altered mental status  Eye Contact:  Poor  Psychomotor Behavior:  Restless  Affect:  Restricted  Hopelessness: Denies  Speech:  Minimal  Thought Progress:   Disorganized  Thought Content:  Unable to demonstrate  Suicidal:  None  Homicidal:  None  Hallucinations:  Not demonstrated today  Delusion:  Unable to demonstrate  Memory:  Unable to evaluate  Orientation:  Unable to evaluate  Reliability:  poor  Insight:  None  Judgement:  Impaired  Impulse Control:  Impaired    Results Review:   I reviewed the patient's new clinical results.  Lab Results (last 24 hours)     Procedure Component Value Units Date/Time    Body Fluid Culture - Body Fluid, Peritoneum [[id number]] Collected: 12/28/22 1338    Specimen: Body Fluid from Peritoneum Updated: 12/29/22 1401     Body Fluid Culture No growth at 24 hours     Gram Stain WBCs seen      No organisms seen    Blood Culture - Blood, Arm, Right [[id number]]  (Normal) Collected: 12/27/22 1839    Specimen: Blood from Arm, Right Updated: 12/28/22 1845     Blood Culture No growth at 24 hours    Blood Culture - Blood, Wrist, Left [[id number]]  (Normal) Collected: 12/27/22 1839    Specimen: Blood from Wrist, Left Updated: 12/28/22 1845     Blood Culture No growth at 24 hours        Imaging Results (Last 24 Hours)     ** No results found for the last 24 hours. **            Assessment & Plan     Principal Problem:    Pneumonia due to infectious organism, unspecified laterality, unspecified part of lung     Delirium due to another medical condition (Pneumonia, liver cirrhosis)  -Discontinue quetiapine to avoid EPS as patient's live function is impaired and medication may not be metabolized appropriately  -May treat symptomatically with lorazepam for agitation, and benadryl for dystonia.    I discussed the patients findings and my recommendations with patient and nursing staff    Kesha Lovell MD  12/29/22  16:21 EST

## 2022-12-29 NOTE — PLAN OF CARE
Goal Outcome Evaluation:              Outcome Evaluation: Patient was very agitated at the beginning of the shift. Attempted to calm patient, sitter at bedside.  Gave patient 0700 dose of keppra that was showing due this morning later in the shfit after reviewing MAR realized patient had been given dose at 0510, 2 hours prior to due time but was still showing due for 0700. In order to avoid patient receiving too much keppra, 1500 dose was held. Patient has rested this afternoon. Continue current plan of care.

## 2022-12-29 NOTE — PROGRESS NOTES
"    [facility]    S: Patient seen and examined.  Not answering questions this AM.  Agitated and confused overnight. This AM exhibiting extrapyramidal symptoms/    O: /75 (BP Location: Right arm, Patient Position: Lying)   Pulse 99   Temp 97.3 °F (36.3 °C) (Axillary)   Resp 16   Ht 167.6 cm (66\")   Wt 77.1 kg (170 lb)   SpO2 93%   BMI 27.44 kg/m²     GENERAL:   Well nourished, multiple uncoordinated movements and lip smacking  EARS,NOSE, MOUTH, THROAT:  MMM, no external lesions of ears and nose  EYES: PERRLA, EOMI  CV:  NL S1/S2  RESPIRATORY:  CTAB no w/c/r  GI:  S/NT/ND +BS  SKIN: No rash or lesions. No nodules.  INT: No edema. No joint deformity.  PSYCH:  not answering questions or following commands  NEURO: multiple uncoordinated movements and lip smacking    Scheduled Meds:famotidine, 20 mg, Oral, BID AC  heparin (porcine), 5,000 Units, Subcutaneous, Q12H  isosorbide mononitrate, 30 mg, Oral, Daily  lactulose, 10 g, Oral, BID  levETIRAcetam, 1,500 mg, Oral, Nightly  levETIRAcetam, 750 mg, Oral, BID  metoprolol succinate XL, 25 mg, Oral, Daily  nicotine, 1 patch, Transdermal, Q24H  OLANZapine zydis, 5 mg, Oral, Once  QUEtiapine, 50 mg, Oral, Nightly  sodium chloride, 10 mL, Intravenous, Q12H  thiamine, 100 mg, Oral, Daily      Continuous Infusions:   PRN Meds:.•  acetaminophen  •  baclofen  •  LORazepam **OR** LORazepam **OR** LORazepam **OR** LORazepam **OR** LORazepam **OR** LORazepam  •  magnesium sulfate **OR** magnesium sulfate in D5W 1g/100mL (PREMIX) **OR** magnesium sulfate  •  nitroglycerin  •  potassium chloride  •  potassium chloride  •  prochlorperazine  •  sodium chloride  •  sodium chloride    Labs: Laboratory information reviewed and reconciled.    Results from last 7 days   Lab Units 12/28/22  0708   WBC 10*3/mm3 10.41         Results from last 7 days   Lab Units 12/28/22  0708   CO2 mmol/L 22.8   BUN mg/dL 11   CREATININE mg/dL 0.46*   GLUCOSE " mg/dL 130*         Results from last 7 days   Lab Units 12/28/22  0937   INR  1.33*         Imaging (last 24 hours):    XR Chest 1 View    Result Date: 12/28/2022    No acute cardiopulmonary findings identified.  This report was finalized on 12/28/2022 8:23 AM by Dr. Fab Huff MD.      US Paracentesis    Result Date: 12/28/2022  1.  Small volume paracentesis for diagnostic fluid was performed with 22-gauge needle. 2.  Small ascites on today's study.  This report was finalized on 12/28/2022 1:45 PM by Dr. Fab Huff MD.       Assessment/Plan:  # Extrapyramidal Symptoms:  Hold offending agents.  Antipsychotics and compazine.  Consult psych.  Benadryl now and q6 prn.  Appreciate psych recs moving forward.    #Bilateral multifocal pneumonia: Patient has completed full course of Rocephin and doxycycline.    #Septic shock: Secondary to pneumonia.  Has completed course of antibiotic as above.  Continue monitor.    #Acute metabolic encephalopathy: Suspect secondary to acute illness.  Work-up for other etiology and so far unrevealing including TSH, ammonia, B12 and folate.  Continue to monitor.  Supportive care.  Stop IV steroids     #Cirrhosis: Paracentesis performed December 28.  Monitor volume status.  Serial abdominal exams and serial labs.    #Chronic thrombocytopenia: No evidence of bleeding, serial labs.    #History of COPD: No acute exacerbation.  Monitor.      DVT Prophylaxis: Heparin subcu  Disposition: Continue to monitor  Discharge disposition: To be determined  Prognosis: Guarded    Bernabe Molina Jr, MD  [facility]  12/29/22  08:43 EST

## 2022-12-30 ENCOUNTER — APPOINTMENT (OUTPATIENT)
Dept: GENERAL RADIOLOGY | Facility: HOSPITAL | Age: 52
DRG: 870 | End: 2022-12-30
Payer: MEDICARE

## 2022-12-30 ENCOUNTER — APPOINTMENT (OUTPATIENT)
Dept: CARDIOLOGY | Facility: HOSPITAL | Age: 52
DRG: 870 | End: 2022-12-30
Payer: MEDICARE

## 2022-12-30 ENCOUNTER — APPOINTMENT (OUTPATIENT)
Dept: CT IMAGING | Facility: HOSPITAL | Age: 52
DRG: 870 | End: 2022-12-30
Payer: MEDICARE

## 2022-12-30 LAB
A-A DO2: 249.9 MMHG (ref 0–300)
A-A DO2: 308.7 MMHG (ref 0–300)
ALBUMIN SERPL-MCNC: 2.2 G/DL (ref 3.5–5.2)
ALBUMIN/GLOB SERPL: 1.1 G/DL
ALP SERPL-CCNC: 176 U/L (ref 39–117)
ALT SERPL W P-5'-P-CCNC: 61 U/L (ref 1–41)
ANION GAP SERPL CALCULATED.3IONS-SCNC: 11.3 MMOL/L (ref 5–15)
APTT PPP: 39.8 SECONDS (ref 26.5–34.5)
ARTERIAL PATENCY WRIST A: POSITIVE
ARTERIAL PATENCY WRIST A: POSITIVE
AST SERPL-CCNC: 72 U/L (ref 1–40)
ATMOSPHERIC PRESS: 731 MMHG
ATMOSPHERIC PRESS: 731 MMHG
B PARAPERT DNA SPEC QL NAA+PROBE: NOT DETECTED
B PERT DNA SPEC QL NAA+PROBE: NOT DETECTED
BASE EXCESS BLDA CALC-SCNC: -0.1 MMOL/L (ref 0–2)
BASE EXCESS BLDA CALC-SCNC: 2.4 MMOL/L (ref 0–2)
BASOPHILS # BLD AUTO: 0.02 10*3/MM3 (ref 0–0.2)
BASOPHILS NFR BLD AUTO: 0.1 % (ref 0–1.5)
BDY SITE: ABNORMAL
BDY SITE: ABNORMAL
BILIRUB SERPL-MCNC: 2.7 MG/DL (ref 0–1.2)
BODY TEMPERATURE: 0 C
BODY TEMPERATURE: 0 C
BUN SERPL-MCNC: 13 MG/DL (ref 6–20)
BUN/CREAT SERPL: 26 (ref 7–25)
C PNEUM DNA NPH QL NAA+NON-PROBE: NOT DETECTED
CALCIUM SPEC-SCNC: 8 MG/DL (ref 8.6–10.5)
CHLORIDE SERPL-SCNC: 110 MMOL/L (ref 98–107)
CO2 BLDA-SCNC: 26.9 MMOL/L (ref 22–33)
CO2 BLDA-SCNC: 27.5 MMOL/L (ref 22–33)
CO2 SERPL-SCNC: 20.7 MMOL/L (ref 22–29)
COHGB MFR BLD: 0.9 % (ref 0–5)
COHGB MFR BLD: 1.6 % (ref 0–5)
CREAT SERPL-MCNC: 0.5 MG/DL (ref 0.76–1.27)
CRP SERPL-MCNC: 2.84 MG/DL (ref 0–0.5)
D-LACTATE SERPL-SCNC: 1.6 MMOL/L (ref 0.5–2)
D-LACTATE SERPL-SCNC: 2.8 MMOL/L (ref 0.5–2)
DEPRECATED RDW RBC AUTO: 57.2 FL (ref 37–54)
EGFRCR SERPLBLD CKD-EPI 2021: 121.2 ML/MIN/1.73
EOSINOPHIL # BLD AUTO: 0 10*3/MM3 (ref 0–0.4)
EOSINOPHIL NFR BLD AUTO: 0 % (ref 0.3–6.2)
ERYTHROCYTE [DISTWIDTH] IN BLOOD BY AUTOMATED COUNT: 14.6 % (ref 12.3–15.4)
FLUAV SUBTYP SPEC NAA+PROBE: NOT DETECTED
FLUBV RNA ISLT QL NAA+PROBE: NOT DETECTED
GAS FLOW AIRWAY: 8 LPM
GLOBULIN UR ELPH-MCNC: 2 GM/DL
GLUCOSE BLDC GLUCOMTR-MCNC: 107 MG/DL (ref 70–130)
GLUCOSE BLDC GLUCOMTR-MCNC: 113 MG/DL (ref 70–130)
GLUCOSE BLDC GLUCOMTR-MCNC: 157 MG/DL (ref 70–130)
GLUCOSE BLDC GLUCOMTR-MCNC: 68 MG/DL (ref 70–130)
GLUCOSE SERPL-MCNC: 96 MG/DL (ref 65–99)
HADV DNA SPEC NAA+PROBE: NOT DETECTED
HCO3 BLDA-SCNC: 25.5 MMOL/L (ref 20–26)
HCO3 BLDA-SCNC: 26.4 MMOL/L (ref 20–26)
HCOV 229E RNA SPEC QL NAA+PROBE: NOT DETECTED
HCOV HKU1 RNA SPEC QL NAA+PROBE: NOT DETECTED
HCOV NL63 RNA SPEC QL NAA+PROBE: NOT DETECTED
HCOV OC43 RNA SPEC QL NAA+PROBE: NOT DETECTED
HCT VFR BLD AUTO: 33.3 % (ref 37.5–51)
HCT VFR BLD AUTO: 33.8 % (ref 37.5–51)
HCT VFR BLD AUTO: 39.8 % (ref 37.5–51)
HCT VFR BLD CALC: 31.9 % (ref 38–51)
HCT VFR BLD CALC: 39.1 % (ref 38–51)
HGB BLD-MCNC: 10.9 G/DL (ref 13–17.7)
HGB BLD-MCNC: 11.3 G/DL (ref 13–17.7)
HGB BLD-MCNC: 13.2 G/DL (ref 13–17.7)
HGB BLDA-MCNC: 10.4 G/DL (ref 14–18)
HGB BLDA-MCNC: 12.8 G/DL (ref 14–18)
HMPV RNA NPH QL NAA+NON-PROBE: NOT DETECTED
HPIV1 RNA ISLT QL NAA+PROBE: NOT DETECTED
HPIV2 RNA SPEC QL NAA+PROBE: NOT DETECTED
HPIV3 RNA NPH QL NAA+PROBE: NOT DETECTED
HPIV4 P GENE NPH QL NAA+PROBE: NOT DETECTED
IMM GRANULOCYTES # BLD AUTO: 0.1 10*3/MM3 (ref 0–0.05)
IMM GRANULOCYTES NFR BLD AUTO: 0.7 % (ref 0–0.5)
INHALED O2 CONCENTRATION: 100 %
INHALED O2 CONCENTRATION: 52 %
INR PPP: 1.43 (ref 0.9–1.1)
LYMPHOCYTES # BLD AUTO: 0.49 10*3/MM3 (ref 0.7–3.1)
LYMPHOCYTES NFR BLD AUTO: 3.3 % (ref 19.6–45.3)
Lab: ABNORMAL
Lab: ABNORMAL
M PNEUMO IGG SER IA-ACNC: NOT DETECTED
MAGNESIUM SERPL-MCNC: 1.9 MG/DL (ref 1.6–2.6)
MCH RBC QN AUTO: 35.6 PG (ref 26.6–33)
MCHC RBC AUTO-ENTMCNC: 33.2 G/DL (ref 31.5–35.7)
MCV RBC AUTO: 107.3 FL (ref 79–97)
METHGB BLD QL: 0 % (ref 0–3)
METHGB BLD QL: 0.3 % (ref 0–3)
MODALITY: ABNORMAL
MODALITY: ABNORMAL
MONOCYTES # BLD AUTO: 0.83 10*3/MM3 (ref 0.1–0.9)
MONOCYTES NFR BLD AUTO: 5.6 % (ref 5–12)
MRSA DNA SPEC QL NAA+PROBE: NORMAL
NEUTROPHILS NFR BLD AUTO: 13.33 10*3/MM3 (ref 1.7–7)
NEUTROPHILS NFR BLD AUTO: 90.3 % (ref 42.7–76)
NOTE: ABNORMAL
NOTE: ABNORMAL
NOTIFIED WHO: ABNORMAL
NRBC BLD AUTO-RTO: 0 /100 WBC (ref 0–0.2)
OXYHGB MFR BLDV: 90.6 % (ref 94–99)
OXYHGB MFR BLDV: 98.7 % (ref 94–99)
PCO2 BLDA: 37.7 MM HG (ref 35–45)
PCO2 BLDA: 44.7 MM HG (ref 35–45)
PCO2 TEMP ADJ BLD: ABNORMAL MM[HG]
PCO2 TEMP ADJ BLD: ABNORMAL MM[HG]
PEEP RESPIRATORY: 8 CM[H2O]
PH BLDA: 7.37 PH UNITS (ref 7.35–7.45)
PH BLDA: 7.45 PH UNITS (ref 7.35–7.45)
PH, TEMP CORRECTED: ABNORMAL
PH, TEMP CORRECTED: ABNORMAL
PLATELET # BLD AUTO: 65 10*3/MM3 (ref 140–450)
PMV BLD AUTO: 10.9 FL (ref 6–12)
PO2 BLDA: 331 MM HG (ref 83–108)
PO2 BLDA: 65.3 MM HG (ref 83–108)
PO2 TEMP ADJ BLD: ABNORMAL MM[HG]
PO2 TEMP ADJ BLD: ABNORMAL MM[HG]
POTASSIUM SERPL-SCNC: 3.3 MMOL/L (ref 3.5–5.2)
PROCALCITONIN SERPL-MCNC: 0.18 NG/ML (ref 0–0.25)
PROT SERPL-MCNC: 4.2 G/DL (ref 6–8.5)
PROTHROMBIN TIME: 17.8 SECONDS (ref 12.1–14.7)
QT INTERVAL: 312 MS
QTC INTERVAL: 448 MS
RBC # BLD AUTO: 3.71 10*6/MM3 (ref 4.14–5.8)
RHINOVIRUS RNA SPEC NAA+PROBE: NOT DETECTED
RSV RNA NPH QL NAA+NON-PROBE: NOT DETECTED
S PNEUM AG SPEC QL LA: NEGATIVE
SAO2 % BLDCOA: 92.1 % (ref 94–99)
SAO2 % BLDCOA: >99.2 % (ref 94–99)
SARS-COV-2 RNA NPH QL NAA+NON-PROBE: NOT DETECTED
SET MECH RESP RATE: 20
SODIUM SERPL-SCNC: 142 MMOL/L (ref 136–145)
VENTILATOR MODE: ABNORMAL
VENTILATOR MODE: ABNORMAL
VT ON VENT VENT: 450 ML
WBC NRBC COR # BLD: 14.77 10*3/MM3 (ref 3.4–10.8)

## 2022-12-30 PROCEDURE — 85014 HEMATOCRIT: CPT | Performed by: INTERNAL MEDICINE

## 2022-12-30 PROCEDURE — 71045 X-RAY EXAM CHEST 1 VIEW: CPT

## 2022-12-30 PROCEDURE — 02HV33Z INSERTION OF INFUSION DEVICE INTO SUPERIOR VENA CAVA, PERCUTANEOUS APPROACH: ICD-10-PCS | Performed by: INTERNAL MEDICINE

## 2022-12-30 PROCEDURE — 25010000002 VANCOMYCIN 5 G RECONSTITUTED SOLUTION: Performed by: PHYSICIAN ASSISTANT

## 2022-12-30 PROCEDURE — 0202U NFCT DS 22 TRGT SARS-COV-2: CPT | Performed by: PHYSICIAN ASSISTANT

## 2022-12-30 PROCEDURE — 87641 MR-STAPH DNA AMP PROBE: CPT | Performed by: PHYSICIAN ASSISTANT

## 2022-12-30 PROCEDURE — 83735 ASSAY OF MAGNESIUM: CPT | Performed by: INTERNAL MEDICINE

## 2022-12-30 PROCEDURE — 85025 COMPLETE CBC W/AUTO DIFF WBC: CPT | Performed by: PHYSICIAN ASSISTANT

## 2022-12-30 PROCEDURE — 86140 C-REACTIVE PROTEIN: CPT | Performed by: PHYSICIAN ASSISTANT

## 2022-12-30 PROCEDURE — 36680 INSERT NEEDLE BONE CAVITY: CPT | Performed by: INTERNAL MEDICINE

## 2022-12-30 PROCEDURE — 94640 AIRWAY INHALATION TREATMENT: CPT

## 2022-12-30 PROCEDURE — 94799 UNLISTED PULMONARY SVC/PX: CPT

## 2022-12-30 PROCEDURE — 85730 THROMBOPLASTIN TIME PARTIAL: CPT | Performed by: INTERNAL MEDICINE

## 2022-12-30 PROCEDURE — 85018 HEMOGLOBIN: CPT | Performed by: INTERNAL MEDICINE

## 2022-12-30 PROCEDURE — 25010000002 DIPHENHYDRAMINE PER 50 MG: Performed by: INTERNAL MEDICINE

## 2022-12-30 PROCEDURE — 82375 ASSAY CARBOXYHB QUANT: CPT

## 2022-12-30 PROCEDURE — 94761 N-INVAS EAR/PLS OXIMETRY MLT: CPT

## 2022-12-30 PROCEDURE — 76937 US GUIDE VASCULAR ACCESS: CPT | Performed by: PHYSICIAN ASSISTANT

## 2022-12-30 PROCEDURE — 83605 ASSAY OF LACTIC ACID: CPT | Performed by: INTERNAL MEDICINE

## 2022-12-30 PROCEDURE — 84145 PROCALCITONIN (PCT): CPT | Performed by: PHYSICIAN ASSISTANT

## 2022-12-30 PROCEDURE — 36600 WITHDRAWAL OF ARTERIAL BLOOD: CPT

## 2022-12-30 PROCEDURE — 25010000002 PROPOFOL 10 MG/ML EMULSION: Performed by: INTERNAL MEDICINE

## 2022-12-30 PROCEDURE — 80053 COMPREHEN METABOLIC PANEL: CPT | Performed by: PHYSICIAN ASSISTANT

## 2022-12-30 PROCEDURE — 07HT33Z INSERTION OF INFUSION DEVICE INTO BONE MARROW, PERCUTANEOUS APPROACH: ICD-10-PCS | Performed by: INTERNAL MEDICINE

## 2022-12-30 PROCEDURE — 71045 X-RAY EXAM CHEST 1 VIEW: CPT | Performed by: RADIOLOGY

## 2022-12-30 PROCEDURE — 93005 ELECTROCARDIOGRAM TRACING: CPT | Performed by: PHYSICIAN ASSISTANT

## 2022-12-30 PROCEDURE — 99292 CRITICAL CARE ADDL 30 MIN: CPT | Performed by: INTERNAL MEDICINE

## 2022-12-30 PROCEDURE — 5A1955Z RESPIRATORY VENTILATION, GREATER THAN 96 CONSECUTIVE HOURS: ICD-10-PCS | Performed by: INTERNAL MEDICINE

## 2022-12-30 PROCEDURE — 99291 CRITICAL CARE FIRST HOUR: CPT | Performed by: INTERNAL MEDICINE

## 2022-12-30 PROCEDURE — 83050 HGB METHEMOGLOBIN QUAN: CPT

## 2022-12-30 PROCEDURE — 25010000002 VITAMIN K1 PER 1 MG: Performed by: INTERNAL MEDICINE

## 2022-12-30 PROCEDURE — 87040 BLOOD CULTURE FOR BACTERIA: CPT | Performed by: PHYSICIAN ASSISTANT

## 2022-12-30 PROCEDURE — 31500 INSERT EMERGENCY AIRWAY: CPT | Performed by: INTERNAL MEDICINE

## 2022-12-30 PROCEDURE — C1751 CATH, INF, PER/CENT/MIDLINE: HCPCS

## 2022-12-30 PROCEDURE — 83605 ASSAY OF LACTIC ACID: CPT | Performed by: PHYSICIAN ASSISTANT

## 2022-12-30 PROCEDURE — 85610 PROTHROMBIN TIME: CPT | Performed by: INTERNAL MEDICINE

## 2022-12-30 PROCEDURE — 36571 INSERT PICVAD CATH: CPT | Performed by: PHYSICIAN ASSISTANT

## 2022-12-30 PROCEDURE — 74176 CT ABD & PELVIS W/O CONTRAST: CPT

## 2022-12-30 PROCEDURE — 36410 VNPNXR 3YR/> PHY/QHP DX/THER: CPT

## 2022-12-30 PROCEDURE — 94002 VENT MGMT INPAT INIT DAY: CPT

## 2022-12-30 PROCEDURE — 87070 CULTURE OTHR SPECIMN AEROBIC: CPT | Performed by: INTERNAL MEDICINE

## 2022-12-30 PROCEDURE — 87205 SMEAR GRAM STAIN: CPT | Performed by: INTERNAL MEDICINE

## 2022-12-30 PROCEDURE — 25010000002 PIPERACILLIN SOD-TAZOBACTAM PER 1 G: Performed by: PHYSICIAN ASSISTANT

## 2022-12-30 PROCEDURE — 82805 BLOOD GASES W/O2 SATURATION: CPT

## 2022-12-30 PROCEDURE — 74176 CT ABD & PELVIS W/O CONTRAST: CPT | Performed by: RADIOLOGY

## 2022-12-30 PROCEDURE — 0BH17EZ INSERTION OF ENDOTRACHEAL AIRWAY INTO TRACHEA, VIA NATURAL OR ARTIFICIAL OPENING: ICD-10-PCS | Performed by: INTERNAL MEDICINE

## 2022-12-30 PROCEDURE — 36556 INSERT NON-TUNNEL CV CATH: CPT | Performed by: INTERNAL MEDICINE

## 2022-12-30 PROCEDURE — 25010000002 MIDAZOLAM PER 1 MG

## 2022-12-30 PROCEDURE — 25010000002 LORAZEPAM PER 2 MG: Performed by: STUDENT IN AN ORGANIZED HEALTH CARE EDUCATION/TRAINING PROGRAM

## 2022-12-30 PROCEDURE — 82962 GLUCOSE BLOOD TEST: CPT

## 2022-12-30 RX ORDER — SODIUM CHLORIDE 0.9 % (FLUSH) 0.9 %
20 SYRINGE (ML) INJECTION AS NEEDED
Status: DISCONTINUED | OUTPATIENT
Start: 2022-12-30 | End: 2023-01-05 | Stop reason: HOSPADM

## 2022-12-30 RX ORDER — LEVETIRACETAM 100 MG/ML
1500 SOLUTION ORAL NIGHTLY
Status: DISCONTINUED | OUTPATIENT
Start: 2022-12-30 | End: 2023-01-04 | Stop reason: ALTCHOICE

## 2022-12-30 RX ORDER — DEXTROSE MONOHYDRATE 25 G/50ML
INJECTION, SOLUTION INTRAVENOUS
Status: COMPLETED
Start: 2022-12-30 | End: 2022-12-30

## 2022-12-30 RX ORDER — SODIUM CHLORIDE 0.9 % (FLUSH) 0.9 %
10 SYRINGE (ML) INJECTION EVERY 12 HOURS SCHEDULED
Status: DISCONTINUED | OUTPATIENT
Start: 2022-12-30 | End: 2023-01-05 | Stop reason: HOSPADM

## 2022-12-30 RX ORDER — FOLIC ACID 1 MG/1
1 TABLET ORAL DAILY
Status: DISCONTINUED | OUTPATIENT
Start: 2022-12-30 | End: 2023-01-05 | Stop reason: HOSPADM

## 2022-12-30 RX ORDER — IPRATROPIUM BROMIDE AND ALBUTEROL SULFATE 2.5; .5 MG/3ML; MG/3ML
3 SOLUTION RESPIRATORY (INHALATION) EVERY 4 HOURS PRN
Status: DISCONTINUED | OUTPATIENT
Start: 2022-12-30 | End: 2023-01-05 | Stop reason: HOSPADM

## 2022-12-30 RX ORDER — SODIUM CHLORIDE 9 MG/ML
40 INJECTION, SOLUTION INTRAVENOUS AS NEEDED
Status: DISCONTINUED | OUTPATIENT
Start: 2022-12-30 | End: 2023-01-05 | Stop reason: HOSPADM

## 2022-12-30 RX ORDER — PHYTONADIONE 10 MG/ML
5 INJECTION, EMULSION INTRAMUSCULAR; INTRAVENOUS; SUBCUTANEOUS ONCE
Status: COMPLETED | OUTPATIENT
Start: 2022-12-30 | End: 2022-12-30

## 2022-12-30 RX ORDER — DEXTROSE MONOHYDRATE 25 G/50ML
25 INJECTION, SOLUTION INTRAVENOUS
Status: DISCONTINUED | OUTPATIENT
Start: 2022-12-30 | End: 2023-01-05 | Stop reason: HOSPADM

## 2022-12-30 RX ORDER — PHENYLEPHRINE HCL IN 0.9% NACL 1 MG/10 ML
SYRINGE (ML) INTRAVENOUS
Status: DISPENSED
Start: 2022-12-30 | End: 2022-12-30

## 2022-12-30 RX ORDER — POTASSIUM CHLORIDE 1.5 G/1.77G
40 POWDER, FOR SOLUTION ORAL EVERY 4 HOURS
Status: COMPLETED | OUTPATIENT
Start: 2022-12-30 | End: 2022-12-30

## 2022-12-30 RX ORDER — MIDAZOLAM HYDROCHLORIDE 1 MG/ML
4 INJECTION INTRAMUSCULAR; INTRAVENOUS ONCE
Status: COMPLETED | OUTPATIENT
Start: 2022-12-30 | End: 2022-12-30

## 2022-12-30 RX ORDER — MIDAZOLAM HYDROCHLORIDE 1 MG/ML
INJECTION INTRAMUSCULAR; INTRAVENOUS
Status: COMPLETED
Start: 2022-12-30 | End: 2022-12-30

## 2022-12-30 RX ORDER — NICOTINE POLACRILEX 4 MG
15 LOZENGE BUCCAL
Status: DISCONTINUED | OUTPATIENT
Start: 2022-12-30 | End: 2023-01-05 | Stop reason: HOSPADM

## 2022-12-30 RX ORDER — NOREPINEPHRINE BIT/0.9 % NACL 8 MG/250ML
.01-.3 INFUSION BOTTLE (ML) INTRAVENOUS
Status: DISCONTINUED | OUTPATIENT
Start: 2022-12-30 | End: 2023-01-05 | Stop reason: HOSPADM

## 2022-12-30 RX ORDER — CHLORHEXIDINE GLUCONATE 0.12 MG/ML
15 RINSE ORAL EVERY 12 HOURS SCHEDULED
Status: DISCONTINUED | OUTPATIENT
Start: 2022-12-30 | End: 2023-01-05 | Stop reason: HOSPADM

## 2022-12-30 RX ORDER — LEVETIRACETAM 100 MG/ML
750 SOLUTION ORAL 2 TIMES DAILY
Status: DISCONTINUED | OUTPATIENT
Start: 2022-12-30 | End: 2023-01-04 | Stop reason: ALTCHOICE

## 2022-12-30 RX ORDER — SODIUM CHLORIDE 0.9 % (FLUSH) 0.9 %
10 SYRINGE (ML) INJECTION AS NEEDED
Status: DISCONTINUED | OUTPATIENT
Start: 2022-12-30 | End: 2023-01-05 | Stop reason: HOSPADM

## 2022-12-30 RX ADMIN — PIPERACILLIN SODIUM AND TAZOBACTAM SODIUM 3.38 G: 3; .375 INJECTION, POWDER, LYOPHILIZED, FOR SOLUTION INTRAVENOUS at 11:32

## 2022-12-30 RX ADMIN — Medication 10 ML: at 14:14

## 2022-12-30 RX ADMIN — CHLORHEXIDINE GLUCONATE 15 ML: 1.2 RINSE ORAL at 20:28

## 2022-12-30 RX ADMIN — Medication 10 ML: at 20:29

## 2022-12-30 RX ADMIN — IPRATROPIUM BROMIDE 0.5 MG: 0.5 SOLUTION RESPIRATORY (INHALATION) at 17:11

## 2022-12-30 RX ADMIN — MIDAZOLAM 4 MG: 1 INJECTION INTRAMUSCULAR; INTRAVENOUS at 04:27

## 2022-12-30 RX ADMIN — PROPOFOL 25 MCG/KG/MIN: 10 INJECTION, EMULSION INTRAVENOUS at 12:15

## 2022-12-30 RX ADMIN — LORAZEPAM 1 MG: 2 INJECTION INTRAMUSCULAR; INTRAVENOUS at 00:56

## 2022-12-30 RX ADMIN — PHYTONADIONE 5 MG: 10 INJECTION, EMULSION INTRAMUSCULAR; INTRAVENOUS; SUBCUTANEOUS at 11:32

## 2022-12-30 RX ADMIN — VANCOMYCIN HYDROCHLORIDE 1500 MG: 5 INJECTION, POWDER, LYOPHILIZED, FOR SOLUTION INTRAVENOUS at 07:18

## 2022-12-30 RX ADMIN — PROPOFOL 25 MCG/KG/MIN: 10 INJECTION, EMULSION INTRAVENOUS at 20:30

## 2022-12-30 RX ADMIN — PROPOFOL 5 MCG/KG/MIN: 10 INJECTION, EMULSION INTRAVENOUS at 04:58

## 2022-12-30 RX ADMIN — Medication 10 ML: at 08:57

## 2022-12-30 RX ADMIN — MIDAZOLAM HYDROCHLORIDE 4 MG: 1 INJECTION INTRAMUSCULAR; INTRAVENOUS at 04:27

## 2022-12-30 RX ADMIN — LEVETIRACETAM 1500 MG: 100 SOLUTION ORAL at 20:28

## 2022-12-30 RX ADMIN — LEVETIRACETAM 750 MG: 100 SOLUTION ORAL at 07:18

## 2022-12-30 RX ADMIN — POTASSIUM CHLORIDE 40 MEQ: 1.5 POWDER, FOR SOLUTION ORAL at 11:24

## 2022-12-30 RX ADMIN — DIPHENHYDRAMINE HYDROCHLORIDE 25 MG: 50 INJECTION INTRAMUSCULAR; INTRAVENOUS at 01:28

## 2022-12-30 RX ADMIN — IPRATROPIUM BROMIDE 0.5 MG: 0.5 SOLUTION RESPIRATORY (INHALATION) at 06:40

## 2022-12-30 RX ADMIN — Medication 100 MG: at 08:56

## 2022-12-30 RX ADMIN — NOREPINEPHRINE BITARTRATE 0.02 MCG/KG/MIN: 1 INJECTION, SOLUTION, CONCENTRATE INTRAVENOUS at 20:30

## 2022-12-30 RX ADMIN — POTASSIUM CHLORIDE 40 MEQ: 1.5 POWDER, FOR SOLUTION ORAL at 06:13

## 2022-12-30 RX ADMIN — Medication 1 MG: at 11:32

## 2022-12-30 RX ADMIN — FAMOTIDINE 20 MG: 20 TABLET ORAL at 08:55

## 2022-12-30 RX ADMIN — FAMOTIDINE 20 MG: 20 TABLET ORAL at 17:09

## 2022-12-30 RX ADMIN — CHLORHEXIDINE GLUCONATE 15 ML: 1.2 RINSE ORAL at 09:00

## 2022-12-30 RX ADMIN — PIPERACILLIN SODIUM AND TAZOBACTAM SODIUM 3.38 G: 3; .375 INJECTION, POWDER, LYOPHILIZED, FOR SOLUTION INTRAVENOUS at 06:07

## 2022-12-30 RX ADMIN — PIPERACILLIN SODIUM AND TAZOBACTAM SODIUM 3.38 G: 3; .375 INJECTION, POWDER, LYOPHILIZED, FOR SOLUTION INTRAVENOUS at 20:28

## 2022-12-30 RX ADMIN — DEXTROSE MONOHYDRATE 25 G: 25 INJECTION, SOLUTION INTRAVENOUS at 06:45

## 2022-12-30 RX ADMIN — IPRATROPIUM BROMIDE 0.5 MG: 0.5 SOLUTION RESPIRATORY (INHALATION) at 12:31

## 2022-12-30 RX ADMIN — SODIUM CHLORIDE 1000 ML: 9 INJECTION, SOLUTION INTRAVENOUS at 07:43

## 2022-12-30 RX ADMIN — IPRATROPIUM BROMIDE 0.5 MG: 0.5 SOLUTION RESPIRATORY (INHALATION) at 01:26

## 2022-12-30 RX ADMIN — LEVETIRACETAM 750 MG: 100 SOLUTION ORAL at 14:35

## 2022-12-30 NOTE — CONSULTS
Palliative Care Initial Consult     Attending Physician: Bernabe Molina Jr., MD  Referring Provider: Bernabe Molina Jr., MD    Palliative care reason for consult: GOC/ACP  Code Status:   Code Status and Medical Interventions:   Ordered at: 12/20/22 5018     Level Of Support Discussed With:    Patient     Code Status (Patient has no pulse and is not breathing):    CPR (Attempt to Resuscitate)     Medical Interventions (Patient has pulse or is breathing):    Full Support      Advanced Directives: Advance Directive Status: Patient does not have advance directive   Healthcare surrogate: NOK spouse Lena Edward  Goals of Care: Lena states that at this time she wishes for her  to remain a full code full treat, and would like to see how he does over the next few days before revisiting goals of care.    HPI:  Jamison Edward is a 52 y.o. male admitted on 12/20/2022 due to leg swelling. He has a medical history of hypertension, COPD, tobacco abuse, multilevel DDD, GERD, Johnson's esophagus, peptic ulcer disease, ulcerative colitis, history of TIA/CVA, and alcohol use. Pt had been attending a cardiologist appointment in follow up for syncope and he had been wearing an cardiac event monitor. During this appointment his daughter stated her father had been more confused as well as was having lower extremity swelling and weakness. His monitor showed he was having episodes of bradycardia and tachycardia without arrhthymias noted.  On 12/29/2022 had been on [address] when  Pt became agitated and he began having difficulty with maintaining oxygen saturations as well as tachycardic. Decision was made to transfer him to CCU where he was subsequently intubated and an IO was place.      ROS: Negative except as above in HPI.     Past Medical History:   Diagnosis Date   • Anemia    • Arthritis of back     not sure   • Johnson esophagus    • Cholelithiasis    • COPD (chronic obstructive pulmonary disease) (HCC)    • CTS  (carpal tunnel syndrome)    • DDD (degenerative disc disease), thoracic    • Degenerative disc disease, lumbar    • Dystonia    • GERD (gastroesophageal reflux disease)    • GI (gastrointestinal bleed)    • Hypertension    • Irritable bowel syndrome    • Low back strain    • Lumbosacral disc disease    • Migraines    • Peptic ulcer disease    • Stroke (HCC)     TIA   • TIA (transient ischemic attack)    • Ulcerative colitis (HCC)    • Wears dentures     upper only     Past Surgical History:   Procedure Laterality Date   • ABDOMINAL SURGERY  09/17/2021   • APPENDECTOMY     • CHOLECYSTECTOMY N/A 04/22/2017    Procedure: CHOLECYSTECTOMY LAPAROSCOPIC;  Surgeon: Jaqueline Guaman MD;  [facility]:  [facility];  Service:    • COLONOSCOPY N/A 05/08/2017    Procedure: COLONOSCOPY  CPTCODE:42599;  Surgeon: Nicho Richey III, MD;  [facility]:  [facility];  Service:    • CUBITAL TUNNEL RELEASE Left 9/1/2022    Procedure: CUBITAL TUNNEL RELEASE LEFT;  Surgeon: Alec Hough MD;  [facility]:  [facility];  Service: Orthopedics;  Laterality: Left;   • ENDOSCOPY     • ENDOSCOPY N/A 05/08/2017    Procedure: ESOPHAGOGASTRODUODENOSCOPY WITH BIOPSY  CPTCODE:51079;  Surgeon: Nicho Richey III, MD;  [facility]:  [facility];  Service:    • ENDOSCOPY N/A 11/03/2017    Procedure: ESOPHAGOGASTRODUODENOSCOPY WITH BIOPSY  CPTCODE: 05728;  Surgeon: Nicho Richey III, MD;  [facility]  [facility];  Service:    • ENDOSCOPY N/A 02/20/2018    Procedure: ESOPHAGOGASTRODUODENOSCOPY WITH DILATATION CPT CODE: 76151;  Surgeon: Nicho Richey III, MD;  [facility];  Service:    • ENDOSCOPY N/A 06/05/2018    Procedure: ESOPHAGOGASTRODUODENOSCOPY WITH BIOPSY CPT CODE: 95014;  Surgeon: Nicho Richey III, MD;  [facility]:  [facility];  Service: Gastroenterology   • ENDOSCOPY N/A 05/26/2021    Procedure: ESOPHAGOGASTRODUODENOSCOPY WITH BIOPSY;  Surgeon: Julieta Hebert MD;  [facility]  [facility];  Service: Gastroenterology;   Laterality: N/A;   • ENDOSCOPY N/A 2021    Procedure: ESOPHAGOGASTRODUODENOSCOPY WITH BIOPSY;  Surgeon: Julieta Hebert MD;  [facility];  Service: Gastroenterology;  Laterality: N/A;   • GASTRECTOMY N/A 2019    Procedure: OPEN VAGOTOMY, ANTRECTOMY,REVISION- Y GASTROJEJUNOSOTOMY;  Surgeon: Herbert Umanzor MD;  Location:  [facility];  Service: General   • UPPER GASTROINTESTINAL ENDOSCOPY     • VENTRAL/INCISIONAL HERNIA REPAIR N/A 12/10/2020    Procedure: INCISIONAL HERNIA REPAIR LAPAROSCOPIC WITH MESH;  Surgeon: Herbert Umanzor MD;  Location:  [facility];  Service: General;  Laterality: N/A;     Social History     Socioeconomic History   • Marital status:    Tobacco Use   • Smoking status: Every Day     Packs/day: 1.00     Years: 35.00     Pack years: 35.00     Types: Cigarettes     Last attempt to quit: 2020     Years since quittin.3   • Smokeless tobacco: Never   • Tobacco comments:     Been smoking 20 years   Vaping Use   • Vaping Use: Never used   Substance and Sexual Activity   • Alcohol use: Yes     Alcohol/week: 4.0 standard drinks     Types: 4 Cans of beer per week   • Drug use: No   • Sexual activity: Yes     Partners: Female     Birth control/protection: None     Family History   Problem Relation Age of Onset   • Osteoporosis Mother    • Hypertension Father    • Osteoporosis Father    • No Known Problems Sister    • No Known Problems Brother    • Drug abuse Brother    • No Known Problems Daughter    • Diabetes Sister        Allergies   Allergen Reactions   • Contrast Dye Other (See Comments)     Shortness of breath   • Prilosec [Omeprazole] Other (See Comments)     Chest pain  Pt states he can take pepcid with no problem   • Protonix [Pantoprazole Sodium] Palpitations     Patient states that protonix causes chest pain.  Shruthi Pérez [facility]  2017  11:19 AM  Pt states he can tolerate pepcid with no problem     • Dilantin [Phenytoin] Delirium   •  Dilaudid [Hydromorphone] Hallucinations   • Flagyl [Metronidazole] Nausea And Vomiting   • Topamax [Topiramate] Anxiety       Current Facility-Administered Medications   Medication Dose Route Frequency Provider Last Rate Last Admin   • chlorhexidine (PERIDEX) 0.12 % solution 15 mL  15 mL Mouth/Throat Q12H Tong Mcgraw MD   15 mL at 12/30/22 0900   • dextrose (D50W) (25 g/50 mL) IV injection 25 g  25 g Intravenous Q15 Min PRN Tong Mcgraw MD   25 g at 12/30/22 0645   • dextrose (GLUTOSE) oral gel 15 g  15 g Oral Q15 Min PRN Tong Mcgraw MD       • diphenhydrAMINE (BENADRYL) injection 25 mg  25 mg Intravenous Q6H PRN Bernabe Molina Jr., MD   25 mg at 12/30/22 0128   • famotidine (PEPCID) tablet 20 mg  20 mg Oral BID Margo Messina DO   20 mg at 12/30/22 0855   • folic acid (FOLVITE) tablet 1 mg  1 mg Oral Daily Manuel Finley MD   1 mg at 12/30/22 1132   • glucagon (human recombinant) (GLUCAGEN DIAGNOSTIC) injection 1 mg  1 mg Subcutaneous Q15 Min PRN Tong Mcgraw MD       • ipratropium (ATROVENT) nebulizer solution 0.5 mg  0.5 mg Nebulization 4x Daily - RT Lauren Alonzo PA-C   0.5 mg at 12/30/22 1231   • ipratropium-albuterol (DUO-NEB) nebulizer solution 3 mL  3 mL Nebulization Q4H PRN Lauren Alonzo PA-C       • levETIRAcetam (KEPPRA) 100 MG/ML solution 1,500 mg  1,500 mg Oral Nightly Tong Mcgraw MD       • levETIRAcetam (KEPPRA) 100 MG/ML solution 750 mg  750 mg Oral BID Tong Mcgraw MD   750 mg at 12/30/22 0718   • magnesium sulfate 4 gram infusion - Mg less than or equal to 1mg/dL  4 g Intravenous PRN Dimitris Mullins MD        Or   • magnesium sulfate 3 gram infusion (1gm x 3) - Mg 1.1 - 1.5 mg/dL  1 g Intravenous PRN Dimitris Mullins MD        Or   • Magnesium Sulfate 2 gram infusion- Mg 1.6 - 1.9 mg/dL  2 g Intravenous PRN Dimitris Mullins MD       • norepinephrine (LEVOPHED) 8 mg in 250 mL NS infusion (premix)  0.02-0.3 mcg/kg/min  Intravenous Titrated Tong Mcgraw MD   Held at 12/30/22 0855   • Pharmacy Consult   Does not apply Continuous PRN Tong Mcgraw MD       • Pharmacy to dose vancomycin   Does not apply Continuous PRN Lauren Alonzo PA-C       • Pharmacy to Dose Zosyn   Does not apply Continuous PRN Lauren Alonzo PA-C       • phenylephrine (DANITZA-SYNEPHRINE) 1 MG/10ML injection  - ADS Override Pull            • piperacillin-tazobactam (ZOSYN) IVPB 3.375 g in 100 mL NS VTB  3.375 g Intravenous Q8H Lauren Alonzo PA-C   3.375 g at 12/30/22 1132   • potassium chloride (K-DUR,KLOR-CON) ER tablet 40 mEq  40 mEq Oral PRN Dimitris Mullins MD       • potassium chloride (KLOR-CON) packet 40 mEq  40 mEq Oral PRN Dimitris Mullins MD       • propofol (DIPRIVAN) infusion 10 mg/mL 100 mL  5-50 mcg/kg/min Intravenous Titrated Tong Mcgraw MD 11.57 mL/hr at 12/30/22 1215 25 mcg/kg/min at 12/30/22 1215   • sodium chloride 0.9 % flush 10 mL  10 mL Intravenous Q12H Dimitris Mullins MD   10 mL at 12/30/22 0857   • sodium chloride 0.9 % flush 10 mL  10 mL Intravenous PRN Dimitris Mullins MD       • sodium chloride 0.9 % infusion 40 mL  40 mL Intravenous PRN Dimitris Mullins MD       • thiamine (VITAMIN B-1) tablet 100 mg  100 mg Oral Daily Margo Khan DO   100 mg at 12/30/22 0856   • vancomycin 1500 mg/500 mL 0.9% NS IVPB ([facility])  1,500 mg Intravenous Q12H Jacy Claros RPH         norepinephrine, 0.02-0.3 mcg/kg/min, Last Rate: Stopped (12/30/22 0855)  Pharmacy Consult,   Pharmacy to dose vancomycin,   Pharmacy to Dose Zosyn,   propofol, 5-50 mcg/kg/min, Last Rate: 25 mcg/kg/min (12/30/22 1215)      •  dextrose  •  dextrose  •  diphenhydrAMINE  •  glucagon (human recombinant)  •  ipratropium-albuterol  •  magnesium sulfate **OR** magnesium sulfate in D5W 1g/100mL (PREMIX) **OR** magnesium sulfate  •  Pharmacy Consult  •  Pharmacy to dose vancomycin  •  Pharmacy to Dose Zosyn  •   "potassium chloride  •  potassium chloride  •  sodium chloride  •  sodium chloride    Current medication reviewed for route, type, dose and frequency and are current per MAR.    Palliative Performance Scale Score:     BP 96/58 Comment: 71  Pulse 99   Temp 97.6 °F (36.4 °C) (Oral)   Resp 23   Ht 167.6 cm (65.98\")   Wt 77.2 kg (170 lb 3.1 oz)   SpO2 92%   BMI 27.48 kg/m²     Intake/Output Summary (Last 24 hours) at 12/30/2022 1311  Last data filed at 12/30/2022 1132  Gross per 24 hour   Intake 2000 ml   Output 300 ml   Net 1700 ml       PE:  General Appearance:    Chronically ill appearing, alert, cooperative, NAD   HEENT:    NC/AT, without obvious abnormality, EOMI, anicteric    Neck:   supple, trachea midline, no JVD   Lungs:     Sedated, intubated on vent, coarse upper lung sounds noted    Heart:    Tachycardic, normal S1 and S2, no M/R/G   Abdomen:     Soft, NT,distended, NABS    Extremities:   Moves all extremities weakly not to command, no edema   Pulses:   Pulses palpable and equal bilaterally   Skin:   Warm, dry, bruising, mottling   Neurologic:   Unable to assess   Psych:   Sedated unable to assess       Labs:   Results from last 7 days   Lab Units 12/30/22  0452 12/30/22 0217   WBC 10*3/mm3  --  14.77*   HEMOGLOBIN g/dL 11.3* 13.2   HEMATOCRIT % 33.8* 39.8   PLATELETS 10*3/mm3  --  65*     Results from last 7 days   Lab Units 12/30/22  0217   SODIUM mmol/L 142   POTASSIUM mmol/L 3.3*   CHLORIDE mmol/L 110*   CO2 mmol/L 20.7*   BUN mg/dL 13   CREATININE mg/dL 0.50*   GLUCOSE mg/dL 96   CALCIUM mg/dL 8.0*     Results from last 7 days   Lab Units 12/30/22  0217   SODIUM mmol/L 142   POTASSIUM mmol/L 3.3*   CHLORIDE mmol/L 110*   CO2 mmol/L 20.7*   BUN mg/dL 13   CREATININE mg/dL 0.50*   CALCIUM mg/dL 8.0*   BILIRUBIN mg/dL 2.7*   ALK PHOS U/L 176*   ALT (SGPT) U/L 61*   AST (SGOT) U/L 72*   GLUCOSE mg/dL 96     Imaging Results (Last 72 Hours)     Procedure Component Value Units Date/Time    XR Chest 1 " View [[id number]] Collected: 12/30/22 1153     Updated: 12/30/22 1222    Narrative:      EXAM:    XR Chest, 1 View     EXAM DATE:    12/30/2022 11:42 AM     CLINICAL HISTORY:    central line placement; J18.9-Pneumonia, unspecified organism;  K74.60-Unspecified cirrhosis of liver; R18.8-Other ascites;  M54.16-Radiculopathy, lumbar region; J96.01-Acute respiratory failure  with hypoxia     TECHNIQUE:    Frontal view of the chest.     COMPARISON:    12/30/2022     FINDINGS:    LUNGS:  Bilateral airspace disease is grossly stable.    PLEURAL SPACE:  Unremarkable.  No pneumothorax.    HEART:  Heart size is stable.    MEDIASTINUM:  Unremarkable.    BONES/JOINTS:  Unremarkable.    TUBES, LINES AND DEVICES:  Right internal jugular central venous  catheter tip in the superior vena cava.  The endotracheal tube (ETT) is  in satisfactory position.  Nasogastric tube tip in the stomach.       Impression:        Bilateral airspace disease is grossly stable.     This report was finalized on 12/30/2022 11:53 AM by Dr. Fab Huff MD.       XR Chest 1 View [[id number]] Collected: 12/30/22 0505     Updated: 12/30/22 0507    Narrative:      CR Chest 1 Vw    INDICATION:   Intubation. Pneumonia.     COMPARISON:    12/30/2022 at 0126 hours.    FINDINGS:  Single portable AP view(s) of the chest.    New endotracheal tube is in good position in the mid trachea. OG tube tip is in the stomach. Patchy bilateral infiltrates are unchanged. Heart size is normal. No pneumothorax.      Impression:      Well-positioned tubes, otherwise no change.    Signer Name: Zachery Sy MD   Signed: 12/30/2022 5:05 AM   Workstation Name: [id number]    [facility]    XR Chest 1 View [[id number]] Collected: 12/30/22 0143     Updated: 12/30/22 0145    Narrative:      CR Chest 1 Vw    INDICATION:   Hypoxia. Pneumonia.     COMPARISON:    12/28/2022    FINDINGS:  Single portable AP view(s) of the chest.    Heart size is grossly normal. Lung  volumes are a bit lower on the current study. Diffuse bilateral somewhat patchy infiltrates have worsened. Considerations include pneumonia and/or pulmonary edema. No pneumothorax.       Impression:      Worsening bilateral pulmonary infiltrates. No pneumothorax.    Signer Name: Zachery Sy MD   Signed: 12/30/2022 1:43 AM   Workstation Name: [id number]    [facility] Paracentesis [[id number]] Collected: 12/28/22 1343    Specimen: Body Fluid Updated: 12/28/22 1347    Narrative:      EXAM:    IR Paracentesis With Image Guidance     EXAM DATE:    12/28/2022 1:42 PM     CLINICAL HISTORY:    large volume ascites; J18.9-Pneumonia, unspecified organism;  K74.60-Unspecified cirrhosis of liver; R18.8-Other ascites;  M54.16-Radiculopathy, lumbar region     TECHNIQUE:    Image-guided percutaneous paracentesis.     COMPARISON:    No relevant prior studies available.     FINDINGS:    CONSENT:  The risks, benefits and alternatives to image-guided  paracentesis were explained to the patient who understood and elected to  proceed.  The risks discussed included but were not limited to bleeding,  infection, bowel perforation, hypotension and pain.  Written consent was  obtained.    TIME-OUT:  A time-out was performed immediately prior to the procedure  to confirm the patient's identity, procedure and site/side of procedure.    PRE-PROCEDURE:  Patient was prepped and draped in a sterile fashion.    PROCEDURE:  Small volume paracentesis for diagnostic fluid was  performed with 22-gauge needle.    COMPLICATIONS:  None.    POST-PROCEDURE:  Patient was discharged to the post-procedure recovery  area.    OTHER FINDINGS:  Small ascites on today's study.       Impression:      1.  Small volume paracentesis for diagnostic fluid was performed with  22-gauge needle.  2.  Small ascites on today's study.     This report was finalized on 12/28/2022 1:45 PM by Dr. Fab Huff MD.       XR Chest 1 View [[id number]]  Collected: 12/28/22 0823     Updated: 12/28/22 0825    Narrative:      EXAM:    XR Chest, 1 View     EXAM DATE:    12/28/2022 7:34 AM     CLINICAL HISTORY:    possible aspiration pna; J18.9-Pneumonia, unspecified organism;  K74.60-Unspecified cirrhosis of liver; R18.8-Other ascites;  M54.16-Radiculopathy, lumbar region     TECHNIQUE:    Frontal view of the chest.     COMPARISON:    12/20/2022     FINDINGS:    LUNGS:  Coarsened interstitial markings noted throughout the lungs.    PLEURAL SPACE:  Unremarkable.  No pneumothorax.    HEART:  Unremarkable.  No cardiomegaly.    MEDIASTINUM:  Unremarkable.    BONES/JOINTS:  Unremarkable.       Impression:        No acute cardiopulmonary findings identified.     This report was finalized on 12/28/2022 8:23 AM by Dr. Fab Huff MD.             Diagnostics: Reviewed    A: Jamison Edward is a 52 y.o. male admitted on 12/20/2022 due to leg swelling. He has a medical history of hypertension, COPD, tobacco abuse, multilevel DDD, GERD, Johnson's esophagus, peptic ulcer disease, ulcerative colitis, history of TIA/CVA, and alcohol use. Pt had been attending a cardiologist appointment in follow up for syncope and he had been wearing an cardiac event monitor. During this appointment his daughter stated her father had been more confused as well as was having lower extremity swelling and weakness. His monitor showed he was having episodes of bradycardia and tachycardia without arrhthymias noted.  On 12/29/2022 had been on [address] when  Pt became agitated and he began having difficulty with maintaining oxygen saturations as well as tachycardic. Decision was made to transfer him to CCU where he was subsequently intubated and an IO was place.           P:   Palliative care was consulted to discuss GOC/ACP. I was able to have a lengthy discussion at bedside with wife Lena, she is realistic and knows that he is not doing well, however would like to continue full code full treatment  for the next several days to see if he is able to turn around and make any improvements. I let her know that we would be here for support for her and her family and that is she needed anything to please let RN let a hold of me.    We appreciate the consult and the opportunity to participate in Jamison Edward's care. We will continue to follow along. Please do not hesitate to contact us regarding further symptom management or goals of care needs, including after hours or on weekends via our on call provider at [phone].     Time: 35 minutes spent reviewing medical and medication records, assessing and examining patient, discussing with family, answering questions, providing some guidance about a plan and documentation of care, and coordinating care with other healthcare members, with > 50% time spent face to face.     Maryann Still, APRN    12/30/2022

## 2022-12-30 NOTE — PROCEDURES
"Intubation    Date/Time: 12/30/2022 4:28 AM  Performed by: Tong Mcgraw MD  Authorized by: Tong Mcgraw MD   Consent: The procedure was performed in an emergent situation.  Patient identity confirmed: provided demographic data and arm band  Time out: Immediately prior to procedure a \"time out\" was called to verify the correct patient, procedure, equipment, support staff and site/side marked as required.  Indications: airway protection,  hypoxemia and  respiratory failure  Intubation method: video-assisted  Patient status: unconscious  Preoxygenation: BVM  Pretreatment medications: none  Sedatives: etomidate and propofol  Paralytic: none  Laryngoscope size: [place] 4 blade.  Tube size: 8.0 mm  Tube type: cuffed  Number of attempts: 1  Cricoid pressure: no  Cords visualized: yes  Post-procedure assessment: CO2 detector and chest rise  Breath sounds: equal and absent over the epigastrium  Cuff inflated: yes  ETT to lip: 24 cm  ETT to teeth: 23 cm  Tube secured with: ETT angelo  Patient tolerance: patient tolerated the procedure well with no immediate complications        "

## 2022-12-30 NOTE — CONSULTS
Referring Provider: Dr. Mcgraw  Reason for Consultation: acute hypoxic respiratory failure, sepsis      Chief complaint  Respiratory distress      History of present illness:      Patient is a 52-year-old male with past medical history including hypertension, COPD, tobacco abuse, degenerative disc disease, GERD, Johnson's esophagus, peptic ulcer disease, ulcerative colitis, TIA/CVA, alcohol use.  He had initially presented and was admitted on 12/21 after abdominal swelling has gradually worsened.  Also noticed other signs of liver complications including abdominal pain, jaundice, nausea, vomiting.  Last alcohol use was reportedly 5 weeks prior.  History also notable for significant acetaminophen use over the past several years.  Patient was admitted at that time for bilateral pneumonia, septic shock, liver cirrhosis with moderate ascites, electrolyte abnormalities, and monitoring of other chronic conditions.  On 12/29, he was notable for worsening confusion and agitation.  He developed experimental symptoms after scheduled quetiapine and olanzapine.  He was given Benadryl, then quetiapine discontinued by psych.  Then given lorazepam.  Wife stated that he was quite drowsy the rest of the day.   In the very early morning hours of 12/30, patient was notable for desaturation event, worsened infiltrates on imaging, and respiratory distress.  Supplemental oxygen requirements progressively increased and patient was transferred to CCU.  As he became less responsive, he was then emergently intubated.  He was notable for hypotension and administer fluids.  He was started on norepinephrine.  Antibiotics were adjusted.  Wife is present at bedside this morning.  Patient currently on mechanical ventilator support, sedated state.      Review Of Systems:    Unable to obtain review of systems due to intubation and sedation.         History  Past Medical History:   Diagnosis Date    Anemia     Arthritis of back     not sure     Johnson esophagus     Cholelithiasis     COPD (chronic obstructive pulmonary disease) (HCC)     CTS (carpal tunnel syndrome)     DDD (degenerative disc disease), thoracic     Degenerative disc disease, lumbar     Dystonia     GERD (gastroesophageal reflux disease)     GI (gastrointestinal bleed)     Hypertension     Irritable bowel syndrome     Low back strain     Lumbosacral disc disease     Migraines     Peptic ulcer disease     Stroke (HCC)     TIA    TIA (transient ischemic attack)     Ulcerative colitis (HCC)     Wears dentures     upper only   ,   Past Surgical History:   Procedure Laterality Date    ABDOMINAL SURGERY  09/17/2021    APPENDECTOMY      CHOLECYSTECTOMY N/A 04/22/2017    Procedure: CHOLECYSTECTOMY LAPAROSCOPIC;  Surgeon: Jaqueline Guaman MD;  Location:  [facility];  Service:     COLONOSCOPY N/A 05/08/2017    Procedure: COLONOSCOPY  CPTCODE:08685;  Surgeon: Nicho Richey III, MD;  Location:  [facility];  Service:     CUBITAL TUNNEL RELEASE Left 9/1/2022    Procedure: CUBITAL TUNNEL RELEASE LEFT;  Surgeon: Alec Hough MD;  Location:  [facility];  Service: Orthopedics;  Laterality: Left;    ENDOSCOPY      ENDOSCOPY N/A 05/08/2017    Procedure: ESOPHAGOGASTRODUODENOSCOPY WITH BIOPSY  CPTCODE:49999;  Surgeon: Nicho Richey III, MD;  Location:  [facility];  Service:     ENDOSCOPY N/A 11/03/2017    Procedure: ESOPHAGOGASTRODUODENOSCOPY WITH BIOPSY  CPTCODE: 92809;  Surgeon: Nicho Richey III, MD;  [facility];  Service:     ENDOSCOPY N/A 02/20/2018    Procedure: ESOPHAGOGASTRODUODENOSCOPY WITH DILATATION CPT CODE: 77124;  Surgeon: Nicho Richey III, MD;  [facility];  Service:     ENDOSCOPY N/A 06/05/2018    Procedure: ESOPHAGOGASTRODUODENOSCOPY WITH BIOPSY CPT CODE: 10983;  Surgeon: Nicho Richey III, MD;  Location:  [facility];  Service: Gastroenterology    ENDOSCOPY N/A 05/26/2021    Procedure: ESOPHAGOGASTRODUODENOSCOPY WITH BIOPSY;  Surgeon:  Julieta Hebert MD;  [facility];  Service: Gastroenterology;  Laterality: N/A;    ENDOSCOPY N/A 2021    Procedure: ESOPHAGOGASTRODUODENOSCOPY WITH BIOPSY;  Surgeon: Julieta Hebert MD;  [facility];  Service: Gastroenterology;  Laterality: N/A;    GASTRECTOMY N/A 2019    Procedure: OPEN VAGOTOMY, ANTRECTOMY,REVISION- Y GASTROJEJUNOSOTOMY;  Surgeon: Herbert Umanzor MD;  Location:  [facility];  Service: General    UPPER GASTROINTESTINAL ENDOSCOPY      VENTRAL/INCISIONAL HERNIA REPAIR N/A 12/10/2020    Procedure: INCISIONAL HERNIA REPAIR LAPAROSCOPIC WITH MESH;  Surgeon: Herbert Umanzor MD;  Location:  [facility];  Service: General;  Laterality: N/A;   ,   Family History   Problem Relation Age of Onset    Osteoporosis Mother     Hypertension Father     Osteoporosis Father     No Known Problems Sister     No Known Problems Brother     Drug abuse Brother     No Known Problems Daughter     Diabetes Sister    ,   Social History     Tobacco Use    Smoking status: Every Day     Packs/day: 1.00     Years: 35.00     Pack years: 35.00     Types: Cigarettes     Last attempt to quit: 2020     Years since quittin.3    Smokeless tobacco: Never    Tobacco comments:     Been smoking 20 years   Vaping Use    Vaping Use: Never used   Substance Use Topics    Alcohol use: Yes     Alcohol/week: 4.0 standard drinks     Types: 4 Cans of beer per week    Drug use: No   ,   Medications Prior to Admission   Medication Sig Dispense Refill Last Dose    baclofen (LIORESAL) 20 MG tablet Take 10-20 mg by mouth 3 (Three) Times a Day As Needed.   2022    esomeprazole (nexIUM) 40 MG capsule Take 40 mg by mouth 2 (Two) Times a Day.   2022    isosorbide mononitrate (IMDUR) 30 MG 24 hr tablet Take 1 tablet by mouth Daily. 30 tablet 11 2022    levETIRAcetam (KEPPRA) 750 MG tablet Take 750 mg by mouth 2 (Two) Times a Day. Patient takes one tablet in the morning and afternoon and  takes two tablets at bedtime.   12/20/2022    levETIRAcetam (KEPPRA) 750 MG tablet Take 1,500 mg by mouth Every Night.   12/19/2022    metoclopramide (REGLAN) 10 MG tablet Take 1 tablet by mouth 2 (Two) Times a Day Before Meals. 60 tablet 5 12/20/2022    metoprolol succinate XL (TOPROL-XL) 25 MG 24 hr tablet Take 1 tablet by mouth Daily. 90 tablet 3 12/20/2022    nitroglycerin (NITROSTAT) 0.4 MG SL tablet Place 1 tablet under the tongue Every 5 (Five) Minutes As Needed for Chest Pain. Take no more than 3 doses in 15 minutes. 100 tablet 2 Past Week    QUEtiapine (SEROquel) 50 MG tablet Take 50 mg by mouth Every Night.   12/19/2022    venlafaxine (EFFEXOR) 75 MG tablet Take 75 mg by mouth every night at bedtime.   12/19/2022   , Scheduled Meds:  chlorhexidine, 15 mL, Mouth/Throat, Q12H  famotidine, 20 mg, Oral, BID AC  folic acid, 1 mg, Oral, Daily  ipratropium, 0.5 mg, Nebulization, 4x Daily - RT  levETIRAcetam, 1,500 mg, Oral, Nightly  levETIRAcetam, 750 mg, Oral, BID  piperacillin-tazobactam, 3.375 g, Intravenous, Q8H  sodium chloride, 10 mL, Intravenous, Q12H  sodium chloride, 10 mL, Intravenous, Q12H  sodium chloride, 10 mL, Intravenous, Q12H  sodium chloride, 10 mL, Intravenous, Q12H  sodium chloride, 10 mL, Intravenous, Q12H  thiamine, 100 mg, Oral, Daily    , Continuous Infusions:  norepinephrine, 0.02-0.3 mcg/kg/min, Last Rate: Stopped (12/30/22 0855)  Pharmacy Consult,   Pharmacy to dose vancomycin,   Pharmacy to Dose Zosyn,   propofol, 5-50 mcg/kg/min, Last Rate: 25 mcg/kg/min (12/30/22 1215)     and Allergies:  Contrast dye, Prilosec [omeprazole], Protonix [pantoprazole sodium], Dilantin [phenytoin], Dilaudid [hydromorphone], Flagyl [metronidazole], and Topamax [topiramate]    Objective     Vital Signs   Temp:  [97.6 °F (36.4 °C)-98.2 °F (36.8 °C)] 97.8 °F (36.6 °C)  Heart Rate:  [] 101  Resp:  [20-23] 22  BP: ()/(50-96) 91/58  FiO2 (%):  [65 %-100 %] 65 %    Physical Exam:                Physical exam limited due to telemedicine  General:   intubated. Sedated.   HENT: normocephalic. Atraumatic.   Cardiac: normal rate and rhythm noted on telemetry  Lungs:  on ventilator support. Appears compliant with current settings.   Musculoskeletal: bilateral lower extremity edemano other significant deformity, joint abnormality  Neurologic: sedated                  Results Review:    LABS:    Lab Results   Component Value Date    GLUCOSE 96 12/30/2022    BUN 13 12/30/2022    CREATININE 0.50 (L) 12/30/2022    EGFRIFNONA 106 11/29/2021    BCR 26.0 (H) 12/30/2022    CO2 20.7 (L) 12/30/2022    CALCIUM 8.0 (L) 12/30/2022    ALBUMIN 2.2 (L) 12/30/2022    AST 72 (H) 12/30/2022    ALT 61 (H) 12/30/2022    WBC 14.77 (H) 12/30/2022    HGB 11.3 (L) 12/30/2022    HCT 33.8 (L) 12/30/2022    .3 (H) 12/30/2022    PLT 65 (L) 12/30/2022     12/30/2022    K 3.3 (L) 12/30/2022     (H) 12/30/2022    ANIONGAP 11.3 12/30/2022       Lab Results   Component Value Date    INR 1.43 (H) 12/30/2022    INR 1.33 (H) 12/28/2022    INR 1.44 (H) 12/24/2022    PROTIME 17.8 (H) 12/30/2022    PROTIME 16.8 (H) 12/28/2022    PROTIME 17.9 (H) 12/24/2022       Results from last 7 days   Lab Units 12/30/22  0452 12/28/22  0937 12/24/22  0134   INR  1.43* 1.33* 1.44*   APTT seconds 39.8*  --  39.9*                     I reviewed the patient's new clinical results.  I reviewed the patient's new imaging results and agree with the interpretation.      Assessment & Plan       Assessment:   Acute hypoxic respiratory failure  Bilateral pneumonia, concern for aspiration pneumonia  Acute metabolic encephalopathy  Septic shock  COPD  Cirrhosis with ascites: Post paracentesis  Chronic thrombocytopenia        Plan:   Sedated-propofol- adjusted for rass goal of zero to -1  Hold off on SAT/SBT trial today due to recent intubation.  Started on thiamine    Briefly required norepinephrine after declining status overnight and worsening hypotension.   Norepinephrine titrated off this morning.  Map goal 65 mmHg or greater.   Ordered echocardiogram  Ordered magnesium level.    FiO2 (%):  [65 %-100 %] 65 %  S RR:  [20-22] 22  PEEP/CPAP (cm H2O):  [8 cm H20] 8 cm H20  MAP (cm H2O):  [13-14] 13  Recent ABG reviewed.  Ventilator settings and graphics reviewed.  Settings adjusted to rate 22, 50% FiO2.  Will hold off on SAT/SBT trial today.  Concern for aspiration pneumonia due to recent changes in mental status and worsening of infiltrates on imaging.  On IV Zosyn  On Atrovent nebs    GI prophylaxis: Pepcid  Nutrition: Nutrition consulted for tube feed initiation  Moderate volume ascites noted on admission.  Underwent paracentesis on 12/28.  CT imaging notable for likely right psoas hematoma.  Ascites now small volume on new CT imaging.    Antibiotics: Zosyn  Cultures: Micro reviewed.  Concern for aspiration event yesterday    DVT prophylaxis: Ordered SCDs, but cannot be able to use due to current lower extremity edema.  Thrombocytopenia noted, with progressive worsening  Right psoas hematoma noted on recent CT imaging.  Ordered vitamin K oral.      Cc 51 mins  Fariba Gunderson PA-C  12/30/22  16:44 Manuel MCKENNA M.D. attest that the above note accurately reflects the work and decisions made  by me.  Patient was seen and evaluated by Dr. Finley, including history of present illness, physical exam, assessment, and treatment plan.  The above note was reviewed and edited by Dr. Finley.    Case d/w nurse and team   This service is provided via audio video tele medicine   I am in [facility] and patient is in [facility]     Scribed for Dr. Finley by Fariba Gunderson PA-C

## 2022-12-30 NOTE — PROGRESS NOTES
[facility] HOSPITALIST PROGRESS NOTE     Patient Identification:  Name:  Jamison Edward  Age:  52 y.o.  Sex:  male  :  1970  MRN:  [id number]  Visit Number:  [id number]  ROOM: [address]     Primary Care Provider:  Leticia Martinez APRN     Date of Admission: 2022    Length of stay in inpatient status:  10    Subjective     Chief Compliant:    Chief Complaint   Patient presents with   • Leg Swelling     History of Presenting Illness: Called about patient having worsening oxygen saturations and more difficulty breathing.  Patient's oxygen requirements increased dramatically from 2 L/min to 15 L/min; even with this, the oxygen saturations were in the 70% range.  Therefore, I made the decision to move patient to the critical care unit and to intubate him emergently.  Please note that the patient did not have a gag reflex and he was obtunded and as result I was unable to obtain any information from him for the history of presenting illness.  No family members were at bedside.  At bedside there were numerous floor nurses and LATA Head.    Objective     Current Hospital Meds:  chlorhexidine, 15 mL, Mouth/Throat, Q12H  famotidine, 20 mg, Oral, BID AC  ipratropium, 0.5 mg, Nebulization, 4x Daily - RT  levETIRAcetam, 1,500 mg, Oral, Nightly  levETIRAcetam, 750 mg, Oral, BID  phenylephrine, , ,   piperacillin-tazobactam, 3.375 g, Intravenous, Once  piperacillin-tazobactam, 3.375 g, Intravenous, Q8H  potassium chloride, 40 mEq, Oral, Q4H  sodium chloride, 10 mL, Intravenous, Q12H  thiamine, 100 mg, Oral, Daily  vancomycin, 20 mg/kg, Intravenous, Once    Pharmacy Consult,   Pharmacy to dose vancomycin,   Pharmacy to Dose Zosyn,   propofol, 5-50 mcg/kg/min, Last Rate: 10 mcg/kg/min (22 5346)      Current Antimicrobial Therapy:  Anti-Infectives (From admission, onward)    Ordered     Dose/Rate Route Frequency Start Stop    22 5772  piperacillin-tazobactam (ZOSYN) IVPB 3.375 g in 100  mL NS VTB        Ordering Provider: Lauren Alonzo PA-C    3.375 g  over 4 Hours Intravenous Every 8 Hours 12/30/22 1030 01/06/23 1029    12/30/22 0332  vancomycin 1500 mg/500 mL 0.9% NS IVPB ([facility])        Ordering Provider: Lauren Alonzo PA-C    20 mg/kg × 77.1 kg  over 120 Minutes Intravenous Once 12/30/22 0430      12/30/22 0332  piperacillin-tazobactam (ZOSYN) IVPB 3.375 g in 100 mL NS VTB        Ordering Provider: Lauren Alonzo PA-ANAID    3.375 g  over 30 Minutes Intravenous Once 12/30/22 0430      12/30/22 0327  Pharmacy to Dose Zosyn        Ordering Provider: Lauren Alonzo PA-C     Does not apply Continuous PRN 12/30/22 0327 01/06/23 0326    12/30/22 0327  Pharmacy to dose vancomycin        Ordering Provider: Lauren Alonzo PA-C     Does not apply Continuous PRN 12/30/22 0326 01/06/23 0325    12/20/22 2024  cefTRIAXone (ROCEPHIN) 2 g in sodium chloride 0.9 % 100 mL IVPB-VTB        Ordering Provider: Alexandre Houston MD    2 g  200 mL/hr over 30 Minutes Intravenous Once 12/20/22 2026 12/20/22 2140 12/20/22 2024  doxycycline (VIBRAMYCIN) 100 mg in sodium chloride 0.9 % 100 mL IVPB-VTB        Ordering Provider: Alexandre Houston MD    100 mg  over 60 Minutes Intravenous Once 12/20/22 2026 12/20/22 2209 12/20/22 1931  doxycycline (MONODOX) 100 MG capsule        Ordering Provider: Bella Khan MD    100 mg Oral 2 Times Daily 12/20/22 0000 12/27/22 5152        Current Diuretic Therapy:  Diuretics (From admission, onward)    None        ----------------------------------------------------------------------------------------------------------------------  Vital Signs:  Temp:  [97.3 °F (36.3 °C)-98.2 °F (36.8 °C)] 98.2 °F (36.8 °C)  Heart Rate:  [] 112  Resp:  [16-21] 21  BP: (107-150)/(64-81) 150/64  FiO2 (%):  [100 %] 100 %  SpO2:  [82 %-100 %] 100 %  on  Flow (L/min):  [2-6] 6;   Device (Oxygen Therapy): ventilator  Body mass index is 27.45 kg/m².    Wt  Readings from Last 3 Encounters:   12/28/22 77.1 kg (170 lb)   12/20/22 75.3 kg (166 lb)   11/18/22 70.9 kg (156 lb 3.2 oz)     Intake & Output (last 3 days)       12/27 0701  12/28 0700 12/28 0701  12/29 0700 12/29 0701  12/30 0700    P.O. 240 240 350    Total Intake(mL/kg) 240 (3.1) 240 (3.1) 350 (4.5)    Urine (mL/kg/hr) 350 (0.2)      Emesis/NG output 0      Stool 0      Total Output 350      Net -110 +240 +350           Urine Unmeasured Occurrence 1 x 1 x 4 x    Stool Unmeasured Occurrence 2 x 2 x     Emesis Unmeasured Occurrence 1 x          NPO Diet NPO Type: Strict NPO  ----------------------------------------------------------------------------------------------------------------------  Physical Exam  Vitals and nursing note reviewed. Exam conducted with a chaperone present.   Constitutional:       General: He is in acute distress.      Appearance: He is well-developed and overweight. He is ill-appearing and toxic-appearing.      Interventions: Nasal cannula in place.   HENT:      Head: Normocephalic and atraumatic.      Right Ear: External ear normal.      Left Ear: External ear normal.      Nose: Nose normal.   Eyes:      General: No scleral icterus.        Right eye: No discharge.         Left eye: No discharge.      Pupils: Pupils are equal, round, and reactive to light.   Cardiovascular:      Rate and Rhythm: Regular rhythm. Tachycardia present.      Pulses: Normal pulses.      Heart sounds: No murmur heard.  Pulmonary:      Effort: Tachypnea, accessory muscle usage and respiratory distress present.      Breath sounds: Rales present. No wheezing.   Abdominal:      General: Bowel sounds are normal. There is no distension.      Palpations: Abdomen is soft.   Musculoskeletal:         General: No swelling, deformity or signs of injury.   Skin:     General: Skin is cool.      Coloration: Skin is mottled. Skin is not cyanotic, jaundiced or pale.      Findings: Ecchymosis and petechiae present.      Comments:  He has a large ecchymotic area at the location of the paracentesis insertion site around to the right flank and onto the right lower back.  It is about 2 and half centimeters in width.   Neurological:      Comments: Patient did not respond to tactile and verbal stimuli.  He would barely attempt to open his eyes.  He could not follow commands.  He did not have a gag reflex.  I did see him move his arms and legs equally on both sides.   Psychiatric:         Attention and Perception: He is inattentive.         Mood and Affect: Affect is flat.         Speech: He is noncommunicative.         Behavior: Behavior is withdrawn.       ----------------------------------------------------------------------------------------------------------------------  Tele: Sinus tachycardia with heart rates up to the 140s.  Personally reviewed the telemetry strips.      Last echocardiogram:  Results for orders placed during the hospital encounter of 09/22/21    Adult Transthoracic Echo Complete w/ Color, Spectral and Contrast if necessary per protocol    Interpretation Summary  · Left ventricular ejection fraction appears to be 61 - 65%. Left ventricular systolic function is normal.  · Left ventricular diastolic function is consistent with (grade I) impaired relaxation.    I have personally read the ECHO final report.    ----------------------------------------------------------------------------------------------------------------------  LABS:    CBC and coagulation:  Results from last 7 days   Lab Units 12/30/22  0452 12/30/22  0217 12/28/22  0937 12/28/22  0708 12/27/22  1840 12/27/22  1757 12/26/22  0703 12/25/22  0007 12/24/22  0134   PROCALCITONIN ng/mL  --  0.18  --   --   --  0.19  --   --   --    LACTATE mmol/L  --  2.8*  --   --   --   --   --   --   --    CRP mg/dL  --  2.84*  --   --   --   --   --   --   --    WBC 10*3/mm3  --  14.77*  --  10.41 13.49*  --  18.85* 9.68 6.93   HEMOGLOBIN g/dL 11.3* 13.2  --  12.6* 12.7*  --   12.5* 12.1* 11.4*   HEMATOCRIT % 33.8* 39.8  --  37.7 40.6  --  38.0 37.2* 34.6*   MCV fL  --  107.3*  --  106.8* 110.3*  --  104.4* 106.0* 103.6*   MCHC g/dL  --  33.2  --  33.4 31.3*  --  32.9 32.5 32.9   PLATELETS 10*3/mm3  --  65*  --  72* 87*  --  103* 86* 74*   INR  1.43*  --  1.33*  --   --   --   --   --  1.44*     Acid/base balance:  Results from last 7 days   Lab Units 12/30/22  0139   PH, ARTERIAL pH units 7.454*   PO2 ART mm Hg 65.3*   PCO2, ARTERIAL mm Hg 37.7   HCO3 ART mmol/L 26.4*     Renal and electrolytes:  Results from last 7 days   Lab Units 12/30/22  0217 12/28/22  0708 12/27/22  1840 12/26/22  0703 12/25/22  0042 12/25/22  0007 12/24/22  0134 12/23/22  1738   SODIUM mmol/L 142 140 141 138  --  136 142  --    POTASSIUM mmol/L 3.3* 3.7 4.7 4.2  --  4.2 4.0 4.7   MAGNESIUM mg/dL  --   --   --  2.1 2.2  --  1.8  --    CHLORIDE mmol/L 110* 107 108* 106  --  105 110*  --    CO2 mmol/L 20.7* 22.8 22.2 20.4*  --  19.2* 18.3*  --    BUN mg/dL 13 11 11 10  --  10 10  --    CREATININE mg/dL 0.50* 0.46* 0.49* 0.47*  --  0.47* 0.44*  --    CALCIUM mg/dL 8.0* 8.4* 8.7 8.3*  --  7.9* 7.9*  --    GLUCOSE mg/dL 96 130* 138* 134*  --  132* 88  --      Estimated Creatinine Clearance: 165.1 mL/min (A) (by C-G formula based on SCr of 0.5 mg/dL (L)).    Liver and pancreatic function:  Results from last 7 days   Lab Units 12/30/22 0217 12/27/22  2032 12/26/22  0703 12/25/22  0007 12/24/22  0134 12/23/22  1206   ALBUMIN g/dL 2.2*  --  2.61* 2.24* 2.15*  --    BILIRUBIN mg/dL 2.7*  --  2.2* 2.0* 2.0*  --    ALK PHOS U/L 176*  --  178* 181* 168*  --    AST (SGOT) U/L 72*  --  58* 42* 46*  --    ALT (SGPT) U/L 61*  --  32 24 22  --    AMMONIA umol/L  --  37  --   --   --  42     Endocrine function:  Lab Results   Component Value Date    HGBA1C 5.10 12/08/2020     Glucose levels from the CMP:  Results from last 7 days   Lab Units 12/30/22 0217 12/28/22 0708 12/27/22  1840 12/26/22  0703 12/25/22 0007 12/24/22 0134    GLUCOSE mg/dL 96 130* 138* 134* 132* 88     Lab Results   Component Value Date    TSH 1.810 12/27/2022    FREET4 0.82 (L) 12/27/2022     Cultures:  Lab Results   Component Value Date    COLORU Orange (A) 12/20/2022    CLARITYU Clear 12/20/2022    PHUR 6.0 12/20/2022    GLUCOSEU Negative 12/20/2022    KETONESU Negative 12/20/2022    BLOODU Negative 12/20/2022    NITRITEU Positive (A) 12/20/2022    LEUKOCYTESUR Trace (A) 12/20/2022    BILIRUBINUR Moderate (2+) (A) 12/20/2022    UROBILINOGEN 2.0 E.U./dL (A) 12/20/2022    RBCUA 0-2 12/20/2022    WBCUA 3-5 (A) 12/20/2022    BACTERIA Trace (A) 12/20/2022     Microbiology Results (last 10 days)     Procedure Component Value - Date/Time    Body Fluid Culture - Body Fluid, Peritoneum [[id number]] Collected: 12/28/22 1338    Lab Status: Preliminary result Specimen: Body Fluid from Peritoneum Updated: 12/29/22 1401     Body Fluid Culture No growth at 24 hours     Gram Stain WBCs seen      No organisms seen    Blood Culture - Blood, Arm, Right [[id number]]  (Normal) Collected: 12/27/22 1839    Lab Status: Preliminary result Specimen: Blood from Arm, Right Updated: 12/29/22 1845     Blood Culture No growth at 2 days    Blood Culture - Blood, Wrist, Left [[id number]]  (Normal) Collected: 12/27/22 1839    Lab Status: Preliminary result Specimen: Blood from Wrist, Left Updated: 12/29/22 1845     Blood Culture No growth at 2 days    Blood Culture - Blood, Arm, Left [[id number]]  (Normal) Collected: 12/22/22 1321    Lab Status: Final result Specimen: Blood from Arm, Left Updated: 12/27/22 1401     Blood Culture No growth at 5 days    Blood Culture - Blood, Hand, Left [[id number]]  (Normal) Collected: 12/22/22 1321    Lab Status: Final result Specimen: Blood from Hand, Left Updated: 12/27/22 1401     Blood Culture No growth at 5 days    S. Pneumo Ag Urine or CSF - Urine, Urine, Clean Catch [[id number]]  (Normal) Collected: 12/22/22 0308    Lab Status: Final result Specimen: Urine, Clean  Catch Updated: 12/22/22 1607     Strep Pneumo Ag Negative    Respiratory Panel PCR w/COVID-19(SARS-CoV-2) MAKAYLA/BECCA/ALINE/PAD/COR/MAD/AURY In-House, NP Swab in UTM/VTM, 3-4 HR TAT - Swab, Nasopharynx [[id number]]  (Normal) Collected: 12/21/22 0308    Lab Status: Final result Specimen: Swab from Nasopharynx Updated: 12/21/22 0417     ADENOVIRUS, PCR Not Detected     Coronavirus 229E Not Detected     Coronavirus HKU1 Not Detected     Coronavirus NL63 Not Detected     Coronavirus OC43 Not Detected     COVID19 Not Detected     Human Metapneumovirus Not Detected     Human Rhinovirus/Enterovirus Not Detected     Influenza A PCR Not Detected     Influenza B PCR Not Detected     Parainfluenza Virus 1 Not Detected     Parainfluenza Virus 2 Not Detected     Parainfluenza Virus 3 Not Detected     Parainfluenza Virus 4 Not Detected     RSV, PCR Not Detected     Bordetella pertussis pcr Not Detected     Bordetella parapertussis PCR Not Detected     Chlamydophila pneumoniae PCR Not Detected     Mycoplasma pneumo by PCR Not Detected    Narrative:      In the setting of a positive respiratory panel with a viral infection PLUS a negative procalcitonin without other underlying concern for bacterial infection, consider observing off antibiotics or discontinuation of antibiotics and continue supportive care. If the respiratory panel is positive for atypical bacterial infection (Bordetella pertussis, Chlamydophila pneumoniae, or Mycoplasma pneumoniae), consider antibiotic de-escalation to target atypical bacterial infection.    COVID-19 and FLU A/B PCR - Swab, Nasopharynx [[id number]]  (Normal) Collected: 12/21/22 0018    Lab Status: Final result Specimen: Swab from Nasopharynx Updated: 12/21/22 0041     COVID19 Not Detected     Influenza A PCR Not Detected     Influenza B PCR Not Detected    Narrative:      Fact sheet for providers: https://www.fda.gov/media/153232/download    Fact sheet for patients:  https://www.fda.gov/media/518703/download    Test performed by PCR.    Blood Culture - Blood, Arm, Left [[id number]]  (Abnormal) Collected: 12/20/22 2100    Lab Status: Final result Specimen: Blood from Arm, Left Updated: 12/23/22 0921     Blood Culture Staphylococcus, coagulase negative     Isolated from Anaerobic Bottle     Gram Stain Anaerobic Bottle Gram positive cocci in pairs and clusters    Narrative:      Probable contaminant requires clinical correlation, susceptibility not performed unless requested by physician.      Blood Culture - Blood, Wrist, Right [[id number]]  (Normal) Collected: 12/20/22 2100    Lab Status: Final result Specimen: Blood from Wrist, Right Updated: 12/25/22 2116     Blood Culture No growth at 5 days    Blood Culture ID, PCR - Blood, Arm, Left [[id number]]  (Abnormal) Collected: 12/20/22 2100    Lab Status: Final result Specimen: Blood from Arm, Left Updated: 12/21/22 2147     BCID, PCR Staph spp, not aureus or lugdunensis. Identification by BCID2 PCR.        I have personally looked at the labs and they are summarized above.  ----------------------------------------------------------------------------------------------------------------------  Detailed radiology reports for the last 24 hours:    Imaging Results (Last 24 Hours)     Procedure Component Value Units Date/Time    XR Chest 1 View [[id number]] Collected: 12/30/22 0505     Updated: 12/30/22 0507    Narrative:      CR Chest 1 Vw    INDICATION:   Intubation. Pneumonia.     COMPARISON:    12/30/2022 at 0126 hours.    FINDINGS:  Single portable AP view(s) of the chest.    New endotracheal tube is in good position in the mid trachea. OG tube tip is in the stomach. Patchy bilateral infiltrates are unchanged. Heart size is normal. No pneumothorax.      Impression:      Well-positioned tubes, otherwise no change.    Signer Name: Zachery Sy MD   Signed: 12/30/2022 5:05 AM   Workstation Name: [id number]    [facility]    XR Chest 1 View [[id number]] Collected: 12/30/22 0143     Updated: 12/30/22 0145    Narrative:      CR Chest 1 Vw    INDICATION:   Hypoxia. Pneumonia.     COMPARISON:    12/28/2022    FINDINGS:  Single portable AP view(s) of the chest.    Heart size is grossly normal. Lung volumes are a bit lower on the current study. Diffuse bilateral somewhat patchy infiltrates have worsened. Considerations include pneumonia and/or pulmonary edema. No pneumothorax.       Impression:      Worsening bilateral pulmonary infiltrates. No pneumothorax.    Signer Name: Zachery Sy MD   Signed: 12/30/2022 1:43 AM   Workstation Name: [id number]    [facility]        I have personally looked at the radiology images and I have read the available final report.    Assessment & Plan      -Development of septic shock (acute metabolic cephalopathy lactic acid 2.8, CRP 2.84, white blood cell count 14,770, heart rates 130-140, hypotension despite fluid resuscitation) and acute hypoxic respiratory failure due to the development of bilateral pneumonia and pneumonitis from aspiration  -He had already completed a course of Rocephin and doxycycline for bilateral multifocal pneumonia and septic shock  -Postprocedure ecchymosis on the abdomen  -History of cirrhosis causing thrombocytopenia  -History of COPD without any acute exacerbation    Echocardiogram ordered as patient is hemodynamically unstable now.  His mean arterial blood pressures dropped below 65 mmHg and thus I am starting him on Levophed.  He is already received 3 L of normal saline.  He will receive empiric vancomycin and Zosyn; sputum culture and an expanded viral respiratory panel have been ordered as well as blood cultures.  We will try an external urinary collection system for strict input and output measurements in an attempt to avoid an indwelling Jones catheter due to risk of worsening infection.  We will perform serial hemoglobin and  hematocrit levels as the patient does have some postprocedure ecchymosis/hematoma of the right flank.  We will also monitor the coagulation studies and if they continue to worsen then we will consider interventions such as vitamin K.  I asked LATA Head to place a midline as the patient had difficult to access peripheral veins; I did have to insert an IO in the left leg as the patient needed to be emergently intubated and we needed access to give medications.  After I intubated the patient, I did look at the postintubation chest x-ray and the OG and ET tube are in satisfactory position.  I have consulted pulmonology.    VTE Prophylaxis:   Mechanical Order History:     None      Pharmalogical Order History:      Ordered     Dose Route Frequency Stop    12/21/22 0106  heparin (porcine) 5000 UNIT/ML injection 5,000 Units  Status:  Discontinued         5,000 Units SC Every 12 Hours Scheduled 12/30/22 0438              The patient is high risk due to the following diagnoses/reasons: Recurrent septic shock, aspiration    Disposition: Undetermined at this time    Total critical care time is 75 minutes.      Tong Mcgraw MD  [facility]  12/30/22  05:25 EST

## 2022-12-30 NOTE — CASE MANAGEMENT/SOCIAL WORK
Continued Stay Note   Yariel     Patient Name: Jamison Edward  MRN: [id number]  Today's Date: 12/30/2022    Admit Date: 12/20/2022    Plan: Pt has been transferred to CCU and now on the vent. CM will continue to follow and assist as needed for d/c planning. He lives at home with his spouse and plans to return at d/c. He has a shower bench and b/p cuff. He requests a RW at d/c and does not have a preference for provider. He did not have HH prior to admission. His wife will transport at d/c.   Discharge Plan     Row Name 12/30/22 0643       Plan    Plan Pt has been transferred to CCU and now on the vent. CM will continue to follow and assist as needed for d/c planning. He lives at home with his spouse and plans to return at d/c. He has a shower bench and b/p cuff. He requests a RW at d/c and does not have a preference for provider. He did not have HH prior to admission. His wife will transport at d/c.    Patient/Family in Agreement with Plan yes    Plan Comments 12/30: isaac anderson RN concern aspiration, transfer CCU, intubated on vent, FIO2 65%, sedated, midline was placed, (L) tibia IO line placed Dr Mcgraw, ziyad Vanc and Zosyn IV, repeat bld cx, paracentesis fld cx neg so far, Pulmonology consult              Fadumo Petersen RN

## 2022-12-30 NOTE — PLAN OF CARE
Goal Outcome Evaluation:               PT has been agitated this shift and had difficulty maintaining O2 and HR WDL. Prn medication given for anxiety. Adilene ROGERS was notified of pts status, new orders were placed. Results arrived and it was determined the patient should be transferred to a higher level of care. PT was transferred to CCU bed 7 at 0400. Spouse Lena was called and notified.

## 2022-12-30 NOTE — PROGRESS NOTES
"    [facility]    S: Patient seen and examined. Intubated overnight due to hypoxic resp failure, aspiration, and patient became obtunded with no gag reflex.      O: BP 96/65   Pulse 106   Temp 97.6 °F (36.4 °C) (Oral)   Resp 23   Ht 167.6 cm (65.98\")   Wt 77.2 kg (170 lb 3.1 oz)   SpO2 100%   BMI 27.48 kg/m²     GENERAL:   intubated, sedated  EARS,NOSE, MOUTH, THROAT:  MMM, no external lesions of ears and nose  EYES: PERRLA, EOMI  CV:  NL S1/S2  RESPIRATORY:  Mechanical breath sounds BL  GI:  ecchymoses surrounding paracentesis site towards R flank, soft  SKIN:ecchymoses surrounding paracentesis site towards R flank  INT:+3 BLLE edema. No joint deformity.  PSYCH:  not answering questions or following commands  NEURO: intubated, sedated    Scheduled Meds:chlorhexidine, 15 mL, Mouth/Throat, Q12H  famotidine, 20 mg, Oral, BID AC  ipratropium, 0.5 mg, Nebulization, 4x Daily - RT  levETIRAcetam, 1,500 mg, Oral, Nightly  levETIRAcetam, 750 mg, Oral, BID  phenylephrine, , ,   piperacillin-tazobactam, 3.375 g, Intravenous, Q8H  potassium chloride, 40 mEq, Oral, Q4H  sodium chloride, 10 mL, Intravenous, Q12H  thiamine, 100 mg, Oral, Daily  vancomycin, 20 mg/kg, Intravenous, Once    Results from last 7 dnorepinephrine, 0.02-0.3 mcg/kg/min  Pharmacy Consult,   Pharmacy to dose vancomycin,   Pharmacy to Dose Zosyn,   propofol, 5-50 mcg/kg/min, Last Rate: 25 mcg/kg/min (12/30/22 0611)       Lab      Units•  dextrose  •  dextrose  •  diphenhydrAMINE  •  glucagon (human recombinant)  •  ipratropium-albuterol  •  magnesium sulfate **OR** magnesium sulfate in D5W 1g/100mL (PREMIX) **OR** magnesium sulfate  •  Pharmacy Consult  •  Pharmacy to dose vancomycin  •  Pharmacy to Dose Zosyn  •  potassium chloride  •  potassium chloride  •  sodium chloride  •  sodium [facility]idel[facility]    XR Chest 1 View    Result D  Results from last 7 days   Lab Units 12/30/22  0217   WBC " 10*3/mm3 14.77*   x.   Results from last 7 days   Lab Units 12/30/22  0217   CO2 mmol/L 20.7*   BUN mg/dL 13   CREATININE mg/dL 0.50*   GLUCOSE mg/dL 96   Signer Name: Zachery Sy MD  Signed: 12/30/2022 1:43 AM  Workstation Name: [id number]  [facility] Paracentesis    Result Date: 12/28/2022  1.  Small volume paracentesis for diagnostic fluid was performed with 22-gauge needle. 2.  Small ascites on today's study.  This report was finalized on 12/28/2022 1:45 PM by Dr. Fab Huff MD.       Assessment/Plan:  # Acute hypoxic resp failure, 2/2 aspiration pneumonia, Intubated early morning 12/30.  Continue broad spectrum abx vanc/zosyn.  Serial labs.  Supportive care, follow cultures.  Levophed as  Needed, wean as able.  Pulm consult, appreciate recs.    # septic shock: treatment as above    # Extrapyramidal Symptoms:  Hold offending agents, Antipsychotics and compazine.  Consult psych.  Benadryl now and q6 prn.  Appreciate psych recs moving forward.    #Bilateral multifocal pneumonia POA: Patient had completed full course of Rocephin and doxycycline. Now with aspiration pneumonia, treatment as above    #Acute metabolic encephalopathy: Suspect secondary to acute illness.  Work-up for other etiology and so far unrevealing including TSH, ammonia, B12 and folate.  Continue to monitor.  Supportive care.  Stopped IV steroids 12/29, discussed with pharmacy no need to wean since only on steroida X 5 days    #Cirrhosis: Paracentesis performed December 28.  Monitor volume status.  Serial abdominal exams and serial labs.    #Chronic thrombocytopenia: No evidence of significant bleeding, serial labs.  Patient does of ecchymoses surrounding paracentesis site that extends towards R flank.  Will check Ct abd pelvis      DVT Prophylaxis: Heparin subcu  Disposition: Continue to monitor  Discharge disposition: To be determined  Prognosis: Guarded    Bernabe Molina Jr, MD  Rounding  Hospitalist  12/30/22  08:01 EST

## 2022-12-30 NOTE — PROGRESS NOTES
Antimicrobial Length of Therapy:    Zosyn day 1 of 7.     Thank you,   Jacy Claros, Pharm.D.   Pharmacy Resident   12/30/2022  13:15 EST

## 2022-12-30 NOTE — NURSING NOTE
Received phone call from radiologist, Dr. Huff, stating patient has right quadrant hematoma measuring 3x10.   Dr. Molina notified of findings.   No new orders, will continue to monitor labs.

## 2022-12-30 NOTE — PROCEDURES
Midline:    Assessment:  Diagnosis: Severe sepsis, poor IV access     Allergies   Allergen Reactions   • Contrast Dye Other (See Comments)     Shortness of breath   • Prilosec [Omeprazole] Other (See Comments)     Chest pain  Pt states he can take pepcid with no problem   • Protonix [Pantoprazole Sodium] Palpitations     Patient states that protonix causes chest pain.  Shruthi Flynn Beto, [facility]  4/23/2017  11:19 AM  Pt states he can tolerate pepcid with no problem     • Dilantin [Phenytoin] Delirium   • Dilaudid [Hydromorphone] Hallucinations   • Flagyl [Metronidazole] Nausea And Vomiting   • Topamax [Topiramate] Anxiety       Order Date/Time: 12/30/2022; 0434  Indications: Poor Access   LABS:  Lab Results   Component Value Date    INR 1.33 (H) 12/28/2022    PROTIME 16.8 (H) 12/28/2022     Lab Results   Component Value Date    PTT 39.9 (H) 12/24/2022     Lab Results   Component Value Date    WBC 14.77 (H) 12/30/2022    HGB 11.3 (L) 12/30/2022    HCT 33.8 (L) 12/30/2022    .3 (H) 12/30/2022    PLT 65 (L) 12/30/2022     Lab Results   Component Value Date    BUN 13 12/30/2022     Lab Results   Component Value Date    CREATININE 0.50 (L) 12/30/2022     Lab Results   Component Value Date    EGFRIFNONA 106 11/29/2021     Labs Reviewed: Bleeding Possible    Contraindications for PICC/Midline:  No Contraindication    Recommendations:  Midline    Location: RUE  Vein: Basilic   Size: US guided midline 18G, 10 cm powerglide); Single lumen   Good blood return, flushed with 10 cc of NS   RUE 29 cm in circumference     DESCRIPTION OF PROCEDURE:   Veins of the patient's right upper extremity were visualized using ultrasound.  Patient was then prepped and draped in a sterile fashion.  Ultrasound cover was placed in sterile manner.  Basilic vein was visualized and cannulated with needle.  Guidewire was passed through with no resistance.  Introducer was placed over guidewire.  Guidewire was then removed.  Single-lumen midline  was threaded through introducer.  Noted to flush with ease with good blood return.  Patient tolerated this procedure well.  There were no complications noted. There was minimal blood loss.        Procedure Time Out:  Time out Time: 0440  Correct Patient Identity: Yes  Correct Surgical Side and Site Are Marked: Yes    Lauren Alonzo PA-C

## 2022-12-30 NOTE — PROGRESS NOTES
Nurse Practitioner - Brief Progress Note  PERMANENT  12/30/2022 05:09    [facility] - [facility] [place], [place] ([facility])    BRIAN MUNIZ    Date of Service 12/30/2022 05:09    HPI/Events of Note [facility] Provider Assessment Note    51 y/o male PMH of HTN, COPD, tobacco abuse, multilevel DDD, GERD, Johnson's esophagus, PUD, ulcerative colitis, TIA/CVA, alcohol abuse, EF 61-65% per ECHO 9/22/2021. Pt admitted to ER on 12/20/2022 with bilateral multifocal pneumonia, septic shock,   liver cirrhosis of unknown etiology, moderate volume ascites, hyperbilirubinemia, hyperammonemia. UDS positive for TCA's. IV Doxycycline and IV Rocephin given in the ER. 12/27/2022: S/p small volume paracentesis for diagnostic fluid was performed with   22-gauge needle; small ascites on today's study.    While on the medical floor 12/30/2022, O2 sat dropped to 79% on 3L NC, possible aspiration. Blood gas 12/30/2022: pH 7.45, pCO2-37, pO2-65, HCO3-26.4.  Pt was intubated for airway protection.     Pertinent labs:  Potassium 3.3, bicarbonate 20.7, glucose 96, BUN 13, creatinine 0.5, calcium 8, magnesium 2.1, AST 72, ALT 61, total bilirubin 2.7, lactate 4.4 -> 2.8, Hgb 11.3/Hct 33.8, WBC 14.77, platelets 65, INR 1.43, PT 17.8, PTT 39.8, ammonia 37,   CRP 2.84, , procalcitonin 1.69    Per radiology  CXR 12/30/2022: New endotracheal tube is in good position in the mid trachea. OG tube tip is in the stomach. Patchy bilateral infiltrates are unchanged. Heart size is normal. No pneumothorax. IMPRESSION: Well-positioned tubes, otherwise no change.    EKG 12/30/2022: sinus tachycardia rate 124, Possible left atrial enlargement. QTc 448    CT abd/pelvis 12/24/2022: Extensive colon wall thickening noted on the previous study has improved and almost resolved. Mild distention of most of the small bowel. Large amount ascites. Small fatty liver. Patchy groundglass infiltrates are present in the   lungs which  are new from 12/20/2022 and may represent Covid 19 pneumonia.    CT head 12/21/2022: No acute intracranial abnormality. No significant change compared to CT brain yesterday. Cerebral volume loss, greater than expected for patient's age.     Assessment and Plan:    Acute hypoxemic respiratory failure, septic shock, aspiration pneumonia, hypokalemia  - Pulmonology consulted  - AC 20/450/+8/100%  - ABG at 6 AM ordered  - Propofol infusion  - Neosynephrine infusion for MAP >65. RUE midline inserted 12/30/2022  - NS 2L IV bolus ordered. Trend lactate  - Zosyn, IV vancomycin. MRSA PCR, respiratory viral panel, sputum culture, S. pneumo Ag urine, UA reflex culture ordered  - KCl 40 meq per tube q4h x2 doses  - Aspiration precautions  - Thrombocytopenia, anemia - no active bleeding, monitor CBC  - Keppra. Seizure, aspiration precautions        __X___   Video Assessment performed  __X__   Most recent labs reviewed  __X___   Vital Signs reviewed. , BP 99/57 (76), RR 20, O2 sat 92%, temp 98.2 F  __X___   Best Practices addressed                 VTE prophylaxis: SCDs                 SUP (when indicated): Famotidine                 Glycemic control: Accu-Chek q6h while NPO                      Please notify bedside physician when present or [facility] if glucose > 180 X 2                 Sepsis guidelines:                  Lung protective strategy: tidal volume 7.1 ml/kg per IBW                 Targeted Temperature Management: N/A  __X___     Spoke with bedside RN  __X___     Orders written      [facility] for any needs if bedside physician is not present.  Note drafted by CLINT Carrera-BC       Critical care; 45 minutes total evaluation time     Care during the described time interval was provided by me.  I have reviewed this patient's available data, including medical history, events of note, physical examination and test results as part of my evaluation.    Interventions Major-Infection -  evaluation and management, Respiratory failure - evaluation and management, Sepsis - evaluation and management, Shock - evaluation and management  Intermediate-Best-practice therapies (e.g. VTE, beta blocker, etc.), Communication with other healthcare providers and/or family        Electronically Signed by: Vinod Thomas (ANP,CCRN) on 12/30/2022 06:37    Annotated By: Geovanna Escobar)    Date: 12/30/22 07:28  Note reviewed, please call if we can be of assistance

## 2022-12-30 NOTE — PROGRESS NOTES
[facility] Medicine Services  CROSS COVER NOTE    Contacted by RN stating the patient's O2 saturation dropped to 79% on 3L NC. He was titrated to 5L NC and his O2 sat stayed in the 80s. STAT ABG and CXR ordered. RN is concerned the patient may have aspirated as they found coughed up food on his gown.     Update: ABG unremarkable on 8L (receiving PRN breathing treatment when ABG was obtained). RT is going to attempt to wean him down to 6L NC. CXR shows worsening bilateral infiltrates. Discussed with attending; will hold on starting IV abx for now as new guidelines do not recommended coverage for ? aspiration pneumonia. I have ordered a STAT CBC, CMP, CrP, lactate and procal to be obtained. If the patient's condition worsens or labs worsen, will consider restarting abx therapy (completed IV rocephin and doxy on 12/25). Continue with Atrovent treatments in setting of tachycardia. Will change to NPO and consult SLP to evaluate for possible aspiration.     Update: Patient is now meeting septic shock criteria again with WBC of 14.77, lactate 2.8, CRP 2.84 and a heart rate of 128.  I have started broad-spectrum antibiotic coverage with pharmacy dose vancomycin and Zosyn.  Will obtain MRSA swab, if negative can DC vancomycin.  Repeat blood cultures ordered. Upon evaluation at bedside the patient was awake but unable to answer questions.  CNA present at bedside states this is been his mentation all shift.  Upon auscultation he has coarse rales bilaterally in all lung fields.  O2 saturation is now around 84% on 6 L NC.  I have ordered bubble HFNC to be placed.  Contacted attending to let her the patient's condition.  We will move him to the CCU for closer monitoring.      Adilene Alonzo PA-C  Hospitalist Service -- [facility]   Pager: [phone]    12/30/22  01:52 EST

## 2022-12-30 NOTE — NURSING NOTE
0409 IO insertion LLE by Dr. Mcgraw    0412 Etomidate 24 mg / 12 mL    0412 Propofol 50 mg / 5 mL    0413 Intubation by Dr. Mcgraw with #8 ETT 24 @ lip      OG @ 65

## 2022-12-30 NOTE — PROCEDURES
A timeout was completed verifying correct patient, procedure, site, positioning.  The patient was placed in dependent position appropriate for central line placement based on the vein to be cannulated.  The patient's right neck was prepped and draped in sterile fashion.  1% lidocaine was used to anesthetize the surrounding skin area.  A triple lumen Cordis catheter was introduced into the internal jugular using the Seldinger technique and under ultrasound guidance.  The catheter was started smoothly over the guidewire and appropriate blood return was obtained intraluminal and the catheter was evacuated of air and flushed with sterile saline.  The catheter was then sutured in place to the skin and a sterile dressing applied.  Perfusion to the extremity distal to the point of catheter insertion was checked and found to be adequate.

## 2022-12-30 NOTE — PROCEDURES
"IO Line Insertion    Date/Time: 12/30/2022 4:27 AM  Performed by: Tong Mcgraw MD  Authorized by: Tong Mcgraw MD   Consent: The procedure was performed in an emergent situation.  Patient identity confirmed: provided demographic data and arm band  Time out: Immediately prior to procedure a \"time out\" was called to verify the correct patient, procedure, equipment, support staff and site/side marked as required.  Indications: clinical deterioration, hemodynamic instability, fluid administration, medication administration and rapid vascular access  Local anesthesia used: no    Anesthesia:  Local anesthesia used: no    Sedation:  Patient sedated: no    Insertion site: left proximal tibia  Site preparation: chlorhexidine  Preparation: Patient was prepped and draped in the usual sterile fashion.  Insertion device: drill device  Insertion: needle was inserted through the bony cortex  Number of attempts: 1  Confirmation method: stability of the needle, easy infusion of fluids and aspiration of blood/marrow  Secured with: transparent dressing and protective shield  Patient tolerance: patient tolerated the procedure well with no immediate complications      "

## 2022-12-30 NOTE — NURSING NOTE
Wife Lena came to see  and get an update from primary RN bedside. Wife has taken all of patient's personal belongings home with her.

## 2022-12-30 NOTE — SIGNIFICANT NOTE
Consult received. Chart reviewed. Pt orally intubated, mechanically ventilated. SLP to sign off at this time. Please reconsult w/ improvement in pt status to functionally participate.     Thank you-  Kristin Archibald M.A., CCC-SLP

## 2022-12-30 NOTE — PROGRESS NOTES
Pharmacy to dose Zosyn : CrCl = 165.1.  Dosed as follows: Zosyn 3.375gm iv ext infusion q8h.  Pharmacy will monitor and adjust dosing per renal function as appropriate.     Abram Troy RPH  06:05 EST  12/30/22

## 2022-12-30 NOTE — SIGNIFICANT NOTE
12/30/22 0811   OTHER   Discipline physical therapist   Rehab Time/Intention   Session Not Performed unable to treat, medical status change   Therapy Assessment/Plan (PT)   Criteria for Skilled Interventions Met (PT) other (see comments)   Recommendation   PT - Next Appointment   (Pt is intubated and has had a decrease in medical status, PT signing off at this time pending progress with medical status. Reconsult welcomed as needed.)

## 2022-12-31 ENCOUNTER — APPOINTMENT (OUTPATIENT)
Dept: CARDIOLOGY | Facility: HOSPITAL | Age: 52
DRG: 870 | End: 2022-12-31
Payer: MEDICARE

## 2022-12-31 LAB
A-A DO2: 146.8 MMHG (ref 0–300)
ANION GAP SERPL CALCULATED.3IONS-SCNC: 8.3 MMOL/L (ref 5–15)
ANISOCYTOSIS BLD QL: NORMAL
ARTERIAL PATENCY WRIST A: POSITIVE
ATMOSPHERIC PRESS: 725 MMHG
BASE EXCESS BLDA CALC-SCNC: 1.4 MMOL/L (ref 0–2)
BASOPHILS # BLD AUTO: 0.02 10*3/MM3 (ref 0–0.2)
BASOPHILS NFR BLD AUTO: 0.2 % (ref 0–1.5)
BDY SITE: ABNORMAL
BH CV ECHO MEAS - ACS: 1.5 CM
BH CV ECHO MEAS - AO MAX PG: 10.4 MMHG
BH CV ECHO MEAS - AO MEAN PG: 6 MMHG
BH CV ECHO MEAS - AO ROOT DIAM: 3 CM
BH CV ECHO MEAS - AO V2 MAX: 161 CM/SEC
BH CV ECHO MEAS - AO V2 VTI: 24.1 CM
BH CV ECHO MEAS - EDV(CUBED): 59.3 ML
BH CV ECHO MEAS - EDV(MOD-SP4): 32.2 ML
BH CV ECHO MEAS - EF(MOD-SP4): 75.2 %
BH CV ECHO MEAS - ESV(CUBED): 17.6 ML
BH CV ECHO MEAS - ESV(MOD-SP4): 8 ML
BH CV ECHO MEAS - FS: 33.3 %
BH CV ECHO MEAS - IVS/LVPW: 0.67 CM
BH CV ECHO MEAS - IVSD: 0.6 CM
BH CV ECHO MEAS - LA DIMENSION: 2.5 CM
BH CV ECHO MEAS - LAT PEAK E' VEL: 11.5 CM/SEC
BH CV ECHO MEAS - LV DIASTOLIC VOL/BSA (35-75): 17.4 CM2
BH CV ECHO MEAS - LV MASS(C)D: 82.3 GRAMS
BH CV ECHO MEAS - LV SYSTOLIC VOL/BSA (12-30): 4.3 CM2
BH CV ECHO MEAS - LVIDD: 3.9 CM
BH CV ECHO MEAS - LVIDS: 2.6 CM
BH CV ECHO MEAS - LVOT AREA: 3.1 CM2
BH CV ECHO MEAS - LVOT DIAM: 2 CM
BH CV ECHO MEAS - LVPWD: 0.9 CM
BH CV ECHO MEAS - MED PEAK E' VEL: 7.2 CM/SEC
BH CV ECHO MEAS - MV A MAX VEL: 115 CM/SEC
BH CV ECHO MEAS - MV E MAX VEL: 88.9 CM/SEC
BH CV ECHO MEAS - MV E/A: 0.77
BH CV ECHO MEAS - RAP SYSTOLE: 10 MMHG
BH CV ECHO MEAS - RVSP: 40.9 MMHG
BH CV ECHO MEAS - SI(MOD-SP4): 13.1 ML/M2
BH CV ECHO MEAS - SV(MOD-SP4): 24.2 ML
BH CV ECHO MEAS - TAPSE (>1.6): 1.84 CM
BH CV ECHO MEAS - TR MAX PG: 30.9 MMHG
BH CV ECHO MEAS - TR MAX VEL: 278 CM/SEC
BH CV ECHO MEASUREMENTS AVERAGE E/E' RATIO: 9.51
BODY TEMPERATURE: 0 C
BUN SERPL-MCNC: 13 MG/DL (ref 6–20)
BUN/CREAT SERPL: 28.3 (ref 7–25)
CALCIUM SPEC-SCNC: 7.9 MG/DL (ref 8.6–10.5)
CHLORIDE SERPL-SCNC: 115 MMOL/L (ref 98–107)
CO2 BLDA-SCNC: 25.9 MMOL/L (ref 22–33)
CO2 SERPL-SCNC: 22.7 MMOL/L (ref 22–29)
COHGB MFR BLD: 1 % (ref 0–5)
CREAT SERPL-MCNC: 0.46 MG/DL (ref 0.76–1.27)
DEPRECATED RDW RBC AUTO: 59.9 FL (ref 37–54)
EGFRCR SERPLBLD CKD-EPI 2021: 124.3 ML/MIN/1.73
EOSINOPHIL # BLD AUTO: 0.06 10*3/MM3 (ref 0–0.4)
EOSINOPHIL NFR BLD AUTO: 0.5 % (ref 0.3–6.2)
ERYTHROCYTE [DISTWIDTH] IN BLOOD BY AUTOMATED COUNT: 15.1 % (ref 12.3–15.4)
GLUCOSE BLDC GLUCOMTR-MCNC: 139 MG/DL (ref 70–130)
GLUCOSE BLDC GLUCOMTR-MCNC: 159 MG/DL (ref 70–130)
GLUCOSE BLDC GLUCOMTR-MCNC: 194 MG/DL (ref 70–130)
GLUCOSE BLDC GLUCOMTR-MCNC: 208 MG/DL (ref 70–130)
GLUCOSE SERPL-MCNC: 139 MG/DL (ref 65–99)
HCO3 BLDA-SCNC: 24.8 MMOL/L (ref 20–26)
HCT VFR BLD AUTO: 33 % (ref 37.5–51)
HCT VFR BLD AUTO: 34.3 % (ref 37.5–51)
HCT VFR BLD AUTO: 35.2 % (ref 37.5–51)
HCT VFR BLD CALC: 34.8 % (ref 38–51)
HGB BLD-MCNC: 10.5 G/DL (ref 13–17.7)
HGB BLD-MCNC: 11.2 G/DL (ref 13–17.7)
HGB BLD-MCNC: 11.4 G/DL (ref 13–17.7)
HGB BLDA-MCNC: 11.3 G/DL (ref 14–18)
IMM GRANULOCYTES # BLD AUTO: 0.06 10*3/MM3 (ref 0–0.05)
IMM GRANULOCYTES NFR BLD AUTO: 0.5 % (ref 0–0.5)
INHALED O2 CONCENTRATION: 40 %
LEFT ATRIUM VOLUME INDEX: 10.6 ML/M2
LYMPHOCYTES # BLD AUTO: 0.52 10*3/MM3 (ref 0.7–3.1)
LYMPHOCYTES NFR BLD AUTO: 4 % (ref 19.6–45.3)
Lab: ABNORMAL
MACROCYTES BLD QL SMEAR: NORMAL
MAGNESIUM SERPL-MCNC: 1.8 MG/DL (ref 1.6–2.6)
MAXIMAL PREDICTED HEART RATE: 166 BPM
MCH RBC QN AUTO: 35.2 PG (ref 26.6–33)
MCHC RBC AUTO-ENTMCNC: 32.4 G/DL (ref 31.5–35.7)
MCV RBC AUTO: 108.6 FL (ref 79–97)
METHGB BLD QL: 0.3 % (ref 0–3)
MODALITY: ABNORMAL
MONOCYTES # BLD AUTO: 0.49 10*3/MM3 (ref 0.1–0.9)
MONOCYTES NFR BLD AUTO: 3.7 % (ref 5–12)
NEUTROPHILS NFR BLD AUTO: 12.01 10*3/MM3 (ref 1.7–7)
NEUTROPHILS NFR BLD AUTO: 91.1 % (ref 42.7–76)
NOTE: ABNORMAL
NRBC BLD AUTO-RTO: 0 /100 WBC (ref 0–0.2)
OXYHGB MFR BLDV: 95.9 % (ref 94–99)
PCO2 BLDA: 34.3 MM HG (ref 35–45)
PCO2 TEMP ADJ BLD: ABNORMAL MM[HG]
PEEP RESPIRATORY: 8 CM[H2O]
PH BLDA: 7.47 PH UNITS (ref 7.35–7.45)
PH, TEMP CORRECTED: ABNORMAL
PLATELET # BLD AUTO: 48 10*3/MM3 (ref 140–450)
PMV BLD AUTO: 12.1 FL (ref 6–12)
PO2 BLDA: 86.8 MM HG (ref 83–108)
PO2 TEMP ADJ BLD: ABNORMAL MM[HG]
POTASSIUM SERPL-SCNC: 3.5 MMOL/L (ref 3.5–5.2)
POTASSIUM SERPL-SCNC: 4.3 MMOL/L (ref 3.5–5.2)
RBC # BLD AUTO: 3.24 10*6/MM3 (ref 4.14–5.8)
SAO2 % BLDCOA: 97.2 % (ref 94–99)
SET MECH RESP RATE: 22
SMALL PLATELETS BLD QL SMEAR: NORMAL
SODIUM SERPL-SCNC: 146 MMOL/L (ref 136–145)
STRESS TARGET HR: 141 BPM
VENTILATOR MODE: ABNORMAL
VT ON VENT VENT: 450 ML
WBC NRBC COR # BLD: 13.16 10*3/MM3 (ref 3.4–10.8)

## 2022-12-31 PROCEDURE — 93306 TTE W/DOPPLER COMPLETE: CPT

## 2022-12-31 PROCEDURE — 94003 VENT MGMT INPAT SUBQ DAY: CPT

## 2022-12-31 PROCEDURE — 85007 BL SMEAR W/DIFF WBC COUNT: CPT | Performed by: INTERNAL MEDICINE

## 2022-12-31 PROCEDURE — 82375 ASSAY CARBOXYHB QUANT: CPT

## 2022-12-31 PROCEDURE — 25010000002 MAGNESIUM SULFATE 2 GM/50ML SOLUTION: Performed by: INTERNAL MEDICINE

## 2022-12-31 PROCEDURE — 94799 UNLISTED PULMONARY SVC/PX: CPT

## 2022-12-31 PROCEDURE — 82805 BLOOD GASES W/O2 SATURATION: CPT

## 2022-12-31 PROCEDURE — 80048 BASIC METABOLIC PNL TOTAL CA: CPT | Performed by: INTERNAL MEDICINE

## 2022-12-31 PROCEDURE — 85014 HEMATOCRIT: CPT | Performed by: INTERNAL MEDICINE

## 2022-12-31 PROCEDURE — 99233 SBSQ HOSP IP/OBS HIGH 50: CPT | Performed by: INTERNAL MEDICINE

## 2022-12-31 PROCEDURE — 85025 COMPLETE CBC W/AUTO DIFF WBC: CPT | Performed by: INTERNAL MEDICINE

## 2022-12-31 PROCEDURE — 25010000002 PROPOFOL 10 MG/ML EMULSION: Performed by: INTERNAL MEDICINE

## 2022-12-31 PROCEDURE — 85018 HEMOGLOBIN: CPT | Performed by: INTERNAL MEDICINE

## 2022-12-31 PROCEDURE — 84132 ASSAY OF SERUM POTASSIUM: CPT | Performed by: INTERNAL MEDICINE

## 2022-12-31 PROCEDURE — 83050 HGB METHEMOGLOBIN QUAN: CPT

## 2022-12-31 PROCEDURE — 25010000002 PIPERACILLIN SOD-TAZOBACTAM PER 1 G: Performed by: PHYSICIAN ASSISTANT

## 2022-12-31 PROCEDURE — 82962 GLUCOSE BLOOD TEST: CPT

## 2022-12-31 PROCEDURE — 94761 N-INVAS EAR/PLS OXIMETRY MLT: CPT

## 2022-12-31 PROCEDURE — 36600 WITHDRAWAL OF ARTERIAL BLOOD: CPT

## 2022-12-31 PROCEDURE — 83735 ASSAY OF MAGNESIUM: CPT | Performed by: INTERNAL MEDICINE

## 2022-12-31 RX ORDER — MAGNESIUM SULFATE HEPTAHYDRATE 40 MG/ML
2 INJECTION, SOLUTION INTRAVENOUS ONCE
Status: COMPLETED | OUTPATIENT
Start: 2022-12-31 | End: 2022-12-31

## 2022-12-31 RX ORDER — POTASSIUM CHLORIDE 1.5 G/1.77G
40 POWDER, FOR SOLUTION ORAL EVERY 4 HOURS
Status: COMPLETED | OUTPATIENT
Start: 2022-12-31 | End: 2022-12-31

## 2022-12-31 RX ORDER — MIDODRINE HYDROCHLORIDE 2.5 MG/1
5 TABLET ORAL
Status: DISCONTINUED | OUTPATIENT
Start: 2022-12-31 | End: 2023-01-03

## 2022-12-31 RX ADMIN — Medication 10 ML: at 09:44

## 2022-12-31 RX ADMIN — CHLORHEXIDINE GLUCONATE 15 ML: 1.2 RINSE ORAL at 09:43

## 2022-12-31 RX ADMIN — Medication 10 ML: at 21:18

## 2022-12-31 RX ADMIN — IPRATROPIUM BROMIDE 0.5 MG: 0.5 SOLUTION RESPIRATORY (INHALATION) at 13:11

## 2022-12-31 RX ADMIN — Medication 10 ML: at 10:28

## 2022-12-31 RX ADMIN — IPRATROPIUM BROMIDE 0.5 MG: 0.5 SOLUTION RESPIRATORY (INHALATION) at 00:44

## 2022-12-31 RX ADMIN — LEVETIRACETAM 750 MG: 100 SOLUTION ORAL at 15:22

## 2022-12-31 RX ADMIN — LEVETIRACETAM 1500 MG: 100 SOLUTION ORAL at 21:16

## 2022-12-31 RX ADMIN — PIPERACILLIN SODIUM AND TAZOBACTAM SODIUM 3.38 G: 3; .375 INJECTION, POWDER, LYOPHILIZED, FOR SOLUTION INTRAVENOUS at 17:32

## 2022-12-31 RX ADMIN — Medication 10 ML: at 09:45

## 2022-12-31 RX ADMIN — PIPERACILLIN SODIUM AND TAZOBACTAM SODIUM 3.38 G: 3; .375 INJECTION, POWDER, LYOPHILIZED, FOR SOLUTION INTRAVENOUS at 02:48

## 2022-12-31 RX ADMIN — Medication 10 ML: at 21:17

## 2022-12-31 RX ADMIN — MAGNESIUM SULFATE HEPTAHYDRATE 2 G: 2 INJECTION, SOLUTION INTRAVENOUS at 04:23

## 2022-12-31 RX ADMIN — POTASSIUM CHLORIDE 40 MEQ: 1.5 POWDER, FOR SOLUTION ORAL at 04:23

## 2022-12-31 RX ADMIN — Medication 100 MG: at 09:41

## 2022-12-31 RX ADMIN — IPRATROPIUM BROMIDE 0.5 MG: 0.5 SOLUTION RESPIRATORY (INHALATION) at 07:01

## 2022-12-31 RX ADMIN — LEVETIRACETAM 750 MG: 100 SOLUTION ORAL at 06:28

## 2022-12-31 RX ADMIN — FAMOTIDINE 20 MG: 20 TABLET ORAL at 06:30

## 2022-12-31 RX ADMIN — IPRATROPIUM BROMIDE 0.5 MG: 0.5 SOLUTION RESPIRATORY (INHALATION) at 19:00

## 2022-12-31 RX ADMIN — PROPOFOL 25 MCG/KG/MIN: 10 INJECTION, EMULSION INTRAVENOUS at 15:24

## 2022-12-31 RX ADMIN — CARBIDOPA AND LEVODOPA 5 MG: 50; 200 TABLET, EXTENDED RELEASE ORAL at 17:32

## 2022-12-31 RX ADMIN — PROPOFOL 35 MCG/KG/MIN: 10 INJECTION, EMULSION INTRAVENOUS at 09:43

## 2022-12-31 RX ADMIN — PROPOFOL 30 MCG/KG/MIN: 10 INJECTION, EMULSION INTRAVENOUS at 02:48

## 2022-12-31 RX ADMIN — CHLORHEXIDINE GLUCONATE 15 ML: 1.2 RINSE ORAL at 21:16

## 2022-12-31 RX ADMIN — FAMOTIDINE 20 MG: 20 TABLET ORAL at 21:17

## 2022-12-31 RX ADMIN — SODIUM CHLORIDE 0.2 MCG/KG/HR: 9 INJECTION, SOLUTION INTRAVENOUS at 12:18

## 2022-12-31 RX ADMIN — PROPOFOL 25 MCG/KG/MIN: 10 INJECTION, EMULSION INTRAVENOUS at 23:25

## 2022-12-31 RX ADMIN — Medication 1 MG: at 09:41

## 2022-12-31 RX ADMIN — PIPERACILLIN SODIUM AND TAZOBACTAM SODIUM 3.38 G: 3; .375 INJECTION, POWDER, LYOPHILIZED, FOR SOLUTION INTRAVENOUS at 09:42

## 2022-12-31 RX ADMIN — POTASSIUM CHLORIDE 40 MEQ: 1.5 POWDER, FOR SOLUTION ORAL at 06:30

## 2022-12-31 NOTE — PROGRESS NOTES
"    [facility]    S: Patient seen and examined.  No acute changes noted overnight.  Remains intubated and sedated.    O: /58   Pulse 112   Temp 97.9 °F (36.6 °C) (Oral)   Resp (!) 31   Ht 167.6 cm (65.98\")   Wt 77.8 kg (171 lb 8.3 oz)   SpO2 97%   BMI 27.70 kg/m²       GENERAL:   intubated, sedated  EARS,NOSE, MOUTH, THROAT:  MMM, no external lesions of ears and nose  EYES: PERRLA, EOMI  CV:  NL S1/S2  RESPIRATORY:  Mechanical breath sounds BL  GI:  ecchymoses surrounding paracentesis site towards R flank, soft  SKIN:ecchymoses surrounding paracentesis site towards R flank  INT:+3 BLLE edema. No joint deformity.  PSYCH:  not answering questions or following commands  NEURO: intubated, sedated    Scheduled Meds:chlorhexidine, 15 mL, Mouth/Throat, Q12H  famotidine, 20 mg, Oral, BID AC  folic acid, 1 mg, Oral, Daily  ipratropium, 0.5 mg, Nebulization, 4x Daily - RT  levETIRAcetam, 1,500 mg, Oral, Nightly  levETIRAcetam, 750 mg, Oral, BID  piperacillin-tazobactam, 3.375 g, Intravenous, Q8H  sodium chloride, 10 mL, Intravenous, Q12H  sodium chloride, 10 mL, Intravenous, Q12H  sodium chloride, 10 mL, Intravenous, Q12H  sodium chloride, 10 mL, Intravenous, Q12H  sodium chloride, 10 mL, Intravenous, Q12H  thiamine, 100 mg, Oral, Daily      Continuous Infusions:norepinephrine, 0.02-0.3 mcg/kg/min, Last Rate: 0.04 mcg/kg/min (12/31/22 2531)  Pharmacy Consult,   Pharmacy to dose vancomycin,   Pharmacy to Dose Zosyn,   propofol, 5-50 mcg/kg/min, Last Rate: 30 mcg/kg/min (12/31/22 2088)      PRN Meds:.•  dextrose  •  dextrose  •  diphenhydrAMINE  •  glucagon (human recombinant)  •  ipratropium-albuterol  •  magnesium sulfate **OR** magnesium sulfate in D5W 1g/100mL (PREMIX) **OR** magnesium sulfate  •  Pharmacy Consult  •  Pharmacy to dose vancomycin  •  Pharmacy to Dose Zosyn  •  potassium chloride  •  potassium chloride  •  sodium chloride  •  sodium chloride  •  sodium " chloride  •  sodium chloride  •  sodium chloride    Labs: Laboratory information reviewed and reconciled.  Results from last 7 days   Lab Units 12/31/22  0016   WBC 10*3/mm3 13.16*       Results from last 7 days   Lab Units 12/31/22  0016   CO2 mmol/L 22.7   BUN mg/dL 13   CREATININE mg/dL 0.46*   GLUCOSE mg/dL 139*       Results from last 7 days   Lab Units 12/30/22  0452   INR  1.43*         Imaging (last 24 hours):    CT Abdomen Pelvis Without Contrast    Result Date: 12/30/2022  1.  Again noted is low-attenuation of the liver. 2.  Grossly stable small ascites is again noted. 3.  Probable right psoas hematoma measuring 3.2 by about 10 cm. 4.  Patchy bibasilar groundglass attenuation and confluent nodularity suggestive of infectious process again noted.  This report was finalized on 12/30/2022 4:40 PM by Dr. Fab Huff MD.      XR Chest 1 View    Result Date: 12/30/2022    Bilateral airspace disease is grossly stable.  This report was finalized on 12/30/2022 11:53 AM by Dr. Fab Huff MD.      XR Chest 1 View    Result Date: 12/30/2022  Well-positioned tubes, otherwise no change. Signer Name: Zachery Sy MD  Signed: 12/30/2022 5:05 AM  Workstation Name: [id number]  Radiology [facility]    XR Chest 1 View    Result Date: 12/30/2022  Worsening bilateral pulmonary infiltrates. No pneumothorax. Signer Name: Zachery Sy MD  Signed: 12/30/2022 1:43 AM  Workstation Name: [id number]  Radiology [facility]     Assessment/Plan:  # Acute hypoxic resp failure, 2/2 aspiration pneumonia, Intubated early morning 12/30.  Started on broad spectrum abx vanc/zosyn initially.    Remains on Zosyn.  Serial labs.  Supportive care, follow cultures.  Levophed as  Needed, wean as able.  Pulm consult, appreciate recs.      # septic shock: treatment as above     # Extrapyramidal Symptoms:  Hold offending agents, Antipsychotics and compazine.  Consult psych.  Benadryl now and q6 prn.  Appreciate  psych recs moving forward.     #Bilateral multifocal pneumonia POA: Patient had completed full course of Rocephin and doxycycline. Now with aspiration pneumonia, treatment as above     #Acute metabolic encephalopathy: Suspect secondary to acute illness.  Work-up for other etiology and so far unrevealing including TSH, ammonia, B12 and folate.  Continue to monitor.  Supportive care.  Stopped IV steroids 12/29, discussed with pharmacy no need to wean since only on steroids X 5 days     #Cirrhosis: Paracentesis performed December 28.  Monitor volume status.  Serial abdominal exams and serial labs.  Received vitamin K     #Chronic thrombocytopenia: Worsening likely due to acute illness.  Small hematoma right psoas.  Chemical VTE prophylaxis held.  Patient did receive vitamin K as above.  Continue to monitor serial labs.  Continue to monitor for signs of bleeding and will transfuse if any active bleeding suspected.  Transfuse for less than 10,000 if no bleeding then less than 50,000 if any signs of bleeding.    #Suspected seizure: Continue Keppra dose increased    DVT Prophylaxis: SCDs  Disposition: Continue to monitor  Discharge disposition: To be determined  Prognosis: Guarded    Bernabe Molina Jr, MD  [facility] Academic Health Systemist  12/31/22  07:23 EST

## 2022-12-31 NOTE — PLAN OF CARE
Goal Outcome Evaluation:  Plan of Care Reviewed With: patient, daughter        Progress: no change  Outcome Evaluation: Intubated/sedated on dipervanm levophed at 0.02, VSS, FiO2 down to 40%, 250cc UOP, ST on monitor.

## 2022-12-31 NOTE — PLAN OF CARE
Goal Outcome Evaluation:  Plan of Care Reviewed With: patient        Progress: improving    Pt remains intubated and sedated on vent with fi02 at 40%.  Pt remains on diprivan and levophed gtts and precedex gtt was started today after failed weaning trial, pt was following some commands at that time.  No acute changes noted this shift.  Will continue to monitor.

## 2022-12-31 NOTE — PROGRESS NOTES
Referring Provider: Dr. Mcgraw  Reason for Consultation: acute hypoxic respiratory failure, sepsis      Chief complaint  Respiratory distress      Sub-   Overnight events reviewed.  All the labs medications ins and outs vitals reviewed.  Ventilator settings and graphics reviewed.  Respiratory secretions better.  Microbiology reviewed.  Review Of Systems:    Unable to obtain review of systems due to intubation and sedation.         History  Past Medical History:   Diagnosis Date   • Anemia    • Arthritis of back     not sure   • Johnson esophagus    • Cholelithiasis    • COPD (chronic obstructive pulmonary disease) (HCC)    • CTS (carpal tunnel syndrome)    • DDD (degenerative disc disease), thoracic    • Degenerative disc disease, lumbar    • Dystonia    • GERD (gastroesophageal reflux disease)    • GI (gastrointestinal bleed)    • Hypertension    • Irritable bowel syndrome    • Low back strain    • Lumbosacral disc disease    • Migraines    • Peptic ulcer disease    • Stroke (HCC)     TIA   • TIA (transient ischemic attack)    • Ulcerative colitis (HCC)    • Wears dentures     upper only   ,   Past Surgical History:   Procedure Laterality Date   • ABDOMINAL SURGERY  09/17/2021   • APPENDECTOMY     • CHOLECYSTECTOMY N/A 04/22/2017    Procedure: CHOLECYSTECTOMY LAPAROSCOPIC;  Surgeon: Jaqueline Guaman MD;  Location: [facility];  Service:    • COLONOSCOPY N/A 05/08/2017    Procedure: COLONOSCOPY  CPTCODE:51348;  Surgeon: Nicho Richey III, MD;  Location: [facility];  Service:    • CUBITAL TUNNEL RELEASE Left 9/1/2022    Procedure: CUBITAL TUNNEL RELEASE LEFT;  Surgeon: Alec Hough MD;  Location: [facility];  Service: Orthopedics;  Laterality: Left;   • ENDOSCOPY     • ENDOSCOPY N/A 05/08/2017    Procedure: ESOPHAGOGASTRODUODENOSCOPY WITH BIOPSY  CPTCODE:74107;  Surgeon: Nicho Richey III, MD;  Location: [facility];  Service:    • ENDOSCOPY N/A 11/03/2017    Procedure: ESOPHAGOGASTRODUODENOSCOPY  WITH BIOPSY  CPTCODE: 49635;  Surgeon: Nicho Richey III, MD;  Location:  [facility];  Service:    • ENDOSCOPY N/A 2018    Procedure: ESOPHAGOGASTRODUODENOSCOPY WITH DILATATION CPT CODE: 13870;  Surgeon: Nicho Richey III, MD;  [facility]:  [facility];  Service:    • ENDOSCOPY N/A 2018    Procedure: ESOPHAGOGASTRODUODENOSCOPY WITH BIOPSY CPT CODE: 08077;  Surgeon: Nicho Richey III, MD;  Location:  [facility];  Service: Gastroenterology   • ENDOSCOPY N/A 2021    Procedure: ESOPHAGOGASTRODUODENOSCOPY WITH BIOPSY;  Surgeon: Julieta Hebert MD;  [facility]:  [facility];  Service: Gastroenterology;  Laterality: N/A;   • ENDOSCOPY N/A 2021    Procedure: ESOPHAGOGASTRODUODENOSCOPY WITH BIOPSY;  Surgeon: Julieta Hebert MD;  [facility]:  [facility];  Service: Gastroenterology;  Laterality: N/A;   • GASTRECTOMY N/A 2019    Procedure: OPEN VAGOTOMY, ANTRECTOMY,REVISION- Y GASTROJEJUNOSOTOMY;  Surgeon: Herbert Umanzor MD;  Location:  [facility];  Service: General   • UPPER GASTROINTESTINAL ENDOSCOPY     • VENTRAL/INCISIONAL HERNIA REPAIR N/A 12/10/2020    Procedure: INCISIONAL HERNIA REPAIR LAPAROSCOPIC WITH MESH;  Surgeon: Herbert Umanzor MD;  Location:  [facility];  Service: General;  Laterality: N/A;   ,   Family History   Problem Relation Age of Onset   • Osteoporosis Mother    • Hypertension Father    • Osteoporosis Father    • No Known Problems Sister    • No Known Problems Brother    • Drug abuse Brother    • No Known Problems Daughter    • Diabetes Sister    ,   Social History     Tobacco Use   • Smoking status: Every Day     Packs/day: 1.00     Years: 35.00     Pack years: 35.00     Types: Cigarettes     Last attempt to quit: 2020     Years since quittin.3   • Smokeless tobacco: Never   • Tobacco comments:     Been smoking 20 years   Vaping Use   • Vaping Use: Never used   Substance Use Topics   • Alcohol use: Yes     Alcohol/week: 4.0 standard drinks      Types: 4 Cans of beer per week   • Drug use: No   ,   Medications Prior to Admission   Medication Sig Dispense Refill Last Dose   • baclofen (LIORESAL) 20 MG tablet Take 10-20 mg by mouth 3 (Three) Times a Day As Needed.   12/20/2022   • esomeprazole (nexIUM) 40 MG capsule Take 40 mg by mouth 2 (Two) Times a Day.   12/20/2022   • isosorbide mononitrate (IMDUR) 30 MG 24 hr tablet Take 1 tablet by mouth Daily. 30 tablet 11 12/20/2022   • levETIRAcetam (KEPPRA) 750 MG tablet Take 750 mg by mouth 2 (Two) Times a Day. Patient takes one tablet in the morning and afternoon and takes two tablets at bedtime.   12/20/2022   • levETIRAcetam (KEPPRA) 750 MG tablet Take 1,500 mg by mouth Every Night.   12/19/2022   • metoclopramide (REGLAN) 10 MG tablet Take 1 tablet by mouth 2 (Two) Times a Day Before Meals. 60 tablet 5 12/20/2022   • metoprolol succinate XL (TOPROL-XL) 25 MG 24 hr tablet Take 1 tablet by mouth Daily. 90 tablet 3 12/20/2022   • nitroglycerin (NITROSTAT) 0.4 MG SL tablet Place 1 tablet under the tongue Every 5 (Five) Minutes As Needed for Chest Pain. Take no more than 3 doses in 15 minutes. 100 tablet 2 Past Week   • QUEtiapine (SEROquel) 50 MG tablet Take 50 mg by mouth Every Night.   12/19/2022   • venlafaxine (EFFEXOR) 75 MG tablet Take 75 mg by mouth every night at bedtime.   12/19/2022   , Scheduled Meds:  chlorhexidine, 15 mL, Mouth/Throat, Q12H  famotidine, 20 mg, Oral, BID AC  folic acid, 1 mg, Oral, Daily  ipratropium, 0.5 mg, Nebulization, 4x Daily - RT  levETIRAcetam, 1,500 mg, Oral, Nightly  levETIRAcetam, 750 mg, Oral, BID  piperacillin-tazobactam, 3.375 g, Intravenous, Q8H  sodium chloride, 10 mL, Intravenous, Q12H  sodium chloride, 10 mL, Intravenous, Q12H  sodium chloride, 10 mL, Intravenous, Q12H  sodium chloride, 10 mL, Intravenous, Q12H  sodium chloride, 10 mL, Intravenous, Q12H  thiamine, 100 mg, Oral, Daily    , Continuous Infusions:  dexmedetomidine, 0.2-1.5 mcg/kg/hr, Last Rate: 0.2  mcg/kg/hr (12/31/22 1218)  norepinephrine, 0.02-0.3 mcg/kg/min, Last Rate: 0.04 mcg/kg/min (12/31/22 0501)  Pharmacy Consult,   Pharmacy to dose vancomycin,   Pharmacy to Dose Zosyn,   propofol, 5-50 mcg/kg/min, Last Rate: 25 mcg/kg/min (12/31/22 1524)     and Allergies:  Contrast dye, Prilosec [omeprazole], Protonix [pantoprazole sodium], Dilantin [phenytoin], Dilaudid [hydromorphone], Flagyl [metronidazole], and Topamax [topiramate]    Objective     Vital Signs   Temp:  [97.7 °F (36.5 °C)-99.1 °F (37.3 °C)] 98.1 °F (36.7 °C)  Heart Rate:  [] 120  Resp:  [22-31] 27  BP: ()/() 108/62  FiO2 (%):  [40 %-65 %] 40 %    Physical Exam:           No changes.    Physical exam limited due to telemedicine  General:   intubated. Sedated.   HENT: normocephalic. Atraumatic.   Cardiac: normal rate and rhythm noted on telemetry  Lungs:  on ventilator support. Appears compliant with current settings.   Musculoskeletal: bilateral lower extremity edemano other significant deformity, joint abnormality  Neurologic: sedated                  Results Review:    LABS:    Lab Results   Component Value Date    GLUCOSE 139 (H) 12/31/2022    BUN 13 12/31/2022    CREATININE 0.46 (L) 12/31/2022    EGFRIFNONA 106 11/29/2021    BCR 28.3 (H) 12/31/2022    CO2 22.7 12/31/2022    CALCIUM 7.9 (L) 12/31/2022    ALBUMIN 2.2 (L) 12/30/2022    AST 72 (H) 12/30/2022    ALT 61 (H) 12/30/2022    WBC 13.16 (H) 12/31/2022    HGB 11.2 (L) 12/31/2022    HCT 34.3 (L) 12/31/2022    .6 (H) 12/31/2022    PLT 48 (C) 12/31/2022     (H) 12/31/2022    K 4.3 12/31/2022     (H) 12/31/2022    ANIONGAP 8.3 12/31/2022       Lab Results   Component Value Date    INR 1.43 (H) 12/30/2022    INR 1.33 (H) 12/28/2022    INR 1.44 (H) 12/24/2022    PROTIME 17.8 (H) 12/30/2022    PROTIME 16.8 (H) 12/28/2022    PROTIME 17.9 (H) 12/24/2022       Results from last 7 days   Lab Units 12/30/22  0452 12/28/22  0937   INR  1.43* 1.33*   APTT seconds  39.8*  --                      I reviewed the patient's new clinical results.  I reviewed the patient's new imaging results and agree with the interpretation.      Assessment & Plan       Assessment:   Acute hypoxic respiratory failure  Bilateral pneumonia, concern for aspiration pneumonia  Acute metabolic encephalopathy  Septic shock  COPD  Cirrhosis with ascites: Post paracentesis  Chronic thrombocytopenia        Plan:   Sedated-propofol- adjusted for rass goal of zero to -1  Hold off on SAT/SBT trial today due to recent intubation.  Continue thiamine    Continue vasopressors to maintain a map goal 65 mmHg or greater.         FiO2 (%):  [40 %-65 %] 40 %  S RR:  [22] 22  PEEP/CPAP (cm H2O):  [8 cm H20] 8 cm H20  MAP (cm H2O):  [6.9-13] 11  Recent ABG reviewed.  Ventilator settings and graphics reviewed.  Settings adjusted to rate 22, 50% FiO2.  We will try SAT and SBT trial.    Wife at bedside spoke to her and answered her questions to her satisfaction.    Concern for aspiration pneumonia due to recent changes in mental status and worsening of infiltrates on imaging.  On IV Zosyn  On Atrovent nebs    GI prophylaxis: Pepcid  Nutrition: Nutrition consulted for tube feed initiation  Moderate volume ascites noted on admission.  Underwent paracentesis on 12/28.  CT imaging notable for likely right psoas hematoma.  Ascites now small volume on new CT imaging.    Antibiotics: Zosyn  Cultures: Micro reviewed.  Concern for aspiration event yesterday    DVT prophylaxis: Ordered SCDs, but cannot be able to use due to current lower extremity edema.  Thrombocytopenia noted, with progressive worsening  Right psoas hematoma noted on recent CT imaging.  Ordered vitamin K oral.    Results for orders placed during the hospital encounter of 12/20/22    Adult Transthoracic Echo Complete W/ Cont if Necessary Per Protocol    Interpretation Summary  •  Left ventricular systolic function is hyperdynamic (EF > 70%). Left ventricular  ejection fraction appears to be greater than 70%.  •  Left ventricular diastolic function is consistent with (grade I) impaired relaxation.  •  Estimated right ventricular systolic pressure from tricuspid regurgitation is mildly elevated (35-45 mmHg).  •  No significant pericardial disease.        Manuel Finley MD  12/31/22  15:37 EST     Case d/w nurse and team   This service is provided via audio video tele medicine   I am in [facility] and patient is in [facility]

## 2022-12-31 NOTE — PLAN OF CARE
Goal Outcome Evaluation:              Outcome Evaluation: Pt. resting comfortably this a. m. continues on ventilator and diprivan for sedation, nad noted , WCNOY

## 2023-01-01 LAB
A-A DO2: 162 MMHG (ref 0–300)
ANION GAP SERPL CALCULATED.3IONS-SCNC: 3.6 MMOL/L (ref 5–15)
ARTERIAL PATENCY WRIST A: POSITIVE
ATMOSPHERIC PRESS: 726 MMHG
BACTERIA SPEC AEROBE CULT: NORMAL
BACTERIA SPEC AEROBE CULT: NORMAL
BACTERIA SPEC RESP CULT: NORMAL
BASE EXCESS BLDA CALC-SCNC: 3.4 MMOL/L (ref 0–2)
BASOPHILS # BLD AUTO: 0.01 10*3/MM3 (ref 0–0.2)
BASOPHILS NFR BLD AUTO: 0.1 % (ref 0–1.5)
BDY SITE: ABNORMAL
BODY TEMPERATURE: 0 C
BUN SERPL-MCNC: 14 MG/DL (ref 6–20)
BUN/CREAT SERPL: 34.1 (ref 7–25)
CALCIUM SPEC-SCNC: 8.1 MG/DL (ref 8.6–10.5)
CHLORIDE SERPL-SCNC: 114 MMOL/L (ref 98–107)
CO2 BLDA-SCNC: 29.1 MMOL/L (ref 22–33)
CO2 SERPL-SCNC: 27.4 MMOL/L (ref 22–29)
COHGB MFR BLD: 1.1 % (ref 0–5)
CREAT SERPL-MCNC: 0.41 MG/DL (ref 0.76–1.27)
DEPRECATED RDW RBC AUTO: 61.3 FL (ref 37–54)
EGFRCR SERPLBLD CKD-EPI 2021: 128.7 ML/MIN/1.73
EOSINOPHIL # BLD AUTO: 0.03 10*3/MM3 (ref 0–0.4)
EOSINOPHIL NFR BLD AUTO: 0.3 % (ref 0.3–6.2)
ERYTHROCYTE [DISTWIDTH] IN BLOOD BY AUTOMATED COUNT: 15.5 % (ref 12.3–15.4)
GLUCOSE BLDC GLUCOMTR-MCNC: 119 MG/DL (ref 70–130)
GLUCOSE BLDC GLUCOMTR-MCNC: 120 MG/DL (ref 70–130)
GLUCOSE BLDC GLUCOMTR-MCNC: 157 MG/DL (ref 70–130)
GLUCOSE BLDC GLUCOMTR-MCNC: 187 MG/DL (ref 70–130)
GLUCOSE SERPL-MCNC: 161 MG/DL (ref 65–99)
GRAM STN SPEC: NORMAL
HCO3 BLDA-SCNC: 27.9 MMOL/L (ref 20–26)
HCT VFR BLD AUTO: 32.4 % (ref 37.5–51)
HCT VFR BLD CALC: 32.6 % (ref 38–51)
HGB BLD-MCNC: 10.4 G/DL (ref 13–17.7)
HGB BLDA-MCNC: 10.6 G/DL (ref 14–18)
IMM GRANULOCYTES # BLD AUTO: 0.05 10*3/MM3 (ref 0–0.05)
IMM GRANULOCYTES NFR BLD AUTO: 0.5 % (ref 0–0.5)
INHALED O2 CONCENTRATION: 40 %
LYMPHOCYTES # BLD AUTO: 0.36 10*3/MM3 (ref 0.7–3.1)
LYMPHOCYTES NFR BLD AUTO: 3.9 % (ref 19.6–45.3)
Lab: ABNORMAL
MAGNESIUM SERPL-MCNC: 2 MG/DL (ref 1.6–2.6)
MCH RBC QN AUTO: 34.8 PG (ref 26.6–33)
MCHC RBC AUTO-ENTMCNC: 32.1 G/DL (ref 31.5–35.7)
MCV RBC AUTO: 108.4 FL (ref 79–97)
METHGB BLD QL: 0.2 % (ref 0–3)
MODALITY: ABNORMAL
MONOCYTES # BLD AUTO: 0.46 10*3/MM3 (ref 0.1–0.9)
MONOCYTES NFR BLD AUTO: 5 % (ref 5–12)
NEUTROPHILS NFR BLD AUTO: 8.35 10*3/MM3 (ref 1.7–7)
NEUTROPHILS NFR BLD AUTO: 90.2 % (ref 42.7–76)
NOTE: ABNORMAL
NRBC BLD AUTO-RTO: 0 /100 WBC (ref 0–0.2)
OXYHGB MFR BLDV: 91.8 % (ref 94–99)
PCO2 BLDA: 41.1 MM HG (ref 35–45)
PCO2 TEMP ADJ BLD: ABNORMAL MM[HG]
PEEP RESPIRATORY: 8 CM[H2O]
PH BLDA: 7.44 PH UNITS (ref 7.35–7.45)
PH, TEMP CORRECTED: ABNORMAL
PLATELET # BLD AUTO: 40 10*3/MM3 (ref 140–450)
PMV BLD AUTO: 13.1 FL (ref 6–12)
PO2 BLDA: 64.5 MM HG (ref 83–108)
PO2 TEMP ADJ BLD: ABNORMAL MM[HG]
POTASSIUM SERPL-SCNC: 4 MMOL/L (ref 3.5–5.2)
RBC # BLD AUTO: 2.99 10*6/MM3 (ref 4.14–5.8)
SAO2 % BLDCOA: 93 % (ref 94–99)
SET MECH RESP RATE: 22
SODIUM SERPL-SCNC: 145 MMOL/L (ref 136–145)
VENTILATOR MODE: ABNORMAL
VT ON VENT VENT: 450 ML
WBC NRBC COR # BLD: 9.26 10*3/MM3 (ref 3.4–10.8)

## 2023-01-01 PROCEDURE — 94799 UNLISTED PULMONARY SVC/PX: CPT

## 2023-01-01 PROCEDURE — 94003 VENT MGMT INPAT SUBQ DAY: CPT

## 2023-01-01 PROCEDURE — 80048 BASIC METABOLIC PNL TOTAL CA: CPT | Performed by: INTERNAL MEDICINE

## 2023-01-01 PROCEDURE — 94761 N-INVAS EAR/PLS OXIMETRY MLT: CPT

## 2023-01-01 PROCEDURE — 82805 BLOOD GASES W/O2 SATURATION: CPT

## 2023-01-01 PROCEDURE — 83050 HGB METHEMOGLOBIN QUAN: CPT

## 2023-01-01 PROCEDURE — 99232 SBSQ HOSP IP/OBS MODERATE 35: CPT | Performed by: INTERNAL MEDICINE

## 2023-01-01 PROCEDURE — 83735 ASSAY OF MAGNESIUM: CPT | Performed by: INTERNAL MEDICINE

## 2023-01-01 PROCEDURE — 82962 GLUCOSE BLOOD TEST: CPT

## 2023-01-01 PROCEDURE — 82375 ASSAY CARBOXYHB QUANT: CPT

## 2023-01-01 PROCEDURE — 25010000002 PROPOFOL 10 MG/ML EMULSION: Performed by: INTERNAL MEDICINE

## 2023-01-01 PROCEDURE — 36600 WITHDRAWAL OF ARTERIAL BLOOD: CPT

## 2023-01-01 PROCEDURE — 99233 SBSQ HOSP IP/OBS HIGH 50: CPT | Performed by: INTERNAL MEDICINE

## 2023-01-01 PROCEDURE — 25010000002 PIPERACILLIN SOD-TAZOBACTAM PER 1 G: Performed by: PHYSICIAN ASSISTANT

## 2023-01-01 PROCEDURE — 85025 COMPLETE CBC W/AUTO DIFF WBC: CPT | Performed by: INTERNAL MEDICINE

## 2023-01-01 RX ADMIN — IPRATROPIUM BROMIDE 0.5 MG: 0.5 SOLUTION RESPIRATORY (INHALATION) at 19:13

## 2023-01-01 RX ADMIN — Medication 1 MG: at 08:01

## 2023-01-01 RX ADMIN — PIPERACILLIN SODIUM AND TAZOBACTAM SODIUM 3.38 G: 3; .375 INJECTION, POWDER, LYOPHILIZED, FOR SOLUTION INTRAVENOUS at 02:29

## 2023-01-01 RX ADMIN — Medication 10 ML: at 08:02

## 2023-01-01 RX ADMIN — PIPERACILLIN SODIUM AND TAZOBACTAM SODIUM 3.38 G: 3; .375 INJECTION, POWDER, LYOPHILIZED, FOR SOLUTION INTRAVENOUS at 10:07

## 2023-01-01 RX ADMIN — Medication 10 ML: at 20:24

## 2023-01-01 RX ADMIN — IPRATROPIUM BROMIDE 0.5 MG: 0.5 SOLUTION RESPIRATORY (INHALATION) at 06:41

## 2023-01-01 RX ADMIN — SODIUM CHLORIDE 1.5 MCG/KG/HR: 9 INJECTION, SOLUTION INTRAVENOUS at 18:43

## 2023-01-01 RX ADMIN — CARBIDOPA AND LEVODOPA 5 MG: 50; 200 TABLET, EXTENDED RELEASE ORAL at 12:17

## 2023-01-01 RX ADMIN — FAMOTIDINE 20 MG: 20 TABLET ORAL at 06:30

## 2023-01-01 RX ADMIN — FAMOTIDINE 20 MG: 20 TABLET ORAL at 16:53

## 2023-01-01 RX ADMIN — CHLORHEXIDINE GLUCONATE 15 ML: 1.2 RINSE ORAL at 20:23

## 2023-01-01 RX ADMIN — CHLORHEXIDINE GLUCONATE 15 ML: 1.2 RINSE ORAL at 08:02

## 2023-01-01 RX ADMIN — LEVETIRACETAM 750 MG: 100 SOLUTION ORAL at 06:30

## 2023-01-01 RX ADMIN — PROPOFOL 15 MCG/KG/MIN: 10 INJECTION, EMULSION INTRAVENOUS at 14:49

## 2023-01-01 RX ADMIN — CARBIDOPA AND LEVODOPA 5 MG: 50; 200 TABLET, EXTENDED RELEASE ORAL at 06:30

## 2023-01-01 RX ADMIN — IPRATROPIUM BROMIDE 0.5 MG: 0.5 SOLUTION RESPIRATORY (INHALATION) at 00:29

## 2023-01-01 RX ADMIN — IPRATROPIUM BROMIDE 0.5 MG: 0.5 SOLUTION RESPIRATORY (INHALATION) at 13:15

## 2023-01-01 RX ADMIN — LEVETIRACETAM 750 MG: 100 SOLUTION ORAL at 14:50

## 2023-01-01 RX ADMIN — CARBIDOPA AND LEVODOPA 5 MG: 50; 200 TABLET, EXTENDED RELEASE ORAL at 16:53

## 2023-01-01 RX ADMIN — LEVETIRACETAM 1500 MG: 100 SOLUTION ORAL at 20:23

## 2023-01-01 RX ADMIN — Medication 100 MG: at 08:01

## 2023-01-01 RX ADMIN — PIPERACILLIN SODIUM AND TAZOBACTAM SODIUM 3.38 G: 3; .375 INJECTION, POWDER, LYOPHILIZED, FOR SOLUTION INTRAVENOUS at 17:51

## 2023-01-01 NOTE — PLAN OF CARE
Goal Outcome Evaluation:  Plan of Care Reviewed With: patient        Progress: no change  Outcome Evaluation: Dipervan titrated down and levophed at 0.01, 250cc UOP, rectal temp 94.1 on blanket warmer, SB on monitor.

## 2023-01-01 NOTE — PLAN OF CARE
Goal Outcome Evaluation:              Outcome Evaluation: Pt remains intubated/sedated. Propofol & Precedex infusing. Levo infusing for BP support. Afebrile. NSR. TF infusing, pt tolerating. VSS. No acute changes noted this shift.

## 2023-01-01 NOTE — PROGRESS NOTES
"    [facility]    S: Patient seen and examined.  Remains intubated and sedated.  No acute changes overnight reported.  Unable to obtain review of systems.  O: /65   Pulse 53   Temp 94.1 °F (34.5 °C) (Rectal)   Resp 23   Ht 167.6 cm (65.98\")   Wt 79.3 kg (174 lb 13.2 oz)   SpO2 98%   BMI 28.23 kg/m²       GENERAL:   intubated, sedated  EARS,NOSE, MOUTH, THROAT:  MMM, no external lesions of ears and nose  EYES: PERRLA, EOMI  CV:  NL S1/S2  RESPIRATORY:  Mechanical breath sounds BL  GI:  ecchymoses surrounding paracentesis site towards R flank, soft  SKIN:ecchymoses surrounding paracentesis site towards R flank  INT:+3 BLLE edema. No joint deformity.  PSYCH:  not answering questions or following commands  NEURO: intubated, sedated    Scheduled Meds:chlorhexidine, 15 mL, Mouth/Throat, Q12H  famotidine, 20 mg, Oral, BID AC  folic acid, 1 mg, Oral, Daily  ipratropium, 0.5 mg, Nebulization, 4x Daily - RT  levETIRAcetam, 1,500 mg, Oral, Nightly  levETIRAcetam, 750 mg, Oral, BID  midodrine, 5 mg, Oral, TID AC  piperacillin-tazobactam, 3.375 g, Intravenous, Q8H  sodium chloride, 10 mL, Intravenous, Q12H  sodium chloride, 10 mL, Intravenous, Q12H  sodium chloride, 10 mL, Intravenous, Q12H  sodium chloride, 10 mL, Intravenous, Q12H  sodium chloride, 10 mL, Intravenous, Q12H  thiamine, 100 mg, Oral, Daily      Continuous Infusions:dexmedetomidine, 0.2-1.5 mcg/kg/hr, Last Rate: 0.3 mcg/kg/hr (01/01/23 0511)  norepinephrine, 0.01-0.3 mcg/kg/min, Last Rate: Stopped (01/01/23 0712)  Pharmacy Consult,   Pharmacy to dose vancomycin,   Pharmacy to Dose Zosyn,   propofol, 5-50 mcg/kg/min, Last Rate: 15 mcg/kg/min (01/01/23 0344)      PRN Meds:.•  dextrose  •  dextrose  •  diphenhydrAMINE  •  glucagon (human recombinant)  •  ipratropium-albuterol  •  magnesium sulfate **OR** magnesium sulfate in D5W 1g/100mL (PREMIX) **OR** magnesium sulfate  •  Pharmacy Consult  •  Pharmacy to dose " vancomycin  •  Pharmacy to Dose Zosyn  •  potassium chloride  •  potassium chloride  •  sodium chloride  •  sodium chloride  •  sodium chloride  •  sodium chloride  •  sodium chloride    Labs: Laboratory information reviewed and reconciled.    Results from last 7 days   Lab Units 01/01/23  0111   WBC 10*3/mm3 9.26         Results from last 7 days   Lab Units 01/01/23  0547   CO2 mmol/L 27.4   BUN mg/dL 14   CREATININE mg/dL 0.41*   GLUCOSE mg/dL 161*         Results from last 7 days   Lab Units 12/30/22  0452   INR  1.43*         Imaging (last 24 hours):    CT Abdomen Pelvis Without Contrast    Result Date: 12/30/2022  1.  Again noted is low-attenuation of the liver. 2.  Grossly stable small ascites is again noted. 3.  Probable right psoas hematoma measuring 3.2 by about 10 cm. 4.  Patchy bibasilar groundglass attenuation and confluent nodularity suggestive of infectious process again noted.  This report was finalized on 12/30/2022 4:40 PM by Dr. Fab Huff MD.      XR Chest 1 View    Result Date: 12/30/2022    Bilateral airspace disease is grossly stable.  This report was finalized on 12/30/2022 11:53 AM by Dr. Fab Huff MD.       Assessment/Plan:  # Acute hypoxic resp failure, 2/2 aspiration pneumonia, Intubated early morning 12/30.  Started on broad spectrum abx vanc/zosyn initially.    Remains on Zosyn.  Serial labs.  Supportive care, follow cultures.  Levophed as  Needed, wean as able.  Midodrine added. Pulm consult, appreciate recs.      # septic shock: treatment as above     # Extrapyramidal Symptoms:  Hold offending agents, Antipsychotics and compazine.  Consult psych.  Benadryl now and q6 prn.  Appreciate psych recs moving forward.     #Bilateral multifocal pneumonia POA: Patient had completed full course of Rocephin and doxycycline. Now with aspiration pneumonia, treatment as above     #Acute metabolic encephalopathy: Suspect secondary to acute illness.  Work-up for other etiology and so far  unrevealing including TSH, ammonia, B12 and folate.  Continue to monitor.  Supportive care.  Stopped IV steroids 12/29, discussed with pharmacy no need to wean since only on steroids X 5 days     #Cirrhosis: Paracentesis performed December 28.  Monitor volume status.  Serial abdominal exams and serial labs.  Received vitamin K     #Acute on chronic thrombocytopenia: Worsening likely due to acute illness.  Small hematoma right psoas.  Chemical VTE prophylaxis held.  Patient did receive vitamin K as above.  Continue to monitor serial labs.  Continue to monitor for signs of bleeding and will transfuse if any active bleeding suspected.  Transfuse for less than 10,000 if no bleeding then less than 50,000 if any signs of bleeding.    #Suspected seizure: Continue Keppra, seizure precaution    DVT Prophylaxis: SCDs  Disposition: Continue to monitor  Discharge disposition: To be determined  Prognosis: Guarded    Bernabe Molina Jr, MD  [facility] Hospitalist  01/01/23  07:52 EST

## 2023-01-02 LAB
A-A DO2: 153.8 MMHG (ref 0–300)
ANION GAP SERPL CALCULATED.3IONS-SCNC: 5.4 MMOL/L (ref 5–15)
ARTERIAL PATENCY WRIST A: POSITIVE
ATMOSPHERIC PRESS: 729 MMHG
BACTERIA FLD CULT: NORMAL
BASE EXCESS BLDA CALC-SCNC: 5.2 MMOL/L (ref 0–2)
BASOPHILS # BLD AUTO: 0.01 10*3/MM3 (ref 0–0.2)
BASOPHILS NFR BLD AUTO: 0.1 % (ref 0–1.5)
BDY SITE: ABNORMAL
BODY TEMPERATURE: 0 C
BUN SERPL-MCNC: 12 MG/DL (ref 6–20)
BUN/CREAT SERPL: 28.6 (ref 7–25)
CALCIUM SPEC-SCNC: 7.7 MG/DL (ref 8.6–10.5)
CHLORIDE SERPL-SCNC: 114 MMOL/L (ref 98–107)
CO2 BLDA-SCNC: 30.3 MMOL/L (ref 22–33)
CO2 SERPL-SCNC: 25.6 MMOL/L (ref 22–29)
COHGB MFR BLD: 1.1 % (ref 0–5)
CREAT SERPL-MCNC: 0.42 MG/DL (ref 0.76–1.27)
DEPRECATED RDW RBC AUTO: 59.6 FL (ref 37–54)
EGFRCR SERPLBLD CKD-EPI 2021: 127.8 ML/MIN/1.73
EOSINOPHIL # BLD AUTO: 0.03 10*3/MM3 (ref 0–0.4)
EOSINOPHIL NFR BLD AUTO: 0.3 % (ref 0.3–6.2)
ERYTHROCYTE [DISTWIDTH] IN BLOOD BY AUTOMATED COUNT: 15.3 % (ref 12.3–15.4)
GLUCOSE BLDC GLUCOMTR-MCNC: 154 MG/DL (ref 70–130)
GLUCOSE BLDC GLUCOMTR-MCNC: 156 MG/DL (ref 70–130)
GLUCOSE BLDC GLUCOMTR-MCNC: 182 MG/DL (ref 70–130)
GLUCOSE SERPL-MCNC: 160 MG/DL (ref 65–99)
GRAM STN SPEC: NORMAL
GRAM STN SPEC: NORMAL
HCO3 BLDA-SCNC: 29.1 MMOL/L (ref 20–26)
HCT VFR BLD AUTO: 33.8 % (ref 37.5–51)
HCT VFR BLD CALC: 33.7 % (ref 38–51)
HGB BLD-MCNC: 10.8 G/DL (ref 13–17.7)
HGB BLDA-MCNC: 11 G/DL (ref 14–18)
IMM GRANULOCYTES # BLD AUTO: 0.05 10*3/MM3 (ref 0–0.05)
IMM GRANULOCYTES NFR BLD AUTO: 0.5 % (ref 0–0.5)
INHALED O2 CONCENTRATION: 40 %
LYMPHOCYTES # BLD AUTO: 0.6 10*3/MM3 (ref 0.7–3.1)
LYMPHOCYTES NFR BLD AUTO: 6.1 % (ref 19.6–45.3)
Lab: ABNORMAL
MCH RBC QN AUTO: 34.2 PG (ref 26.6–33)
MCHC RBC AUTO-ENTMCNC: 32 G/DL (ref 31.5–35.7)
MCV RBC AUTO: 107 FL (ref 79–97)
METHGB BLD QL: 0.5 % (ref 0–3)
MODALITY: ABNORMAL
MONOCYTES # BLD AUTO: 0.6 10*3/MM3 (ref 0.1–0.9)
MONOCYTES NFR BLD AUTO: 6.1 % (ref 5–12)
NEUTROPHILS NFR BLD AUTO: 8.62 10*3/MM3 (ref 1.7–7)
NEUTROPHILS NFR BLD AUTO: 86.9 % (ref 42.7–76)
NOTE: ABNORMAL
NRBC BLD AUTO-RTO: 0 /100 WBC (ref 0–0.2)
OXYHGB MFR BLDV: 94.4 % (ref 94–99)
PCO2 BLDA: 39.4 MM HG (ref 35–45)
PCO2 TEMP ADJ BLD: ABNORMAL MM[HG]
PEEP RESPIRATORY: 8 CM[H2O]
PH BLDA: 7.48 PH UNITS (ref 7.35–7.45)
PH, TEMP CORRECTED: ABNORMAL
PLATELET # BLD AUTO: 44 10*3/MM3 (ref 140–450)
PMV BLD AUTO: 11.6 FL (ref 6–12)
PO2 BLDA: 75.6 MM HG (ref 83–108)
PO2 TEMP ADJ BLD: ABNORMAL MM[HG]
POTASSIUM SERPL-SCNC: 4 MMOL/L (ref 3.5–5.2)
PSV: 8 CMH2O
RBC # BLD AUTO: 3.16 10*6/MM3 (ref 4.14–5.8)
SAO2 % BLDCOA: 95.9 % (ref 94–99)
SODIUM SERPL-SCNC: 145 MMOL/L (ref 136–145)
VENTILATOR MODE: ABNORMAL
WBC NRBC COR # BLD: 9.91 10*3/MM3 (ref 3.4–10.8)

## 2023-01-02 PROCEDURE — 94799 UNLISTED PULMONARY SVC/PX: CPT

## 2023-01-02 PROCEDURE — 94761 N-INVAS EAR/PLS OXIMETRY MLT: CPT

## 2023-01-02 PROCEDURE — 25010000002 PROPOFOL 10 MG/ML EMULSION: Performed by: INTERNAL MEDICINE

## 2023-01-02 PROCEDURE — 82375 ASSAY CARBOXYHB QUANT: CPT

## 2023-01-02 PROCEDURE — 82962 GLUCOSE BLOOD TEST: CPT

## 2023-01-02 PROCEDURE — 82805 BLOOD GASES W/O2 SATURATION: CPT

## 2023-01-02 PROCEDURE — 99232 SBSQ HOSP IP/OBS MODERATE 35: CPT | Performed by: STUDENT IN AN ORGANIZED HEALTH CARE EDUCATION/TRAINING PROGRAM

## 2023-01-02 PROCEDURE — 80048 BASIC METABOLIC PNL TOTAL CA: CPT | Performed by: INTERNAL MEDICINE

## 2023-01-02 PROCEDURE — 85025 COMPLETE CBC W/AUTO DIFF WBC: CPT | Performed by: INTERNAL MEDICINE

## 2023-01-02 PROCEDURE — 83050 HGB METHEMOGLOBIN QUAN: CPT

## 2023-01-02 PROCEDURE — 94003 VENT MGMT INPAT SUBQ DAY: CPT

## 2023-01-02 PROCEDURE — 36600 WITHDRAWAL OF ARTERIAL BLOOD: CPT

## 2023-01-02 PROCEDURE — 25010000002 PIPERACILLIN SOD-TAZOBACTAM PER 1 G: Performed by: PHYSICIAN ASSISTANT

## 2023-01-02 RX ORDER — OLANZAPINE 5 MG/1
5 TABLET ORAL 2 TIMES DAILY
Status: DISCONTINUED | OUTPATIENT
Start: 2023-01-02 | End: 2023-01-05 | Stop reason: HOSPADM

## 2023-01-02 RX ADMIN — Medication 10 ML: at 08:52

## 2023-01-02 RX ADMIN — SODIUM CHLORIDE 1.3 MCG/KG/HR: 9 INJECTION, SOLUTION INTRAVENOUS at 03:34

## 2023-01-02 RX ADMIN — Medication 10 ML: at 20:55

## 2023-01-02 RX ADMIN — FAMOTIDINE 20 MG: 20 TABLET ORAL at 17:25

## 2023-01-02 RX ADMIN — PIPERACILLIN SODIUM AND TAZOBACTAM SODIUM 3.38 G: 3; .375 INJECTION, POWDER, LYOPHILIZED, FOR SOLUTION INTRAVENOUS at 02:48

## 2023-01-02 RX ADMIN — CARBIDOPA AND LEVODOPA 5 MG: 50; 200 TABLET, EXTENDED RELEASE ORAL at 06:31

## 2023-01-02 RX ADMIN — LEVETIRACETAM 750 MG: 100 SOLUTION ORAL at 15:23

## 2023-01-02 RX ADMIN — Medication 100 MG: at 08:52

## 2023-01-02 RX ADMIN — IPRATROPIUM BROMIDE 0.5 MG: 0.5 SOLUTION RESPIRATORY (INHALATION) at 18:38

## 2023-01-02 RX ADMIN — CHLORHEXIDINE GLUCONATE 15 ML: 1.2 RINSE ORAL at 08:52

## 2023-01-02 RX ADMIN — OLANZAPINE 5 MG: 5 TABLET, FILM COATED ORAL at 22:05

## 2023-01-02 RX ADMIN — IPRATROPIUM BROMIDE 0.5 MG: 0.5 SOLUTION RESPIRATORY (INHALATION) at 12:49

## 2023-01-02 RX ADMIN — PIPERACILLIN SODIUM AND TAZOBACTAM SODIUM 3.38 G: 3; .375 INJECTION, POWDER, LYOPHILIZED, FOR SOLUTION INTRAVENOUS at 10:31

## 2023-01-02 RX ADMIN — IPRATROPIUM BROMIDE 0.5 MG: 0.5 SOLUTION RESPIRATORY (INHALATION) at 06:46

## 2023-01-02 RX ADMIN — LEVETIRACETAM 750 MG: 100 SOLUTION ORAL at 06:31

## 2023-01-02 RX ADMIN — FAMOTIDINE 20 MG: 20 TABLET ORAL at 06:31

## 2023-01-02 RX ADMIN — PROPOFOL 15 MCG/KG/MIN: 10 INJECTION, EMULSION INTRAVENOUS at 18:10

## 2023-01-02 RX ADMIN — Medication 1 MG: at 08:52

## 2023-01-02 RX ADMIN — CHLORHEXIDINE GLUCONATE 15 ML: 1.2 RINSE ORAL at 20:54

## 2023-01-02 RX ADMIN — PIPERACILLIN SODIUM AND TAZOBACTAM SODIUM 3.38 G: 3; .375 INJECTION, POWDER, LYOPHILIZED, FOR SOLUTION INTRAVENOUS at 18:06

## 2023-01-02 RX ADMIN — IPRATROPIUM BROMIDE 0.5 MG: 0.5 SOLUTION RESPIRATORY (INHALATION) at 00:44

## 2023-01-02 RX ADMIN — Medication 10 ML: at 20:54

## 2023-01-02 RX ADMIN — LEVETIRACETAM 1500 MG: 100 SOLUTION ORAL at 20:54

## 2023-01-02 RX ADMIN — PROPOFOL 15 MCG/KG/MIN: 10 INJECTION, EMULSION INTRAVENOUS at 02:48

## 2023-01-02 RX ADMIN — CARBIDOPA AND LEVODOPA 5 MG: 50; 200 TABLET, EXTENDED RELEASE ORAL at 10:31

## 2023-01-02 RX ADMIN — CARBIDOPA AND LEVODOPA 5 MG: 50; 200 TABLET, EXTENDED RELEASE ORAL at 17:25

## 2023-01-02 NOTE — PLAN OF CARE
Goal Outcome Evaluation:  Plan of Care Reviewed With: patient        Progress: no change  Outcome Evaluation: Intubated/sedated, vss, propofol and precedex infusing, levophed titrated off, tmax 100.7, SR/SB on monitor.

## 2023-01-02 NOTE — PLAN OF CARE
Problem: Restraint, Nonbehavioral (Nonviolent)  Goal: Absence of Harm or Injury  Outcome: Ongoing, Progressing  Intervention: Implement Least Restrictive Safety Strategies  Recent Flowsheet Documentation  Taken 1/2/2023 1600 by Brooklyn Smith RN  Medical Device Protection:   tubing secured   torso covered  Less Restrictive Alternative:   bed alarm in use   calming techniques promoted   positive reinforcement provided  De-Escalation Techniques:   appropriate behavior reinforced   diversional activity encouraged   verbally redirected   stimulation decreased   reoriented   quiet time facilitated  Diversional Activities: television  Taken 1/2/2023 1400 by Brooklyn Smith RN  Medical Device Protection:   tubing secured   torso covered  Less Restrictive Alternative:   bed alarm in use   calming techniques promoted   positive reinforcement provided  De-Escalation Techniques:   reoriented   stimulation decreased   verbally redirected   family involvement requested   increased round frequency  Diversional Activities: television  Taken 1/2/2023 1200 by Brooklyn Smith RN  Medical Device Protection:   tubing secured   torso covered  Less Restrictive Alternative:   bed alarm in use   calming techniques promoted   positive reinforcement provided  De-Escalation Techniques:   verbally redirected   stimulation decreased   reoriented   quiet time facilitated   increased round frequency   family involvement requested  Diversional Activities: television  Taken 1/2/2023 1058 by Brooklyn Smith RN  Medical Device Protection:   tubing secured   torso covered  Less Restrictive Alternative:   bed alarm in use   calming techniques promoted   positive reinforcement provided  De-Escalation Techniques:   appropriate behavior reinforced   family involvement requested   increased round frequency   quiet time facilitated   stimulation decreased  Diversional Activities: television  Taken 1/2/2023 0900 by Brooklyn Smith,  RN  Diversional Activities: television  Intervention: Protect Dignity, Rights, and Personal Wellbeing  Recent Flowsheet Documentation  Taken 1/2/2023 1400 by Brooklyn Smith RN  Trust Relationship/Rapport:   care explained   reassurance provided  Taken 1/2/2023 0900 by Brooklyn Smith RN  Trust Relationship/Rapport:   care explained   reassurance provided  Intervention: Protect Skin and Joint Integrity  Recent Flowsheet Documentation  Taken 1/2/2023 1600 by Brooklyn Smith RN  Body Position:   turned   left   side-lying  Taken 1/2/2023 1400 by Broolkyn Smith RN  Body Position:   turned   right   side-lying  Taken 1/2/2023 1200 by Brooklyn Smith RN  Body Position:   turned   left   side-lying  Taken 1/2/2023 1058 by Brooklyn Smith RN  Body Position:   turned   right   side-lying  Taken 1/2/2023 1000 by Brooklyn Smith RN  Body Position:   turned   right   side-lying  Taken 1/2/2023 0800 by Brooklyn Smith RN  Body Position:   turned   left   side-lying   Goal Outcome Evaluation: Pt remains intubated/sedated. Failed SBT due to agitation and aggression. Propofol and precedex infusing for sedation. VSS. TF infusing. No BM.

## 2023-01-02 NOTE — PROGRESS NOTES
[facility] HOSPITALIST PROGRESS NOTE     Patient Identification:  Name:  Jamison Edward  Age:  52 y.o.  Sex:  male  :  1970  MRN:  [id number]  Visit Number:  [id number]  ROOM: [address]     Primary Care Provider:  Leticia Martinez APRN    Length of stay in inpatient status:  13    Subjective     Chief Compliant:    Chief Complaint   Patient presents with   • Leg Swelling       History of Presenting Illness:    Patient seen and examined this morning with his wife present at bedside. She reports they are turning down his sedation for a spontaneous breathing trial currently. She says she thinks he is improving, but is upset because she thinks the medications he was given caused him to be too sleepy and to aspirate. She says she doesn't blame anyone, but is upset that the medications had such a strong effect. Patient is still very sleepy during my exam and is intubated, so is unable to provide history/ROS.    Objective     Current Hospital Meds:chlorhexidine, 15 mL, Mouth/Throat, Q12H  famotidine, 20 mg, Oral, BID AC  folic acid, 1 mg, Oral, Daily  ipratropium, 0.5 mg, Nebulization, 4x Daily - RT  levETIRAcetam, 1,500 mg, Oral, Nightly  levETIRAcetam, 750 mg, Oral, BID  midodrine, 5 mg, Oral, TID AC  piperacillin-tazobactam, 3.375 g, Intravenous, Q8H  sodium chloride, 10 mL, Intravenous, Q12H  sodium chloride, 10 mL, Intravenous, Q12H  sodium chloride, 10 mL, Intravenous, Q12H  sodium chloride, 10 mL, Intravenous, Q12H  sodium chloride, 10 mL, Intravenous, Q12H  thiamine, 100 mg, Oral, Daily    dexmedetomidine, 0.2-1.5 mcg/kg/hr, Last Rate: 0.6 mcg/kg/hr (23 1410)  norepinephrine, 0.01-0.3 mcg/kg/min, Last Rate: Stopped (23 0430)  Pharmacy Consult,   Pharmacy to dose vancomycin,   Pharmacy to Dose Zosyn,   propofol, 5-50 mcg/kg/min, Last Rate: 10 mcg/kg/min (23 1422)        Current Antimicrobial Therapy:  Anti-Infectives (From admission, onward)    Ordered     Dose/Rate Route  Frequency Start Stop    12/30/22 0332  piperacillin-tazobactam (ZOSYN) IVPB 3.375 g in 100 mL NS VTB        Ordering Provider: Lauren Alonzo PA-C    3.375 g  over 4 Hours Intravenous Every 8 Hours 12/30/22 1030 01/06/23 1029    12/30/22 0332  vancomycin 1500 mg/500 mL 0.9% NS IVPB ([facility])        Ordering Provider: Lauren Alonzo PA-C    20 mg/kg × 77.1 kg  over 120 Minutes Intravenous Once 12/30/22 0430 12/30/22 0918    12/30/22 0332  piperacillin-tazobactam (ZOSYN) IVPB 3.375 g in 100 mL NS VTB        Ordering Provider: Lauren Alonzo PA-C    3.375 g  over 30 Minutes Intravenous Once 12/30/22 0430 12/30/22 0637    12/30/22 0327  Pharmacy to Dose Zosyn        Ordering Provider: Lauren Alonzo PA-C     Does not apply Continuous PRN 12/30/22 0327 01/06/23 0326    12/30/22 0327  Pharmacy to dose vancomycin        Ordering Provider: Lauren Alonzo PA-C     Does not apply Continuous PRN 12/30/22 0326 01/06/23 0325    12/20/22 2024  cefTRIAXone (ROCEPHIN) 2 g in sodium chloride 0.9 % 100 mL IVPB-VTB        Ordering Provider: Alexandre Houston MD    2 g  200 mL/hr over 30 Minutes Intravenous Once 12/20/22 2026 12/20/22 2140 12/20/22 2024  doxycycline (VIBRAMYCIN) 100 mg in sodium chloride 0.9 % 100 mL IVPB-VTB        Ordering Provider: Alexandre Houston MD    100 mg  over 60 Minutes Intravenous Once 12/20/22 2026 12/20/22 2209 12/20/22 1931  doxycycline (MONODOX) 100 MG capsule        Ordering Provider: Bella Khan MD    100 mg Oral 2 Times Daily 12/20/22 0000 12/27/22 6563        Current Diuretic Therapy:  Diuretics (From admission, onward)    None        ----------------------------------------------------------------------------------------------------------------------  Vital Signs:  Temp:  [97.7 °F (36.5 °C)-100.2 °F (37.9 °C)] 97.9 °F (36.6 °C)  Heart Rate:  [37-92] 59  Resp:  [22-28] 25  BP: ()/(49-73) 110/66  FiO2 (%):  [40 %] 40 %  SpO2:  [92 %-100 %]  97 %  on  Flow (L/min):  [40] 40;   Device (Oxygen Therapy): ventilator  Body mass index is 28.91 kg/m².    Wt Readings from Last 3 Encounters:   01/02/23 81.2 kg (179 lb 0.2 oz)   12/20/22 75.3 kg (166 lb)   11/18/22 70.9 kg (156 lb 3.2 oz)     Intake & Output (last 3 days)       12/30 0701 12/31 0700 12/31 0701 01/01 0700 01/01 0701 01/02 0700 01/02 0701 01/03 0700    P.O.        I.V. (mL/kg) 206.3 (2.7) 494.6 (6.2) 826.4 (10.2)     Other 80 231 202     NG/ 1057 1160     IV Piggyback 1800 300 100     Total Intake(mL/kg) 2405.3 (30.9) 2082.6 (26.3) 2288.4 (28.2)     Urine (mL/kg/hr) 750 (0.4) 250 (0.1) 625 (0.3)     Total Output 750 250 625     Net +1655.3 +1832.6 +1663.4                 NPO Diet NPO Type: Strict NPO  ----------------------------------------------------------------------------------------------------------------------  Physical exam:   Constitutional:  Well-developed and well-nourished. No acute distress. Appears chronically ill. Intubated.  HENT:  Head:  Normocephalic and atraumatic. ET and OG tubes in place.  Cardiac: Heart regular rate and rhythm, no murmur  Pulmonary/Chest:  No respiratory distress, normal rate and effort. Breath sounds clear to auscultation bilaterally but slightly diminished.  Abdominal: Mild if any abdominal distention. Soft to palpation. Normal bowel sounds.  Musculoskeletal:  No deformity.    Neurological:  Sleepy, sedation recently turned off for spontaneous breathing trial  Skin:  Skin is warm and dry.   Peripheral vascular:  No cyanosis  Edited by: Margo Khan DO at 1/2/2023 1531  ----------------------------------------------------------------------------------------------------------------------  Results from last 7 days   Lab Units 01/02/23  0016 01/01/23  0111 12/31/22  1546 12/31/22  0831 12/31/22  0016 12/30/22  1921 12/30/22  1613 12/30/22  0452 12/30/22  0217 12/28/22  0937   CRP mg/dL  --   --   --   --   --   --   --   --  2.84*  --    LACTATE  mmol/L  --   --   --   --   --  1.6  --   --  2.8*  --    WBC 10*3/mm3 9.91 9.26  --   --  13.16*  --   --   --  14.77*  --    HEMOGLOBIN g/dL 10.8* 10.4* 10.5*   < > 11.4*  --    < > 11.3* 13.2  --    HEMATOCRIT % 33.8* 32.4* 33.0*   < > 35.2*  --    < > 33.8* 39.8  --    MCV fL 107.0* 108.4*  --   --  108.6*  --   --   --  107.3*  --    MCHC g/dL 32.0 32.1  --   --  32.4  --   --   --  33.2  --    PLATELETS 10*3/mm3 44* 40*  --   --  48*  --   --   --  65*  --    INR   --   --   --   --   --   --   --  1.43*  --  1.33*    < > = values in this interval not displayed.     Results from last 7 days   Lab Units 01/02/23  1302   PH, ARTERIAL pH units 7.477*   PO2 ART mm Hg 75.6*   PCO2, ARTERIAL mm Hg 39.4   HCO3 ART mmol/L 29.1*     Results from last 7 days   Lab Units 01/02/23  0016 01/01/23  0547 12/31/22  1045 12/31/22  0016 12/30/22  0217   SODIUM mmol/L 145 145  --  146* 142   POTASSIUM mmol/L 4.0 4.0 4.3 3.5 3.3*   MAGNESIUM mg/dL  --  2.0  --  1.8 1.9   CHLORIDE mmol/L 114* 114*  --  115* 110*   CO2 mmol/L 25.6 27.4  --  22.7 20.7*   BUN mg/dL 12 14  --  13 13   CREATININE mg/dL 0.42* 0.41*  --  0.46* 0.50*   CALCIUM mg/dL 7.7* 8.1*  --  7.9* 8.0*   GLUCOSE mg/dL 160* 161*  --  139* 96   ALBUMIN g/dL  --   --   --   --  2.2*   BILIRUBIN mg/dL  --   --   --   --  2.7*   ALK PHOS U/L  --   --   --   --  176*   AST (SGOT) U/L  --   --   --   --  72*   ALT (SGPT) U/L  --   --   --   --  61*   Estimated Creatinine Clearance: 201.3 mL/min (A) (by C-G formula based on SCr of 0.42 mg/dL (L)).  No results found for: AMMONIA              Glucose   Date/Time Value Ref Range Status   01/02/2023 0540 156 (H) 70 - 130 mg/dL Final     Comment:     Meter: [id number] : [id number] [facility]   01/02/2023 0016 154 (H) 70 - 130 mg/dL Final     Comment:     Meter: [id number] : [id number] [facility]   01/01/2023 1818 120 70 - 130 mg/dL Final     Comment:     Meter: [id number] : [id number] VIVIAN NAVAS    01/01/2023 1223 119 70 - 130 mg/dL Final     Comment:     Meter: [id number] : [id number] VIVIAN NAVAS   01/01/2023 0548 157 (H) 70 - 130 mg/dL Final     Comment:     Meter: [id number] : [id number] DEV ARSEN   01/01/2023 0040 187 (H) 70 - 130 mg/dL Final     Comment:     Meter: [id number] : [id number] DEV ARSEN   12/31/2022 1747 208 (H) 70 - 130 mg/dL Final     Comment:     Meter: [id number] : [id number] Rainer Bravo   12/31/2022 1229 194 (H) 70 - 130 mg/dL Final     Comment:     Meter: [id number] : [id number] Rainer Bravo     Lab Results   Component Value Date    TSH 1.810 12/27/2022    FREET4 0.82 (L) 12/27/2022     No results found for: PREGTESTUR, PREGSERUM, HCG, HCGQUANT  Pain Management Panel     Pain Management Panel Latest Ref Rng & Units 12/20/2022 11/15/2022    AMPHETAMINES SCREEN, URINE Negative Negative Negative    BARBITURATES SCREEN Negative Negative Negative    BENZODIAZEPINE SCREEN, URINE Negative Negative Negative    BUPRENORPHINEUR Negative Negative Negative    COCAINE SCREEN, URINE Negative Negative Negative    METHADONE SCREEN, URINE Negative Negative Negative    METHAMPHETAMINEUR Negative Negative Negative        Brief Urine Lab Results  (Last result in the past 365 days)      Color   Clarity   Blood   Leuk Est   Nitrite   Protein   CREAT   Urine HCG        12/20/22 1849 Orange   Clear   Negative   Trace   Positive   Trace               Blood Culture   Date Value Ref Range Status   12/30/2022 No growth at 3 days  Preliminary   12/30/2022 No growth at 3 days  Preliminary   12/27/2022 No growth at 5 days  Final   12/27/2022 No growth at 5 days  Final     No results found for: URINECX  No results found for: WOUNDCX  No results found for: STOOLCX  Respiratory Culture   Date Value Ref Range Status   12/30/2022   Final    Moderate growth (3+) Normal respiratory nick. No S. aureus or Pseudomonas aeruginosa detected. Final report.     No results  found for: AFBCX  Results from last 7 days   Lab Units 12/30/22  1921 12/30/22  0217 12/27/22  1757   PROCALCITONIN ng/mL  --  0.18 0.19   LACTATE mmol/L 1.6 2.8*  --    CRP mg/dL  --  2.84*  --        I have personally looked at the labs and they are summarized above.  ----------------------------------------------------------------------------------------------------------------------  Detailed radiology reports for the last 24 hours:  Imaging Results (Last 24 Hours)     ** No results found for the last 24 hours. **        Assessment & Plan      #Acute hypoxic respiratory failure secondary to aspiration pneumonia diagnosed 12/30/22  #Community acquired pneumonia s/p course of ceftriaxone and doxycycline  #Septic shock  - Patient was admitted with community acquired pneumonia and symptoms resolved after treatment with ceftriaxone and doxycycline. He then had an aspiration event with subsequent severe hypoxia and was intubated on 12/30/22. He was started on broad spectrum antibiotic coverage with vanc/zosyn. Vancomycin now discontinued after MRSA swab was negative.  - Continue treatment with zosyn.  - Pulmonology consulted, appreciate assistance. Patient's sedation was turned down for spontaneous breathing trial today.   - Levophed was stopped early this morning. Continue to monitor BP and provide vasopressor support as needed. Continue midodrine also.  - Continue to follow blood counts and culture results.      #Extrapyramidal symptoms  #Delirium and aggression  #Metabolic encephalopathy  - Etiology of patient's confusion is not entirely clear. Thought to be mostly due to his acute illness; however, his confusion persisted and apparently worsened despite resolution of elevated ammonia and treatment of his initial infection. He then required multiple doses of geodon as he was becoming very agitated and threatening to hit staff (actually did attempt to hit  per review of nursing notes).   - He received  ativan for elevated CIWA score previously, and his wife has requested he not get any more ativan or similar medications. She is concerned that he has had worse confusion when given ativan in the past (though his confusion this admission started before the ativan was given). Unlikely that his altered mental status is from alcohol withdrawal at this point as he has reportedly been sober for at least six weeks now. She also thinks it unlikely to be psychiatric in origin. Psychiatry has been consulted for recommendations, especially for medication recommendations to help with agitation but that won't worsen his confusion, if possible. Wife says patient has had confusion when electrolytes have been abnormal in the past, although sodium, potassium, and magnesium are now WNL.   - Continue to monitor closely as he is weaned from sedation for breathing trials (currently receiving precedex).       #Liver cirrhosis, unknown etiology  #Ascites  #Hyperbilirubinemia  #Hyperammonemia  #Chronically elevated alkaline phosphatase  #Hypoalbuminemia  #Pseudohypocalcemia 2/2 hypoalbuminemia  #Abdominal pain  - CT findings suggestive of cirrhosis on admission, significantly worse than previous CT in November.  - Etiology unclear, but differential diagnosis favors alcohol +/- acetaminophen induced over hemochromatosis. Less likely primary sclerosing cholangitis as he had a normal MRCP one month ago, but it is still on the differential. Patient reportedly stopped drinking about six weeks ago now.  - He had large ascites on CT but then this appears to have significantly improved by the time he was able to get paracentesis.  - Follow up with outpatient GI as scheduled  - Avoid hepatotoxins as able.  - Repeat CMP in AM to monitor transaminases    #Acute on chronic thrombocytopenia  #Chronic macrocytic anemia  - Thrombocytopenia likely secondary to cirrhosis and worsened by acute illness. Platelet count 44K today. Transfuse platelets for  <10,000 if no acute bleeding or <50,000 with signs of bleeding. No signs of acute bleeding seen on today's exam.   - Repeat CBC in a.m.    #Positive urine drug screen  - UDS positive for TCAs at admission (inconsistent with home medication list). He denied any current drug use at admission.     #Reported left leg weakness  #Multilevel degenerative disc disease  #History of TIA/CVA  - Nonspecific periventricular white matter lesions noted on MRI brain earlier in admission likely representing chronic deep white matter ischemic change, no acute abnormalities noted. MRI lumbar spine showed multilevel degenerative changes with moderate canal stenosis at L5-S1 and mild to moderate canal stenosis at L3-L4.   - Reconsult PT/OT when weaned from ventilator.    #Acute hypokalemia  #Borderline hypomagnesemia  - Resolved  - Replace per protocol  - Monitor with chemistry panel periodically     #History of hypertension   - BP has been low requiring vasopressor support. Continue to monitor and avoid agents that would lower BP at this time.    #GERD  #Johnson's esophagus  - Continue PPI    #Ulcerative colitis  - No findings of worsening colitis on last CT. Continue supportive care.    #Tobacco use  - Nicotine patch made available   Edited by: Margo Khan DO at 1/2/2023 1531    VTE Prophylaxis:   Mechanical Order History:      Ordered        12/30/22 0856  Place Sequential Compression Device  Once            12/30/22 0856  Maintain Sequential Compression Device  Continuous                    Pharmalogical Order History:      Ordered     Dose Route Frequency Stop    12/21/22 0106  heparin (porcine) 5000 UNIT/ML injection 5,000 Units  Status:  Discontinued         5,000 Units SC Every 12 Hours Scheduled 12/30/22 0438                Dispo: pending clinical course     Margo Khan DO  [facility]  01/02/23  15:32 EST

## 2023-01-03 ENCOUNTER — APPOINTMENT (OUTPATIENT)
Dept: GENERAL RADIOLOGY | Facility: HOSPITAL | Age: 53
DRG: 870 | End: 2023-01-03
Payer: MEDICARE

## 2023-01-03 LAB
ALBUMIN SERPL-MCNC: 1.3 G/DL (ref 3.5–5.2)
ALBUMIN/GLOB SERPL: 0.6 G/DL
ALP SERPL-CCNC: 155 U/L (ref 39–117)
ALT SERPL W P-5'-P-CCNC: 36 U/L (ref 1–41)
AMMONIA BLD-SCNC: 34 UMOL/L (ref 16–60)
ANION GAP SERPL CALCULATED.3IONS-SCNC: 5.6 MMOL/L (ref 5–15)
AST SERPL-CCNC: 32 U/L (ref 1–40)
BASOPHILS # BLD AUTO: 0.01 10*3/MM3 (ref 0–0.2)
BASOPHILS NFR BLD AUTO: 0.1 % (ref 0–1.5)
BILIRUB SERPL-MCNC: 1.1 MG/DL (ref 0–1.2)
BUN SERPL-MCNC: 13 MG/DL (ref 6–20)
BUN/CREAT SERPL: 35.1 (ref 7–25)
CALCIUM SPEC-SCNC: 7.5 MG/DL (ref 8.6–10.5)
CHLORIDE SERPL-SCNC: 113 MMOL/L (ref 98–107)
CO2 SERPL-SCNC: 26.4 MMOL/L (ref 22–29)
CREAT SERPL-MCNC: 0.37 MG/DL (ref 0.76–1.27)
DEPRECATED RDW RBC AUTO: 60.7 FL (ref 37–54)
EGFRCR SERPLBLD CKD-EPI 2021: 132.7 ML/MIN/1.73
EOSINOPHIL # BLD AUTO: 0.12 10*3/MM3 (ref 0–0.4)
EOSINOPHIL NFR BLD AUTO: 1.4 % (ref 0.3–6.2)
ERYTHROCYTE [DISTWIDTH] IN BLOOD BY AUTOMATED COUNT: 15.4 % (ref 12.3–15.4)
GLOBULIN UR ELPH-MCNC: 2.3 GM/DL
GLUCOSE BLDC GLUCOMTR-MCNC: 140 MG/DL (ref 70–130)
GLUCOSE BLDC GLUCOMTR-MCNC: 142 MG/DL (ref 70–130)
GLUCOSE BLDC GLUCOMTR-MCNC: 156 MG/DL (ref 70–130)
GLUCOSE SERPL-MCNC: 146 MG/DL (ref 65–99)
HCT VFR BLD AUTO: 32.2 % (ref 37.5–51)
HGB BLD-MCNC: 10.1 G/DL (ref 13–17.7)
IMM GRANULOCYTES # BLD AUTO: 0.04 10*3/MM3 (ref 0–0.05)
IMM GRANULOCYTES NFR BLD AUTO: 0.5 % (ref 0–0.5)
LYMPHOCYTES # BLD AUTO: 0.77 10*3/MM3 (ref 0.7–3.1)
LYMPHOCYTES NFR BLD AUTO: 9.2 % (ref 19.6–45.3)
MAGNESIUM SERPL-MCNC: 1.7 MG/DL (ref 1.6–2.6)
MCH RBC QN AUTO: 34.1 PG (ref 26.6–33)
MCHC RBC AUTO-ENTMCNC: 31.4 G/DL (ref 31.5–35.7)
MCV RBC AUTO: 108.8 FL (ref 79–97)
MONOCYTES # BLD AUTO: 0.58 10*3/MM3 (ref 0.1–0.9)
MONOCYTES NFR BLD AUTO: 6.9 % (ref 5–12)
NEUTROPHILS NFR BLD AUTO: 6.87 10*3/MM3 (ref 1.7–7)
NEUTROPHILS NFR BLD AUTO: 81.9 % (ref 42.7–76)
NRBC BLD AUTO-RTO: 0 /100 WBC (ref 0–0.2)
PLATELET # BLD AUTO: 45 10*3/MM3 (ref 140–450)
PMV BLD AUTO: 12.3 FL (ref 6–12)
POTASSIUM SERPL-SCNC: 3.7 MMOL/L (ref 3.5–5.2)
PROT SERPL-MCNC: 3.6 G/DL (ref 6–8.5)
QT INTERVAL: 380 MS
QTC INTERVAL: 398 MS
RBC # BLD AUTO: 2.96 10*6/MM3 (ref 4.14–5.8)
SODIUM SERPL-SCNC: 145 MMOL/L (ref 136–145)
WBC NRBC COR # BLD: 8.39 10*3/MM3 (ref 3.4–10.8)

## 2023-01-03 PROCEDURE — 94799 UNLISTED PULMONARY SVC/PX: CPT

## 2023-01-03 PROCEDURE — 25010000002 PROPOFOL 10 MG/ML EMULSION: Performed by: INTERNAL MEDICINE

## 2023-01-03 PROCEDURE — 99232 SBSQ HOSP IP/OBS MODERATE 35: CPT | Performed by: STUDENT IN AN ORGANIZED HEALTH CARE EDUCATION/TRAINING PROGRAM

## 2023-01-03 PROCEDURE — 94761 N-INVAS EAR/PLS OXIMETRY MLT: CPT

## 2023-01-03 PROCEDURE — 85025 COMPLETE CBC W/AUTO DIFF WBC: CPT | Performed by: STUDENT IN AN ORGANIZED HEALTH CARE EDUCATION/TRAINING PROGRAM

## 2023-01-03 PROCEDURE — 94003 VENT MGMT INPAT SUBQ DAY: CPT

## 2023-01-03 PROCEDURE — 74018 RADEX ABDOMEN 1 VIEW: CPT

## 2023-01-03 PROCEDURE — 93010 ELECTROCARDIOGRAM REPORT: CPT | Performed by: INTERNAL MEDICINE

## 2023-01-03 PROCEDURE — 83735 ASSAY OF MAGNESIUM: CPT | Performed by: STUDENT IN AN ORGANIZED HEALTH CARE EDUCATION/TRAINING PROGRAM

## 2023-01-03 PROCEDURE — 25010000002 PIPERACILLIN SOD-TAZOBACTAM PER 1 G: Performed by: PHYSICIAN ASSISTANT

## 2023-01-03 PROCEDURE — 93005 ELECTROCARDIOGRAM TRACING: CPT | Performed by: INTERNAL MEDICINE

## 2023-01-03 PROCEDURE — 80053 COMPREHEN METABOLIC PANEL: CPT | Performed by: STUDENT IN AN ORGANIZED HEALTH CARE EDUCATION/TRAINING PROGRAM

## 2023-01-03 PROCEDURE — 99291 CRITICAL CARE FIRST HOUR: CPT | Performed by: INTERNAL MEDICINE

## 2023-01-03 PROCEDURE — 25010000002 MAGNESIUM SULFATE 2 GM/50ML SOLUTION: Performed by: STUDENT IN AN ORGANIZED HEALTH CARE EDUCATION/TRAINING PROGRAM

## 2023-01-03 PROCEDURE — 82140 ASSAY OF AMMONIA: CPT | Performed by: STUDENT IN AN ORGANIZED HEALTH CARE EDUCATION/TRAINING PROGRAM

## 2023-01-03 PROCEDURE — 82962 GLUCOSE BLOOD TEST: CPT

## 2023-01-03 RX ORDER — MAGNESIUM SULFATE HEPTAHYDRATE 40 MG/ML
2 INJECTION, SOLUTION INTRAVENOUS ONCE
Status: COMPLETED | OUTPATIENT
Start: 2023-01-03 | End: 2023-01-03

## 2023-01-03 RX ORDER — MIDODRINE HYDROCHLORIDE 2.5 MG/1
10 TABLET ORAL
Status: DISCONTINUED | OUTPATIENT
Start: 2023-01-03 | End: 2023-01-05 | Stop reason: HOSPADM

## 2023-01-03 RX ORDER — AMOXICILLIN 250 MG
1 CAPSULE ORAL ONCE
Status: COMPLETED | OUTPATIENT
Start: 2023-01-03 | End: 2023-01-03

## 2023-01-03 RX ADMIN — Medication 10 ML: at 21:37

## 2023-01-03 RX ADMIN — MAGNESIUM SULFATE HEPTAHYDRATE 2 G: 2 INJECTION, SOLUTION INTRAVENOUS at 05:36

## 2023-01-03 RX ADMIN — Medication 1 MG: at 08:31

## 2023-01-03 RX ADMIN — CHLORHEXIDINE GLUCONATE 15 ML: 1.2 RINSE ORAL at 21:36

## 2023-01-03 RX ADMIN — SODIUM CHLORIDE 1.5 MCG/KG/HR: 9 INJECTION, SOLUTION INTRAVENOUS at 21:41

## 2023-01-03 RX ADMIN — CARBIDOPA AND LEVODOPA 5 MG: 50; 200 TABLET, EXTENDED RELEASE ORAL at 06:16

## 2023-01-03 RX ADMIN — CARBIDOPA AND LEVODOPA 10 MG: 50; 200 TABLET, EXTENDED RELEASE ORAL at 17:13

## 2023-01-03 RX ADMIN — OLANZAPINE 5 MG: 5 TABLET, FILM COATED ORAL at 08:31

## 2023-01-03 RX ADMIN — PROPOFOL 15 MCG/KG/MIN: 10 INJECTION, EMULSION INTRAVENOUS at 17:50

## 2023-01-03 RX ADMIN — Medication 10 ML: at 08:31

## 2023-01-03 RX ADMIN — PIPERACILLIN SODIUM AND TAZOBACTAM SODIUM 3.38 G: 3; .375 INJECTION, POWDER, LYOPHILIZED, FOR SOLUTION INTRAVENOUS at 17:50

## 2023-01-03 RX ADMIN — SODIUM CHLORIDE 1.3 MCG/KG/HR: 9 INJECTION, SOLUTION INTRAVENOUS at 01:12

## 2023-01-03 RX ADMIN — PIPERACILLIN SODIUM AND TAZOBACTAM SODIUM 3.38 G: 3; .375 INJECTION, POWDER, LYOPHILIZED, FOR SOLUTION INTRAVENOUS at 02:55

## 2023-01-03 RX ADMIN — CHLORHEXIDINE GLUCONATE 15 ML: 1.2 RINSE ORAL at 08:31

## 2023-01-03 RX ADMIN — IPRATROPIUM BROMIDE 0.5 MG: 0.5 SOLUTION RESPIRATORY (INHALATION) at 00:33

## 2023-01-03 RX ADMIN — PROPOFOL 15 MCG/KG/MIN: 10 INJECTION, EMULSION INTRAVENOUS at 05:36

## 2023-01-03 RX ADMIN — OLANZAPINE 5 MG: 5 TABLET, FILM COATED ORAL at 21:36

## 2023-01-03 RX ADMIN — IPRATROPIUM BROMIDE 0.5 MG: 0.5 SOLUTION RESPIRATORY (INHALATION) at 12:29

## 2023-01-03 RX ADMIN — DOCUSATE SODIUM 50 MG AND SENNOSIDES 8.6 MG 1 TABLET: 8.6; 5 TABLET, FILM COATED ORAL at 05:37

## 2023-01-03 RX ADMIN — LEVETIRACETAM 750 MG: 100 SOLUTION ORAL at 15:59

## 2023-01-03 RX ADMIN — FAMOTIDINE 20 MG: 20 TABLET ORAL at 17:13

## 2023-01-03 RX ADMIN — LEVETIRACETAM 750 MG: 100 SOLUTION ORAL at 06:16

## 2023-01-03 RX ADMIN — PIPERACILLIN SODIUM AND TAZOBACTAM SODIUM 3.38 G: 3; .375 INJECTION, POWDER, LYOPHILIZED, FOR SOLUTION INTRAVENOUS at 12:11

## 2023-01-03 RX ADMIN — CARBIDOPA AND LEVODOPA 10 MG: 50; 200 TABLET, EXTENDED RELEASE ORAL at 12:11

## 2023-01-03 RX ADMIN — FAMOTIDINE 20 MG: 20 TABLET ORAL at 06:17

## 2023-01-03 RX ADMIN — Medication 100 MG: at 08:31

## 2023-01-03 RX ADMIN — IPRATROPIUM BROMIDE 0.5 MG: 0.5 SOLUTION RESPIRATORY (INHALATION) at 06:28

## 2023-01-03 RX ADMIN — SODIUM CHLORIDE 1.5 MCG/KG/HR: 9 INJECTION, SOLUTION INTRAVENOUS at 10:55

## 2023-01-03 RX ADMIN — LEVETIRACETAM 1500 MG: 100 SOLUTION ORAL at 21:36

## 2023-01-03 RX ADMIN — IPRATROPIUM BROMIDE 0.5 MG: 0.5 SOLUTION RESPIRATORY (INHALATION) at 18:53

## 2023-01-03 NOTE — PROGRESS NOTES
[facility] HOSPITALIST PROGRESS NOTE     Patient Identification:  Name:  Jamison Edward  Age:  52 y.o.  Sex:  male  :  1970  MRN:  [id number]  Visit Number:  [id number]  ROOM: [address]     Primary Care Provider:  Leticia Martinez APRN    Length of stay in inpatient status:  14    Subjective     Chief Compliant:    Chief Complaint   Patient presents with   • Leg Swelling       History of Presenting Illness:    Patient seen and examined and then I spoke with his wife in the waiting room. Patient is intubated/sedated and unable to provide history or ROS. Per wife and nursing staff patient did okay from a respiratory standpoint, but became agitated and aggressive when sedation was decreased yesterday for spontaneous breathing trial and seemed significantly confused.     Objective     Current Hospital Meds:chlorhexidine, 15 mL, Mouth/Throat, Q12H  famotidine, 20 mg, Oral, BID AC  folic acid, 1 mg, Oral, Daily  ipratropium, 0.5 mg, Nebulization, 4x Daily - RT  levETIRAcetam, 1,500 mg, Oral, Nightly  levETIRAcetam, 750 mg, Oral, BID  midodrine, 10 mg, Oral, TID AC  OLANZapine, 5 mg, Oral, BID  piperacillin-tazobactam, 3.375 g, Intravenous, Q8H  sodium chloride, 10 mL, Intravenous, Q12H  sodium chloride, 10 mL, Intravenous, Q12H  sodium chloride, 10 mL, Intravenous, Q12H  sodium chloride, 10 mL, Intravenous, Q12H  sodium chloride, 10 mL, Intravenous, Q12H  thiamine, 100 mg, Oral, Daily    dexmedetomidine, 0.2-1.5 mcg/kg/hr, Last Rate: 1.5 mcg/kg/hr (23 1055)  norepinephrine, 0.01-0.3 mcg/kg/min, Last Rate: Stopped (23 0430)  Pharmacy Consult,   propofol, 5-50 mcg/kg/min, Last Rate: Stopped (23 1029)        Current Antimicrobial Therapy:  Anti-Infectives (From admission, onward)    Ordered     Dose/Rate Route Frequency Start Stop    22 0332  piperacillin-tazobactam (ZOSYN) IVPB 3.375 g in 100 mL NS VTB        Ordering Provider: Lauren Alonzo PA-C    3.375 g  over 4  Hours Intravenous Every 8 Hours 12/30/22 1030 01/06/23 1029    12/30/22 0332  vancomycin 1500 mg/500 mL 0.9% NS IVPB ([facility])        Ordering Provider: Lauren Alonzo PA-C    20 mg/kg × 77.1 kg  over 120 Minutes Intravenous Once 12/30/22 0430 12/30/22 0918    12/30/22 0332  piperacillin-tazobactam (ZOSYN) IVPB 3.375 g in 100 mL NS VTB        Ordering Provider: Lauren Alonzo PA-C    3.375 g  over 30 Minutes Intravenous Once 12/30/22 0430 12/30/22 0637    12/20/22 2024  cefTRIAXone (ROCEPHIN) 2 g in sodium chloride 0.9 % 100 mL IVPB-VTB        Ordering Provider: Alexandre Houston MD    2 g  200 mL/hr over 30 Minutes Intravenous Once 12/20/22 2026 12/20/22 2140    12/20/22 2024  doxycycline (VIBRAMYCIN) 100 mg in sodium chloride 0.9 % 100 mL IVPB-VTB        Ordering Provider: Alexandre Houston MD    100 mg  over 60 Minutes Intravenous Once 12/20/22 2026 12/20/22 2209 12/20/22 1931  doxycycline (MONODOX) 100 MG capsule        Ordering Provider: Bella Khan MD    100 mg Oral 2 Times Daily 12/20/22 0000 12/27/22 9134        Current Diuretic Therapy:  Diuretics (From admission, onward)    None        ----------------------------------------------------------------------------------------------------------------------  Vital Signs:  Temp:  [97.7 °F (36.5 °C)-100 °F (37.8 °C)] 98.9 °F (37.2 °C)  Heart Rate:  [48-80] 74  Resp:  [22-28] 22  BP: ()/(43-87) 106/61  FiO2 (%):  [40 %] 40 %  SpO2:  [92 %-100 %] 96 %  on   ;   Device (Oxygen Therapy): ventilator  Body mass index is 29.66 kg/m².    Wt Readings from Last 3 Encounters:   01/03/23 83.3 kg (183 lb 10.3 oz)   12/20/22 75.3 kg (166 lb)   11/18/22 70.9 kg (156 lb 3.2 oz)     Intake & Output (last 3 days)       12/31 0701  01/01 0700 01/01 0701  01/02 0700 01/02 0701  01/03 0700 01/03 0701  01/04 0700    I.V. (mL/kg) 494.6 (6.2) 826.4 (10.2) 562.4 (6.8)     Other 231 202 112     NG/GT 1057 1160 623     IV Piggyback 300 100 100     Total  Intake(mL/kg) 2082.6 (26.3) 2288.4 (28.2) 1397.4 (16.8)     Urine (mL/kg/hr) 250 (0.1) 625 (0.3) 600 (0.3)     Total Output 250 625 600     Net +1832.6 +1663.4 +797.4                 NPO Diet NPO Type: Strict NPO  ----------------------------------------------------------------------------------------------------------------------  Physical exam:   Constitutional:  Well-developed and well-nourished. No acute distress. Appears chronically ill. Intubated.  HENT:  Head:  Normocephalic and atraumatic. ET and OG tubes in place.  Cardiac: Heart regular rate and rhythm, no murmur  Pulmonary/Chest:  No respiratory distress, normal rate and effort. Breath sounds clear to auscultation bilaterally with sounds of mechanical ventilation noted, no crackles, wheeze, or rhonchi.  Abdominal: Mild abdominal distention. Soft to palpation. Normal bowel sounds.  Musculoskeletal:  No deformity.    Neurological:  Sedated  Skin:  Skin is warm and dry.   Peripheral vascular:  No cyanosis. Anasarca noted.  Edited by: Margo Khan DO at 1/3/2023 1057  ----------------------------------------------------------------------------------------------------------------------  Results from last 7 days   Lab Units 01/03/23  0043 01/02/23  0016 01/01/23  0111 12/31/22  0016 12/30/22  1921 12/30/22  1613 12/30/22  0452 12/30/22  0217 12/28/22  0937   CRP mg/dL  --   --   --   --   --   --   --  2.84*  --    LACTATE mmol/L  --   --   --   --  1.6  --   --  2.8*  --    WBC 10*3/mm3 8.39 9.91 9.26   < >  --   --   --  14.77*  --    HEMOGLOBIN g/dL 10.1* 10.8* 10.4*   < >  --    < > 11.3* 13.2  --    HEMATOCRIT % 32.2* 33.8* 32.4*   < >  --    < > 33.8* 39.8  --    MCV fL 108.8* 107.0* 108.4*   < >  --   --   --  107.3*  --    MCHC g/dL 31.4* 32.0 32.1   < >  --   --   --  33.2  --    PLATELETS 10*3/mm3 45* 44* 40*   < >  --   --   --  65*  --    INR   --   --   --   --   --   --  1.43*  --  1.33*    < > = values in this interval not displayed.      Results from last 7 days   Lab Units 01/02/23  1302   PH, ARTERIAL pH units 7.477*   PO2 ART mm Hg 75.6*   PCO2, ARTERIAL mm Hg 39.4   HCO3 ART mmol/L 29.1*     Results from last 7 days   Lab Units 01/03/23  0043 01/02/23  0016 01/01/23  0547 12/31/22  1045 12/31/22  0016 12/30/22  0217   SODIUM mmol/L 145 145 145  --  146* 142   POTASSIUM mmol/L 3.7 4.0 4.0   < > 3.5 3.3*   MAGNESIUM mg/dL 1.7  --  2.0  --  1.8 1.9   CHLORIDE mmol/L 113* 114* 114*  --  115* 110*   CO2 mmol/L 26.4 25.6 27.4  --  22.7 20.7*   BUN mg/dL 13 12 14  --  13 13   CREATININE mg/dL 0.37* 0.42* 0.41*  --  0.46* 0.50*   CALCIUM mg/dL 7.5* 7.7* 8.1*  --  7.9* 8.0*   GLUCOSE mg/dL 146* 160* 161*  --  139* 96   ALBUMIN g/dL 1.3*  --   --   --   --  2.2*   BILIRUBIN mg/dL 1.1  --   --   --   --  2.7*   ALK PHOS U/L 155*  --   --   --   --  176*   AST (SGOT) U/L 32  --   --   --   --  72*   ALT (SGPT) U/L 36  --   --   --   --  61*    < > = values in this interval not displayed.   Estimated Creatinine Clearance: 231.1 mL/min (A) (by C-G formula based on SCr of 0.37 mg/dL (L)).  Ammonia   Date Value Ref Range Status   01/03/2023 34 16 - 60 umol/L Final                 Glucose   Date/Time Value Ref Range Status   01/03/2023 0616 156 (H) 70 - 130 mg/dL Final     Comment:     Meter: [id number] : [id number] DEV ARSEN   01/03/2023 0022 140 (H) 70 - 130 mg/dL Final     Comment:     Meter: [id number] : [id number] [facility]   01/02/2023 1804 182 (H) 70 - 130 mg/dL Final     Comment:     Meter: [id number] : [id number] VIVIAN WYNN [facility] [place]   01/02/2023 0540 156 (H) 70 - 130 mg/dL Final     Comment:     Meter: [id number] : [id number] [facility]   01/02/2023 0016 154 (H) 70 - 130 mg/dL Final     Comment:     Meter: [id number] : [id number] [facility]   01/01/2023 1818 120 70 - 130 mg/dL Final     Comment:     Meter: [id number] : [id number] VIVIAN HUFF   01/01/2023 1223 119 70 - 130 mg/dL Final     Comment:      Meter: [id number] : [id number] VIVIAN NAVAS   01/01/2023 0548 157 (H) 70 - 130 mg/dL Final     Comment:     Meter: [id number] : [id number] DEVGEORGE LEGER     Lab Results   Component Value Date    TSH 1.810 12/27/2022    FREET4 0.82 (L) 12/27/2022     No results found for: PREGTESTUR, PREGSERUM, HCG, HCGQUANT  Pain Management Panel     Pain Management Panel Latest Ref Rng & Units 12/20/2022 11/15/2022    AMPHETAMINES SCREEN, URINE Negative Negative Negative    BARBITURATES SCREEN Negative Negative Negative    BENZODIAZEPINE SCREEN, URINE Negative Negative Negative    BUPRENORPHINEUR Negative Negative Negative    COCAINE SCREEN, URINE Negative Negative Negative    METHADONE SCREEN, URINE Negative Negative Negative    METHAMPHETAMINEUR Negative Negative Negative        Brief Urine Lab Results  (Last result in the past 365 days)      Color   Clarity   Blood   Leuk Est   Nitrite   Protein   CREAT   Urine HCG        12/20/22 1849 Orange   Clear   Negative   Trace   Positive   Trace               Blood Culture   Date Value Ref Range Status   12/30/2022 No growth at 4 days  Preliminary   12/30/2022 No growth at 4 days  Preliminary   12/27/2022 No growth at 5 days  Final   12/27/2022 No growth at 5 days  Final     No results found for: URINECX  No results found for: WOUNDCX  No results found for: STOOLCX  Respiratory Culture   Date Value Ref Range Status   12/30/2022   Final    Moderate growth (3+) Normal respiratory nick. No S. aureus or Pseudomonas aeruginosa detected. Final report.     No results found for: AFBCX  Results from last 7 days   Lab Units 12/30/22  1921 12/30/22  0217 12/27/22  1757   PROCALCITONIN ng/mL  --  0.18 0.19   LACTATE mmol/L 1.6 2.8*  --    CRP mg/dL  --  2.84*  --        I have personally looked at the labs and they are summarized above.  ----------------------------------------------------------------------------------------------------------------------  Detailed radiology reports  for the last 24 hours:  Imaging Results (Last 24 Hours)     Procedure Component Value Units Date/Time    XR Abdomen KUB [[id number]] Collected: 01/03/23 0531     Updated: 01/03/23 0533    Narrative:      KUB, 1/3/2023      HISTORY:    Hospital inpatient with constipation. Ascites.      TECHNIQUE:  AP supine lower abdomen and pelvis.    COMPARISON:  *  CT abdomen/pelvis, 12/30/2022.    FINDINGS:  Distended and peripherally dense abdomen due to the presence of large volume ascites best demonstrated on CT. Bowel segments displaced into the central abdomen. Mild dilatation of air-filled colon. No visible large volume stool burden.    NG tube in the distal portion of the stomach. Herniorrhaphy clips.      Impression:      Ascites. Nonspecific bowel gas pattern. No visible large volume stool burden.    Signer Name: Brian Gudino MD   Signed: 1/3/2023 5:31 AM   Workstation Name: [email or web address]    [facility]        Assessment & Plan      #Acute hypoxic respiratory failure secondary to aspiration pneumonia diagnosed 12/30/22  #Community acquired pneumonia s/p course of ceftriaxone and doxycycline  #Septic shock  - Patient was admitted with community acquired pneumonia and symptoms resolved after treatment with ceftriaxone and doxycycline. He then had an aspiration event with subsequent severe hypoxia and was intubated on 12/30/22. He was started on broad spectrum antibiotic coverage with vanc/zosyn. Vancomycin now discontinued after MRSA swab was negative.  - Continue treatment with zosyn, day 5.  - Pulmonology consulted, appreciate assistance. Patient reportedly became very agitated and aggressive during SBT yesterday. Plan for repeat trial today per Pulmonology discretion.  - No longer requiring levophed for blood pressure support. Continue midodrine.   - Continue to follow blood counts and culture results.      #Extrapyramidal symptoms  #Delirium and aggression  #Metabolic  encephalopathy  #history of hyperammonemia  - Etiology of patient's confusion is not entirely clear. Thought to be mostly due to his acute illness; however, his confusion persisted and apparently worsened despite resolution of elevated ammonia and treatment of his initial infection. He then required multiple doses of geodon as he was becoming very agitated and threatening to hit staff (actually did attempt to hit ).   - He received ativan for elevated CIWA score previously, and his wife has requested he not get any more ativan or similar medications. She is concerned that he has had worse confusion when given ativan in the past (though his confusion this admission started before the ativan was given). Unlikely that his altered mental status is from alcohol withdrawal at this point as he has reportedly been sober for at least six weeks now. She also thinks it unlikely to be psychiatric in origin. Psychiatry has been consulted for recommendations, especially for medication recommendations to help with agitation but that won't worsen his confusion, if possible. Wife says patient has had confusion when electrolytes have been abnormal in the past, but sodium, potassium, and magnesium are now WNL.   - Continue to monitor closely as he is weaned from sedation for breathing trials (currently receiving precedex).   - Repeat ammonia level today to see if hyperammonemia may again be contributing, as he has not had a BM in about a week. KUB ordered overnight by AICU and does not show evidence of bowel obstruction or significant stool burden. Consider restarting lactulose pending ammonia level result.    #Liver cirrhosis, unknown etiology  #Ascites  #Hyperbilirubinemia  #Hyperammonemia  #Chronically elevated alkaline phosphatase  #Hypoalbuminemia  #Pseudohypocalcemia 2/2 hypoalbuminemia  #Abdominal pain  - CT findings suggestive of cirrhosis on admission, significantly worse than previous CT in November.  -  Etiology unclear, but differential diagnosis favors alcohol +/- acetaminophen induced over hemochromatosis. Less likely primary sclerosing cholangitis as he had a normal MRCP one month prior to admission, but it is still on the differential. He would not be able to get an MRI at present, and likely couldn't have earlier in admission either, due to agitation and now intubation. Patient reportedly stopped drinking about six weeks ago now.  - He had large ascites on CT but then this appears to have significantly improved by the time he was able to get paracentesis.  - Follow up with outpatient GI as scheduled  - Avoid hepatotoxins as able.  - ALT and AST are WNL today. Alk phos mildly improved at 155. Bilirubin is WNL also.  - Repeat CMP periodically to follow transaminases.    #Acute on chronic thrombocytopenia  #Chronic macrocytic anemia  - Thrombocytopenia likely secondary to cirrhosis and worsened by acute illness. Platelet count 45K today. Transfuse platelets for <10,000 if no acute bleeding or <50,000 with signs of bleeding.   - Hgb has slowly trended down over the past two weeks and is at 10.1 today. No signs of acute bleeding seen on today's exam. Folate was low normal (5.71) when checked on 12/27/22. Ferritin was significantly elevated (possibly as acute phase reactant) and TIBC decreased, but iron level was low. Continue folic acid and thiamine supplements.   - Repeat CBC in a.m.    #Positive urine drug screen  - UDS positive for TCAs at admission (inconsistent with home medication list). He denied any current drug use at admission.     #Reported left leg weakness  #Multilevel degenerative disc disease  #History of TIA/CVA  - Nonspecific periventricular white matter lesions noted on MRI brain earlier in admission likely representing chronic deep white matter ischemic change, no acute abnormalities noted. MRI lumbar spine showed multilevel degenerative changes with moderate canal stenosis at L5-S1 and mild to  moderate canal stenosis at L3-L4.   - Reconsult PT/OT when weaned from ventilator.    #Acute hypokalemia  #Borderline hypomagnesemia  - Resolved  - Replace per protocol  - Monitor with chemistry panel periodically     #History of hypertension   - BP has been low requiring vasopressor support. Continue to monitor and avoid agents that would lower BP at this time.    #GERD  #Johnson's esophagus  - Continue PPI    #Ulcerative colitis  - No findings of worsening colitis on last CT. Continue supportive care.    #Tobacco use  - Nicotine patch made available   Edited by: Margo Khan DO at 1/3/2023 1057    VTE Prophylaxis:   Mechanical Order History:      Ordered        12/30/22 0856  Place Sequential Compression Device  Once            12/30/22 0856  Maintain Sequential Compression Device  Continuous                    Pharmalogical Order History:      Ordered     Dose Route Frequency Stop    12/21/22 0106  heparin (porcine) 5000 UNIT/ML injection 5,000 Units  Status:  Discontinued         5,000 Units SC Every 12 Hours Scheduled 12/30/22 0438                Dispo: pending clinical course     Margo Khan DO  [facility]  01/03/23  10:57 EST

## 2023-01-03 NOTE — PROGRESS NOTES
[facility] Physician - Brief Progress Note  PERMANENT  01/03/2023 04:46    [facility] [place] CCU - [facility] - [place] ([facility])    BRIAN MUNIZ    Date of Service 01/03/2023 04:46    HPI/Events of Note RN notes patient has not had a bowel movement since December 25 and abdomen is starting to get distended.    For:   Colace x1.  KUB.      Interventions Intermediate-Communication with other healthcare providers and/or family        Electronically Signed by: Jaqueline Kerr) on 01/03/2023 04:46

## 2023-01-03 NOTE — CASE MANAGEMENT/SOCIAL WORK
Continued Stay Note  WARNER Saldivar     Patient Name: Jamison Edward  MRN: [id number]  Today's Date: 1/3/2023    Admit Date: 12/20/2022       Discharge Plan     Row Name 01/03/23 1005       Plan    Plan Spoke with wife, Lena via phone. Patient plans to return home at dc when med stable. Currently in CCU on vent. Has requested a rolling walker at dc. No agency preference. Wife will transport home at dc. CM will follow and assist as needed.    Patient/Family in Agreement with Plan yes    Row Name 01/03/23 0959       Plan    Plan Comments                Discharge Codes    No documentation.               Expected Discharge Date and Time     Expected Discharge Date Expected Discharge Time    Jan 6, 2023             Zaida Wilson RN

## 2023-01-03 NOTE — PLAN OF CARE
Goal Outcome Evaluation:  Plan of Care Reviewed With: patient   Discontinued    Problem: Restraint, Nonbehavioral (Nonviolent)  Goal: Absence of Harm or Injury  Outcome: Ongoing, Progressing  Intervention: Implement Least Restrictive Safety Strategies  Recent Flowsheet Documentation  Taken 1/2/2023 2000 by Jacinda Renae RN  Medical Device Protection: tubing secured  Intervention: Protect Dignity, Rights, and Personal Wellbeing  Recent Flowsheet Documentation  Taken 1/2/2023 2000 by Jacinda Renae RN  Trust Relationship/Rapport:  • care explained  • reassurance provided  Intervention: Protect Skin and Joint Integrity  Recent Flowsheet Documentation  Taken 1/2/2023 2200 by Jacinda Renae RN  Body Position:  • turned  • side-lying  • right  Taken 1/2/2023 2000 by Jacinda Renae RN  Body Position:  • turned  • side-lying  • left  Range of Motion: ROM (range of motion) performed

## 2023-01-03 NOTE — PLAN OF CARE
Goal Outcome Evaluation:           Progress: no change  Outcome Evaluation: Pt remains intubated/sedated. Propofol and Precedex infusing for sedation. SB-NSR on monitor. 260 UOP. No BM. Afebrile. TF infusing. VSS.

## 2023-01-03 NOTE — PROGRESS NOTES
Referring Provider: Dr. Mcgraw  Reason for Consultation: acute hypoxic respiratory failure, sepsis      Chief complaint  Respiratory distress      Sub-   Overnight events reviewed.  Gets very agitated on turning the sedation down.  Will start Precedex and will start weaning.  As per the nurses gets aggressive sometimes and tries to punch the nurses.  Review Of Systems:  No changes  Unable to obtain review of systems due to intubation and sedation.         History  Past Medical History:   Diagnosis Date   • Anemia    • Arthritis of back     not sure   • Johnson esophagus    • Cholelithiasis    • COPD (chronic obstructive pulmonary disease) (HCC)    • CTS (carpal tunnel syndrome)    • DDD (degenerative disc disease), thoracic    • Degenerative disc disease, lumbar    • Dystonia    • GERD (gastroesophageal reflux disease)    • GI (gastrointestinal bleed)    • Hypertension    • Irritable bowel syndrome    • Low back strain    • Lumbosacral disc disease    • Migraines    • Peptic ulcer disease    • Stroke (HCC)     TIA   • TIA (transient ischemic attack)    • Ulcerative colitis (HCC)    • Wears dentures     upper only   ,   Past Surgical History:   Procedure Laterality Date   • ABDOMINAL SURGERY  09/17/2021   • APPENDECTOMY     • CHOLECYSTECTOMY N/A 04/22/2017    Procedure: CHOLECYSTECTOMY LAPAROSCOPIC;  Surgeon: Jaqueline Guaman MD;  Location: [facility];  Service:    • COLONOSCOPY N/A 05/08/2017    Procedure: COLONOSCOPY  CPTCODE:53269;  Surgeon: Nicho Richey III, MD;  Location: [facility];  Service:    • CUBITAL TUNNEL RELEASE Left 9/1/2022    Procedure: CUBITAL TUNNEL RELEASE LEFT;  Surgeon: Alec Hough MD;  Location: [facility];  Service: Orthopedics;  Laterality: Left;   • ENDOSCOPY     • ENDOSCOPY N/A 05/08/2017    Procedure: ESOPHAGOGASTRODUODENOSCOPY WITH BIOPSY  CPTCODE:04054;  Surgeon: Nicho Richey III, MD;  Location: [facility];  Service:    • ENDOSCOPY N/A 11/03/2017    Procedure:  ESOPHAGOGASTRODUODENOSCOPY WITH BIOPSY  CPTCODE: 21512;  Surgeon: Nicho Richey III, MD;  Location:  [facility];  Service:    • ENDOSCOPY N/A 2018    Procedure: ESOPHAGOGASTRODUODENOSCOPY WITH DILATATION CPT CODE: 20849;  Surgeon: Nicho Richey III, MD;  Location:  [facility];  Service:    • ENDOSCOPY N/A 2018    Procedure: ESOPHAGOGASTRODUODENOSCOPY WITH BIOPSY CPT CODE: 79111;  Surgeon: Nicho Richey III, MD;  Location:  [facility];  Service: Gastroenterology   • ENDOSCOPY N/A 2021    Procedure: ESOPHAGOGASTRODUODENOSCOPY WITH BIOPSY;  Surgeon: Julieta Hebert MD;  Location:  [facility];  Service: Gastroenterology;  Laterality: N/A;   • ENDOSCOPY N/A 2021    Procedure: ESOPHAGOGASTRODUODENOSCOPY WITH BIOPSY;  Surgeon: Julieta Hebert MD;  Location:  [facility];  Service: Gastroenterology;  Laterality: N/A;   • GASTRECTOMY N/A 2019    Procedure: OPEN VAGOTOMY, ANTRECTOMY,REVISION- Y GASTROJEJUNOSOTOMY;  Surgeon: Herbert Umanzor MD;  Location:  [facility];  Service: General   • UPPER GASTROINTESTINAL ENDOSCOPY     • VENTRAL/INCISIONAL HERNIA REPAIR N/A 12/10/2020    Procedure: INCISIONAL HERNIA REPAIR LAPAROSCOPIC WITH MESH;  Surgeon: Herbert Umanzor MD;  Location:  [facility];  Service: General;  Laterality: N/A;   ,   Family History   Problem Relation Age of Onset   • Osteoporosis Mother    • Hypertension Father    • Osteoporosis Father    • No Known Problems Sister    • No Known Problems Brother    • Drug abuse Brother    • No Known Problems Daughter    • Diabetes Sister    ,   Social History     Tobacco Use   • Smoking status: Every Day     Packs/day: 1.00     Years: 35.00     Pack years: 35.00     Types: Cigarettes     Last attempt to quit: 2020     Years since quittin.3   • Smokeless tobacco: Never   • Tobacco comments:     Been smoking 20 years   Vaping Use   • Vaping Use: Never used   Substance Use Topics   • Alcohol use: Yes      Alcohol/week: 4.0 standard drinks     Types: 4 Cans of beer per week   • Drug use: No   ,   Medications Prior to Admission   Medication Sig Dispense Refill Last Dose   • baclofen (LIORESAL) 20 MG tablet Take 10-20 mg by mouth 3 (Three) Times a Day As Needed.   12/20/2022   • esomeprazole (nexIUM) 40 MG capsule Take 40 mg by mouth 2 (Two) Times a Day.   12/20/2022   • isosorbide mononitrate (IMDUR) 30 MG 24 hr tablet Take 1 tablet by mouth Daily. 30 tablet 11 12/20/2022   • levETIRAcetam (KEPPRA) 750 MG tablet Take 750 mg by mouth 2 (Two) Times a Day. Patient takes one tablet in the morning and afternoon and takes two tablets at bedtime.   12/20/2022   • levETIRAcetam (KEPPRA) 750 MG tablet Take 1,500 mg by mouth Every Night.   12/19/2022   • metoclopramide (REGLAN) 10 MG tablet Take 1 tablet by mouth 2 (Two) Times a Day Before Meals. 60 tablet 5 12/20/2022   • metoprolol succinate XL (TOPROL-XL) 25 MG 24 hr tablet Take 1 tablet by mouth Daily. 90 tablet 3 12/20/2022   • nitroglycerin (NITROSTAT) 0.4 MG SL tablet Place 1 tablet under the tongue Every 5 (Five) Minutes As Needed for Chest Pain. Take no more than 3 doses in 15 minutes. 100 tablet 2 Past Week   • QUEtiapine (SEROquel) 50 MG tablet Take 50 mg by mouth Every Night.   12/19/2022   • venlafaxine (EFFEXOR) 75 MG tablet Take 75 mg by mouth every night at bedtime.   12/19/2022   , Scheduled Meds:  chlorhexidine, 15 mL, Mouth/Throat, Q12H  famotidine, 20 mg, Oral, BID AC  folic acid, 1 mg, Oral, Daily  ipratropium, 0.5 mg, Nebulization, 4x Daily - RT  levETIRAcetam, 1,500 mg, Oral, Nightly  levETIRAcetam, 750 mg, Oral, BID  midodrine, 10 mg, Oral, TID AC  OLANZapine, 5 mg, Oral, BID  piperacillin-tazobactam, 3.375 g, Intravenous, Q8H  sodium chloride, 10 mL, Intravenous, Q12H  sodium chloride, 10 mL, Intravenous, Q12H  sodium chloride, 10 mL, Intravenous, Q12H  sodium chloride, 10 mL, Intravenous, Q12H  sodium chloride, 10 mL, Intravenous, Q12H  thiamine, 100  mg, Oral, Daily    , Continuous Infusions:  dexmedetomidine, 0.2-1.5 mcg/kg/hr, Last Rate: 1.5 mcg/kg/hr (01/03/23 1055)  norepinephrine, 0.01-0.3 mcg/kg/min, Last Rate: Stopped (01/02/23 0430)  Pharmacy Consult,   propofol, 5-50 mcg/kg/min, Last Rate: Stopped (01/03/23 1029)     and Allergies:  Contrast dye, Prilosec [omeprazole], Protonix [pantoprazole sodium], Dilantin [phenytoin], Dilaudid [hydromorphone], Flagyl [metronidazole], and Topamax [topiramate]    Objective     Vital Signs   Temp:  [98.5 °F (36.9 °C)-100 °F (37.8 °C)] 98.9 °F (37.2 °C)  Heart Rate:  [48-80] 61  Resp:  [22-28] 23  BP: ()/(43-87) 103/63  FiO2 (%):  [40 %] 40 %    Physical Exam:           No changes.    Physical exam limited due to telemedicine  General:   intubated. Sedated.  Not in any distress remains synchronous with the ventilator.  HENT: normocephalic. Atraumatic.   Cardiac: normal rate and rhythm noted on telemetry  Lungs:  on ventilator support. Appears compliant with current settings.   Musculoskeletal: bilateral lower extremity edemano other significant deformity, joint abnormality  Neurologic: sedated                  Results Review:    LABS:    Lab Results   Component Value Date    GLUCOSE 146 (H) 01/03/2023    BUN 13 01/03/2023    CREATININE 0.37 (L) 01/03/2023    EGFRIFNONA 106 11/29/2021    BCR 35.1 (H) 01/03/2023    CO2 26.4 01/03/2023    CALCIUM 7.5 (L) 01/03/2023    ALBUMIN 1.3 (L) 01/03/2023    AST 32 01/03/2023    ALT 36 01/03/2023    WBC 8.39 01/03/2023    HGB 10.1 (L) 01/03/2023    HCT 32.2 (L) 01/03/2023    .8 (H) 01/03/2023    PLT 45 (C) 01/03/2023     01/03/2023    K 3.7 01/03/2023     (H) 01/03/2023    ANIONGAP 5.6 01/03/2023       Lab Results   Component Value Date    INR 1.43 (H) 12/30/2022    INR 1.33 (H) 12/28/2022    INR 1.44 (H) 12/24/2022    PROTIME 17.8 (H) 12/30/2022    PROTIME 16.8 (H) 12/28/2022    PROTIME 17.9 (H) 12/24/2022       Results from last 7 days   Lab Units  12/30/22  0452 12/28/22  0937   INR  1.43* 1.33*   APTT seconds 39.8*  --                      I reviewed the patient's new clinical results.  I reviewed the patient's new imaging results and agree with the interpretation.      Assessment & Plan       Assessment:   Acute hypoxic respiratory failure  Bilateral pneumonia, concern for aspiration pneumonia  Acute metabolic encephalopathy  Septic shock  COPD  Cirrhosis with ascites: Post paracentesis  Chronic thrombocytopenia        Plan:   Sedated-propofol- adjusted for rass goal of zero to -1  Hold off on SAT/SBT trial today due to recent intubation.  Continue thiamine    Continue vasopressors to maintain a map goal 65 mmHg or greater.         FiO2 (%):  [40 %] 40 %  S RR:  [22] 22  PEEP/CPAP (cm H2O):  [8 cm H20] 8 cm H20  MAP (cm H2O):  [12-14] 14  Recent ABG reviewed.  Ventilator settings and graphics reviewed.  Settings adjusted to rate 22, 50% FiO2.  Ventilator settings adjusted.  SAT and SBT trial on daily basis  No family members at bedside.  Concern for aspiration pneumonia due to recent changes in mental status and worsening of infiltrates on imaging.  Continue nebs and continue current antibiotics.  GI prophylaxis: Pepcid  Nutrition: Nutrition consulted for tube feed initiation  Moderate volume ascites noted on admission.  Underwent paracentesis on 12/28.  CT imaging notable for likely right psoas hematoma.  Ascites now small volume on new CT imaging.    Antibiotics: Zosyn  Cultures: Micro reviewed.  Concern for aspiration event yesterday    DVT prophylaxis: Ordered SCDs, but cannot be able to use due to current lower extremity edema.  Thrombocytopenia noted, with progressive worsening  Right psoas hematoma noted on recent CT imaging.  Ordered vitamin K oral.    Results for orders placed during the hospital encounter of 12/20/22    Adult Transthoracic Echo Complete W/ Cont if Necessary Per Protocol    Interpretation Summary  •  Left ventricular systolic  function is hyperdynamic (EF > 70%). Left ventricular ejection fraction appears to be greater than 70%.  •  Left ventricular diastolic function is consistent with (grade I) impaired relaxation.  •  Estimated right ventricular systolic pressure from tricuspid regurgitation is mildly elevated (35-45 mmHg).  •  No significant pericardial disease.        Manuel Finley MD  01/03/23  13:59 EST     Case d/w nurse and team   This service is provided via audio video tele medicine   I am in [facility] and patient is in [facility]

## 2023-01-03 NOTE — PROGRESS NOTES
"Palliative Care Daily Progress Note     S: Clinically, the patient did not tolerate a reduction in his sedation for spontaneous breathing trial.  The patient remains sedated and appears comfortable at the present time.  His wife has been updated by the attending.    O:   Palliative Performance Scale Score:     /61   Pulse 74   Temp 98.9 °F (37.2 °C)   Resp 22   Ht 167.6 cm (65.98\")   Wt 83.3 kg (183 lb 10.3 oz)   SpO2 96%   BMI 29.66 kg/m²     Intake/Output Summary (Last 24 hours) at 1/3/2023 1204  Last data filed at 1/3/2023 0536  Gross per 24 hour   Intake 1397.41 ml   Output 600 ml   Net 797.41 ml       PE:  General Appearance:   Intubated, on ventilator   HEENT:    NC/AT   Neck:   supple   Lungs:     CTAB     Heart:    RRR   Abdomen:     Soft   Extremities:  2+ edema   Pulses:   Pulses weak   Skin:   Warm, dry   Neurologic:  Intubated and sedated   Psych:   Calm         Meds: Reviewed     Labs:   Results from last 7 days   Lab Units 01/03/23 0043   WBC 10*3/mm3 8.39   HEMOGLOBIN g/dL 10.1*   HEMATOCRIT % 32.2*   PLATELETS 10*3/mm3 45*     Results from last 7 days   Lab Units 01/03/23 0043   SODIUM mmol/L 145   POTASSIUM mmol/L 3.7   CHLORIDE mmol/L 113*   CO2 mmol/L 26.4   BUN mg/dL 13   CREATININE mg/dL 0.37*   GLUCOSE mg/dL 146*   CALCIUM mg/dL 7.5*     Results from last 7 days   Lab Units 01/03/23 0043   SODIUM mmol/L 145   POTASSIUM mmol/L 3.7   CHLORIDE mmol/L 113*   CO2 mmol/L 26.4   BUN mg/dL 13   CREATININE mg/dL 0.37*   CALCIUM mg/dL 7.5*   BILIRUBIN mg/dL 1.1   ALK PHOS U/L 155*   ALT (SGPT) U/L 36   AST (SGOT) U/L 32   GLUCOSE mg/dL 146*     Imaging Results (Last 72 Hours)     Procedure Component Value Units Date/Time    XR Abdomen KUB [[id number]] Collected: 01/03/23 0531     Updated: 01/03/23 0533    Narrative:      EMELIA, 1/3/2023      HISTORY:    Hospital inpatient with constipation. Ascites.      TECHNIQUE:  AP supine lower abdomen and pelvis.    COMPARISON:  *  CT abdomen/pelvis, " 12/30/2022.    FINDINGS:  Distended and peripherally dense abdomen due to the presence of large volume ascites best demonstrated on CT. Bowel segments displaced into the central abdomen. Mild dilatation of air-filled colon. No visible large volume stool burden.    NG tube in the distal portion of the stomach. Herniorrhaphy clips.      Impression:      Ascites. Nonspecific bowel gas pattern. No visible large volume stool burden.    Signer Name: Brian Gudino MD   Signed: 1/3/2023 5:31 AM   [facility]    [facility]            Diagnostics: Reviewed    A: Clinically, the patient has not tolerated a decrease in his sedation and spontaneous breathing trial.  Presently, he is comfortable and remains sedated.      P: Continue present management.  We will continue to offer support to family and assist with any medical decision making as it arises.      We will continue to follow along. Please do not hesitate to contact us regarding further sx mgmt or GOC needs, including after hours or on weekends via our on call provider at [phone].     Alan Powell MD    1/3/2023

## 2023-01-03 NOTE — PROGRESS NOTES
" LOS: 14 days     Chief Complaint:  Pulmonology is following for acute hypoxic respiratory failure    Subjective     Interval History:     Patient piedad on mechanical ventilator support.  Notable for increased agitation during previous weaning trials, not able to be extubated safely.    Review of Systems:     Unable to obtain review of systems due to intubation and sedation.         Objective     Vital Signs  /60   Pulse 52   Temp 99.5 °F (37.5 °C) (Oral)   Resp 23   Ht 167.6 cm (65.98\")   Wt 83.3 kg (183 lb 10.3 oz)   SpO2 94%   BMI 29.66 kg/m²      Physical Exam:  GENERAL APPEARANCE: Intubated and sedated.   HEENT:  normocephalic. Atraumatic. Pupils equal bilaterally. ET tube in place.     CARDIAC: Normal S1 and S2.  Rhythm is regular. There is peripheral edema.   RESPIRATORY: Bilateral air entry positive.  No wheezing, rhonchi.   GI: soft, nondistended.   MUSCULOSKELETAL: No significant deformity or joint abnormality. + edema, petechiae of the feet.    NEUROLOGICAL: Unable to assess due to sedation status.  PSYCHIATRIC: Unable to assess due to sedation status.                             Results Review:   I reviewed the patient's new clinical results.  I reviewed the patient's new imaging results and agree with the interpretation.        CBC      CBC 1/1/23 1/2/23 1/3/23   WBC 9.26 9.91 8.39   RBC 2.99 (A) 3.16 (A) 2.96 (A)   Hemoglobin 10.4 (A) 10.8 (A) 10.1 (A)   Hematocrit 32.4 (A) 33.8 (A) 32.2 (A)   .4 (A) 107.0 (A) 108.8 (A)   MCH 34.8 (A) 34.2 (A) 34.1 (A)   MCHC 32.1 32.0 31.4 (A)   RDW 15.5 (A) 15.3 15.4   Platelets 40 (A) 44 (A) 45 (A)   (A) Abnormal value                  CMP      CMP 1/1/23 1/2/23 1/3/23   Glucose 161 (A) 160 (A) 146 (A)   BUN 14 12 13   Creatinine 0.41 (A) 0.42 (A) 0.37 (A)   eGFR 128.7 127.8 132.7   Sodium 145 145 145   Potassium 4.0 4.0 3.7   Chloride 114 (A) 114 (A) 113 (A)   Calcium 8.1 (A) 7.7 (A) 7.5 (A)   Total Protein   3.6 (A)   Albumin   1.3 (A) "   Globulin   2.3   Total Bilirubin   1.1   Alkaline Phosphatase   155 (A)   AST (SGOT)   32   ALT (SGPT)   36   Albumin/Globulin Ratio   0.6   BUN/Creatinine Ratio 34.1 (A) 28.6 (A) 35.1 (A)   Anion Gap 3.6 (A) 5.4 5.6   (A) Abnormal value         Comments are available for some flowsheets but are not being displayed.                 Site   Date Value Ref Range Status   01/02/2023 Right Radial  Final     Rivera's Test   Date Value Ref Range Status   01/02/2023 Positive  Final     pH, Arterial   Date Value Ref Range Status   01/02/2023 7.477 (H) 7.350 - 7.450 pH units Final     Comment:     83 Value above reference range     pCO2, Arterial   Date Value Ref Range Status   01/02/2023 39.4 35.0 - 45.0 mm Hg Final     pO2, Arterial   Date Value Ref Range Status   01/02/2023 75.6 (L) 83.0 - 108.0 mm Hg Final     Comment:     84 Value below reference range     HCO3, Arterial   Date Value Ref Range Status   01/02/2023 29.1 (H) 20.0 - 26.0 mmol/L Final     Comment:     83 Value above reference range     Base Excess, Arterial   Date Value Ref Range Status   01/02/2023 5.2 (H) 0.0 - 2.0 mmol/L Final     O2 Saturation, Arterial   Date Value Ref Range Status   01/02/2023 95.9 94.0 - 99.0 % Final     Hemoglobin, Blood Gas   Date Value Ref Range Status   01/02/2023 11.0 (L) 14 - 18 g/dL Final     Comment:     84 Value below reference range     Hematocrit, Blood Gas   Date Value Ref Range Status   01/02/2023 33.7 (L) 38.0 - 51.0 % Final     Comment:     84 Value below reference range     Oxyhemoglobin   Date Value Ref Range Status   01/02/2023 94.4 94 - 99 % Final     Methemoglobin   Date Value Ref Range Status   01/02/2023 0.50 0.00 - 3.00 % Final     Carboxyhemoglobin   Date Value Ref Range Status   01/02/2023 1.1 0 - 5 % Final     CO2 Content   Date Value Ref Range Status   01/02/2023 30.3 22 - 33 mmol/L Final     Barometric Pressure for Blood Gas   Date Value Ref Range Status   01/02/2023 729 mmHg Final     Modality   Date Value  Ref Range Status   01/02/2023 Ventilator  Final     FIO2   Date Value Ref Range Status   01/02/2023 40 % Final           Medication Review:   Scheduled Medications:  chlorhexidine, 15 mL, Mouth/Throat, Q12H  famotidine, 20 mg, Oral, BID AC  folic acid, 1 mg, Oral, Daily  ipratropium, 0.5 mg, Nebulization, 4x Daily - RT  levETIRAcetam, 1,500 mg, Oral, Nightly  levETIRAcetam, 750 mg, Oral, BID  midodrine, 10 mg, Oral, TID AC  OLANZapine, 5 mg, Oral, BID  piperacillin-tazobactam, 3.375 g, Intravenous, Q8H  sodium chloride, 10 mL, Intravenous, Q12H  sodium chloride, 10 mL, Intravenous, Q12H  sodium chloride, 10 mL, Intravenous, Q12H  sodium chloride, 10 mL, Intravenous, Q12H  sodium chloride, 10 mL, Intravenous, Q12H  thiamine, 100 mg, Oral, Daily      Continuous infusions:  dexmedetomidine, 0.2-1.5 mcg/kg/hr, Last Rate: 1.5 mcg/kg/hr (01/03/23 1055)  norepinephrine, 0.01-0.3 mcg/kg/min, Last Rate: Stopped (01/02/23 0430)  Pharmacy Consult,   propofol, 5-50 mcg/kg/min, Last Rate: 15 mcg/kg/min (01/03/23 1750)          Assessment:   Acute hypoxic respiratory failure  Bilateral pneumonia, concern for aspiration pneumonia  Acute metabolic encephalopathy  Septic shock  COPD  Cirrhosis with ascites: Post paracentesis  Chronic thrombocytopenia      Plan:   Sedated-propofol, Precedex  Continue thiamine  Failed recent weaning trials due to significant agitation/aggressiveness.  On Keppra  On Zyprexa  On thiamine replacement    BP borderline low, increased dosing midodrine  Map goal 65 mmHg or greater.     FiO2 (%):  [40 %] 40 %  S RR:  [22] 22  PEEP/CPAP (cm H2O):  [8 cm H20] 8 cm H20  MAP (cm H2O):  [12-14] 12  Ventilator settings graphics reviewed.  Settings titrated.  Failed recent SAT/SBT trials due to significant agitation  On Atrovent nebs    GI prophylaxis: Pepcid  Nutrition: Tube feeds  Underwent paracentesis for ascites on 12/28.  Then developed hematoma on CT imaging of the psoas muscle.    Chemical DVT prophylaxis  held.    Antibiotics: Zosyn  Cultures: Micro reviewed    DVT prophylaxis: SCDs.  Chemical prophylaxis avoided in the setting of significant thrombocytopenia likely related to liver dysfunction and psoas hematoma on CT imaging recently.      Cc 31 mins  Fariba Gunderson PA-C  01/03/23  19:13 Manuel MCKENNA M.D. attest that the above note accurately reflects the work and decisions made  by me.  Patient was seen and evaluated by Dr. Finley, including history of present illness, physical exam, assessment, and treatment plan.  The above note was reviewed and edited by Dr. Finley.    Scribed for Dr. Finley by Fariba Gunderson PA-C

## 2023-01-04 ENCOUNTER — APPOINTMENT (OUTPATIENT)
Dept: GENERAL RADIOLOGY | Facility: HOSPITAL | Age: 53
DRG: 870 | End: 2023-01-04
Payer: MEDICARE

## 2023-01-04 LAB
A-A DO2: 166.2 MMHG (ref 0–300)
ANION GAP SERPL CALCULATED.3IONS-SCNC: 6.9 MMOL/L (ref 5–15)
ARTERIAL PATENCY WRIST A: POSITIVE
ATMOSPHERIC PRESS: 721 MMHG
ATMOSPHERIC PRESS: 722 MMHG
BACTERIA SPEC AEROBE CULT: NORMAL
BACTERIA SPEC AEROBE CULT: NORMAL
BACTERIA UR QL AUTO: ABNORMAL /HPF
BASE EXCESS BLDA CALC-SCNC: 4 MMOL/L (ref 0–2)
BASE EXCESS BLDV CALC-SCNC: 5.3 MMOL/L (ref 0–2)
BDY SITE: ABNORMAL
BDY SITE: ABNORMAL
BILIRUB UR QL STRIP: ABNORMAL
BODY TEMPERATURE: 0 C
BODY TEMPERATURE: 37 C
BUN SERPL-MCNC: 12 MG/DL (ref 6–20)
BUN/CREAT SERPL: 41.4 (ref 7–25)
CALCIUM SPEC-SCNC: 7 MG/DL (ref 8.6–10.5)
CHLORIDE SERPL-SCNC: 113 MMOL/L (ref 98–107)
CLARITY UR: ABNORMAL
CO2 BLDA-SCNC: 28.3 MMOL/L (ref 22–33)
CO2 BLDA-SCNC: 31.3 MMOL/L (ref 22–33)
CO2 SERPL-SCNC: 24.1 MMOL/L (ref 22–29)
COHGB MFR BLD: 0.9 % (ref 0–5)
COHGB MFR BLD: 1 % (ref 0–5)
COLOR UR: ABNORMAL
CREAT SERPL-MCNC: 0.29 MG/DL (ref 0.76–1.27)
DEPRECATED RDW RBC AUTO: 62.1 FL (ref 37–54)
EGFRCR SERPLBLD CKD-EPI 2021: 142.9 ML/MIN/1.73
ERYTHROCYTE [DISTWIDTH] IN BLOOD BY AUTOMATED COUNT: 15.8 % (ref 12.3–15.4)
GLUCOSE BLDC GLUCOMTR-MCNC: 122 MG/DL (ref 70–130)
GLUCOSE BLDC GLUCOMTR-MCNC: 152 MG/DL (ref 70–130)
GLUCOSE BLDC GLUCOMTR-MCNC: 177 MG/DL (ref 70–130)
GLUCOSE SERPL-MCNC: 152 MG/DL (ref 65–99)
GLUCOSE UR STRIP-MCNC: NEGATIVE MG/DL
HCO3 BLDA-SCNC: 27.2 MMOL/L (ref 20–26)
HCO3 BLDV-SCNC: 30 MMOL/L (ref 22–28)
HCT VFR BLD AUTO: 32.5 % (ref 37.5–51)
HCT VFR BLD CALC: 32.6 % (ref 38–51)
HGB BLD-MCNC: 10.2 G/DL (ref 13–17.7)
HGB BLDA-MCNC: 10.6 G/DL (ref 14–18)
HGB BLDA-MCNC: 12 G/DL (ref 14–18)
HGB UR QL STRIP.AUTO: ABNORMAL
HYALINE CASTS UR QL AUTO: ABNORMAL /LPF
INHALED O2 CONCENTRATION: 40 %
INHALED O2 CONCENTRATION: 40 %
KETONES UR QL STRIP: NEGATIVE
LEUKOCYTE ESTERASE UR QL STRIP.AUTO: ABNORMAL
Lab: ABNORMAL
Lab: ABNORMAL
MAGNESIUM SERPL-MCNC: 1.8 MG/DL (ref 1.6–2.6)
MCH RBC QN AUTO: 34 PG (ref 26.6–33)
MCHC RBC AUTO-ENTMCNC: 31.4 G/DL (ref 31.5–35.7)
MCV RBC AUTO: 108.3 FL (ref 79–97)
METHGB BLD QL: 0.4 % (ref 0–3)
METHGB BLD QL: <-0.1 % (ref 0–3)
MODALITY: ABNORMAL
MODALITY: ABNORMAL
NITRITE UR QL STRIP: NEGATIVE
NOTE: ABNORMAL
OXYHGB MFR BLDV: 59 % (ref 45–75)
OXYHGB MFR BLDV: 93.2 % (ref 94–99)
PCO2 BLDA: 35.1 MM HG (ref 35–45)
PCO2 BLDV: 43.6 MM HG (ref 41–51)
PCO2 TEMP ADJ BLD: ABNORMAL MM[HG]
PEEP RESPIRATORY: 5 CM[H2O]
PEEP RESPIRATORY: 8 CM[H2O]
PH BLDA: 7.5 PH UNITS (ref 7.35–7.45)
PH BLDV: 7.45 PH UNITS (ref 7.32–7.42)
PH UR STRIP.AUTO: 6 [PH] (ref 5–8)
PH, TEMP CORRECTED: ABNORMAL
PLATELET # BLD AUTO: 50 10*3/MM3 (ref 140–450)
PMV BLD AUTO: 12.1 FL (ref 6–12)
PO2 BLDA: 65 MM HG (ref 83–108)
PO2 BLDV: 32.2 MM HG (ref 27–53)
PO2 TEMP ADJ BLD: ABNORMAL MM[HG]
POTASSIUM SERPL-SCNC: 3.5 MMOL/L (ref 3.5–5.2)
POTASSIUM SERPL-SCNC: 3.6 MMOL/L (ref 3.5–5.2)
PROT UR QL STRIP: ABNORMAL
PSV: 8 CMH2O
RBC # BLD AUTO: 3 10*6/MM3 (ref 4.14–5.8)
RBC # UR STRIP: ABNORMAL /HPF
REF LAB TEST METHOD: ABNORMAL
SAO2 % BLDCOA: 93.9 % (ref 94–99)
SAO2 % BLDCOV: 59.8 % (ref 45–75)
SET MECH RESP RATE: 22
SODIUM SERPL-SCNC: 144 MMOL/L (ref 136–145)
SP GR UR STRIP: 1.02 (ref 1–1.03)
SQUAMOUS #/AREA URNS HPF: ABNORMAL /HPF
UROBILINOGEN UR QL STRIP: ABNORMAL
VENTILATOR MODE: ABNORMAL
VENTILATOR MODE: ABNORMAL
VT ON VENT VENT: 450 ML
WBC # UR STRIP: ABNORMAL /HPF
WBC NRBC COR # BLD: 8.71 10*3/MM3 (ref 3.4–10.8)
YEAST URNS QL MICRO: ABNORMAL /HPF

## 2023-01-04 PROCEDURE — 85027 COMPLETE CBC AUTOMATED: CPT | Performed by: STUDENT IN AN ORGANIZED HEALTH CARE EDUCATION/TRAINING PROGRAM

## 2023-01-04 PROCEDURE — 81001 URINALYSIS AUTO W/SCOPE: CPT | Performed by: STUDENT IN AN ORGANIZED HEALTH CARE EDUCATION/TRAINING PROGRAM

## 2023-01-04 PROCEDURE — 25010000002 FUROSEMIDE PER 20 MG: Performed by: INTERNAL MEDICINE

## 2023-01-04 PROCEDURE — 71045 X-RAY EXAM CHEST 1 VIEW: CPT | Performed by: RADIOLOGY

## 2023-01-04 PROCEDURE — 82820 HEMOGLOBIN-OXYGEN AFFINITY: CPT

## 2023-01-04 PROCEDURE — 94799 UNLISTED PULMONARY SVC/PX: CPT

## 2023-01-04 PROCEDURE — 84132 ASSAY OF SERUM POTASSIUM: CPT | Performed by: STUDENT IN AN ORGANIZED HEALTH CARE EDUCATION/TRAINING PROGRAM

## 2023-01-04 PROCEDURE — 82805 BLOOD GASES W/O2 SATURATION: CPT

## 2023-01-04 PROCEDURE — 80048 BASIC METABOLIC PNL TOTAL CA: CPT | Performed by: STUDENT IN AN ORGANIZED HEALTH CARE EDUCATION/TRAINING PROGRAM

## 2023-01-04 PROCEDURE — 87070 CULTURE OTHR SPECIMN AEROBIC: CPT | Performed by: INTERNAL MEDICINE

## 2023-01-04 PROCEDURE — 71045 X-RAY EXAM CHEST 1 VIEW: CPT

## 2023-01-04 PROCEDURE — 36600 WITHDRAWAL OF ARTERIAL BLOOD: CPT

## 2023-01-04 PROCEDURE — 25010000002 PIPERACILLIN SOD-TAZOBACTAM PER 1 G: Performed by: PHYSICIAN ASSISTANT

## 2023-01-04 PROCEDURE — 94003 VENT MGMT INPAT SUBQ DAY: CPT

## 2023-01-04 PROCEDURE — 82962 GLUCOSE BLOOD TEST: CPT

## 2023-01-04 PROCEDURE — 83735 ASSAY OF MAGNESIUM: CPT | Performed by: STUDENT IN AN ORGANIZED HEALTH CARE EDUCATION/TRAINING PROGRAM

## 2023-01-04 PROCEDURE — 87040 BLOOD CULTURE FOR BACTERIA: CPT | Performed by: STUDENT IN AN ORGANIZED HEALTH CARE EDUCATION/TRAINING PROGRAM

## 2023-01-04 PROCEDURE — 83050 HGB METHEMOGLOBIN QUAN: CPT

## 2023-01-04 PROCEDURE — 0 LEVETIRACETAM IN NACL 0.54% 1500 MG/100ML SOLUTION: Performed by: STUDENT IN AN ORGANIZED HEALTH CARE EDUCATION/TRAINING PROGRAM

## 2023-01-04 PROCEDURE — 25010000002 PROPOFOL 10 MG/ML EMULSION: Performed by: INTERNAL MEDICINE

## 2023-01-04 PROCEDURE — 25010000002 LEVETRIRACETAM PER 10 MG: Performed by: STUDENT IN AN ORGANIZED HEALTH CARE EDUCATION/TRAINING PROGRAM

## 2023-01-04 PROCEDURE — 99291 CRITICAL CARE FIRST HOUR: CPT | Performed by: INTERNAL MEDICINE

## 2023-01-04 PROCEDURE — 99232 SBSQ HOSP IP/OBS MODERATE 35: CPT | Performed by: STUDENT IN AN ORGANIZED HEALTH CARE EDUCATION/TRAINING PROGRAM

## 2023-01-04 PROCEDURE — 87205 SMEAR GRAM STAIN: CPT | Performed by: INTERNAL MEDICINE

## 2023-01-04 PROCEDURE — 25010000002 MAGNESIUM SULFATE 2 GM/50ML SOLUTION: Performed by: STUDENT IN AN ORGANIZED HEALTH CARE EDUCATION/TRAINING PROGRAM

## 2023-01-04 PROCEDURE — 94761 N-INVAS EAR/PLS OXIMETRY MLT: CPT

## 2023-01-04 PROCEDURE — 82375 ASSAY CARBOXYHB QUANT: CPT

## 2023-01-04 RX ORDER — FUROSEMIDE 10 MG/ML
40 INJECTION INTRAMUSCULAR; INTRAVENOUS ONCE
Status: COMPLETED | OUTPATIENT
Start: 2023-01-04 | End: 2023-01-04

## 2023-01-04 RX ORDER — MAGNESIUM SULFATE HEPTAHYDRATE 40 MG/ML
2 INJECTION, SOLUTION INTRAVENOUS ONCE
Status: COMPLETED | OUTPATIENT
Start: 2023-01-04 | End: 2023-01-04

## 2023-01-04 RX ORDER — POTASSIUM CHLORIDE 1.5 G/1.77G
40 POWDER, FOR SOLUTION ORAL EVERY 4 HOURS
Status: COMPLETED | OUTPATIENT
Start: 2023-01-04 | End: 2023-01-04

## 2023-01-04 RX ORDER — LEVETIRACETAM 15 MG/ML
1500 INJECTION INTRAVASCULAR EVERY 24 HOURS
Status: DISCONTINUED | OUTPATIENT
Start: 2023-01-04 | End: 2023-01-05 | Stop reason: HOSPADM

## 2023-01-04 RX ADMIN — Medication 10 ML: at 08:04

## 2023-01-04 RX ADMIN — LEVETIRACETAM 750 MG: 100 INJECTION INTRAVENOUS at 16:17

## 2023-01-04 RX ADMIN — CARBIDOPA AND LEVODOPA 10 MG: 50; 200 TABLET, EXTENDED RELEASE ORAL at 10:42

## 2023-01-04 RX ADMIN — PROPOFOL 25 MCG/KG/MIN: 10 INJECTION, EMULSION INTRAVENOUS at 02:33

## 2023-01-04 RX ADMIN — POTASSIUM CHLORIDE 40 MEQ: 1.5 POWDER, FOR SOLUTION ORAL at 07:57

## 2023-01-04 RX ADMIN — CHLORHEXIDINE GLUCONATE 15 ML: 1.2 RINSE ORAL at 08:03

## 2023-01-04 RX ADMIN — LEVETIRACETAM 1500 MG: 15 INJECTION INTRAVENOUS at 23:11

## 2023-01-04 RX ADMIN — IPRATROPIUM BROMIDE 0.5 MG: 0.5 SOLUTION RESPIRATORY (INHALATION) at 12:31

## 2023-01-04 RX ADMIN — CHLORHEXIDINE GLUCONATE 15 ML: 1.2 RINSE ORAL at 21:50

## 2023-01-04 RX ADMIN — Medication 10 ML: at 21:55

## 2023-01-04 RX ADMIN — PIPERACILLIN SODIUM AND TAZOBACTAM SODIUM 3.38 G: 3; .375 INJECTION, POWDER, LYOPHILIZED, FOR SOLUTION INTRAVENOUS at 10:42

## 2023-01-04 RX ADMIN — MAGNESIUM SULFATE HEPTAHYDRATE 2 G: 2 INJECTION, SOLUTION INTRAVENOUS at 07:57

## 2023-01-04 RX ADMIN — SODIUM CHLORIDE 1.5 MCG/KG/HR: 9 INJECTION, SOLUTION INTRAVENOUS at 07:05

## 2023-01-04 RX ADMIN — IPRATROPIUM BROMIDE 0.5 MG: 0.5 SOLUTION RESPIRATORY (INHALATION) at 06:42

## 2023-01-04 RX ADMIN — CARBIDOPA AND LEVODOPA 10 MG: 50; 200 TABLET, EXTENDED RELEASE ORAL at 06:34

## 2023-01-04 RX ADMIN — Medication 1 MG: at 08:03

## 2023-01-04 RX ADMIN — IPRATROPIUM BROMIDE 0.5 MG: 0.5 SOLUTION RESPIRATORY (INHALATION) at 19:14

## 2023-01-04 RX ADMIN — Medication 10 ML: at 08:03

## 2023-01-04 RX ADMIN — FUROSEMIDE 40 MG: 10 INJECTION, SOLUTION INTRAMUSCULAR; INTRAVENOUS at 10:42

## 2023-01-04 RX ADMIN — MAGNESIUM GLUCONATE 500 MG ORAL TABLET 400 MG: 500 TABLET ORAL at 10:42

## 2023-01-04 RX ADMIN — POTASSIUM CHLORIDE 40 MEQ: 1.5 POWDER, FOR SOLUTION ORAL at 10:46

## 2023-01-04 RX ADMIN — IPRATROPIUM BROMIDE 0.5 MG: 0.5 SOLUTION RESPIRATORY (INHALATION) at 00:38

## 2023-01-04 RX ADMIN — PIPERACILLIN SODIUM AND TAZOBACTAM SODIUM 3.38 G: 3; .375 INJECTION, POWDER, LYOPHILIZED, FOR SOLUTION INTRAVENOUS at 17:49

## 2023-01-04 RX ADMIN — LEVETIRACETAM 750 MG: 100 SOLUTION ORAL at 06:34

## 2023-01-04 RX ADMIN — OLANZAPINE 5 MG: 5 TABLET, FILM COATED ORAL at 08:03

## 2023-01-04 RX ADMIN — Medication 10 ML: at 21:56

## 2023-01-04 RX ADMIN — FAMOTIDINE 20 MG: 20 TABLET ORAL at 06:34

## 2023-01-04 RX ADMIN — Medication 100 MG: at 08:03

## 2023-01-04 RX ADMIN — PIPERACILLIN SODIUM AND TAZOBACTAM SODIUM 3.38 G: 3; .375 INJECTION, POWDER, LYOPHILIZED, FOR SOLUTION INTRAVENOUS at 02:38

## 2023-01-04 NOTE — PROGRESS NOTES
[facility] HOSPITALIST PROGRESS NOTE     Patient Identification:  Name:  Jamison Edward  Age:  52 y.o.  Sex:  male  :  1970  MRN:  [id number]  Visit Number:  [id number]  ROOM: [address]     Primary Care Provider:  Leticia Martinez APRN    Length of stay in inpatient status:  15    Subjective     Chief Compliant:    Chief Complaint   Patient presents with   • Leg Swelling       History of Presenting Illness:    Patient intubated/sedated, unable to provide ROS/history. Planning for repeat spontaneous breathing trials today per discussion with Pulmonology and nursing staff. He had a fever of 101 overnight but no other acute events noted. Patient's wife updated in the waiting room at her preference.    Objective     Current Hospital Meds:chlorhexidine, 15 mL, Mouth/Throat, Q12H  famotidine, 20 mg, Oral, BID AC  folic acid, 1 mg, Oral, Daily  ipratropium, 0.5 mg, Nebulization, 4x Daily - RT  levETIRAcetam, 1,500 mg, Oral, Nightly  levETIRAcetam, 750 mg, Oral, BID  magnesium oxide, 400 mg, Oral, BID  midodrine, 10 mg, Oral, TID AC  OLANZapine, 5 mg, Oral, BID  piperacillin-tazobactam, 3.375 g, Intravenous, Q8H  sodium chloride, 10 mL, Intravenous, Q12H  sodium chloride, 10 mL, Intravenous, Q12H  sodium chloride, 10 mL, Intravenous, Q12H  sodium chloride, 10 mL, Intravenous, Q12H  sodium chloride, 10 mL, Intravenous, Q12H  thiamine, 100 mg, Oral, Daily    dexmedetomidine, 0.2-1.5 mcg/kg/hr, Last Rate: Stopped (23)  norepinephrine, 0.01-0.3 mcg/kg/min, Last Rate: Stopped (23 0430)  Pharmacy Consult,   propofol, 5-50 mcg/kg/min, Last Rate: Stopped (23)        Current Antimicrobial Therapy:  Anti-Infectives (From admission, onward)    Ordered     Dose/Rate Route Frequency Start Stop    22 0332  piperacillin-tazobactam (ZOSYN) IVPB 3.375 g in 100 mL NS VTB        Ordering Provider: Cheri, Lauren Adilene, PA-C    3.375 g  over 4 Hours Intravenous Every 8 Hours 22  1030 01/06/23 1029    12/30/22 0332  vancomycin 1500 mg/500 mL 0.9% NS IVPB ([facility])        Ordering Provider: Lauren Alonzo PA-C    20 mg/kg × 77.1 kg  over 120 Minutes Intravenous Once 12/30/22 0430 12/30/22 0918    12/30/22 0332  piperacillin-tazobactam (ZOSYN) IVPB 3.375 g in 100 mL NS VTB        Ordering Provider: Lauren Alonzo PA-C    3.375 g  over 30 Minutes Intravenous Once 12/30/22 0430 12/30/22 0637    12/20/22 2024  cefTRIAXone (ROCEPHIN) 2 g in sodium chloride 0.9 % 100 mL IVPB-VTB        Ordering Provider: Alexandre Houston MD    2 g  200 mL/hr over 30 Minutes Intravenous Once 12/20/22 2026 12/20/22 2140    12/20/22 2024  doxycycline (VIBRAMYCIN) 100 mg in sodium chloride 0.9 % 100 mL IVPB-VTB        Ordering Provider: Alexandre Houston MD    100 mg  over 60 Minutes Intravenous Once 12/20/22 2026 12/20/22 2209    12/20/22 1931  doxycycline (MONODOX) 100 MG capsule        Ordering Provider: Bella Khan MD    100 mg Oral 2 Times Daily 12/20/22 0000 12/27/22 4465        Current Diuretic Therapy:  Diuretics (From admission, onward)    Ordered     Dose/Rate Route Frequency Start Stop    01/04/23 0912  furosemide (LASIX) injection 40 mg        Ordering Provider: Manuel Finley MD    40 mg Intravenous Once 01/04/23 0930 01/04/23 1042        ----------------------------------------------------------------------------------------------------------------------  Vital Signs:  Temp:  [97.7 °F (36.5 °C)-101.2 °F (38.4 °C)] 97.7 °F (36.5 °C)  Heart Rate:  [41-68] 61  Resp:  [19-27] 19  BP: ()/(47-68) 127/61  FiO2 (%):  [40 %] 40 %  SpO2:  [92 %-98 %] 94 %  on  Flow (L/min):  [40] 40;   Device (Oxygen Therapy): ventilator  Body mass index is 29.83 kg/m².    Wt Readings from Last 3 Encounters:   01/04/23 83.8 kg (184 lb 11.9 oz)   12/20/22 75.3 kg (166 lb)   11/18/22 70.9 kg (156 lb 3.2 oz)     Intake & Output (last 3 days)       01/01 0701  01/02 0700 01/02 0701 01/03 0700 01/03  0701 01/04 0700 01/04 0701 01/05 0700    I.V. (mL/kg) 826.4 (10.2) 562.4 (6.8) 700.5 (8.4)     Other 202 112 211     NG/GT 1160 623 875     IV Piggyback 100 100 100     Total Intake(mL/kg) 2288.4 (28.2) 1397.4 (16.8) 1886.5 (22.5)     Urine (mL/kg/hr) 625 (0.3) 600 (0.3) 510 (0.3)     Total Output 625 600 510     Net +1663.4 +797.4 +1376.5                 NPO Diet NPO Type: Strict NPO  ----------------------------------------------------------------------------------------------------------------------  Physical exam:   Constitutional:  Well-developed and well-nourished. No acute distress. Appears chronically ill. Intubated.  HENT:  Head:  Normocephalic and atraumatic. ET and OG tubes in place.  Cardiac: Heart regular rate and rhythm, no murmur  Pulmonary/Chest:  On mechanical ventilation, appears synchronous with ventilator. Sounds of mechanical ventilation noted and somewhat coarse breath sounds in anterior lung fields. No significant crackles, wheeze, or rhonchi.  Abdominal: Mild abdominal distention. Soft to palpation. Normal bowel sounds.  Musculoskeletal:  No deformity.    Neurological:  Sedated  Skin:  Skin is warm and dry.   Peripheral vascular:  No cyanosis. Anasarca noted, unchanged from yesterday. Extremities appear well perfused.  Edited by: Margo Khan DO at 1/4/2023 1111  ----------------------------------------------------------------------------------------------------------------------  Results from last 7 days   Lab Units 01/04/23  0044 01/03/23  0043 01/02/23  0016 12/31/22  0016 12/30/22  1921 12/30/22  1613 12/30/22  0452 12/30/22  0217   CRP mg/dL  --   --   --   --   --   --   --  2.84*   LACTATE mmol/L  --   --   --   --  1.6  --   --  2.8*   WBC 10*3/mm3 8.71 8.39 9.91   < >  --   --   --  14.77*   HEMOGLOBIN g/dL 10.2* 10.1* 10.8*   < >  --    < > 11.3* 13.2   HEMATOCRIT % 32.5* 32.2* 33.8*   < >  --    < > 33.8* 39.8   MCV fL 108.3* 108.8* 107.0*   < >  --   --   --  107.3*   MCHC  g/dL 31.4* 31.4* 32.0   < >  --   --   --  33.2   PLATELETS 10*3/mm3 50* 45* 44*   < >  --   --   --  65*   INR   --   --   --   --   --   --  1.43*  --     < > = values in this interval not displayed.     Results from last 7 days   Lab Units 01/04/23  0552   PH, ARTERIAL pH units 7.499*   PO2 ART mm Hg 65.0*   PCO2, ARTERIAL mm Hg 35.1   HCO3 ART mmol/L 27.2*     Results from last 7 days   Lab Units 01/04/23  0044 01/03/23  0043 01/02/23  0016 01/01/23  0547 12/31/22  0016 12/30/22  0217   SODIUM mmol/L 144 145 145 145   < > 142   POTASSIUM mmol/L 3.5 3.7 4.0 4.0   < > 3.3*   MAGNESIUM mg/dL 1.8 1.7  --  2.0   < > 1.9   CHLORIDE mmol/L 113* 113* 114* 114*   < > 110*   CO2 mmol/L 24.1 26.4 25.6 27.4   < > 20.7*   BUN mg/dL 12 13 12 14   < > 13   CREATININE mg/dL 0.29* 0.37* 0.42* 0.41*   < > 0.50*   CALCIUM mg/dL 7.0* 7.5* 7.7* 8.1*   < > 8.0*   GLUCOSE mg/dL 152* 146* 160* 161*   < > 96   ALBUMIN g/dL  --  1.3*  --   --   --  2.2*   BILIRUBIN mg/dL  --  1.1  --   --   --  2.7*   ALK PHOS U/L  --  155*  --   --   --  176*   AST (SGOT) U/L  --  32  --   --   --  72*   ALT (SGPT) U/L  --  36  --   --   --  61*    < > = values in this interval not displayed.   Estimated Creatinine Clearance: 295.7 mL/min (A) (by C-G formula based on SCr of 0.29 mg/dL (L)).  Ammonia   Date Value Ref Range Status   01/03/2023 34 16 - 60 umol/L Final                 Glucose   Date/Time Value Ref Range Status   01/04/2023 0607 177 (H) 70 - 130 mg/dL Final     Comment:     Meter: [id number] : [id number] DEV ARSEN   01/04/2023 0043 152 (H) 70 - 130 mg/dL Final     Comment:     Meter: [id number] : [id number] DEV ARSEN   01/03/2023 1757 142 (H) 70 - 130 mg/dL Final     Comment:     Meter: [id number] : [id number] VIVIAN NAVAS   01/03/2023 0616 156 (H) 70 - 130 mg/dL Final     Comment:     Meter: [id number] : [id number] [facility]   01/03/2023 0022 140 (H) 70 - 130 mg/dL Final     Comment:     Meter:  [id number] : [id number] DEV ARSEN   01/02/2023 1804 182 (H) 70 - 130 mg/dL Final     Comment:     Meter: [id number] : [id number] VIVIAN NAVAS   01/02/2023 0540 156 (H) 70 - 130 mg/dL Final     Comment:     Meter: [id number] : [id number] DEV ARSEN   01/02/2023 0016 154 (H) 70 - 130 mg/dL Final     Comment:     Meter: [id number] : [id number] [facility]     Lab Results   Component Value Date    TSH 1.810 12/27/2022    FREET4 0.82 (L) 12/27/2022     No results found for: PREGTESTUR, PREGSERUM, HCG, HCGQUANT  Pain Management Panel     Pain Management Panel Latest Ref Rng & Units 12/20/2022 11/15/2022    AMPHETAMINES SCREEN, URINE Negative Negative Negative    BARBITURATES SCREEN Negative Negative Negative    BENZODIAZEPINE SCREEN, URINE Negative Negative Negative    BUPRENORPHINEUR Negative Negative Negative    COCAINE SCREEN, URINE Negative Negative Negative    METHADONE SCREEN, URINE Negative Negative Negative    METHAMPHETAMINEUR Negative Negative Negative        Brief Urine Lab Results  (Last result in the past 365 days)      Color   Clarity   Blood   Leuk Est   Nitrite   Protein   CREAT   Urine HCG        12/20/22 1849 Orange   Clear   Negative   Trace   Positive   Trace               Blood Culture   Date Value Ref Range Status   12/30/2022 No growth at 5 days  Final   12/30/2022 No growth at 5 days  Final     No results found for: URINECX  No results found for: WOUNDCX  No results found for: STOOLCX  Respiratory Culture   Date Value Ref Range Status   12/30/2022   Final    Moderate growth (3+) Normal respiratory nick. No S. aureus or Pseudomonas aeruginosa detected. Final report.     No results found for: AFBCX  Results from last 7 days   Lab Units 12/30/22  1921 12/30/22  0217   PROCALCITONIN ng/mL  --  0.18   LACTATE mmol/L 1.6 2.8*   CRP mg/dL  --  2.84*       I have personally looked at the labs and they are summarized  above.  ----------------------------------------------------------------------------------------------------------------------  Detailed radiology reports for the last 24 hours:  Imaging Results (Last 24 Hours)     Procedure Component Value Units Date/Time    XR Chest 1 View [[id number]] Collected: 01/04/23 0833     Updated: 01/04/23 0849    Narrative:      XR CHEST 1 VW-     CLINICAL INDICATION: resp failure        COMPARISON: 12/30/2022      TECHNIQUE: Single frontal view of the chest.     FINDINGS:      LUNGS: Minimal bilateral airspace disease      HEART AND MEDIASTINUM: Heart and mediastinal contours are unremarkable        SKELETON: Bony and soft tissue structures are unremarkable.     No interval line change       Impression:         1. Minimal bilateral airspace disease. Overall very slight improvement     This report was finalized on 1/4/2023 8:33 AM by Dr. Giacomo Barnes MD.           Assessment & Plan      #Acute hypoxic respiratory failure secondary to aspiration pneumonia diagnosed 12/30/22  #Community acquired pneumonia s/p course of ceftriaxone and doxycycline  #Septic shock  #New fever 1/4/23  - Patient was admitted with community acquired pneumonia and symptoms resolved after treatment with ceftriaxone and doxycycline. He then had an aspiration event with subsequent severe hypoxia and was intubated on 12/30/22. He was started on broad spectrum antibiotic coverage with vanc/zosyn. Vancomycin now discontinued after MRSA swab was negative.  - Continue treatment with zosyn, day 6.  - Pulmonology consulted, appreciate assistance. Patient reportedly became very agitated and aggressive during previous SBT. Plan for repeat trial today per Pulmonology discretion.  - No longer requiring levophed for blood pressure support. Continue midodrine.   - Continue to follow blood counts and culture results.  - Repeat blood cultures ordered this morning as patient has had a temperature of 101 overnight. CXR appears  stable. UA ordered to evaluate for evidence of UTI. No obvious signs of skin/soft tissue infection on my exam. Continue to monitor for signs/symptoms of worsening infection and consider broadening antibiotic coverage if fever persists.      #Extrapyramidal symptoms  #Delirium and aggression  #Metabolic encephalopathy  #history of hyperammonemia  - Etiology of patient's confusion is not entirely clear. Thought to be mostly due to his acute illness; however, his confusion persisted and apparently worsened despite resolution of elevated ammonia and treatment of his initial infection. He then required multiple doses of geodon as he was becoming very agitated and threatening to hit staff (actually did attempt to hit ).   - He received ativan for elevated CIWA score previously, and his wife has requested he not get any more ativan or similar medications. She is concerned that he has had worse confusion when given ativan in the past (though his confusion this admission started before the ativan was given). Unlikely that his altered mental status is from alcohol withdrawal at this point as he has reportedly been sober for at least six weeks now. She also thinks it unlikely to be psychiatric in origin. Psychiatry has been consulted for recommendations, especially for medication recommendations to help with agitation but that won't worsen his confusion, if possible. Wife says patient has had confusion when electrolytes have been abnormal in the past, but sodium, potassium, and magnesium are now WNL.   - Continue to monitor closely as he is weaned from sedation for breathing trials.  - Ammonia level was WNL.       #Liver cirrhosis, unknown etiology  #Ascites  #Hyperbilirubinemia  #Hyperammonemia  #Chronically elevated alkaline phosphatase  #Hypoalbuminemia  #Pseudohypocalcemia 2/2 hypoalbuminemia  #Abdominal pain  - CT findings suggestive of cirrhosis on admission, significantly worse than previous CT in  November.  - Etiology unclear, but differential diagnosis favors alcohol +/- acetaminophen induced over hemochromatosis. Less likely primary sclerosing cholangitis as he had a normal MRCP one month prior to admission, but it is still on the differential. He would not be able to get an MRI at present, and likely couldn't have earlier in admission either, due to agitation and now intubation. Patient reportedly stopped drinking about six weeks ago now.  - He had large ascites on CT but then this appears to have significantly improved by the time he was able to get paracentesis.  - Follow up with outpatient GI as scheduled  - Avoid hepatotoxins as able.  - Repeat CMP in AM to monitor transaminases    #Acute on chronic thrombocytopenia  #Chronic macrocytic anemia  - Thrombocytopenia likely secondary to cirrhosis and worsened by acute illness. Platelet count improved to 50K today. Transfuse platelets for <10,000 if no acute bleeding or <50,000 with signs of bleeding.   - Hgb has slowly trended down over the past two weeks and is at 10.1 today. No signs of acute bleeding seen on today's exam. Folate was low normal (5.71) when checked on 12/27/22. Ferritin was significantly elevated (possibly as acute phase reactant) and TIBC decreased, but iron level was low. Continue folic acid and thiamine supplements.   - Repeat CBC in a.m.    #Positive urine drug screen  - UDS positive for TCAs at admission (inconsistent with home medication list). He denied any current drug use at admission.     #Reported left leg weakness  #Multilevel degenerative disc disease  #History of TIA/CVA  - Nonspecific periventricular white matter lesions noted on MRI brain earlier in admission likely representing chronic deep white matter ischemic change, no acute abnormalities noted. MRI lumbar spine showed multilevel degenerative changes with moderate canal stenosis at L5-S1 and mild to moderate canal stenosis at L3-L4.   - Reconsult PT/OT when weaned  from ventilator.    #Acute hypokalemia  #Borderline hypomagnesemia  - Resolved  - Replace per protocol  - Monitor with chemistry panel periodically     #History of hypertension   - BP has been low requiring vasopressor support. Continue to monitor and avoid agents that would lower BP at this time.    #GERD  #Johnson's esophagus  - Continue PPI    #Ulcerative colitis  - No findings of worsening colitis on last CT. Continue supportive care.    #Tobacco use  - Nicotine patch made available     Copied portions of the note have been reviewed and are correct as of 01/04/23.  Edited by: Margo Khan DO at 1/4/2023 1111    VTE Prophylaxis:   Mechanical Order History:      Ordered        12/30/22 0856  Place Sequential Compression Device  Once            12/30/22 0856  Maintain Sequential Compression Device  Continuous                    Pharmalogical Order History:      Ordered     Dose Route Frequency Stop    12/21/22 0106  heparin (porcine) 5000 UNIT/ML injection 5,000 Units  Status:  Discontinued         5,000 Units SC Every 12 Hours Scheduled 12/30/22 0438                Dispo: pending clinical course    Margo Khan DO  [facility]  01/04/23  11:11 EST

## 2023-01-04 NOTE — PROGRESS NOTES
"Palliative Care Daily Progress Note     S: Medical record reviewed, followed up with Primary AMANDA Sharma and Dr Margo Khan regarding patient's condition. When Palliative care entered the room the pt was restless and agitated and was in restraints. I attempted to ask him questions regarding orientation or pain and he just continued to curse.      O:   Palliative Performance Scale Score:     BP (!) 87/55   Pulse 87   Temp 97.7 °F (36.5 °C) (Oral)   Resp 24   Ht 167.6 cm (65.98\")   Wt 83.8 kg (184 lb 11.9 oz)   SpO2 94%   BMI 29.83 kg/m²     Intake/Output Summary (Last 24 hours) at 1/4/2023 1605  Last data filed at 1/4/2023 0351  Gross per 24 hour   Intake 1886.48 ml   Output 510 ml   Net 1376.48 ml       PE:  General Appearance:    Chronically ill appearing, awake alert to self,  non cooperative, in restraints NAD   HEENT:    NC/AT, without obvious abnormality, EOMI, anicteric    Neck:   supple, trachea midline, no JVD   Lungs:     Light sedation, intubated on vent, coarse upper lung sounds noted    Heart:    regular rate and rhythm, normal S1 and S2, no M/R/G   Abdomen:     Soft, NT,distended, NABS    Extremities:   Moves all extremities weakly not to command, no edema   Pulses:   Pulses palpable and equal bilaterally   Skin:   Warm, dry, bruising,   Neurologic:   Unable to assess   Psych:   On Vent awake agitated in restraints           Meds: Reviewed and changes noted.    Labs:   Results from last 7 days   Lab Units 01/04/23 0044   WBC 10*3/mm3 8.71   HEMOGLOBIN g/dL 10.2*   HEMATOCRIT % 32.5*   PLATELETS 10*3/mm3 50*     Results from last 7 days   Lab Units 01/04/23  0044   SODIUM mmol/L 144   POTASSIUM mmol/L 3.5   CHLORIDE mmol/L 113*   CO2 mmol/L 24.1   BUN mg/dL 12   CREATININE mg/dL 0.29*   GLUCOSE mg/dL 152*   CALCIUM mg/dL 7.0*     Results from last 7 days   Lab Units 01/04/23 0044 01/03/23  0043   SODIUM mmol/L 144 145   POTASSIUM mmol/L 3.5 3.7   CHLORIDE mmol/L 113* 113*   CO2 mmol/L 24.1 26.4 "   BUN mg/dL 12 13   CREATININE mg/dL 0.29* 0.37*   CALCIUM mg/dL 7.0* 7.5*   BILIRUBIN mg/dL  --  1.1   ALK PHOS U/L  --  155*   ALT (SGPT) U/L  --  36   AST (SGOT) U/L  --  32   GLUCOSE mg/dL 152* 146*     Imaging Results (Last 72 Hours)     Procedure Component Value Units Date/Time    XR Chest 1 View [[id number]] Collected: 01/04/23 0833     Updated: 01/04/23 0849    Narrative:      XR CHEST 1 VW-     CLINICAL INDICATION: resp failure        COMPARISON: 12/30/2022      TECHNIQUE: Single frontal view of the chest.     FINDINGS:      LUNGS: Minimal bilateral airspace disease      HEART AND MEDIASTINUM: Heart and mediastinal contours are unremarkable        SKELETON: Bony and soft tissue structures are unremarkable.     No interval line change       Impression:         1. Minimal bilateral airspace disease. Overall very slight improvement     This report was finalized on 1/4/2023 8:33 AM by Dr. Giacomo Barnes MD.       XR Abdomen KUB [[id number]] Collected: 01/03/23 0531     Updated: 01/03/23 0533    Narrative:      KUB, 1/3/2023      HISTORY:    Hospital inpatient with constipation. Ascites.      TECHNIQUE:  AP supine lower abdomen and pelvis.    COMPARISON:  *  CT abdomen/pelvis, 12/30/2022.    FINDINGS:  Distended and peripherally dense abdomen due to the presence of large volume ascites best demonstrated on CT. Bowel segments displaced into the central abdomen. Mild dilatation of air-filled colon. No visible large volume stool burden.    NG tube in the distal portion of the stomach. Herniorrhaphy clips.      Impression:      Ascites. Nonspecific bowel gas pattern. No visible large volume stool burden.    Signer Name: Brian Gudino MD   Signed: 1/3/2023 5:31 AM   Workstation Name: [facility]    [facility]            Diagnostics: Reviewed    A: Jamison Edward is a 52 y.o.  male  admitted on 12/20/2022 due to leg swelling. He has a medical history of hypertension, COPD, tobacco  abuse, multilevel DDD, GERD, Johnson's esophagus, peptic ulcer disease, ulcerative colitis, history of TIA/CVA, and alcohol use. Pt had been attending a cardiologist appointment in follow up for syncope and he had been wearing an cardiac event monitor. During this appointment his daughter stated her father had been more confused as well as was having lower extremity swelling and weakness. His monitor showed he was having episodes of bradycardia and tachycardia without arrhthymias noted.  On 12/29/2022 had been on [address] when  Pt became agitated and he began having difficulty with maintaining oxygen saturations as well as tachycardic. Decision was made to transfer him to CCU where he was subsequently intubated and an IO was place.                 P:  I was able to touch base with Pts wife Lena to provide support. We discussed how Jamison had been able to come off the ventilator today and she was elated anout this. We discussed that she should have a conversation with him once he is stable regarding what his wishes would be for the future in the event something should happen like this again.     We will continue to follow along. Please do not hesitate to contact us regarding further sx mgmt or GOC needs, including after hours or on weekends via our on call provider at [phone].     Maryann Still, APRN    1/4/2023

## 2023-01-04 NOTE — PLAN OF CARE
Goal Outcome Evaluation:  Plan of Care Reviewed With: patient        Progress: no change  VSS on precedex and dipervan, temp 101.2, cooling blanket applied and ETT sputum culture obtained.

## 2023-01-05 ENCOUNTER — HOSPITAL ENCOUNTER (OUTPATIENT)
Facility: HOSPITAL | Age: 53
Discharge: HOME | End: 2023-03-07
Attending: INTERNAL MEDICINE | Admitting: INTERNAL MEDICINE
Payer: COMMERCIAL

## 2023-01-05 ENCOUNTER — APPOINTMENT (OUTPATIENT)
Dept: GENERAL RADIOLOGY | Facility: HOSPITAL | Age: 53
End: 2023-01-05
Payer: COMMERCIAL

## 2023-01-05 ENCOUNTER — APPOINTMENT (OUTPATIENT)
Dept: GENERAL RADIOLOGY | Facility: HOSPITAL | Age: 53
DRG: 870 | End: 2023-01-05
Payer: MEDICARE

## 2023-01-05 VITALS
HEART RATE: 96 BPM | RESPIRATION RATE: 26 BRPM | OXYGEN SATURATION: 100 % | DIASTOLIC BLOOD PRESSURE: 56 MMHG | HEIGHT: 66 IN | SYSTOLIC BLOOD PRESSURE: 102 MMHG | TEMPERATURE: 99.3 F | BODY MASS INDEX: 31.18 KG/M2 | WEIGHT: 194 LBS

## 2023-01-05 PROBLEM — J96.01 ACUTE RESPIRATORY FAILURE WITH HYPOXIA: Status: ACTIVE | Noted: 2023-01-05

## 2023-01-05 LAB
A-A DO2: 144.7 MMHG (ref 0–300)
ALBUMIN SERPL-MCNC: 1.5 G/DL (ref 3.5–5.2)
ALBUMIN/GLOB SERPL: 0.6 G/DL
ALP SERPL-CCNC: 175 U/L (ref 39–117)
ALT SERPL W P-5'-P-CCNC: 32 U/L (ref 1–41)
ANION GAP SERPL CALCULATED.3IONS-SCNC: 6.7 MMOL/L (ref 5–15)
ANION GAP SERPL CALCULATED.3IONS-SCNC: 8.7 MMOL/L (ref 5–15)
ARTERIAL PATENCY WRIST A: ABNORMAL
AST SERPL-CCNC: 46 U/L (ref 1–40)
ATMOSPHERIC PRESS: 728 MMHG
BACTERIA UR QL AUTO: ABNORMAL /HPF
BASE EXCESS BLDA CALC-SCNC: 4.1 MMOL/L (ref 0–2)
BASOPHILS # BLD AUTO: 0.02 10*3/MM3 (ref 0–0.2)
BASOPHILS # BLD AUTO: 0.03 10*3/MM3 (ref 0–0.2)
BASOPHILS NFR BLD AUTO: 0.2 % (ref 0–1.5)
BASOPHILS NFR BLD AUTO: 0.2 % (ref 0–1.5)
BDY SITE: ABNORMAL
BILIRUB SERPL-MCNC: 1 MG/DL (ref 0–1.2)
BILIRUB UR QL STRIP: ABNORMAL
BODY TEMPERATURE: 0 C
BUN SERPL-MCNC: 12 MG/DL (ref 6–20)
BUN SERPL-MCNC: 13 MG/DL (ref 6–20)
BUN/CREAT SERPL: 37.1 (ref 7–25)
BUN/CREAT SERPL: 38.7 (ref 7–25)
CALCIUM SPEC-SCNC: 7.2 MG/DL (ref 8.6–10.5)
CALCIUM SPEC-SCNC: 7.3 MG/DL (ref 8.6–10.5)
CHLORIDE SERPL-SCNC: 113 MMOL/L (ref 98–107)
CHLORIDE SERPL-SCNC: 114 MMOL/L (ref 98–107)
CLARITY UR: ABNORMAL
CO2 BLDA-SCNC: 29.4 MMOL/L (ref 22–33)
CO2 SERPL-SCNC: 24.3 MMOL/L (ref 22–29)
CO2 SERPL-SCNC: 25.3 MMOL/L (ref 22–29)
COHGB MFR BLD: 1 % (ref 0–5)
COLOR UR: ABNORMAL
CREAT SERPL-MCNC: 0.31 MG/DL (ref 0.76–1.27)
CREAT SERPL-MCNC: 0.35 MG/DL (ref 0.76–1.27)
CRP SERPL-MCNC: 14.36 MG/DL (ref 0–0.5)
DEPRECATED RDW RBC AUTO: 61.9 FL (ref 37–54)
DEPRECATED RDW RBC AUTO: 64.5 FL (ref 37–54)
EGFRCR SERPLBLD CKD-EPI 2021: 135 ML/MIN/1.73
EGFRCR SERPLBLD CKD-EPI 2021: 140 ML/MIN/1.73
EOSINOPHIL # BLD AUTO: 0.01 10*3/MM3 (ref 0–0.4)
EOSINOPHIL # BLD AUTO: 0.03 10*3/MM3 (ref 0–0.4)
EOSINOPHIL NFR BLD AUTO: 0.1 % (ref 0.3–6.2)
EOSINOPHIL NFR BLD AUTO: 0.3 % (ref 0.3–6.2)
ERYTHROCYTE [DISTWIDTH] IN BLOOD BY AUTOMATED COUNT: 15.9 % (ref 12.3–15.4)
ERYTHROCYTE [DISTWIDTH] IN BLOOD BY AUTOMATED COUNT: 16.4 % (ref 12.3–15.4)
GLOBULIN UR ELPH-MCNC: 2.4 GM/DL
GLUCOSE BLDC GLUCOMTR-MCNC: 101 MG/DL (ref 70–130)
GLUCOSE BLDC GLUCOMTR-MCNC: 88 MG/DL (ref 70–130)
GLUCOSE BLDC GLUCOMTR-MCNC: 88 MG/DL (ref 70–130)
GLUCOSE BLDC GLUCOMTR-MCNC: 98 MG/DL (ref 70–130)
GLUCOSE BLDC GLUCOMTR-MCNC: 99 MG/DL (ref 70–130)
GLUCOSE SERPL-MCNC: 87 MG/DL (ref 65–99)
GLUCOSE SERPL-MCNC: 97 MG/DL (ref 65–99)
GLUCOSE UR STRIP-MCNC: NEGATIVE MG/DL
HCO3 BLDA-SCNC: 28.2 MMOL/L (ref 20–26)
HCT VFR BLD AUTO: 29.8 % (ref 37.5–51)
HCT VFR BLD AUTO: 34.7 % (ref 37.5–51)
HCT VFR BLD CALC: 30.1 % (ref 38–51)
HGB BLD-MCNC: 11.2 G/DL (ref 13–17.7)
HGB BLD-MCNC: 9.5 G/DL (ref 13–17.7)
HGB BLDA-MCNC: 9.8 G/DL (ref 14–18)
HGB UR QL STRIP.AUTO: ABNORMAL
HYALINE CASTS UR QL AUTO: ABNORMAL /LPF
IMM GRANULOCYTES # BLD AUTO: 0.1 10*3/MM3 (ref 0–0.05)
IMM GRANULOCYTES # BLD AUTO: 0.13 10*3/MM3 (ref 0–0.05)
IMM GRANULOCYTES NFR BLD AUTO: 0.8 % (ref 0–0.5)
IMM GRANULOCYTES NFR BLD AUTO: 0.9 % (ref 0–0.5)
INHALED O2 CONCENTRATION: 40 %
KETONES UR QL STRIP: ABNORMAL
LEUKOCYTE ESTERASE UR QL STRIP.AUTO: ABNORMAL
LYMPHOCYTES # BLD AUTO: 0.78 10*3/MM3 (ref 0.7–3.1)
LYMPHOCYTES # BLD AUTO: 0.91 10*3/MM3 (ref 0.7–3.1)
LYMPHOCYTES NFR BLD AUTO: 4.7 % (ref 19.6–45.3)
LYMPHOCYTES NFR BLD AUTO: 7.9 % (ref 19.6–45.3)
Lab: ABNORMAL
MAGNESIUM SERPL-MCNC: 1.9 MG/DL (ref 1.6–2.6)
MCH RBC QN AUTO: 34.3 PG (ref 26.6–33)
MCH RBC QN AUTO: 34.5 PG (ref 26.6–33)
MCHC RBC AUTO-ENTMCNC: 31.9 G/DL (ref 31.5–35.7)
MCHC RBC AUTO-ENTMCNC: 32.3 G/DL (ref 31.5–35.7)
MCV RBC AUTO: 106.8 FL (ref 79–97)
MCV RBC AUTO: 107.6 FL (ref 79–97)
METHGB BLD QL: 0.5 % (ref 0–3)
MODALITY: ABNORMAL
MONOCYTES # BLD AUTO: 0.77 10*3/MM3 (ref 0.1–0.9)
MONOCYTES # BLD AUTO: 0.93 10*3/MM3 (ref 0.1–0.9)
MONOCYTES NFR BLD AUTO: 5.7 % (ref 5–12)
MONOCYTES NFR BLD AUTO: 6.7 % (ref 5–12)
NEUTROPHILS NFR BLD AUTO: 14.57 10*3/MM3 (ref 1.7–7)
NEUTROPHILS NFR BLD AUTO: 84 % (ref 42.7–76)
NEUTROPHILS NFR BLD AUTO: 88.5 % (ref 42.7–76)
NEUTROPHILS NFR BLD AUTO: 9.74 10*3/MM3 (ref 1.7–7)
NITRITE UR QL STRIP: NEGATIVE
NOTE: ABNORMAL
NRBC BLD AUTO-RTO: 0 /100 WBC (ref 0–0.2)
NRBC BLD AUTO-RTO: 0 /100 WBC (ref 0–0.2)
OXYHGB MFR BLDV: 95.3 % (ref 94–99)
PAW @ PEAK INSP FLOW SETTING VENT: 18 CMH2O
PCO2 BLDA: 39.2 MM HG (ref 35–45)
PCO2 TEMP ADJ BLD: ABNORMAL MM[HG]
PEEP RESPIRATORY: 5 CM[H2O]
PH BLDA: 7.46 PH UNITS (ref 7.35–7.45)
PH UR STRIP.AUTO: <=5 [PH] (ref 5–8)
PH, TEMP CORRECTED: ABNORMAL
PLATELET # BLD AUTO: 66 10*3/MM3 (ref 140–450)
PLATELET # BLD AUTO: 75 10*3/MM3 (ref 140–450)
PMV BLD AUTO: 11.5 FL (ref 6–12)
PMV BLD AUTO: 11.9 FL (ref 6–12)
PO2 BLDA: 84.7 MM HG (ref 83–108)
PO2 TEMP ADJ BLD: ABNORMAL MM[HG]
POTASSIUM SERPL-SCNC: 3.3 MMOL/L (ref 3.5–5.2)
POTASSIUM SERPL-SCNC: 3.7 MMOL/L (ref 3.5–5.2)
POTASSIUM SERPL-SCNC: 3.9 MMOL/L (ref 3.5–5.2)
PROT SERPL-MCNC: 3.9 G/DL (ref 6–8.5)
PROT UR QL STRIP: ABNORMAL
QT INTERVAL: 296 MS
QTC INTERVAL: 442 MS
RBC # BLD AUTO: 2.77 10*6/MM3 (ref 4.14–5.8)
RBC # BLD AUTO: 3.25 10*6/MM3 (ref 4.14–5.8)
RBC # UR STRIP: ABNORMAL /HPF
REF LAB TEST METHOD: ABNORMAL
SAO2 % BLDCOA: 96.8 % (ref 94–99)
SET MECH RESP RATE: 18
SODIUM SERPL-SCNC: 146 MMOL/L (ref 136–145)
SODIUM SERPL-SCNC: 146 MMOL/L (ref 136–145)
SP GR UR STRIP: >1.03 (ref 1–1.03)
SQUAMOUS #/AREA URNS HPF: ABNORMAL /HPF
TOTAL RATE: 21 BREATHS/MINUTE
UROBILINOGEN UR QL STRIP: ABNORMAL
VENTILATOR MODE: ABNORMAL
VT ON VENT VENT: 450 ML
WBC # UR STRIP: ABNORMAL /HPF
WBC NRBC COR # BLD: 11.57 10*3/MM3 (ref 3.4–10.8)
WBC NRBC COR # BLD: 16.45 10*3/MM3 (ref 3.4–10.8)
YEAST URNS QL MICRO: ABNORMAL /HPF

## 2023-01-05 PROCEDURE — 36415 COLL VENOUS BLD VENIPUNCTURE: CPT | Performed by: INTERNAL MEDICINE

## 2023-01-05 PROCEDURE — 85025 COMPLETE CBC W/AUTO DIFF WBC: CPT | Performed by: INTERNAL MEDICINE

## 2023-01-05 PROCEDURE — 83050 HGB METHEMOGLOBIN QUAN: CPT

## 2023-01-05 PROCEDURE — 94799 UNLISTED PULMONARY SVC/PX: CPT

## 2023-01-05 PROCEDURE — 84132 ASSAY OF SERUM POTASSIUM: CPT | Performed by: STUDENT IN AN ORGANIZED HEALTH CARE EDUCATION/TRAINING PROGRAM

## 2023-01-05 PROCEDURE — 71045 X-RAY EXAM CHEST 1 VIEW: CPT

## 2023-01-05 PROCEDURE — 93010 ELECTROCARDIOGRAM REPORT: CPT | Performed by: INTERNAL MEDICINE

## 2023-01-05 PROCEDURE — 87070 CULTURE OTHR SPECIMN AEROBIC: CPT | Performed by: INTERNAL MEDICINE

## 2023-01-05 PROCEDURE — 82962 GLUCOSE BLOOD TEST: CPT

## 2023-01-05 PROCEDURE — 71045 X-RAY EXAM CHEST 1 VIEW: CPT | Performed by: RADIOLOGY

## 2023-01-05 PROCEDURE — 25010000002 PROPOFOL 10 MG/ML EMULSION: Performed by: INTERNAL MEDICINE

## 2023-01-05 PROCEDURE — 83735 ASSAY OF MAGNESIUM: CPT | Performed by: STUDENT IN AN ORGANIZED HEALTH CARE EDUCATION/TRAINING PROGRAM

## 2023-01-05 PROCEDURE — 80053 COMPREHEN METABOLIC PANEL: CPT | Performed by: INTERNAL MEDICINE

## 2023-01-05 PROCEDURE — 82805 BLOOD GASES W/O2 SATURATION: CPT

## 2023-01-05 PROCEDURE — 94002 VENT MGMT INPAT INIT DAY: CPT

## 2023-01-05 PROCEDURE — 25010000002 MAGNESIUM SULFATE 2 GM/50ML SOLUTION: Performed by: STUDENT IN AN ORGANIZED HEALTH CARE EDUCATION/TRAINING PROGRAM

## 2023-01-05 PROCEDURE — 93005 ELECTROCARDIOGRAM TRACING: CPT | Performed by: INTERNAL MEDICINE

## 2023-01-05 PROCEDURE — 80048 BASIC METABOLIC PNL TOTAL CA: CPT | Performed by: STUDENT IN AN ORGANIZED HEALTH CARE EDUCATION/TRAINING PROGRAM

## 2023-01-05 PROCEDURE — 93010 ELECTROCARDIOGRAM REPORT: CPT | Performed by: SPECIALIST

## 2023-01-05 PROCEDURE — 25010000002 PIPERACILLIN SOD-TAZOBACTAM PER 1 G: Performed by: PHYSICIAN ASSISTANT

## 2023-01-05 PROCEDURE — 87086 URINE CULTURE/COLONY COUNT: CPT | Performed by: INTERNAL MEDICINE

## 2023-01-05 PROCEDURE — 93005 ELECTROCARDIOGRAM TRACING: CPT | Performed by: HOSPITALIST

## 2023-01-05 PROCEDURE — 84134 ASSAY OF PREALBUMIN: CPT | Performed by: INTERNAL MEDICINE

## 2023-01-05 PROCEDURE — 87205 SMEAR GRAM STAIN: CPT | Performed by: INTERNAL MEDICINE

## 2023-01-05 PROCEDURE — 0BH17EZ INSERTION OF ENDOTRACHEAL AIRWAY INTO TRACHEA, VIA NATURAL OR ARTIFICIAL OPENING: ICD-10-PCS | Performed by: INTERNAL MEDICINE

## 2023-01-05 PROCEDURE — 81001 URINALYSIS AUTO W/SCOPE: CPT | Performed by: INTERNAL MEDICINE

## 2023-01-05 PROCEDURE — 85025 COMPLETE CBC W/AUTO DIFF WBC: CPT | Performed by: STUDENT IN AN ORGANIZED HEALTH CARE EDUCATION/TRAINING PROGRAM

## 2023-01-05 PROCEDURE — 99239 HOSP IP/OBS DSCHRG MGMT >30: CPT | Performed by: STUDENT IN AN ORGANIZED HEALTH CARE EDUCATION/TRAINING PROGRAM

## 2023-01-05 PROCEDURE — 86140 C-REACTIVE PROTEIN: CPT | Performed by: INTERNAL MEDICINE

## 2023-01-05 PROCEDURE — 82375 ASSAY CARBOXYHB QUANT: CPT

## 2023-01-05 PROCEDURE — 0 POTASSIUM CHLORIDE 10 MEQ/100ML SOLUTION: Performed by: HOSPITALIST

## 2023-01-05 PROCEDURE — 99291 CRITICAL CARE FIRST HOUR: CPT | Performed by: INTERNAL MEDICINE

## 2023-01-05 PROCEDURE — 31500 INSERT EMERGENCY AIRWAY: CPT | Performed by: INTERNAL MEDICINE

## 2023-01-05 PROCEDURE — 94761 N-INVAS EAR/PLS OXIMETRY MLT: CPT

## 2023-01-05 PROCEDURE — 25010000002 LEVETRIRACETAM PER 10 MG: Performed by: STUDENT IN AN ORGANIZED HEALTH CARE EDUCATION/TRAINING PROGRAM

## 2023-01-05 RX ORDER — POTASSIUM CHLORIDE 7.45 MG/ML
10 INJECTION INTRAVENOUS
Status: COMPLETED | OUTPATIENT
Start: 2023-01-05 | End: 2023-01-05

## 2023-01-05 RX ORDER — MAGNESIUM SULFATE HEPTAHYDRATE 40 MG/ML
2 INJECTION, SOLUTION INTRAVENOUS ONCE
Status: COMPLETED | OUTPATIENT
Start: 2023-01-05 | End: 2023-01-05

## 2023-01-05 RX ORDER — METOPROLOL TARTRATE 5 MG/5ML
5 INJECTION INTRAVENOUS ONCE
Status: COMPLETED | OUTPATIENT
Start: 2023-01-05 | End: 2023-01-05

## 2023-01-05 RX ADMIN — CHLORHEXIDINE GLUCONATE 15 ML: 1.2 RINSE ORAL at 08:22

## 2023-01-05 RX ADMIN — CARBIDOPA AND LEVODOPA 10 MG: 50; 200 TABLET, EXTENDED RELEASE ORAL at 17:00

## 2023-01-05 RX ADMIN — POTASSIUM CHLORIDE 10 MEQ: 7.46 INJECTION, SOLUTION INTRAVENOUS at 05:13

## 2023-01-05 RX ADMIN — PROPOFOL 5 MCG/KG/MIN: 10 INJECTION, EMULSION INTRAVENOUS at 10:05

## 2023-01-05 RX ADMIN — Medication 10 ML: at 08:21

## 2023-01-05 RX ADMIN — CARBIDOPA AND LEVODOPA 10 MG: 50; 200 TABLET, EXTENDED RELEASE ORAL at 12:12

## 2023-01-05 RX ADMIN — IPRATROPIUM BROMIDE 0.5 MG: 0.5 SOLUTION RESPIRATORY (INHALATION) at 07:05

## 2023-01-05 RX ADMIN — METOPROLOL TARTRATE 5 MG: 1 INJECTION, SOLUTION INTRAVENOUS at 01:35

## 2023-01-05 RX ADMIN — METOPROLOL TARTRATE 5 MG: 1 INJECTION, SOLUTION INTRAVENOUS at 06:10

## 2023-01-05 RX ADMIN — PROPOFOL 35 MCG/KG/MIN: 10 INJECTION, EMULSION INTRAVENOUS at 14:43

## 2023-01-05 RX ADMIN — POTASSIUM CHLORIDE 10 MEQ: 7.46 INJECTION, SOLUTION INTRAVENOUS at 06:11

## 2023-01-05 RX ADMIN — Medication 10 ML: at 14:44

## 2023-01-05 RX ADMIN — IPRATROPIUM BROMIDE 0.5 MG: 0.5 SOLUTION RESPIRATORY (INHALATION) at 13:06

## 2023-01-05 RX ADMIN — POTASSIUM CHLORIDE 10 MEQ: 7.46 INJECTION, SOLUTION INTRAVENOUS at 07:18

## 2023-01-05 RX ADMIN — Medication 100 MG: at 12:11

## 2023-01-05 RX ADMIN — LEVETIRACETAM 750 MG: 100 INJECTION INTRAVENOUS at 06:33

## 2023-01-05 RX ADMIN — LEVETIRACETAM 750 MG: 100 INJECTION INTRAVENOUS at 14:45

## 2023-01-05 RX ADMIN — FAMOTIDINE 20 MG: 20 TABLET ORAL at 12:11

## 2023-01-05 RX ADMIN — PIPERACILLIN SODIUM AND TAZOBACTAM SODIUM 3.38 G: 3; .375 INJECTION, POWDER, LYOPHILIZED, FOR SOLUTION INTRAVENOUS at 01:36

## 2023-01-05 RX ADMIN — PIPERACILLIN SODIUM AND TAZOBACTAM SODIUM 3.38 G: 3; .375 INJECTION, POWDER, LYOPHILIZED, FOR SOLUTION INTRAVENOUS at 10:29

## 2023-01-05 RX ADMIN — Medication 1 MG: at 12:11

## 2023-01-05 RX ADMIN — OLANZAPINE 5 MG: 5 TABLET, FILM COATED ORAL at 12:12

## 2023-01-05 RX ADMIN — MAGNESIUM SULFATE HEPTAHYDRATE 2 G: 2 INJECTION, SOLUTION INTRAVENOUS at 05:14

## 2023-01-05 RX ADMIN — PIPERACILLIN SODIUM AND TAZOBACTAM SODIUM 3.38 G: 3; .375 INJECTION, POWDER, LYOPHILIZED, FOR SOLUTION INTRAVENOUS at 17:48

## 2023-01-05 NOTE — PROGRESS NOTES
[facility] HOSPITALIST PROGRESS NOTE     Patient Identification:  Name:  Jamison Edward  Age:  52 y.o.  Sex:  male  :  1970  MRN:  [id number]  Visit Number:  [id number]  ROOM: [address]     Primary Care Provider:  Leticia Martinez APRN    Length of stay in inpatient status:  16    Subjective     Chief Compliant:    Chief Complaint   Patient presents with   • Leg Swelling       History of Presenting Illness:    Patient was seen and examined with his wife present at bedside this morning. He had been extubated yesterday afternoon. At time of my exam he was off the ventilator but appeared dyspneic and slightly diaphoretic. He reported shortness of breath. Denied chest pain or pain elsewhere. Ability to gather history was limited due to patient's voice being very weak after being intubated and also his dyspnea. His wife discussed with patient while I was there that Pulmonology said he may need to go back on the ventilator to rest, and he agreed to do so if needed.    Objective     Current Hospital Meds:chlorhexidine, 15 mL, Mouth/Throat, Q12H  famotidine, 20 mg, Oral, BID AC  folic acid, 1 mg, Oral, Daily  ipratropium, 0.5 mg, Nebulization, 4x Daily - RT  levETIRAcetam, 750 mg, Intravenous, Q24H  levETIRAcetam, 750 mg, Intravenous, Q24H  levETIRAcetam, 1,500 mg, Intravenous, Q24H  midodrine, 10 mg, Oral, TID AC  OLANZapine, 5 mg, Oral, BID  piperacillin-tazobactam, 3.375 g, Intravenous, Q8H  sodium chloride, 10 mL, Intravenous, Q12H  sodium chloride, 10 mL, Intravenous, Q12H  sodium chloride, 10 mL, Intravenous, Q12H  sodium chloride, 10 mL, Intravenous, Q12H  sodium chloride, 10 mL, Intravenous, Q12H  thiamine, 100 mg, Oral, Daily    dexmedetomidine, 0.2-1.5 mcg/kg/hr, Last Rate: Stopped (23 0902)  norepinephrine, 0.01-0.3 mcg/kg/min, Last Rate: Stopped (23 0430)  Pharmacy Consult,   propofol, 5-50 mcg/kg/min, Last Rate: 20 mcg/kg/min (23 1022)        Current Antimicrobial  Therapy:  Anti-Infectives (From admission, onward)    Ordered     Dose/Rate Route Frequency Start Stop    12/30/22 0332  piperacillin-tazobactam (ZOSYN) IVPB 3.375 g in 100 mL NS VTB        Ordering Provider: Lauren Alonzo PA-C    3.375 g  over 4 Hours Intravenous Every 8 Hours 12/30/22 1030 01/06/23 1029    12/30/22 0332  vancomycin 1500 mg/500 mL 0.9% NS IVPB ([facility])        Ordering Provider: Lauren Alonzo PA-C    20 mg/kg × 77.1 kg  over 120 Minutes Intravenous Once 12/30/22 0430 12/30/22 0918    12/30/22 0332  piperacillin-tazobactam (ZOSYN) IVPB 3.375 g in 100 mL NS VTB        Ordering Provider: Lauren Alonzo PA-C    3.375 g  over 30 Minutes Intravenous Once 12/30/22 0430 12/30/22 0637    12/20/22 2024  cefTRIAXone (ROCEPHIN) 2 g in sodium chloride 0.9 % 100 mL IVPB-VTB        Ordering Provider: Alexandre Houston MD    2 g  200 mL/hr over 30 Minutes Intravenous Once 12/20/22 2026 12/20/22 2140 12/20/22 2024  doxycycline (VIBRAMYCIN) 100 mg in sodium chloride 0.9 % 100 mL IVPB-VTB        Ordering Provider: Alexandre Houston MD    100 mg  over 60 Minutes Intravenous Once 12/20/22 2026 12/20/22 2209 12/20/22 1931  doxycycline (MONODOX) 100 MG capsule        Ordering Provider: Bella Khan MD    100 mg Oral 2 Times Daily 12/20/22 0000 12/27/22 7386        Current Diuretic Therapy:  Diuretics (From admission, onward)    Ordered     Dose/Rate Route Frequency Start Stop    01/04/23 0912  furosemide (LASIX) injection 40 mg        Ordering Provider: Manuel Finley MD    40 mg Intravenous Once 01/04/23 0930 01/04/23 1042        ----------------------------------------------------------------------------------------------------------------------  Vital Signs:  Temp:  [97.5 °F (36.4 °C)-100 °F (37.8 °C)] 99.5 °F (37.5 °C)  Heart Rate:  [] 105  Resp:  [18-30] 24  BP: ()/(44-86) 119/69  FiO2 (%):  [40 %-80 %] 40 %  SpO2:  [80 %-100 %] 95 %  on  Flow (L/min):  [3-12]  12;   Device (Oxygen Therapy): ventilator  Body mass index is 31.33 kg/m².    Wt Readings from Last 3 Encounters:   01/05/23 88 kg (194 lb 0.1 oz)   12/20/22 75.3 kg (166 lb)   11/18/22 70.9 kg (156 lb 3.2 oz)     Intake & Output (last 3 days)       01/02 0701 01/03 0700 01/03 0701 01/04 0700 01/04 0701 01/05 0700 01/05 0701 01/06 0700    I.V. (mL/kg) 562.4 (6.8) 700.5 (8.4) 175.4 (2)     Other 112 211      NG/ 875      IV Piggyback 100 100 400 200    Total Intake(mL/kg) 1397.4 (16.8) 1886.5 (22.5) 575.4 (6.5) 200 (2.3)    Urine (mL/kg/hr) 600 (0.3) 510 (0.3) 1500 (0.7)     Total Output      Net +797.4 +1376.5 -924.6 +200                NPO Diet NPO Type: Strict NPO  ----------------------------------------------------------------------------------------------------------------------  Physical exam:   Constitutional:  Well-developed and well-nourished. Appears chronically ill. At time of my exam he appeared dyspneic.  HENT:  Head:  Normocephalic and atraumatic. He had been extubated.  Cardiac: Tachycardia, rate and rhythm, no murmur  Pulmonary/Chest:  Patient extubated when I saw him this morning. He appeared dyspneic and was tachypneic. Rhonchi noted diffusely in bilateral lung fields.  Abdominal: Mild abdominal distention. Soft to palpation. Normal bowel sounds. Mild diffuse tenderness without rigidity or guarding.  Musculoskeletal:  No deformity.    Neurological:  Awake, alert, oriented to person and location. Disoriented to situation.  Skin:  Skin is warm, sweating noted  Peripheral vascular:  No cyanosis. Anasarca noted, with questionable slight improvement from yesterday. Extremities appear well perfused.  Edited by: Margo Khan DO at 1/5/2023 1049  ----------------------------------------------------------------------------------------------------------------------  Results from last 7 days   Lab Units 01/05/23  0022 01/04/23  0044 01/03/23  0043 12/31/22  0016 12/30/22  1921  12/30/22  1613 12/30/22  0452 12/30/22  0217   CRP mg/dL  --   --   --   --   --   --   --  2.84*   LACTATE mmol/L  --   --   --   --  1.6  --   --  2.8*   WBC 10*3/mm3 16.45* 8.71 8.39   < >  --   --   --  14.77*   HEMOGLOBIN g/dL 11.2* 10.2* 10.1*   < >  --    < > 11.3* 13.2   HEMATOCRIT % 34.7* 32.5* 32.2*   < >  --    < > 33.8* 39.8   MCV fL 106.8* 108.3* 108.8*   < >  --   --   --  107.3*   MCHC g/dL 32.3 31.4* 31.4*   < >  --   --   --  33.2   PLATELETS 10*3/mm3 66* 50* 45*   < >  --   --   --  65*   INR   --   --   --   --   --   --  1.43*  --     < > = values in this interval not displayed.     Results from last 7 days   Lab Units 01/04/23  0552   PH, ARTERIAL pH units 7.499*   PO2 ART mm Hg 65.0*   PCO2, ARTERIAL mm Hg 35.1   HCO3 ART mmol/L 27.2*     Results from last 7 days   Lab Units 01/05/23  0022 01/04/23  1903 01/04/23  0044 01/03/23  0043 12/31/22  0016 12/30/22  0217   SODIUM mmol/L 146*  --  144 145   < > 142   POTASSIUM mmol/L 3.3* 3.6 3.5 3.7   < > 3.3*   MAGNESIUM mg/dL 1.9  --  1.8 1.7   < > 1.9   CHLORIDE mmol/L 113*  --  113* 113*   < > 110*   CO2 mmol/L 24.3  --  24.1 26.4   < > 20.7*   BUN mg/dL 12  --  12 13   < > 13   CREATININE mg/dL 0.31*  --  0.29* 0.37*   < > 0.50*   CALCIUM mg/dL 7.3*  --  7.0* 7.5*   < > 8.0*   GLUCOSE mg/dL 87  --  152* 146*   < > 96   ALBUMIN g/dL  --   --   --  1.3*  --  2.2*   BILIRUBIN mg/dL  --   --   --  1.1  --  2.7*   ALK PHOS U/L  --   --   --  155*  --  176*   AST (SGOT) U/L  --   --   --  32  --  72*   ALT (SGPT) U/L  --   --   --  36  --  61*    < > = values in this interval not displayed.   Estimated Creatinine Clearance: 283.2 mL/min (A) (by C-G formula based on SCr of 0.31 mg/dL (L)).  Ammonia   Date Value Ref Range Status   01/03/2023 34 16 - 60 umol/L Final                 Glucose   Date/Time Value Ref Range Status   01/05/2023 0633 98 70 - 130 mg/dL Final     Comment:     Meter: [id number] : [id number] YAKELIN HOUGH   01/05/2023 0021 88 70 -  130 mg/dL Final     Comment:     Meter: [id number] : [id number] DEV ARSEN   01/04/2023 1304 122 70 - 130 mg/dL Final     Comment:     Meter: [id number] : [id number] SHAWN RANGELE   01/04/2023 0607 177 (H) 70 - 130 mg/dL Final     Comment:     Meter: [id number] : [id number] DEV ARSEN   01/04/2023 0043 152 (H) 70 - 130 mg/dL Final     Comment:     Meter: [id number] : [id number] DEV ARSEN   01/03/2023 1757 142 (H) 70 - 130 mg/dL Final     Comment:     Meter: [id number] : [id number] VIVIAN NAVAS   01/03/2023 0616 156 (H) 70 - 130 mg/dL Final     Comment:     Meter: [id number] : [id number] DEV ARSEN   01/03/2023 0022 140 (H) 70 - 130 mg/dL Final     Comment:     Meter: [id number] : [id number] DEV ARSEN     Lab Results   Component Value Date    TSH 1.810 12/27/2022    FREET4 0.82 (L) 12/27/2022     No results found for: PREGTESTUR, PREGSERUM, HCG, HCGQUANT  Pain Management Panel     Pain Management Panel Latest Ref Rng & Units 12/20/2022 11/15/2022    AMPHETAMINES SCREEN, URINE Negative Negative Negative    BARBITURATES SCREEN Negative Negative Negative    BENZODIAZEPINE SCREEN, URINE Negative Negative Negative    BUPRENORPHINEUR Negative Negative Negative    COCAINE SCREEN, URINE Negative Negative Negative    METHADONE SCREEN, URINE Negative Negative Negative    METHAMPHETAMINEUR Negative Negative Negative        Brief Urine Lab Results  (Last result in the past 365 days)      Color   Clarity   Blood   Leuk Est   Nitrite   Protein   CREAT   Urine HCG        01/04/23 1749 Dark Yellow   Cloudy   Large (3+)   Small (1+)   Negative   Trace               Blood Culture   Date Value Ref Range Status   12/30/2022 No growth at 5 days  Final   12/30/2022 No growth at 5 days  Final     No results found for: URINECX  No results found for: WOUNDCX  No results found for: STOOLCX  Respiratory Culture   Date Value Ref Range Status   01/04/2023   Preliminary    Scant growth  (1+) The culture consists of normal respiratory nick. This is a preliminary report; final report to follow.   12/30/2022   Final    Moderate growth (3+) Normal respiratory nick. No S. aureus or Pseudomonas aeruginosa detected. Final report.     No results found for: AFBCX  Results from last 7 days   Lab Units 12/30/22  1921 12/30/22  0217   PROCALCITONIN ng/mL  --  0.18   LACTATE mmol/L 1.6 2.8*   CRP mg/dL  --  2.84*       I have personally looked at the labs and they are summarized above.  ----------------------------------------------------------------------------------------------------------------------  Detailed radiology reports for the last 24 hours:  Imaging Results (Last 24 Hours)     Procedure Component Value Units Date/Time    XR Chest 1 View [[id number]] Resulted: 01/05/23 1027     Updated: 01/05/23 1027        Assessment & Plan      #Acute hypoxic respiratory failure secondary to aspiration pneumonia diagnosed 12/30/22  #Community acquired pneumonia s/p course of ceftriaxone and doxycycline  #Septic shock  #New fever 1/4/23  - Patient was admitted with community acquired pneumonia and symptoms resolved after treatment with ceftriaxone and doxycycline. He then had an aspiration event with subsequent severe hypoxia and was intubated on 12/30/22. He was started on broad spectrum antibiotic coverage with vanc/zosyn. Vancomycin now discontinued after MRSA swab was negative.  - Continue treatment with zosyn, day 6.  - Pulmonology consulted, appreciate assistance.   - No longer requiring levophed for blood pressure support. Continue midodrine.   - Continue to follow blood counts and culture results.  - Repeat blood cultures from 1/4/23 in process. Patient was extubated yesterday but unfortunately respiratory status has worsened again and he has been re-intubated.    #Extrapyramidal symptoms  #Delirium and aggression  #Metabolic encephalopathy  #history of hyperammonemia  - Etiology of patient's confusion is  not entirely clear. Thought to be mostly due to his acute illness; however, his confusion persisted and apparently worsened despite resolution of elevated ammonia and treatment of his initial infection. He then required multiple doses of geodon as he was becoming very agitated and threatening to hit staff (actually did attempt to hit ).   - He received ativan for elevated CIWA score previously, and his wife has requested he not get any more ativan or similar medications. She is concerned that he has had worse confusion when given ativan in the past (though his confusion this admission started before the ativan was given). Unlikely that his altered mental status is from alcohol withdrawal at this point as he has reportedly been sober for at least six weeks now. She also thinks it unlikely to be psychiatric in origin. Psychiatry has been consulted for recommendations, especially for medication recommendations to help with agitation but that won't worsen his confusion, if possible. Wife says patient has had confusion when electrolytes have been abnormal in the past, but sodium, potassium, and magnesium are now WNL.   - Today during my exam patient was more calm and oriented to person and place, not situation.  - Continue to monitor closely when attempting to wean from sedation.        #Liver cirrhosis, unknown etiology  #Ascites  #Hyperbilirubinemia  #Hyperammonemia  #Chronically elevated alkaline phosphatase  #Hypoalbuminemia  #Pseudohypocalcemia 2/2 hypoalbuminemia  #Abdominal pain  - CT findings suggestive of cirrhosis on admission, significantly worse than previous CT in November.  - Etiology unclear, but differential diagnosis favors alcohol +/- acetaminophen induced over hemochromatosis. Less likely primary sclerosing cholangitis as he had a normal MRCP one month prior to admission, but it is still on the differential. He would not be able to get an MRI at present, and likely couldn't have earlier  in admission either, due to agitation and now intubation. Patient reportedly stopped drinking about six weeks ago now.  - He had large ascites on CT but then this appears to have significantly improved by the time he was able to get paracentesis.  - Follow up with outpatient GI as scheduled  - Avoid hepatotoxins as able.  - Repeat CMP in AM to monitor transaminases    #Acute on chronic thrombocytopenia  #Chronic macrocytic anemia  - Thrombocytopenia likely secondary to cirrhosis and worsened by acute illness. Platelet count improved to 66K today. Transfuse platelets for <10,000 if no acute bleeding or <50,000 with signs of bleeding.   - Hgb has slowly trended down over the past two weeks but seems to be improving, hemoglobin 11.2 today. No signs of acute bleeding seen on today's exam. Folate was low normal (5.71) when checked on 12/27/22. Ferritin was significantly elevated (possibly as acute phase reactant) and TIBC decreased, but iron level was low. Continue folic acid and thiamine supplements.   - Repeat CBC in a.m.    #Positive urine drug screen  - UDS positive for TCAs at admission (inconsistent with home medication list). He denied any current drug use at admission.     #Reported left leg weakness  #Multilevel degenerative disc disease  #History of TIA/CVA  - Nonspecific periventricular white matter lesions noted on MRI brain earlier in admission likely representing chronic deep white matter ischemic change, no acute abnormalities noted. MRI lumbar spine showed multilevel degenerative changes with moderate canal stenosis at L5-S1 and mild to moderate canal stenosis at L3-L4.   - Reconsult PT/OT when weaned from ventilator.    #Acute hypokalemia  #Borderline hypomagnesemia  - Resolved  - Replace per protocol  - Monitor with chemistry panel periodically     #History of hypertension   - BP has been low requiring vasopressor support. Continue to monitor and avoid agents that would lower BP at this  time.    #GERD  #Johnson's esophagus  - Continue PPI    #Ulcerative colitis  - No findings of worsening colitis on last CT. Continue supportive care.    #Tobacco use  - Nicotine patch made available     #Constipation  - No BM in nearly a week. No s/s of bowel obstruction. Start Miralax daily and dulcolax or senna-docusate prn.    Copied portions of the note have been reviewed and are accurate as of 01/05/23.  Edited by: Margo Khan DO at 1/5/2023 1050    VTE Prophylaxis:   Mechanical Order History:      Ordered        12/30/22 0856  Place Sequential Compression Device  Once            12/30/22 0856  Maintain Sequential Compression Device  Continuous                    Pharmalogical Order History:      Ordered     Dose Route Frequency Stop    12/21/22 0106  heparin (porcine) 5000 UNIT/ML injection 5,000 Units  Status:  Discontinued         5,000 Units SC Every 12 Hours Scheduled 12/30/22 0438                Dispo: Pending clinical course. Likely will need LTAC so [facility] consult has been requested.    Margo Khan DO  [facility]  01/05/23  10:50 EST

## 2023-01-05 NOTE — CASE MANAGEMENT/SOCIAL WORK
Continued Stay Note   [facility]     Patient Name: Jamison Edward  MRN: [id number]  Today's Date: 1/5/2023    Admit Date: 12/20/2022       Discharge Plan     Row Name 01/05/23 1350       Plan    Plan Remains in CCU. Re-intubated today. [facility] cslt, likely will need vent weaning per MD note. SS consulted to assist with disposition planning.    Row Name 01/05/23 1341       Plan    Plan Comments                Discharge Codes    No documentation.               Expected Discharge Date and Time     Expected Discharge Date Expected Discharge Time    Jan 6, 2023             Zaida Wilson RN

## 2023-01-05 NOTE — PROGRESS NOTES
THC Physician - Brief Progress Note  PERMANENT  01/05/2023 01:14    [facility] - [place] - [place] - CCU - 10 - [place], [place] ([facility])    BRIAN MUNIZ    Date of Service 01/05/2023 01:14    HPI/Events of Note RN informed HR 130s, /70  Takes Metoprolol 25 mg at home and has not received here  D/W RN and suggested Metoprolol 5 mg IV X 1      Interventions Intermediate-Arrhythmia - evaluation and management, Communication with other healthcare providers and/or family        Electronically Signed by: Cuauhtemoc Campos) on 01/05/2023 01:16

## 2023-01-05 NOTE — PROGRESS NOTES
Endotracheal Intubation Procedure Note    Indication for endotracheal intubation: Critically ill due to change in mental status- respiratory failure not able to clear respiratory secretions     Sedation: Propofol -10 mL     Number of attempts: 1      Patient was emergently intubated as patient had altered mental status.  Patient was given propofol and vecuronium for sedation and paralysis.  Mac 4 blade was used to look at the vocal cords and grade 2 view of Vocal cords was achieved.  Size 7.5 tube was inserted through the vocal cords.  Tube condensation was present.  End tidal CO2 color change was noticed for 10 breaths.  Bilateral breath sounds were present.    Patient's vitals remained normal.  Patient's pulse ox remained from % patient was put on assist control mode of ventilation.

## 2023-01-05 NOTE — CASE MANAGEMENT/SOCIAL WORK
Discharge Planning Assessment   [facility]     Patient Name: Jamison Edward  MRN: [id number]  Today's Date: 1/5/2023    Admit Date: 12/20/2022       Discharge Plan     Row Name 01/05/23 1359       Plan    Plan SS received  consult for Pt now on vent. [facility] referral. Assist with safe discharge planning. SS spoke with [facility] per Rachel who states she has discussed with Pt's spouse and she is agreeable to Continue care hospital placement. [facility] has agreed to accept Pt today.  notified Dr. Khan of bed availability.  to follow.                       DB MariW

## 2023-01-05 NOTE — PROGRESS NOTES
THC Physician - Brief Progress Note  PERMANENT  01/05/2023 06:03    [facility] [place] CCU - [facility] - [place], [place] ([facility])    BRIAN MUNIZ    Date of Service 01/05/2023 06:03    HPI/Events of Note RN reported HR in 120s after Lopressor 5 mg IV was given (when HR was 130 - 140s)  D/W RN who mentioned that patient remains in sinus rhythm and has no other concerns (of pain or volume depletion) on causes for tachycardia  Suggeted Lopressor 5 mg IV X 1      Interventions Intermediate-Arrhythmia - evaluation and management        Electronically Signed by: Cuauhtemoc Campos) on 01/05/2023 06:05

## 2023-01-05 NOTE — DISCHARGE SUMMARY
[facility] HOSPITALISTS DISCHARGE SUMMARY    Patient Identification:  Name:  Jamison Edward  Age:  52 y.o.  Sex:  male  :  1970  MRN:  [id number]  Visit Number:  [id number]    Date of Admission: 2022  Date of Discharge:  2023     PCP: Leticia Martinez APRN    DISCHARGE DIAGNOSIS   #Community acquired pneumonia, present on admission  #Acute hypoxic respiratory failure secondary to aspiration pneumonia diagnosed 22  #Septic shock  #Recurrent fever 23  #Extrapyramidal symptoms  #Delirium and aggression  #Metabolic encephalopathy  #history of hyperammonemia  #Liver cirrhosis, unknown etiology  #Ascites  #Hyperbilirubinemia  #Hyperammonemia  #Chronically elevated alkaline phosphatase  #Hypoalbuminemia  #Pseudohypocalcemia 2/2 hypoalbuminemia  #Abdominal pain  #Acute on chronic thrombocytopenia  #Chronic macrocytic anemia  #Positive urine drug screen  #Reported left leg weakness  #Multilevel degenerative disc disease  #History of TIA/CVA  #Acute hypokalemia  #Mild hypomagnesemia  #History of hypertension   #GERD  #History of Johnson's esophagus  #Ulcerative colitis  #Tobacco use    CONSULTS   Pulmonology   Psychiatry  Palliative Care    PROCEDURES PERFORMED  Diagnostic paracentesis 22  Endotracheal intubation 22  IO line insertion 22  US guided midline insertion 22  Central line placement 22  Extubation 23 and re-intubation 23      HOSPITAL COURSE  Patient is a 52 y.o. male presented to [facility] complaining of confusion, lower extremity edema, leg weakness, and abdominal swelling.  Please see the admitting history and physical for further details.      Patient was initially diagnosed with community acquired pneumonia and treated with ceftriaxone and doxycycline. He was also found to have cirrhosis on CT abdomen, which was significantly worse than his prior imaging in 2022. He was not noted to have any evidence of  "CVA on imaging but did have multilevel degenerative disc disease on spinal imaging, thought to be the culprit for his back pain and leg weakness. After treating his pneumonia his respiratory status improved but his confusion continued to wax and wane. He had a diagnostic paracentesis on 12/28/22 for \"large ascites\" seen on CT, but by the time he had paracentesis the amount of ascites had greatly reduced. He intermittently became agitated and aggressive, requiring doses of geodon or ativan to help his agitation and ensure safety of himself and staff. The etiology of patient's confusion is not entirely clear. Thought to be mostly due to his acute illness; however, his confusion persisted and apparently worsened despite resolution of elevated ammonia and treatment of his initial infection. There was some concern for alcohol withdrawal; however, patient's wife reported he had not had any alcohol for 4-5 weeks prior to admission. Due likely to repeat doses of antipsychotic he developed extrapyramidal symptoms, so the geodon was discontinued.     On 12/30/22 patient had an aspiration event and had acute respiratory distress requiring intubation. He developed aspiration pneumonia and septic shock requiring vasopressor support. Pulmonology was consulted and assisted with ventilator management. He was treated with IV zosyn and shock resolved. He continued to clinically improve and was extubated on 1/4/23, but unfortunately began to tire out and had to be re-intubated on 1/5/23. The likelihood of a prolonged ventilator course was discussed with patient's wife and she was agreeable to LTAC placement.    During admission patient had mild electrolyte abnormalities that resolved after appropriate electrolyte replacement. He has a history of hypertension but all antihypertensives were held due to hypotension, and he did not require them to be restarted. He has chronic anemia and thrombocytopenia which worsened earlier in admission " and has now begun to improve. He was given folic acid supplementation for folate deficiency/macrocytic anemia and thiamine supplementation for history of alcohol abuse and possible withdrawal.    On day of discharge patient was intubated and sedated. Plan was discussed with his wife at bedside. He was accepted to [facility] for long term acute care and will be transferred there for further management.    Follow up:  - He will need to follow up with GI when possible for further workup of his cirrhosis.  - Continue to monitor anemia and thrombocytopenia with periodic labs and monitor for signs of bleeding.        VITAL SIGNS:  Temp:  [97.8 °F (36.6 °C)-100 °F (37.8 °C)] 99.3 °F (37.4 °C)  Heart Rate:  [] 102  Resp:  [18-30] 28  BP: ()/(49-86) 100/56  FiO2 (%):  [40 %-80 %] 40 %  SpO2:  [80 %-100 %] 99 %  on  Flow (L/min):  [4-12] 12;   Device (Oxygen Therapy): ventilator    Body mass index is 31.33 kg/m².  Wt Readings from Last 3 Encounters:   01/05/23 88 kg (194 lb 0.1 oz)   12/20/22 75.3 kg (166 lb)   11/18/22 70.9 kg (156 lb 3.2 oz)       PHYSICAL EXAM:   Constitutional:  Well-developed and well-nourished. No acute distress. Appears chronically ill. Intubated.  HENT:  Head:  Normocephalic and atraumatic. ET and OG tubes in place.  Cardiac: Heart regular rate and rhythm, no murmur  Pulmonary/Chest:  On mechanical ventilation, appears synchronous with ventilator. Sounds of mechanical ventilation noted and mild rhonchi noted but improved from earlier today.   Abdominal: Mild abdominal distention. Soft to palpation. Normal bowel sounds.  Musculoskeletal:  No deformity.    Neurological:  Sedated  Skin:  Skin is warm and dry.   Peripheral vascular:  No cyanosis. Anasarca noted. Extremities appear well perfused  Edited by: Margo Khan DO at 1/5/2023 1706      DISCHARGE DISPOSITION   Stable    DISCHARGE MEDICATIONS:     Discharge Medications      Continue These Medications      Instructions  Start Date   levETIRAcetam 750 MG tablet  Commonly known as: KEPPRA   750 mg, Oral, 2 Times Daily, Patient takes one tablet in the morning and afternoon and takes two tablets at bedtime.      levETIRAcetam 750 MG tablet  Commonly known as: KEPPRA   1,500 mg, Oral, Nightly         Stop These Medications    baclofen 20 MG tablet  Commonly known as: LIORESAL     esomeprazole 40 MG capsule  Commonly known as: nexIUM     isosorbide mononitrate 30 MG 24 hr tablet  Commonly known as: IMDUR     metoclopramide 10 MG tablet  Commonly known as: REGLAN     metoprolol succinate XL 25 MG 24 hr tablet  Commonly known as: TOPROL-XL     nitroglycerin 0.4 MG SL tablet  Commonly known as: NITROSTAT     QUEtiapine 50 MG tablet  Commonly known as: SEROquel     venlafaxine 75 MG tablet  Commonly known as: EFFEXOR               Contact information for follow-up providers     Leticia Martinez APRN .    Specialties: Nurse Practitioner, Family Medicine  Contact information:  [address] [facility] [address]  [phone]                   Contact information for after-discharge care     Destination     [facility] .    Service: Long Term Acute Care  Contact information:  [address] [facility] [address]  [phone]                              TEST  RESULTS PENDING AT DISCHARGE  Pending Labs     Order Current Status    Blood Culture - Blood, Wrist, Left Preliminary result    Respiratory Culture - Sputum, ET Suction Preliminary result           CODE STATUS  Code Status and Medical Interventions:   Ordered at: 12/20/22 9540     Level Of Support Discussed With:    Patient     Code Status (Patient has no pulse and is not breathing):    CPR (Attempt to Resuscitate)     Medical Interventions (Patient has pulse or is breathing):    Full Support       The 10-year ASCVD risk score (Macho DEY, et al., 2019) is: 2.6%    Values used to calculate the score:      Age: 52 years      Sex: Male      Is Non-   American: No      Diabetic: No      Tobacco smoker: Yes      Systolic Blood Pressure: 100 mmHg      Is BP treated: No      HDL Cholesterol: 87 mg/dL      Total Cholesterol: 145 mg/dL     Margo Khan DO  [facility]  01/05/23  16:25 EST    Please note that this discharge summary required more than 30 minutes to complete.

## 2023-01-05 NOTE — PLAN OF CARE
Goal Outcome Evaluation:  Plan of Care Reviewed With: spouse, patient            Discharging to [facility]

## 2023-01-05 NOTE — SIGNIFICANT NOTE
01/05/23 1016   OTHER   Discipline speech language pathologist   Rehab Time/Intention   Session Not Performed unable to evaluate, medical status change  (Consult received, chart reviewed. Rachid observed in CCU wiht pulomonologist present. Pulmonologist defers SLP evaluation at this time d/t current respiratory status. SLP to f/u daily for status improvements.)

## 2023-01-05 NOTE — CASE MANAGEMENT/SOCIAL WORK
Discharge Planning Assessment   [place]     Patient Name: Jamison Edward  MRN: [id number]  Today's Date: 1/5/2023    Admit Date: 12/20/2022       Discharge Plan     Row Name 01/05/23 1512       Plan    Final Discharge Disposition Code 63 - LT[facility]    Final Note [facility].  provided RN with report number for [facility] [phone] [facility].                              Continued Care and Services - Admitted Since 12/20/2022     Destination Coordination complete.    Service Provider Request Status Selected Services Address Phone Fax Patient Preferred    [facility]  Selected Long Term Acute Care [address] [facility] [place] [address] [phone] --                RAMESH Mari

## 2023-01-05 NOTE — PROGRESS NOTES
Enter Query Response Below      Query Response:     Patient has been treated for two different instances of pneumonia--first, bacterial pneumonia unspecified, then aspiration pneumonia.         If applicable, please update the problem list.          Patient: Jamison Banegas        : 1970  Account: [id number]           Admit Date:         How to Respond to this query:       a. Click New Note     b. Answer query within the yellow box.                c. Update the Problem List, if applicable.      If you have any questions about this query contact me at: [email or web address]     :    52 year old admitted with community acquired pneumonia, septic shock and respiratory failure. Treated with Vibramycin 22 to 22, Rocephin 22 to 22, Zosyn 22 to 23. After study can you please clarify the type of pneumonia the patient was treated/monitored for:    Gram negative pneumonia (excluding Haemophilus influenzae)  Bacterial pneumonia unspecified  Other- specify______  Unable to determine    By submitting this query, we are merely seeking further clarification of documentation to accurately reflect all conditions that you are monitoring, evaluating, treating or that extend the hospitalization or utilize additional resources of care. Please utilize your independent clinical judgment when addressing the question(s) above.     This query and your response, once completed, will be entered into the legal medical record.    Sincerely,  Lawanda Arguello RN CDI  Clinical Documentation Integrity Program

## 2023-01-05 NOTE — PLAN OF CARE
Goal Outcome Evaluation:           Progress: no change  Outcome Evaluation: Patient resting in bed.  O2 has been stable on 6L NC.  IV Lopressor given for tachycardia.  Mag and K being replaced.  No complaints at this time.

## 2023-01-05 NOTE — PROGRESS NOTES
" LOS: 15 days     Chief Complaint:  Pulmonology is following for acute hypoxic respiratory failure    Subjective     Interval History:     Patient remains on mechanical ventilator support.  Again, did not recently tolerate weaning trial after notable for increased agitation.  Appropriately sedated at this time.  Interval fever noted of 101.    Review of Systems:     Unable to obtain review of systems due to intubation and sedation.           Objective     Vital Signs  BP (!) 84/61   Pulse 108   Temp 97.9 °F (36.6 °C) (Oral)   Resp 25   Ht 167.6 cm (65.98\")   Wt 83.8 kg (184 lb 11.9 oz)   SpO2 92%   BMI 29.83 kg/m²      Physical Exam:  GENERAL APPEARANCE: Intubated and sedated.  Appears to be resting comfortably in bed.   HEENT:  normocephalic. Atraumatic. Pupils equal bilaterally. No scleral icterus.  ET tube in place.  No tracheal deviation.   CARDIAC: Normal S1 and S2. No S3, S4 or murmurs. Rhythm is regular. There is peripheral edema  RESPIRATORY: Bilateral air entry positive. Clear lung sounds. Compliant with current ventilator settings.   GI:   Nondistended.  MUSCULOSKELETAL: No significant deformity or joint abnormality.  Diffuse peripheral edema.  NEUROLOGICAL: Unable to assess due to sedation status.  PSYCHIATRIC: Unable to assess due to sedation status.                             Results Review:   I reviewed the patient's new clinical results.  I reviewed the patient's new imaging results and agree with the interpretation.        CBC      CBC 1/2/23 1/3/23 1/4/23   WBC 9.91 8.39 8.71   RBC 3.16 (A) 2.96 (A) 3.00 (A)   Hemoglobin 10.8 (A) 10.1 (A) 10.2 (A)   Hematocrit 33.8 (A) 32.2 (A) 32.5 (A)   .0 (A) 108.8 (A) 108.3 (A)   MCH 34.2 (A) 34.1 (A) 34.0 (A)   MCHC 32.0 31.4 (A) 31.4 (A)   RDW 15.3 15.4 15.8 (A)   Platelets 44 (A) 45 (A) 50 (A)   (A) Abnormal value                  CMP      CMP 1/2/23 1/3/23 1/4/23 1/4/23      0044 1903   Glucose 160 (A) 146 (A) 152 (A)    BUN 12 13 12  "   Creatinine 0.42 (A) 0.37 (A) 0.29 (A)    eGFR 127.8 132.7 142.9    Sodium 145 145 144    Potassium 4.0 3.7 3.5 3.6   Chloride 114 (A) 113 (A) 113 (A)    Calcium 7.7 (A) 7.5 (A) 7.0 (A)    Total Protein  3.6 (A)     Albumin  1.3 (A)     Globulin  2.3     Total Bilirubin  1.1     Alkaline Phosphatase  155 (A)     AST (SGOT)  32     ALT (SGPT)  36     Albumin/Globulin Ratio  0.6     BUN/Creatinine Ratio 28.6 (A) 35.1 (A) 41.4 (A)    Anion Gap 5.4 5.6 6.9    (A) Abnormal value         Comments are available for some flowsheets but are not being displayed.                 Site   Date Value Ref Range Status   01/04/2023 Nurse/Dr Arguello  Final     [facility]   Date Value Ref Range Status   01/04/2023 Positive  Final     pH, Arterial   Date Value Ref Range Status   01/04/2023 7.499 (H) 7.350 - 7.450 pH units Final     Comment:     83 Value above reference range     pCO2, Arterial   Date Value Ref Range Status   01/04/2023 35.1 35.0 - 45.0 mm Hg Final     pO2, Arterial   Date Value Ref Range Status   01/04/2023 65.0 (L) 83.0 - 108.0 mm Hg Final     Comment:     84 Value below reference range     HCO3, Arterial   Date Value Ref Range Status   01/04/2023 27.2 (H) 20.0 - 26.0 mmol/L Final     Comment:     83 Value above reference range     Base Excess, Arterial   Date Value Ref Range Status   01/04/2023 4.0 (H) 0.0 - 2.0 mmol/L Final     O2 Saturation, Arterial   Date Value Ref Range Status   01/04/2023 93.9 (L) 94.0 - 99.0 % Final     Comment:     84 Value below reference range     Hemoglobin, Blood Gas   Date Value Ref Range Status   01/04/2023 12.0 (L) 14 - 18 g/dL Final     Comment:     84 Value below reference range     Hematocrit, Blood Gas   Date Value Ref Range Status   01/04/2023 32.6 (L) 38.0 - 51.0 % Final     Comment:     84 Value below reference range     Oxyhemoglobin Venous   Date Value Ref Range Status   01/04/2023 59.0 45.0 - 75.0 % Final     Methemoglobin Venous   Date Value Ref Range Status   01/04/2023 0.4  0.0 - 3.0 % Final     Methemoglobin   Date Value Ref Range Status   01/04/2023 <-0.10 (L) 0.00 - 3.00 % Final     Comment:     94 Value below reportable range < _0.1     Carboxyhemoglobin   Date Value Ref Range Status   01/04/2023 0.9 0 - 5 % Final     Carboxyhemoglobin Venous   Date Value Ref Range Status   01/04/2023 1.0 0.0 - 5.0 % Final     CO2 Content   Date Value Ref Range Status   01/04/2023 31.3 22 - 33 mmol/L Final     Barometric Pressure for Blood Gas   Date Value Ref Range Status   01/04/2023 722 mmHg Final     Modality   Date Value Ref Range Status   01/04/2023 Ventilator  Final     FIO2   Date Value Ref Range Status   01/04/2023 40 % Final           Medication Review:   Scheduled Medications:  chlorhexidine, 15 mL, Mouth/Throat, Q12H  famotidine, 20 mg, Oral, BID AC  folic acid, 1 mg, Oral, Daily  ipratropium, 0.5 mg, Nebulization, 4x Daily - RT  [START ON 1/5/2023] levETIRAcetam, 750 mg, Intravenous, Q24H  levETIRAcetam, 750 mg, Intravenous, Q24H  levETIRAcetam, 1,500 mg, Intravenous, Q24H  magnesium oxide, 400 mg, Oral, BID  midodrine, 10 mg, Oral, TID AC  OLANZapine, 5 mg, Oral, BID  piperacillin-tazobactam, 3.375 g, Intravenous, Q8H  sodium chloride, 10 mL, Intravenous, Q12H  sodium chloride, 10 mL, Intravenous, Q12H  sodium chloride, 10 mL, Intravenous, Q12H  sodium chloride, 10 mL, Intravenous, Q12H  sodium chloride, 10 mL, Intravenous, Q12H  thiamine, 100 mg, Oral, Daily      Continuous infusions:  dexmedetomidine, 0.2-1.5 mcg/kg/hr, Last Rate: Stopped (01/04/23 0902)  norepinephrine, 0.01-0.3 mcg/kg/min, Last Rate: Stopped (01/02/23 0430)  Pharmacy Consult,   propofol, 5-50 mcg/kg/min, Last Rate: Stopped (01/04/23 0902)        Assessment:   Acute hypoxic respiratory failure  Bilateral pneumonia, concern for aspiration pneumonia  Acute metabolic encephalopathy  Septic shock  COPD  Cirrhosis with ascites: Post paracentesis  Chronic thrombocytopenia      Plan:   Sedated-propofol, Precedex.  On  thiamine.  Plan to attempt SAT/SBT trial today as appropriate.  Ordered restraints due to recent aggressiveness during previous weaning trials/pulling at lines and tubes..  On Keppra  On Zyprexa  On thiamine    On midodrine.  Map goal 65 mmHg or greater.     FiO2 (%):  [40 %] 40 %  S RR:  [18-22] 18  PEEP/CPAP (cm H2O):  [5 cm H20-8 cm H20] 5 cm H20  PA SUP:  [8 cm H20] 8 cm H20  MAP (cm H2O):  [8.3-13] 9.7  Ventilator settings and graphics reviewed.  Recent ABG reviewed.  Settings adjusted to rate 18, PEEP 5  Will attempt SAT/SBT as tolerated.  On Atrovent nebs.  On IV Zosyn, concern for aspiration prior to intubation.    GI prophylaxis: Pepcid  Nutrition: Feeds.  Discontinued free water flushes.    Discontinued free water flushes.  Ordered Lasix 40 mg IV once  Received potassium replacement  Ordered magnesium sulfate replacement, in addition to current order..    Concern for aspiration prior to intubation  Antibiotics: Zosyn  Cultures: Micro reviewed    DVT prophylaxis: SCDs  Thrombocytopenia noted in the setting of liver dysfunction, but overall stable.  Anemia stable.  On folic acid replacement.  Recent hematoma noted of the psoas muscle on imaging after paracentesis.    Cc time 31 mins   Fariba Gunderson PA-C  01/04/23  19:39 Manuel MCKENNA M.D. attest that the above note accurately reflects the work and decisions made  by me.  Patient was seen and evaluated by Dr. Finley, including history of present illness, physical exam, assessment, and treatment plan.  The above note was reviewed and edited by Dr. Finley.    Scribed for Dr. Finley by Fariba Gunderson PA-C

## 2023-01-05 NOTE — PROGRESS NOTES
" LOS: 16 days     Chief Complaint:  Pulmonology is following for acute hypoxic respiratory failure    Subjective     Interval History:     Patient was able to be extubated yesterday. Since then, he has been notable for progressive worsening of shortness of breath. Notable for week cough, breathlessness with speech.   States that he feels like his breathing is worsening and may need to return to ventilator support.   Wife present at bedside, also very concerned.       Review of Systems:     Shortness of breath, congestion. Other ROS limited due to current respiratory status.         Objective     Vital Signs  /69   Pulse 105   Temp 99.5 °F (37.5 °C) (Oral)   Resp 24   Ht 167.6 cm (65.98\")   Wt 88 kg (194 lb 0.1 oz)   SpO2 95%   BMI 31.33 kg/m²      Physical Exam:  GENERAL APPEARANCE: alert and cooperative. Increased work of breathing, weak vocal quality and cough with + secretions. On NC.   HEAD: normocephalic. Atraumatic.   ENT: Pupils equal bilaterally. EOMI. Vision grossly intact.  Oral mucosa pink, moist.   Neck supple.   CARDIAC: Normal S1 and S2. Tachycardic, rhythm is regular. There is peripheral edema.  Extremities are warm and well perfused.   RESPIRATORY: Bilateral air entry positive. Rhonchi. Wet cough. On NC. Increased work of breathing/respiratory distress, tachypneic.   GI: nondistended.   MUSCULOSKELETAL: No significant deformity or joint abnormality. Present peripheral edema.   NEUROLOGICAL: nonfocal.   PSYCHIATRIC: oriented to self, situation.                      Results Review:   I reviewed the patient's new clinical results.  I reviewed the patient's new imaging results and agree with the interpretation.        CBC      CBC 1/3/23 1/4/23 1/5/23   WBC 8.39 8.71 16.45 (A)   RBC 2.96 (A) 3.00 (A) 3.25 (A)   Hemoglobin 10.1 (A) 10.2 (A) 11.2 (A)   Hematocrit 32.2 (A) 32.5 (A) 34.7 (A)   .8 (A) 108.3 (A) 106.8 (A)   MCH 34.1 (A) 34.0 (A) 34.5 (A)   MCHC 31.4 (A) 31.4 (A) 32.3   RDW " 15.4 15.8 (A) 15.9 (A)   Platelets 45 (A) 50 (A) 66 (A)   (A) Abnormal value                  CMP      CMP 1/3/23 1/4/23 1/4/23 1/5/23     0044 1903    Glucose 146 (A) 152 (A)  87   BUN 13 12  12   Creatinine 0.37 (A) 0.29 (A)  0.31 (A)   eGFR 132.7 142.9  140.0   Sodium 145 144  146 (A)   Potassium 3.7 3.5 3.6 3.3 (A)   Chloride 113 (A) 113 (A)  113 (A)   Calcium 7.5 (A) 7.0 (A)  7.3 (A)   Total Protein 3.6 (A)      Albumin 1.3 (A)      Globulin 2.3      Total Bilirubin 1.1      Alkaline Phosphatase 155 (A)      AST (SGOT) 32      ALT (SGPT) 36      Albumin/Globulin Ratio 0.6      BUN/Creatinine Ratio 35.1 (A) 41.4 (A)  38.7 (A)   Anion Gap 5.6 6.9  8.7   (A) Abnormal value         Comments are available for some flowsheets but are not being displayed.                 Site   Date Value Ref Range Status   01/04/2023 Nurse/Dr Arguello  Final     [facility] Test   Date Value Ref Range Status   01/04/2023 Positive  Final     pH, Arterial   Date Value Ref Range Status   01/04/2023 7.499 (H) 7.350 - 7.450 pH units Final     Comment:     83 Value above reference range     pCO2, Arterial   Date Value Ref Range Status   01/04/2023 35.1 35.0 - 45.0 mm Hg Final     pO2, Arterial   Date Value Ref Range Status   01/04/2023 65.0 (L) 83.0 - 108.0 mm Hg Final     Comment:     84 Value below reference range     HCO3, Arterial   Date Value Ref Range Status   01/04/2023 27.2 (H) 20.0 - 26.0 mmol/L Final     Comment:     83 Value above reference range     Base Excess, Arterial   Date Value Ref Range Status   01/04/2023 4.0 (H) 0.0 - 2.0 mmol/L Final     O2 Saturation, Arterial   Date Value Ref Range Status   01/04/2023 93.9 (L) 94.0 - 99.0 % Final     Comment:     84 Value below reference range     Hemoglobin, Blood Gas   Date Value Ref Range Status   01/04/2023 12.0 (L) 14 - 18 g/dL Final     Comment:     84 Value below reference range     Hematocrit, Blood Gas   Date Value Ref Range Status   01/04/2023 32.6 (L) 38.0 - 51.0 % Final      Comment:     84 Value below reference range     Oxyhemoglobin Venous   Date Value Ref Range Status   01/04/2023 59.0 45.0 - 75.0 % Final     Methemoglobin Venous   Date Value Ref Range Status   01/04/2023 0.4 0.0 - 3.0 % Final     Methemoglobin   Date Value Ref Range Status   01/04/2023 <-0.10 (L) 0.00 - 3.00 % Final     Comment:     94 Value below reportable range < _0.1     Carboxyhemoglobin   Date Value Ref Range Status   01/04/2023 0.9 0 - 5 % Final     Carboxyhemoglobin Venous   Date Value Ref Range Status   01/04/2023 1.0 0.0 - 5.0 % Final     CO2 Content   Date Value Ref Range Status   01/04/2023 31.3 22 - 33 mmol/L Final     Barometric Pressure for Blood Gas   Date Value Ref Range Status   01/04/2023 722 mmHg Final     Modality   Date Value Ref Range Status   01/04/2023 Ventilator  Final     FIO2   Date Value Ref Range Status   01/04/2023 40 % Final           Medication Review:   Scheduled Medications:  chlorhexidine, 15 mL, Mouth/Throat, Q12H  famotidine, 20 mg, Oral, BID AC  folic acid, 1 mg, Oral, Daily  ipratropium, 0.5 mg, Nebulization, 4x Daily - RT  levETIRAcetam, 750 mg, Intravenous, Q24H  levETIRAcetam, 750 mg, Intravenous, Q24H  levETIRAcetam, 1,500 mg, Intravenous, Q24H  midodrine, 10 mg, Oral, TID AC  OLANZapine, 5 mg, Oral, BID  piperacillin-tazobactam, 3.375 g, Intravenous, Q8H  sodium chloride, 10 mL, Intravenous, Q12H  sodium chloride, 10 mL, Intravenous, Q12H  sodium chloride, 10 mL, Intravenous, Q12H  sodium chloride, 10 mL, Intravenous, Q12H  sodium chloride, 10 mL, Intravenous, Q12H  thiamine, 100 mg, Oral, Daily      Continuous infusions:  dexmedetomidine, 0.2-1.5 mcg/kg/hr, Last Rate: Stopped (01/04/23 0902)  norepinephrine, 0.01-0.3 mcg/kg/min, Last Rate: Stopped (01/02/23 0430)  Pharmacy Consult,   propofol, 5-50 mcg/kg/min, Last Rate: 20 mcg/kg/min (01/05/23 1022)        Assessment:   Acute hypoxic respiratory failure  Bilateral pneumonia, concern for aspiration pneumonia  Acute  metabolic encephalopathy  Septic shock  COPD  Cirrhosis with ascites: Post paracentesis  Chronic thrombocytopenia      Plan:   Decision made with patient, wife for reintubation due to worsening shortness of breath, increased work of breathing, difficulty clearning secreation      Sedation - propofol.  On keppra.   On zyprexa  On thiamine.     Map goal 65 mmHg or greater.   On midodrine    FiO2 (%):  [40 %-80 %] 40 %  S RR:  [18-24] 18  PEEP/CPAP (cm H2O):  [5 cm H20] 5 cm H20  IL SUP:  [8 cm H20] 8 cm H20  MAP (cm H2O):  [9.4-11] 11  Reintubated due to worsening respiratory distress, inability to clear respiratory secretions.   Will repeat chest xray after intubation.  Respiratory culture pending.   On IV zosyn - initial concern for aspiration pneumonia prior to previous intubation.   On atrovent nebs.     GI prophylaxis: famotidine  Nutrition consult placed to start tube feeds.     Ordered Lasix 40 mg IV once.   Also receiving KCl replacement and Mg Sulfate.     Antibiotics: Zosyn  New leukocytosis today.   Cultures reviewed.     DVT prophylaxis: SCDs.   Remains notable for thrombocytopenia in the setting of cirrhosis, stable.   Anemia stable, consider replacement as appropriate.   On folic acid.         Case management referral placed for possible [facility] or other LTACH transfer as patient will likely require prolonged ventilator weaning, due to quick re-devevelopment of respiratory distress after recent extubation (less than 24 hours).       Cc -32 mins  Fariba Gunderson PA-C  01/05/23  11:38 EST      Scribed for Dr. Finley by Fariba Gunderson PA-C  IManuel M.D. attest that the above note accurately reflects the work and decisions made  by me.  Patient was seen and evaluated by Dr. Finley, including history of present illness, physical exam, assessment, and treatment plan.  The above note was reviewed and edited by Dr. Finley.

## 2023-01-06 ENCOUNTER — APPOINTMENT (OUTPATIENT)
Dept: GENERAL RADIOLOGY | Facility: HOSPITAL | Age: 53
End: 2023-01-06
Payer: COMMERCIAL

## 2023-01-06 ENCOUNTER — OUTSIDE FACILITY SERVICE (OUTPATIENT)
Dept: INFECTIOUS DISEASES | Facility: CLINIC | Age: 53
End: 2023-01-06
Payer: MEDICARE

## 2023-01-06 ENCOUNTER — OUTSIDE FACILITY SERVICE (OUTPATIENT)
Dept: PULMONOLOGY | Facility: CLINIC | Age: 53
End: 2023-01-06
Payer: MEDICARE

## 2023-01-06 LAB
ALBUMIN SERPL-MCNC: 1.5 G/DL (ref 3.5–5.2)
ALBUMIN SERPL-MCNC: 1.5 G/DL (ref 3.5–5.2)
ALBUMIN/GLOB SERPL: 0.6 G/DL
ALBUMIN/GLOB SERPL: 0.6 G/DL
ALP SERPL-CCNC: 172 U/L (ref 39–117)
ALP SERPL-CCNC: 194 U/L (ref 39–117)
ALT SERPL W P-5'-P-CCNC: 28 U/L (ref 1–41)
ALT SERPL W P-5'-P-CCNC: 33 U/L (ref 1–41)
ANION GAP SERPL CALCULATED.3IONS-SCNC: 4.4 MMOL/L (ref 5–15)
ANION GAP SERPL CALCULATED.3IONS-SCNC: 8.4 MMOL/L (ref 5–15)
AST SERPL-CCNC: 43 U/L (ref 1–40)
AST SERPL-CCNC: 43 U/L (ref 1–40)
BACTERIA SPEC RESP CULT: NORMAL
BACTERIA UR QL AUTO: ABNORMAL /HPF
BASOPHILS # BLD AUTO: 0.02 10*3/MM3 (ref 0–0.2)
BASOPHILS # BLD AUTO: 0.02 10*3/MM3 (ref 0–0.2)
BASOPHILS NFR BLD AUTO: 0.2 % (ref 0–1.5)
BASOPHILS NFR BLD AUTO: 0.2 % (ref 0–1.5)
BILIRUB SERPL-MCNC: 0.8 MG/DL (ref 0–1.2)
BILIRUB SERPL-MCNC: 0.9 MG/DL (ref 0–1.2)
BILIRUB UR QL STRIP: ABNORMAL
BUN SERPL-MCNC: 12 MG/DL (ref 6–20)
BUN SERPL-MCNC: 13 MG/DL (ref 6–20)
BUN/CREAT SERPL: 41.4 (ref 7–25)
BUN/CREAT SERPL: 44.8 (ref 7–25)
CALCIUM SPEC-SCNC: 7.3 MG/DL (ref 8.6–10.5)
CALCIUM SPEC-SCNC: 7.5 MG/DL (ref 8.6–10.5)
CHLORIDE SERPL-SCNC: 112 MMOL/L (ref 98–107)
CHLORIDE SERPL-SCNC: 116 MMOL/L (ref 98–107)
CHOLEST SERPL-MCNC: 80 MG/DL (ref 0–200)
CLARITY UR: ABNORMAL
CO2 SERPL-SCNC: 25.6 MMOL/L (ref 22–29)
CO2 SERPL-SCNC: 25.6 MMOL/L (ref 22–29)
COLOR UR: ABNORMAL
CREAT SERPL-MCNC: 0.29 MG/DL (ref 0.76–1.27)
CREAT SERPL-MCNC: 0.29 MG/DL (ref 0.76–1.27)
CRP SERPL-MCNC: 10.27 MG/DL (ref 0–0.5)
CRP SERPL-MCNC: 13.53 MG/DL (ref 0–0.5)
D-LACTATE SERPL-SCNC: 2.5 MMOL/L (ref 0.5–2)
DEPRECATED RDW RBC AUTO: 63.1 FL (ref 37–54)
DEPRECATED RDW RBC AUTO: 63.7 FL (ref 37–54)
EGFRCR SERPLBLD CKD-EPI 2021: 142.9 ML/MIN/1.73
EGFRCR SERPLBLD CKD-EPI 2021: 142.9 ML/MIN/1.73
EOSINOPHIL # BLD AUTO: 0.06 10*3/MM3 (ref 0–0.4)
EOSINOPHIL # BLD AUTO: 0.07 10*3/MM3 (ref 0–0.4)
EOSINOPHIL NFR BLD AUTO: 0.6 % (ref 0.3–6.2)
EOSINOPHIL NFR BLD AUTO: 0.7 % (ref 0.3–6.2)
ERYTHROCYTE [DISTWIDTH] IN BLOOD BY AUTOMATED COUNT: 16.3 % (ref 12.3–15.4)
ERYTHROCYTE [DISTWIDTH] IN BLOOD BY AUTOMATED COUNT: 16.5 % (ref 12.3–15.4)
GLOBULIN UR ELPH-MCNC: 2.4 GM/DL
GLOBULIN UR ELPH-MCNC: 2.4 GM/DL
GLUCOSE BLDC GLUCOMTR-MCNC: 115 MG/DL (ref 70–130)
GLUCOSE BLDC GLUCOMTR-MCNC: 117 MG/DL (ref 70–130)
GLUCOSE BLDC GLUCOMTR-MCNC: 142 MG/DL (ref 70–130)
GLUCOSE BLDC GLUCOMTR-MCNC: 175 MG/DL (ref 70–130)
GLUCOSE SERPL-MCNC: 114 MG/DL (ref 65–99)
GLUCOSE SERPL-MCNC: 130 MG/DL (ref 65–99)
GLUCOSE UR STRIP-MCNC: NEGATIVE MG/DL
GRAM STN SPEC: NORMAL
HCT VFR BLD AUTO: 29.3 % (ref 37.5–51)
HCT VFR BLD AUTO: 29.7 % (ref 37.5–51)
HDLC SERPL-MCNC: 17 MG/DL (ref 40–60)
HGB BLD-MCNC: 9.2 G/DL (ref 13–17.7)
HGB BLD-MCNC: 9.4 G/DL (ref 13–17.7)
HGB UR QL STRIP.AUTO: ABNORMAL
HYALINE CASTS UR QL AUTO: ABNORMAL /LPF
IMM GRANULOCYTES # BLD AUTO: 0.06 10*3/MM3 (ref 0–0.05)
IMM GRANULOCYTES # BLD AUTO: 0.07 10*3/MM3 (ref 0–0.05)
IMM GRANULOCYTES NFR BLD AUTO: 0.6 % (ref 0–0.5)
IMM GRANULOCYTES NFR BLD AUTO: 0.7 % (ref 0–0.5)
KETONES UR QL STRIP: ABNORMAL
LDLC SERPL CALC-MCNC: 41 MG/DL (ref 0–100)
LDLC/HDLC SERPL: 2.27 {RATIO}
LEUKOCYTE ESTERASE UR QL STRIP.AUTO: ABNORMAL
LYMPHOCYTES # BLD AUTO: 0.81 10*3/MM3 (ref 0.7–3.1)
LYMPHOCYTES # BLD AUTO: 0.91 10*3/MM3 (ref 0.7–3.1)
LYMPHOCYTES NFR BLD AUTO: 8.5 % (ref 19.6–45.3)
LYMPHOCYTES NFR BLD AUTO: 9.3 % (ref 19.6–45.3)
MCH RBC QN AUTO: 33.3 PG (ref 26.6–33)
MCH RBC QN AUTO: 34.9 PG (ref 26.6–33)
MCHC RBC AUTO-ENTMCNC: 31 G/DL (ref 31.5–35.7)
MCHC RBC AUTO-ENTMCNC: 32.1 G/DL (ref 31.5–35.7)
MCV RBC AUTO: 107.6 FL (ref 79–97)
MCV RBC AUTO: 108.9 FL (ref 79–97)
MONOCYTES # BLD AUTO: 0.53 10*3/MM3 (ref 0.1–0.9)
MONOCYTES # BLD AUTO: 0.61 10*3/MM3 (ref 0.1–0.9)
MONOCYTES NFR BLD AUTO: 5.6 % (ref 5–12)
MONOCYTES NFR BLD AUTO: 6.2 % (ref 5–12)
NEUTROPHILS NFR BLD AUTO: 8.01 10*3/MM3 (ref 1.7–7)
NEUTROPHILS NFR BLD AUTO: 8.12 10*3/MM3 (ref 1.7–7)
NEUTROPHILS NFR BLD AUTO: 83 % (ref 42.7–76)
NEUTROPHILS NFR BLD AUTO: 84.4 % (ref 42.7–76)
NITRITE UR QL STRIP: NEGATIVE
NRBC BLD AUTO-RTO: 0 /100 WBC (ref 0–0.2)
NRBC BLD AUTO-RTO: 0 /100 WBC (ref 0–0.2)
PH UR STRIP.AUTO: 7.5 [PH] (ref 5–8)
PLATELET # BLD AUTO: 65 10*3/MM3 (ref 140–450)
PLATELET # BLD AUTO: 69 10*3/MM3 (ref 140–450)
PMV BLD AUTO: 11.6 FL (ref 6–12)
PMV BLD AUTO: 11.8 FL (ref 6–12)
POTASSIUM SERPL-SCNC: 3.4 MMOL/L (ref 3.5–5.2)
POTASSIUM SERPL-SCNC: 3.8 MMOL/L (ref 3.5–5.2)
POTASSIUM SERPL-SCNC: 3.9 MMOL/L (ref 3.5–5.2)
PREALB SERPL-MCNC: 3.3 MG/DL (ref 20–40)
PROCALCITONIN SERPL-MCNC: 0.34 NG/ML (ref 0–0.25)
PROT SERPL-MCNC: 3.9 G/DL (ref 6–8.5)
PROT SERPL-MCNC: 3.9 G/DL (ref 6–8.5)
PROT UR QL STRIP: ABNORMAL
RBC # BLD AUTO: 2.69 10*6/MM3 (ref 4.14–5.8)
RBC # BLD AUTO: 2.76 10*6/MM3 (ref 4.14–5.8)
RBC # UR STRIP: ABNORMAL /HPF
REF LAB TEST METHOD: ABNORMAL
SODIUM SERPL-SCNC: 146 MMOL/L (ref 136–145)
SODIUM SERPL-SCNC: 146 MMOL/L (ref 136–145)
SP GR UR STRIP: >1.03 (ref 1–1.03)
SQUAMOUS #/AREA URNS HPF: ABNORMAL /HPF
TRIGL SERPL-MCNC: 122 MG/DL (ref 0–150)
UROBILINOGEN UR QL STRIP: ABNORMAL
VLDLC SERPL-MCNC: 22 MG/DL (ref 5–40)
WBC # UR STRIP: ABNORMAL /HPF
WBC NRBC COR # BLD: 9.5 10*3/MM3 (ref 3.4–10.8)
WBC NRBC COR # BLD: 9.79 10*3/MM3 (ref 3.4–10.8)
YEAST URNS QL MICRO: ABNORMAL /HPF

## 2023-01-06 PROCEDURE — 86140 C-REACTIVE PROTEIN: CPT | Performed by: INTERNAL MEDICINE

## 2023-01-06 PROCEDURE — 97163 PT EVAL HIGH COMPLEX 45 MIN: CPT

## 2023-01-06 PROCEDURE — 84145 PROCALCITONIN (PCT): CPT | Performed by: INTERNAL MEDICINE

## 2023-01-06 PROCEDURE — 97167 OT EVAL HIGH COMPLEX 60 MIN: CPT

## 2023-01-06 PROCEDURE — 99291 CRITICAL CARE FIRST HOUR: CPT | Performed by: INTERNAL MEDICINE

## 2023-01-06 PROCEDURE — 80053 COMPREHEN METABOLIC PANEL: CPT | Performed by: INTERNAL MEDICINE

## 2023-01-06 PROCEDURE — 81001 URINALYSIS AUTO W/SCOPE: CPT | Performed by: INTERNAL MEDICINE

## 2023-01-06 PROCEDURE — 87040 BLOOD CULTURE FOR BACTERIA: CPT | Performed by: INTERNAL MEDICINE

## 2023-01-06 PROCEDURE — 82962 GLUCOSE BLOOD TEST: CPT

## 2023-01-06 PROCEDURE — 36415 COLL VENOUS BLD VENIPUNCTURE: CPT | Performed by: INTERNAL MEDICINE

## 2023-01-06 PROCEDURE — 84132 ASSAY OF SERUM POTASSIUM: CPT | Performed by: PHYSICIAN ASSISTANT

## 2023-01-06 PROCEDURE — 71045 X-RAY EXAM CHEST 1 VIEW: CPT | Performed by: RADIOLOGY

## 2023-01-06 PROCEDURE — 85025 COMPLETE CBC W/AUTO DIFF WBC: CPT | Performed by: INTERNAL MEDICINE

## 2023-01-06 PROCEDURE — 99222 1ST HOSP IP/OBS MODERATE 55: CPT | Performed by: INTERNAL MEDICINE

## 2023-01-06 PROCEDURE — 71045 X-RAY EXAM CHEST 1 VIEW: CPT

## 2023-01-06 PROCEDURE — 87086 URINE CULTURE/COLONY COUNT: CPT | Performed by: INTERNAL MEDICINE

## 2023-01-06 PROCEDURE — 83605 ASSAY OF LACTIC ACID: CPT | Performed by: INTERNAL MEDICINE

## 2023-01-06 PROCEDURE — 80061 LIPID PANEL: CPT | Performed by: INTERNAL MEDICINE

## 2023-01-07 ENCOUNTER — OUTSIDE FACILITY SERVICE (OUTPATIENT)
Dept: PULMONOLOGY | Facility: CLINIC | Age: 53
End: 2023-01-07
Payer: MEDICARE

## 2023-01-07 LAB
A-A DO2: 108.1 MMHG (ref 0–300)
ALBUMIN SERPL-MCNC: 1.5 G/DL (ref 3.5–5.2)
ALBUMIN/GLOB SERPL: 0.6 G/DL
ALP SERPL-CCNC: 213 U/L (ref 39–117)
ALT SERPL W P-5'-P-CCNC: 35 U/L (ref 1–41)
ANION GAP SERPL CALCULATED.3IONS-SCNC: 6.6 MMOL/L (ref 5–15)
ARTERIAL PATENCY WRIST A: ABNORMAL
AST SERPL-CCNC: 40 U/L (ref 1–40)
ATMOSPHERIC PRESS: 733 MMHG
BACTERIA SPEC AEROBE CULT: ABNORMAL
BACTERIA SPEC AEROBE CULT: ABNORMAL
BASE EXCESS BLDA CALC-SCNC: 5.1 MMOL/L (ref 0–2)
BASOPHILS # BLD AUTO: 0.02 10*3/MM3 (ref 0–0.2)
BASOPHILS NFR BLD AUTO: 0.2 % (ref 0–1.5)
BDY SITE: ABNORMAL
BILIRUB SERPL-MCNC: 0.9 MG/DL (ref 0–1.2)
BODY TEMPERATURE: 0 C
BUN SERPL-MCNC: 12 MG/DL (ref 6–20)
BUN/CREAT SERPL: 40 (ref 7–25)
CALCIUM SPEC-SCNC: 7.4 MG/DL (ref 8.6–10.5)
CHLORIDE SERPL-SCNC: 114 MMOL/L (ref 98–107)
CO2 BLDA-SCNC: 30.4 MMOL/L (ref 22–33)
CO2 SERPL-SCNC: 25.4 MMOL/L (ref 22–29)
COHGB MFR BLD: 1.1 % (ref 0–5)
CREAT SERPL-MCNC: 0.3 MG/DL (ref 0.76–1.27)
CRP SERPL-MCNC: 9.37 MG/DL (ref 0–0.5)
DEPRECATED RDW RBC AUTO: 64.1 FL (ref 37–54)
EGFRCR SERPLBLD CKD-EPI 2021: 141.4 ML/MIN/1.73
EOSINOPHIL # BLD AUTO: 0.09 10*3/MM3 (ref 0–0.4)
EOSINOPHIL NFR BLD AUTO: 0.8 % (ref 0.3–6.2)
ERYTHROCYTE [DISTWIDTH] IN BLOOD BY AUTOMATED COUNT: 16.3 % (ref 12.3–15.4)
GLOBULIN UR ELPH-MCNC: 2.6 GM/DL
GLUCOSE BLDC GLUCOMTR-MCNC: 122 MG/DL (ref 70–130)
GLUCOSE BLDC GLUCOMTR-MCNC: 136 MG/DL (ref 70–130)
GLUCOSE SERPL-MCNC: 129 MG/DL (ref 65–99)
HCO3 BLDA-SCNC: 29.2 MMOL/L (ref 20–26)
HCT VFR BLD AUTO: 32.8 % (ref 37.5–51)
HCT VFR BLD CALC: 30.6 % (ref 38–51)
HGB BLD-MCNC: 10.3 G/DL (ref 13–17.7)
HGB BLDA-MCNC: 10 G/DL (ref 14–18)
IMM GRANULOCYTES # BLD AUTO: 0.07 10*3/MM3 (ref 0–0.05)
IMM GRANULOCYTES NFR BLD AUTO: 0.6 % (ref 0–0.5)
INHALED O2 CONCENTRATION: 35 %
LYMPHOCYTES # BLD AUTO: 1.03 10*3/MM3 (ref 0.7–3.1)
LYMPHOCYTES NFR BLD AUTO: 9.1 % (ref 19.6–45.3)
Lab: ABNORMAL
MCH RBC QN AUTO: 33.9 PG (ref 26.6–33)
MCHC RBC AUTO-ENTMCNC: 31.4 G/DL (ref 31.5–35.7)
MCV RBC AUTO: 107.9 FL (ref 79–97)
METHGB BLD QL: 0.4 % (ref 0–3)
MODALITY: ABNORMAL
MONOCYTES # BLD AUTO: 0.56 10*3/MM3 (ref 0.1–0.9)
MONOCYTES NFR BLD AUTO: 5 % (ref 5–12)
NEUTROPHILS NFR BLD AUTO: 84.3 % (ref 42.7–76)
NEUTROPHILS NFR BLD AUTO: 9.49 10*3/MM3 (ref 1.7–7)
NOTE: ABNORMAL
NRBC BLD AUTO-RTO: 0 /100 WBC (ref 0–0.2)
OXYHGB MFR BLDV: 95.8 % (ref 94–99)
PCO2 BLDA: 40.1 MM HG (ref 35–45)
PCO2 TEMP ADJ BLD: ABNORMAL MM[HG]
PEEP RESPIRATORY: 5 CM[H2O]
PH BLDA: 7.47 PH UNITS (ref 7.35–7.45)
PH, TEMP CORRECTED: ABNORMAL
PLATELET # BLD AUTO: 84 10*3/MM3 (ref 140–450)
PMV BLD AUTO: 11.7 FL (ref 6–12)
PO2 BLDA: 87.8 MM HG (ref 83–108)
PO2 TEMP ADJ BLD: ABNORMAL MM[HG]
POTASSIUM SERPL-SCNC: 3.9 MMOL/L (ref 3.5–5.2)
PROT SERPL-MCNC: 4.1 G/DL (ref 6–8.5)
RBC # BLD AUTO: 3.04 10*6/MM3 (ref 4.14–5.8)
SAO2 % BLDCOA: 97.3 % (ref 94–99)
SET MECH RESP RATE: 16
SODIUM SERPL-SCNC: 146 MMOL/L (ref 136–145)
VENTILATOR MODE: ABNORMAL
VT ON VENT VENT: 450 ML
WBC NRBC COR # BLD: 11.26 10*3/MM3 (ref 3.4–10.8)

## 2023-01-07 PROCEDURE — 85025 COMPLETE CBC W/AUTO DIFF WBC: CPT | Performed by: INTERNAL MEDICINE

## 2023-01-07 PROCEDURE — 80053 COMPREHEN METABOLIC PANEL: CPT | Performed by: INTERNAL MEDICINE

## 2023-01-07 PROCEDURE — 82962 GLUCOSE BLOOD TEST: CPT

## 2023-01-07 PROCEDURE — 82805 BLOOD GASES W/O2 SATURATION: CPT

## 2023-01-07 PROCEDURE — 86140 C-REACTIVE PROTEIN: CPT | Performed by: INTERNAL MEDICINE

## 2023-01-07 PROCEDURE — 83050 HGB METHEMOGLOBIN QUAN: CPT

## 2023-01-07 PROCEDURE — 82375 ASSAY CARBOXYHB QUANT: CPT

## 2023-01-07 PROCEDURE — 99291 CRITICAL CARE FIRST HOUR: CPT | Performed by: INTERNAL MEDICINE

## 2023-01-08 ENCOUNTER — OUTSIDE FACILITY SERVICE (OUTPATIENT)
Dept: PULMONOLOGY | Facility: CLINIC | Age: 53
End: 2023-01-08
Payer: MEDICARE

## 2023-01-08 LAB
ALBUMIN SERPL-MCNC: 1.3 G/DL (ref 3.5–5.2)
ALBUMIN/GLOB SERPL: 0.5 G/DL
ALP SERPL-CCNC: 183 U/L (ref 39–117)
ALT SERPL W P-5'-P-CCNC: 26 U/L (ref 1–41)
ANION GAP SERPL CALCULATED.3IONS-SCNC: 4.7 MMOL/L (ref 5–15)
AST SERPL-CCNC: 26 U/L (ref 1–40)
BACTERIA SPEC RESP CULT: NORMAL
BASOPHILS # BLD AUTO: 0.03 10*3/MM3 (ref 0–0.2)
BASOPHILS NFR BLD AUTO: 0.4 % (ref 0–1.5)
BILIRUB SERPL-MCNC: 0.7 MG/DL (ref 0–1.2)
BUN SERPL-MCNC: 13 MG/DL (ref 6–20)
BUN/CREAT SERPL: 59.1 (ref 7–25)
CALCIUM SPEC-SCNC: 7.2 MG/DL (ref 8.6–10.5)
CHLORIDE SERPL-SCNC: 115 MMOL/L (ref 98–107)
CO2 SERPL-SCNC: 27.3 MMOL/L (ref 22–29)
CREAT SERPL-MCNC: 0.22 MG/DL (ref 0.76–1.27)
CRP SERPL-MCNC: 6.95 MG/DL (ref 0–0.5)
DEPRECATED RDW RBC AUTO: 62.4 FL (ref 37–54)
EGFRCR SERPLBLD CKD-EPI 2021: 155.3 ML/MIN/1.73
EOSINOPHIL # BLD AUTO: 0.12 10*3/MM3 (ref 0–0.4)
EOSINOPHIL NFR BLD AUTO: 1.5 % (ref 0.3–6.2)
ERYTHROCYTE [DISTWIDTH] IN BLOOD BY AUTOMATED COUNT: 16 % (ref 12.3–15.4)
GLOBULIN UR ELPH-MCNC: 2.6 GM/DL
GLUCOSE BLDC GLUCOMTR-MCNC: 112 MG/DL (ref 70–130)
GLUCOSE BLDC GLUCOMTR-MCNC: 119 MG/DL (ref 70–130)
GLUCOSE BLDC GLUCOMTR-MCNC: 123 MG/DL (ref 70–130)
GLUCOSE BLDC GLUCOMTR-MCNC: 140 MG/DL (ref 70–130)
GLUCOSE SERPL-MCNC: 106 MG/DL (ref 65–99)
GRAM STN SPEC: NORMAL
HCT VFR BLD AUTO: 29.7 % (ref 37.5–51)
HGB BLD-MCNC: 9.3 G/DL (ref 13–17.7)
IMM GRANULOCYTES # BLD AUTO: 0.05 10*3/MM3 (ref 0–0.05)
IMM GRANULOCYTES NFR BLD AUTO: 0.6 % (ref 0–0.5)
LYMPHOCYTES # BLD AUTO: 0.93 10*3/MM3 (ref 0.7–3.1)
LYMPHOCYTES NFR BLD AUTO: 11.8 % (ref 19.6–45.3)
MCH RBC QN AUTO: 33.7 PG (ref 26.6–33)
MCHC RBC AUTO-ENTMCNC: 31.3 G/DL (ref 31.5–35.7)
MCV RBC AUTO: 107.6 FL (ref 79–97)
MONOCYTES # BLD AUTO: 0.46 10*3/MM3 (ref 0.1–0.9)
MONOCYTES NFR BLD AUTO: 5.8 % (ref 5–12)
NEUTROPHILS NFR BLD AUTO: 6.31 10*3/MM3 (ref 1.7–7)
NEUTROPHILS NFR BLD AUTO: 79.9 % (ref 42.7–76)
NRBC BLD AUTO-RTO: 0 /100 WBC (ref 0–0.2)
PLATELET # BLD AUTO: 79 10*3/MM3 (ref 140–450)
PMV BLD AUTO: 11.6 FL (ref 6–12)
POTASSIUM SERPL-SCNC: 3.8 MMOL/L (ref 3.5–5.2)
PROT SERPL-MCNC: 3.9 G/DL (ref 6–8.5)
QT INTERVAL: 378 MS
QTC INTERVAL: 459 MS
RBC # BLD AUTO: 2.76 10*6/MM3 (ref 4.14–5.8)
SODIUM SERPL-SCNC: 147 MMOL/L (ref 136–145)
WBC NRBC COR # BLD: 7.9 10*3/MM3 (ref 3.4–10.8)

## 2023-01-08 PROCEDURE — 86140 C-REACTIVE PROTEIN: CPT | Performed by: INTERNAL MEDICINE

## 2023-01-08 PROCEDURE — 99291 CRITICAL CARE FIRST HOUR: CPT | Performed by: INTERNAL MEDICINE

## 2023-01-08 PROCEDURE — 36415 COLL VENOUS BLD VENIPUNCTURE: CPT | Performed by: INTERNAL MEDICINE

## 2023-01-08 PROCEDURE — 82962 GLUCOSE BLOOD TEST: CPT

## 2023-01-08 PROCEDURE — 80053 COMPREHEN METABOLIC PANEL: CPT | Performed by: INTERNAL MEDICINE

## 2023-01-08 PROCEDURE — 85025 COMPLETE CBC W/AUTO DIFF WBC: CPT | Performed by: INTERNAL MEDICINE

## 2023-01-09 ENCOUNTER — OUTSIDE FACILITY SERVICE (OUTPATIENT)
Dept: INFECTIOUS DISEASES | Facility: CLINIC | Age: 53
End: 2023-01-09
Payer: MEDICARE

## 2023-01-09 ENCOUNTER — OUTSIDE FACILITY SERVICE (OUTPATIENT)
Dept: PULMONOLOGY | Facility: CLINIC | Age: 53
End: 2023-01-09
Payer: MEDICARE

## 2023-01-09 LAB
ALBUMIN SERPL-MCNC: 1.3 G/DL (ref 3.5–5.2)
ALBUMIN/GLOB SERPL: 0.5 G/DL
ALP SERPL-CCNC: 166 U/L (ref 39–117)
ALT SERPL W P-5'-P-CCNC: 22 U/L (ref 1–41)
ANION GAP SERPL CALCULATED.3IONS-SCNC: 4.2 MMOL/L (ref 5–15)
AST SERPL-CCNC: 20 U/L (ref 1–40)
BACTERIA SPEC AEROBE CULT: NORMAL
BASOPHILS # BLD AUTO: 0.03 10*3/MM3 (ref 0–0.2)
BASOPHILS NFR BLD AUTO: 0.3 % (ref 0–1.5)
BILIRUB SERPL-MCNC: 0.8 MG/DL (ref 0–1.2)
BUN SERPL-MCNC: 12 MG/DL (ref 6–20)
BUN/CREAT SERPL: 46.2 (ref 7–25)
CALCIUM SPEC-SCNC: 7.3 MG/DL (ref 8.6–10.5)
CHLORIDE SERPL-SCNC: 114 MMOL/L (ref 98–107)
CO2 SERPL-SCNC: 25.8 MMOL/L (ref 22–29)
CREAT SERPL-MCNC: 0.26 MG/DL (ref 0.76–1.27)
CRP SERPL-MCNC: 6.61 MG/DL (ref 0–0.5)
DEPRECATED RDW RBC AUTO: 63.2 FL (ref 37–54)
EGFRCR SERPLBLD CKD-EPI 2021: 147.7 ML/MIN/1.73
EOSINOPHIL # BLD AUTO: 0.13 10*3/MM3 (ref 0–0.4)
EOSINOPHIL NFR BLD AUTO: 1.5 % (ref 0.3–6.2)
ERYTHROCYTE [DISTWIDTH] IN BLOOD BY AUTOMATED COUNT: 16.1 % (ref 12.3–15.4)
GLOBULIN UR ELPH-MCNC: 2.5 GM/DL
GLUCOSE BLDC GLUCOMTR-MCNC: 111 MG/DL (ref 70–130)
GLUCOSE BLDC GLUCOMTR-MCNC: 121 MG/DL (ref 70–130)
GLUCOSE BLDC GLUCOMTR-MCNC: 140 MG/DL (ref 70–130)
GLUCOSE SERPL-MCNC: 125 MG/DL (ref 65–99)
HCT VFR BLD AUTO: 30 % (ref 37.5–51)
HGB BLD-MCNC: 9.4 G/DL (ref 13–17.7)
IMM GRANULOCYTES # BLD AUTO: 0.05 10*3/MM3 (ref 0–0.05)
IMM GRANULOCYTES NFR BLD AUTO: 0.6 % (ref 0–0.5)
LYMPHOCYTES # BLD AUTO: 1.01 10*3/MM3 (ref 0.7–3.1)
LYMPHOCYTES NFR BLD AUTO: 11.5 % (ref 19.6–45.3)
MCH RBC QN AUTO: 33.9 PG (ref 26.6–33)
MCHC RBC AUTO-ENTMCNC: 31.3 G/DL (ref 31.5–35.7)
MCV RBC AUTO: 108.3 FL (ref 79–97)
MONOCYTES # BLD AUTO: 0.42 10*3/MM3 (ref 0.1–0.9)
MONOCYTES NFR BLD AUTO: 4.8 % (ref 5–12)
NEUTROPHILS NFR BLD AUTO: 7.12 10*3/MM3 (ref 1.7–7)
NEUTROPHILS NFR BLD AUTO: 81.3 % (ref 42.7–76)
NRBC BLD AUTO-RTO: 0 /100 WBC (ref 0–0.2)
PLATELET # BLD AUTO: 85 10*3/MM3 (ref 140–450)
PMV BLD AUTO: 11.3 FL (ref 6–12)
POTASSIUM SERPL-SCNC: 3.4 MMOL/L (ref 3.5–5.2)
PROT SERPL-MCNC: 3.8 G/DL (ref 6–8.5)
RBC # BLD AUTO: 2.77 10*6/MM3 (ref 4.14–5.8)
SODIUM SERPL-SCNC: 144 MMOL/L (ref 136–145)
WBC NRBC COR # BLD: 8.76 10*3/MM3 (ref 3.4–10.8)

## 2023-01-09 PROCEDURE — 99233 SBSQ HOSP IP/OBS HIGH 50: CPT | Performed by: INTERNAL MEDICINE

## 2023-01-09 PROCEDURE — 86140 C-REACTIVE PROTEIN: CPT | Performed by: INTERNAL MEDICINE

## 2023-01-09 PROCEDURE — 80053 COMPREHEN METABOLIC PANEL: CPT | Performed by: INTERNAL MEDICINE

## 2023-01-09 PROCEDURE — 85025 COMPLETE CBC W/AUTO DIFF WBC: CPT | Performed by: INTERNAL MEDICINE

## 2023-01-09 PROCEDURE — 82962 GLUCOSE BLOOD TEST: CPT

## 2023-01-09 PROCEDURE — 99291 CRITICAL CARE FIRST HOUR: CPT | Performed by: INTERNAL MEDICINE

## 2023-01-10 ENCOUNTER — OUTSIDE FACILITY SERVICE (OUTPATIENT)
Dept: INFECTIOUS DISEASES | Facility: CLINIC | Age: 53
End: 2023-01-10
Payer: MEDICARE

## 2023-01-10 ENCOUNTER — OUTSIDE FACILITY SERVICE (OUTPATIENT)
Dept: PULMONOLOGY | Facility: CLINIC | Age: 53
End: 2023-01-10
Payer: MEDICARE

## 2023-01-10 LAB
A-A DO2: 86.2 MMHG (ref 0–300)
ALBUMIN SERPL-MCNC: 1.3 G/DL (ref 3.5–5.2)
ALBUMIN/GLOB SERPL: 0.5 G/DL
ALP SERPL-CCNC: 170 U/L (ref 39–117)
ALT SERPL W P-5'-P-CCNC: 24 U/L (ref 1–41)
ANION GAP SERPL CALCULATED.3IONS-SCNC: 4.1 MMOL/L (ref 5–15)
ARTERIAL PATENCY WRIST A: POSITIVE
AST SERPL-CCNC: 25 U/L (ref 1–40)
ATMOSPHERIC PRESS: 727 MMHG
BASE EXCESS BLDA CALC-SCNC: 3.5 MMOL/L (ref 0–2)
BASOPHILS # BLD AUTO: 0.01 10*3/MM3 (ref 0–0.2)
BASOPHILS NFR BLD AUTO: 0.1 % (ref 0–1.5)
BDY SITE: ABNORMAL
BILIRUB SERPL-MCNC: 0.7 MG/DL (ref 0–1.2)
BODY TEMPERATURE: 0 C
BUN SERPL-MCNC: 11 MG/DL (ref 6–20)
BUN/CREAT SERPL: 40.7 (ref 7–25)
CALCIUM SPEC-SCNC: 7.3 MG/DL (ref 8.6–10.5)
CHLORIDE SERPL-SCNC: 114 MMOL/L (ref 98–107)
CO2 BLDA-SCNC: 28.6 MMOL/L (ref 22–33)
CO2 SERPL-SCNC: 25.9 MMOL/L (ref 22–29)
COHGB MFR BLD: 1.5 % (ref 0–5)
CREAT SERPL-MCNC: 0.27 MG/DL (ref 0.76–1.27)
CRP SERPL-MCNC: 7.16 MG/DL (ref 0–0.5)
DEPRECATED RDW RBC AUTO: 61.3 FL (ref 37–54)
EGFRCR SERPLBLD CKD-EPI 2021: 146 ML/MIN/1.73
EOSINOPHIL # BLD AUTO: 0.16 10*3/MM3 (ref 0–0.4)
EOSINOPHIL NFR BLD AUTO: 1.7 % (ref 0.3–6.2)
ERYTHROCYTE [DISTWIDTH] IN BLOOD BY AUTOMATED COUNT: 16.2 % (ref 12.3–15.4)
GLOBULIN UR ELPH-MCNC: 2.5 GM/DL
GLUCOSE BLDC GLUCOMTR-MCNC: 104 MG/DL (ref 70–130)
GLUCOSE BLDC GLUCOMTR-MCNC: 118 MG/DL (ref 70–130)
GLUCOSE BLDC GLUCOMTR-MCNC: 118 MG/DL (ref 70–130)
GLUCOSE BLDC GLUCOMTR-MCNC: 98 MG/DL (ref 70–130)
GLUCOSE SERPL-MCNC: 118 MG/DL (ref 65–99)
HCO3 BLDA-SCNC: 27.4 MMOL/L (ref 20–26)
HCT VFR BLD AUTO: 29.8 % (ref 37.5–51)
HCT VFR BLD CALC: 29 % (ref 38–51)
HGB BLD-MCNC: 9.5 G/DL (ref 13–17.7)
HGB BLDA-MCNC: 9.5 G/DL (ref 14–18)
IMM GRANULOCYTES # BLD AUTO: 0.05 10*3/MM3 (ref 0–0.05)
IMM GRANULOCYTES NFR BLD AUTO: 0.5 % (ref 0–0.5)
INHALED O2 CONCENTRATION: 30 %
LYMPHOCYTES # BLD AUTO: 0.95 10*3/MM3 (ref 0.7–3.1)
LYMPHOCYTES NFR BLD AUTO: 10 % (ref 19.6–45.3)
Lab: ABNORMAL
MCH RBC QN AUTO: 33.9 PG (ref 26.6–33)
MCHC RBC AUTO-ENTMCNC: 31.9 G/DL (ref 31.5–35.7)
MCV RBC AUTO: 106.4 FL (ref 79–97)
METHGB BLD QL: 0.3 % (ref 0–3)
MODALITY: ABNORMAL
MONOCYTES # BLD AUTO: 0.49 10*3/MM3 (ref 0.1–0.9)
MONOCYTES NFR BLD AUTO: 5.2 % (ref 5–12)
NEUTROPHILS NFR BLD AUTO: 7.82 10*3/MM3 (ref 1.7–7)
NEUTROPHILS NFR BLD AUTO: 82.5 % (ref 42.7–76)
NOTE: ABNORMAL
NRBC BLD AUTO-RTO: 0 /100 WBC (ref 0–0.2)
OXYHGB MFR BLDV: 94 % (ref 94–99)
PCO2 BLDA: 38 MM HG (ref 35–45)
PCO2 TEMP ADJ BLD: ABNORMAL MM[HG]
PEEP RESPIRATORY: 5 CM[H2O]
PH BLDA: 7.47 PH UNITS (ref 7.35–7.45)
PH, TEMP CORRECTED: ABNORMAL
PLATELET # BLD AUTO: 88 10*3/MM3 (ref 140–450)
PMV BLD AUTO: 11.3 FL (ref 6–12)
PO2 BLDA: 75.5 MM HG (ref 83–108)
PO2 TEMP ADJ BLD: ABNORMAL MM[HG]
POTASSIUM SERPL-SCNC: 3.6 MMOL/L (ref 3.5–5.2)
PROT SERPL-MCNC: 3.8 G/DL (ref 6–8.5)
PSV: 8 CMH2O
RBC # BLD AUTO: 2.8 10*6/MM3 (ref 4.14–5.8)
SAO2 % BLDCOA: 95.7 % (ref 94–99)
SODIUM SERPL-SCNC: 144 MMOL/L (ref 136–145)
VENTILATOR MODE: ABNORMAL
WBC NRBC COR # BLD: 9.48 10*3/MM3 (ref 3.4–10.8)

## 2023-01-10 PROCEDURE — 80053 COMPREHEN METABOLIC PANEL: CPT | Performed by: INTERNAL MEDICINE

## 2023-01-10 PROCEDURE — 82805 BLOOD GASES W/O2 SATURATION: CPT

## 2023-01-10 PROCEDURE — 36415 COLL VENOUS BLD VENIPUNCTURE: CPT | Performed by: INTERNAL MEDICINE

## 2023-01-10 PROCEDURE — 99291 CRITICAL CARE FIRST HOUR: CPT | Performed by: INTERNAL MEDICINE

## 2023-01-10 PROCEDURE — 82962 GLUCOSE BLOOD TEST: CPT

## 2023-01-10 PROCEDURE — 99232 SBSQ HOSP IP/OBS MODERATE 35: CPT | Performed by: INTERNAL MEDICINE

## 2023-01-10 PROCEDURE — 85025 COMPLETE CBC W/AUTO DIFF WBC: CPT | Performed by: INTERNAL MEDICINE

## 2023-01-10 PROCEDURE — 86140 C-REACTIVE PROTEIN: CPT | Performed by: INTERNAL MEDICINE

## 2023-01-10 PROCEDURE — 82375 ASSAY CARBOXYHB QUANT: CPT

## 2023-01-10 PROCEDURE — 83050 HGB METHEMOGLOBIN QUAN: CPT

## 2023-01-11 ENCOUNTER — OUTSIDE FACILITY SERVICE (OUTPATIENT)
Dept: PULMONOLOGY | Facility: CLINIC | Age: 53
End: 2023-01-11
Payer: MEDICARE

## 2023-01-11 ENCOUNTER — OUTSIDE FACILITY SERVICE (OUTPATIENT)
Dept: INFECTIOUS DISEASES | Facility: CLINIC | Age: 53
End: 2023-01-11
Payer: MEDICARE

## 2023-01-11 LAB
ALBUMIN SERPL-MCNC: 1.8 G/DL (ref 3.5–5.2)
ALBUMIN/GLOB SERPL: 0.8 G/DL
ALP SERPL-CCNC: 170 U/L (ref 39–117)
ALT SERPL W P-5'-P-CCNC: 22 U/L (ref 1–41)
ANION GAP SERPL CALCULATED.3IONS-SCNC: 6.5 MMOL/L (ref 5–15)
AST SERPL-CCNC: 21 U/L (ref 1–40)
BACTERIA SPEC AEROBE CULT: NORMAL
BACTERIA SPEC AEROBE CULT: NORMAL
BASOPHILS # BLD AUTO: 0.01 10*3/MM3 (ref 0–0.2)
BASOPHILS NFR BLD AUTO: 0.1 % (ref 0–1.5)
BILIRUB SERPL-MCNC: 0.8 MG/DL (ref 0–1.2)
BUN SERPL-MCNC: 9 MG/DL (ref 6–20)
BUN/CREAT SERPL: 33.3 (ref 7–25)
CALCIUM SPEC-SCNC: 7.5 MG/DL (ref 8.6–10.5)
CHLORIDE SERPL-SCNC: 113 MMOL/L (ref 98–107)
CO2 SERPL-SCNC: 26.5 MMOL/L (ref 22–29)
CREAT SERPL-MCNC: 0.27 MG/DL (ref 0.76–1.27)
CRP SERPL-MCNC: 7.37 MG/DL (ref 0–0.5)
DEPRECATED RDW RBC AUTO: 62.7 FL (ref 37–54)
EGFRCR SERPLBLD CKD-EPI 2021: 146 ML/MIN/1.73
EOSINOPHIL # BLD AUTO: 0.12 10*3/MM3 (ref 0–0.4)
EOSINOPHIL NFR BLD AUTO: 1.6 % (ref 0.3–6.2)
ERYTHROCYTE [DISTWIDTH] IN BLOOD BY AUTOMATED COUNT: 16.6 % (ref 12.3–15.4)
GLOBULIN UR ELPH-MCNC: 2.4 GM/DL
GLUCOSE BLDC GLUCOMTR-MCNC: 117 MG/DL (ref 70–130)
GLUCOSE BLDC GLUCOMTR-MCNC: 133 MG/DL (ref 70–130)
GLUCOSE BLDC GLUCOMTR-MCNC: 99 MG/DL (ref 70–130)
GLUCOSE BLDC GLUCOMTR-MCNC: 99 MG/DL (ref 70–130)
GLUCOSE SERPL-MCNC: 98 MG/DL (ref 65–99)
HCT VFR BLD AUTO: 27.4 % (ref 37.5–51)
HGB BLD-MCNC: 8.9 G/DL (ref 13–17.7)
IMM GRANULOCYTES # BLD AUTO: 0.04 10*3/MM3 (ref 0–0.05)
IMM GRANULOCYTES NFR BLD AUTO: 0.5 % (ref 0–0.5)
LYMPHOCYTES # BLD AUTO: 1.07 10*3/MM3 (ref 0.7–3.1)
LYMPHOCYTES NFR BLD AUTO: 13.9 % (ref 19.6–45.3)
MCH RBC QN AUTO: 34.4 PG (ref 26.6–33)
MCHC RBC AUTO-ENTMCNC: 32.5 G/DL (ref 31.5–35.7)
MCV RBC AUTO: 105.8 FL (ref 79–97)
MONOCYTES # BLD AUTO: 0.56 10*3/MM3 (ref 0.1–0.9)
MONOCYTES NFR BLD AUTO: 7.3 % (ref 5–12)
NEUTROPHILS NFR BLD AUTO: 5.92 10*3/MM3 (ref 1.7–7)
NEUTROPHILS NFR BLD AUTO: 76.6 % (ref 42.7–76)
NRBC BLD AUTO-RTO: 0 /100 WBC (ref 0–0.2)
PLATELET # BLD AUTO: 87 10*3/MM3 (ref 140–450)
PMV BLD AUTO: 11.1 FL (ref 6–12)
POTASSIUM SERPL-SCNC: 3.7 MMOL/L (ref 3.5–5.2)
PROT SERPL-MCNC: 4.2 G/DL (ref 6–8.5)
RBC # BLD AUTO: 2.59 10*6/MM3 (ref 4.14–5.8)
SODIUM SERPL-SCNC: 146 MMOL/L (ref 136–145)
WBC NRBC COR # BLD: 7.72 10*3/MM3 (ref 3.4–10.8)

## 2023-01-11 PROCEDURE — 85025 COMPLETE CBC W/AUTO DIFF WBC: CPT | Performed by: INTERNAL MEDICINE

## 2023-01-11 PROCEDURE — 86140 C-REACTIVE PROTEIN: CPT | Performed by: INTERNAL MEDICINE

## 2023-01-11 PROCEDURE — 99291 CRITICAL CARE FIRST HOUR: CPT | Performed by: INTERNAL MEDICINE

## 2023-01-11 PROCEDURE — 80053 COMPREHEN METABOLIC PANEL: CPT | Performed by: INTERNAL MEDICINE

## 2023-01-11 PROCEDURE — 99232 SBSQ HOSP IP/OBS MODERATE 35: CPT | Performed by: INTERNAL MEDICINE

## 2023-01-11 PROCEDURE — 82962 GLUCOSE BLOOD TEST: CPT

## 2023-01-12 ENCOUNTER — OUTSIDE FACILITY SERVICE (OUTPATIENT)
Dept: INFECTIOUS DISEASES | Facility: CLINIC | Age: 53
End: 2023-01-12
Payer: MEDICARE

## 2023-01-12 ENCOUNTER — APPOINTMENT (OUTPATIENT)
Dept: GENERAL RADIOLOGY | Facility: HOSPITAL | Age: 53
End: 2023-01-12
Payer: COMMERCIAL

## 2023-01-12 ENCOUNTER — OUTSIDE FACILITY SERVICE (OUTPATIENT)
Dept: PULMONOLOGY | Facility: CLINIC | Age: 53
End: 2023-01-12
Payer: MEDICARE

## 2023-01-12 LAB
ALBUMIN SERPL-MCNC: 1.5 G/DL (ref 3.5–5.2)
ALBUMIN/GLOB SERPL: 0.6 G/DL
ALP SERPL-CCNC: 164 U/L (ref 39–117)
ALT SERPL W P-5'-P-CCNC: 20 U/L (ref 1–41)
ANION GAP SERPL CALCULATED.3IONS-SCNC: 5.4 MMOL/L (ref 5–15)
AST SERPL-CCNC: 22 U/L (ref 1–40)
BASOPHILS # BLD AUTO: 0.01 10*3/MM3 (ref 0–0.2)
BASOPHILS NFR BLD AUTO: 0.2 % (ref 0–1.5)
BILIRUB SERPL-MCNC: 0.7 MG/DL (ref 0–1.2)
BUN SERPL-MCNC: 10 MG/DL (ref 6–20)
BUN/CREAT SERPL: 41.7 (ref 7–25)
CALCIUM SPEC-SCNC: 7.6 MG/DL (ref 8.6–10.5)
CHLORIDE SERPL-SCNC: 111 MMOL/L (ref 98–107)
CO2 SERPL-SCNC: 26.6 MMOL/L (ref 22–29)
CORTIS SERPL-MCNC: 4.88 MCG/DL
CREAT SERPL-MCNC: 0.24 MG/DL (ref 0.76–1.27)
CRP SERPL-MCNC: 7.04 MG/DL (ref 0–0.5)
DEPRECATED RDW RBC AUTO: 64.6 FL (ref 37–54)
EGFRCR SERPLBLD CKD-EPI 2021: 151.3 ML/MIN/1.73
EOSINOPHIL # BLD AUTO: 0.16 10*3/MM3 (ref 0–0.4)
EOSINOPHIL NFR BLD AUTO: 2.6 % (ref 0.3–6.2)
ERYTHROCYTE [DISTWIDTH] IN BLOOD BY AUTOMATED COUNT: 16.7 % (ref 12.3–15.4)
GLOBULIN UR ELPH-MCNC: 2.4 GM/DL
GLUCOSE BLDC GLUCOMTR-MCNC: 119 MG/DL (ref 70–130)
GLUCOSE BLDC GLUCOMTR-MCNC: 161 MG/DL (ref 70–130)
GLUCOSE SERPL-MCNC: 123 MG/DL (ref 65–99)
HCT VFR BLD AUTO: 27 % (ref 37.5–51)
HGB BLD-MCNC: 8.6 G/DL (ref 13–17.7)
IMM GRANULOCYTES # BLD AUTO: 0.04 10*3/MM3 (ref 0–0.05)
IMM GRANULOCYTES NFR BLD AUTO: 0.6 % (ref 0–0.5)
LYMPHOCYTES # BLD AUTO: 0.89 10*3/MM3 (ref 0.7–3.1)
LYMPHOCYTES NFR BLD AUTO: 14.3 % (ref 19.6–45.3)
MCH RBC QN AUTO: 34.3 PG (ref 26.6–33)
MCHC RBC AUTO-ENTMCNC: 31.9 G/DL (ref 31.5–35.7)
MCV RBC AUTO: 107.6 FL (ref 79–97)
MONOCYTES # BLD AUTO: 0.51 10*3/MM3 (ref 0.1–0.9)
MONOCYTES NFR BLD AUTO: 8.2 % (ref 5–12)
MRSA DNA SPEC QL NAA+PROBE: NORMAL
NEUTROPHILS NFR BLD AUTO: 4.63 10*3/MM3 (ref 1.7–7)
NEUTROPHILS NFR BLD AUTO: 74.1 % (ref 42.7–76)
NRBC BLD AUTO-RTO: 0 /100 WBC (ref 0–0.2)
PLATELET # BLD AUTO: 84 10*3/MM3 (ref 140–450)
PMV BLD AUTO: 11.1 FL (ref 6–12)
POTASSIUM SERPL-SCNC: 3.5 MMOL/L (ref 3.5–5.2)
PROT SERPL-MCNC: 3.9 G/DL (ref 6–8.5)
RBC # BLD AUTO: 2.51 10*6/MM3 (ref 4.14–5.8)
SODIUM SERPL-SCNC: 143 MMOL/L (ref 136–145)
TSH SERPL DL<=0.05 MIU/L-ACNC: 2.1 UIU/ML (ref 0.27–4.2)
WBC NRBC COR # BLD: 6.24 10*3/MM3 (ref 3.4–10.8)

## 2023-01-12 PROCEDURE — 87205 SMEAR GRAM STAIN: CPT | Performed by: INTERNAL MEDICINE

## 2023-01-12 PROCEDURE — 87641 MR-STAPH DNA AMP PROBE: CPT | Performed by: INTERNAL MEDICINE

## 2023-01-12 PROCEDURE — 86140 C-REACTIVE PROTEIN: CPT | Performed by: INTERNAL MEDICINE

## 2023-01-12 PROCEDURE — 82962 GLUCOSE BLOOD TEST: CPT

## 2023-01-12 PROCEDURE — 71045 X-RAY EXAM CHEST 1 VIEW: CPT

## 2023-01-12 PROCEDURE — 99291 CRITICAL CARE FIRST HOUR: CPT | Performed by: INTERNAL MEDICINE

## 2023-01-12 PROCEDURE — 84443 ASSAY THYROID STIM HORMONE: CPT | Performed by: INTERNAL MEDICINE

## 2023-01-12 PROCEDURE — 82533 TOTAL CORTISOL: CPT | Performed by: INTERNAL MEDICINE

## 2023-01-12 PROCEDURE — 71045 X-RAY EXAM CHEST 1 VIEW: CPT | Performed by: RADIOLOGY

## 2023-01-12 PROCEDURE — 85025 COMPLETE CBC W/AUTO DIFF WBC: CPT | Performed by: INTERNAL MEDICINE

## 2023-01-12 PROCEDURE — 99233 SBSQ HOSP IP/OBS HIGH 50: CPT | Performed by: INTERNAL MEDICINE

## 2023-01-12 PROCEDURE — 87077 CULTURE AEROBIC IDENTIFY: CPT | Performed by: INTERNAL MEDICINE

## 2023-01-12 PROCEDURE — 87070 CULTURE OTHR SPECIMN AEROBIC: CPT | Performed by: INTERNAL MEDICINE

## 2023-01-12 PROCEDURE — 87040 BLOOD CULTURE FOR BACTERIA: CPT | Performed by: INTERNAL MEDICINE

## 2023-01-12 PROCEDURE — 87186 SC STD MICRODIL/AGAR DIL: CPT | Performed by: INTERNAL MEDICINE

## 2023-01-12 PROCEDURE — 36415 COLL VENOUS BLD VENIPUNCTURE: CPT | Performed by: INTERNAL MEDICINE

## 2023-01-12 PROCEDURE — 80053 COMPREHEN METABOLIC PANEL: CPT | Performed by: INTERNAL MEDICINE

## 2023-01-13 ENCOUNTER — OUTSIDE FACILITY SERVICE (OUTPATIENT)
Dept: PULMONOLOGY | Facility: CLINIC | Age: 53
End: 2023-01-13
Payer: MEDICARE

## 2023-01-13 LAB
ALBUMIN SERPL-MCNC: 1.6 G/DL (ref 3.5–5.2)
ALBUMIN/GLOB SERPL: 0.6 G/DL
ALP SERPL-CCNC: 194 U/L (ref 39–117)
ALT SERPL W P-5'-P-CCNC: 20 U/L (ref 1–41)
ANION GAP SERPL CALCULATED.3IONS-SCNC: 4.9 MMOL/L (ref 5–15)
AST SERPL-CCNC: 21 U/L (ref 1–40)
BASOPHILS # BLD AUTO: 0.01 10*3/MM3 (ref 0–0.2)
BASOPHILS NFR BLD AUTO: 0.1 % (ref 0–1.5)
BILIRUB SERPL-MCNC: 0.6 MG/DL (ref 0–1.2)
BUN SERPL-MCNC: 8 MG/DL (ref 6–20)
BUN/CREAT SERPL: 27.6 (ref 7–25)
CALCIUM SPEC-SCNC: 7.9 MG/DL (ref 8.6–10.5)
CHLORIDE SERPL-SCNC: 109 MMOL/L (ref 98–107)
CO2 SERPL-SCNC: 27.1 MMOL/L (ref 22–29)
CREAT SERPL-MCNC: 0.29 MG/DL (ref 0.76–1.27)
CRP SERPL-MCNC: 7.24 MG/DL (ref 0–0.5)
DEPRECATED RDW RBC AUTO: 61.2 FL (ref 37–54)
EGFRCR SERPLBLD CKD-EPI 2021: 142.9 ML/MIN/1.73
EOSINOPHIL # BLD AUTO: 0 10*3/MM3 (ref 0–0.4)
EOSINOPHIL NFR BLD AUTO: 0 % (ref 0.3–6.2)
ERYTHROCYTE [DISTWIDTH] IN BLOOD BY AUTOMATED COUNT: 16.2 % (ref 12.3–15.4)
GLOBULIN UR ELPH-MCNC: 2.9 GM/DL
GLUCOSE BLDC GLUCOMTR-MCNC: 111 MG/DL (ref 70–130)
GLUCOSE BLDC GLUCOMTR-MCNC: 115 MG/DL (ref 70–130)
GLUCOSE SERPL-MCNC: 182 MG/DL (ref 65–99)
HCT VFR BLD AUTO: 28.5 % (ref 37.5–51)
HGB BLD-MCNC: 9.1 G/DL (ref 13–17.7)
IMM GRANULOCYTES # BLD AUTO: 0.04 10*3/MM3 (ref 0–0.05)
IMM GRANULOCYTES NFR BLD AUTO: 0.5 % (ref 0–0.5)
LYMPHOCYTES # BLD AUTO: 0.58 10*3/MM3 (ref 0.7–3.1)
LYMPHOCYTES NFR BLD AUTO: 7 % (ref 19.6–45.3)
MCH RBC QN AUTO: 33 PG (ref 26.6–33)
MCHC RBC AUTO-ENTMCNC: 31.9 G/DL (ref 31.5–35.7)
MCV RBC AUTO: 103.3 FL (ref 79–97)
MONOCYTES # BLD AUTO: 0.42 10*3/MM3 (ref 0.1–0.9)
MONOCYTES NFR BLD AUTO: 5.1 % (ref 5–12)
NEUTROPHILS NFR BLD AUTO: 7.18 10*3/MM3 (ref 1.7–7)
NEUTROPHILS NFR BLD AUTO: 87.3 % (ref 42.7–76)
NRBC BLD AUTO-RTO: 0 /100 WBC (ref 0–0.2)
PLATELET # BLD AUTO: 104 10*3/MM3 (ref 140–450)
PMV BLD AUTO: 11.2 FL (ref 6–12)
POTASSIUM SERPL-SCNC: 3.9 MMOL/L (ref 3.5–5.2)
PROT SERPL-MCNC: 4.5 G/DL (ref 6–8.5)
RBC # BLD AUTO: 2.76 10*6/MM3 (ref 4.14–5.8)
SODIUM SERPL-SCNC: 141 MMOL/L (ref 136–145)
WBC NRBC COR # BLD: 8.23 10*3/MM3 (ref 3.4–10.8)

## 2023-01-13 PROCEDURE — 82962 GLUCOSE BLOOD TEST: CPT

## 2023-01-13 PROCEDURE — 86140 C-REACTIVE PROTEIN: CPT | Performed by: INTERNAL MEDICINE

## 2023-01-13 PROCEDURE — 99291 CRITICAL CARE FIRST HOUR: CPT | Performed by: INTERNAL MEDICINE

## 2023-01-13 PROCEDURE — 85025 COMPLETE CBC W/AUTO DIFF WBC: CPT | Performed by: INTERNAL MEDICINE

## 2023-01-13 PROCEDURE — 80053 COMPREHEN METABOLIC PANEL: CPT | Performed by: INTERNAL MEDICINE

## 2023-01-14 ENCOUNTER — OUTSIDE FACILITY SERVICE (OUTPATIENT)
Dept: PULMONOLOGY | Facility: CLINIC | Age: 53
End: 2023-01-14
Payer: MEDICARE

## 2023-01-14 LAB
ALBUMIN SERPL-MCNC: 1.7 G/DL (ref 3.5–5.2)
ALBUMIN/GLOB SERPL: 0.6 G/DL
ALP SERPL-CCNC: 193 U/L (ref 39–117)
ALT SERPL W P-5'-P-CCNC: 21 U/L (ref 1–41)
ANION GAP SERPL CALCULATED.3IONS-SCNC: 5.8 MMOL/L (ref 5–15)
AST SERPL-CCNC: 20 U/L (ref 1–40)
BACTERIA SPEC RESP CULT: ABNORMAL
BACTERIA SPEC RESP CULT: ABNORMAL
BASOPHILS # BLD AUTO: 0.01 10*3/MM3 (ref 0–0.2)
BASOPHILS NFR BLD AUTO: 0.1 % (ref 0–1.5)
BILIRUB SERPL-MCNC: 0.7 MG/DL (ref 0–1.2)
BUN SERPL-MCNC: 7 MG/DL (ref 6–20)
BUN/CREAT SERPL: 21.9 (ref 7–25)
CALCIUM SPEC-SCNC: 7.8 MG/DL (ref 8.6–10.5)
CHLORIDE SERPL-SCNC: 110 MMOL/L (ref 98–107)
CO2 SERPL-SCNC: 26.2 MMOL/L (ref 22–29)
CREAT SERPL-MCNC: 0.32 MG/DL (ref 0.76–1.27)
CRP SERPL-MCNC: 5.85 MG/DL (ref 0–0.5)
DEPRECATED RDW RBC AUTO: 65.1 FL (ref 37–54)
EGFRCR SERPLBLD CKD-EPI 2021: 138.7 ML/MIN/1.73
EOSINOPHIL # BLD AUTO: 0.03 10*3/MM3 (ref 0–0.4)
EOSINOPHIL NFR BLD AUTO: 0.4 % (ref 0.3–6.2)
ERYTHROCYTE [DISTWIDTH] IN BLOOD BY AUTOMATED COUNT: 17.1 % (ref 12.3–15.4)
GLOBULIN UR ELPH-MCNC: 2.8 GM/DL
GLUCOSE BLDC GLUCOMTR-MCNC: 132 MG/DL (ref 70–130)
GLUCOSE BLDC GLUCOMTR-MCNC: 148 MG/DL (ref 70–130)
GLUCOSE BLDC GLUCOMTR-MCNC: 157 MG/DL (ref 70–130)
GLUCOSE BLDC GLUCOMTR-MCNC: 181 MG/DL (ref 70–130)
GLUCOSE SERPL-MCNC: 131 MG/DL (ref 65–99)
GRAM STN SPEC: ABNORMAL
HCT VFR BLD AUTO: 28 % (ref 37.5–51)
HGB BLD-MCNC: 9.1 G/DL (ref 13–17.7)
IMM GRANULOCYTES # BLD AUTO: 0.03 10*3/MM3 (ref 0–0.05)
IMM GRANULOCYTES NFR BLD AUTO: 0.4 % (ref 0–0.5)
LYMPHOCYTES # BLD AUTO: 0.86 10*3/MM3 (ref 0.7–3.1)
LYMPHOCYTES NFR BLD AUTO: 11 % (ref 19.6–45.3)
MCH RBC QN AUTO: 34 PG (ref 26.6–33)
MCHC RBC AUTO-ENTMCNC: 32.5 G/DL (ref 31.5–35.7)
MCV RBC AUTO: 104.5 FL (ref 79–97)
MONOCYTES # BLD AUTO: 0.89 10*3/MM3 (ref 0.1–0.9)
MONOCYTES NFR BLD AUTO: 11.4 % (ref 5–12)
NEUTROPHILS NFR BLD AUTO: 6.01 10*3/MM3 (ref 1.7–7)
NEUTROPHILS NFR BLD AUTO: 76.7 % (ref 42.7–76)
NRBC BLD AUTO-RTO: 0 /100 WBC (ref 0–0.2)
PLATELET # BLD AUTO: 130 10*3/MM3 (ref 140–450)
PMV BLD AUTO: 10.7 FL (ref 6–12)
POTASSIUM SERPL-SCNC: 3.4 MMOL/L (ref 3.5–5.2)
PROT SERPL-MCNC: 4.5 G/DL (ref 6–8.5)
QT INTERVAL: 380 MS
QTC INTERVAL: 435 MS
RBC # BLD AUTO: 2.68 10*6/MM3 (ref 4.14–5.8)
SODIUM SERPL-SCNC: 142 MMOL/L (ref 136–145)
WBC NRBC COR # BLD: 7.83 10*3/MM3 (ref 3.4–10.8)

## 2023-01-14 PROCEDURE — 80053 COMPREHEN METABOLIC PANEL: CPT | Performed by: INTERNAL MEDICINE

## 2023-01-14 PROCEDURE — 85025 COMPLETE CBC W/AUTO DIFF WBC: CPT | Performed by: INTERNAL MEDICINE

## 2023-01-14 PROCEDURE — 93010 ELECTROCARDIOGRAM REPORT: CPT | Performed by: SPECIALIST

## 2023-01-14 PROCEDURE — 82962 GLUCOSE BLOOD TEST: CPT

## 2023-01-14 PROCEDURE — 99291 CRITICAL CARE FIRST HOUR: CPT | Performed by: INTERNAL MEDICINE

## 2023-01-14 PROCEDURE — 86140 C-REACTIVE PROTEIN: CPT | Performed by: INTERNAL MEDICINE

## 2023-01-14 PROCEDURE — 93005 ELECTROCARDIOGRAM TRACING: CPT | Performed by: INTERNAL MEDICINE

## 2023-01-15 ENCOUNTER — OUTSIDE FACILITY SERVICE (OUTPATIENT)
Dept: PULMONOLOGY | Facility: CLINIC | Age: 53
End: 2023-01-15
Payer: MEDICARE

## 2023-01-15 LAB
ALBUMIN SERPL-MCNC: 1.5 G/DL (ref 3.5–5.2)
ALBUMIN/GLOB SERPL: 0.5 G/DL
ALP SERPL-CCNC: 163 U/L (ref 39–117)
ALT SERPL W P-5'-P-CCNC: 20 U/L (ref 1–41)
ANION GAP SERPL CALCULATED.3IONS-SCNC: 5.2 MMOL/L (ref 5–15)
AST SERPL-CCNC: 20 U/L (ref 1–40)
BASOPHILS # BLD AUTO: 0.01 10*3/MM3 (ref 0–0.2)
BASOPHILS NFR BLD AUTO: 0.2 % (ref 0–1.5)
BILIRUB SERPL-MCNC: 0.4 MG/DL (ref 0–1.2)
BUN SERPL-MCNC: 7 MG/DL (ref 6–20)
BUN/CREAT SERPL: 26.9 (ref 7–25)
CALCIUM SPEC-SCNC: 7.8 MG/DL (ref 8.6–10.5)
CHLORIDE SERPL-SCNC: 111 MMOL/L (ref 98–107)
CO2 SERPL-SCNC: 28.8 MMOL/L (ref 22–29)
CREAT SERPL-MCNC: 0.26 MG/DL (ref 0.76–1.27)
CRP SERPL-MCNC: 5.67 MG/DL (ref 0–0.5)
DEPRECATED RDW RBC AUTO: 63.5 FL (ref 37–54)
EGFRCR SERPLBLD CKD-EPI 2021: 147.7 ML/MIN/1.73
EOSINOPHIL # BLD AUTO: 0.04 10*3/MM3 (ref 0–0.4)
EOSINOPHIL NFR BLD AUTO: 0.7 % (ref 0.3–6.2)
ERYTHROCYTE [DISTWIDTH] IN BLOOD BY AUTOMATED COUNT: 16.8 % (ref 12.3–15.4)
GLOBULIN UR ELPH-MCNC: 2.8 GM/DL
GLUCOSE BLDC GLUCOMTR-MCNC: 129 MG/DL (ref 70–130)
GLUCOSE BLDC GLUCOMTR-MCNC: 131 MG/DL (ref 70–130)
GLUCOSE SERPL-MCNC: 121 MG/DL (ref 65–99)
HCT VFR BLD AUTO: 26.9 % (ref 37.5–51)
HGB BLD-MCNC: 8.7 G/DL (ref 13–17.7)
IMM GRANULOCYTES # BLD AUTO: 0.01 10*3/MM3 (ref 0–0.05)
IMM GRANULOCYTES NFR BLD AUTO: 0.2 % (ref 0–0.5)
LYMPHOCYTES # BLD AUTO: 0.93 10*3/MM3 (ref 0.7–3.1)
LYMPHOCYTES NFR BLD AUTO: 16.4 % (ref 19.6–45.3)
MCH RBC QN AUTO: 33.9 PG (ref 26.6–33)
MCHC RBC AUTO-ENTMCNC: 32.3 G/DL (ref 31.5–35.7)
MCV RBC AUTO: 104.7 FL (ref 79–97)
MONOCYTES # BLD AUTO: 0.8 10*3/MM3 (ref 0.1–0.9)
MONOCYTES NFR BLD AUTO: 14.1 % (ref 5–12)
NEUTROPHILS NFR BLD AUTO: 3.89 10*3/MM3 (ref 1.7–7)
NEUTROPHILS NFR BLD AUTO: 68.4 % (ref 42.7–76)
NRBC BLD AUTO-RTO: 0 /100 WBC (ref 0–0.2)
PLATELET # BLD AUTO: 119 10*3/MM3 (ref 140–450)
PMV BLD AUTO: 10.9 FL (ref 6–12)
POTASSIUM SERPL-SCNC: 3.1 MMOL/L (ref 3.5–5.2)
POTASSIUM SERPL-SCNC: 4.4 MMOL/L (ref 3.5–5.2)
PROT SERPL-MCNC: 4.3 G/DL (ref 6–8.5)
RBC # BLD AUTO: 2.57 10*6/MM3 (ref 4.14–5.8)
SODIUM SERPL-SCNC: 145 MMOL/L (ref 136–145)
WBC NRBC COR # BLD: 5.68 10*3/MM3 (ref 3.4–10.8)

## 2023-01-15 PROCEDURE — 82962 GLUCOSE BLOOD TEST: CPT

## 2023-01-15 PROCEDURE — 85025 COMPLETE CBC W/AUTO DIFF WBC: CPT | Performed by: INTERNAL MEDICINE

## 2023-01-15 PROCEDURE — 84132 ASSAY OF SERUM POTASSIUM: CPT | Performed by: PHYSICIAN ASSISTANT

## 2023-01-15 PROCEDURE — 80053 COMPREHEN METABOLIC PANEL: CPT | Performed by: INTERNAL MEDICINE

## 2023-01-15 PROCEDURE — 99291 CRITICAL CARE FIRST HOUR: CPT | Performed by: INTERNAL MEDICINE

## 2023-01-15 PROCEDURE — 86140 C-REACTIVE PROTEIN: CPT | Performed by: INTERNAL MEDICINE

## 2023-01-15 PROCEDURE — 36415 COLL VENOUS BLD VENIPUNCTURE: CPT | Performed by: PHYSICIAN ASSISTANT

## 2023-01-16 ENCOUNTER — OUTSIDE FACILITY SERVICE (OUTPATIENT)
Dept: INFECTIOUS DISEASES | Facility: CLINIC | Age: 53
End: 2023-01-16
Payer: MEDICARE

## 2023-01-16 ENCOUNTER — OUTSIDE FACILITY SERVICE (OUTPATIENT)
Dept: PULMONOLOGY | Facility: CLINIC | Age: 53
End: 2023-01-16
Payer: MEDICARE

## 2023-01-16 LAB
ALBUMIN SERPL-MCNC: 1.7 G/DL (ref 3.5–5.2)
ALBUMIN/GLOB SERPL: 0.6 G/DL
ALP SERPL-CCNC: 172 U/L (ref 39–117)
ALT SERPL W P-5'-P-CCNC: 23 U/L (ref 1–41)
ANION GAP SERPL CALCULATED.3IONS-SCNC: 6.6 MMOL/L (ref 5–15)
AST SERPL-CCNC: 23 U/L (ref 1–40)
BASOPHILS # BLD AUTO: 0.01 10*3/MM3 (ref 0–0.2)
BASOPHILS NFR BLD AUTO: 0.1 % (ref 0–1.5)
BILIRUB SERPL-MCNC: 0.5 MG/DL (ref 0–1.2)
BUN SERPL-MCNC: 8 MG/DL (ref 6–20)
BUN/CREAT SERPL: 22.9 (ref 7–25)
CALCIUM SPEC-SCNC: 7.9 MG/DL (ref 8.6–10.5)
CHLORIDE SERPL-SCNC: 110 MMOL/L (ref 98–107)
CO2 SERPL-SCNC: 27.4 MMOL/L (ref 22–29)
CREAT SERPL-MCNC: 0.35 MG/DL (ref 0.76–1.27)
CRP SERPL-MCNC: 4.37 MG/DL (ref 0–0.5)
DEPRECATED RDW RBC AUTO: 63.9 FL (ref 37–54)
EGFRCR SERPLBLD CKD-EPI 2021: 135 ML/MIN/1.73
EOSINOPHIL # BLD AUTO: 0 10*3/MM3 (ref 0–0.4)
EOSINOPHIL NFR BLD AUTO: 0 % (ref 0.3–6.2)
ERYTHROCYTE [DISTWIDTH] IN BLOOD BY AUTOMATED COUNT: 17.2 % (ref 12.3–15.4)
GLOBULIN UR ELPH-MCNC: 2.9 GM/DL
GLUCOSE BLDC GLUCOMTR-MCNC: 127 MG/DL (ref 70–130)
GLUCOSE BLDC GLUCOMTR-MCNC: 149 MG/DL (ref 70–130)
GLUCOSE BLDC GLUCOMTR-MCNC: 154 MG/DL (ref 70–130)
GLUCOSE BLDC GLUCOMTR-MCNC: 162 MG/DL (ref 70–130)
GLUCOSE BLDC GLUCOMTR-MCNC: 185 MG/DL (ref 70–130)
GLUCOSE SERPL-MCNC: 168 MG/DL (ref 65–99)
HCT VFR BLD AUTO: 29.4 % (ref 37.5–51)
HGB BLD-MCNC: 9.6 G/DL (ref 13–17.7)
IMM GRANULOCYTES # BLD AUTO: 0.05 10*3/MM3 (ref 0–0.05)
IMM GRANULOCYTES NFR BLD AUTO: 0.6 % (ref 0–0.5)
LYMPHOCYTES # BLD AUTO: 0.41 10*3/MM3 (ref 0.7–3.1)
LYMPHOCYTES NFR BLD AUTO: 4.6 % (ref 19.6–45.3)
MCH RBC QN AUTO: 33.8 PG (ref 26.6–33)
MCHC RBC AUTO-ENTMCNC: 32.7 G/DL (ref 31.5–35.7)
MCV RBC AUTO: 103.5 FL (ref 79–97)
MONOCYTES # BLD AUTO: 0.62 10*3/MM3 (ref 0.1–0.9)
MONOCYTES NFR BLD AUTO: 6.9 % (ref 5–12)
NEUTROPHILS NFR BLD AUTO: 7.84 10*3/MM3 (ref 1.7–7)
NEUTROPHILS NFR BLD AUTO: 87.8 % (ref 42.7–76)
NRBC BLD AUTO-RTO: 0 /100 WBC (ref 0–0.2)
PLATELET # BLD AUTO: 166 10*3/MM3 (ref 140–450)
PMV BLD AUTO: 10.8 FL (ref 6–12)
POTASSIUM SERPL-SCNC: 3.7 MMOL/L (ref 3.5–5.2)
PROT SERPL-MCNC: 4.6 G/DL (ref 6–8.5)
RBC # BLD AUTO: 2.84 10*6/MM3 (ref 4.14–5.8)
SODIUM SERPL-SCNC: 144 MMOL/L (ref 136–145)
WBC NRBC COR # BLD: 8.93 10*3/MM3 (ref 3.4–10.8)

## 2023-01-16 PROCEDURE — 80053 COMPREHEN METABOLIC PANEL: CPT | Performed by: INTERNAL MEDICINE

## 2023-01-16 PROCEDURE — 86140 C-REACTIVE PROTEIN: CPT | Performed by: INTERNAL MEDICINE

## 2023-01-16 PROCEDURE — 99232 SBSQ HOSP IP/OBS MODERATE 35: CPT | Performed by: INTERNAL MEDICINE

## 2023-01-16 PROCEDURE — 85025 COMPLETE CBC W/AUTO DIFF WBC: CPT | Performed by: INTERNAL MEDICINE

## 2023-01-16 PROCEDURE — 82962 GLUCOSE BLOOD TEST: CPT

## 2023-01-16 PROCEDURE — 99291 CRITICAL CARE FIRST HOUR: CPT | Performed by: INTERNAL MEDICINE

## 2023-01-16 RX ORDER — LEVOTHYROXINE SODIUM 0.03 MG/1
25 TABLET ORAL DAILY
COMMUNITY
End: 2023-04-07

## 2023-01-16 RX ORDER — NOREPINEPHRINE BITARTRATE 1 MG/ML
8000 INJECTION, SOLUTION INTRAVENOUS EVERY 4 HOURS PRN
Status: ON HOLD | COMMUNITY
End: 2023-01-16

## 2023-01-16 RX ORDER — DOCUSATE SODIUM 100 MG/1
100 CAPSULE, LIQUID FILLED ORAL 2 TIMES DAILY
COMMUNITY
End: 2023-03-28

## 2023-01-16 RX ORDER — POTASSIUM CHLORIDE 7.45 MG/ML
10 INJECTION INTRAVENOUS
COMMUNITY
End: 2023-03-28

## 2023-01-16 RX ORDER — FENTANYL CITRATE/PF 100MCG/2ML
50 SYRINGE (ML) INTRAVENOUS
COMMUNITY
End: 2023-03-28

## 2023-01-16 RX ORDER — ACETAMINOPHEN 650 MG/20.3ML
650 SUSPENSION ORAL EVERY 4 HOURS PRN
COMMUNITY
End: 2023-03-28

## 2023-01-16 RX ORDER — HEPARIN SODIUM (PORCINE) LOCK FLUSH IV SOLN 100 UNIT/ML 100 UNIT/ML
100 SOLUTION INTRAVENOUS AS NEEDED
COMMUNITY
End: 2023-03-28

## 2023-01-16 RX ORDER — ALPRAZOLAM 1 MG/1
1 TABLET ORAL 2 TIMES DAILY
COMMUNITY
End: 2023-03-28

## 2023-01-16 RX ORDER — FOLIC ACID 1 MG/1
1 TABLET ORAL DAILY
COMMUNITY
End: 2023-03-28

## 2023-01-16 RX ORDER — OLANZAPINE 5 MG/1
10 TABLET ORAL 2 TIMES DAILY
COMMUNITY
End: 2023-03-28

## 2023-01-16 RX ORDER — MAGNESIUM HYDROXIDE/ALUMINUM HYDROXICE/SIMETHICONE 120; 1200; 1200 MG/30ML; MG/30ML; MG/30ML
30 SUSPENSION ORAL EVERY 6 HOURS PRN
COMMUNITY
End: 2023-03-28

## 2023-01-16 RX ORDER — SODIUM CHLORIDE 0.9 % (FLUSH) 0.9 %
10 SYRINGE (ML) INJECTION AS NEEDED
COMMUNITY
End: 2023-03-28

## 2023-01-16 RX ORDER — HEPARIN SODIUM (PORCINE) LOCK FLUSH IV SOLN 100 UNIT/ML 100 UNIT/ML
100 SOLUTION INTRAVENOUS EVERY 12 HOURS
COMMUNITY
End: 2023-03-28

## 2023-01-16 RX ORDER — DEXTROSE MONOHYDRATE 25 G/50ML
50 INJECTION, SOLUTION INTRAVENOUS DAILY PRN
COMMUNITY
End: 2023-03-28

## 2023-01-16 RX ORDER — IBUPROFEN 600 MG/1
1 TABLET ORAL DAILY PRN
COMMUNITY
End: 2023-03-28

## 2023-01-16 RX ORDER — NICOTINE POLACRILEX 4 MG
1 LOZENGE BUCCAL DAILY PRN
COMMUNITY
End: 2023-03-28

## 2023-01-16 RX ORDER — FAMOTIDINE 20 MG/1
20 TABLET, FILM COATED ORAL 2 TIMES DAILY
COMMUNITY
End: 2023-03-28

## 2023-01-16 RX ORDER — AMOXICILLIN 250 MG
2 CAPSULE ORAL 2 TIMES DAILY
COMMUNITY
End: 2023-03-28

## 2023-01-16 RX ORDER — POTASSIUM CHLORIDE 20 MEQ/1
20 TABLET, EXTENDED RELEASE ORAL
COMMUNITY
End: 2023-03-28

## 2023-01-16 RX ORDER — OXYCODONE HYDROCHLORIDE 5 MG/1
5 TABLET ORAL EVERY 6 HOURS PRN
COMMUNITY
End: 2023-03-28

## 2023-01-16 RX ORDER — MAGNESIUM OXIDE 400 MG/1
400 TABLET ORAL 2 TIMES DAILY PRN
COMMUNITY
End: 2023-03-28

## 2023-01-16 RX ORDER — DEXMEDETOMIDINE HYDROCHLORIDE 100 UG/ML
400 INJECTION, SOLUTION INTRAVENOUS TAKE AS DIRECTED
COMMUNITY
End: 2023-03-28

## 2023-01-16 RX ORDER — PROPOFOL 10 MG/ML
1000 VIAL (ML) INTRAVENOUS EVERY 4 HOURS PRN
COMMUNITY
End: 2023-03-28

## 2023-01-16 RX ORDER — INSULIN LISPRO 100 [IU]/ML
0-6 INJECTION, SOLUTION INTRAVENOUS; SUBCUTANEOUS EVERY 6 HOURS
COMMUNITY
End: 2023-03-28

## 2023-01-16 RX ORDER — MIDODRINE HYDROCHLORIDE 5 MG/1
15 TABLET ORAL
COMMUNITY
End: 2023-03-28

## 2023-01-16 RX ORDER — ONDANSETRON 2 MG/ML
4 INJECTION INTRAMUSCULAR; INTRAVENOUS EVERY 6 HOURS PRN
COMMUNITY
End: 2023-03-28

## 2023-01-16 RX ORDER — SODIUM CHLORIDE 0.9 % (FLUSH) 0.9 %
10 SYRINGE (ML) INJECTION EVERY 12 HOURS
COMMUNITY
End: 2023-03-28

## 2023-01-17 ENCOUNTER — OUTSIDE FACILITY SERVICE (OUTPATIENT)
Dept: INFECTIOUS DISEASES | Facility: CLINIC | Age: 53
End: 2023-01-17
Payer: MEDICARE

## 2023-01-17 ENCOUNTER — ANESTHESIA (OUTPATIENT)
Dept: PERIOP | Facility: HOSPITAL | Age: 53
End: 2023-01-17
Payer: COMMERCIAL

## 2023-01-17 ENCOUNTER — OUTSIDE FACILITY SERVICE (OUTPATIENT)
Dept: PULMONOLOGY | Facility: CLINIC | Age: 53
End: 2023-01-17
Payer: MEDICARE

## 2023-01-17 ENCOUNTER — ANESTHESIA EVENT (OUTPATIENT)
Dept: PERIOP | Facility: HOSPITAL | Age: 53
End: 2023-01-17
Payer: COMMERCIAL

## 2023-01-17 LAB
ALBUMIN SERPL-MCNC: 1.8 G/DL (ref 3.5–5.2)
ALBUMIN/GLOB SERPL: 0.6 G/DL
ALP SERPL-CCNC: 162 U/L (ref 39–117)
ALT SERPL W P-5'-P-CCNC: 24 U/L (ref 1–41)
ANION GAP SERPL CALCULATED.3IONS-SCNC: 4.2 MMOL/L (ref 5–15)
AST SERPL-CCNC: 25 U/L (ref 1–40)
BACTERIA SPEC AEROBE CULT: NORMAL
BACTERIA SPEC AEROBE CULT: NORMAL
BASOPHILS # BLD AUTO: 0 10*3/MM3 (ref 0–0.2)
BASOPHILS NFR BLD AUTO: 0 % (ref 0–1.5)
BILIRUB SERPL-MCNC: 0.5 MG/DL (ref 0–1.2)
BUN SERPL-MCNC: 9 MG/DL (ref 6–20)
BUN/CREAT SERPL: 23.7 (ref 7–25)
CALCIUM SPEC-SCNC: 8 MG/DL (ref 8.6–10.5)
CHLORIDE SERPL-SCNC: 113 MMOL/L (ref 98–107)
CO2 SERPL-SCNC: 30.8 MMOL/L (ref 22–29)
CREAT SERPL-MCNC: 0.38 MG/DL (ref 0.76–1.27)
CRP SERPL-MCNC: 3.79 MG/DL (ref 0–0.5)
DEPRECATED RDW RBC AUTO: 68.7 FL (ref 37–54)
EGFRCR SERPLBLD CKD-EPI 2021: 131.7 ML/MIN/1.73
EOSINOPHIL # BLD AUTO: 0 10*3/MM3 (ref 0–0.4)
EOSINOPHIL NFR BLD AUTO: 0 % (ref 0.3–6.2)
ERYTHROCYTE [DISTWIDTH] IN BLOOD BY AUTOMATED COUNT: 17.5 % (ref 12.3–15.4)
GLOBULIN UR ELPH-MCNC: 2.9 GM/DL
GLUCOSE BLDC GLUCOMTR-MCNC: 118 MG/DL (ref 70–130)
GLUCOSE BLDC GLUCOMTR-MCNC: 129 MG/DL (ref 70–130)
GLUCOSE BLDC GLUCOMTR-MCNC: 139 MG/DL (ref 70–130)
GLUCOSE BLDC GLUCOMTR-MCNC: 144 MG/DL (ref 70–130)
GLUCOSE SERPL-MCNC: 150 MG/DL (ref 65–99)
HCT VFR BLD AUTO: 28.9 % (ref 37.5–51)
HGB BLD-MCNC: 9.1 G/DL (ref 13–17.7)
IMM GRANULOCYTES # BLD AUTO: 0.02 10*3/MM3 (ref 0–0.05)
IMM GRANULOCYTES NFR BLD AUTO: 0.4 % (ref 0–0.5)
LYMPHOCYTES # BLD AUTO: 0.7 10*3/MM3 (ref 0.7–3.1)
LYMPHOCYTES NFR BLD AUTO: 13.7 % (ref 19.6–45.3)
MCH RBC QN AUTO: 33.5 PG (ref 26.6–33)
MCHC RBC AUTO-ENTMCNC: 31.5 G/DL (ref 31.5–35.7)
MCV RBC AUTO: 106.3 FL (ref 79–97)
MONOCYTES # BLD AUTO: 0.88 10*3/MM3 (ref 0.1–0.9)
MONOCYTES NFR BLD AUTO: 17.2 % (ref 5–12)
NEUTROPHILS NFR BLD AUTO: 3.51 10*3/MM3 (ref 1.7–7)
NEUTROPHILS NFR BLD AUTO: 68.7 % (ref 42.7–76)
NRBC BLD AUTO-RTO: 0 /100 WBC (ref 0–0.2)
PLATELET # BLD AUTO: 154 10*3/MM3 (ref 140–450)
PMV BLD AUTO: 10.6 FL (ref 6–12)
POTASSIUM SERPL-SCNC: 3.6 MMOL/L (ref 3.5–5.2)
PROT SERPL-MCNC: 4.7 G/DL (ref 6–8.5)
RBC # BLD AUTO: 2.72 10*6/MM3 (ref 4.14–5.8)
SODIUM SERPL-SCNC: 148 MMOL/L (ref 136–145)
WBC NRBC COR # BLD: 5.11 10*3/MM3 (ref 3.4–10.8)

## 2023-01-17 PROCEDURE — 86140 C-REACTIVE PROTEIN: CPT | Performed by: INTERNAL MEDICINE

## 2023-01-17 PROCEDURE — 82962 GLUCOSE BLOOD TEST: CPT

## 2023-01-17 PROCEDURE — 99291 CRITICAL CARE FIRST HOUR: CPT | Performed by: INTERNAL MEDICINE

## 2023-01-17 PROCEDURE — 31600 PLANNED TRACHEOSTOMY: CPT | Performed by: SURGERY

## 2023-01-17 PROCEDURE — 36415 COLL VENOUS BLD VENIPUNCTURE: CPT | Performed by: INTERNAL MEDICINE

## 2023-01-17 PROCEDURE — 80053 COMPREHEN METABOLIC PANEL: CPT | Performed by: INTERNAL MEDICINE

## 2023-01-17 PROCEDURE — 85025 COMPLETE CBC W/AUTO DIFF WBC: CPT | Performed by: INTERNAL MEDICINE

## 2023-01-17 PROCEDURE — 25010000002 PROPOFOL 10 MG/ML EMULSION: Performed by: ANESTHESIOLOGY

## 2023-01-17 PROCEDURE — 99232 SBSQ HOSP IP/OBS MODERATE 35: CPT | Performed by: INTERNAL MEDICINE

## 2023-01-17 DEVICE — ABSORBABLE HEMOSTAT (OXIDIZED REGENERATED CELLULOSE)
Type: IMPLANTABLE DEVICE | Site: TRACHEA | Status: FUNCTIONAL
Brand: SURGICEL NU-KNIT

## 2023-01-17 RX ORDER — PROPOFOL 10 MG/ML
VIAL (ML) INTRAVENOUS AS NEEDED
Status: DISCONTINUED | OUTPATIENT
Start: 2023-01-17 | End: 2023-01-17 | Stop reason: SURG

## 2023-01-17 RX ORDER — BUPIVACAINE HYDROCHLORIDE 5 MG/ML
INJECTION, SOLUTION PERINEURAL AS NEEDED
Status: DISCONTINUED | OUTPATIENT
Start: 2023-01-17 | End: 2023-01-17 | Stop reason: HOSPADM

## 2023-01-17 RX ORDER — MAGNESIUM HYDROXIDE 1200 MG/15ML
LIQUID ORAL AS NEEDED
Status: DISCONTINUED | OUTPATIENT
Start: 2023-01-17 | End: 2023-01-17 | Stop reason: HOSPADM

## 2023-01-17 RX ORDER — SODIUM CHLORIDE 9 MG/ML
INJECTION, SOLUTION INTRAVENOUS CONTINUOUS PRN
Status: DISCONTINUED | OUTPATIENT
Start: 2023-01-17 | End: 2023-01-17 | Stop reason: SURG

## 2023-01-17 RX ADMIN — PROPOFOL 50 MG: 10 INJECTION, EMULSION INTRAVENOUS at 11:28

## 2023-01-17 RX ADMIN — PROPOFOL 50 MG: 10 INJECTION, EMULSION INTRAVENOUS at 11:20

## 2023-01-17 RX ADMIN — SODIUM CHLORIDE: 9 INJECTION, SOLUTION INTRAVENOUS at 11:42

## 2023-01-17 NOTE — ANESTHESIA PREPROCEDURE EVALUATION
Anesthesia Evaluation     Patient summary reviewed and Nursing notes reviewed   no history of anesthetic complications:  NPO Solid Status: > 8 hours  NPO Liquid Status: > 8 hours           Airway   Mallampati: II  TM distance: >3 FB  Neck ROM: full  No difficulty expected  Dental    (+) poor dentition    Pulmonary    (+) pneumonia worsening , a smoker, COPD moderate, shortness of breath, decreased breath sounds,   Cardiovascular - normal exam    ECG reviewed  Rhythm: regular  Rate: normal    (+) hypertension, BROWN, PVD,       Neuro/Psych  (+) TIA, CVA, headaches, numbness,    GI/Hepatic/Renal/Endo    (+) obesity,  GERD, PUD, GI bleeding ,     Musculoskeletal     (+) back pain, chronic pain,   Abdominal  - normal exam   Substance History - negative use     OB/GYN negative ob/gyn ROS         Other   arthritis, blood dyscrasia anemia,     ROS/Med Hx Other: Echo 12/31/2022:  •  Left ventricular systolic function is hyperdynamic (EF > 70%). Left ventricular ejection fraction appears to be greater than 70%.  •  Left ventricular diastolic function is consistent with (grade I) impaired relaxation.  •  Estimated right ventricular systolic pressure from tricuspid regurgitation is mildly elevated (35-45 mmHg).  •  No significant pericardial disease.        Phys Exam Other: Intubated since 12/30/2022.  Metabolic encephalopathy after aspiration pneumonia.                 Anesthesia Plan    ASA 4     general     intravenous induction   Postoperative Plan: Expected vent after surgery  Anesthetic plan, risks, benefits, and alternatives have been provided, discussed and informed consent has been obtained with: patient.  Pre-procedure education provided  Plan discussed with CRNA.        CODE STATUS:

## 2023-01-17 NOTE — ANESTHESIA POSTPROCEDURE EVALUATION
Patient: Jamison Edward    Procedure Summary     Date: 01/17/23 Room / Location: [facility] OR 02 /  [facility] OR    Anesthesia Start: 1120 Anesthesia Stop: 1240    Procedure: TRACHEOSTOMY (Neck) Diagnosis: Not recorded    Surgeons: Felton De León MD Provider: Surya Morris DO    Anesthesia Type: general ASA Status: 4          Anesthesia Type: general    Vitals  No vitals data found for the desired time range.          Post Anesthesia Care and Evaluation    Patient location during evaluation: ICU  Patient participation: complete - patient cannot participate  Level of consciousness: obtunded/minimal responses  Pain score: 0  Pain management: adequate    Airway patency: patent  Anesthetic complications: No anesthetic complications  PONV Status: controlled  Cardiovascular status: acceptable  Respiratory status: acceptable, ventilator and trach  Hydration status: euvolemic    Comments: /60  HR 90  RR SIMV 12  O2 Sat 96%  Temp 98.3    No anesthesia care post op

## 2023-01-18 ENCOUNTER — APPOINTMENT (OUTPATIENT)
Dept: GENERAL RADIOLOGY | Facility: HOSPITAL | Age: 53
End: 2023-01-18
Payer: COMMERCIAL

## 2023-01-18 ENCOUNTER — OUTSIDE FACILITY SERVICE (OUTPATIENT)
Dept: PULMONOLOGY | Facility: CLINIC | Age: 53
End: 2023-01-18
Payer: MEDICARE

## 2023-01-18 LAB
ALBUMIN SERPL-MCNC: 1.6 G/DL (ref 3.5–5.2)
ALBUMIN/GLOB SERPL: 0.5 G/DL
ALP SERPL-CCNC: 150 U/L (ref 39–117)
ALT SERPL W P-5'-P-CCNC: 20 U/L (ref 1–41)
ANION GAP SERPL CALCULATED.3IONS-SCNC: 7.4 MMOL/L (ref 5–15)
AST SERPL-CCNC: 24 U/L (ref 1–40)
BASOPHILS # BLD AUTO: 0.02 10*3/MM3 (ref 0–0.2)
BASOPHILS NFR BLD AUTO: 0.2 % (ref 0–1.5)
BILIRUB SERPL-MCNC: 0.7 MG/DL (ref 0–1.2)
BUN SERPL-MCNC: 9 MG/DL (ref 6–20)
BUN/CREAT SERPL: 28.1 (ref 7–25)
CALCIUM SPEC-SCNC: 7.7 MG/DL (ref 8.6–10.5)
CHLORIDE SERPL-SCNC: 110 MMOL/L (ref 98–107)
CO2 SERPL-SCNC: 27.6 MMOL/L (ref 22–29)
CREAT SERPL-MCNC: 0.32 MG/DL (ref 0.76–1.27)
CRP SERPL-MCNC: 4.15 MG/DL (ref 0–0.5)
DEPRECATED RDW RBC AUTO: 65.4 FL (ref 37–54)
EGFRCR SERPLBLD CKD-EPI 2021: 138.7 ML/MIN/1.73
EOSINOPHIL # BLD AUTO: 0.05 10*3/MM3 (ref 0–0.4)
EOSINOPHIL NFR BLD AUTO: 0.5 % (ref 0.3–6.2)
ERYTHROCYTE [DISTWIDTH] IN BLOOD BY AUTOMATED COUNT: 17.2 % (ref 12.3–15.4)
GLOBULIN UR ELPH-MCNC: 3 GM/DL
GLUCOSE BLDC GLUCOMTR-MCNC: 122 MG/DL (ref 70–130)
GLUCOSE BLDC GLUCOMTR-MCNC: 142 MG/DL (ref 70–130)
GLUCOSE BLDC GLUCOMTR-MCNC: 142 MG/DL (ref 70–130)
GLUCOSE BLDC GLUCOMTR-MCNC: 145 MG/DL (ref 70–130)
GLUCOSE SERPL-MCNC: 132 MG/DL (ref 65–99)
HCT VFR BLD AUTO: 31.7 % (ref 37.5–51)
HGB BLD-MCNC: 10.1 G/DL (ref 13–17.7)
IMM GRANULOCYTES # BLD AUTO: 0.05 10*3/MM3 (ref 0–0.05)
IMM GRANULOCYTES NFR BLD AUTO: 0.5 % (ref 0–0.5)
LYMPHOCYTES # BLD AUTO: 0.82 10*3/MM3 (ref 0.7–3.1)
LYMPHOCYTES NFR BLD AUTO: 8.9 % (ref 19.6–45.3)
MCH RBC QN AUTO: 33.1 PG (ref 26.6–33)
MCHC RBC AUTO-ENTMCNC: 31.9 G/DL (ref 31.5–35.7)
MCV RBC AUTO: 103.9 FL (ref 79–97)
MONOCYTES # BLD AUTO: 1.2 10*3/MM3 (ref 0.1–0.9)
MONOCYTES NFR BLD AUTO: 13 % (ref 5–12)
NEUTROPHILS NFR BLD AUTO: 7.11 10*3/MM3 (ref 1.7–7)
NEUTROPHILS NFR BLD AUTO: 76.9 % (ref 42.7–76)
NRBC BLD AUTO-RTO: 0 /100 WBC (ref 0–0.2)
PLATELET # BLD AUTO: 183 10*3/MM3 (ref 140–450)
PMV BLD AUTO: 10.1 FL (ref 6–12)
POTASSIUM SERPL-SCNC: 2.6 MMOL/L (ref 3.5–5.2)
POTASSIUM SERPL-SCNC: 2.9 MMOL/L (ref 3.5–5.2)
POTASSIUM SERPL-SCNC: 3.1 MMOL/L (ref 3.5–5.2)
PROT SERPL-MCNC: 4.6 G/DL (ref 6–8.5)
RBC # BLD AUTO: 3.05 10*6/MM3 (ref 4.14–5.8)
SODIUM SERPL-SCNC: 145 MMOL/L (ref 136–145)
WBC NRBC COR # BLD: 9.25 10*3/MM3 (ref 3.4–10.8)

## 2023-01-18 PROCEDURE — 82962 GLUCOSE BLOOD TEST: CPT

## 2023-01-18 PROCEDURE — 86140 C-REACTIVE PROTEIN: CPT | Performed by: INTERNAL MEDICINE

## 2023-01-18 PROCEDURE — 36415 COLL VENOUS BLD VENIPUNCTURE: CPT | Performed by: PHYSICIAN ASSISTANT

## 2023-01-18 PROCEDURE — 85025 COMPLETE CBC W/AUTO DIFF WBC: CPT | Performed by: INTERNAL MEDICINE

## 2023-01-18 PROCEDURE — 71045 X-RAY EXAM CHEST 1 VIEW: CPT | Performed by: RADIOLOGY

## 2023-01-18 PROCEDURE — 84132 ASSAY OF SERUM POTASSIUM: CPT | Performed by: PHYSICIAN ASSISTANT

## 2023-01-18 PROCEDURE — 80053 COMPREHEN METABOLIC PANEL: CPT | Performed by: INTERNAL MEDICINE

## 2023-01-18 PROCEDURE — 71045 X-RAY EXAM CHEST 1 VIEW: CPT

## 2023-01-18 PROCEDURE — 99233 SBSQ HOSP IP/OBS HIGH 50: CPT | Performed by: INTERNAL MEDICINE

## 2023-01-19 ENCOUNTER — OUTSIDE FACILITY SERVICE (OUTPATIENT)
Dept: PULMONOLOGY | Facility: CLINIC | Age: 53
End: 2023-01-19
Payer: MEDICARE

## 2023-01-19 ENCOUNTER — APPOINTMENT (OUTPATIENT)
Dept: GENERAL RADIOLOGY | Facility: HOSPITAL | Age: 53
End: 2023-01-19
Payer: COMMERCIAL

## 2023-01-19 ENCOUNTER — OUTSIDE FACILITY SERVICE (OUTPATIENT)
Dept: INFECTIOUS DISEASES | Facility: CLINIC | Age: 53
End: 2023-01-19
Payer: MEDICARE

## 2023-01-19 LAB
A-A DO2: 166.7 MMHG (ref 0–300)
ALBUMIN SERPL-MCNC: 1.6 G/DL (ref 3.5–5.2)
ALBUMIN SERPL-MCNC: 1.6 G/DL (ref 3.5–5.2)
ALBUMIN/GLOB SERPL: 0.6 G/DL
ALP SERPL-CCNC: 131 U/L (ref 39–117)
ALP SERPL-CCNC: 137 U/L (ref 39–117)
ALT SERPL W P-5'-P-CCNC: 12 U/L (ref 1–41)
ALT SERPL W P-5'-P-CCNC: 16 U/L (ref 1–41)
ANION GAP SERPL CALCULATED.3IONS-SCNC: 2.2 MMOL/L (ref 5–15)
ARTERIAL PATENCY WRIST A: ABNORMAL
AST SERPL-CCNC: 15 U/L (ref 1–40)
AST SERPL-CCNC: 20 U/L (ref 1–40)
ATMOSPHERIC PRESS: 719 MMHG
BACTERIA UR QL AUTO: ABNORMAL /HPF
BASE EXCESS BLDA CALC-SCNC: 6.3 MMOL/L (ref 0–2)
BASOPHILS # BLD AUTO: 0.01 10*3/MM3 (ref 0–0.2)
BASOPHILS NFR BLD AUTO: 0.1 % (ref 0–1.5)
BDY SITE: ABNORMAL
BILIRUB CONJ SERPL-MCNC: 0.3 MG/DL (ref 0–0.3)
BILIRUB INDIRECT SERPL-MCNC: 0.3 MG/DL
BILIRUB SERPL-MCNC: 0.6 MG/DL (ref 0–1.2)
BILIRUB SERPL-MCNC: 0.8 MG/DL (ref 0–1.2)
BILIRUB UR QL STRIP: NEGATIVE
BODY TEMPERATURE: 0 C
BUN SERPL-MCNC: 6 MG/DL (ref 6–20)
BUN/CREAT SERPL: 26.1 (ref 7–25)
CALCIUM SPEC-SCNC: 7.6 MG/DL (ref 8.6–10.5)
CHLORIDE SERPL-SCNC: 108 MMOL/L (ref 98–107)
CLARITY UR: CLEAR
CO2 BLDA-SCNC: 30.7 MMOL/L (ref 22–33)
CO2 SERPL-SCNC: 28.8 MMOL/L (ref 22–29)
COHGB MFR BLD: 1.7 % (ref 0–5)
COLOR UR: ABNORMAL
CREAT SERPL-MCNC: 0.23 MG/DL (ref 0.76–1.27)
CRP SERPL-MCNC: 11.3 MG/DL (ref 0–0.5)
DEPRECATED RDW RBC AUTO: 63.4 FL (ref 37–54)
EGFRCR SERPLBLD CKD-EPI 2021: 153.2 ML/MIN/1.73
EOSINOPHIL # BLD AUTO: 0 10*3/MM3 (ref 0–0.4)
EOSINOPHIL NFR BLD AUTO: 0 % (ref 0.3–6.2)
ERYTHROCYTE [DISTWIDTH] IN BLOOD BY AUTOMATED COUNT: 16.8 % (ref 12.3–15.4)
GLOBULIN UR ELPH-MCNC: 2.7 GM/DL
GLUCOSE BLDC GLUCOMTR-MCNC: 133 MG/DL (ref 70–130)
GLUCOSE BLDC GLUCOMTR-MCNC: 146 MG/DL (ref 70–130)
GLUCOSE BLDC GLUCOMTR-MCNC: 150 MG/DL (ref 70–130)
GLUCOSE BLDC GLUCOMTR-MCNC: 182 MG/DL (ref 70–130)
GLUCOSE SERPL-MCNC: 156 MG/DL (ref 65–99)
GLUCOSE UR STRIP-MCNC: NEGATIVE MG/DL
HCO3 BLDA-SCNC: 29.6 MMOL/L (ref 20–26)
HCT VFR BLD AUTO: 29 % (ref 37.5–51)
HCT VFR BLD CALC: 30.8 % (ref 38–51)
HGB BLD-MCNC: 9.2 G/DL (ref 13–17.7)
HGB BLDA-MCNC: 10.1 G/DL (ref 14–18)
HGB UR QL STRIP.AUTO: ABNORMAL
HYALINE CASTS UR QL AUTO: ABNORMAL /LPF
IMM GRANULOCYTES # BLD AUTO: 0.05 10*3/MM3 (ref 0–0.05)
IMM GRANULOCYTES NFR BLD AUTO: 0.6 % (ref 0–0.5)
INHALED O2 CONCENTRATION: 40 %
KETONES UR QL STRIP: NEGATIVE
LEUKOCYTE ESTERASE UR QL STRIP.AUTO: ABNORMAL
LYMPHOCYTES # BLD AUTO: 0.41 10*3/MM3 (ref 0.7–3.1)
LYMPHOCYTES NFR BLD AUTO: 4.6 % (ref 19.6–45.3)
Lab: ABNORMAL
MAGNESIUM SERPL-MCNC: 1.6 MG/DL (ref 1.6–2.6)
MCH RBC QN AUTO: 32.7 PG (ref 26.6–33)
MCHC RBC AUTO-ENTMCNC: 31.7 G/DL (ref 31.5–35.7)
MCV RBC AUTO: 103.2 FL (ref 79–97)
METHGB BLD QL: 0.1 % (ref 0–3)
MODALITY: ABNORMAL
MONOCYTES # BLD AUTO: 0.63 10*3/MM3 (ref 0.1–0.9)
MONOCYTES NFR BLD AUTO: 7.1 % (ref 5–12)
NEUTROPHILS NFR BLD AUTO: 7.75 10*3/MM3 (ref 1.7–7)
NEUTROPHILS NFR BLD AUTO: 87.6 % (ref 42.7–76)
NITRITE UR QL STRIP: NEGATIVE
NOTE: ABNORMAL
NRBC BLD AUTO-RTO: 0 /100 WBC (ref 0–0.2)
OXYHGB MFR BLDV: 91.1 % (ref 94–99)
PCO2 BLDA: 36.5 MM HG (ref 35–45)
PCO2 TEMP ADJ BLD: ABNORMAL MM[HG]
PEEP RESPIRATORY: 5 CM[H2O]
PH BLDA: 7.52 PH UNITS (ref 7.35–7.45)
PH UR STRIP.AUTO: 7 [PH] (ref 5–8)
PH, TEMP CORRECTED: ABNORMAL
PLATELET # BLD AUTO: 169 10*3/MM3 (ref 140–450)
PMV BLD AUTO: 10.4 FL (ref 6–12)
PO2 BLDA: 62.1 MM HG (ref 83–108)
PO2 TEMP ADJ BLD: ABNORMAL MM[HG]
POTASSIUM SERPL-SCNC: 2.7 MMOL/L (ref 3.5–5.2)
POTASSIUM SERPL-SCNC: 3.7 MMOL/L (ref 3.5–5.2)
PROT SERPL-MCNC: 4.2 G/DL (ref 6–8.5)
PROT SERPL-MCNC: 4.3 G/DL (ref 6–8.5)
PROT UR QL STRIP: ABNORMAL
PSV: 12 CMH2O
RBC # BLD AUTO: 2.81 10*6/MM3 (ref 4.14–5.8)
RBC # UR STRIP: ABNORMAL /HPF
REF LAB TEST METHOD: ABNORMAL
SAO2 % BLDCOA: 92.8 % (ref 94–99)
SODIUM SERPL-SCNC: 139 MMOL/L (ref 136–145)
SP GR UR STRIP: 1.02 (ref 1–1.03)
SQUAMOUS #/AREA URNS HPF: ABNORMAL /HPF
UROBILINOGEN UR QL STRIP: ABNORMAL
VENTILATOR MODE: ABNORMAL
WBC # UR STRIP: ABNORMAL /HPF
WBC NRBC COR # BLD: 8.85 10*3/MM3 (ref 3.4–10.8)
YEAST URNS QL MICRO: ABNORMAL /HPF

## 2023-01-19 PROCEDURE — 86140 C-REACTIVE PROTEIN: CPT | Performed by: INTERNAL MEDICINE

## 2023-01-19 PROCEDURE — 81001 URINALYSIS AUTO W/SCOPE: CPT | Performed by: INTERNAL MEDICINE

## 2023-01-19 PROCEDURE — 71045 X-RAY EXAM CHEST 1 VIEW: CPT

## 2023-01-19 PROCEDURE — 99232 SBSQ HOSP IP/OBS MODERATE 35: CPT | Performed by: INTERNAL MEDICINE

## 2023-01-19 PROCEDURE — 82375 ASSAY CARBOXYHB QUANT: CPT

## 2023-01-19 PROCEDURE — 71045 X-RAY EXAM CHEST 1 VIEW: CPT | Performed by: RADIOLOGY

## 2023-01-19 PROCEDURE — 87086 URINE CULTURE/COLONY COUNT: CPT | Performed by: INTERNAL MEDICINE

## 2023-01-19 PROCEDURE — 82962 GLUCOSE BLOOD TEST: CPT

## 2023-01-19 PROCEDURE — 84132 ASSAY OF SERUM POTASSIUM: CPT | Performed by: INTERNAL MEDICINE

## 2023-01-19 PROCEDURE — 80053 COMPREHEN METABOLIC PANEL: CPT | Performed by: INTERNAL MEDICINE

## 2023-01-19 PROCEDURE — 83050 HGB METHEMOGLOBIN QUAN: CPT

## 2023-01-19 PROCEDURE — 82248 BILIRUBIN DIRECT: CPT | Performed by: INTERNAL MEDICINE

## 2023-01-19 PROCEDURE — 83735 ASSAY OF MAGNESIUM: CPT | Performed by: INTERNAL MEDICINE

## 2023-01-19 PROCEDURE — 36415 COLL VENOUS BLD VENIPUNCTURE: CPT | Performed by: INTERNAL MEDICINE

## 2023-01-19 PROCEDURE — 85025 COMPLETE CBC W/AUTO DIFF WBC: CPT | Performed by: INTERNAL MEDICINE

## 2023-01-19 PROCEDURE — 82805 BLOOD GASES W/O2 SATURATION: CPT

## 2023-01-19 PROCEDURE — 99291 CRITICAL CARE FIRST HOUR: CPT | Performed by: INTERNAL MEDICINE

## 2023-01-20 ENCOUNTER — OUTSIDE FACILITY SERVICE (OUTPATIENT)
Dept: PULMONOLOGY | Facility: CLINIC | Age: 53
End: 2023-01-20
Payer: MEDICARE

## 2023-01-20 ENCOUNTER — APPOINTMENT (OUTPATIENT)
Dept: GENERAL RADIOLOGY | Facility: HOSPITAL | Age: 53
End: 2023-01-20
Payer: COMMERCIAL

## 2023-01-20 LAB
ALBUMIN SERPL-MCNC: 1.8 G/DL (ref 3.5–5.2)
ALBUMIN/GLOB SERPL: 0.7 G/DL
ALP SERPL-CCNC: 145 U/L (ref 39–117)
ALT SERPL W P-5'-P-CCNC: 13 U/L (ref 1–41)
ANION GAP SERPL CALCULATED.3IONS-SCNC: 4.4 MMOL/L (ref 5–15)
AST SERPL-CCNC: 17 U/L (ref 1–40)
BASOPHILS # BLD AUTO: 0.01 10*3/MM3 (ref 0–0.2)
BASOPHILS NFR BLD AUTO: 0.1 % (ref 0–1.5)
BILIRUB SERPL-MCNC: 0.7 MG/DL (ref 0–1.2)
BUN SERPL-MCNC: 7 MG/DL (ref 6–20)
BUN/CREAT SERPL: 25.9 (ref 7–25)
CALCIUM SPEC-SCNC: 7.9 MG/DL (ref 8.6–10.5)
CHLORIDE SERPL-SCNC: 106 MMOL/L (ref 98–107)
CO2 SERPL-SCNC: 28.6 MMOL/L (ref 22–29)
CREAT SERPL-MCNC: 0.27 MG/DL (ref 0.76–1.27)
CRP SERPL-MCNC: 10.16 MG/DL (ref 0–0.5)
DEPRECATED RDW RBC AUTO: 65 FL (ref 37–54)
EGFRCR SERPLBLD CKD-EPI 2021: 146 ML/MIN/1.73
EOSINOPHIL # BLD AUTO: 0.13 10*3/MM3 (ref 0–0.4)
EOSINOPHIL NFR BLD AUTO: 1.2 % (ref 0.3–6.2)
ERYTHROCYTE [DISTWIDTH] IN BLOOD BY AUTOMATED COUNT: 17.3 % (ref 12.3–15.4)
GLOBULIN UR ELPH-MCNC: 2.7 GM/DL
GLUCOSE BLDC GLUCOMTR-MCNC: 136 MG/DL (ref 70–130)
GLUCOSE BLDC GLUCOMTR-MCNC: 139 MG/DL (ref 70–130)
GLUCOSE SERPL-MCNC: 142 MG/DL (ref 65–99)
HCT VFR BLD AUTO: 31.3 % (ref 37.5–51)
HGB BLD-MCNC: 10 G/DL (ref 13–17.7)
IMM GRANULOCYTES # BLD AUTO: 0.06 10*3/MM3 (ref 0–0.05)
IMM GRANULOCYTES NFR BLD AUTO: 0.5 % (ref 0–0.5)
LYMPHOCYTES # BLD AUTO: 1.06 10*3/MM3 (ref 0.7–3.1)
LYMPHOCYTES NFR BLD AUTO: 9.6 % (ref 19.6–45.3)
MCH RBC QN AUTO: 32.9 PG (ref 26.6–33)
MCHC RBC AUTO-ENTMCNC: 31.9 G/DL (ref 31.5–35.7)
MCV RBC AUTO: 103 FL (ref 79–97)
MONOCYTES # BLD AUTO: 1.02 10*3/MM3 (ref 0.1–0.9)
MONOCYTES NFR BLD AUTO: 9.2 % (ref 5–12)
NEUTROPHILS NFR BLD AUTO: 79.4 % (ref 42.7–76)
NEUTROPHILS NFR BLD AUTO: 8.78 10*3/MM3 (ref 1.7–7)
NRBC BLD AUTO-RTO: 0 /100 WBC (ref 0–0.2)
PLATELET # BLD AUTO: 214 10*3/MM3 (ref 140–450)
PMV BLD AUTO: 10 FL (ref 6–12)
POTASSIUM SERPL-SCNC: 3 MMOL/L (ref 3.5–5.2)
POTASSIUM SERPL-SCNC: 3.4 MMOL/L (ref 3.5–5.2)
POTASSIUM SERPL-SCNC: 3.5 MMOL/L (ref 3.5–5.2)
PROT SERPL-MCNC: 4.5 G/DL (ref 6–8.5)
QT INTERVAL: 404 MS
QTC INTERVAL: 436 MS
RBC # BLD AUTO: 3.04 10*6/MM3 (ref 4.14–5.8)
SODIUM SERPL-SCNC: 139 MMOL/L (ref 136–145)
WBC NRBC COR # BLD: 11.06 10*3/MM3 (ref 3.4–10.8)

## 2023-01-20 PROCEDURE — 85025 COMPLETE CBC W/AUTO DIFF WBC: CPT | Performed by: INTERNAL MEDICINE

## 2023-01-20 PROCEDURE — 93010 ELECTROCARDIOGRAM REPORT: CPT | Performed by: INTERNAL MEDICINE

## 2023-01-20 PROCEDURE — 71045 X-RAY EXAM CHEST 1 VIEW: CPT | Performed by: RADIOLOGY

## 2023-01-20 PROCEDURE — 93005 ELECTROCARDIOGRAM TRACING: CPT | Performed by: INTERNAL MEDICINE

## 2023-01-20 PROCEDURE — 84132 ASSAY OF SERUM POTASSIUM: CPT | Performed by: PHYSICIAN ASSISTANT

## 2023-01-20 PROCEDURE — 86140 C-REACTIVE PROTEIN: CPT | Performed by: INTERNAL MEDICINE

## 2023-01-20 PROCEDURE — 99233 SBSQ HOSP IP/OBS HIGH 50: CPT | Performed by: INTERNAL MEDICINE

## 2023-01-20 PROCEDURE — 82962 GLUCOSE BLOOD TEST: CPT

## 2023-01-20 PROCEDURE — 36415 COLL VENOUS BLD VENIPUNCTURE: CPT | Performed by: PHYSICIAN ASSISTANT

## 2023-01-20 PROCEDURE — 71045 X-RAY EXAM CHEST 1 VIEW: CPT

## 2023-01-20 PROCEDURE — 80053 COMPREHEN METABOLIC PANEL: CPT | Performed by: INTERNAL MEDICINE

## 2023-01-21 ENCOUNTER — APPOINTMENT (OUTPATIENT)
Dept: GENERAL RADIOLOGY | Facility: HOSPITAL | Age: 53
End: 2023-01-21
Payer: COMMERCIAL

## 2023-01-21 ENCOUNTER — OUTSIDE FACILITY SERVICE (OUTPATIENT)
Dept: PULMONOLOGY | Facility: CLINIC | Age: 53
End: 2023-01-21
Payer: MEDICARE

## 2023-01-21 LAB
ALBUMIN SERPL-MCNC: 1.8 G/DL (ref 3.5–5.2)
ALBUMIN/GLOB SERPL: 0.9 G/DL
ALP SERPL-CCNC: 119 U/L (ref 39–117)
ALT SERPL W P-5'-P-CCNC: 14 U/L (ref 1–41)
ANION GAP SERPL CALCULATED.3IONS-SCNC: 3.1 MMOL/L (ref 5–15)
AST SERPL-CCNC: 19 U/L (ref 1–40)
BACTERIA SPEC AEROBE CULT: ABNORMAL
BASOPHILS # BLD AUTO: 0.01 10*3/MM3 (ref 0–0.2)
BASOPHILS NFR BLD AUTO: 0.1 % (ref 0–1.5)
BILIRUB SERPL-MCNC: 0.7 MG/DL (ref 0–1.2)
BUN SERPL-MCNC: 6 MG/DL (ref 6–20)
BUN/CREAT SERPL: 18.8 (ref 7–25)
CALCIUM SPEC-SCNC: 7.8 MG/DL (ref 8.6–10.5)
CHLORIDE SERPL-SCNC: 111 MMOL/L (ref 98–107)
CO2 SERPL-SCNC: 28.9 MMOL/L (ref 22–29)
CREAT SERPL-MCNC: 0.32 MG/DL (ref 0.76–1.27)
CRP SERPL-MCNC: 7.48 MG/DL (ref 0–0.5)
DEPRECATED RDW RBC AUTO: 67.2 FL (ref 37–54)
EGFRCR SERPLBLD CKD-EPI 2021: 138.7 ML/MIN/1.73
EOSINOPHIL # BLD AUTO: 0.15 10*3/MM3 (ref 0–0.4)
EOSINOPHIL NFR BLD AUTO: 2.1 % (ref 0.3–6.2)
ERYTHROCYTE [DISTWIDTH] IN BLOOD BY AUTOMATED COUNT: 17.2 % (ref 12.3–15.4)
GLOBULIN UR ELPH-MCNC: 2.1 GM/DL
GLUCOSE BLDC GLUCOMTR-MCNC: 118 MG/DL (ref 70–130)
GLUCOSE BLDC GLUCOMTR-MCNC: 138 MG/DL (ref 70–130)
GLUCOSE BLDC GLUCOMTR-MCNC: 147 MG/DL (ref 70–130)
GLUCOSE SERPL-MCNC: 143 MG/DL (ref 65–99)
HCT VFR BLD AUTO: 28.7 % (ref 37.5–51)
HGB BLD-MCNC: 8.9 G/DL (ref 13–17.7)
IMM GRANULOCYTES # BLD AUTO: 0.03 10*3/MM3 (ref 0–0.05)
IMM GRANULOCYTES NFR BLD AUTO: 0.4 % (ref 0–0.5)
LYMPHOCYTES # BLD AUTO: 1.16 10*3/MM3 (ref 0.7–3.1)
LYMPHOCYTES NFR BLD AUTO: 16.4 % (ref 19.6–45.3)
MCH RBC QN AUTO: 32.8 PG (ref 26.6–33)
MCHC RBC AUTO-ENTMCNC: 31 G/DL (ref 31.5–35.7)
MCV RBC AUTO: 105.9 FL (ref 79–97)
MONOCYTES # BLD AUTO: 0.85 10*3/MM3 (ref 0.1–0.9)
MONOCYTES NFR BLD AUTO: 12 % (ref 5–12)
NEUTROPHILS NFR BLD AUTO: 4.87 10*3/MM3 (ref 1.7–7)
NEUTROPHILS NFR BLD AUTO: 69 % (ref 42.7–76)
NRBC BLD AUTO-RTO: 0 /100 WBC (ref 0–0.2)
PLATELET # BLD AUTO: 177 10*3/MM3 (ref 140–450)
PMV BLD AUTO: 10.5 FL (ref 6–12)
POTASSIUM SERPL-SCNC: 3.1 MMOL/L (ref 3.5–5.2)
POTASSIUM SERPL-SCNC: 3.6 MMOL/L (ref 3.5–5.2)
PROT SERPL-MCNC: 3.9 G/DL (ref 6–8.5)
RBC # BLD AUTO: 2.71 10*6/MM3 (ref 4.14–5.8)
SODIUM SERPL-SCNC: 143 MMOL/L (ref 136–145)
WBC NRBC COR # BLD: 7.07 10*3/MM3 (ref 3.4–10.8)

## 2023-01-21 PROCEDURE — 99233 SBSQ HOSP IP/OBS HIGH 50: CPT | Performed by: INTERNAL MEDICINE

## 2023-01-21 PROCEDURE — 86140 C-REACTIVE PROTEIN: CPT | Performed by: INTERNAL MEDICINE

## 2023-01-21 PROCEDURE — 71045 X-RAY EXAM CHEST 1 VIEW: CPT

## 2023-01-21 PROCEDURE — 82962 GLUCOSE BLOOD TEST: CPT

## 2023-01-21 PROCEDURE — 84132 ASSAY OF SERUM POTASSIUM: CPT | Performed by: INTERNAL MEDICINE

## 2023-01-21 PROCEDURE — 85025 COMPLETE CBC W/AUTO DIFF WBC: CPT | Performed by: INTERNAL MEDICINE

## 2023-01-21 PROCEDURE — 36415 COLL VENOUS BLD VENIPUNCTURE: CPT | Performed by: INTERNAL MEDICINE

## 2023-01-21 PROCEDURE — 80053 COMPREHEN METABOLIC PANEL: CPT | Performed by: INTERNAL MEDICINE

## 2023-01-22 ENCOUNTER — OUTSIDE FACILITY SERVICE (OUTPATIENT)
Dept: PULMONOLOGY | Facility: CLINIC | Age: 53
End: 2023-01-22
Payer: MEDICARE

## 2023-01-22 LAB
ALBUMIN SERPL-MCNC: 1.9 G/DL (ref 3.5–5.2)
ALBUMIN/GLOB SERPL: 0.8 G/DL
ALP SERPL-CCNC: 141 U/L (ref 39–117)
ALT SERPL W P-5'-P-CCNC: 16 U/L (ref 1–41)
ANION GAP SERPL CALCULATED.3IONS-SCNC: 4.3 MMOL/L (ref 5–15)
AST SERPL-CCNC: 21 U/L (ref 1–40)
BASOPHILS # BLD AUTO: 0.01 10*3/MM3 (ref 0–0.2)
BASOPHILS NFR BLD AUTO: 0.1 % (ref 0–1.5)
BILIRUB SERPL-MCNC: 0.6 MG/DL (ref 0–1.2)
BUN SERPL-MCNC: 7 MG/DL (ref 6–20)
BUN/CREAT SERPL: 22.6 (ref 7–25)
CALCIUM SPEC-SCNC: 8.1 MG/DL (ref 8.6–10.5)
CHLORIDE SERPL-SCNC: 114 MMOL/L (ref 98–107)
CO2 SERPL-SCNC: 30.7 MMOL/L (ref 22–29)
CREAT SERPL-MCNC: 0.31 MG/DL (ref 0.76–1.27)
CRP SERPL-MCNC: 7.78 MG/DL (ref 0–0.5)
DEPRECATED RDW RBC AUTO: 62.8 FL (ref 37–54)
EGFRCR SERPLBLD CKD-EPI 2021: 140 ML/MIN/1.73
EOSINOPHIL # BLD AUTO: 0.02 10*3/MM3 (ref 0–0.4)
EOSINOPHIL NFR BLD AUTO: 0.2 % (ref 0.3–6.2)
ERYTHROCYTE [DISTWIDTH] IN BLOOD BY AUTOMATED COUNT: 16.5 % (ref 12.3–15.4)
GLOBULIN UR ELPH-MCNC: 2.5 GM/DL
GLUCOSE BLDC GLUCOMTR-MCNC: 177 MG/DL (ref 70–130)
GLUCOSE SERPL-MCNC: 162 MG/DL (ref 65–99)
HCT VFR BLD AUTO: 32.2 % (ref 37.5–51)
HGB BLD-MCNC: 9.9 G/DL (ref 13–17.7)
IMM GRANULOCYTES # BLD AUTO: 0.05 10*3/MM3 (ref 0–0.05)
IMM GRANULOCYTES NFR BLD AUTO: 0.5 % (ref 0–0.5)
LYMPHOCYTES # BLD AUTO: 0.48 10*3/MM3 (ref 0.7–3.1)
LYMPHOCYTES NFR BLD AUTO: 4.9 % (ref 19.6–45.3)
MAGNESIUM SERPL-MCNC: 2 MG/DL (ref 1.6–2.6)
MCH RBC QN AUTO: 32 PG (ref 26.6–33)
MCHC RBC AUTO-ENTMCNC: 30.7 G/DL (ref 31.5–35.7)
MCV RBC AUTO: 104.2 FL (ref 79–97)
MONOCYTES # BLD AUTO: 0.46 10*3/MM3 (ref 0.1–0.9)
MONOCYTES NFR BLD AUTO: 4.7 % (ref 5–12)
NEUTROPHILS NFR BLD AUTO: 8.69 10*3/MM3 (ref 1.7–7)
NEUTROPHILS NFR BLD AUTO: 89.6 % (ref 42.7–76)
NRBC BLD AUTO-RTO: 0 /100 WBC (ref 0–0.2)
PLATELET # BLD AUTO: 174 10*3/MM3 (ref 140–450)
PMV BLD AUTO: 10.5 FL (ref 6–12)
POTASSIUM SERPL-SCNC: 3.4 MMOL/L (ref 3.5–5.2)
POTASSIUM SERPL-SCNC: 3.6 MMOL/L (ref 3.5–5.2)
PROT SERPL-MCNC: 4.4 G/DL (ref 6–8.5)
QT INTERVAL: 418 MS
QTC INTERVAL: 418 MS
RBC # BLD AUTO: 3.09 10*6/MM3 (ref 4.14–5.8)
SODIUM SERPL-SCNC: 149 MMOL/L (ref 136–145)
WBC NRBC COR # BLD: 9.71 10*3/MM3 (ref 3.4–10.8)

## 2023-01-22 PROCEDURE — 84132 ASSAY OF SERUM POTASSIUM: CPT | Performed by: INTERNAL MEDICINE

## 2023-01-22 PROCEDURE — 86140 C-REACTIVE PROTEIN: CPT | Performed by: INTERNAL MEDICINE

## 2023-01-22 PROCEDURE — 93005 ELECTROCARDIOGRAM TRACING: CPT | Performed by: INTERNAL MEDICINE

## 2023-01-22 PROCEDURE — 82962 GLUCOSE BLOOD TEST: CPT

## 2023-01-22 PROCEDURE — 85025 COMPLETE CBC W/AUTO DIFF WBC: CPT | Performed by: INTERNAL MEDICINE

## 2023-01-22 PROCEDURE — 99233 SBSQ HOSP IP/OBS HIGH 50: CPT | Performed by: INTERNAL MEDICINE

## 2023-01-22 PROCEDURE — 83735 ASSAY OF MAGNESIUM: CPT | Performed by: INTERNAL MEDICINE

## 2023-01-22 PROCEDURE — 80053 COMPREHEN METABOLIC PANEL: CPT | Performed by: INTERNAL MEDICINE

## 2023-01-23 ENCOUNTER — OUTSIDE FACILITY SERVICE (OUTPATIENT)
Dept: PULMONOLOGY | Facility: CLINIC | Age: 53
End: 2023-01-23
Payer: MEDICARE

## 2023-01-23 ENCOUNTER — OUTSIDE FACILITY SERVICE (OUTPATIENT)
Dept: INFECTIOUS DISEASES | Facility: CLINIC | Age: 53
End: 2023-01-23
Payer: MEDICARE

## 2023-01-23 LAB
ALBUMIN SERPL-MCNC: 1.7 G/DL (ref 3.5–5.2)
ALBUMIN/GLOB SERPL: 0.6 G/DL
ALP SERPL-CCNC: 138 U/L (ref 39–117)
ALT SERPL W P-5'-P-CCNC: 14 U/L (ref 1–41)
ANION GAP SERPL CALCULATED.3IONS-SCNC: 6.4 MMOL/L (ref 5–15)
AST SERPL-CCNC: 18 U/L (ref 1–40)
BASOPHILS # BLD AUTO: 0.02 10*3/MM3 (ref 0–0.2)
BASOPHILS NFR BLD AUTO: 0.2 % (ref 0–1.5)
BILIRUB SERPL-MCNC: 0.5 MG/DL (ref 0–1.2)
BUN SERPL-MCNC: 8 MG/DL (ref 6–20)
BUN/CREAT SERPL: 26.7 (ref 7–25)
CALCIUM SPEC-SCNC: 7.8 MG/DL (ref 8.6–10.5)
CHLORIDE SERPL-SCNC: 112 MMOL/L (ref 98–107)
CO2 SERPL-SCNC: 28.6 MMOL/L (ref 22–29)
CREAT SERPL-MCNC: 0.3 MG/DL (ref 0.76–1.27)
CRP SERPL-MCNC: 4.95 MG/DL (ref 0–0.5)
DEPRECATED RDW RBC AUTO: 63.4 FL (ref 37–54)
EGFRCR SERPLBLD CKD-EPI 2021: 141.4 ML/MIN/1.73
EOSINOPHIL # BLD AUTO: 0.01 10*3/MM3 (ref 0–0.4)
EOSINOPHIL NFR BLD AUTO: 0.1 % (ref 0.3–6.2)
ERYTHROCYTE [DISTWIDTH] IN BLOOD BY AUTOMATED COUNT: 16.5 % (ref 12.3–15.4)
GLOBULIN UR ELPH-MCNC: 2.7 GM/DL
GLUCOSE BLDC GLUCOMTR-MCNC: 140 MG/DL (ref 70–130)
GLUCOSE BLDC GLUCOMTR-MCNC: 179 MG/DL (ref 70–130)
GLUCOSE SERPL-MCNC: 193 MG/DL (ref 65–99)
HCT VFR BLD AUTO: 30.9 % (ref 37.5–51)
HGB BLD-MCNC: 9.9 G/DL (ref 13–17.7)
IMM GRANULOCYTES # BLD AUTO: 0.06 10*3/MM3 (ref 0–0.05)
IMM GRANULOCYTES NFR BLD AUTO: 0.5 % (ref 0–0.5)
LYMPHOCYTES # BLD AUTO: 0.58 10*3/MM3 (ref 0.7–3.1)
LYMPHOCYTES NFR BLD AUTO: 5 % (ref 19.6–45.3)
MCH RBC QN AUTO: 33.7 PG (ref 26.6–33)
MCHC RBC AUTO-ENTMCNC: 32 G/DL (ref 31.5–35.7)
MCV RBC AUTO: 105.1 FL (ref 79–97)
MONOCYTES # BLD AUTO: 0.72 10*3/MM3 (ref 0.1–0.9)
MONOCYTES NFR BLD AUTO: 6.3 % (ref 5–12)
NEUTROPHILS NFR BLD AUTO: 10.1 10*3/MM3 (ref 1.7–7)
NEUTROPHILS NFR BLD AUTO: 87.9 % (ref 42.7–76)
NRBC BLD AUTO-RTO: 0 /100 WBC (ref 0–0.2)
PLATELET # BLD AUTO: 164 10*3/MM3 (ref 140–450)
PMV BLD AUTO: 10.6 FL (ref 6–12)
POTASSIUM SERPL-SCNC: 3.1 MMOL/L (ref 3.5–5.2)
POTASSIUM SERPL-SCNC: 3.7 MMOL/L (ref 3.5–5.2)
PROT SERPL-MCNC: 4.4 G/DL (ref 6–8.5)
RBC # BLD AUTO: 2.94 10*6/MM3 (ref 4.14–5.8)
SODIUM SERPL-SCNC: 147 MMOL/L (ref 136–145)
WBC NRBC COR # BLD: 11.49 10*3/MM3 (ref 3.4–10.8)

## 2023-01-23 PROCEDURE — 82962 GLUCOSE BLOOD TEST: CPT

## 2023-01-23 PROCEDURE — 80053 COMPREHEN METABOLIC PANEL: CPT | Performed by: INTERNAL MEDICINE

## 2023-01-23 PROCEDURE — 84132 ASSAY OF SERUM POTASSIUM: CPT | Performed by: INTERNAL MEDICINE

## 2023-01-23 PROCEDURE — 85025 COMPLETE CBC W/AUTO DIFF WBC: CPT | Performed by: INTERNAL MEDICINE

## 2023-01-23 PROCEDURE — 99232 SBSQ HOSP IP/OBS MODERATE 35: CPT | Performed by: INTERNAL MEDICINE

## 2023-01-23 PROCEDURE — 36415 COLL VENOUS BLD VENIPUNCTURE: CPT | Performed by: INTERNAL MEDICINE

## 2023-01-23 PROCEDURE — 99233 SBSQ HOSP IP/OBS HIGH 50: CPT | Performed by: INTERNAL MEDICINE

## 2023-01-23 PROCEDURE — 86140 C-REACTIVE PROTEIN: CPT | Performed by: INTERNAL MEDICINE

## 2023-01-24 ENCOUNTER — OUTSIDE FACILITY SERVICE (OUTPATIENT)
Dept: INFECTIOUS DISEASES | Facility: CLINIC | Age: 53
End: 2023-01-24
Payer: MEDICARE

## 2023-01-24 ENCOUNTER — OUTSIDE FACILITY SERVICE (OUTPATIENT)
Dept: PULMONOLOGY | Facility: CLINIC | Age: 53
End: 2023-01-24
Payer: MEDICARE

## 2023-01-24 ENCOUNTER — APPOINTMENT (OUTPATIENT)
Dept: GENERAL RADIOLOGY | Facility: HOSPITAL | Age: 53
End: 2023-01-24
Payer: COMMERCIAL

## 2023-01-24 LAB
ALBUMIN SERPL-MCNC: 1.8 G/DL (ref 3.5–5.2)
ALBUMIN/GLOB SERPL: 0.7 G/DL
ALP SERPL-CCNC: 150 U/L (ref 39–117)
ALT SERPL W P-5'-P-CCNC: 9 U/L (ref 1–41)
ANION GAP SERPL CALCULATED.3IONS-SCNC: 2 MMOL/L (ref 5–15)
AST SERPL-CCNC: 11 U/L (ref 1–40)
BASOPHILS # BLD AUTO: 0.02 10*3/MM3 (ref 0–0.2)
BASOPHILS NFR BLD AUTO: 0.2 % (ref 0–1.5)
BILIRUB SERPL-MCNC: 0.4 MG/DL (ref 0–1.2)
BUN SERPL-MCNC: 7 MG/DL (ref 6–20)
BUN/CREAT SERPL: 29.2 (ref 7–25)
CALCIUM SPEC-SCNC: 8.2 MG/DL (ref 8.6–10.5)
CHLORIDE SERPL-SCNC: 114 MMOL/L (ref 98–107)
CO2 SERPL-SCNC: 28 MMOL/L (ref 22–29)
CREAT SERPL-MCNC: 0.24 MG/DL (ref 0.76–1.27)
CRP SERPL-MCNC: 3.17 MG/DL (ref 0–0.5)
DEPRECATED RDW RBC AUTO: 64.6 FL (ref 37–54)
EGFRCR SERPLBLD CKD-EPI 2021: 151.3 ML/MIN/1.73
EOSINOPHIL # BLD AUTO: 0 10*3/MM3 (ref 0–0.4)
EOSINOPHIL NFR BLD AUTO: 0 % (ref 0.3–6.2)
ERYTHROCYTE [DISTWIDTH] IN BLOOD BY AUTOMATED COUNT: 16.5 % (ref 12.3–15.4)
GLOBULIN UR ELPH-MCNC: 2.6 GM/DL
GLUCOSE BLDC GLUCOMTR-MCNC: 142 MG/DL (ref 70–130)
GLUCOSE BLDC GLUCOMTR-MCNC: 146 MG/DL (ref 70–130)
GLUCOSE BLDC GLUCOMTR-MCNC: 155 MG/DL (ref 70–130)
GLUCOSE BLDC GLUCOMTR-MCNC: 181 MG/DL (ref 70–130)
GLUCOSE SERPL-MCNC: 190 MG/DL (ref 65–99)
HCT VFR BLD AUTO: 30.2 % (ref 37.5–51)
HGB BLD-MCNC: 9.2 G/DL (ref 13–17.7)
IMM GRANULOCYTES # BLD AUTO: 0.14 10*3/MM3 (ref 0–0.05)
IMM GRANULOCYTES NFR BLD AUTO: 1.2 % (ref 0–0.5)
LYMPHOCYTES # BLD AUTO: 0.49 10*3/MM3 (ref 0.7–3.1)
LYMPHOCYTES NFR BLD AUTO: 4.2 % (ref 19.6–45.3)
MCH RBC QN AUTO: 32.3 PG (ref 26.6–33)
MCHC RBC AUTO-ENTMCNC: 30.5 G/DL (ref 31.5–35.7)
MCV RBC AUTO: 106 FL (ref 79–97)
MONOCYTES # BLD AUTO: 0.64 10*3/MM3 (ref 0.1–0.9)
MONOCYTES NFR BLD AUTO: 5.5 % (ref 5–12)
NEUTROPHILS NFR BLD AUTO: 10.31 10*3/MM3 (ref 1.7–7)
NEUTROPHILS NFR BLD AUTO: 88.9 % (ref 42.7–76)
NRBC BLD AUTO-RTO: 0 /100 WBC (ref 0–0.2)
PLATELET # BLD AUTO: 162 10*3/MM3 (ref 140–450)
PMV BLD AUTO: 10.6 FL (ref 6–12)
POTASSIUM SERPL-SCNC: 3.8 MMOL/L (ref 3.5–5.2)
PROT SERPL-MCNC: 4.4 G/DL (ref 6–8.5)
RBC # BLD AUTO: 2.85 10*6/MM3 (ref 4.14–5.8)
SODIUM SERPL-SCNC: 144 MMOL/L (ref 136–145)
WBC NRBC COR # BLD: 11.6 10*3/MM3 (ref 3.4–10.8)

## 2023-01-24 PROCEDURE — 86140 C-REACTIVE PROTEIN: CPT | Performed by: INTERNAL MEDICINE

## 2023-01-24 PROCEDURE — 99291 CRITICAL CARE FIRST HOUR: CPT | Performed by: INTERNAL MEDICINE

## 2023-01-24 PROCEDURE — 80053 COMPREHEN METABOLIC PANEL: CPT | Performed by: INTERNAL MEDICINE

## 2023-01-24 PROCEDURE — 87070 CULTURE OTHR SPECIMN AEROBIC: CPT | Performed by: INTERNAL MEDICINE

## 2023-01-24 PROCEDURE — 87205 SMEAR GRAM STAIN: CPT | Performed by: INTERNAL MEDICINE

## 2023-01-24 PROCEDURE — 82962 GLUCOSE BLOOD TEST: CPT

## 2023-01-24 PROCEDURE — 71045 X-RAY EXAM CHEST 1 VIEW: CPT | Performed by: RADIOLOGY

## 2023-01-24 PROCEDURE — 85025 COMPLETE CBC W/AUTO DIFF WBC: CPT | Performed by: INTERNAL MEDICINE

## 2023-01-24 PROCEDURE — 36415 COLL VENOUS BLD VENIPUNCTURE: CPT | Performed by: INTERNAL MEDICINE

## 2023-01-24 PROCEDURE — 99232 SBSQ HOSP IP/OBS MODERATE 35: CPT | Performed by: INTERNAL MEDICINE

## 2023-01-24 PROCEDURE — 71045 X-RAY EXAM CHEST 1 VIEW: CPT

## 2023-01-25 ENCOUNTER — OUTSIDE FACILITY SERVICE (OUTPATIENT)
Dept: PULMONOLOGY | Facility: CLINIC | Age: 53
End: 2023-01-25
Payer: MEDICARE

## 2023-01-25 LAB
ALBUMIN SERPL-MCNC: 2.3 G/DL (ref 3.5–5.2)
ALBUMIN/GLOB SERPL: 1 G/DL
ALP SERPL-CCNC: 140 U/L (ref 39–117)
ALT SERPL W P-5'-P-CCNC: 10 U/L (ref 1–41)
ANION GAP SERPL CALCULATED.3IONS-SCNC: 7.2 MMOL/L (ref 5–15)
AST SERPL-CCNC: 16 U/L (ref 1–40)
BASOPHILS # BLD AUTO: 0.01 10*3/MM3 (ref 0–0.2)
BASOPHILS NFR BLD AUTO: 0.1 % (ref 0–1.5)
BILIRUB SERPL-MCNC: 0.5 MG/DL (ref 0–1.2)
BUN SERPL-MCNC: 6 MG/DL (ref 6–20)
BUN/CREAT SERPL: 21.4 (ref 7–25)
CALCIUM SPEC-SCNC: 8.1 MG/DL (ref 8.6–10.5)
CHLORIDE SERPL-SCNC: 109 MMOL/L (ref 98–107)
CO2 SERPL-SCNC: 30.8 MMOL/L (ref 22–29)
CREAT SERPL-MCNC: 0.28 MG/DL (ref 0.76–1.27)
CRP SERPL-MCNC: 2.07 MG/DL (ref 0–0.5)
DEPRECATED RDW RBC AUTO: 62.3 FL (ref 37–54)
EGFRCR SERPLBLD CKD-EPI 2021: 144.4 ML/MIN/1.73
EOSINOPHIL # BLD AUTO: 0 10*3/MM3 (ref 0–0.4)
EOSINOPHIL NFR BLD AUTO: 0 % (ref 0.3–6.2)
ERYTHROCYTE [DISTWIDTH] IN BLOOD BY AUTOMATED COUNT: 16.1 % (ref 12.3–15.4)
GLOBULIN UR ELPH-MCNC: 2.4 GM/DL
GLUCOSE BLDC GLUCOMTR-MCNC: 132 MG/DL (ref 70–130)
GLUCOSE BLDC GLUCOMTR-MCNC: 146 MG/DL (ref 70–130)
GLUCOSE BLDC GLUCOMTR-MCNC: 153 MG/DL (ref 70–130)
GLUCOSE SERPL-MCNC: 177 MG/DL (ref 65–99)
HCT VFR BLD AUTO: 30.6 % (ref 37.5–51)
HGB BLD-MCNC: 9.6 G/DL (ref 13–17.7)
IMM GRANULOCYTES # BLD AUTO: 0.07 10*3/MM3 (ref 0–0.05)
IMM GRANULOCYTES NFR BLD AUTO: 0.7 % (ref 0–0.5)
LYMPHOCYTES # BLD AUTO: 0.77 10*3/MM3 (ref 0.7–3.1)
LYMPHOCYTES NFR BLD AUTO: 7.6 % (ref 19.6–45.3)
MAGNESIUM SERPL-MCNC: 1.7 MG/DL (ref 1.6–2.6)
MCH RBC QN AUTO: 33 PG (ref 26.6–33)
MCHC RBC AUTO-ENTMCNC: 31.4 G/DL (ref 31.5–35.7)
MCV RBC AUTO: 105.2 FL (ref 79–97)
MONOCYTES # BLD AUTO: 0.77 10*3/MM3 (ref 0.1–0.9)
MONOCYTES NFR BLD AUTO: 7.6 % (ref 5–12)
NEUTROPHILS NFR BLD AUTO: 8.52 10*3/MM3 (ref 1.7–7)
NEUTROPHILS NFR BLD AUTO: 84 % (ref 42.7–76)
NRBC BLD AUTO-RTO: 0 /100 WBC (ref 0–0.2)
PLATELET # BLD AUTO: 170 10*3/MM3 (ref 140–450)
PMV BLD AUTO: 10.5 FL (ref 6–12)
POTASSIUM SERPL-SCNC: 3 MMOL/L (ref 3.5–5.2)
POTASSIUM SERPL-SCNC: 3.1 MMOL/L (ref 3.5–5.2)
POTASSIUM SERPL-SCNC: 3.3 MMOL/L (ref 3.5–5.2)
PROT SERPL-MCNC: 4.7 G/DL (ref 6–8.5)
RBC # BLD AUTO: 2.91 10*6/MM3 (ref 4.14–5.8)
SODIUM SERPL-SCNC: 147 MMOL/L (ref 136–145)
WBC NRBC COR # BLD: 10.14 10*3/MM3 (ref 3.4–10.8)

## 2023-01-25 PROCEDURE — 86140 C-REACTIVE PROTEIN: CPT | Performed by: INTERNAL MEDICINE

## 2023-01-25 PROCEDURE — 82962 GLUCOSE BLOOD TEST: CPT

## 2023-01-25 PROCEDURE — 85025 COMPLETE CBC W/AUTO DIFF WBC: CPT | Performed by: INTERNAL MEDICINE

## 2023-01-25 PROCEDURE — 83735 ASSAY OF MAGNESIUM: CPT | Performed by: INTERNAL MEDICINE

## 2023-01-25 PROCEDURE — 99291 CRITICAL CARE FIRST HOUR: CPT | Performed by: INTERNAL MEDICINE

## 2023-01-25 PROCEDURE — 80053 COMPREHEN METABOLIC PANEL: CPT | Performed by: INTERNAL MEDICINE

## 2023-01-25 PROCEDURE — 97163 PT EVAL HIGH COMPLEX 45 MIN: CPT

## 2023-01-25 PROCEDURE — 84132 ASSAY OF SERUM POTASSIUM: CPT | Performed by: INTERNAL MEDICINE

## 2023-01-25 PROCEDURE — 84132 ASSAY OF SERUM POTASSIUM: CPT | Performed by: PHYSICIAN ASSISTANT

## 2023-01-25 PROCEDURE — 36415 COLL VENOUS BLD VENIPUNCTURE: CPT | Performed by: INTERNAL MEDICINE

## 2023-01-26 ENCOUNTER — OUTSIDE FACILITY SERVICE (OUTPATIENT)
Dept: INFECTIOUS DISEASES | Facility: CLINIC | Age: 53
End: 2023-01-26
Payer: MEDICARE

## 2023-01-26 ENCOUNTER — OUTSIDE FACILITY SERVICE (OUTPATIENT)
Dept: PULMONOLOGY | Facility: CLINIC | Age: 53
End: 2023-01-26
Payer: MEDICARE

## 2023-01-26 LAB
ALBUMIN SERPL-MCNC: 1.9 G/DL (ref 3.5–5.2)
ALBUMIN/GLOB SERPL: 0.8 G/DL
ALP SERPL-CCNC: 124 U/L (ref 39–117)
ALT SERPL W P-5'-P-CCNC: 13 U/L (ref 1–41)
ANION GAP SERPL CALCULATED.3IONS-SCNC: 4.3 MMOL/L (ref 5–15)
AST SERPL-CCNC: 17 U/L (ref 1–40)
BACTERIA SPEC RESP CULT: NORMAL
BASOPHILS # BLD AUTO: 0.01 10*3/MM3 (ref 0–0.2)
BASOPHILS NFR BLD AUTO: 0.1 % (ref 0–1.5)
BILIRUB SERPL-MCNC: 0.5 MG/DL (ref 0–1.2)
BUN SERPL-MCNC: 5 MG/DL (ref 6–20)
BUN/CREAT SERPL: 18.5 (ref 7–25)
CALCIUM SPEC-SCNC: 7.8 MG/DL (ref 8.6–10.5)
CHLORIDE SERPL-SCNC: 111 MMOL/L (ref 98–107)
CO2 SERPL-SCNC: 32.7 MMOL/L (ref 22–29)
CREAT SERPL-MCNC: 0.27 MG/DL (ref 0.76–1.27)
CRP SERPL-MCNC: 2.65 MG/DL (ref 0–0.5)
DEPRECATED RDW RBC AUTO: 63.7 FL (ref 37–54)
EGFRCR SERPLBLD CKD-EPI 2021: 146 ML/MIN/1.73
EOSINOPHIL # BLD AUTO: 0.09 10*3/MM3 (ref 0–0.4)
EOSINOPHIL NFR BLD AUTO: 1 % (ref 0.3–6.2)
ERYTHROCYTE [DISTWIDTH] IN BLOOD BY AUTOMATED COUNT: 16.4 % (ref 12.3–15.4)
GLOBULIN UR ELPH-MCNC: 2.3 GM/DL
GLUCOSE BLDC GLUCOMTR-MCNC: 125 MG/DL (ref 70–130)
GLUCOSE BLDC GLUCOMTR-MCNC: 129 MG/DL (ref 70–130)
GLUCOSE BLDC GLUCOMTR-MCNC: 142 MG/DL (ref 70–130)
GLUCOSE SERPL-MCNC: 115 MG/DL (ref 65–99)
GRAM STN SPEC: NORMAL
HCT VFR BLD AUTO: 29.1 % (ref 37.5–51)
HGB BLD-MCNC: 8.8 G/DL (ref 13–17.7)
IMM GRANULOCYTES # BLD AUTO: 0.07 10*3/MM3 (ref 0–0.05)
IMM GRANULOCYTES NFR BLD AUTO: 0.8 % (ref 0–0.5)
LYMPHOCYTES # BLD AUTO: 1.09 10*3/MM3 (ref 0.7–3.1)
LYMPHOCYTES NFR BLD AUTO: 12.6 % (ref 19.6–45.3)
MAGNESIUM SERPL-MCNC: 1.7 MG/DL (ref 1.6–2.6)
MCH RBC QN AUTO: 32 PG (ref 26.6–33)
MCHC RBC AUTO-ENTMCNC: 30.2 G/DL (ref 31.5–35.7)
MCV RBC AUTO: 105.8 FL (ref 79–97)
MONOCYTES # BLD AUTO: 0.85 10*3/MM3 (ref 0.1–0.9)
MONOCYTES NFR BLD AUTO: 9.8 % (ref 5–12)
NEUTROPHILS NFR BLD AUTO: 6.53 10*3/MM3 (ref 1.7–7)
NEUTROPHILS NFR BLD AUTO: 75.7 % (ref 42.7–76)
NRBC BLD AUTO-RTO: 0 /100 WBC (ref 0–0.2)
PLATELET # BLD AUTO: 162 10*3/MM3 (ref 140–450)
PMV BLD AUTO: 10.4 FL (ref 6–12)
POTASSIUM SERPL-SCNC: 3.2 MMOL/L (ref 3.5–5.2)
POTASSIUM SERPL-SCNC: 3.6 MMOL/L (ref 3.5–5.2)
PREALB SERPL-MCNC: 10.1 MG/DL (ref 20–40)
PROT SERPL-MCNC: 4.2 G/DL (ref 6–8.5)
RBC # BLD AUTO: 2.75 10*6/MM3 (ref 4.14–5.8)
SODIUM SERPL-SCNC: 148 MMOL/L (ref 136–145)
WBC NRBC COR # BLD: 8.64 10*3/MM3 (ref 3.4–10.8)

## 2023-01-26 PROCEDURE — 80053 COMPREHEN METABOLIC PANEL: CPT | Performed by: INTERNAL MEDICINE

## 2023-01-26 PROCEDURE — 85025 COMPLETE CBC W/AUTO DIFF WBC: CPT | Performed by: INTERNAL MEDICINE

## 2023-01-26 PROCEDURE — 83735 ASSAY OF MAGNESIUM: CPT | Performed by: PHYSICIAN ASSISTANT

## 2023-01-26 PROCEDURE — 99291 CRITICAL CARE FIRST HOUR: CPT | Performed by: INTERNAL MEDICINE

## 2023-01-26 PROCEDURE — 97530 THERAPEUTIC ACTIVITIES: CPT

## 2023-01-26 PROCEDURE — 84132 ASSAY OF SERUM POTASSIUM: CPT | Performed by: INTERNAL MEDICINE

## 2023-01-26 PROCEDURE — 84134 ASSAY OF PREALBUMIN: CPT | Performed by: INTERNAL MEDICINE

## 2023-01-26 PROCEDURE — 82962 GLUCOSE BLOOD TEST: CPT

## 2023-01-26 PROCEDURE — 99232 SBSQ HOSP IP/OBS MODERATE 35: CPT | Performed by: INTERNAL MEDICINE

## 2023-01-26 PROCEDURE — 36415 COLL VENOUS BLD VENIPUNCTURE: CPT | Performed by: INTERNAL MEDICINE

## 2023-01-26 PROCEDURE — 86140 C-REACTIVE PROTEIN: CPT | Performed by: INTERNAL MEDICINE

## 2023-01-27 ENCOUNTER — OUTSIDE FACILITY SERVICE (OUTPATIENT)
Dept: PULMONOLOGY | Facility: CLINIC | Age: 53
End: 2023-01-27
Payer: MEDICARE

## 2023-01-27 ENCOUNTER — OUTSIDE FACILITY SERVICE (OUTPATIENT)
Dept: INFECTIOUS DISEASES | Facility: CLINIC | Age: 53
End: 2023-01-27
Payer: MEDICARE

## 2023-01-27 ENCOUNTER — APPOINTMENT (OUTPATIENT)
Dept: GENERAL RADIOLOGY | Facility: HOSPITAL | Age: 53
End: 2023-01-27
Payer: COMMERCIAL

## 2023-01-27 LAB
A-A DO2: 214.4 MMHG (ref 0–300)
ALBUMIN SERPL-MCNC: 1.8 G/DL (ref 3.5–5.2)
ALBUMIN SERPL-MCNC: 2 G/DL (ref 3.5–5.2)
ALBUMIN/GLOB SERPL: 0.7 G/DL
ALBUMIN/GLOB SERPL: 0.8 G/DL
ALP SERPL-CCNC: 130 U/L (ref 39–117)
ALP SERPL-CCNC: 133 U/L (ref 39–117)
ALT SERPL W P-5'-P-CCNC: 15 U/L (ref 1–41)
ALT SERPL W P-5'-P-CCNC: 9 U/L (ref 1–41)
AMORPH URATE CRY URNS QL MICRO: ABNORMAL /HPF
ANION GAP SERPL CALCULATED.3IONS-SCNC: 4.6 MMOL/L (ref 5–15)
ANION GAP SERPL CALCULATED.3IONS-SCNC: 6.2 MMOL/L (ref 5–15)
ARTERIAL PATENCY WRIST A: ABNORMAL
AST SERPL-CCNC: 18 U/L (ref 1–40)
AST SERPL-CCNC: 22 U/L (ref 1–40)
ATMOSPHERIC PRESS: 731 MMHG
BACTERIA UR QL AUTO: ABNORMAL /HPF
BASE EXCESS BLDA CALC-SCNC: 11 MMOL/L (ref 0–2)
BASOPHILS # BLD AUTO: 0.01 10*3/MM3 (ref 0–0.2)
BASOPHILS # BLD AUTO: 0.01 10*3/MM3 (ref 0–0.2)
BASOPHILS NFR BLD AUTO: 0.1 % (ref 0–1.5)
BASOPHILS NFR BLD AUTO: 0.1 % (ref 0–1.5)
BDY SITE: ABNORMAL
BILIRUB SERPL-MCNC: 0.5 MG/DL (ref 0–1.2)
BILIRUB SERPL-MCNC: 0.5 MG/DL (ref 0–1.2)
BILIRUB UR QL STRIP: NEGATIVE
BODY TEMPERATURE: 0 C
BUN SERPL-MCNC: 4 MG/DL (ref 6–20)
BUN SERPL-MCNC: 4 MG/DL (ref 6–20)
BUN/CREAT SERPL: 13.3 (ref 7–25)
BUN/CREAT SERPL: 16 (ref 7–25)
CALCIUM SPEC-SCNC: 7.6 MG/DL (ref 8.6–10.5)
CALCIUM SPEC-SCNC: 7.7 MG/DL (ref 8.6–10.5)
CHLORIDE SERPL-SCNC: 106 MMOL/L (ref 98–107)
CHLORIDE SERPL-SCNC: 106 MMOL/L (ref 98–107)
CLARITY UR: ABNORMAL
CO2 BLDA-SCNC: 35.5 MMOL/L (ref 22–33)
CO2 SERPL-SCNC: 29.8 MMOL/L (ref 22–29)
CO2 SERPL-SCNC: 32.4 MMOL/L (ref 22–29)
COHGB MFR BLD: 1.8 % (ref 0–5)
COLOR UR: YELLOW
CREAT SERPL-MCNC: 0.25 MG/DL (ref 0.76–1.27)
CREAT SERPL-MCNC: 0.3 MG/DL (ref 0.76–1.27)
CRP SERPL-MCNC: 5.54 MG/DL (ref 0–0.5)
CRP SERPL-MCNC: 5.64 MG/DL (ref 0–0.5)
D-LACTATE SERPL-SCNC: 1.5 MMOL/L (ref 0.5–2)
DEPRECATED RDW RBC AUTO: 61.8 FL (ref 37–54)
DEPRECATED RDW RBC AUTO: 62.2 FL (ref 37–54)
EGFRCR SERPLBLD CKD-EPI 2021: 141.4 ML/MIN/1.73
EGFRCR SERPLBLD CKD-EPI 2021: 149.4 ML/MIN/1.73
EOSINOPHIL # BLD AUTO: 0.01 10*3/MM3 (ref 0–0.4)
EOSINOPHIL # BLD AUTO: 0.02 10*3/MM3 (ref 0–0.4)
EOSINOPHIL NFR BLD AUTO: 0.1 % (ref 0.3–6.2)
EOSINOPHIL NFR BLD AUTO: 0.2 % (ref 0.3–6.2)
ERYTHROCYTE [DISTWIDTH] IN BLOOD BY AUTOMATED COUNT: 16.1 % (ref 12.3–15.4)
ERYTHROCYTE [DISTWIDTH] IN BLOOD BY AUTOMATED COUNT: 16.1 % (ref 12.3–15.4)
GAS FLOW AIRWAY: 10 LPM
GLOBULIN UR ELPH-MCNC: 2.4 GM/DL
GLOBULIN UR ELPH-MCNC: 2.6 GM/DL
GLUCOSE BLDC GLUCOMTR-MCNC: 123 MG/DL (ref 70–130)
GLUCOSE BLDC GLUCOMTR-MCNC: 127 MG/DL (ref 70–130)
GLUCOSE BLDC GLUCOMTR-MCNC: 152 MG/DL (ref 70–130)
GLUCOSE BLDC GLUCOMTR-MCNC: 156 MG/DL (ref 70–130)
GLUCOSE BLDC GLUCOMTR-MCNC: 164 MG/DL (ref 70–130)
GLUCOSE SERPL-MCNC: 136 MG/DL (ref 65–99)
GLUCOSE SERPL-MCNC: 153 MG/DL (ref 65–99)
GLUCOSE UR STRIP-MCNC: NEGATIVE MG/DL
HCO3 BLDA-SCNC: 34.3 MMOL/L (ref 20–26)
HCT VFR BLD AUTO: 30.7 % (ref 37.5–51)
HCT VFR BLD AUTO: 30.7 % (ref 37.5–51)
HCT VFR BLD CALC: 29.5 % (ref 38–51)
HGB BLD-MCNC: 9.6 G/DL (ref 13–17.7)
HGB BLD-MCNC: 9.8 G/DL (ref 13–17.7)
HGB BLDA-MCNC: 9.6 G/DL (ref 14–18)
HGB UR QL STRIP.AUTO: ABNORMAL
HYALINE CASTS UR QL AUTO: ABNORMAL /LPF
IMM GRANULOCYTES # BLD AUTO: 0.06 10*3/MM3 (ref 0–0.05)
IMM GRANULOCYTES # BLD AUTO: 0.07 10*3/MM3 (ref 0–0.05)
IMM GRANULOCYTES NFR BLD AUTO: 0.5 % (ref 0–0.5)
IMM GRANULOCYTES NFR BLD AUTO: 0.6 % (ref 0–0.5)
INHALED O2 CONCENTRATION: 50 %
KETONES UR QL STRIP: NEGATIVE
LEUKOCYTE ESTERASE UR QL STRIP.AUTO: ABNORMAL
LYMPHOCYTES # BLD AUTO: 0.7 10*3/MM3 (ref 0.7–3.1)
LYMPHOCYTES # BLD AUTO: 0.95 10*3/MM3 (ref 0.7–3.1)
LYMPHOCYTES NFR BLD AUTO: 5.8 % (ref 19.6–45.3)
LYMPHOCYTES NFR BLD AUTO: 8.2 % (ref 19.6–45.3)
Lab: ABNORMAL
MAGNESIUM SERPL-MCNC: 1.8 MG/DL (ref 1.6–2.6)
MCH RBC QN AUTO: 32.5 PG (ref 26.6–33)
MCH RBC QN AUTO: 33 PG (ref 26.6–33)
MCHC RBC AUTO-ENTMCNC: 31.3 G/DL (ref 31.5–35.7)
MCHC RBC AUTO-ENTMCNC: 31.9 G/DL (ref 31.5–35.7)
MCV RBC AUTO: 103.4 FL (ref 79–97)
MCV RBC AUTO: 104.1 FL (ref 79–97)
METHGB BLD QL: 0.3 % (ref 0–3)
MODALITY: ABNORMAL
MONOCYTES # BLD AUTO: 0.97 10*3/MM3 (ref 0.1–0.9)
MONOCYTES # BLD AUTO: 0.99 10*3/MM3 (ref 0.1–0.9)
MONOCYTES NFR BLD AUTO: 8 % (ref 5–12)
MONOCYTES NFR BLD AUTO: 8.6 % (ref 5–12)
NEUTROPHILS NFR BLD AUTO: 10.38 10*3/MM3 (ref 1.7–7)
NEUTROPHILS NFR BLD AUTO: 82.3 % (ref 42.7–76)
NEUTROPHILS NFR BLD AUTO: 85.5 % (ref 42.7–76)
NEUTROPHILS NFR BLD AUTO: 9.5 10*3/MM3 (ref 1.7–7)
NITRITE UR QL STRIP: NEGATIVE
NOTE: ABNORMAL
NOTIFIED WHO: ABNORMAL
NRBC BLD AUTO-RTO: 0 /100 WBC (ref 0–0.2)
NRBC BLD AUTO-RTO: 0 /100 WBC (ref 0–0.2)
OXYHGB MFR BLDV: 95.8 % (ref 94–99)
PCO2 BLDA: 39.3 MM HG (ref 35–45)
PCO2 TEMP ADJ BLD: ABNORMAL MM[HG]
PH BLDA: 7.55 PH UNITS (ref 7.35–7.45)
PH UR STRIP.AUTO: 8 [PH] (ref 5–8)
PH, TEMP CORRECTED: ABNORMAL
PLATELET # BLD AUTO: 148 10*3/MM3 (ref 140–450)
PLATELET # BLD AUTO: 159 10*3/MM3 (ref 140–450)
PMV BLD AUTO: 10 FL (ref 6–12)
PMV BLD AUTO: 10.1 FL (ref 6–12)
PO2 BLDA: 85.3 MM HG (ref 83–108)
PO2 TEMP ADJ BLD: ABNORMAL MM[HG]
POTASSIUM SERPL-SCNC: 3.1 MMOL/L (ref 3.5–5.2)
POTASSIUM SERPL-SCNC: 3.1 MMOL/L (ref 3.5–5.2)
PROT SERPL-MCNC: 4.4 G/DL (ref 6–8.5)
PROT SERPL-MCNC: 4.4 G/DL (ref 6–8.5)
PROT UR QL STRIP: NEGATIVE
RBC # BLD AUTO: 2.95 10*6/MM3 (ref 4.14–5.8)
RBC # BLD AUTO: 2.97 10*6/MM3 (ref 4.14–5.8)
RBC # UR STRIP: ABNORMAL /HPF
REF LAB TEST METHOD: ABNORMAL
SAO2 % BLDCOA: 97.9 % (ref 94–99)
SODIUM SERPL-SCNC: 142 MMOL/L (ref 136–145)
SODIUM SERPL-SCNC: 143 MMOL/L (ref 136–145)
SP GR UR STRIP: 1.01 (ref 1–1.03)
SQUAMOUS #/AREA URNS HPF: ABNORMAL /HPF
UROBILINOGEN UR QL STRIP: ABNORMAL
VENTILATOR MODE: ABNORMAL
WBC # UR STRIP: ABNORMAL /HPF
WBC NRBC COR # BLD: 11.54 10*3/MM3 (ref 3.4–10.8)
WBC NRBC COR # BLD: 12.13 10*3/MM3 (ref 3.4–10.8)
YEAST URNS QL MICRO: ABNORMAL /HPF

## 2023-01-27 PROCEDURE — 82375 ASSAY CARBOXYHB QUANT: CPT

## 2023-01-27 PROCEDURE — 81001 URINALYSIS AUTO W/SCOPE: CPT | Performed by: PHYSICIAN ASSISTANT

## 2023-01-27 PROCEDURE — 97530 THERAPEUTIC ACTIVITIES: CPT

## 2023-01-27 PROCEDURE — 71045 X-RAY EXAM CHEST 1 VIEW: CPT

## 2023-01-27 PROCEDURE — 83050 HGB METHEMOGLOBIN QUAN: CPT

## 2023-01-27 PROCEDURE — 93005 ELECTROCARDIOGRAM TRACING: CPT | Performed by: PHYSICIAN ASSISTANT

## 2023-01-27 PROCEDURE — 86140 C-REACTIVE PROTEIN: CPT | Performed by: PHYSICIAN ASSISTANT

## 2023-01-27 PROCEDURE — 80053 COMPREHEN METABOLIC PANEL: CPT | Performed by: INTERNAL MEDICINE

## 2023-01-27 PROCEDURE — 87086 URINE CULTURE/COLONY COUNT: CPT | Performed by: PHYSICIAN ASSISTANT

## 2023-01-27 PROCEDURE — 99291 CRITICAL CARE FIRST HOUR: CPT | Performed by: INTERNAL MEDICINE

## 2023-01-27 PROCEDURE — 83605 ASSAY OF LACTIC ACID: CPT | Performed by: PHYSICIAN ASSISTANT

## 2023-01-27 PROCEDURE — 82962 GLUCOSE BLOOD TEST: CPT

## 2023-01-27 PROCEDURE — 85025 COMPLETE CBC W/AUTO DIFF WBC: CPT | Performed by: PHYSICIAN ASSISTANT

## 2023-01-27 PROCEDURE — 87040 BLOOD CULTURE FOR BACTERIA: CPT | Performed by: PHYSICIAN ASSISTANT

## 2023-01-27 PROCEDURE — 82805 BLOOD GASES W/O2 SATURATION: CPT

## 2023-01-27 PROCEDURE — 83735 ASSAY OF MAGNESIUM: CPT | Performed by: PHYSICIAN ASSISTANT

## 2023-01-27 PROCEDURE — 86140 C-REACTIVE PROTEIN: CPT | Performed by: INTERNAL MEDICINE

## 2023-01-27 PROCEDURE — 85025 COMPLETE CBC W/AUTO DIFF WBC: CPT | Performed by: INTERNAL MEDICINE

## 2023-01-27 PROCEDURE — 99233 SBSQ HOSP IP/OBS HIGH 50: CPT | Performed by: INTERNAL MEDICINE

## 2023-01-27 PROCEDURE — 93010 ELECTROCARDIOGRAM REPORT: CPT | Performed by: INTERNAL MEDICINE

## 2023-01-27 PROCEDURE — 80053 COMPREHEN METABOLIC PANEL: CPT | Performed by: PHYSICIAN ASSISTANT

## 2023-01-28 ENCOUNTER — OUTSIDE FACILITY SERVICE (OUTPATIENT)
Dept: PULMONOLOGY | Facility: CLINIC | Age: 53
End: 2023-01-28
Payer: MEDICARE

## 2023-01-28 LAB
ALBUMIN SERPL-MCNC: 2.2 G/DL (ref 3.5–5.2)
ALBUMIN/GLOB SERPL: 0.8 G/DL
ALP SERPL-CCNC: 152 U/L (ref 39–117)
ALT SERPL W P-5'-P-CCNC: 13 U/L (ref 1–41)
ANION GAP SERPL CALCULATED.3IONS-SCNC: 3 MMOL/L (ref 5–15)
AST SERPL-CCNC: 19 U/L (ref 1–40)
BACTERIA SPEC AEROBE CULT: ABNORMAL
BASOPHILS # BLD AUTO: 0 10*3/MM3 (ref 0–0.2)
BASOPHILS NFR BLD AUTO: 0 % (ref 0–1.5)
BILIRUB SERPL-MCNC: 0.6 MG/DL (ref 0–1.2)
BUN SERPL-MCNC: 6 MG/DL (ref 6–20)
BUN/CREAT SERPL: 20 (ref 7–25)
CALCIUM SPEC-SCNC: 7.9 MG/DL (ref 8.6–10.5)
CHLORIDE SERPL-SCNC: 105 MMOL/L (ref 98–107)
CO2 SERPL-SCNC: 34 MMOL/L (ref 22–29)
CREAT SERPL-MCNC: 0.3 MG/DL (ref 0.76–1.27)
CRP SERPL-MCNC: 7.26 MG/DL (ref 0–0.5)
DEPRECATED RDW RBC AUTO: 61.1 FL (ref 37–54)
EGFRCR SERPLBLD CKD-EPI 2021: 141.4 ML/MIN/1.73
EOSINOPHIL # BLD AUTO: 0 10*3/MM3 (ref 0–0.4)
EOSINOPHIL NFR BLD AUTO: 0 % (ref 0.3–6.2)
ERYTHROCYTE [DISTWIDTH] IN BLOOD BY AUTOMATED COUNT: 15.9 % (ref 12.3–15.4)
GLOBULIN UR ELPH-MCNC: 2.6 GM/DL
GLUCOSE BLDC GLUCOMTR-MCNC: 139 MG/DL (ref 70–130)
GLUCOSE BLDC GLUCOMTR-MCNC: 177 MG/DL (ref 70–130)
GLUCOSE BLDC GLUCOMTR-MCNC: 200 MG/DL (ref 70–130)
GLUCOSE SERPL-MCNC: 177 MG/DL (ref 65–99)
HCT VFR BLD AUTO: 30.8 % (ref 37.5–51)
HGB BLD-MCNC: 9.8 G/DL (ref 13–17.7)
IMM GRANULOCYTES # BLD AUTO: 0.04 10*3/MM3 (ref 0–0.05)
IMM GRANULOCYTES NFR BLD AUTO: 0.5 % (ref 0–0.5)
LYMPHOCYTES # BLD AUTO: 0.33 10*3/MM3 (ref 0.7–3.1)
LYMPHOCYTES NFR BLD AUTO: 3.9 % (ref 19.6–45.3)
MAGNESIUM SERPL-MCNC: 1.8 MG/DL (ref 1.6–2.6)
MAGNESIUM SERPL-MCNC: 2 MG/DL (ref 1.6–2.6)
MCH RBC QN AUTO: 33 PG (ref 26.6–33)
MCHC RBC AUTO-ENTMCNC: 31.8 G/DL (ref 31.5–35.7)
MCV RBC AUTO: 103.7 FL (ref 79–97)
MONOCYTES # BLD AUTO: 0.61 10*3/MM3 (ref 0.1–0.9)
MONOCYTES NFR BLD AUTO: 7.2 % (ref 5–12)
NEUTROPHILS NFR BLD AUTO: 7.55 10*3/MM3 (ref 1.7–7)
NEUTROPHILS NFR BLD AUTO: 88.4 % (ref 42.7–76)
NRBC BLD AUTO-RTO: 0 /100 WBC (ref 0–0.2)
PLATELET # BLD AUTO: 111 10*3/MM3 (ref 140–450)
PMV BLD AUTO: 10.3 FL (ref 6–12)
POTASSIUM SERPL-SCNC: 3.3 MMOL/L (ref 3.5–5.2)
PROT SERPL-MCNC: 4.8 G/DL (ref 6–8.5)
RBC # BLD AUTO: 2.97 10*6/MM3 (ref 4.14–5.8)
SODIUM SERPL-SCNC: 142 MMOL/L (ref 136–145)
WBC NRBC COR # BLD: 8.53 10*3/MM3 (ref 3.4–10.8)

## 2023-01-28 PROCEDURE — 80053 COMPREHEN METABOLIC PANEL: CPT | Performed by: INTERNAL MEDICINE

## 2023-01-28 PROCEDURE — 99291 CRITICAL CARE FIRST HOUR: CPT | Performed by: INTERNAL MEDICINE

## 2023-01-28 PROCEDURE — 82962 GLUCOSE BLOOD TEST: CPT

## 2023-01-28 PROCEDURE — 83735 ASSAY OF MAGNESIUM: CPT | Performed by: INTERNAL MEDICINE

## 2023-01-28 PROCEDURE — 83735 ASSAY OF MAGNESIUM: CPT | Performed by: PHYSICIAN ASSISTANT

## 2023-01-28 PROCEDURE — 36415 COLL VENOUS BLD VENIPUNCTURE: CPT | Performed by: INTERNAL MEDICINE

## 2023-01-28 PROCEDURE — 85025 COMPLETE CBC W/AUTO DIFF WBC: CPT | Performed by: INTERNAL MEDICINE

## 2023-01-28 PROCEDURE — 86140 C-REACTIVE PROTEIN: CPT | Performed by: INTERNAL MEDICINE

## 2023-01-29 ENCOUNTER — OUTSIDE FACILITY SERVICE (OUTPATIENT)
Dept: PULMONOLOGY | Facility: CLINIC | Age: 53
End: 2023-01-29
Payer: MEDICARE

## 2023-01-29 LAB
ALBUMIN SERPL-MCNC: 2.2 G/DL (ref 3.5–5.2)
ALBUMIN/GLOB SERPL: 0.8 G/DL
ALP SERPL-CCNC: 143 U/L (ref 39–117)
ALT SERPL W P-5'-P-CCNC: 12 U/L (ref 1–41)
ANION GAP SERPL CALCULATED.3IONS-SCNC: 5.1 MMOL/L (ref 5–15)
AST SERPL-CCNC: 17 U/L (ref 1–40)
BASOPHILS # BLD AUTO: 0 10*3/MM3 (ref 0–0.2)
BASOPHILS NFR BLD AUTO: 0 % (ref 0–1.5)
BILIRUB SERPL-MCNC: 0.4 MG/DL (ref 0–1.2)
BUN SERPL-MCNC: 8 MG/DL (ref 6–20)
BUN/CREAT SERPL: 32 (ref 7–25)
CALCIUM SPEC-SCNC: 7.9 MG/DL (ref 8.6–10.5)
CHLORIDE SERPL-SCNC: 106 MMOL/L (ref 98–107)
CO2 SERPL-SCNC: 35.9 MMOL/L (ref 22–29)
CREAT SERPL-MCNC: 0.25 MG/DL (ref 0.76–1.27)
CRP SERPL-MCNC: 5.22 MG/DL (ref 0–0.5)
DEPRECATED RDW RBC AUTO: 59.6 FL (ref 37–54)
EGFRCR SERPLBLD CKD-EPI 2021: 149.4 ML/MIN/1.73
EOSINOPHIL # BLD AUTO: 0 10*3/MM3 (ref 0–0.4)
EOSINOPHIL NFR BLD AUTO: 0 % (ref 0.3–6.2)
ERYTHROCYTE [DISTWIDTH] IN BLOOD BY AUTOMATED COUNT: 15.7 % (ref 12.3–15.4)
GLOBULIN UR ELPH-MCNC: 2.6 GM/DL
GLUCOSE BLDC GLUCOMTR-MCNC: 121 MG/DL (ref 70–130)
GLUCOSE BLDC GLUCOMTR-MCNC: 138 MG/DL (ref 70–130)
GLUCOSE BLDC GLUCOMTR-MCNC: 174 MG/DL (ref 70–130)
GLUCOSE SERPL-MCNC: 181 MG/DL (ref 65–99)
HCT VFR BLD AUTO: 30.7 % (ref 37.5–51)
HGB BLD-MCNC: 9.7 G/DL (ref 13–17.7)
IMM GRANULOCYTES # BLD AUTO: 0.05 10*3/MM3 (ref 0–0.05)
IMM GRANULOCYTES NFR BLD AUTO: 0.5 % (ref 0–0.5)
LYMPHOCYTES # BLD AUTO: 0.64 10*3/MM3 (ref 0.7–3.1)
LYMPHOCYTES NFR BLD AUTO: 6.2 % (ref 19.6–45.3)
MAGNESIUM SERPL-MCNC: 2 MG/DL (ref 1.6–2.6)
MCH RBC QN AUTO: 32.8 PG (ref 26.6–33)
MCHC RBC AUTO-ENTMCNC: 31.6 G/DL (ref 31.5–35.7)
MCV RBC AUTO: 103.7 FL (ref 79–97)
MONOCYTES # BLD AUTO: 0.79 10*3/MM3 (ref 0.1–0.9)
MONOCYTES NFR BLD AUTO: 7.6 % (ref 5–12)
NEUTROPHILS NFR BLD AUTO: 8.91 10*3/MM3 (ref 1.7–7)
NEUTROPHILS NFR BLD AUTO: 85.7 % (ref 42.7–76)
NRBC BLD AUTO-RTO: 0 /100 WBC (ref 0–0.2)
PLATELET # BLD AUTO: 132 10*3/MM3 (ref 140–450)
PMV BLD AUTO: 10.8 FL (ref 6–12)
POTASSIUM SERPL-SCNC: 3.2 MMOL/L (ref 3.5–5.2)
POTASSIUM SERPL-SCNC: 3.5 MMOL/L (ref 3.5–5.2)
PROT SERPL-MCNC: 4.8 G/DL (ref 6–8.5)
QT INTERVAL: 316 MS
QTC INTERVAL: 450 MS
RBC # BLD AUTO: 2.96 10*6/MM3 (ref 4.14–5.8)
SODIUM SERPL-SCNC: 147 MMOL/L (ref 136–145)
WBC NRBC COR # BLD: 10.39 10*3/MM3 (ref 3.4–10.8)

## 2023-01-29 PROCEDURE — 80053 COMPREHEN METABOLIC PANEL: CPT | Performed by: INTERNAL MEDICINE

## 2023-01-29 PROCEDURE — 86140 C-REACTIVE PROTEIN: CPT | Performed by: INTERNAL MEDICINE

## 2023-01-29 PROCEDURE — 84132 ASSAY OF SERUM POTASSIUM: CPT | Performed by: PHYSICIAN ASSISTANT

## 2023-01-29 PROCEDURE — 83735 ASSAY OF MAGNESIUM: CPT | Performed by: PHYSICIAN ASSISTANT

## 2023-01-29 PROCEDURE — 36415 COLL VENOUS BLD VENIPUNCTURE: CPT | Performed by: INTERNAL MEDICINE

## 2023-01-29 PROCEDURE — 99291 CRITICAL CARE FIRST HOUR: CPT | Performed by: INTERNAL MEDICINE

## 2023-01-29 PROCEDURE — 85025 COMPLETE CBC W/AUTO DIFF WBC: CPT | Performed by: INTERNAL MEDICINE

## 2023-01-29 PROCEDURE — 82962 GLUCOSE BLOOD TEST: CPT

## 2023-01-30 ENCOUNTER — OUTSIDE FACILITY SERVICE (OUTPATIENT)
Dept: INFECTIOUS DISEASES | Facility: CLINIC | Age: 53
End: 2023-01-30
Payer: MEDICARE

## 2023-01-30 ENCOUNTER — OUTSIDE FACILITY SERVICE (OUTPATIENT)
Dept: PULMONOLOGY | Facility: CLINIC | Age: 53
End: 2023-01-30
Payer: MEDICARE

## 2023-01-30 ENCOUNTER — APPOINTMENT (OUTPATIENT)
Dept: GENERAL RADIOLOGY | Facility: HOSPITAL | Age: 53
End: 2023-01-30
Payer: COMMERCIAL

## 2023-01-30 LAB
ALBUMIN SERPL-MCNC: 2 G/DL (ref 3.5–5.2)
ALBUMIN/GLOB SERPL: 0.8 G/DL
ALP SERPL-CCNC: 140 U/L (ref 39–117)
ALT SERPL W P-5'-P-CCNC: 11 U/L (ref 1–41)
ANION GAP SERPL CALCULATED.3IONS-SCNC: 3.7 MMOL/L (ref 5–15)
AST SERPL-CCNC: 16 U/L (ref 1–40)
BASOPHILS # BLD AUTO: 0.01 10*3/MM3 (ref 0–0.2)
BASOPHILS NFR BLD AUTO: 0.1 % (ref 0–1.5)
BILIRUB SERPL-MCNC: 0.4 MG/DL (ref 0–1.2)
BUN SERPL-MCNC: 9 MG/DL (ref 6–20)
BUN/CREAT SERPL: 37.5 (ref 7–25)
CALCIUM SPEC-SCNC: 7.8 MG/DL (ref 8.6–10.5)
CHLORIDE SERPL-SCNC: 106 MMOL/L (ref 98–107)
CO2 SERPL-SCNC: 36.3 MMOL/L (ref 22–29)
CREAT SERPL-MCNC: 0.24 MG/DL (ref 0.76–1.27)
CRP SERPL-MCNC: 4.34 MG/DL (ref 0–0.5)
DEPRECATED RDW RBC AUTO: 59.9 FL (ref 37–54)
EGFRCR SERPLBLD CKD-EPI 2021: 151.3 ML/MIN/1.73
EOSINOPHIL # BLD AUTO: 0 10*3/MM3 (ref 0–0.4)
EOSINOPHIL NFR BLD AUTO: 0 % (ref 0.3–6.2)
ERYTHROCYTE [DISTWIDTH] IN BLOOD BY AUTOMATED COUNT: 15.4 % (ref 12.3–15.4)
GLOBULIN UR ELPH-MCNC: 2.4 GM/DL
GLUCOSE BLDC GLUCOMTR-MCNC: 133 MG/DL (ref 70–130)
GLUCOSE BLDC GLUCOMTR-MCNC: 140 MG/DL (ref 70–130)
GLUCOSE BLDC GLUCOMTR-MCNC: 165 MG/DL (ref 70–130)
GLUCOSE SERPL-MCNC: 159 MG/DL (ref 65–99)
HCT VFR BLD AUTO: 29.2 % (ref 37.5–51)
HGB BLD-MCNC: 9 G/DL (ref 13–17.7)
IMM GRANULOCYTES # BLD AUTO: 0.06 10*3/MM3 (ref 0–0.05)
IMM GRANULOCYTES NFR BLD AUTO: 0.4 % (ref 0–0.5)
LYMPHOCYTES # BLD AUTO: 0.31 10*3/MM3 (ref 0.7–3.1)
LYMPHOCYTES NFR BLD AUTO: 2.3 % (ref 19.6–45.3)
MAGNESIUM SERPL-MCNC: 2.2 MG/DL (ref 1.6–2.6)
MCH RBC QN AUTO: 32.4 PG (ref 26.6–33)
MCHC RBC AUTO-ENTMCNC: 30.8 G/DL (ref 31.5–35.7)
MCV RBC AUTO: 105 FL (ref 79–97)
MONOCYTES # BLD AUTO: 0.66 10*3/MM3 (ref 0.1–0.9)
MONOCYTES NFR BLD AUTO: 4.9 % (ref 5–12)
NEUTROPHILS NFR BLD AUTO: 12.36 10*3/MM3 (ref 1.7–7)
NEUTROPHILS NFR BLD AUTO: 92.3 % (ref 42.7–76)
NRBC BLD AUTO-RTO: 0 /100 WBC (ref 0–0.2)
PLATELET # BLD AUTO: 135 10*3/MM3 (ref 140–450)
PMV BLD AUTO: 10.5 FL (ref 6–12)
POTASSIUM SERPL-SCNC: 3.8 MMOL/L (ref 3.5–5.2)
PROT SERPL-MCNC: 4.4 G/DL (ref 6–8.5)
RBC # BLD AUTO: 2.78 10*6/MM3 (ref 4.14–5.8)
SODIUM SERPL-SCNC: 146 MMOL/L (ref 136–145)
WBC NRBC COR # BLD: 13.4 10*3/MM3 (ref 3.4–10.8)

## 2023-01-30 PROCEDURE — 85025 COMPLETE CBC W/AUTO DIFF WBC: CPT | Performed by: INTERNAL MEDICINE

## 2023-01-30 PROCEDURE — 97530 THERAPEUTIC ACTIVITIES: CPT

## 2023-01-30 PROCEDURE — 80053 COMPREHEN METABOLIC PANEL: CPT | Performed by: INTERNAL MEDICINE

## 2023-01-30 PROCEDURE — 36415 COLL VENOUS BLD VENIPUNCTURE: CPT | Performed by: INTERNAL MEDICINE

## 2023-01-30 PROCEDURE — 99232 SBSQ HOSP IP/OBS MODERATE 35: CPT | Performed by: NURSE PRACTITIONER

## 2023-01-30 PROCEDURE — 99291 CRITICAL CARE FIRST HOUR: CPT | Performed by: INTERNAL MEDICINE

## 2023-01-30 PROCEDURE — 83735 ASSAY OF MAGNESIUM: CPT | Performed by: PHYSICIAN ASSISTANT

## 2023-01-30 PROCEDURE — 82962 GLUCOSE BLOOD TEST: CPT

## 2023-01-30 PROCEDURE — 86140 C-REACTIVE PROTEIN: CPT | Performed by: INTERNAL MEDICINE

## 2023-01-30 PROCEDURE — 71045 X-RAY EXAM CHEST 1 VIEW: CPT

## 2023-01-31 ENCOUNTER — OUTSIDE FACILITY SERVICE (OUTPATIENT)
Dept: PULMONOLOGY | Facility: CLINIC | Age: 53
End: 2023-01-31
Payer: MEDICARE

## 2023-01-31 ENCOUNTER — OUTSIDE FACILITY SERVICE (OUTPATIENT)
Dept: INFECTIOUS DISEASES | Facility: CLINIC | Age: 53
End: 2023-01-31
Payer: MEDICARE

## 2023-01-31 LAB
ALBUMIN SERPL-MCNC: 2.2 G/DL (ref 3.5–5.2)
ALBUMIN/GLOB SERPL: 0.8 G/DL
ALP SERPL-CCNC: 147 U/L (ref 39–117)
ALT SERPL W P-5'-P-CCNC: 11 U/L (ref 1–41)
ANION GAP SERPL CALCULATED.3IONS-SCNC: 7.1 MMOL/L (ref 5–15)
AST SERPL-CCNC: 20 U/L (ref 1–40)
BASOPHILS # BLD AUTO: 0.01 10*3/MM3 (ref 0–0.2)
BASOPHILS NFR BLD AUTO: 0.1 % (ref 0–1.5)
BILIRUB SERPL-MCNC: 0.5 MG/DL (ref 0–1.2)
BUN SERPL-MCNC: 7 MG/DL (ref 6–20)
BUN/CREAT SERPL: 25 (ref 7–25)
CALCIUM SPEC-SCNC: 8 MG/DL (ref 8.6–10.5)
CHLORIDE SERPL-SCNC: 104 MMOL/L (ref 98–107)
CO2 SERPL-SCNC: 32.9 MMOL/L (ref 22–29)
CREAT SERPL-MCNC: 0.28 MG/DL (ref 0.76–1.27)
CRP SERPL-MCNC: 4.83 MG/DL (ref 0–0.5)
DEPRECATED RDW RBC AUTO: 59.2 FL (ref 37–54)
EGFRCR SERPLBLD CKD-EPI 2021: 144.4 ML/MIN/1.73
EOSINOPHIL # BLD AUTO: 0.01 10*3/MM3 (ref 0–0.4)
EOSINOPHIL NFR BLD AUTO: 0.1 % (ref 0.3–6.2)
ERYTHROCYTE [DISTWIDTH] IN BLOOD BY AUTOMATED COUNT: 15.5 % (ref 12.3–15.4)
GLOBULIN UR ELPH-MCNC: 2.8 GM/DL
GLUCOSE BLDC GLUCOMTR-MCNC: 122 MG/DL (ref 70–130)
GLUCOSE BLDC GLUCOMTR-MCNC: 135 MG/DL (ref 70–130)
GLUCOSE BLDC GLUCOMTR-MCNC: 136 MG/DL (ref 70–130)
GLUCOSE SERPL-MCNC: 140 MG/DL (ref 65–99)
HCT VFR BLD AUTO: 31.6 % (ref 37.5–51)
HGB BLD-MCNC: 9.9 G/DL (ref 13–17.7)
IMM GRANULOCYTES # BLD AUTO: 0.08 10*3/MM3 (ref 0–0.05)
IMM GRANULOCYTES NFR BLD AUTO: 0.6 % (ref 0–0.5)
LYMPHOCYTES # BLD AUTO: 1.04 10*3/MM3 (ref 0.7–3.1)
LYMPHOCYTES NFR BLD AUTO: 8.3 % (ref 19.6–45.3)
MAGNESIUM SERPL-MCNC: 2 MG/DL (ref 1.6–2.6)
MCH RBC QN AUTO: 32.8 PG (ref 26.6–33)
MCHC RBC AUTO-ENTMCNC: 31.3 G/DL (ref 31.5–35.7)
MCV RBC AUTO: 104.6 FL (ref 79–97)
MONOCYTES # BLD AUTO: 0.93 10*3/MM3 (ref 0.1–0.9)
MONOCYTES NFR BLD AUTO: 7.5 % (ref 5–12)
NEUTROPHILS NFR BLD AUTO: 10.41 10*3/MM3 (ref 1.7–7)
NEUTROPHILS NFR BLD AUTO: 83.4 % (ref 42.7–76)
NRBC BLD AUTO-RTO: 0 /100 WBC (ref 0–0.2)
PLATELET # BLD AUTO: 164 10*3/MM3 (ref 140–450)
PMV BLD AUTO: 10.4 FL (ref 6–12)
POTASSIUM SERPL-SCNC: 3.6 MMOL/L (ref 3.5–5.2)
PROT SERPL-MCNC: 5 G/DL (ref 6–8.5)
RBC # BLD AUTO: 3.02 10*6/MM3 (ref 4.14–5.8)
SODIUM SERPL-SCNC: 144 MMOL/L (ref 136–145)
WBC NRBC COR # BLD: 12.48 10*3/MM3 (ref 3.4–10.8)

## 2023-01-31 PROCEDURE — 99232 SBSQ HOSP IP/OBS MODERATE 35: CPT | Performed by: NURSE PRACTITIONER

## 2023-01-31 PROCEDURE — 99233 SBSQ HOSP IP/OBS HIGH 50: CPT | Performed by: INTERNAL MEDICINE

## 2023-01-31 PROCEDURE — 97530 THERAPEUTIC ACTIVITIES: CPT

## 2023-01-31 PROCEDURE — 83735 ASSAY OF MAGNESIUM: CPT | Performed by: PHYSICIAN ASSISTANT

## 2023-01-31 PROCEDURE — 86140 C-REACTIVE PROTEIN: CPT | Performed by: INTERNAL MEDICINE

## 2023-01-31 PROCEDURE — 36415 COLL VENOUS BLD VENIPUNCTURE: CPT | Performed by: INTERNAL MEDICINE

## 2023-01-31 PROCEDURE — 82962 GLUCOSE BLOOD TEST: CPT

## 2023-01-31 PROCEDURE — 92610 EVALUATE SWALLOWING FUNCTION: CPT

## 2023-01-31 PROCEDURE — 80053 COMPREHEN METABOLIC PANEL: CPT | Performed by: INTERNAL MEDICINE

## 2023-01-31 PROCEDURE — 85025 COMPLETE CBC W/AUTO DIFF WBC: CPT | Performed by: INTERNAL MEDICINE

## 2023-02-01 ENCOUNTER — APPOINTMENT (OUTPATIENT)
Dept: GENERAL RADIOLOGY | Facility: HOSPITAL | Age: 53
End: 2023-02-01
Payer: COMMERCIAL

## 2023-02-01 ENCOUNTER — OUTSIDE FACILITY SERVICE (OUTPATIENT)
Dept: PULMONOLOGY | Facility: CLINIC | Age: 53
End: 2023-02-01
Payer: MEDICARE

## 2023-02-01 LAB
ALBUMIN SERPL-MCNC: 2.1 G/DL (ref 3.5–5.2)
ALBUMIN/GLOB SERPL: 0.8 G/DL
ALP SERPL-CCNC: 150 U/L (ref 39–117)
ALT SERPL W P-5'-P-CCNC: 11 U/L (ref 1–41)
ANION GAP SERPL CALCULATED.3IONS-SCNC: 5.4 MMOL/L (ref 5–15)
AST SERPL-CCNC: 21 U/L (ref 1–40)
BACTERIA SPEC AEROBE CULT: NORMAL
BACTERIA SPEC AEROBE CULT: NORMAL
BASOPHILS # BLD AUTO: 0.01 10*3/MM3 (ref 0–0.2)
BASOPHILS NFR BLD AUTO: 0.1 % (ref 0–1.5)
BILIRUB SERPL-MCNC: 0.5 MG/DL (ref 0–1.2)
BUN SERPL-MCNC: 7 MG/DL (ref 6–20)
BUN/CREAT SERPL: 22.6 (ref 7–25)
CALCIUM SPEC-SCNC: 8.1 MG/DL (ref 8.6–10.5)
CHLORIDE SERPL-SCNC: 109 MMOL/L (ref 98–107)
CO2 SERPL-SCNC: 34.6 MMOL/L (ref 22–29)
CREAT SERPL-MCNC: 0.31 MG/DL (ref 0.76–1.27)
CRP SERPL-MCNC: 3.42 MG/DL (ref 0–0.5)
DEPRECATED RDW RBC AUTO: 58.5 FL (ref 37–54)
EGFRCR SERPLBLD CKD-EPI 2021: 140 ML/MIN/1.73
EOSINOPHIL # BLD AUTO: 0.01 10*3/MM3 (ref 0–0.4)
EOSINOPHIL NFR BLD AUTO: 0.1 % (ref 0.3–6.2)
ERYTHROCYTE [DISTWIDTH] IN BLOOD BY AUTOMATED COUNT: 15.4 % (ref 12.3–15.4)
GLOBULIN UR ELPH-MCNC: 2.8 GM/DL
GLUCOSE BLDC GLUCOMTR-MCNC: 121 MG/DL (ref 70–130)
GLUCOSE BLDC GLUCOMTR-MCNC: 133 MG/DL (ref 70–130)
GLUCOSE BLDC GLUCOMTR-MCNC: 146 MG/DL (ref 70–130)
GLUCOSE BLDC GLUCOMTR-MCNC: 155 MG/DL (ref 70–130)
GLUCOSE SERPL-MCNC: 144 MG/DL (ref 65–99)
HCT VFR BLD AUTO: 29.8 % (ref 37.5–51)
HGB BLD-MCNC: 9.3 G/DL (ref 13–17.7)
IMM GRANULOCYTES # BLD AUTO: 0.05 10*3/MM3 (ref 0–0.05)
IMM GRANULOCYTES NFR BLD AUTO: 0.5 % (ref 0–0.5)
LYMPHOCYTES # BLD AUTO: 0.45 10*3/MM3 (ref 0.7–3.1)
LYMPHOCYTES NFR BLD AUTO: 4.5 % (ref 19.6–45.3)
MAGNESIUM SERPL-MCNC: 1.9 MG/DL (ref 1.6–2.6)
MCH RBC QN AUTO: 32.6 PG (ref 26.6–33)
MCHC RBC AUTO-ENTMCNC: 31.2 G/DL (ref 31.5–35.7)
MCV RBC AUTO: 104.6 FL (ref 79–97)
MONOCYTES # BLD AUTO: 0.58 10*3/MM3 (ref 0.1–0.9)
MONOCYTES NFR BLD AUTO: 5.8 % (ref 5–12)
NEUTROPHILS NFR BLD AUTO: 8.83 10*3/MM3 (ref 1.7–7)
NEUTROPHILS NFR BLD AUTO: 89 % (ref 42.7–76)
NRBC BLD AUTO-RTO: 0 /100 WBC (ref 0–0.2)
PLATELET # BLD AUTO: 140 10*3/MM3 (ref 140–450)
PMV BLD AUTO: 10.6 FL (ref 6–12)
POTASSIUM SERPL-SCNC: 3.6 MMOL/L (ref 3.5–5.2)
PROT SERPL-MCNC: 4.9 G/DL (ref 6–8.5)
RBC # BLD AUTO: 2.85 10*6/MM3 (ref 4.14–5.8)
SODIUM SERPL-SCNC: 149 MMOL/L (ref 136–145)
WBC NRBC COR # BLD: 9.93 10*3/MM3 (ref 3.4–10.8)

## 2023-02-01 PROCEDURE — 71045 X-RAY EXAM CHEST 1 VIEW: CPT | Performed by: RADIOLOGY

## 2023-02-01 PROCEDURE — 82962 GLUCOSE BLOOD TEST: CPT

## 2023-02-01 PROCEDURE — 86140 C-REACTIVE PROTEIN: CPT | Performed by: INTERNAL MEDICINE

## 2023-02-01 PROCEDURE — 71045 X-RAY EXAM CHEST 1 VIEW: CPT

## 2023-02-01 PROCEDURE — 80053 COMPREHEN METABOLIC PANEL: CPT | Performed by: INTERNAL MEDICINE

## 2023-02-01 PROCEDURE — 83735 ASSAY OF MAGNESIUM: CPT | Performed by: PHYSICIAN ASSISTANT

## 2023-02-01 PROCEDURE — 99233 SBSQ HOSP IP/OBS HIGH 50: CPT | Performed by: INTERNAL MEDICINE

## 2023-02-01 PROCEDURE — 85025 COMPLETE CBC W/AUTO DIFF WBC: CPT | Performed by: INTERNAL MEDICINE

## 2023-02-02 ENCOUNTER — OUTSIDE FACILITY SERVICE (OUTPATIENT)
Dept: INFECTIOUS DISEASES | Facility: CLINIC | Age: 53
End: 2023-02-02
Payer: MEDICARE

## 2023-02-02 ENCOUNTER — OUTSIDE FACILITY SERVICE (OUTPATIENT)
Dept: PULMONOLOGY | Facility: CLINIC | Age: 53
End: 2023-02-02
Payer: MEDICARE

## 2023-02-02 ENCOUNTER — APPOINTMENT (OUTPATIENT)
Dept: GENERAL RADIOLOGY | Facility: HOSPITAL | Age: 53
End: 2023-02-02
Payer: COMMERCIAL

## 2023-02-02 LAB
ALBUMIN SERPL-MCNC: 2.1 G/DL (ref 3.5–5.2)
ALBUMIN/GLOB SERPL: 0.8 G/DL
ALP SERPL-CCNC: 146 U/L (ref 39–117)
ALT SERPL W P-5'-P-CCNC: 13 U/L (ref 1–41)
ANION GAP SERPL CALCULATED.3IONS-SCNC: 4.8 MMOL/L (ref 5–15)
AST SERPL-CCNC: 21 U/L (ref 1–40)
BASOPHILS # BLD AUTO: 0.01 10*3/MM3 (ref 0–0.2)
BASOPHILS NFR BLD AUTO: 0.1 % (ref 0–1.5)
BILIRUB SERPL-MCNC: 0.4 MG/DL (ref 0–1.2)
BUN SERPL-MCNC: 7 MG/DL (ref 6–20)
BUN/CREAT SERPL: 29.2 (ref 7–25)
CALCIUM SPEC-SCNC: 8.1 MG/DL (ref 8.6–10.5)
CHLORIDE SERPL-SCNC: 108 MMOL/L (ref 98–107)
CO2 SERPL-SCNC: 34.2 MMOL/L (ref 22–29)
CREAT SERPL-MCNC: 0.24 MG/DL (ref 0.76–1.27)
CRP SERPL-MCNC: 3.1 MG/DL (ref 0–0.5)
DEPRECATED RDW RBC AUTO: 58.6 FL (ref 37–54)
EGFRCR SERPLBLD CKD-EPI 2021: 151.3 ML/MIN/1.73
EOSINOPHIL # BLD AUTO: 0.01 10*3/MM3 (ref 0–0.4)
EOSINOPHIL NFR BLD AUTO: 0.1 % (ref 0.3–6.2)
ERYTHROCYTE [DISTWIDTH] IN BLOOD BY AUTOMATED COUNT: 15.5 % (ref 12.3–15.4)
GLOBULIN UR ELPH-MCNC: 2.5 GM/DL
GLUCOSE BLDC GLUCOMTR-MCNC: 134 MG/DL (ref 70–130)
GLUCOSE SERPL-MCNC: 148 MG/DL (ref 65–99)
HCT VFR BLD AUTO: 28.3 % (ref 37.5–51)
HGB BLD-MCNC: 8.8 G/DL (ref 13–17.7)
IMM GRANULOCYTES # BLD AUTO: 0.04 10*3/MM3 (ref 0–0.05)
IMM GRANULOCYTES NFR BLD AUTO: 0.5 % (ref 0–0.5)
LYMPHOCYTES # BLD AUTO: 0.5 10*3/MM3 (ref 0.7–3.1)
LYMPHOCYTES NFR BLD AUTO: 5.7 % (ref 19.6–45.3)
MAGNESIUM SERPL-MCNC: 2 MG/DL (ref 1.6–2.6)
MCH RBC QN AUTO: 32.4 PG (ref 26.6–33)
MCHC RBC AUTO-ENTMCNC: 31.1 G/DL (ref 31.5–35.7)
MCV RBC AUTO: 104 FL (ref 79–97)
MONOCYTES # BLD AUTO: 0.75 10*3/MM3 (ref 0.1–0.9)
MONOCYTES NFR BLD AUTO: 8.5 % (ref 5–12)
NEUTROPHILS NFR BLD AUTO: 7.48 10*3/MM3 (ref 1.7–7)
NEUTROPHILS NFR BLD AUTO: 85.1 % (ref 42.7–76)
NRBC BLD AUTO-RTO: 0 /100 WBC (ref 0–0.2)
PLATELET # BLD AUTO: 129 10*3/MM3 (ref 140–450)
PMV BLD AUTO: 10.5 FL (ref 6–12)
POTASSIUM SERPL-SCNC: 3 MMOL/L (ref 3.5–5.2)
POTASSIUM SERPL-SCNC: 3.2 MMOL/L (ref 3.5–5.2)
PROT SERPL-MCNC: 4.6 G/DL (ref 6–8.5)
RBC # BLD AUTO: 2.72 10*6/MM3 (ref 4.14–5.8)
SODIUM SERPL-SCNC: 147 MMOL/L (ref 136–145)
VALPROATE SERPL-MCNC: 13 MCG/ML (ref 50–125)
WBC NRBC COR # BLD: 8.79 10*3/MM3 (ref 3.4–10.8)

## 2023-02-02 PROCEDURE — 99232 SBSQ HOSP IP/OBS MODERATE 35: CPT | Performed by: NURSE PRACTITIONER

## 2023-02-02 PROCEDURE — 36415 COLL VENOUS BLD VENIPUNCTURE: CPT | Performed by: INTERNAL MEDICINE

## 2023-02-02 PROCEDURE — 83735 ASSAY OF MAGNESIUM: CPT | Performed by: PHYSICIAN ASSISTANT

## 2023-02-02 PROCEDURE — 71045 X-RAY EXAM CHEST 1 VIEW: CPT

## 2023-02-02 PROCEDURE — 85025 COMPLETE CBC W/AUTO DIFF WBC: CPT | Performed by: INTERNAL MEDICINE

## 2023-02-02 PROCEDURE — 86140 C-REACTIVE PROTEIN: CPT | Performed by: INTERNAL MEDICINE

## 2023-02-02 PROCEDURE — 92610 EVALUATE SWALLOWING FUNCTION: CPT

## 2023-02-02 PROCEDURE — 80164 ASSAY DIPROPYLACETIC ACD TOT: CPT | Performed by: PHYSICIAN ASSISTANT

## 2023-02-02 PROCEDURE — 80053 COMPREHEN METABOLIC PANEL: CPT | Performed by: INTERNAL MEDICINE

## 2023-02-02 PROCEDURE — 84132 ASSAY OF SERUM POTASSIUM: CPT | Performed by: INTERNAL MEDICINE

## 2023-02-02 PROCEDURE — 99233 SBSQ HOSP IP/OBS HIGH 50: CPT | Performed by: INTERNAL MEDICINE

## 2023-02-02 PROCEDURE — 82962 GLUCOSE BLOOD TEST: CPT

## 2023-02-03 ENCOUNTER — APPOINTMENT (OUTPATIENT)
Dept: GENERAL RADIOLOGY | Facility: HOSPITAL | Age: 53
End: 2023-02-03
Payer: COMMERCIAL

## 2023-02-03 ENCOUNTER — OUTSIDE FACILITY SERVICE (OUTPATIENT)
Dept: PULMONOLOGY | Facility: CLINIC | Age: 53
End: 2023-02-03
Payer: MEDICARE

## 2023-02-03 ENCOUNTER — OUTSIDE FACILITY SERVICE (OUTPATIENT)
Dept: INFECTIOUS DISEASES | Facility: CLINIC | Age: 53
End: 2023-02-03
Payer: MEDICARE

## 2023-02-03 LAB
ALBUMIN SERPL-MCNC: 1.8 G/DL (ref 3.5–5.2)
ALBUMIN/GLOB SERPL: 0.8 G/DL
ALP SERPL-CCNC: 115 U/L (ref 39–117)
ALT SERPL W P-5'-P-CCNC: 10 U/L (ref 1–41)
ANION GAP SERPL CALCULATED.3IONS-SCNC: 4.3 MMOL/L (ref 5–15)
AST SERPL-CCNC: 15 U/L (ref 1–40)
BASOPHILS # BLD AUTO: 0.02 10*3/MM3 (ref 0–0.2)
BASOPHILS NFR BLD AUTO: 0.2 % (ref 0–1.5)
BILIRUB SERPL-MCNC: 0.5 MG/DL (ref 0–1.2)
BUN SERPL-MCNC: 6 MG/DL (ref 6–20)
BUN/CREAT SERPL: 26.1 (ref 7–25)
CALCIUM SPEC-SCNC: 7.8 MG/DL (ref 8.6–10.5)
CHLORIDE SERPL-SCNC: 110 MMOL/L (ref 98–107)
CO2 SERPL-SCNC: 32.7 MMOL/L (ref 22–29)
CREAT SERPL-MCNC: 0.23 MG/DL (ref 0.76–1.27)
CRP SERPL-MCNC: 3 MG/DL (ref 0–0.5)
DEPRECATED RDW RBC AUTO: 59.1 FL (ref 37–54)
EGFRCR SERPLBLD CKD-EPI 2021: 153.2 ML/MIN/1.73
EOSINOPHIL # BLD AUTO: 0.05 10*3/MM3 (ref 0–0.4)
EOSINOPHIL NFR BLD AUTO: 0.6 % (ref 0.3–6.2)
ERYTHROCYTE [DISTWIDTH] IN BLOOD BY AUTOMATED COUNT: 15.5 % (ref 12.3–15.4)
GLOBULIN UR ELPH-MCNC: 2.4 GM/DL
GLUCOSE SERPL-MCNC: 116 MG/DL (ref 65–99)
HCT VFR BLD AUTO: 26.6 % (ref 37.5–51)
HGB BLD-MCNC: 8.2 G/DL (ref 13–17.7)
IMM GRANULOCYTES # BLD AUTO: 0.03 10*3/MM3 (ref 0–0.05)
IMM GRANULOCYTES NFR BLD AUTO: 0.4 % (ref 0–0.5)
LYMPHOCYTES # BLD AUTO: 0.73 10*3/MM3 (ref 0.7–3.1)
LYMPHOCYTES NFR BLD AUTO: 9 % (ref 19.6–45.3)
MAGNESIUM SERPL-MCNC: 1.9 MG/DL (ref 1.6–2.6)
MCH RBC QN AUTO: 32.3 PG (ref 26.6–33)
MCHC RBC AUTO-ENTMCNC: 30.8 G/DL (ref 31.5–35.7)
MCV RBC AUTO: 104.7 FL (ref 79–97)
MONOCYTES # BLD AUTO: 0.76 10*3/MM3 (ref 0.1–0.9)
MONOCYTES NFR BLD AUTO: 9.4 % (ref 5–12)
NEUTROPHILS NFR BLD AUTO: 6.53 10*3/MM3 (ref 1.7–7)
NEUTROPHILS NFR BLD AUTO: 80.4 % (ref 42.7–76)
NRBC BLD AUTO-RTO: 0 /100 WBC (ref 0–0.2)
PLATELET # BLD AUTO: 130 10*3/MM3 (ref 140–450)
PMV BLD AUTO: 10.1 FL (ref 6–12)
POTASSIUM SERPL-SCNC: 3.4 MMOL/L (ref 3.5–5.2)
PROT SERPL-MCNC: 4.2 G/DL (ref 6–8.5)
RBC # BLD AUTO: 2.54 10*6/MM3 (ref 4.14–5.8)
SODIUM SERPL-SCNC: 147 MMOL/L (ref 136–145)
WBC NRBC COR # BLD: 8.12 10*3/MM3 (ref 3.4–10.8)

## 2023-02-03 PROCEDURE — 99232 SBSQ HOSP IP/OBS MODERATE 35: CPT | Performed by: NURSE PRACTITIONER

## 2023-02-03 PROCEDURE — 99233 SBSQ HOSP IP/OBS HIGH 50: CPT | Performed by: INTERNAL MEDICINE

## 2023-02-03 PROCEDURE — 85025 COMPLETE CBC W/AUTO DIFF WBC: CPT | Performed by: INTERNAL MEDICINE

## 2023-02-03 PROCEDURE — 71045 X-RAY EXAM CHEST 1 VIEW: CPT

## 2023-02-03 PROCEDURE — 83735 ASSAY OF MAGNESIUM: CPT | Performed by: PHYSICIAN ASSISTANT

## 2023-02-03 PROCEDURE — 86140 C-REACTIVE PROTEIN: CPT | Performed by: INTERNAL MEDICINE

## 2023-02-03 PROCEDURE — 80053 COMPREHEN METABOLIC PANEL: CPT | Performed by: INTERNAL MEDICINE

## 2023-02-04 ENCOUNTER — OUTSIDE FACILITY SERVICE (OUTPATIENT)
Dept: PULMONOLOGY | Facility: CLINIC | Age: 53
End: 2023-02-04
Payer: MEDICARE

## 2023-02-04 ENCOUNTER — APPOINTMENT (OUTPATIENT)
Dept: GENERAL RADIOLOGY | Facility: HOSPITAL | Age: 53
End: 2023-02-04
Payer: COMMERCIAL

## 2023-02-04 LAB
ALBUMIN SERPL-MCNC: 2.1 G/DL (ref 3.5–5.2)
ALBUMIN/GLOB SERPL: 0.8 G/DL
ALP SERPL-CCNC: 150 U/L (ref 39–117)
ALT SERPL W P-5'-P-CCNC: 11 U/L (ref 1–41)
ANION GAP SERPL CALCULATED.3IONS-SCNC: 4.9 MMOL/L (ref 5–15)
AST SERPL-CCNC: 16 U/L (ref 1–40)
BACTERIA UR QL AUTO: ABNORMAL /HPF
BASOPHILS # BLD AUTO: 0.01 10*3/MM3 (ref 0–0.2)
BASOPHILS NFR BLD AUTO: 0.1 % (ref 0–1.5)
BILIRUB SERPL-MCNC: 0.5 MG/DL (ref 0–1.2)
BILIRUB UR QL STRIP: NEGATIVE
BUN SERPL-MCNC: 9 MG/DL (ref 6–20)
BUN/CREAT SERPL: 32.1 (ref 7–25)
CALCIUM SPEC-SCNC: 8.1 MG/DL (ref 8.6–10.5)
CHLORIDE SERPL-SCNC: 109 MMOL/L (ref 98–107)
CLARITY UR: ABNORMAL
CO2 SERPL-SCNC: 30.1 MMOL/L (ref 22–29)
COLOR UR: YELLOW
CREAT SERPL-MCNC: 0.28 MG/DL (ref 0.76–1.27)
CRP SERPL-MCNC: 7.08 MG/DL (ref 0–0.5)
DEPRECATED RDW RBC AUTO: 56 FL (ref 37–54)
EGFRCR SERPLBLD CKD-EPI 2021: 144.4 ML/MIN/1.73
EOSINOPHIL # BLD AUTO: 0 10*3/MM3 (ref 0–0.4)
EOSINOPHIL NFR BLD AUTO: 0 % (ref 0.3–6.2)
ERYTHROCYTE [DISTWIDTH] IN BLOOD BY AUTOMATED COUNT: 14.9 % (ref 12.3–15.4)
GLOBULIN UR ELPH-MCNC: 2.7 GM/DL
GLUCOSE BLDC GLUCOMTR-MCNC: 137 MG/DL (ref 70–130)
GLUCOSE SERPL-MCNC: 217 MG/DL (ref 65–99)
GLUCOSE UR STRIP-MCNC: NEGATIVE MG/DL
GRAN CASTS URNS QL MICRO: ABNORMAL /LPF
HCT VFR BLD AUTO: 29.6 % (ref 37.5–51)
HGB BLD-MCNC: 9.1 G/DL (ref 13–17.7)
HGB UR QL STRIP.AUTO: ABNORMAL
HYALINE CASTS UR QL AUTO: ABNORMAL /LPF
IMM GRANULOCYTES # BLD AUTO: 0.05 10*3/MM3 (ref 0–0.05)
IMM GRANULOCYTES NFR BLD AUTO: 0.4 % (ref 0–0.5)
KETONES UR QL STRIP: NEGATIVE
LEUKOCYTE ESTERASE UR QL STRIP.AUTO: ABNORMAL
LYMPHOCYTES # BLD AUTO: 0.26 10*3/MM3 (ref 0.7–3.1)
LYMPHOCYTES NFR BLD AUTO: 2.2 % (ref 19.6–45.3)
MAGNESIUM SERPL-MCNC: 1.8 MG/DL (ref 1.6–2.6)
MCH RBC QN AUTO: 31.6 PG (ref 26.6–33)
MCHC RBC AUTO-ENTMCNC: 30.7 G/DL (ref 31.5–35.7)
MCV RBC AUTO: 102.8 FL (ref 79–97)
MONOCYTES # BLD AUTO: 0.83 10*3/MM3 (ref 0.1–0.9)
MONOCYTES NFR BLD AUTO: 6.9 % (ref 5–12)
NEUTROPHILS NFR BLD AUTO: 10.89 10*3/MM3 (ref 1.7–7)
NEUTROPHILS NFR BLD AUTO: 90.4 % (ref 42.7–76)
NITRITE UR QL STRIP: NEGATIVE
NRBC BLD AUTO-RTO: 0 /100 WBC (ref 0–0.2)
PH UR STRIP.AUTO: 8 [PH] (ref 5–8)
PLATELET # BLD AUTO: 136 10*3/MM3 (ref 140–450)
PMV BLD AUTO: 10.4 FL (ref 6–12)
POTASSIUM SERPL-SCNC: 4.1 MMOL/L (ref 3.5–5.2)
POTASSIUM SERPL-SCNC: 4.2 MMOL/L (ref 3.5–5.2)
PROT SERPL-MCNC: 4.8 G/DL (ref 6–8.5)
PROT UR QL STRIP: NEGATIVE
RBC # BLD AUTO: 2.88 10*6/MM3 (ref 4.14–5.8)
RBC # UR STRIP: ABNORMAL /HPF
REF LAB TEST METHOD: ABNORMAL
SODIUM SERPL-SCNC: 144 MMOL/L (ref 136–145)
SP GR UR STRIP: 1.01 (ref 1–1.03)
SQUAMOUS #/AREA URNS HPF: ABNORMAL /HPF
UROBILINOGEN UR QL STRIP: ABNORMAL
WBC # UR STRIP: ABNORMAL /HPF
WBC NRBC COR # BLD: 12.04 10*3/MM3 (ref 3.4–10.8)
YEAST URNS QL MICRO: ABNORMAL /HPF

## 2023-02-04 PROCEDURE — 84132 ASSAY OF SERUM POTASSIUM: CPT | Performed by: INTERNAL MEDICINE

## 2023-02-04 PROCEDURE — 71045 X-RAY EXAM CHEST 1 VIEW: CPT

## 2023-02-04 PROCEDURE — 86140 C-REACTIVE PROTEIN: CPT | Performed by: INTERNAL MEDICINE

## 2023-02-04 PROCEDURE — 99233 SBSQ HOSP IP/OBS HIGH 50: CPT | Performed by: INTERNAL MEDICINE

## 2023-02-04 PROCEDURE — 83735 ASSAY OF MAGNESIUM: CPT | Performed by: PHYSICIAN ASSISTANT

## 2023-02-04 PROCEDURE — 81001 URINALYSIS AUTO W/SCOPE: CPT | Performed by: INTERNAL MEDICINE

## 2023-02-04 PROCEDURE — 80053 COMPREHEN METABOLIC PANEL: CPT | Performed by: INTERNAL MEDICINE

## 2023-02-04 PROCEDURE — 82962 GLUCOSE BLOOD TEST: CPT

## 2023-02-04 PROCEDURE — 87040 BLOOD CULTURE FOR BACTERIA: CPT | Performed by: INTERNAL MEDICINE

## 2023-02-04 PROCEDURE — 36415 COLL VENOUS BLD VENIPUNCTURE: CPT | Performed by: INTERNAL MEDICINE

## 2023-02-04 PROCEDURE — 87086 URINE CULTURE/COLONY COUNT: CPT | Performed by: INTERNAL MEDICINE

## 2023-02-04 PROCEDURE — 85025 COMPLETE CBC W/AUTO DIFF WBC: CPT | Performed by: INTERNAL MEDICINE

## 2023-02-05 ENCOUNTER — APPOINTMENT (OUTPATIENT)
Dept: GENERAL RADIOLOGY | Facility: HOSPITAL | Age: 53
End: 2023-02-05
Payer: COMMERCIAL

## 2023-02-05 ENCOUNTER — OUTSIDE FACILITY SERVICE (OUTPATIENT)
Dept: PULMONOLOGY | Facility: CLINIC | Age: 53
End: 2023-02-05
Payer: MEDICARE

## 2023-02-05 LAB
ALBUMIN SERPL-MCNC: 2 G/DL (ref 3.5–5.2)
ALBUMIN/GLOB SERPL: 0.7 G/DL
ALP SERPL-CCNC: 142 U/L (ref 39–117)
ALT SERPL W P-5'-P-CCNC: 12 U/L (ref 1–41)
ANION GAP SERPL CALCULATED.3IONS-SCNC: 5.5 MMOL/L (ref 5–15)
AST SERPL-CCNC: 20 U/L (ref 1–40)
BASOPHILS # BLD AUTO: 0 10*3/MM3 (ref 0–0.2)
BASOPHILS NFR BLD AUTO: 0 % (ref 0–1.5)
BILIRUB SERPL-MCNC: 0.4 MG/DL (ref 0–1.2)
BUN SERPL-MCNC: 8 MG/DL (ref 6–20)
BUN/CREAT SERPL: 25.8 (ref 7–25)
CALCIUM SPEC-SCNC: 8.2 MG/DL (ref 8.6–10.5)
CHLORIDE SERPL-SCNC: 108 MMOL/L (ref 98–107)
CO2 SERPL-SCNC: 31.5 MMOL/L (ref 22–29)
CREAT SERPL-MCNC: 0.31 MG/DL (ref 0.76–1.27)
CRP SERPL-MCNC: 5.51 MG/DL (ref 0–0.5)
DEPRECATED RDW RBC AUTO: 58.1 FL (ref 37–54)
EGFRCR SERPLBLD CKD-EPI 2021: 140 ML/MIN/1.73
EOSINOPHIL # BLD AUTO: 0 10*3/MM3 (ref 0–0.4)
EOSINOPHIL NFR BLD AUTO: 0 % (ref 0.3–6.2)
ERYTHROCYTE [DISTWIDTH] IN BLOOD BY AUTOMATED COUNT: 15.2 % (ref 12.3–15.4)
GLOBULIN UR ELPH-MCNC: 2.9 GM/DL
GLUCOSE BLDC GLUCOMTR-MCNC: 141 MG/DL (ref 70–130)
GLUCOSE SERPL-MCNC: 191 MG/DL (ref 65–99)
HCT VFR BLD AUTO: 29.8 % (ref 37.5–51)
HGB BLD-MCNC: 9.3 G/DL (ref 13–17.7)
IMM GRANULOCYTES # BLD AUTO: 0.04 10*3/MM3 (ref 0–0.05)
IMM GRANULOCYTES NFR BLD AUTO: 0.5 % (ref 0–0.5)
LYMPHOCYTES # BLD AUTO: 0.23 10*3/MM3 (ref 0.7–3.1)
LYMPHOCYTES NFR BLD AUTO: 2.7 % (ref 19.6–45.3)
MAGNESIUM SERPL-MCNC: 1.9 MG/DL (ref 1.6–2.6)
MCH RBC QN AUTO: 32.5 PG (ref 26.6–33)
MCHC RBC AUTO-ENTMCNC: 31.2 G/DL (ref 31.5–35.7)
MCV RBC AUTO: 104.2 FL (ref 79–97)
MONOCYTES # BLD AUTO: 0.53 10*3/MM3 (ref 0.1–0.9)
MONOCYTES NFR BLD AUTO: 6.2 % (ref 5–12)
NEUTROPHILS NFR BLD AUTO: 7.69 10*3/MM3 (ref 1.7–7)
NEUTROPHILS NFR BLD AUTO: 90.6 % (ref 42.7–76)
NRBC BLD AUTO-RTO: 0 /100 WBC (ref 0–0.2)
PLATELET # BLD AUTO: 121 10*3/MM3 (ref 140–450)
PMV BLD AUTO: 10.6 FL (ref 6–12)
POTASSIUM SERPL-SCNC: 4.3 MMOL/L (ref 3.5–5.2)
PROT SERPL-MCNC: 4.9 G/DL (ref 6–8.5)
RBC # BLD AUTO: 2.86 10*6/MM3 (ref 4.14–5.8)
SODIUM SERPL-SCNC: 145 MMOL/L (ref 136–145)
WBC NRBC COR # BLD: 8.49 10*3/MM3 (ref 3.4–10.8)

## 2023-02-05 PROCEDURE — 99233 SBSQ HOSP IP/OBS HIGH 50: CPT | Performed by: INTERNAL MEDICINE

## 2023-02-05 PROCEDURE — 71045 X-RAY EXAM CHEST 1 VIEW: CPT

## 2023-02-05 PROCEDURE — 80053 COMPREHEN METABOLIC PANEL: CPT | Performed by: INTERNAL MEDICINE

## 2023-02-05 PROCEDURE — 86140 C-REACTIVE PROTEIN: CPT | Performed by: INTERNAL MEDICINE

## 2023-02-05 PROCEDURE — 87070 CULTURE OTHR SPECIMN AEROBIC: CPT | Performed by: INTERNAL MEDICINE

## 2023-02-05 PROCEDURE — 85025 COMPLETE CBC W/AUTO DIFF WBC: CPT | Performed by: INTERNAL MEDICINE

## 2023-02-05 PROCEDURE — 87205 SMEAR GRAM STAIN: CPT | Performed by: INTERNAL MEDICINE

## 2023-02-05 PROCEDURE — 83735 ASSAY OF MAGNESIUM: CPT | Performed by: PHYSICIAN ASSISTANT

## 2023-02-05 PROCEDURE — 82962 GLUCOSE BLOOD TEST: CPT

## 2023-02-06 ENCOUNTER — OUTSIDE FACILITY SERVICE (OUTPATIENT)
Dept: INFECTIOUS DISEASES | Facility: CLINIC | Age: 53
End: 2023-02-06
Payer: MEDICARE

## 2023-02-06 ENCOUNTER — APPOINTMENT (OUTPATIENT)
Dept: GENERAL RADIOLOGY | Facility: HOSPITAL | Age: 53
End: 2023-02-06
Payer: COMMERCIAL

## 2023-02-06 ENCOUNTER — OUTSIDE FACILITY SERVICE (OUTPATIENT)
Dept: PULMONOLOGY | Facility: CLINIC | Age: 53
End: 2023-02-06
Payer: MEDICARE

## 2023-02-06 LAB
ALBUMIN SERPL-MCNC: 1.9 G/DL (ref 3.5–5.2)
ALBUMIN/GLOB SERPL: 0.7 G/DL
ALP SERPL-CCNC: 127 U/L (ref 39–117)
ALT SERPL W P-5'-P-CCNC: 10 U/L (ref 1–41)
ANION GAP SERPL CALCULATED.3IONS-SCNC: 5.5 MMOL/L (ref 5–15)
AST SERPL-CCNC: 14 U/L (ref 1–40)
BACTERIA SPEC AEROBE CULT: ABNORMAL
BASOPHILS # BLD AUTO: 0.01 10*3/MM3 (ref 0–0.2)
BASOPHILS NFR BLD AUTO: 0.1 % (ref 0–1.5)
BILIRUB SERPL-MCNC: 0.3 MG/DL (ref 0–1.2)
BUN SERPL-MCNC: 7 MG/DL (ref 6–20)
BUN/CREAT SERPL: 25.9 (ref 7–25)
CALCIUM SPEC-SCNC: 8 MG/DL (ref 8.6–10.5)
CHLORIDE SERPL-SCNC: 110 MMOL/L (ref 98–107)
CO2 SERPL-SCNC: 32.5 MMOL/L (ref 22–29)
CREAT SERPL-MCNC: 0.27 MG/DL (ref 0.76–1.27)
CRP SERPL-MCNC: 4.51 MG/DL (ref 0–0.5)
DEPRECATED RDW RBC AUTO: 56 FL (ref 37–54)
EGFRCR SERPLBLD CKD-EPI 2021: 146 ML/MIN/1.73
EOSINOPHIL # BLD AUTO: 0.03 10*3/MM3 (ref 0–0.4)
EOSINOPHIL NFR BLD AUTO: 0.4 % (ref 0.3–6.2)
ERYTHROCYTE [DISTWIDTH] IN BLOOD BY AUTOMATED COUNT: 15 % (ref 12.3–15.4)
GLOBULIN UR ELPH-MCNC: 2.7 GM/DL
GLUCOSE BLDC GLUCOMTR-MCNC: 116 MG/DL (ref 70–130)
GLUCOSE SERPL-MCNC: 132 MG/DL (ref 65–99)
HCT VFR BLD AUTO: 28.1 % (ref 37.5–51)
HGB BLD-MCNC: 8.4 G/DL (ref 13–17.7)
IMM GRANULOCYTES # BLD AUTO: 0.02 10*3/MM3 (ref 0–0.05)
IMM GRANULOCYTES NFR BLD AUTO: 0.3 % (ref 0–0.5)
LYMPHOCYTES # BLD AUTO: 0.79 10*3/MM3 (ref 0.7–3.1)
LYMPHOCYTES NFR BLD AUTO: 11.7 % (ref 19.6–45.3)
MAGNESIUM SERPL-MCNC: 1.8 MG/DL (ref 1.6–2.6)
MCH RBC QN AUTO: 30.7 PG (ref 26.6–33)
MCHC RBC AUTO-ENTMCNC: 29.9 G/DL (ref 31.5–35.7)
MCV RBC AUTO: 102.6 FL (ref 79–97)
MONOCYTES # BLD AUTO: 0.63 10*3/MM3 (ref 0.1–0.9)
MONOCYTES NFR BLD AUTO: 9.3 % (ref 5–12)
NEUTROPHILS NFR BLD AUTO: 5.28 10*3/MM3 (ref 1.7–7)
NEUTROPHILS NFR BLD AUTO: 78.2 % (ref 42.7–76)
NRBC BLD AUTO-RTO: 0 /100 WBC (ref 0–0.2)
PLATELET # BLD AUTO: 127 10*3/MM3 (ref 140–450)
PMV BLD AUTO: 9.9 FL (ref 6–12)
POTASSIUM SERPL-SCNC: 3.3 MMOL/L (ref 3.5–5.2)
POTASSIUM SERPL-SCNC: 3.9 MMOL/L (ref 3.5–5.2)
PROT SERPL-MCNC: 4.6 G/DL (ref 6–8.5)
QT INTERVAL: 320 MS
QTC INTERVAL: 433 MS
RBC # BLD AUTO: 2.74 10*6/MM3 (ref 4.14–5.8)
SODIUM SERPL-SCNC: 148 MMOL/L (ref 136–145)
VALPROATE SERPL-MCNC: 12.1 MCG/ML (ref 50–125)
WBC NRBC COR # BLD: 6.76 10*3/MM3 (ref 3.4–10.8)

## 2023-02-06 PROCEDURE — 92610 EVALUATE SWALLOWING FUNCTION: CPT

## 2023-02-06 PROCEDURE — 74230 X-RAY XM SWLNG FUNCJ C+: CPT | Performed by: RADIOLOGY

## 2023-02-06 PROCEDURE — 86140 C-REACTIVE PROTEIN: CPT | Performed by: INTERNAL MEDICINE

## 2023-02-06 PROCEDURE — 36415 COLL VENOUS BLD VENIPUNCTURE: CPT | Performed by: INTERNAL MEDICINE

## 2023-02-06 PROCEDURE — 99232 SBSQ HOSP IP/OBS MODERATE 35: CPT | Performed by: INTERNAL MEDICINE

## 2023-02-06 PROCEDURE — 85025 COMPLETE CBC W/AUTO DIFF WBC: CPT | Performed by: INTERNAL MEDICINE

## 2023-02-06 PROCEDURE — 82962 GLUCOSE BLOOD TEST: CPT

## 2023-02-06 PROCEDURE — 93005 ELECTROCARDIOGRAM TRACING: CPT | Performed by: INTERNAL MEDICINE

## 2023-02-06 PROCEDURE — 92611 MOTION FLUOROSCOPY/SWALLOW: CPT

## 2023-02-06 PROCEDURE — 80164 ASSAY DIPROPYLACETIC ACD TOT: CPT | Performed by: PHYSICIAN ASSISTANT

## 2023-02-06 PROCEDURE — 80053 COMPREHEN METABOLIC PANEL: CPT | Performed by: INTERNAL MEDICINE

## 2023-02-06 PROCEDURE — 83735 ASSAY OF MAGNESIUM: CPT | Performed by: PHYSICIAN ASSISTANT

## 2023-02-06 PROCEDURE — 84132 ASSAY OF SERUM POTASSIUM: CPT | Performed by: INTERNAL MEDICINE

## 2023-02-06 PROCEDURE — 71045 X-RAY EXAM CHEST 1 VIEW: CPT

## 2023-02-06 PROCEDURE — 99233 SBSQ HOSP IP/OBS HIGH 50: CPT | Performed by: INTERNAL MEDICINE

## 2023-02-06 PROCEDURE — 74230 X-RAY XM SWLNG FUNCJ C+: CPT

## 2023-02-06 PROCEDURE — 97530 THERAPEUTIC ACTIVITIES: CPT

## 2023-02-07 ENCOUNTER — OUTSIDE FACILITY SERVICE (OUTPATIENT)
Dept: PULMONOLOGY | Facility: CLINIC | Age: 53
End: 2023-02-07
Payer: MEDICARE

## 2023-02-07 ENCOUNTER — OUTSIDE FACILITY SERVICE (OUTPATIENT)
Dept: INFECTIOUS DISEASES | Facility: CLINIC | Age: 53
End: 2023-02-07
Payer: MEDICARE

## 2023-02-07 LAB
ALBUMIN SERPL-MCNC: 2 G/DL (ref 3.5–5.2)
ALBUMIN/GLOB SERPL: 0.7 G/DL
ALP SERPL-CCNC: 131 U/L (ref 39–117)
ALT SERPL W P-5'-P-CCNC: 10 U/L (ref 1–41)
ANION GAP SERPL CALCULATED.3IONS-SCNC: 4 MMOL/L (ref 5–15)
AST SERPL-CCNC: 14 U/L (ref 1–40)
BACTERIA SPEC RESP CULT: NORMAL
BASOPHILS # BLD AUTO: 0 10*3/MM3 (ref 0–0.2)
BASOPHILS NFR BLD AUTO: 0 % (ref 0–1.5)
BILIRUB SERPL-MCNC: 0.4 MG/DL (ref 0–1.2)
BUN SERPL-MCNC: 6 MG/DL (ref 6–20)
BUN/CREAT SERPL: 20 (ref 7–25)
CALCIUM SPEC-SCNC: 8.1 MG/DL (ref 8.6–10.5)
CHLORIDE SERPL-SCNC: 105 MMOL/L (ref 98–107)
CO2 SERPL-SCNC: 32 MMOL/L (ref 22–29)
CREAT SERPL-MCNC: 0.3 MG/DL (ref 0.76–1.27)
CRP SERPL-MCNC: 4.08 MG/DL (ref 0–0.5)
DEPRECATED RDW RBC AUTO: 55.7 FL (ref 37–54)
EGFRCR SERPLBLD CKD-EPI 2021: 141.4 ML/MIN/1.73
EOSINOPHIL # BLD AUTO: 0.02 10*3/MM3 (ref 0–0.4)
EOSINOPHIL NFR BLD AUTO: 0.3 % (ref 0.3–6.2)
ERYTHROCYTE [DISTWIDTH] IN BLOOD BY AUTOMATED COUNT: 14.7 % (ref 12.3–15.4)
GLOBULIN UR ELPH-MCNC: 2.7 GM/DL
GLUCOSE BLDC GLUCOMTR-MCNC: 132 MG/DL (ref 70–130)
GLUCOSE SERPL-MCNC: 176 MG/DL (ref 65–99)
GRAM STN SPEC: NORMAL
HCT VFR BLD AUTO: 28 % (ref 37.5–51)
HGB BLD-MCNC: 8.6 G/DL (ref 13–17.7)
IMM GRANULOCYTES # BLD AUTO: 0.03 10*3/MM3 (ref 0–0.05)
IMM GRANULOCYTES NFR BLD AUTO: 0.5 % (ref 0–0.5)
LYMPHOCYTES # BLD AUTO: 0.42 10*3/MM3 (ref 0.7–3.1)
LYMPHOCYTES NFR BLD AUTO: 6.8 % (ref 19.6–45.3)
MAGNESIUM SERPL-MCNC: 1.8 MG/DL (ref 1.6–2.6)
MCH RBC QN AUTO: 31.5 PG (ref 26.6–33)
MCHC RBC AUTO-ENTMCNC: 30.7 G/DL (ref 31.5–35.7)
MCV RBC AUTO: 102.6 FL (ref 79–97)
MONOCYTES # BLD AUTO: 0.39 10*3/MM3 (ref 0.1–0.9)
MONOCYTES NFR BLD AUTO: 6.3 % (ref 5–12)
NEUTROPHILS NFR BLD AUTO: 5.32 10*3/MM3 (ref 1.7–7)
NEUTROPHILS NFR BLD AUTO: 86.1 % (ref 42.7–76)
NRBC BLD AUTO-RTO: 0 /100 WBC (ref 0–0.2)
PLATELET # BLD AUTO: 123 10*3/MM3 (ref 140–450)
PMV BLD AUTO: 9.7 FL (ref 6–12)
POTASSIUM SERPL-SCNC: 3.3 MMOL/L (ref 3.5–5.2)
POTASSIUM SERPL-SCNC: 3.4 MMOL/L (ref 3.5–5.2)
PROT SERPL-MCNC: 4.7 G/DL (ref 6–8.5)
RBC # BLD AUTO: 2.73 10*6/MM3 (ref 4.14–5.8)
SODIUM SERPL-SCNC: 141 MMOL/L (ref 136–145)
VALPROATE SERPL-MCNC: 20.5 MCG/ML (ref 50–125)
WBC NRBC COR # BLD: 6.18 10*3/MM3 (ref 3.4–10.8)

## 2023-02-07 PROCEDURE — 99233 SBSQ HOSP IP/OBS HIGH 50: CPT | Performed by: INTERNAL MEDICINE

## 2023-02-07 PROCEDURE — 82962 GLUCOSE BLOOD TEST: CPT

## 2023-02-07 PROCEDURE — 80164 ASSAY DIPROPYLACETIC ACD TOT: CPT | Performed by: PHYSICIAN ASSISTANT

## 2023-02-07 PROCEDURE — 85025 COMPLETE CBC W/AUTO DIFF WBC: CPT | Performed by: INTERNAL MEDICINE

## 2023-02-07 PROCEDURE — 36415 COLL VENOUS BLD VENIPUNCTURE: CPT | Performed by: INTERNAL MEDICINE

## 2023-02-07 PROCEDURE — 92526 ORAL FUNCTION THERAPY: CPT

## 2023-02-07 PROCEDURE — 99232 SBSQ HOSP IP/OBS MODERATE 35: CPT | Performed by: INTERNAL MEDICINE

## 2023-02-07 PROCEDURE — 83735 ASSAY OF MAGNESIUM: CPT | Performed by: PHYSICIAN ASSISTANT

## 2023-02-07 PROCEDURE — 84132 ASSAY OF SERUM POTASSIUM: CPT | Performed by: INTERNAL MEDICINE

## 2023-02-07 PROCEDURE — 97530 THERAPEUTIC ACTIVITIES: CPT

## 2023-02-07 PROCEDURE — 86140 C-REACTIVE PROTEIN: CPT | Performed by: INTERNAL MEDICINE

## 2023-02-07 PROCEDURE — 80053 COMPREHEN METABOLIC PANEL: CPT | Performed by: INTERNAL MEDICINE

## 2023-02-08 ENCOUNTER — OUTSIDE FACILITY SERVICE (OUTPATIENT)
Dept: INFECTIOUS DISEASES | Facility: CLINIC | Age: 53
End: 2023-02-08
Payer: MEDICARE

## 2023-02-08 ENCOUNTER — APPOINTMENT (OUTPATIENT)
Dept: GENERAL RADIOLOGY | Facility: HOSPITAL | Age: 53
End: 2023-02-08
Payer: COMMERCIAL

## 2023-02-08 ENCOUNTER — OUTSIDE FACILITY SERVICE (OUTPATIENT)
Dept: PULMONOLOGY | Facility: CLINIC | Age: 53
End: 2023-02-08
Payer: MEDICARE

## 2023-02-08 LAB
ALBUMIN SERPL-MCNC: 2.1 G/DL (ref 3.5–5.2)
ALBUMIN/GLOB SERPL: 0.8 G/DL
ALP SERPL-CCNC: 136 U/L (ref 39–117)
ALT SERPL W P-5'-P-CCNC: 9 U/L (ref 1–41)
ANION GAP SERPL CALCULATED.3IONS-SCNC: 4.8 MMOL/L (ref 5–15)
AST SERPL-CCNC: 14 U/L (ref 1–40)
BASOPHILS # BLD AUTO: 0.01 10*3/MM3 (ref 0–0.2)
BASOPHILS NFR BLD AUTO: 0.1 % (ref 0–1.5)
BILIRUB SERPL-MCNC: 0.3 MG/DL (ref 0–1.2)
BUN SERPL-MCNC: 6 MG/DL (ref 6–20)
BUN/CREAT SERPL: 18.2 (ref 7–25)
CALCIUM SPEC-SCNC: 8.3 MG/DL (ref 8.6–10.5)
CHLORIDE SERPL-SCNC: 107 MMOL/L (ref 98–107)
CO2 SERPL-SCNC: 32.2 MMOL/L (ref 22–29)
CREAT SERPL-MCNC: 0.33 MG/DL (ref 0.76–1.27)
CRP SERPL-MCNC: 2.85 MG/DL (ref 0–0.5)
DEPRECATED RDW RBC AUTO: 54 FL (ref 37–54)
EGFRCR SERPLBLD CKD-EPI 2021: 137.4 ML/MIN/1.73
EOSINOPHIL # BLD AUTO: 0 10*3/MM3 (ref 0–0.4)
EOSINOPHIL NFR BLD AUTO: 0 % (ref 0.3–6.2)
ERYTHROCYTE [DISTWIDTH] IN BLOOD BY AUTOMATED COUNT: 14.3 % (ref 12.3–15.4)
GLOBULIN UR ELPH-MCNC: 2.7 GM/DL
GLUCOSE BLDC GLUCOMTR-MCNC: 91 MG/DL (ref 70–130)
GLUCOSE SERPL-MCNC: 168 MG/DL (ref 65–99)
HCT VFR BLD AUTO: 28.9 % (ref 37.5–51)
HGB BLD-MCNC: 8.8 G/DL (ref 13–17.7)
IMM GRANULOCYTES # BLD AUTO: 0.03 10*3/MM3 (ref 0–0.05)
IMM GRANULOCYTES NFR BLD AUTO: 0.4 % (ref 0–0.5)
LYMPHOCYTES # BLD AUTO: 0.45 10*3/MM3 (ref 0.7–3.1)
LYMPHOCYTES NFR BLD AUTO: 6.2 % (ref 19.6–45.3)
MAGNESIUM SERPL-MCNC: 1.9 MG/DL (ref 1.6–2.6)
MCH RBC QN AUTO: 31.3 PG (ref 26.6–33)
MCHC RBC AUTO-ENTMCNC: 30.4 G/DL (ref 31.5–35.7)
MCV RBC AUTO: 102.8 FL (ref 79–97)
MONOCYTES # BLD AUTO: 0.41 10*3/MM3 (ref 0.1–0.9)
MONOCYTES NFR BLD AUTO: 5.6 % (ref 5–12)
NEUTROPHILS NFR BLD AUTO: 6.41 10*3/MM3 (ref 1.7–7)
NEUTROPHILS NFR BLD AUTO: 87.7 % (ref 42.7–76)
NRBC BLD AUTO-RTO: 0 /100 WBC (ref 0–0.2)
PLATELET # BLD AUTO: 124 10*3/MM3 (ref 140–450)
PMV BLD AUTO: 9.9 FL (ref 6–12)
POTASSIUM SERPL-SCNC: 3.9 MMOL/L (ref 3.5–5.2)
PROT SERPL-MCNC: 4.8 G/DL (ref 6–8.5)
RBC # BLD AUTO: 2.81 10*6/MM3 (ref 4.14–5.8)
SODIUM SERPL-SCNC: 144 MMOL/L (ref 136–145)
VALPROATE SERPL-MCNC: 19 MCG/ML (ref 50–125)
WBC NRBC COR # BLD: 7.31 10*3/MM3 (ref 3.4–10.8)

## 2023-02-08 PROCEDURE — 71045 X-RAY EXAM CHEST 1 VIEW: CPT

## 2023-02-08 PROCEDURE — 99232 SBSQ HOSP IP/OBS MODERATE 35: CPT | Performed by: INTERNAL MEDICINE

## 2023-02-08 PROCEDURE — 99222 1ST HOSP IP/OBS MODERATE 55: CPT | Performed by: PSYCHIATRY & NEUROLOGY

## 2023-02-08 PROCEDURE — 71045 X-RAY EXAM CHEST 1 VIEW: CPT | Performed by: RADIOLOGY

## 2023-02-08 PROCEDURE — 82962 GLUCOSE BLOOD TEST: CPT

## 2023-02-08 PROCEDURE — 92526 ORAL FUNCTION THERAPY: CPT

## 2023-02-08 PROCEDURE — 83735 ASSAY OF MAGNESIUM: CPT | Performed by: PHYSICIAN ASSISTANT

## 2023-02-08 PROCEDURE — 80164 ASSAY DIPROPYLACETIC ACD TOT: CPT | Performed by: PHYSICIAN ASSISTANT

## 2023-02-08 PROCEDURE — 80053 COMPREHEN METABOLIC PANEL: CPT | Performed by: INTERNAL MEDICINE

## 2023-02-08 PROCEDURE — 85025 COMPLETE CBC W/AUTO DIFF WBC: CPT | Performed by: INTERNAL MEDICINE

## 2023-02-08 PROCEDURE — 97530 THERAPEUTIC ACTIVITIES: CPT

## 2023-02-08 PROCEDURE — 86140 C-REACTIVE PROTEIN: CPT | Performed by: INTERNAL MEDICINE

## 2023-02-09 ENCOUNTER — OUTSIDE FACILITY SERVICE (OUTPATIENT)
Dept: PULMONOLOGY | Facility: CLINIC | Age: 53
End: 2023-02-09
Payer: MEDICARE

## 2023-02-09 LAB
BACTERIA SPEC AEROBE CULT: NORMAL
BACTERIA SPEC AEROBE CULT: NORMAL
GLUCOSE BLDC GLUCOMTR-MCNC: 137 MG/DL (ref 70–130)
MAGNESIUM SERPL-MCNC: 1.9 MG/DL (ref 1.6–2.6)

## 2023-02-09 PROCEDURE — 36415 COLL VENOUS BLD VENIPUNCTURE: CPT | Performed by: PHYSICIAN ASSISTANT

## 2023-02-09 PROCEDURE — 82962 GLUCOSE BLOOD TEST: CPT

## 2023-02-09 PROCEDURE — 83735 ASSAY OF MAGNESIUM: CPT | Performed by: PHYSICIAN ASSISTANT

## 2023-02-09 PROCEDURE — 99232 SBSQ HOSP IP/OBS MODERATE 35: CPT | Performed by: INTERNAL MEDICINE

## 2023-02-10 ENCOUNTER — OUTSIDE FACILITY SERVICE (OUTPATIENT)
Dept: PULMONOLOGY | Facility: CLINIC | Age: 53
End: 2023-02-10
Payer: MEDICARE

## 2023-02-10 LAB
ALBUMIN SERPL-MCNC: 2.2 G/DL (ref 3.5–5.2)
ALBUMIN/GLOB SERPL: 0.8 G/DL
ALP SERPL-CCNC: 132 U/L (ref 39–117)
ALT SERPL W P-5'-P-CCNC: 7 U/L (ref 1–41)
ANION GAP SERPL CALCULATED.3IONS-SCNC: 3.7 MMOL/L (ref 5–15)
AST SERPL-CCNC: 12 U/L (ref 1–40)
BASOPHILS # BLD AUTO: 0.01 10*3/MM3 (ref 0–0.2)
BASOPHILS NFR BLD AUTO: 0.1 % (ref 0–1.5)
BILIRUB SERPL-MCNC: 0.4 MG/DL (ref 0–1.2)
BUN SERPL-MCNC: 8 MG/DL (ref 6–20)
BUN/CREAT SERPL: 25.8 (ref 7–25)
CALCIUM SPEC-SCNC: 8.2 MG/DL (ref 8.6–10.5)
CHLORIDE SERPL-SCNC: 107 MMOL/L (ref 98–107)
CO2 SERPL-SCNC: 35.3 MMOL/L (ref 22–29)
CREAT SERPL-MCNC: 0.31 MG/DL (ref 0.76–1.27)
CRP SERPL-MCNC: 4.16 MG/DL (ref 0–0.5)
DEPRECATED RDW RBC AUTO: 53.3 FL (ref 37–54)
EGFRCR SERPLBLD CKD-EPI 2021: 140 ML/MIN/1.73
EOSINOPHIL # BLD AUTO: 0.16 10*3/MM3 (ref 0–0.4)
EOSINOPHIL NFR BLD AUTO: 2.4 % (ref 0.3–6.2)
ERYTHROCYTE [DISTWIDTH] IN BLOOD BY AUTOMATED COUNT: 14.5 % (ref 12.3–15.4)
GLOBULIN UR ELPH-MCNC: 2.7 GM/DL
GLUCOSE SERPL-MCNC: 127 MG/DL (ref 65–99)
HCT VFR BLD AUTO: 28.8 % (ref 37.5–51)
HGB BLD-MCNC: 8.8 G/DL (ref 13–17.7)
IMM GRANULOCYTES # BLD AUTO: 0.02 10*3/MM3 (ref 0–0.05)
IMM GRANULOCYTES NFR BLD AUTO: 0.3 % (ref 0–0.5)
LYMPHOCYTES # BLD AUTO: 0.95 10*3/MM3 (ref 0.7–3.1)
LYMPHOCYTES NFR BLD AUTO: 14 % (ref 19.6–45.3)
MAGNESIUM SERPL-MCNC: 2.1 MG/DL (ref 1.6–2.6)
MCH RBC QN AUTO: 30.9 PG (ref 26.6–33)
MCHC RBC AUTO-ENTMCNC: 30.6 G/DL (ref 31.5–35.7)
MCV RBC AUTO: 101.1 FL (ref 79–97)
MONOCYTES # BLD AUTO: 0.55 10*3/MM3 (ref 0.1–0.9)
MONOCYTES NFR BLD AUTO: 8.1 % (ref 5–12)
NEUTROPHILS NFR BLD AUTO: 5.1 10*3/MM3 (ref 1.7–7)
NEUTROPHILS NFR BLD AUTO: 75.1 % (ref 42.7–76)
NRBC BLD AUTO-RTO: 0 /100 WBC (ref 0–0.2)
PLATELET # BLD AUTO: 147 10*3/MM3 (ref 140–450)
PMV BLD AUTO: 10.2 FL (ref 6–12)
POTASSIUM SERPL-SCNC: 2.9 MMOL/L (ref 3.5–5.2)
POTASSIUM SERPL-SCNC: 3.1 MMOL/L (ref 3.5–5.2)
PROT SERPL-MCNC: 4.9 G/DL (ref 6–8.5)
RBC # BLD AUTO: 2.85 10*6/MM3 (ref 4.14–5.8)
SODIUM SERPL-SCNC: 146 MMOL/L (ref 136–145)
VALPROATE SERPL-MCNC: 23.4 MCG/ML (ref 50–125)
WBC NRBC COR # BLD: 6.79 10*3/MM3 (ref 3.4–10.8)

## 2023-02-10 PROCEDURE — 84132 ASSAY OF SERUM POTASSIUM: CPT | Performed by: INTERNAL MEDICINE

## 2023-02-10 PROCEDURE — 80164 ASSAY DIPROPYLACETIC ACD TOT: CPT | Performed by: PSYCHIATRY & NEUROLOGY

## 2023-02-10 PROCEDURE — 97530 THERAPEUTIC ACTIVITIES: CPT

## 2023-02-10 PROCEDURE — 80053 COMPREHEN METABOLIC PANEL: CPT | Performed by: INTERNAL MEDICINE

## 2023-02-10 PROCEDURE — 85025 COMPLETE CBC W/AUTO DIFF WBC: CPT | Performed by: INTERNAL MEDICINE

## 2023-02-10 PROCEDURE — 99232 SBSQ HOSP IP/OBS MODERATE 35: CPT | Performed by: INTERNAL MEDICINE

## 2023-02-10 PROCEDURE — 36415 COLL VENOUS BLD VENIPUNCTURE: CPT | Performed by: INTERNAL MEDICINE

## 2023-02-10 PROCEDURE — 86140 C-REACTIVE PROTEIN: CPT | Performed by: INTERNAL MEDICINE

## 2023-02-10 PROCEDURE — 83735 ASSAY OF MAGNESIUM: CPT | Performed by: PHYSICIAN ASSISTANT

## 2023-02-11 ENCOUNTER — OUTSIDE FACILITY SERVICE (OUTPATIENT)
Dept: PULMONOLOGY | Facility: CLINIC | Age: 53
End: 2023-02-11
Payer: MEDICARE

## 2023-02-11 ENCOUNTER — APPOINTMENT (OUTPATIENT)
Dept: GENERAL RADIOLOGY | Facility: HOSPITAL | Age: 53
End: 2023-02-11
Payer: COMMERCIAL

## 2023-02-11 LAB
A-A DO2: 74.1 MMHG (ref 0–300)
A-A DO2: ABNORMAL
A-A DO2: ABNORMAL
ALBUMIN SERPL-MCNC: 2.4 G/DL (ref 3.5–5.2)
ALBUMIN/GLOB SERPL: 0.8 G/DL
ALP SERPL-CCNC: 137 U/L (ref 39–117)
ALT SERPL W P-5'-P-CCNC: 7 U/L (ref 1–41)
ANION GAP SERPL CALCULATED.3IONS-SCNC: 5.9 MMOL/L (ref 5–15)
ARTERIAL PATENCY WRIST A: ABNORMAL
AST SERPL-CCNC: 15 U/L (ref 1–40)
ATMOSPHERIC PRESS: 734 MMHG
BASE EXCESS BLDA CALC-SCNC: 11.5 MMOL/L (ref 0–2)
BASE EXCESS BLDA CALC-SCNC: 11.6 MMOL/L (ref 0–2)
BASE EXCESS BLDA CALC-SCNC: 11.7 MMOL/L (ref 0–2)
BDY SITE: ABNORMAL
BILIRUB SERPL-MCNC: 0.5 MG/DL (ref 0–1.2)
BODY TEMPERATURE: 0 C
BUN SERPL-MCNC: 7 MG/DL (ref 6–20)
BUN/CREAT SERPL: 22.6 (ref 7–25)
CALCIUM SPEC-SCNC: 8.5 MG/DL (ref 8.6–10.5)
CHLORIDE SERPL-SCNC: 105 MMOL/L (ref 98–107)
CO2 BLDA-SCNC: 37.7 MMOL/L (ref 22–33)
CO2 BLDA-SCNC: 37.9 MMOL/L (ref 22–33)
CO2 BLDA-SCNC: 38.3 MMOL/L (ref 22–33)
CO2 SERPL-SCNC: 34.1 MMOL/L (ref 22–29)
COHGB MFR BLD: 1.8 % (ref 0–5)
COHGB MFR BLD: 1.9 % (ref 0–5)
COHGB MFR BLD: 2.1 % (ref 0–5)
CREAT SERPL-MCNC: 0.31 MG/DL (ref 0.76–1.27)
CRP SERPL-MCNC: 8.3 MG/DL (ref 0–0.5)
DEPRECATED RDW RBC AUTO: 52.4 FL (ref 37–54)
EGFRCR SERPLBLD CKD-EPI 2021: 140 ML/MIN/1.73
ERYTHROCYTE [DISTWIDTH] IN BLOOD BY AUTOMATED COUNT: 14.3 % (ref 12.3–15.4)
GLOBULIN UR ELPH-MCNC: 3 GM/DL
GLUCOSE SERPL-MCNC: 146 MG/DL (ref 65–99)
HCO3 BLDA-SCNC: 36.2 MMOL/L (ref 20–26)
HCO3 BLDA-SCNC: 36.4 MMOL/L (ref 20–26)
HCO3 BLDA-SCNC: 36.8 MMOL/L (ref 20–26)
HCT VFR BLD AUTO: 31.2 % (ref 37.5–51)
HCT VFR BLD CALC: 29.8 % (ref 38–51)
HCT VFR BLD CALC: 30.1 % (ref 38–51)
HCT VFR BLD CALC: 30.7 % (ref 38–51)
HGB BLD-MCNC: 9.8 G/DL (ref 13–17.7)
HGB BLDA-MCNC: 10 G/DL (ref 14–18)
HGB BLDA-MCNC: 9.7 G/DL (ref 14–18)
HGB BLDA-MCNC: 9.8 G/DL (ref 14–18)
INHALED O2 CONCENTRATION: 24 %
INHALED O2 CONCENTRATION: 24 %
INHALED O2 CONCENTRATION: 28 %
Lab: ABNORMAL
MAGNESIUM SERPL-MCNC: 1.8 MG/DL (ref 1.6–2.6)
MCH RBC QN AUTO: 31.4 PG (ref 26.6–33)
MCHC RBC AUTO-ENTMCNC: 31.4 G/DL (ref 31.5–35.7)
MCV RBC AUTO: 100 FL (ref 79–97)
METHGB BLD QL: 0 % (ref 0–3)
METHGB BLD QL: 0 % (ref 0–3)
METHGB BLD QL: 0.2 % (ref 0–3)
MODALITY: ABNORMAL
NOTE: ABNORMAL
OXYHGB MFR BLDV: 89.8 % (ref 94–99)
OXYHGB MFR BLDV: 97.8 % (ref 94–99)
OXYHGB MFR BLDV: 98.4 % (ref 94–99)
PCO2 BLDA: 47.5 MM HG (ref 35–45)
PCO2 BLDA: 48.7 MM HG (ref 35–45)
PCO2 BLDA: 50.3 MM HG (ref 35–45)
PCO2 TEMP ADJ BLD: ABNORMAL MM[HG]
PH BLDA: 7.47 PH UNITS (ref 7.35–7.45)
PH BLDA: 7.48 PH UNITS (ref 7.35–7.45)
PH BLDA: 7.49 PH UNITS (ref 7.35–7.45)
PH, TEMP CORRECTED: ABNORMAL
PLATELET # BLD AUTO: 150 10*3/MM3 (ref 140–450)
PMV BLD AUTO: 9.7 FL (ref 6–12)
PO2 BLDA: 133 MM HG (ref 83–108)
PO2 BLDA: 164 MM HG (ref 83–108)
PO2 BLDA: 62 MM HG (ref 83–108)
PO2 TEMP ADJ BLD: ABNORMAL MM[HG]
POTASSIUM SERPL-SCNC: 3 MMOL/L (ref 3.5–5.2)
PROT SERPL-MCNC: 5.4 G/DL (ref 6–8.5)
RBC # BLD AUTO: 3.12 10*6/MM3 (ref 4.14–5.8)
SAO2 % BLDCOA: 91.7 % (ref 94–99)
SAO2 % BLDCOA: >99.2 % (ref 94–99)
SAO2 % BLDCOA: >99.2 % (ref 94–99)
SODIUM SERPL-SCNC: 145 MMOL/L (ref 136–145)
VENTILATOR MODE: ABNORMAL
WBC NRBC COR # BLD: 10.51 10*3/MM3 (ref 3.4–10.8)

## 2023-02-11 PROCEDURE — 82375 ASSAY CARBOXYHB QUANT: CPT

## 2023-02-11 PROCEDURE — 86140 C-REACTIVE PROTEIN: CPT | Performed by: INTERNAL MEDICINE

## 2023-02-11 PROCEDURE — 71045 X-RAY EXAM CHEST 1 VIEW: CPT

## 2023-02-11 PROCEDURE — 83050 HGB METHEMOGLOBIN QUAN: CPT

## 2023-02-11 PROCEDURE — 83735 ASSAY OF MAGNESIUM: CPT | Performed by: PHYSICIAN ASSISTANT

## 2023-02-11 PROCEDURE — 82805 BLOOD GASES W/O2 SATURATION: CPT

## 2023-02-11 PROCEDURE — 87205 SMEAR GRAM STAIN: CPT | Performed by: INTERNAL MEDICINE

## 2023-02-11 PROCEDURE — 85027 COMPLETE CBC AUTOMATED: CPT | Performed by: INTERNAL MEDICINE

## 2023-02-11 PROCEDURE — 87147 CULTURE TYPE IMMUNOLOGIC: CPT | Performed by: INTERNAL MEDICINE

## 2023-02-11 PROCEDURE — 99232 SBSQ HOSP IP/OBS MODERATE 35: CPT | Performed by: INTERNAL MEDICINE

## 2023-02-11 PROCEDURE — 36415 COLL VENOUS BLD VENIPUNCTURE: CPT | Performed by: PHYSICIAN ASSISTANT

## 2023-02-11 PROCEDURE — 87070 CULTURE OTHR SPECIMN AEROBIC: CPT | Performed by: INTERNAL MEDICINE

## 2023-02-11 PROCEDURE — 80053 COMPREHEN METABOLIC PANEL: CPT | Performed by: INTERNAL MEDICINE

## 2023-02-11 PROCEDURE — 87186 SC STD MICRODIL/AGAR DIL: CPT | Performed by: INTERNAL MEDICINE

## 2023-02-12 ENCOUNTER — APPOINTMENT (OUTPATIENT)
Dept: GENERAL RADIOLOGY | Facility: HOSPITAL | Age: 53
End: 2023-02-12
Payer: COMMERCIAL

## 2023-02-12 ENCOUNTER — OUTSIDE FACILITY SERVICE (OUTPATIENT)
Dept: PULMONOLOGY | Facility: CLINIC | Age: 53
End: 2023-02-12
Payer: MEDICARE

## 2023-02-12 LAB
A-A DO2: 112.7 MMHG (ref 0–300)
ALBUMIN SERPL-MCNC: 2.4 G/DL (ref 3.5–5.2)
ALBUMIN/GLOB SERPL: 0.7 G/DL
ALP SERPL-CCNC: 151 U/L (ref 39–117)
ALT SERPL W P-5'-P-CCNC: 9 U/L (ref 1–41)
AMORPH URATE CRY URNS QL MICRO: ABNORMAL /HPF
ANION GAP SERPL CALCULATED.3IONS-SCNC: 6.4 MMOL/L (ref 5–15)
ARTERIAL PATENCY WRIST A: ABNORMAL
AST SERPL-CCNC: 13 U/L (ref 1–40)
ATMOSPHERIC PRESS: 724 MMHG
BACTERIA UR QL AUTO: ABNORMAL /HPF
BASE EXCESS BLDA CALC-SCNC: 11.2 MMOL/L (ref 0–2)
BASOPHILS # BLD AUTO: 0.04 10*3/MM3 (ref 0–0.2)
BASOPHILS NFR BLD AUTO: 0.3 % (ref 0–1.5)
BDY SITE: ABNORMAL
BILIRUB SERPL-MCNC: 0.4 MG/DL (ref 0–1.2)
BILIRUB UR QL STRIP: NEGATIVE
BODY TEMPERATURE: 0 C
BUN SERPL-MCNC: 7 MG/DL (ref 6–20)
BUN/CREAT SERPL: 20 (ref 7–25)
CALCIUM SPEC-SCNC: 8.7 MG/DL (ref 8.6–10.5)
CHLORIDE SERPL-SCNC: 106 MMOL/L (ref 98–107)
CLARITY UR: ABNORMAL
CO2 BLDA-SCNC: 38.5 MMOL/L (ref 22–33)
CO2 SERPL-SCNC: 32.6 MMOL/L (ref 22–29)
COHGB MFR BLD: 1.9 % (ref 0–5)
COLOR UR: ABNORMAL
CREAT SERPL-MCNC: 0.35 MG/DL (ref 0.76–1.27)
CRP SERPL-MCNC: 12.79 MG/DL (ref 0–0.5)
D-LACTATE SERPL-SCNC: 1.3 MMOL/L (ref 0.5–2)
DEPRECATED RDW RBC AUTO: 52.6 FL (ref 37–54)
EGFRCR SERPLBLD CKD-EPI 2021: 135 ML/MIN/1.73
EOSINOPHIL # BLD AUTO: 0.07 10*3/MM3 (ref 0–0.4)
EOSINOPHIL NFR BLD AUTO: 0.5 % (ref 0.3–6.2)
ERYTHROCYTE [DISTWIDTH] IN BLOOD BY AUTOMATED COUNT: 14.1 % (ref 12.3–15.4)
GLOBULIN UR ELPH-MCNC: 3.3 GM/DL
GLUCOSE SERPL-MCNC: 217 MG/DL (ref 65–99)
GLUCOSE UR STRIP-MCNC: NEGATIVE MG/DL
HCO3 BLDA-SCNC: 36.8 MMOL/L (ref 20–26)
HCT VFR BLD AUTO: 33.2 % (ref 37.5–51)
HCT VFR BLD CALC: 30.3 % (ref 38–51)
HGB BLD-MCNC: 10.2 G/DL (ref 13–17.7)
HGB BLDA-MCNC: 9.9 G/DL (ref 14–18)
HGB UR QL STRIP.AUTO: ABNORMAL
HYALINE CASTS UR QL AUTO: ABNORMAL /LPF
IMM GRANULOCYTES # BLD AUTO: 0.04 10*3/MM3 (ref 0–0.05)
IMM GRANULOCYTES NFR BLD AUTO: 0.3 % (ref 0–0.5)
INHALED O2 CONCENTRATION: 36 %
KETONES UR QL STRIP: ABNORMAL
LEUKOCYTE ESTERASE UR QL STRIP.AUTO: ABNORMAL
LYMPHOCYTES # BLD AUTO: 0.13 10*3/MM3 (ref 0.7–3.1)
LYMPHOCYTES NFR BLD AUTO: 1 % (ref 19.6–45.3)
Lab: ABNORMAL
MAGNESIUM SERPL-MCNC: 1.9 MG/DL (ref 1.6–2.6)
MCH RBC QN AUTO: 31.2 PG (ref 26.6–33)
MCHC RBC AUTO-ENTMCNC: 30.7 G/DL (ref 31.5–35.7)
MCV RBC AUTO: 101.5 FL (ref 79–97)
METHGB BLD QL: 0 % (ref 0–3)
MODALITY: ABNORMAL
MONOCYTES # BLD AUTO: 0.63 10*3/MM3 (ref 0.1–0.9)
MONOCYTES NFR BLD AUTO: 4.6 % (ref 5–12)
MRSA DNA SPEC QL NAA+PROBE: NORMAL
NEUTROPHILS NFR BLD AUTO: 12.71 10*3/MM3 (ref 1.7–7)
NEUTROPHILS NFR BLD AUTO: 93.3 % (ref 42.7–76)
NITRITE UR QL STRIP: NEGATIVE
NOTE: ABNORMAL
NOTIFIED WHO: ABNORMAL
NRBC BLD AUTO-RTO: 0 /100 WBC (ref 0–0.2)
OXYHGB MFR BLDV: 92.7 % (ref 94–99)
PCO2 BLDA: 53.9 MM HG (ref 35–45)
PCO2 TEMP ADJ BLD: ABNORMAL MM[HG]
PH BLDA: 7.44 PH UNITS (ref 7.35–7.45)
PH UR STRIP.AUTO: 7.5 [PH] (ref 5–8)
PH, TEMP CORRECTED: ABNORMAL
PLATELET # BLD AUTO: 150 10*3/MM3 (ref 140–450)
PMV BLD AUTO: 9.8 FL (ref 6–12)
PO2 BLDA: 71.4 MM HG (ref 83–108)
PO2 TEMP ADJ BLD: ABNORMAL MM[HG]
POTASSIUM SERPL-SCNC: 4 MMOL/L (ref 3.5–5.2)
PROT SERPL-MCNC: 5.7 G/DL (ref 6–8.5)
PROT UR QL STRIP: ABNORMAL
RBC # BLD AUTO: 3.27 10*6/MM3 (ref 4.14–5.8)
RBC # UR STRIP: ABNORMAL /HPF
REF LAB TEST METHOD: ABNORMAL
SAO2 % BLDCOA: 94.5 % (ref 94–99)
SODIUM SERPL-SCNC: 145 MMOL/L (ref 136–145)
SP GR UR STRIP: 1.02 (ref 1–1.03)
SQUAMOUS #/AREA URNS HPF: ABNORMAL /HPF
UROBILINOGEN UR QL STRIP: ABNORMAL
VALPROATE SERPL-MCNC: 35.8 MCG/ML (ref 50–125)
VENTILATOR MODE: ABNORMAL
WBC # UR STRIP: ABNORMAL /HPF
WBC NRBC COR # BLD: 13.62 10*3/MM3 (ref 3.4–10.8)
YEAST URNS QL MICRO: ABNORMAL /HPF

## 2023-02-12 PROCEDURE — 71045 X-RAY EXAM CHEST 1 VIEW: CPT

## 2023-02-12 PROCEDURE — 83605 ASSAY OF LACTIC ACID: CPT | Performed by: INTERNAL MEDICINE

## 2023-02-12 PROCEDURE — 85025 COMPLETE CBC W/AUTO DIFF WBC: CPT | Performed by: INTERNAL MEDICINE

## 2023-02-12 PROCEDURE — 87186 SC STD MICRODIL/AGAR DIL: CPT | Performed by: INTERNAL MEDICINE

## 2023-02-12 PROCEDURE — 82805 BLOOD GASES W/O2 SATURATION: CPT

## 2023-02-12 PROCEDURE — 81001 URINALYSIS AUTO W/SCOPE: CPT | Performed by: INTERNAL MEDICINE

## 2023-02-12 PROCEDURE — 80053 COMPREHEN METABOLIC PANEL: CPT | Performed by: INTERNAL MEDICINE

## 2023-02-12 PROCEDURE — 87205 SMEAR GRAM STAIN: CPT | Performed by: INTERNAL MEDICINE

## 2023-02-12 PROCEDURE — 83735 ASSAY OF MAGNESIUM: CPT | Performed by: INTERNAL MEDICINE

## 2023-02-12 PROCEDURE — 86140 C-REACTIVE PROTEIN: CPT | Performed by: INTERNAL MEDICINE

## 2023-02-12 PROCEDURE — 82375 ASSAY CARBOXYHB QUANT: CPT

## 2023-02-12 PROCEDURE — 87040 BLOOD CULTURE FOR BACTERIA: CPT | Performed by: INTERNAL MEDICINE

## 2023-02-12 PROCEDURE — 87070 CULTURE OTHR SPECIMN AEROBIC: CPT | Performed by: INTERNAL MEDICINE

## 2023-02-12 PROCEDURE — 87641 MR-STAPH DNA AMP PROBE: CPT | Performed by: INTERNAL MEDICINE

## 2023-02-12 PROCEDURE — 99233 SBSQ HOSP IP/OBS HIGH 50: CPT | Performed by: INTERNAL MEDICINE

## 2023-02-12 PROCEDURE — 80164 ASSAY DIPROPYLACETIC ACD TOT: CPT | Performed by: INTERNAL MEDICINE

## 2023-02-12 PROCEDURE — 83050 HGB METHEMOGLOBIN QUAN: CPT

## 2023-02-12 PROCEDURE — 87147 CULTURE TYPE IMMUNOLOGIC: CPT | Performed by: INTERNAL MEDICINE

## 2023-02-13 ENCOUNTER — OUTSIDE FACILITY SERVICE (OUTPATIENT)
Dept: INFECTIOUS DISEASES | Facility: CLINIC | Age: 53
End: 2023-02-13
Payer: MEDICARE

## 2023-02-13 ENCOUNTER — OUTSIDE FACILITY SERVICE (OUTPATIENT)
Dept: PULMONOLOGY | Facility: CLINIC | Age: 53
End: 2023-02-13
Payer: MEDICARE

## 2023-02-13 ENCOUNTER — APPOINTMENT (OUTPATIENT)
Dept: GENERAL RADIOLOGY | Facility: HOSPITAL | Age: 53
End: 2023-02-13
Payer: COMMERCIAL

## 2023-02-13 LAB
ALBUMIN SERPL-MCNC: 2.1 G/DL (ref 3.5–5.2)
ALBUMIN/GLOB SERPL: 0.6 G/DL
ALP SERPL-CCNC: 121 U/L (ref 39–117)
ALT SERPL W P-5'-P-CCNC: 7 U/L (ref 1–41)
ANION GAP SERPL CALCULATED.3IONS-SCNC: 3.8 MMOL/L (ref 5–15)
AST SERPL-CCNC: 12 U/L (ref 1–40)
BACTERIA SPEC RESP CULT: ABNORMAL
BACTERIA SPEC RESP CULT: ABNORMAL
BASOPHILS # BLD AUTO: 0.02 10*3/MM3 (ref 0–0.2)
BASOPHILS NFR BLD AUTO: 0.2 % (ref 0–1.5)
BILIRUB SERPL-MCNC: 0.3 MG/DL (ref 0–1.2)
BUN SERPL-MCNC: 9 MG/DL (ref 6–20)
BUN/CREAT SERPL: 37.5 (ref 7–25)
CALCIUM SPEC-SCNC: 8.7 MG/DL (ref 8.6–10.5)
CHLORIDE SERPL-SCNC: 105 MMOL/L (ref 98–107)
CO2 SERPL-SCNC: 34.2 MMOL/L (ref 22–29)
CREAT SERPL-MCNC: 0.24 MG/DL (ref 0.76–1.27)
CRP SERPL-MCNC: 13.1 MG/DL (ref 0–0.5)
DEPRECATED RDW RBC AUTO: 53.8 FL (ref 37–54)
EGFRCR SERPLBLD CKD-EPI 2021: 151.3 ML/MIN/1.73
EOSINOPHIL # BLD AUTO: 0.03 10*3/MM3 (ref 0–0.4)
EOSINOPHIL NFR BLD AUTO: 0.3 % (ref 0.3–6.2)
ERYTHROCYTE [DISTWIDTH] IN BLOOD BY AUTOMATED COUNT: 14.1 % (ref 12.3–15.4)
GLOBULIN UR ELPH-MCNC: 3.3 GM/DL
GLUCOSE SERPL-MCNC: 131 MG/DL (ref 65–99)
GRAM STN SPEC: ABNORMAL
HCT VFR BLD AUTO: 33.3 % (ref 37.5–51)
HGB BLD-MCNC: 10.1 G/DL (ref 13–17.7)
IMM GRANULOCYTES # BLD AUTO: 0.02 10*3/MM3 (ref 0–0.05)
IMM GRANULOCYTES NFR BLD AUTO: 0.2 % (ref 0–0.5)
LYMPHOCYTES # BLD AUTO: 0.36 10*3/MM3 (ref 0.7–3.1)
LYMPHOCYTES NFR BLD AUTO: 4.1 % (ref 19.6–45.3)
MAGNESIUM SERPL-MCNC: 2 MG/DL (ref 1.6–2.6)
MCH RBC QN AUTO: 31.3 PG (ref 26.6–33)
MCHC RBC AUTO-ENTMCNC: 30.3 G/DL (ref 31.5–35.7)
MCV RBC AUTO: 103.1 FL (ref 79–97)
MONOCYTES # BLD AUTO: 0.76 10*3/MM3 (ref 0.1–0.9)
MONOCYTES NFR BLD AUTO: 8.7 % (ref 5–12)
NEUTROPHILS NFR BLD AUTO: 7.56 10*3/MM3 (ref 1.7–7)
NEUTROPHILS NFR BLD AUTO: 86.5 % (ref 42.7–76)
NRBC BLD AUTO-RTO: 0 /100 WBC (ref 0–0.2)
PLATELET # BLD AUTO: 157 10*3/MM3 (ref 140–450)
PMV BLD AUTO: 10 FL (ref 6–12)
POTASSIUM SERPL-SCNC: 4.4 MMOL/L (ref 3.5–5.2)
PROT SERPL-MCNC: 5.4 G/DL (ref 6–8.5)
RBC # BLD AUTO: 3.23 10*6/MM3 (ref 4.14–5.8)
SODIUM SERPL-SCNC: 143 MMOL/L (ref 136–145)
WBC NRBC COR # BLD: 8.75 10*3/MM3 (ref 3.4–10.8)

## 2023-02-13 PROCEDURE — 74018 RADEX ABDOMEN 1 VIEW: CPT

## 2023-02-13 PROCEDURE — 99233 SBSQ HOSP IP/OBS HIGH 50: CPT | Performed by: INTERNAL MEDICINE

## 2023-02-13 PROCEDURE — 80053 COMPREHEN METABOLIC PANEL: CPT | Performed by: PHYSICIAN ASSISTANT

## 2023-02-13 PROCEDURE — 85025 COMPLETE CBC W/AUTO DIFF WBC: CPT | Performed by: PHYSICIAN ASSISTANT

## 2023-02-13 PROCEDURE — 74018 RADEX ABDOMEN 1 VIEW: CPT | Performed by: RADIOLOGY

## 2023-02-13 PROCEDURE — 71045 X-RAY EXAM CHEST 1 VIEW: CPT | Performed by: RADIOLOGY

## 2023-02-13 PROCEDURE — 71045 X-RAY EXAM CHEST 1 VIEW: CPT

## 2023-02-13 PROCEDURE — 86140 C-REACTIVE PROTEIN: CPT | Performed by: PHYSICIAN ASSISTANT

## 2023-02-13 PROCEDURE — 36415 COLL VENOUS BLD VENIPUNCTURE: CPT | Performed by: INTERNAL MEDICINE

## 2023-02-13 PROCEDURE — 83735 ASSAY OF MAGNESIUM: CPT | Performed by: INTERNAL MEDICINE

## 2023-02-14 ENCOUNTER — APPOINTMENT (OUTPATIENT)
Dept: CT IMAGING | Facility: HOSPITAL | Age: 53
End: 2023-02-14
Payer: COMMERCIAL

## 2023-02-14 ENCOUNTER — APPOINTMENT (OUTPATIENT)
Dept: GENERAL RADIOLOGY | Facility: HOSPITAL | Age: 53
End: 2023-02-14
Payer: COMMERCIAL

## 2023-02-14 ENCOUNTER — OUTSIDE FACILITY SERVICE (OUTPATIENT)
Dept: INFECTIOUS DISEASES | Facility: CLINIC | Age: 53
End: 2023-02-14
Payer: MEDICARE

## 2023-02-14 ENCOUNTER — OUTSIDE FACILITY SERVICE (OUTPATIENT)
Dept: PULMONOLOGY | Facility: CLINIC | Age: 53
End: 2023-02-14
Payer: MEDICARE

## 2023-02-14 LAB
A-A DO2: 84 MMHG (ref 0–300)
ALBUMIN SERPL-MCNC: 2.2 G/DL (ref 3.5–5.2)
ALBUMIN/GLOB SERPL: 0.7 G/DL
ALP SERPL-CCNC: 120 U/L (ref 39–117)
ALT SERPL W P-5'-P-CCNC: 8 U/L (ref 1–41)
ANION GAP SERPL CALCULATED.3IONS-SCNC: 2.9 MMOL/L (ref 5–15)
ARTERIAL PATENCY WRIST A: ABNORMAL
AST SERPL-CCNC: 13 U/L (ref 1–40)
ATMOSPHERIC PRESS: 726 MMHG
BACTERIA SPEC RESP CULT: ABNORMAL
BACTERIA SPEC RESP CULT: ABNORMAL
BASE EXCESS BLDA CALC-SCNC: 12.6 MMOL/L (ref 0–2)
BASOPHILS # BLD AUTO: 0.01 10*3/MM3 (ref 0–0.2)
BASOPHILS NFR BLD AUTO: 0.1 % (ref 0–1.5)
BDY SITE: ABNORMAL
BILIRUB SERPL-MCNC: 0.3 MG/DL (ref 0–1.2)
BODY TEMPERATURE: 0 C
BUN SERPL-MCNC: 10 MG/DL (ref 6–20)
BUN/CREAT SERPL: 33.3 (ref 7–25)
CALCIUM SPEC-SCNC: 8.7 MG/DL (ref 8.6–10.5)
CHLORIDE SERPL-SCNC: 102 MMOL/L (ref 98–107)
CO2 BLDA-SCNC: 40.9 MMOL/L (ref 22–33)
CO2 SERPL-SCNC: 36.1 MMOL/L (ref 22–29)
COHGB MFR BLD: 1.7 % (ref 0–5)
CREAT SERPL-MCNC: 0.3 MG/DL (ref 0.76–1.27)
CRP SERPL-MCNC: 10.44 MG/DL (ref 0–0.5)
DEPRECATED RDW RBC AUTO: 52.8 FL (ref 37–54)
EGFRCR SERPLBLD CKD-EPI 2021: 141.4 ML/MIN/1.73
EOSINOPHIL # BLD AUTO: 0.01 10*3/MM3 (ref 0–0.4)
EOSINOPHIL NFR BLD AUTO: 0.1 % (ref 0.3–6.2)
ERYTHROCYTE [DISTWIDTH] IN BLOOD BY AUTOMATED COUNT: 14.2 % (ref 12.3–15.4)
GAS FLOW AIRWAY: 4 LPM
GLOBULIN UR ELPH-MCNC: 3.3 GM/DL
GLUCOSE BLDC GLUCOMTR-MCNC: 124 MG/DL (ref 70–130)
GLUCOSE SERPL-MCNC: 156 MG/DL (ref 65–99)
GRAM STN SPEC: ABNORMAL
HCO3 BLDA-SCNC: 39.1 MMOL/L (ref 20–26)
HCT VFR BLD AUTO: 33.3 % (ref 37.5–51)
HCT VFR BLD CALC: 32.1 % (ref 38–51)
HGB BLD-MCNC: 10.2 G/DL (ref 13–17.7)
HGB BLDA-MCNC: 10.5 G/DL (ref 14–18)
IMM GRANULOCYTES # BLD AUTO: 0.03 10*3/MM3 (ref 0–0.05)
IMM GRANULOCYTES NFR BLD AUTO: 0.4 % (ref 0–0.5)
INHALED O2 CONCENTRATION: 36 %
LYMPHOCYTES # BLD AUTO: 0.31 10*3/MM3 (ref 0.7–3.1)
LYMPHOCYTES NFR BLD AUTO: 4.2 % (ref 19.6–45.3)
Lab: ABNORMAL
MAGNESIUM SERPL-MCNC: 2 MG/DL (ref 1.6–2.6)
MCH RBC QN AUTO: 30.8 PG (ref 26.6–33)
MCHC RBC AUTO-ENTMCNC: 30.6 G/DL (ref 31.5–35.7)
MCV RBC AUTO: 100.6 FL (ref 79–97)
METHGB BLD QL: 0 % (ref 0–3)
MODALITY: ABNORMAL
MONOCYTES # BLD AUTO: 0.39 10*3/MM3 (ref 0.1–0.9)
MONOCYTES NFR BLD AUTO: 5.3 % (ref 5–12)
NEUTROPHILS NFR BLD AUTO: 6.56 10*3/MM3 (ref 1.7–7)
NEUTROPHILS NFR BLD AUTO: 89.9 % (ref 42.7–76)
NOTE: ABNORMAL
NOTIFIED WHO: ABNORMAL
NRBC BLD AUTO-RTO: 0 /100 WBC (ref 0–0.2)
OXYHGB MFR BLDV: 96.1 % (ref 94–99)
PCO2 BLDA: 60.4 MM HG (ref 35–45)
PCO2 TEMP ADJ BLD: ABNORMAL MM[HG]
PH BLDA: 7.42 PH UNITS (ref 7.35–7.45)
PH, TEMP CORRECTED: ABNORMAL
PLATELET # BLD AUTO: 185 10*3/MM3 (ref 140–450)
PMV BLD AUTO: 10 FL (ref 6–12)
PO2 BLDA: 93.9 MM HG (ref 83–108)
PO2 TEMP ADJ BLD: ABNORMAL MM[HG]
POTASSIUM SERPL-SCNC: 4 MMOL/L (ref 3.5–5.2)
PROT SERPL-MCNC: 5.5 G/DL (ref 6–8.5)
RBC # BLD AUTO: 3.31 10*6/MM3 (ref 4.14–5.8)
SAO2 % BLDCOA: 97.8 % (ref 94–99)
SODIUM SERPL-SCNC: 141 MMOL/L (ref 136–145)
VANCOMYCIN TROUGH SERPL-MCNC: 10.3 MCG/ML (ref 5–20)
VENTILATOR MODE: ABNORMAL
WBC NRBC COR # BLD: 7.31 10*3/MM3 (ref 3.4–10.8)

## 2023-02-14 PROCEDURE — 71045 X-RAY EXAM CHEST 1 VIEW: CPT

## 2023-02-14 PROCEDURE — 97530 THERAPEUTIC ACTIVITIES: CPT

## 2023-02-14 PROCEDURE — 82375 ASSAY CARBOXYHB QUANT: CPT

## 2023-02-14 PROCEDURE — 70450 CT HEAD/BRAIN W/O DYE: CPT

## 2023-02-14 PROCEDURE — 71045 X-RAY EXAM CHEST 1 VIEW: CPT | Performed by: RADIOLOGY

## 2023-02-14 PROCEDURE — 82962 GLUCOSE BLOOD TEST: CPT

## 2023-02-14 PROCEDURE — 83050 HGB METHEMOGLOBIN QUAN: CPT

## 2023-02-14 PROCEDURE — 82805 BLOOD GASES W/O2 SATURATION: CPT

## 2023-02-14 PROCEDURE — 36415 COLL VENOUS BLD VENIPUNCTURE: CPT | Performed by: INTERNAL MEDICINE

## 2023-02-14 PROCEDURE — 99232 SBSQ HOSP IP/OBS MODERATE 35: CPT | Performed by: PSYCHIATRY & NEUROLOGY

## 2023-02-14 PROCEDURE — 80202 ASSAY OF VANCOMYCIN: CPT | Performed by: INTERNAL MEDICINE

## 2023-02-14 PROCEDURE — 99232 SBSQ HOSP IP/OBS MODERATE 35: CPT | Performed by: INTERNAL MEDICINE

## 2023-02-14 PROCEDURE — 85025 COMPLETE CBC W/AUTO DIFF WBC: CPT | Performed by: PHYSICIAN ASSISTANT

## 2023-02-14 PROCEDURE — 83735 ASSAY OF MAGNESIUM: CPT | Performed by: INTERNAL MEDICINE

## 2023-02-14 PROCEDURE — 70450 CT HEAD/BRAIN W/O DYE: CPT | Performed by: RADIOLOGY

## 2023-02-14 PROCEDURE — 80053 COMPREHEN METABOLIC PANEL: CPT | Performed by: PHYSICIAN ASSISTANT

## 2023-02-14 PROCEDURE — 99233 SBSQ HOSP IP/OBS HIGH 50: CPT | Performed by: INTERNAL MEDICINE

## 2023-02-14 PROCEDURE — 86140 C-REACTIVE PROTEIN: CPT | Performed by: PHYSICIAN ASSISTANT

## 2023-02-15 ENCOUNTER — OUTSIDE FACILITY SERVICE (OUTPATIENT)
Dept: PULMONOLOGY | Facility: CLINIC | Age: 53
End: 2023-02-15
Payer: MEDICARE

## 2023-02-15 ENCOUNTER — OUTSIDE FACILITY SERVICE (OUTPATIENT)
Dept: INFECTIOUS DISEASES | Facility: CLINIC | Age: 53
End: 2023-02-15
Payer: MEDICARE

## 2023-02-15 LAB
A-A DO2: 88.1 MMHG (ref 0–300)
ALBUMIN SERPL-MCNC: 2.2 G/DL (ref 3.5–5.2)
ALBUMIN/GLOB SERPL: 0.7 G/DL
ALP SERPL-CCNC: 121 U/L (ref 39–117)
ALT SERPL W P-5'-P-CCNC: <5 U/L (ref 1–41)
AMMONIA BLD-SCNC: 52 UMOL/L (ref 16–60)
ANION GAP SERPL CALCULATED.3IONS-SCNC: 2.3 MMOL/L (ref 5–15)
ARTERIAL PATENCY WRIST A: ABNORMAL
AST SERPL-CCNC: 11 U/L (ref 1–40)
ATMOSPHERIC PRESS: 727 MMHG
BASE EXCESS BLDA CALC-SCNC: 19.4 MMOL/L (ref 0–2)
BASOPHILS # BLD AUTO: 0.01 10*3/MM3 (ref 0–0.2)
BASOPHILS NFR BLD AUTO: 0.1 % (ref 0–1.5)
BDY SITE: ABNORMAL
BILIRUB SERPL-MCNC: 0.3 MG/DL (ref 0–1.2)
BODY TEMPERATURE: 0 C
BUN SERPL-MCNC: 8 MG/DL (ref 6–20)
BUN/CREAT SERPL: 24.2 (ref 7–25)
CALCIUM SPEC-SCNC: 8.7 MG/DL (ref 8.6–10.5)
CHLORIDE SERPL-SCNC: 103 MMOL/L (ref 98–107)
CO2 BLDA-SCNC: 44.7 MMOL/L (ref 22–33)
CO2 SERPL-SCNC: 40.7 MMOL/L (ref 22–29)
COHGB MFR BLD: 1.8 % (ref 0–5)
CREAT SERPL-MCNC: 0.33 MG/DL (ref 0.76–1.27)
CRP SERPL-MCNC: 6.99 MG/DL (ref 0–0.5)
DEPRECATED RDW RBC AUTO: 51.6 FL (ref 37–54)
EGFRCR SERPLBLD CKD-EPI 2021: 137.4 ML/MIN/1.73
EOSINOPHIL # BLD AUTO: 0.02 10*3/MM3 (ref 0–0.4)
EOSINOPHIL NFR BLD AUTO: 0.2 % (ref 0.3–6.2)
ERYTHROCYTE [DISTWIDTH] IN BLOOD BY AUTOMATED COUNT: 14.1 % (ref 12.3–15.4)
GLOBULIN UR ELPH-MCNC: 3.2 GM/DL
GLUCOSE BLDC GLUCOMTR-MCNC: 139 MG/DL (ref 70–130)
GLUCOSE SERPL-MCNC: 156 MG/DL (ref 65–99)
HCO3 BLDA-SCNC: 43.3 MMOL/L (ref 20–26)
HCT VFR BLD AUTO: 33.5 % (ref 37.5–51)
HCT VFR BLD CALC: 31.5 % (ref 38–51)
HGB BLD-MCNC: 10.3 G/DL (ref 13–17.7)
HGB BLDA-MCNC: 10.3 G/DL (ref 14–18)
IMM GRANULOCYTES # BLD AUTO: 0.03 10*3/MM3 (ref 0–0.05)
IMM GRANULOCYTES NFR BLD AUTO: 0.3 % (ref 0–0.5)
INHALED O2 CONCENTRATION: 30 %
LYMPHOCYTES # BLD AUTO: 0.44 10*3/MM3 (ref 0.7–3.1)
LYMPHOCYTES NFR BLD AUTO: 4.7 % (ref 19.6–45.3)
Lab: ABNORMAL
Lab: ABNORMAL
MAGNESIUM SERPL-MCNC: 1.9 MG/DL (ref 1.6–2.6)
MCH RBC QN AUTO: 30.7 PG (ref 26.6–33)
MCHC RBC AUTO-ENTMCNC: 30.7 G/DL (ref 31.5–35.7)
MCV RBC AUTO: 100 FL (ref 79–97)
METHGB BLD QL: 0.1 % (ref 0–3)
MODALITY: ABNORMAL
MONOCYTES # BLD AUTO: 1.07 10*3/MM3 (ref 0.1–0.9)
MONOCYTES NFR BLD AUTO: 11.4 % (ref 5–12)
NEUTROPHILS NFR BLD AUTO: 7.85 10*3/MM3 (ref 1.7–7)
NEUTROPHILS NFR BLD AUTO: 83.3 % (ref 42.7–76)
NOTE: ABNORMAL
NOTIFIED BY: ABNORMAL
NOTIFIED WHO: ABNORMAL
NRBC BLD AUTO-RTO: 0 /100 WBC (ref 0–0.2)
OXYHGB MFR BLDV: 93.5 % (ref 94–99)
PCO2 BLDA: 45.8 MM HG (ref 35–45)
PCO2 TEMP ADJ BLD: ABNORMAL MM[HG]
PEEP RESPIRATORY: 5 CM[H2O]
PH BLDA: 7.58 PH UNITS (ref 7.35–7.45)
PH, TEMP CORRECTED: ABNORMAL
PLATELET # BLD AUTO: 206 10*3/MM3 (ref 140–450)
PMV BLD AUTO: 9.7 FL (ref 6–12)
PO2 BLDA: 64.8 MM HG (ref 83–108)
PO2 TEMP ADJ BLD: ABNORMAL MM[HG]
POTASSIUM SERPL-SCNC: 3.5 MMOL/L (ref 3.5–5.2)
PROT SERPL-MCNC: 5.4 G/DL (ref 6–8.5)
RBC # BLD AUTO: 3.35 10*6/MM3 (ref 4.14–5.8)
SAO2 % BLDCOA: 95.3 % (ref 94–99)
SET MECH RESP RATE: 18
SODIUM SERPL-SCNC: 146 MMOL/L (ref 136–145)
VENTILATOR MODE: ABNORMAL
VT ON VENT VENT: 500 ML
WBC NRBC COR # BLD: 9.42 10*3/MM3 (ref 3.4–10.8)

## 2023-02-15 PROCEDURE — 87205 SMEAR GRAM STAIN: CPT | Performed by: INTERNAL MEDICINE

## 2023-02-15 PROCEDURE — 83050 HGB METHEMOGLOBIN QUAN: CPT

## 2023-02-15 PROCEDURE — 87147 CULTURE TYPE IMMUNOLOGIC: CPT | Performed by: INTERNAL MEDICINE

## 2023-02-15 PROCEDURE — 83735 ASSAY OF MAGNESIUM: CPT | Performed by: INTERNAL MEDICINE

## 2023-02-15 PROCEDURE — 85025 COMPLETE CBC W/AUTO DIFF WBC: CPT | Performed by: PHYSICIAN ASSISTANT

## 2023-02-15 PROCEDURE — 87070 CULTURE OTHR SPECIMN AEROBIC: CPT | Performed by: INTERNAL MEDICINE

## 2023-02-15 PROCEDURE — 86140 C-REACTIVE PROTEIN: CPT | Performed by: PHYSICIAN ASSISTANT

## 2023-02-15 PROCEDURE — 82805 BLOOD GASES W/O2 SATURATION: CPT

## 2023-02-15 PROCEDURE — 99291 CRITICAL CARE FIRST HOUR: CPT | Performed by: INTERNAL MEDICINE

## 2023-02-15 PROCEDURE — 82140 ASSAY OF AMMONIA: CPT | Performed by: INTERNAL MEDICINE

## 2023-02-15 PROCEDURE — 80053 COMPREHEN METABOLIC PANEL: CPT | Performed by: PHYSICIAN ASSISTANT

## 2023-02-15 PROCEDURE — 99232 SBSQ HOSP IP/OBS MODERATE 35: CPT | Performed by: INTERNAL MEDICINE

## 2023-02-15 PROCEDURE — 87186 SC STD MICRODIL/AGAR DIL: CPT | Performed by: INTERNAL MEDICINE

## 2023-02-15 PROCEDURE — 82962 GLUCOSE BLOOD TEST: CPT

## 2023-02-15 PROCEDURE — 82375 ASSAY CARBOXYHB QUANT: CPT

## 2023-02-16 ENCOUNTER — OUTSIDE FACILITY SERVICE (OUTPATIENT)
Dept: PULMONOLOGY | Facility: CLINIC | Age: 53
End: 2023-02-16
Payer: MEDICARE

## 2023-02-16 ENCOUNTER — OUTSIDE FACILITY SERVICE (OUTPATIENT)
Dept: INFECTIOUS DISEASES | Facility: CLINIC | Age: 53
End: 2023-02-16
Payer: MEDICARE

## 2023-02-16 LAB
A-A DO2: 75.7 MMHG (ref 0–300)
ALBUMIN SERPL-MCNC: 2.2 G/DL (ref 3.5–5.2)
ALBUMIN/GLOB SERPL: 0.8 G/DL
ALP SERPL-CCNC: 109 U/L (ref 39–117)
ALT SERPL W P-5'-P-CCNC: 6 U/L (ref 1–41)
ANION GAP SERPL CALCULATED.3IONS-SCNC: 3 MMOL/L (ref 5–15)
ARTERIAL PATENCY WRIST A: ABNORMAL
AST SERPL-CCNC: 14 U/L (ref 1–40)
ATMOSPHERIC PRESS: 724 MMHG
BASE EXCESS BLDA CALC-SCNC: 16.8 MMOL/L (ref 0–2)
BASOPHILS # BLD AUTO: 0.02 10*3/MM3 (ref 0–0.2)
BASOPHILS NFR BLD AUTO: 0.3 % (ref 0–1.5)
BDY SITE: ABNORMAL
BILIRUB SERPL-MCNC: 0.3 MG/DL (ref 0–1.2)
BODY TEMPERATURE: 0 C
BUN SERPL-MCNC: 11 MG/DL (ref 6–20)
BUN/CREAT SERPL: 27.5 (ref 7–25)
CALCIUM SPEC-SCNC: 8.6 MG/DL (ref 8.6–10.5)
CHLORIDE SERPL-SCNC: 105 MMOL/L (ref 98–107)
CO2 BLDA-SCNC: 42.8 MMOL/L (ref 22–33)
CO2 SERPL-SCNC: 40 MMOL/L (ref 22–29)
COHGB MFR BLD: 1.7 % (ref 0–5)
CREAT SERPL-MCNC: 0.4 MG/DL (ref 0.76–1.27)
CRP SERPL-MCNC: 8.01 MG/DL (ref 0–0.5)
DEPRECATED RDW RBC AUTO: 52.2 FL (ref 37–54)
EGFRCR SERPLBLD CKD-EPI 2021: 129.7 ML/MIN/1.73
EOSINOPHIL # BLD AUTO: 0.06 10*3/MM3 (ref 0–0.4)
EOSINOPHIL NFR BLD AUTO: 0.8 % (ref 0.3–6.2)
ERYTHROCYTE [DISTWIDTH] IN BLOOD BY AUTOMATED COUNT: 14.3 % (ref 12.3–15.4)
GLOBULIN UR ELPH-MCNC: 2.9 GM/DL
GLUCOSE BLDC GLUCOMTR-MCNC: 144 MG/DL (ref 70–130)
GLUCOSE SERPL-MCNC: 137 MG/DL (ref 65–99)
HCO3 BLDA-SCNC: 41.3 MMOL/L (ref 20–26)
HCT VFR BLD AUTO: 29.9 % (ref 37.5–51)
HCT VFR BLD CALC: 28.2 % (ref 38–51)
HGB BLD-MCNC: 9.1 G/DL (ref 13–17.7)
HGB BLDA-MCNC: 9.2 G/DL (ref 14–18)
IMM GRANULOCYTES # BLD AUTO: 0.05 10*3/MM3 (ref 0–0.05)
IMM GRANULOCYTES NFR BLD AUTO: 0.7 % (ref 0–0.5)
INHALED O2 CONCENTRATION: 30 %
LYMPHOCYTES # BLD AUTO: 1.18 10*3/MM3 (ref 0.7–3.1)
LYMPHOCYTES NFR BLD AUTO: 15.6 % (ref 19.6–45.3)
Lab: ABNORMAL
MAGNESIUM SERPL-MCNC: 2.2 MG/DL (ref 1.6–2.6)
MCH RBC QN AUTO: 30.6 PG (ref 26.6–33)
MCHC RBC AUTO-ENTMCNC: 30.4 G/DL (ref 31.5–35.7)
MCV RBC AUTO: 100.7 FL (ref 79–97)
METHGB BLD QL: 0.2 % (ref 0–3)
MODALITY: ABNORMAL
MONOCYTES # BLD AUTO: 1.04 10*3/MM3 (ref 0.1–0.9)
MONOCYTES NFR BLD AUTO: 13.7 % (ref 5–12)
NEUTROPHILS NFR BLD AUTO: 5.23 10*3/MM3 (ref 1.7–7)
NEUTROPHILS NFR BLD AUTO: 68.9 % (ref 42.7–76)
NOTE: ABNORMAL
NRBC BLD AUTO-RTO: 0 /100 WBC (ref 0–0.2)
OXYHGB MFR BLDV: 94.4 % (ref 94–99)
PCO2 BLDA: 48.9 MM HG (ref 35–45)
PCO2 TEMP ADJ BLD: ABNORMAL MM[HG]
PEEP RESPIRATORY: 5 CM[H2O]
PH BLDA: 7.54 PH UNITS (ref 7.35–7.45)
PH, TEMP CORRECTED: ABNORMAL
PLATELET # BLD AUTO: 200 10*3/MM3 (ref 140–450)
PMV BLD AUTO: 9.7 FL (ref 6–12)
PO2 BLDA: 72.9 MM HG (ref 83–108)
PO2 TEMP ADJ BLD: ABNORMAL MM[HG]
POTASSIUM SERPL-SCNC: 3.5 MMOL/L (ref 3.5–5.2)
PROT SERPL-MCNC: 5.1 G/DL (ref 6–8.5)
RBC # BLD AUTO: 2.97 10*6/MM3 (ref 4.14–5.8)
SAO2 % BLDCOA: 96.2 % (ref 94–99)
SET MECH RESP RATE: 16
SODIUM SERPL-SCNC: 148 MMOL/L (ref 136–145)
VENTILATOR MODE: ABNORMAL
VT ON VENT VENT: 400 ML
WBC NRBC COR # BLD: 7.58 10*3/MM3 (ref 3.4–10.8)

## 2023-02-16 PROCEDURE — 99291 CRITICAL CARE FIRST HOUR: CPT | Performed by: INTERNAL MEDICINE

## 2023-02-16 PROCEDURE — 86140 C-REACTIVE PROTEIN: CPT | Performed by: PHYSICIAN ASSISTANT

## 2023-02-16 PROCEDURE — 99232 SBSQ HOSP IP/OBS MODERATE 35: CPT | Performed by: NURSE PRACTITIONER

## 2023-02-16 PROCEDURE — 36415 COLL VENOUS BLD VENIPUNCTURE: CPT | Performed by: PHYSICIAN ASSISTANT

## 2023-02-16 PROCEDURE — 83735 ASSAY OF MAGNESIUM: CPT | Performed by: INTERNAL MEDICINE

## 2023-02-16 PROCEDURE — 82805 BLOOD GASES W/O2 SATURATION: CPT

## 2023-02-16 PROCEDURE — 82375 ASSAY CARBOXYHB QUANT: CPT

## 2023-02-16 PROCEDURE — 83050 HGB METHEMOGLOBIN QUAN: CPT

## 2023-02-16 PROCEDURE — 85025 COMPLETE CBC W/AUTO DIFF WBC: CPT | Performed by: PHYSICIAN ASSISTANT

## 2023-02-16 PROCEDURE — 82962 GLUCOSE BLOOD TEST: CPT

## 2023-02-16 PROCEDURE — 80053 COMPREHEN METABOLIC PANEL: CPT | Performed by: PHYSICIAN ASSISTANT

## 2023-02-17 ENCOUNTER — OUTSIDE FACILITY SERVICE (OUTPATIENT)
Dept: INFECTIOUS DISEASES | Facility: CLINIC | Age: 53
End: 2023-02-17
Payer: MEDICARE

## 2023-02-17 ENCOUNTER — OUTSIDE FACILITY SERVICE (OUTPATIENT)
Dept: PULMONOLOGY | Facility: CLINIC | Age: 53
End: 2023-02-17
Payer: MEDICARE

## 2023-02-17 LAB
ALBUMIN SERPL-MCNC: 2.1 G/DL (ref 3.5–5.2)
ALBUMIN/GLOB SERPL: 0.7 G/DL
ALP SERPL-CCNC: 115 U/L (ref 39–117)
ALT SERPL W P-5'-P-CCNC: <5 U/L (ref 1–41)
ANION GAP SERPL CALCULATED.3IONS-SCNC: 2.6 MMOL/L (ref 5–15)
AST SERPL-CCNC: 12 U/L (ref 1–40)
BACTERIA SPEC AEROBE CULT: NORMAL
BACTERIA SPEC AEROBE CULT: NORMAL
BACTERIA SPEC RESP CULT: ABNORMAL
BACTERIA SPEC RESP CULT: ABNORMAL
BASOPHILS # BLD AUTO: 0.02 10*3/MM3 (ref 0–0.2)
BASOPHILS NFR BLD AUTO: 0.2 % (ref 0–1.5)
BILIRUB SERPL-MCNC: 0.3 MG/DL (ref 0–1.2)
BUN SERPL-MCNC: 12 MG/DL (ref 6–20)
BUN/CREAT SERPL: 31.6 (ref 7–25)
CALCIUM SPEC-SCNC: 8.7 MG/DL (ref 8.6–10.5)
CHLORIDE SERPL-SCNC: 110 MMOL/L (ref 98–107)
CO2 SERPL-SCNC: 35.4 MMOL/L (ref 22–29)
CREAT SERPL-MCNC: 0.38 MG/DL (ref 0.76–1.27)
CRP SERPL-MCNC: 5.17 MG/DL (ref 0–0.5)
DEPRECATED RDW RBC AUTO: 52 FL (ref 37–54)
EGFRCR SERPLBLD CKD-EPI 2021: 131.7 ML/MIN/1.73
EOSINOPHIL # BLD AUTO: 0.17 10*3/MM3 (ref 0–0.4)
EOSINOPHIL NFR BLD AUTO: 2.1 % (ref 0.3–6.2)
ERYTHROCYTE [DISTWIDTH] IN BLOOD BY AUTOMATED COUNT: 14.2 % (ref 12.3–15.4)
GLOBULIN UR ELPH-MCNC: 3 GM/DL
GLUCOSE SERPL-MCNC: 125 MG/DL (ref 65–99)
GRAM STN SPEC: ABNORMAL
HCT VFR BLD AUTO: 28.3 % (ref 37.5–51)
HGB BLD-MCNC: 8.7 G/DL (ref 13–17.7)
IMM GRANULOCYTES # BLD AUTO: 0.05 10*3/MM3 (ref 0–0.05)
IMM GRANULOCYTES NFR BLD AUTO: 0.6 % (ref 0–0.5)
LYMPHOCYTES # BLD AUTO: 1.15 10*3/MM3 (ref 0.7–3.1)
LYMPHOCYTES NFR BLD AUTO: 14.1 % (ref 19.6–45.3)
MAGNESIUM SERPL-MCNC: 1.9 MG/DL (ref 1.6–2.6)
MCH RBC QN AUTO: 31.3 PG (ref 26.6–33)
MCHC RBC AUTO-ENTMCNC: 30.7 G/DL (ref 31.5–35.7)
MCV RBC AUTO: 101.8 FL (ref 79–97)
MONOCYTES # BLD AUTO: 0.94 10*3/MM3 (ref 0.1–0.9)
MONOCYTES NFR BLD AUTO: 11.5 % (ref 5–12)
NEUTROPHILS NFR BLD AUTO: 5.83 10*3/MM3 (ref 1.7–7)
NEUTROPHILS NFR BLD AUTO: 71.5 % (ref 42.7–76)
NRBC BLD AUTO-RTO: 0 /100 WBC (ref 0–0.2)
PLATELET # BLD AUTO: 173 10*3/MM3 (ref 140–450)
PMV BLD AUTO: 10 FL (ref 6–12)
POTASSIUM SERPL-SCNC: 4 MMOL/L (ref 3.5–5.2)
PROT SERPL-MCNC: 5.1 G/DL (ref 6–8.5)
RBC # BLD AUTO: 2.78 10*6/MM3 (ref 4.14–5.8)
SODIUM SERPL-SCNC: 148 MMOL/L (ref 136–145)
WBC NRBC COR # BLD: 8.16 10*3/MM3 (ref 3.4–10.8)

## 2023-02-17 PROCEDURE — 36415 COLL VENOUS BLD VENIPUNCTURE: CPT | Performed by: PHYSICIAN ASSISTANT

## 2023-02-17 PROCEDURE — 99232 SBSQ HOSP IP/OBS MODERATE 35: CPT | Performed by: NURSE PRACTITIONER

## 2023-02-17 PROCEDURE — 99233 SBSQ HOSP IP/OBS HIGH 50: CPT | Performed by: INTERNAL MEDICINE

## 2023-02-17 PROCEDURE — 85025 COMPLETE CBC W/AUTO DIFF WBC: CPT | Performed by: PHYSICIAN ASSISTANT

## 2023-02-17 PROCEDURE — 83735 ASSAY OF MAGNESIUM: CPT | Performed by: INTERNAL MEDICINE

## 2023-02-17 PROCEDURE — 86140 C-REACTIVE PROTEIN: CPT | Performed by: PHYSICIAN ASSISTANT

## 2023-02-17 PROCEDURE — 80053 COMPREHEN METABOLIC PANEL: CPT | Performed by: PHYSICIAN ASSISTANT

## 2023-02-18 ENCOUNTER — OUTSIDE FACILITY SERVICE (OUTPATIENT)
Dept: PULMONOLOGY | Facility: CLINIC | Age: 53
End: 2023-02-18
Payer: MEDICARE

## 2023-02-18 LAB
ALBUMIN SERPL-MCNC: 2 G/DL (ref 3.5–5.2)
ALBUMIN/GLOB SERPL: 0.7 G/DL
ALP SERPL-CCNC: 104 U/L (ref 39–117)
ALT SERPL W P-5'-P-CCNC: <5 U/L (ref 1–41)
ANION GAP SERPL CALCULATED.3IONS-SCNC: 6.1 MMOL/L (ref 5–15)
AST SERPL-CCNC: 11 U/L (ref 1–40)
BASOPHILS # BLD AUTO: 0.02 10*3/MM3 (ref 0–0.2)
BASOPHILS NFR BLD AUTO: 0.2 % (ref 0–1.5)
BILIRUB SERPL-MCNC: 0.3 MG/DL (ref 0–1.2)
BUN SERPL-MCNC: 10 MG/DL (ref 6–20)
BUN/CREAT SERPL: 33.3 (ref 7–25)
CALCIUM SPEC-SCNC: 8.5 MG/DL (ref 8.6–10.5)
CHLORIDE SERPL-SCNC: 106 MMOL/L (ref 98–107)
CO2 SERPL-SCNC: 28.9 MMOL/L (ref 22–29)
CREAT SERPL-MCNC: 0.3 MG/DL (ref 0.76–1.27)
CRP SERPL-MCNC: 3.1 MG/DL (ref 0–0.5)
DEPRECATED RDW RBC AUTO: 51.2 FL (ref 37–54)
EGFRCR SERPLBLD CKD-EPI 2021: 141.4 ML/MIN/1.73
EOSINOPHIL # BLD AUTO: 0.19 10*3/MM3 (ref 0–0.4)
EOSINOPHIL NFR BLD AUTO: 2 % (ref 0.3–6.2)
ERYTHROCYTE [DISTWIDTH] IN BLOOD BY AUTOMATED COUNT: 14 % (ref 12.3–15.4)
GLOBULIN UR ELPH-MCNC: 3 GM/DL
GLUCOSE BLDC GLUCOMTR-MCNC: 117 MG/DL (ref 70–130)
GLUCOSE SERPL-MCNC: 90 MG/DL (ref 65–99)
HCT VFR BLD AUTO: 29.8 % (ref 37.5–51)
HGB BLD-MCNC: 9.1 G/DL (ref 13–17.7)
IMM GRANULOCYTES # BLD AUTO: 0.07 10*3/MM3 (ref 0–0.05)
IMM GRANULOCYTES NFR BLD AUTO: 0.8 % (ref 0–0.5)
LYMPHOCYTES # BLD AUTO: 1.27 10*3/MM3 (ref 0.7–3.1)
LYMPHOCYTES NFR BLD AUTO: 13.7 % (ref 19.6–45.3)
MAGNESIUM SERPL-MCNC: 1.8 MG/DL (ref 1.6–2.6)
MCH RBC QN AUTO: 30.4 PG (ref 26.6–33)
MCHC RBC AUTO-ENTMCNC: 30.5 G/DL (ref 31.5–35.7)
MCV RBC AUTO: 99.7 FL (ref 79–97)
MONOCYTES # BLD AUTO: 0.87 10*3/MM3 (ref 0.1–0.9)
MONOCYTES NFR BLD AUTO: 9.4 % (ref 5–12)
NEUTROPHILS NFR BLD AUTO: 6.87 10*3/MM3 (ref 1.7–7)
NEUTROPHILS NFR BLD AUTO: 73.9 % (ref 42.7–76)
NRBC BLD AUTO-RTO: 0 /100 WBC (ref 0–0.2)
PLATELET # BLD AUTO: 194 10*3/MM3 (ref 140–450)
PMV BLD AUTO: 9.7 FL (ref 6–12)
POTASSIUM SERPL-SCNC: 4.1 MMOL/L (ref 3.5–5.2)
PROT SERPL-MCNC: 5 G/DL (ref 6–8.5)
RBC # BLD AUTO: 2.99 10*6/MM3 (ref 4.14–5.8)
SODIUM SERPL-SCNC: 141 MMOL/L (ref 136–145)
VANCOMYCIN TROUGH SERPL-MCNC: 12.1 MCG/ML (ref 5–20)
WBC NRBC COR # BLD: 9.29 10*3/MM3 (ref 3.4–10.8)

## 2023-02-18 PROCEDURE — 80053 COMPREHEN METABOLIC PANEL: CPT | Performed by: PHYSICIAN ASSISTANT

## 2023-02-18 PROCEDURE — 82962 GLUCOSE BLOOD TEST: CPT

## 2023-02-18 PROCEDURE — 80202 ASSAY OF VANCOMYCIN: CPT | Performed by: INTERNAL MEDICINE

## 2023-02-18 PROCEDURE — 86140 C-REACTIVE PROTEIN: CPT | Performed by: PHYSICIAN ASSISTANT

## 2023-02-18 PROCEDURE — 99233 SBSQ HOSP IP/OBS HIGH 50: CPT | Performed by: INTERNAL MEDICINE

## 2023-02-18 PROCEDURE — 83735 ASSAY OF MAGNESIUM: CPT | Performed by: INTERNAL MEDICINE

## 2023-02-18 PROCEDURE — 85025 COMPLETE CBC W/AUTO DIFF WBC: CPT | Performed by: PHYSICIAN ASSISTANT

## 2023-02-18 PROCEDURE — 36415 COLL VENOUS BLD VENIPUNCTURE: CPT | Performed by: INTERNAL MEDICINE

## 2023-02-19 ENCOUNTER — APPOINTMENT (OUTPATIENT)
Dept: GENERAL RADIOLOGY | Facility: HOSPITAL | Age: 53
End: 2023-02-19
Payer: COMMERCIAL

## 2023-02-19 ENCOUNTER — OUTSIDE FACILITY SERVICE (OUTPATIENT)
Dept: PULMONOLOGY | Facility: CLINIC | Age: 53
End: 2023-02-19
Payer: MEDICARE

## 2023-02-19 LAB
ALBUMIN SERPL-MCNC: 2.2 G/DL (ref 3.5–5.2)
ALBUMIN/GLOB SERPL: 0.7 G/DL
ALP SERPL-CCNC: 118 U/L (ref 39–117)
ALT SERPL W P-5'-P-CCNC: 6 U/L (ref 1–41)
ANION GAP SERPL CALCULATED.3IONS-SCNC: 6.6 MMOL/L (ref 5–15)
AST SERPL-CCNC: 13 U/L (ref 1–40)
BASOPHILS # BLD AUTO: 0.02 10*3/MM3 (ref 0–0.2)
BASOPHILS NFR BLD AUTO: 0.3 % (ref 0–1.5)
BILIRUB SERPL-MCNC: 0.3 MG/DL (ref 0–1.2)
BUN SERPL-MCNC: 8 MG/DL (ref 6–20)
BUN/CREAT SERPL: 25.8 (ref 7–25)
CALCIUM SPEC-SCNC: 8.4 MG/DL (ref 8.6–10.5)
CHLORIDE SERPL-SCNC: 102 MMOL/L (ref 98–107)
CO2 SERPL-SCNC: 31.4 MMOL/L (ref 22–29)
CREAT SERPL-MCNC: 0.31 MG/DL (ref 0.76–1.27)
CRP SERPL-MCNC: 4.75 MG/DL (ref 0–0.5)
DEPRECATED RDW RBC AUTO: 50.3 FL (ref 37–54)
EGFRCR SERPLBLD CKD-EPI 2021: 140 ML/MIN/1.73
EOSINOPHIL # BLD AUTO: 0.18 10*3/MM3 (ref 0–0.4)
EOSINOPHIL NFR BLD AUTO: 2.4 % (ref 0.3–6.2)
ERYTHROCYTE [DISTWIDTH] IN BLOOD BY AUTOMATED COUNT: 14.1 % (ref 12.3–15.4)
GLOBULIN UR ELPH-MCNC: 3 GM/DL
GLUCOSE BLDC GLUCOMTR-MCNC: 118 MG/DL (ref 70–130)
GLUCOSE SERPL-MCNC: 130 MG/DL (ref 65–99)
HCT VFR BLD AUTO: 30.6 % (ref 37.5–51)
HGB BLD-MCNC: 9.5 G/DL (ref 13–17.7)
IMM GRANULOCYTES # BLD AUTO: 0.06 10*3/MM3 (ref 0–0.05)
IMM GRANULOCYTES NFR BLD AUTO: 0.8 % (ref 0–0.5)
LYMPHOCYTES # BLD AUTO: 1.05 10*3/MM3 (ref 0.7–3.1)
LYMPHOCYTES NFR BLD AUTO: 13.9 % (ref 19.6–45.3)
MAGNESIUM SERPL-MCNC: 1.8 MG/DL (ref 1.6–2.6)
MCH RBC QN AUTO: 30.4 PG (ref 26.6–33)
MCHC RBC AUTO-ENTMCNC: 31 G/DL (ref 31.5–35.7)
MCV RBC AUTO: 97.8 FL (ref 79–97)
MONOCYTES # BLD AUTO: 0.7 10*3/MM3 (ref 0.1–0.9)
MONOCYTES NFR BLD AUTO: 9.3 % (ref 5–12)
NEUTROPHILS NFR BLD AUTO: 5.52 10*3/MM3 (ref 1.7–7)
NEUTROPHILS NFR BLD AUTO: 73.3 % (ref 42.7–76)
NRBC BLD AUTO-RTO: 0 /100 WBC (ref 0–0.2)
PLATELET # BLD AUTO: 217 10*3/MM3 (ref 140–450)
PMV BLD AUTO: 9.6 FL (ref 6–12)
POTASSIUM SERPL-SCNC: 3.9 MMOL/L (ref 3.5–5.2)
PROT SERPL-MCNC: 5.2 G/DL (ref 6–8.5)
RBC # BLD AUTO: 3.13 10*6/MM3 (ref 4.14–5.8)
SODIUM SERPL-SCNC: 140 MMOL/L (ref 136–145)
WBC NRBC COR # BLD: 7.53 10*3/MM3 (ref 3.4–10.8)

## 2023-02-19 PROCEDURE — 71045 X-RAY EXAM CHEST 1 VIEW: CPT

## 2023-02-19 PROCEDURE — 80053 COMPREHEN METABOLIC PANEL: CPT | Performed by: PHYSICIAN ASSISTANT

## 2023-02-19 PROCEDURE — 99291 CRITICAL CARE FIRST HOUR: CPT | Performed by: INTERNAL MEDICINE

## 2023-02-19 PROCEDURE — 86140 C-REACTIVE PROTEIN: CPT | Performed by: PHYSICIAN ASSISTANT

## 2023-02-19 PROCEDURE — 85025 COMPLETE CBC W/AUTO DIFF WBC: CPT | Performed by: PHYSICIAN ASSISTANT

## 2023-02-19 PROCEDURE — 82962 GLUCOSE BLOOD TEST: CPT

## 2023-02-19 PROCEDURE — 83735 ASSAY OF MAGNESIUM: CPT | Performed by: INTERNAL MEDICINE

## 2023-02-20 ENCOUNTER — OUTSIDE FACILITY SERVICE (OUTPATIENT)
Dept: INFECTIOUS DISEASES | Facility: CLINIC | Age: 53
End: 2023-02-20
Payer: MEDICARE

## 2023-02-20 ENCOUNTER — APPOINTMENT (OUTPATIENT)
Dept: GENERAL RADIOLOGY | Facility: HOSPITAL | Age: 53
End: 2023-02-20
Payer: COMMERCIAL

## 2023-02-20 ENCOUNTER — OUTSIDE FACILITY SERVICE (OUTPATIENT)
Dept: PULMONOLOGY | Facility: CLINIC | Age: 53
End: 2023-02-20
Payer: MEDICARE

## 2023-02-20 LAB
ALBUMIN SERPL-MCNC: 2.3 G/DL (ref 3.5–5.2)
ALBUMIN/GLOB SERPL: 0.7 G/DL
ALP SERPL-CCNC: 117 U/L (ref 39–117)
ALT SERPL W P-5'-P-CCNC: <5 U/L (ref 1–41)
ANION GAP SERPL CALCULATED.3IONS-SCNC: 5.5 MMOL/L (ref 5–15)
AST SERPL-CCNC: 15 U/L (ref 1–40)
BASOPHILS # BLD AUTO: 0.02 10*3/MM3 (ref 0–0.2)
BASOPHILS NFR BLD AUTO: 0.2 % (ref 0–1.5)
BILIRUB SERPL-MCNC: 0.3 MG/DL (ref 0–1.2)
BUN SERPL-MCNC: 5 MG/DL (ref 6–20)
BUN/CREAT SERPL: 13.9 (ref 7–25)
CALCIUM SPEC-SCNC: 8.7 MG/DL (ref 8.6–10.5)
CHLORIDE SERPL-SCNC: 104 MMOL/L (ref 98–107)
CO2 SERPL-SCNC: 33.5 MMOL/L (ref 22–29)
CREAT SERPL-MCNC: 0.36 MG/DL (ref 0.76–1.27)
CRP SERPL-MCNC: 4.78 MG/DL (ref 0–0.5)
DEPRECATED RDW RBC AUTO: 49.3 FL (ref 37–54)
EGFRCR SERPLBLD CKD-EPI 2021: 133.8 ML/MIN/1.73
EOSINOPHIL # BLD AUTO: 0.08 10*3/MM3 (ref 0–0.4)
EOSINOPHIL NFR BLD AUTO: 0.9 % (ref 0.3–6.2)
ERYTHROCYTE [DISTWIDTH] IN BLOOD BY AUTOMATED COUNT: 14 % (ref 12.3–15.4)
GLOBULIN UR ELPH-MCNC: 3.1 GM/DL
GLUCOSE BLDC GLUCOMTR-MCNC: 148 MG/DL (ref 70–130)
GLUCOSE SERPL-MCNC: 108 MG/DL (ref 65–99)
HCT VFR BLD AUTO: 30.8 % (ref 37.5–51)
HGB BLD-MCNC: 9.5 G/DL (ref 13–17.7)
IMM GRANULOCYTES # BLD AUTO: 0.07 10*3/MM3 (ref 0–0.05)
IMM GRANULOCYTES NFR BLD AUTO: 0.8 % (ref 0–0.5)
LYMPHOCYTES # BLD AUTO: 1.15 10*3/MM3 (ref 0.7–3.1)
LYMPHOCYTES NFR BLD AUTO: 12.6 % (ref 19.6–45.3)
MAGNESIUM SERPL-MCNC: 2 MG/DL (ref 1.6–2.6)
MCH RBC QN AUTO: 30.2 PG (ref 26.6–33)
MCHC RBC AUTO-ENTMCNC: 30.8 G/DL (ref 31.5–35.7)
MCV RBC AUTO: 97.8 FL (ref 79–97)
MONOCYTES # BLD AUTO: 1.03 10*3/MM3 (ref 0.1–0.9)
MONOCYTES NFR BLD AUTO: 11.3 % (ref 5–12)
NEUTROPHILS NFR BLD AUTO: 6.8 10*3/MM3 (ref 1.7–7)
NEUTROPHILS NFR BLD AUTO: 74.2 % (ref 42.7–76)
NRBC BLD AUTO-RTO: 0 /100 WBC (ref 0–0.2)
PLATELET # BLD AUTO: 227 10*3/MM3 (ref 140–450)
PMV BLD AUTO: 9.6 FL (ref 6–12)
POTASSIUM SERPL-SCNC: 4.3 MMOL/L (ref 3.5–5.2)
PROT SERPL-MCNC: 5.4 G/DL (ref 6–8.5)
RBC # BLD AUTO: 3.15 10*6/MM3 (ref 4.14–5.8)
SODIUM SERPL-SCNC: 143 MMOL/L (ref 136–145)
WBC NRBC COR # BLD: 9.15 10*3/MM3 (ref 3.4–10.8)

## 2023-02-20 PROCEDURE — 71045 X-RAY EXAM CHEST 1 VIEW: CPT

## 2023-02-20 PROCEDURE — 99291 CRITICAL CARE FIRST HOUR: CPT | Performed by: INTERNAL MEDICINE

## 2023-02-20 PROCEDURE — 99232 SBSQ HOSP IP/OBS MODERATE 35: CPT | Performed by: INTERNAL MEDICINE

## 2023-02-20 PROCEDURE — 86140 C-REACTIVE PROTEIN: CPT | Performed by: PHYSICIAN ASSISTANT

## 2023-02-20 PROCEDURE — 80053 COMPREHEN METABOLIC PANEL: CPT | Performed by: PHYSICIAN ASSISTANT

## 2023-02-20 PROCEDURE — 36415 COLL VENOUS BLD VENIPUNCTURE: CPT | Performed by: PHYSICIAN ASSISTANT

## 2023-02-20 PROCEDURE — 85025 COMPLETE CBC W/AUTO DIFF WBC: CPT | Performed by: PHYSICIAN ASSISTANT

## 2023-02-20 PROCEDURE — 82962 GLUCOSE BLOOD TEST: CPT

## 2023-02-20 PROCEDURE — 83735 ASSAY OF MAGNESIUM: CPT | Performed by: INTERNAL MEDICINE

## 2023-02-21 ENCOUNTER — OUTSIDE FACILITY SERVICE (OUTPATIENT)
Dept: INFECTIOUS DISEASES | Facility: CLINIC | Age: 53
End: 2023-02-21
Payer: MEDICARE

## 2023-02-21 ENCOUNTER — OUTSIDE FACILITY SERVICE (OUTPATIENT)
Dept: PULMONOLOGY | Facility: CLINIC | Age: 53
End: 2023-02-21
Payer: MEDICARE

## 2023-02-21 LAB
ALBUMIN SERPL-MCNC: 2.5 G/DL (ref 3.5–5.2)
ALBUMIN/GLOB SERPL: 0.7 G/DL
ALP SERPL-CCNC: 122 U/L (ref 39–117)
ALT SERPL W P-5'-P-CCNC: 7 U/L (ref 1–41)
ANION GAP SERPL CALCULATED.3IONS-SCNC: 8 MMOL/L (ref 5–15)
AST SERPL-CCNC: 19 U/L (ref 1–40)
BASOPHILS # BLD AUTO: 0.02 10*3/MM3 (ref 0–0.2)
BASOPHILS NFR BLD AUTO: 0.2 % (ref 0–1.5)
BILIRUB SERPL-MCNC: 0.4 MG/DL (ref 0–1.2)
BUN SERPL-MCNC: 5 MG/DL (ref 6–20)
BUN/CREAT SERPL: 12.5 (ref 7–25)
CALCIUM SPEC-SCNC: 8.8 MG/DL (ref 8.6–10.5)
CHLORIDE SERPL-SCNC: 101 MMOL/L (ref 98–107)
CO2 SERPL-SCNC: 30 MMOL/L (ref 22–29)
CREAT SERPL-MCNC: 0.4 MG/DL (ref 0.76–1.27)
CRP SERPL-MCNC: 4.18 MG/DL (ref 0–0.5)
DEPRECATED RDW RBC AUTO: 49.1 FL (ref 37–54)
EGFRCR SERPLBLD CKD-EPI 2021: 129.7 ML/MIN/1.73
EOSINOPHIL # BLD AUTO: 0.08 10*3/MM3 (ref 0–0.4)
EOSINOPHIL NFR BLD AUTO: 0.7 % (ref 0.3–6.2)
ERYTHROCYTE [DISTWIDTH] IN BLOOD BY AUTOMATED COUNT: 14 % (ref 12.3–15.4)
GLOBULIN UR ELPH-MCNC: 3.4 GM/DL
GLUCOSE BLDC GLUCOMTR-MCNC: 126 MG/DL (ref 70–130)
GLUCOSE SERPL-MCNC: 114 MG/DL (ref 65–99)
HCT VFR BLD AUTO: 32.1 % (ref 37.5–51)
HGB BLD-MCNC: 10 G/DL (ref 13–17.7)
IMM GRANULOCYTES # BLD AUTO: 0.08 10*3/MM3 (ref 0–0.05)
IMM GRANULOCYTES NFR BLD AUTO: 0.7 % (ref 0–0.5)
LYMPHOCYTES # BLD AUTO: 1.09 10*3/MM3 (ref 0.7–3.1)
LYMPHOCYTES NFR BLD AUTO: 9.5 % (ref 19.6–45.3)
MAGNESIUM SERPL-MCNC: 1.9 MG/DL (ref 1.6–2.6)
MCH RBC QN AUTO: 30 PG (ref 26.6–33)
MCHC RBC AUTO-ENTMCNC: 31.2 G/DL (ref 31.5–35.7)
MCV RBC AUTO: 96.4 FL (ref 79–97)
MONOCYTES # BLD AUTO: 1.18 10*3/MM3 (ref 0.1–0.9)
MONOCYTES NFR BLD AUTO: 10.3 % (ref 5–12)
NEUTROPHILS NFR BLD AUTO: 78.6 % (ref 42.7–76)
NEUTROPHILS NFR BLD AUTO: 9.06 10*3/MM3 (ref 1.7–7)
NRBC BLD AUTO-RTO: 0 /100 WBC (ref 0–0.2)
PLATELET # BLD AUTO: 247 10*3/MM3 (ref 140–450)
PMV BLD AUTO: 9.5 FL (ref 6–12)
POTASSIUM SERPL-SCNC: 4.1 MMOL/L (ref 3.5–5.2)
PROT SERPL-MCNC: 5.9 G/DL (ref 6–8.5)
RBC # BLD AUTO: 3.33 10*6/MM3 (ref 4.14–5.8)
SODIUM SERPL-SCNC: 139 MMOL/L (ref 136–145)
WBC NRBC COR # BLD: 11.51 10*3/MM3 (ref 3.4–10.8)

## 2023-02-21 PROCEDURE — 86140 C-REACTIVE PROTEIN: CPT | Performed by: PHYSICIAN ASSISTANT

## 2023-02-21 PROCEDURE — 36415 COLL VENOUS BLD VENIPUNCTURE: CPT | Performed by: PHYSICIAN ASSISTANT

## 2023-02-21 PROCEDURE — 85025 COMPLETE CBC W/AUTO DIFF WBC: CPT | Performed by: PHYSICIAN ASSISTANT

## 2023-02-21 PROCEDURE — 99291 CRITICAL CARE FIRST HOUR: CPT | Performed by: INTERNAL MEDICINE

## 2023-02-21 PROCEDURE — 82962 GLUCOSE BLOOD TEST: CPT

## 2023-02-21 PROCEDURE — 80053 COMPREHEN METABOLIC PANEL: CPT | Performed by: PHYSICIAN ASSISTANT

## 2023-02-21 PROCEDURE — 83735 ASSAY OF MAGNESIUM: CPT | Performed by: INTERNAL MEDICINE

## 2023-02-21 PROCEDURE — 99232 SBSQ HOSP IP/OBS MODERATE 35: CPT | Performed by: INTERNAL MEDICINE

## 2023-02-22 ENCOUNTER — OUTSIDE FACILITY SERVICE (OUTPATIENT)
Dept: PULMONOLOGY | Facility: CLINIC | Age: 53
End: 2023-02-22
Payer: MEDICARE

## 2023-02-22 ENCOUNTER — APPOINTMENT (OUTPATIENT)
Dept: GENERAL RADIOLOGY | Facility: HOSPITAL | Age: 53
End: 2023-02-22
Payer: COMMERCIAL

## 2023-02-22 ENCOUNTER — OUTSIDE FACILITY SERVICE (OUTPATIENT)
Dept: INFECTIOUS DISEASES | Facility: CLINIC | Age: 53
End: 2023-02-22
Payer: MEDICARE

## 2023-02-22 LAB
ALBUMIN SERPL-MCNC: 2.7 G/DL (ref 3.5–5.2)
ALBUMIN/GLOB SERPL: 0.8 G/DL
ALP SERPL-CCNC: 124 U/L (ref 39–117)
ALT SERPL W P-5'-P-CCNC: 6 U/L (ref 1–41)
ANION GAP SERPL CALCULATED.3IONS-SCNC: 8.8 MMOL/L (ref 5–15)
AST SERPL-CCNC: 15 U/L (ref 1–40)
BASOPHILS # BLD AUTO: 0.04 10*3/MM3 (ref 0–0.2)
BASOPHILS NFR BLD AUTO: 0.4 % (ref 0–1.5)
BILIRUB SERPL-MCNC: 0.4 MG/DL (ref 0–1.2)
BUN SERPL-MCNC: 6 MG/DL (ref 6–20)
BUN/CREAT SERPL: 14 (ref 7–25)
CALCIUM SPEC-SCNC: 9.2 MG/DL (ref 8.6–10.5)
CHLORIDE SERPL-SCNC: 101 MMOL/L (ref 98–107)
CO2 SERPL-SCNC: 31.2 MMOL/L (ref 22–29)
CREAT SERPL-MCNC: 0.43 MG/DL (ref 0.76–1.27)
CRP SERPL-MCNC: 3.99 MG/DL (ref 0–0.5)
DEPRECATED RDW RBC AUTO: 48.9 FL (ref 37–54)
EGFRCR SERPLBLD CKD-EPI 2021: 126.9 ML/MIN/1.73
EOSINOPHIL # BLD AUTO: 0.1 10*3/MM3 (ref 0–0.4)
EOSINOPHIL NFR BLD AUTO: 1 % (ref 0.3–6.2)
ERYTHROCYTE [DISTWIDTH] IN BLOOD BY AUTOMATED COUNT: 13.8 % (ref 12.3–15.4)
GLOBULIN UR ELPH-MCNC: 3.4 GM/DL
GLUCOSE BLDC GLUCOMTR-MCNC: 115 MG/DL (ref 70–130)
GLUCOSE SERPL-MCNC: 100 MG/DL (ref 65–99)
HCT VFR BLD AUTO: 32.5 % (ref 37.5–51)
HGB BLD-MCNC: 10.1 G/DL (ref 13–17.7)
IMM GRANULOCYTES # BLD AUTO: 0.04 10*3/MM3 (ref 0–0.05)
IMM GRANULOCYTES NFR BLD AUTO: 0.4 % (ref 0–0.5)
LYMPHOCYTES # BLD AUTO: 0.89 10*3/MM3 (ref 0.7–3.1)
LYMPHOCYTES NFR BLD AUTO: 9.2 % (ref 19.6–45.3)
MAGNESIUM SERPL-MCNC: 1.9 MG/DL (ref 1.6–2.6)
MCH RBC QN AUTO: 29.9 PG (ref 26.6–33)
MCHC RBC AUTO-ENTMCNC: 31.1 G/DL (ref 31.5–35.7)
MCV RBC AUTO: 96.2 FL (ref 79–97)
MONOCYTES # BLD AUTO: 0.98 10*3/MM3 (ref 0.1–0.9)
MONOCYTES NFR BLD AUTO: 10.2 % (ref 5–12)
NEUTROPHILS NFR BLD AUTO: 7.58 10*3/MM3 (ref 1.7–7)
NEUTROPHILS NFR BLD AUTO: 78.8 % (ref 42.7–76)
NRBC BLD AUTO-RTO: 0 /100 WBC (ref 0–0.2)
PLATELET # BLD AUTO: 242 10*3/MM3 (ref 140–450)
PMV BLD AUTO: 9.4 FL (ref 6–12)
POTASSIUM SERPL-SCNC: 3.9 MMOL/L (ref 3.5–5.2)
PROT SERPL-MCNC: 6.1 G/DL (ref 6–8.5)
RBC # BLD AUTO: 3.38 10*6/MM3 (ref 4.14–5.8)
SODIUM SERPL-SCNC: 141 MMOL/L (ref 136–145)
VALPROATE SERPL-MCNC: 41.7 MCG/ML (ref 50–125)
WBC NRBC COR # BLD: 9.63 10*3/MM3 (ref 3.4–10.8)

## 2023-02-22 PROCEDURE — 85025 COMPLETE CBC W/AUTO DIFF WBC: CPT | Performed by: PHYSICIAN ASSISTANT

## 2023-02-22 PROCEDURE — 71045 X-RAY EXAM CHEST 1 VIEW: CPT

## 2023-02-22 PROCEDURE — 83735 ASSAY OF MAGNESIUM: CPT | Performed by: INTERNAL MEDICINE

## 2023-02-22 PROCEDURE — 97163 PT EVAL HIGH COMPLEX 45 MIN: CPT

## 2023-02-22 PROCEDURE — 36415 COLL VENOUS BLD VENIPUNCTURE: CPT | Performed by: PHYSICIAN ASSISTANT

## 2023-02-22 PROCEDURE — 86140 C-REACTIVE PROTEIN: CPT | Performed by: PHYSICIAN ASSISTANT

## 2023-02-22 PROCEDURE — 99232 SBSQ HOSP IP/OBS MODERATE 35: CPT | Performed by: INTERNAL MEDICINE

## 2023-02-22 PROCEDURE — 99232 SBSQ HOSP IP/OBS MODERATE 35: CPT | Performed by: PSYCHIATRY & NEUROLOGY

## 2023-02-22 PROCEDURE — 80164 ASSAY DIPROPYLACETIC ACD TOT: CPT | Performed by: PHYSICIAN ASSISTANT

## 2023-02-22 PROCEDURE — 82962 GLUCOSE BLOOD TEST: CPT

## 2023-02-22 PROCEDURE — 80053 COMPREHEN METABOLIC PANEL: CPT | Performed by: PHYSICIAN ASSISTANT

## 2023-02-22 PROCEDURE — 99233 SBSQ HOSP IP/OBS HIGH 50: CPT | Performed by: INTERNAL MEDICINE

## 2023-02-23 ENCOUNTER — APPOINTMENT (OUTPATIENT)
Dept: GENERAL RADIOLOGY | Facility: HOSPITAL | Age: 53
End: 2023-02-23
Payer: COMMERCIAL

## 2023-02-23 ENCOUNTER — OUTSIDE FACILITY SERVICE (OUTPATIENT)
Dept: INFECTIOUS DISEASES | Facility: CLINIC | Age: 53
End: 2023-02-23
Payer: MEDICARE

## 2023-02-23 ENCOUNTER — OUTSIDE FACILITY SERVICE (OUTPATIENT)
Dept: PULMONOLOGY | Facility: CLINIC | Age: 53
End: 2023-02-23
Payer: MEDICARE

## 2023-02-23 LAB
ALBUMIN SERPL-MCNC: 2.9 G/DL (ref 3.5–5.2)
ALBUMIN/GLOB SERPL: 0.7 G/DL
ALP SERPL-CCNC: 131 U/L (ref 39–117)
ALT SERPL W P-5'-P-CCNC: 7 U/L (ref 1–41)
ANION GAP SERPL CALCULATED.3IONS-SCNC: 9.8 MMOL/L (ref 5–15)
AST SERPL-CCNC: 21 U/L (ref 1–40)
BASOPHILS # BLD AUTO: 0.04 10*3/MM3 (ref 0–0.2)
BASOPHILS NFR BLD AUTO: 0.4 % (ref 0–1.5)
BILIRUB SERPL-MCNC: 0.5 MG/DL (ref 0–1.2)
BUN SERPL-MCNC: 7 MG/DL (ref 6–20)
BUN/CREAT SERPL: 16.7 (ref 7–25)
CALCIUM SPEC-SCNC: 9.4 MG/DL (ref 8.6–10.5)
CHLORIDE SERPL-SCNC: 101 MMOL/L (ref 98–107)
CO2 SERPL-SCNC: 28.2 MMOL/L (ref 22–29)
CREAT SERPL-MCNC: 0.42 MG/DL (ref 0.76–1.27)
CRP SERPL-MCNC: 4.23 MG/DL (ref 0–0.5)
DEPRECATED RDW RBC AUTO: 49.3 FL (ref 37–54)
EGFRCR SERPLBLD CKD-EPI 2021: 127.8 ML/MIN/1.73
EOSINOPHIL # BLD AUTO: 0.11 10*3/MM3 (ref 0–0.4)
EOSINOPHIL NFR BLD AUTO: 1.1 % (ref 0.3–6.2)
ERYTHROCYTE [DISTWIDTH] IN BLOOD BY AUTOMATED COUNT: 13.8 % (ref 12.3–15.4)
GLOBULIN UR ELPH-MCNC: 3.9 GM/DL
GLUCOSE BLDC GLUCOMTR-MCNC: 101 MG/DL (ref 70–130)
GLUCOSE SERPL-MCNC: 91 MG/DL (ref 65–99)
HCT VFR BLD AUTO: 37.9 % (ref 37.5–51)
HGB BLD-MCNC: 11.6 G/DL (ref 13–17.7)
IMM GRANULOCYTES # BLD AUTO: 0.05 10*3/MM3 (ref 0–0.05)
IMM GRANULOCYTES NFR BLD AUTO: 0.5 % (ref 0–0.5)
LYMPHOCYTES # BLD AUTO: 1.11 10*3/MM3 (ref 0.7–3.1)
LYMPHOCYTES NFR BLD AUTO: 11.3 % (ref 19.6–45.3)
MAGNESIUM SERPL-MCNC: 2 MG/DL (ref 1.6–2.6)
MCH RBC QN AUTO: 29.5 PG (ref 26.6–33)
MCHC RBC AUTO-ENTMCNC: 30.6 G/DL (ref 31.5–35.7)
MCV RBC AUTO: 96.4 FL (ref 79–97)
MONOCYTES # BLD AUTO: 0.96 10*3/MM3 (ref 0.1–0.9)
MONOCYTES NFR BLD AUTO: 9.8 % (ref 5–12)
NEUTROPHILS NFR BLD AUTO: 7.54 10*3/MM3 (ref 1.7–7)
NEUTROPHILS NFR BLD AUTO: 76.9 % (ref 42.7–76)
NRBC BLD AUTO-RTO: 0 /100 WBC (ref 0–0.2)
PLATELET # BLD AUTO: 286 10*3/MM3 (ref 140–450)
PMV BLD AUTO: 9.3 FL (ref 6–12)
POTASSIUM SERPL-SCNC: 4.4 MMOL/L (ref 3.5–5.2)
PROT SERPL-MCNC: 6.8 G/DL (ref 6–8.5)
RBC # BLD AUTO: 3.93 10*6/MM3 (ref 4.14–5.8)
SODIUM SERPL-SCNC: 139 MMOL/L (ref 136–145)
WBC NRBC COR # BLD: 9.81 10*3/MM3 (ref 3.4–10.8)

## 2023-02-23 PROCEDURE — 85025 COMPLETE CBC W/AUTO DIFF WBC: CPT | Performed by: PHYSICIAN ASSISTANT

## 2023-02-23 PROCEDURE — 74230 X-RAY XM SWLNG FUNCJ C+: CPT

## 2023-02-23 PROCEDURE — 80053 COMPREHEN METABOLIC PANEL: CPT | Performed by: PHYSICIAN ASSISTANT

## 2023-02-23 PROCEDURE — 99233 SBSQ HOSP IP/OBS HIGH 50: CPT | Performed by: INTERNAL MEDICINE

## 2023-02-23 PROCEDURE — 82962 GLUCOSE BLOOD TEST: CPT

## 2023-02-23 PROCEDURE — 71045 X-RAY EXAM CHEST 1 VIEW: CPT

## 2023-02-23 PROCEDURE — 92610 EVALUATE SWALLOWING FUNCTION: CPT

## 2023-02-23 PROCEDURE — 92611 MOTION FLUOROSCOPY/SWALLOW: CPT

## 2023-02-23 PROCEDURE — 71045 X-RAY EXAM CHEST 1 VIEW: CPT | Performed by: RADIOLOGY

## 2023-02-23 PROCEDURE — 83735 ASSAY OF MAGNESIUM: CPT | Performed by: INTERNAL MEDICINE

## 2023-02-23 PROCEDURE — 86140 C-REACTIVE PROTEIN: CPT | Performed by: PHYSICIAN ASSISTANT

## 2023-02-23 PROCEDURE — 99232 SBSQ HOSP IP/OBS MODERATE 35: CPT | Performed by: INTERNAL MEDICINE

## 2023-02-23 PROCEDURE — 74230 X-RAY XM SWLNG FUNCJ C+: CPT | Performed by: RADIOLOGY

## 2023-02-24 ENCOUNTER — OUTSIDE FACILITY SERVICE (OUTPATIENT)
Dept: PULMONOLOGY | Facility: CLINIC | Age: 53
End: 2023-02-24
Payer: MEDICARE

## 2023-02-24 ENCOUNTER — OUTSIDE FACILITY SERVICE (OUTPATIENT)
Dept: INFECTIOUS DISEASES | Facility: CLINIC | Age: 53
End: 2023-02-24
Payer: MEDICARE

## 2023-02-24 LAB
ALBUMIN SERPL-MCNC: 2.6 G/DL (ref 3.5–5.2)
ALBUMIN/GLOB SERPL: 0.7 G/DL
ALP SERPL-CCNC: 121 U/L (ref 39–117)
ALT SERPL W P-5'-P-CCNC: 5 U/L (ref 1–41)
ANION GAP SERPL CALCULATED.3IONS-SCNC: 9.5 MMOL/L (ref 5–15)
AST SERPL-CCNC: 38 U/L (ref 1–40)
BASOPHILS # BLD AUTO: 0.04 10*3/MM3 (ref 0–0.2)
BASOPHILS NFR BLD AUTO: 0.3 % (ref 0–1.5)
BILIRUB SERPL-MCNC: 0.5 MG/DL (ref 0–1.2)
BUN SERPL-MCNC: 7 MG/DL (ref 6–20)
BUN/CREAT SERPL: 15.9 (ref 7–25)
CALCIUM SPEC-SCNC: 9.2 MG/DL (ref 8.6–10.5)
CHLORIDE SERPL-SCNC: 100 MMOL/L (ref 98–107)
CO2 SERPL-SCNC: 27.5 MMOL/L (ref 22–29)
CREAT SERPL-MCNC: 0.44 MG/DL (ref 0.76–1.27)
CRP SERPL-MCNC: 4.26 MG/DL (ref 0–0.5)
DEPRECATED RDW RBC AUTO: 48.1 FL (ref 37–54)
EGFRCR SERPLBLD CKD-EPI 2021: 126 ML/MIN/1.73
EOSINOPHIL # BLD AUTO: 0.07 10*3/MM3 (ref 0–0.4)
EOSINOPHIL NFR BLD AUTO: 0.6 % (ref 0.3–6.2)
ERYTHROCYTE [DISTWIDTH] IN BLOOD BY AUTOMATED COUNT: 13.6 % (ref 12.3–15.4)
GLOBULIN UR ELPH-MCNC: 3.7 GM/DL
GLUCOSE SERPL-MCNC: 90 MG/DL (ref 65–99)
HCT VFR BLD AUTO: 35.6 % (ref 37.5–51)
HGB BLD-MCNC: 11.4 G/DL (ref 13–17.7)
IMM GRANULOCYTES # BLD AUTO: 0.04 10*3/MM3 (ref 0–0.05)
IMM GRANULOCYTES NFR BLD AUTO: 0.3 % (ref 0–0.5)
LYMPHOCYTES # BLD AUTO: 0.99 10*3/MM3 (ref 0.7–3.1)
LYMPHOCYTES NFR BLD AUTO: 8.3 % (ref 19.6–45.3)
MAGNESIUM SERPL-MCNC: 1.8 MG/DL (ref 1.6–2.6)
MCH RBC QN AUTO: 30.6 PG (ref 26.6–33)
MCHC RBC AUTO-ENTMCNC: 32 G/DL (ref 31.5–35.7)
MCV RBC AUTO: 95.7 FL (ref 79–97)
MONOCYTES # BLD AUTO: 1.51 10*3/MM3 (ref 0.1–0.9)
MONOCYTES NFR BLD AUTO: 12.7 % (ref 5–12)
NEUTROPHILS NFR BLD AUTO: 77.8 % (ref 42.7–76)
NEUTROPHILS NFR BLD AUTO: 9.28 10*3/MM3 (ref 1.7–7)
NRBC BLD AUTO-RTO: 0 /100 WBC (ref 0–0.2)
PLATELET # BLD AUTO: 266 10*3/MM3 (ref 140–450)
PMV BLD AUTO: 10.3 FL (ref 6–12)
POTASSIUM SERPL-SCNC: 4.3 MMOL/L (ref 3.5–5.2)
PROT SERPL-MCNC: 6.3 G/DL (ref 6–8.5)
RBC # BLD AUTO: 3.72 10*6/MM3 (ref 4.14–5.8)
SODIUM SERPL-SCNC: 137 MMOL/L (ref 136–145)
WBC NRBC COR # BLD: 11.93 10*3/MM3 (ref 3.4–10.8)

## 2023-02-24 PROCEDURE — 99233 SBSQ HOSP IP/OBS HIGH 50: CPT | Performed by: INTERNAL MEDICINE

## 2023-02-24 PROCEDURE — 86140 C-REACTIVE PROTEIN: CPT | Performed by: PHYSICIAN ASSISTANT

## 2023-02-24 PROCEDURE — 99232 SBSQ HOSP IP/OBS MODERATE 35: CPT | Performed by: INTERNAL MEDICINE

## 2023-02-24 PROCEDURE — 36415 COLL VENOUS BLD VENIPUNCTURE: CPT | Performed by: PHYSICIAN ASSISTANT

## 2023-02-24 PROCEDURE — 85025 COMPLETE CBC W/AUTO DIFF WBC: CPT | Performed by: PHYSICIAN ASSISTANT

## 2023-02-24 PROCEDURE — 92526 ORAL FUNCTION THERAPY: CPT

## 2023-02-24 PROCEDURE — 83735 ASSAY OF MAGNESIUM: CPT | Performed by: INTERNAL MEDICINE

## 2023-02-24 PROCEDURE — 97530 THERAPEUTIC ACTIVITIES: CPT

## 2023-02-24 PROCEDURE — 80053 COMPREHEN METABOLIC PANEL: CPT | Performed by: PHYSICIAN ASSISTANT

## 2023-02-25 ENCOUNTER — APPOINTMENT (OUTPATIENT)
Dept: GENERAL RADIOLOGY | Facility: HOSPITAL | Age: 53
End: 2023-02-25
Payer: COMMERCIAL

## 2023-02-25 ENCOUNTER — OUTSIDE FACILITY SERVICE (OUTPATIENT)
Dept: PULMONOLOGY | Facility: CLINIC | Age: 53
End: 2023-02-25
Payer: MEDICARE

## 2023-02-25 LAB — MAGNESIUM SERPL-MCNC: 1.8 MG/DL (ref 1.6–2.6)

## 2023-02-25 PROCEDURE — 71045 X-RAY EXAM CHEST 1 VIEW: CPT

## 2023-02-25 PROCEDURE — 83735 ASSAY OF MAGNESIUM: CPT | Performed by: INTERNAL MEDICINE

## 2023-02-25 PROCEDURE — 99233 SBSQ HOSP IP/OBS HIGH 50: CPT | Performed by: INTERNAL MEDICINE

## 2023-02-25 PROCEDURE — 36415 COLL VENOUS BLD VENIPUNCTURE: CPT | Performed by: INTERNAL MEDICINE

## 2023-02-26 ENCOUNTER — OUTSIDE FACILITY SERVICE (OUTPATIENT)
Dept: PULMONOLOGY | Facility: CLINIC | Age: 53
End: 2023-02-26
Payer: MEDICARE

## 2023-02-26 LAB
ALBUMIN SERPL-MCNC: 2.4 G/DL (ref 3.5–5.2)
ALBUMIN/GLOB SERPL: 0.8 G/DL
ALP SERPL-CCNC: 110 U/L (ref 39–117)
ALT SERPL W P-5'-P-CCNC: 5 U/L (ref 1–41)
ANION GAP SERPL CALCULATED.3IONS-SCNC: 5.8 MMOL/L (ref 5–15)
AST SERPL-CCNC: 14 U/L (ref 1–40)
BASOPHILS # BLD AUTO: 0.06 10*3/MM3 (ref 0–0.2)
BASOPHILS NFR BLD AUTO: 0.6 % (ref 0–1.5)
BILIRUB SERPL-MCNC: 0.3 MG/DL (ref 0–1.2)
BUN SERPL-MCNC: 12 MG/DL (ref 6–20)
BUN/CREAT SERPL: 27.9 (ref 7–25)
CALCIUM SPEC-SCNC: 8.6 MG/DL (ref 8.6–10.5)
CHLORIDE SERPL-SCNC: 106 MMOL/L (ref 98–107)
CO2 SERPL-SCNC: 31.2 MMOL/L (ref 22–29)
CREAT SERPL-MCNC: 0.43 MG/DL (ref 0.76–1.27)
CRP SERPL-MCNC: 3.24 MG/DL (ref 0–0.5)
DEPRECATED RDW RBC AUTO: 49.4 FL (ref 37–54)
EGFRCR SERPLBLD CKD-EPI 2021: 126.9 ML/MIN/1.73
EOSINOPHIL # BLD AUTO: 0.2 10*3/MM3 (ref 0–0.4)
EOSINOPHIL NFR BLD AUTO: 2.1 % (ref 0.3–6.2)
ERYTHROCYTE [DISTWIDTH] IN BLOOD BY AUTOMATED COUNT: 13.7 % (ref 12.3–15.4)
GLOBULIN UR ELPH-MCNC: 3.1 GM/DL
GLUCOSE SERPL-MCNC: 103 MG/DL (ref 65–99)
HCT VFR BLD AUTO: 32.4 % (ref 37.5–51)
HGB BLD-MCNC: 9.8 G/DL (ref 13–17.7)
IMM GRANULOCYTES # BLD AUTO: 0.03 10*3/MM3 (ref 0–0.05)
IMM GRANULOCYTES NFR BLD AUTO: 0.3 % (ref 0–0.5)
LYMPHOCYTES # BLD AUTO: 1.57 10*3/MM3 (ref 0.7–3.1)
LYMPHOCYTES NFR BLD AUTO: 16.5 % (ref 19.6–45.3)
MAGNESIUM SERPL-MCNC: 1.8 MG/DL (ref 1.6–2.6)
MCH RBC QN AUTO: 29.6 PG (ref 26.6–33)
MCHC RBC AUTO-ENTMCNC: 30.2 G/DL (ref 31.5–35.7)
MCV RBC AUTO: 97.9 FL (ref 79–97)
MONOCYTES # BLD AUTO: 1.29 10*3/MM3 (ref 0.1–0.9)
MONOCYTES NFR BLD AUTO: 13.6 % (ref 5–12)
NEUTROPHILS NFR BLD AUTO: 6.34 10*3/MM3 (ref 1.7–7)
NEUTROPHILS NFR BLD AUTO: 66.9 % (ref 42.7–76)
NRBC BLD AUTO-RTO: 0 /100 WBC (ref 0–0.2)
PLATELET # BLD AUTO: 269 10*3/MM3 (ref 140–450)
PMV BLD AUTO: 9.5 FL (ref 6–12)
POTASSIUM SERPL-SCNC: 4.8 MMOL/L (ref 3.5–5.2)
PROT SERPL-MCNC: 5.5 G/DL (ref 6–8.5)
RBC # BLD AUTO: 3.31 10*6/MM3 (ref 4.14–5.8)
SODIUM SERPL-SCNC: 143 MMOL/L (ref 136–145)
WBC NRBC COR # BLD: 9.49 10*3/MM3 (ref 3.4–10.8)

## 2023-02-26 PROCEDURE — 80053 COMPREHEN METABOLIC PANEL: CPT | Performed by: INTERNAL MEDICINE

## 2023-02-26 PROCEDURE — 86140 C-REACTIVE PROTEIN: CPT | Performed by: INTERNAL MEDICINE

## 2023-02-26 PROCEDURE — 99233 SBSQ HOSP IP/OBS HIGH 50: CPT | Performed by: INTERNAL MEDICINE

## 2023-02-26 PROCEDURE — 85025 COMPLETE CBC W/AUTO DIFF WBC: CPT | Performed by: INTERNAL MEDICINE

## 2023-02-26 PROCEDURE — 83735 ASSAY OF MAGNESIUM: CPT | Performed by: INTERNAL MEDICINE

## 2023-02-27 ENCOUNTER — APPOINTMENT (OUTPATIENT)
Dept: GENERAL RADIOLOGY | Facility: HOSPITAL | Age: 53
End: 2023-02-27
Payer: COMMERCIAL

## 2023-02-27 ENCOUNTER — OUTSIDE FACILITY SERVICE (OUTPATIENT)
Dept: INFECTIOUS DISEASES | Facility: CLINIC | Age: 53
End: 2023-02-27
Payer: MEDICARE

## 2023-02-27 ENCOUNTER — OUTSIDE FACILITY SERVICE (OUTPATIENT)
Dept: PULMONOLOGY | Facility: CLINIC | Age: 53
End: 2023-02-27
Payer: MEDICARE

## 2023-02-27 LAB
GLUCOSE BLDC GLUCOMTR-MCNC: 103 MG/DL (ref 70–130)
MAGNESIUM SERPL-MCNC: 1.7 MG/DL (ref 1.6–2.6)

## 2023-02-27 PROCEDURE — 71045 X-RAY EXAM CHEST 1 VIEW: CPT

## 2023-02-27 PROCEDURE — 82962 GLUCOSE BLOOD TEST: CPT

## 2023-02-27 PROCEDURE — 36415 COLL VENOUS BLD VENIPUNCTURE: CPT | Performed by: INTERNAL MEDICINE

## 2023-02-27 PROCEDURE — 99233 SBSQ HOSP IP/OBS HIGH 50: CPT | Performed by: INTERNAL MEDICINE

## 2023-02-27 PROCEDURE — 97530 THERAPEUTIC ACTIVITIES: CPT

## 2023-02-27 PROCEDURE — 83735 ASSAY OF MAGNESIUM: CPT | Performed by: INTERNAL MEDICINE

## 2023-02-27 PROCEDURE — 99232 SBSQ HOSP IP/OBS MODERATE 35: CPT | Performed by: INTERNAL MEDICINE

## 2023-02-27 PROCEDURE — 71045 X-RAY EXAM CHEST 1 VIEW: CPT | Performed by: RADIOLOGY

## 2023-02-28 ENCOUNTER — OUTSIDE FACILITY SERVICE (OUTPATIENT)
Dept: INFECTIOUS DISEASES | Facility: CLINIC | Age: 53
End: 2023-02-28
Payer: MEDICARE

## 2023-02-28 ENCOUNTER — OUTSIDE FACILITY SERVICE (OUTPATIENT)
Dept: PULMONOLOGY | Facility: CLINIC | Age: 53
End: 2023-02-28
Payer: MEDICARE

## 2023-02-28 ENCOUNTER — APPOINTMENT (OUTPATIENT)
Dept: GENERAL RADIOLOGY | Facility: HOSPITAL | Age: 53
End: 2023-02-28
Payer: COMMERCIAL

## 2023-02-28 LAB
A-A DO2: ABNORMAL
ALBUMIN SERPL-MCNC: 2.4 G/DL (ref 3.5–5.2)
ALBUMIN/GLOB SERPL: 0.7 G/DL
ALP SERPL-CCNC: 109 U/L (ref 39–117)
ALT SERPL W P-5'-P-CCNC: <5 U/L (ref 1–41)
ANION GAP SERPL CALCULATED.3IONS-SCNC: 7.2 MMOL/L (ref 5–15)
ARTERIAL PATENCY WRIST A: POSITIVE
AST SERPL-CCNC: 18 U/L (ref 1–40)
ATMOSPHERIC PRESS: 723 MMHG
BASE EXCESS BLDA CALC-SCNC: 6.9 MMOL/L (ref 0–2)
BASOPHILS # BLD AUTO: 0.07 10*3/MM3 (ref 0–0.2)
BASOPHILS NFR BLD AUTO: 0.5 % (ref 0–1.5)
BDY SITE: ABNORMAL
BILIRUB SERPL-MCNC: 0.3 MG/DL (ref 0–1.2)
BODY TEMPERATURE: 0 C
BUN SERPL-MCNC: 7 MG/DL (ref 6–20)
BUN/CREAT SERPL: 25 (ref 7–25)
CALCIUM SPEC-SCNC: 8.6 MG/DL (ref 8.6–10.5)
CHLORIDE SERPL-SCNC: 102 MMOL/L (ref 98–107)
CO2 BLDA-SCNC: 33.2 MMOL/L (ref 22–33)
CO2 SERPL-SCNC: 27.8 MMOL/L (ref 22–29)
COHGB MFR BLD: 1.3 % (ref 0–5)
CREAT SERPL-MCNC: 0.28 MG/DL (ref 0.76–1.27)
CRP SERPL-MCNC: 1.59 MG/DL (ref 0–0.5)
DEPRECATED RDW RBC AUTO: 47.1 FL (ref 37–54)
EGFRCR SERPLBLD CKD-EPI 2021: 144.4 ML/MIN/1.73
EOSINOPHIL # BLD AUTO: 0.03 10*3/MM3 (ref 0–0.4)
EOSINOPHIL NFR BLD AUTO: 0.2 % (ref 0.3–6.2)
ERYTHROCYTE [DISTWIDTH] IN BLOOD BY AUTOMATED COUNT: 13.4 % (ref 12.3–15.4)
GAS FLOW AIRWAY: 3 LPM
GLOBULIN UR ELPH-MCNC: 3.4 GM/DL
GLUCOSE SERPL-MCNC: 116 MG/DL (ref 65–99)
HCO3 BLDA-SCNC: 31.8 MMOL/L (ref 20–26)
HCT VFR BLD AUTO: 32.8 % (ref 37.5–51)
HCT VFR BLD CALC: 31.7 % (ref 38–51)
HGB BLD-MCNC: 10.5 G/DL (ref 13–17.7)
HGB BLDA-MCNC: 10.3 G/DL (ref 14–18)
IMM GRANULOCYTES # BLD AUTO: 0.04 10*3/MM3 (ref 0–0.05)
IMM GRANULOCYTES NFR BLD AUTO: 0.3 % (ref 0–0.5)
INHALED O2 CONCENTRATION: 32 %
LYMPHOCYTES # BLD AUTO: 0.82 10*3/MM3 (ref 0.7–3.1)
LYMPHOCYTES NFR BLD AUTO: 6.1 % (ref 19.6–45.3)
Lab: ABNORMAL
MAGNESIUM SERPL-MCNC: 1.8 MG/DL (ref 1.6–2.6)
MCH RBC QN AUTO: 30.5 PG (ref 26.6–33)
MCHC RBC AUTO-ENTMCNC: 32 G/DL (ref 31.5–35.7)
MCV RBC AUTO: 95.3 FL (ref 79–97)
METHGB BLD QL: 0.4 % (ref 0–3)
MODALITY: ABNORMAL
MONOCYTES # BLD AUTO: 1.14 10*3/MM3 (ref 0.1–0.9)
MONOCYTES NFR BLD AUTO: 8.5 % (ref 5–12)
NEUTROPHILS NFR BLD AUTO: 11.39 10*3/MM3 (ref 1.7–7)
NEUTROPHILS NFR BLD AUTO: 84.4 % (ref 42.7–76)
NOTE: ABNORMAL
NOTIFIED BY: ABNORMAL
NOTIFIED WHO: ABNORMAL
NRBC BLD AUTO-RTO: 0 /100 WBC (ref 0–0.2)
OXYHGB MFR BLDV: 96.6 % (ref 94–99)
PCO2 BLDA: 46 MM HG (ref 35–45)
PCO2 TEMP ADJ BLD: ABNORMAL MM[HG]
PH BLDA: 7.45 PH UNITS (ref 7.35–7.45)
PH, TEMP CORRECTED: ABNORMAL
PLATELET # BLD AUTO: 279 10*3/MM3 (ref 140–450)
PMV BLD AUTO: 9.5 FL (ref 6–12)
PO2 BLDA: 101 MM HG (ref 83–108)
PO2 TEMP ADJ BLD: ABNORMAL MM[HG]
POTASSIUM SERPL-SCNC: 3.9 MMOL/L (ref 3.5–5.2)
PROT SERPL-MCNC: 5.8 G/DL (ref 6–8.5)
RBC # BLD AUTO: 3.44 10*6/MM3 (ref 4.14–5.8)
SAO2 % BLDCOA: 98.3 % (ref 94–99)
SODIUM SERPL-SCNC: 137 MMOL/L (ref 136–145)
VENTILATOR MODE: ABNORMAL
WBC NRBC COR # BLD: 13.49 10*3/MM3 (ref 3.4–10.8)

## 2023-02-28 PROCEDURE — 80053 COMPREHEN METABOLIC PANEL: CPT | Performed by: INTERNAL MEDICINE

## 2023-02-28 PROCEDURE — 83735 ASSAY OF MAGNESIUM: CPT | Performed by: INTERNAL MEDICINE

## 2023-02-28 PROCEDURE — 82375 ASSAY CARBOXYHB QUANT: CPT

## 2023-02-28 PROCEDURE — 83050 HGB METHEMOGLOBIN QUAN: CPT

## 2023-02-28 PROCEDURE — 86140 C-REACTIVE PROTEIN: CPT | Performed by: INTERNAL MEDICINE

## 2023-02-28 PROCEDURE — 99233 SBSQ HOSP IP/OBS HIGH 50: CPT | Performed by: INTERNAL MEDICINE

## 2023-02-28 PROCEDURE — 71045 X-RAY EXAM CHEST 1 VIEW: CPT

## 2023-02-28 PROCEDURE — 82805 BLOOD GASES W/O2 SATURATION: CPT

## 2023-02-28 PROCEDURE — 85025 COMPLETE CBC W/AUTO DIFF WBC: CPT | Performed by: INTERNAL MEDICINE

## 2023-02-28 PROCEDURE — 92526 ORAL FUNCTION THERAPY: CPT

## 2023-02-28 PROCEDURE — 99232 SBSQ HOSP IP/OBS MODERATE 35: CPT | Performed by: INTERNAL MEDICINE

## 2023-03-01 ENCOUNTER — OUTSIDE FACILITY SERVICE (OUTPATIENT)
Dept: INFECTIOUS DISEASES | Facility: CLINIC | Age: 53
End: 2023-03-01
Payer: MEDICARE

## 2023-03-01 ENCOUNTER — APPOINTMENT (OUTPATIENT)
Dept: GENERAL RADIOLOGY | Facility: HOSPITAL | Age: 53
End: 2023-03-01
Payer: COMMERCIAL

## 2023-03-01 ENCOUNTER — OUTSIDE FACILITY SERVICE (OUTPATIENT)
Dept: PULMONOLOGY | Facility: CLINIC | Age: 53
End: 2023-03-01
Payer: MEDICARE

## 2023-03-01 LAB — MAGNESIUM SERPL-MCNC: 1.8 MG/DL (ref 1.6–2.6)

## 2023-03-01 PROCEDURE — 99232 SBSQ HOSP IP/OBS MODERATE 35: CPT | Performed by: INTERNAL MEDICINE

## 2023-03-01 PROCEDURE — 83735 ASSAY OF MAGNESIUM: CPT | Performed by: INTERNAL MEDICINE

## 2023-03-01 PROCEDURE — 74230 X-RAY XM SWLNG FUNCJ C+: CPT

## 2023-03-01 PROCEDURE — 74230 X-RAY XM SWLNG FUNCJ C+: CPT | Performed by: RADIOLOGY

## 2023-03-01 PROCEDURE — 92611 MOTION FLUOROSCOPY/SWALLOW: CPT

## 2023-03-01 PROCEDURE — 97530 THERAPEUTIC ACTIVITIES: CPT

## 2023-03-01 PROCEDURE — 92610 EVALUATE SWALLOWING FUNCTION: CPT

## 2023-03-01 PROCEDURE — 36415 COLL VENOUS BLD VENIPUNCTURE: CPT | Performed by: INTERNAL MEDICINE

## 2023-03-01 NOTE — MBS/VFSS/FEES
Long Term Care - Speech Language Pathology   Swallow Re-Evaluation [facility]   MODIFIED BARIUM SWALLOW STUDY     Patient Name: Jamison Edward  : 1970  MRN: [id number]  Today's Date: 3/1/2023             Admit Date: 2023      Jamison Edward  presents to the radiology suite this am from [facility] to participate in an instrumental MBS to assess safety/efficacy of swallowing fnx, determine safest/least restrictive diet.     PMH is significant for HTB, COPD, tobacco abuse, multilevel DDD, GERD, Johnson's esophagus, PUD, ulcerative colitis, history of TIA/CVA, and alcohol use. He was admitted to [facility] from [facility] following admission 22 -23. He was initially diagnosed w/ CAP, found to have cirrhosis and multilevel degenerative disc disease. Following treatment of PNA his respiratory status was improved but ams continued to wax and wane. He is s/p diagnostic paracentesis 22 for ascites, however, at the time of paracentesis ascites had greatly reduced. He was intermittently agitated and aggressive, requiring sedating medications to improve agitation and ensure safety of himself and staff. Aspiration event noted 22 resulting in acute respiratory distress requiring intubation. He developed aspiration pneumonia and septic shock requiring vasopressor support. He continued to clinically improve and was extubated 23, re-intubated 23. He was transferred to [facility] 23, s/p tracheostomy . SLP was consulted upon weaning to trach collar. He did participate in initial MBS 23 w/ a mild-mod oral dysphagia, severe pharyngeal dysphagia w/ recommendation to continue NPO except ice chip protocol and formal dysphagia tx. Participation in dysphagia tx was limited per fluctuating ams and a/a status. He has had a prolonged NG tube as PEG Is unable to be placed 2/2 h/o gastric surgery. He has repeatedly pulled his NG tube out across this admission. He unfortunately suffered a status  change w/ aspiration of TF, placed back on mechanical ventilation 2/15/23, SLP signed off at that time. He was weaned back to trach collar and decannulated in afternoon on 23. He removed NG tube overnight and has refused to allow replacement at this time.     He is NPO awaiting this evaluation.     Social History     Socioeconomic History   • Marital status:    Tobacco Use   • Smoking status: Every Day     Packs/day: 1.00     Years: 35.00     Pack years: 35.00     Types: Cigarettes     Last attempt to quit: 2020     Years since quittin.4   • Smokeless tobacco: Never   • Tobacco comments:     Been smoking 20 years   Vaping Use   • Vaping Use: Never used   Substance and Sexual Activity   • Alcohol use: Yes     Alcohol/week: 4.0 standard drinks     Types: 4 Cans of beer per week   • Drug use: No   • Sexual activity: Yes     Partners: Female     Birth control/protection: None      CR Chest 1 Vw     INDICATION:   Enteric tube placement.      COMPARISON:    Chest 2023     FINDINGS:  Portable AP view(s) of the chest.  Enteric tube again noted with tip projecting over the proximal body/fundus of the stomach. Bibasilar atelectasis. Heart size and mediastinum are stable.     IMPRESSION:  Enteric tube tip again projecting over the proximal body/fundus of the stomach.     Signer Name: Preston Phillips MD   Signed: 2023 12:25 AM   [facility]    [facility]    Diet Orders (active) (From admission, onward)     Start     Ordered    23 1320  Diet: Regular/House Diet; Texture: Pureed (NDD 1); Fluid Consistency: Nectar Thick  Diet Effective Now        Comments: Po intake ONLY when pt is FULLY awake and alert  Medications whole in puree. Single pill presentation.   Pt fully upright and centered for any and all po intake.    23 1321    23 0943  Nutren 1.5 (Osmolite 1.5); Tube Feeding Type: Continuous; Continuous Tube Feeding Start Rate (mL/hr): 20;  Then Advance Rate By (mL/hr): 10; Every __ Hours: 4; To Goal Rate of (mL/hr): 55; Water Flush Amount (mL): 10; Water Flush Frequency: Every 1...  Diet Effective Now         02/16/23 0942                He is observed on room air w/o complications across this evaluation.     Risks and benefits of the procedure are explained w/ verbalizing understanding/agreement to participate.     He presents via wheelchair for this evaluation and is able to transfer to stationary chair w/ some assistance. He is notably impulsive and requires verbal cues for safety.     Mr Edward is positioned upright and centered in a soft strap supportive chair to accept multiple po presentations of solid cracker, puree, honey thick, nectar thick, and thin liquids via spoon, cup and straw, along w/ whole placebo pill in puree. He is able to self feed.     All views are from the lateral plane.     Facial/oral structures are symmetrical upon observation w/o lingual deviation upon protrusion. Oral mucosa are moist, pink and clean. Secretions are clear, thin and well controlled. OROM/SHALONDA is generally weak to imitate oral postures. Gag is not assessed. Volitional cough is mildly weak in intensity, clear in quality, nonproductive. Vocal quality is slightly weak in intensity, clear in quality w/ intelligible speech. He is a/a and cooperative to particpate. He is oriented to self at this time, follows simple directives, and participates in simple conversational exchanges.     Upon po presentations, adequate bolus anticipation w/ mildly weak labial seal for bolus clearance via spoon bowl, cup rim stability and suction via straw. Bolus formation, manipulation, and control are mildly weak. He demonstrates difficulty w/ portion control as he requires cues to not self-present large bolus amounts intermittently. Mild to moderately prolonged mastication is evidenced w/ solid cracker presentation.  A-p transit is slightly prolonged w/ trace oral residue  remaining of honey thick liquids and thin liquids. Tongue base retraction and linguavelar seal are slightly weak w/ premature spillage from the valleculae evidenced w/ thin liquids via cup. Penetration is evidenced before the swallow w/ thin liquids via straw of remaining cup presentation residue mixed w/ pt secretions. No aspiration is evidenced before the swallow.     Pharyngeal swallow is timely w/ adequate hyolaryngeal elevation and complete epiglottic inversion. Pharyngeal contraction is severely weak w/ significant residue remaining w/ initial presentations of puree consistencies and honey thick liquids. Amount of remaining pharyngeal residue decreases w/ decreasing liquid viscosity. Aspiration is evidenced after the swallow of honey thick liquid pharyngeal residue mixed w/ pt secretions. Aspiration is again evidenced w/ thin liquids via cup and straw presentations during the swallow of current presentation as well as remaining pharyngeal residue. Pharyngeal residue remains in significant amounts w/ honey thick liquids and thin liquids primarily. No other laryngeal penetration or aspiration evidenced during or after the swallow.     Compensatory strategy of chin tuck is sufficient to extinguish aspiration events, however pt is unable to consistently perform this strategy despite max verbal and visual cues.     Full esophageal sweep reveals no mucosal abnormalities. Motility is wfl w/o retrograde flow noted. Whole placebo pill is able to be manipulated and passes w/o complications.                Visit Dx:   No diagnosis found.  Patient Active Problem List   Diagnosis   • Precordial chest pain   • Weight loss, abnormal   • Right upper quadrant abdominal pain   • Epigastric pain   • History of bleeding ulcers   • Nausea   • Malaise and fatigue   • Acute gastric ulcer without hemorrhage or perforation   • Poor appetite   • Duodenal ulcer   • Gastroesophageal reflux disease   • Dysphagia   • Iron deficiency anemia    • Malabsorption   • Gastric ulcer without hemorrhage or perforation   • Dystonia   • Peptic ulcer without hemorrhage or perforation   • Gastric outlet obstruction   • Incisional hernia, without obstruction or gangrene   • Incisional hernia   • Chest pain, atypical   • Gastric bezoar   • Essential hypertension   • Cigarette smoker   • Chronic back pain   • Sinus tachycardia   • Tobacco use   • Mild carpal tunnel syndrome   • Orthostatic hypotension   • Palpitations   • Abdominal swelling   • Lower extremity edema   • Pneumonia due to infectious organism, unspecified laterality, unspecified part of lung   • Acute respiratory failure with hypoxia ([facility])     Past Medical History:   Diagnosis Date   • Anemia    • Arthritis of back     not sure   • Johnson esophagus    • Cholelithiasis    • COPD (chronic obstructive pulmonary disease) ([facility])    • CTS (carpal tunnel syndrome)    • DDD (degenerative disc disease), thoracic    • Degenerative disc disease, lumbar    • Dystonia    • GERD (gastroesophageal reflux disease)    • GI (gastrointestinal bleed)    • Hypertension    • Irritable bowel syndrome    • Low back strain    • Lumbosacral disc disease    • Migraines    • Peptic ulcer disease    • Stroke ([facility])     TIA   • TIA (transient ischemic attack)    • Ulcerative colitis ([facility])    • Wears dentures     upper only     Past Surgical History:   Procedure Laterality Date   • ABDOMINAL SURGERY  09/17/2021   • APPENDECTOMY     • CHOLECYSTECTOMY N/A 04/22/2017    Procedure: CHOLECYSTECTOMY LAPAROSCOPIC;  Surgeon: Jaqueline Guaman MD;  Location: [facility] OR;  Service:    • COLONOSCOPY N/A 05/08/2017    Procedure: COLONOSCOPY  CPTCODE:75850;  Surgeon: Nicho Richey III, MD;  Location: [facility] OR;  Service:    • CUBITAL TUNNEL RELEASE Left 9/1/2022    Procedure: CUBITAL TUNNEL RELEASE LEFT;  Surgeon: Alec Hough MD;  Location: [facility] OR;  Service: Orthopedics;  Laterality: Left;   • ENDOSCOPY     • ENDOSCOPY N/A  05/08/2017    Procedure: ESOPHAGOGASTRODUODENOSCOPY WITH BIOPSY  CPTCODE:22888;  Surgeon: Nicho Richey III, MD;  Location:  [facility] OR;  Service:    • ENDOSCOPY N/A 11/03/2017    Procedure: ESOPHAGOGASTRODUODENOSCOPY WITH BIOPSY  CPTCODE: 60999;  Surgeon: Nicho Richey III, MD;  Location:  [facility] OR;  Service:    • ENDOSCOPY N/A 02/20/2018    Procedure: ESOPHAGOGASTRODUODENOSCOPY WITH DILATATION CPT CODE: 72174;  Surgeon: Nicho Richey III, MD;  Location:  [facility] OR;  Service:    • ENDOSCOPY N/A 06/05/2018    Procedure: ESOPHAGOGASTRODUODENOSCOPY WITH BIOPSY CPT CODE: 58235;  Surgeon: Nicho Richey III, MD;  Location:  [facility] OR;  Service: Gastroenterology   • ENDOSCOPY N/A 05/26/2021    Procedure: ESOPHAGOGASTRODUODENOSCOPY WITH BIOPSY;  Surgeon: Julieta Hebert MD;  Location:  [facility] OR;  Service: Gastroenterology;  Laterality: N/A;   • ENDOSCOPY N/A 06/02/2021    Procedure: ESOPHAGOGASTRODUODENOSCOPY WITH BIOPSY;  Surgeon: Julieta Hebert MD;  Location:  [facility] OR;  Service: Gastroenterology;  Laterality: N/A;   • GASTRECTOMY N/A 09/17/2019    Procedure: OPEN VAGOTOMY, ANTRECTOMY,REVISION- Y GASTROJEJUNOSOTOMY;  Surgeon: Herbert Umanzor MD;  Location:  [facility] OR;  Service: General   • TRACHEOSTOMY N/A 1/17/2023    Procedure: TRACHEOSTOMY;  Surgeon: Felton De León MD;  Location:  [facility] OR;  Service: General;  Laterality: N/A;   • UPPER GASTROINTESTINAL ENDOSCOPY     • VENTRAL/INCISIONAL HERNIA REPAIR N/A 12/10/2020    Procedure: INCISIONAL HERNIA REPAIR LAPAROSCOPIC WITH MESH;  Surgeon: Herbert Umanzor MD;  Location:  [facility] OR;  Service: General;  Laterality: N/A;     Impression:   Mr Edward presents w/ a moderate oral dysphagia w/ mildy weak bolus control, moderately prolonged mastication, oral residue remaining, and premature spillage from the valleculae evidenced w/ thin liquids. He additionally presents w/ a moderate to severe pharyngeal dysphagia w/  severely weak pharyngeal contraction resulting in significant residue remaining in the valleculae w/ puree and honey thick liquids which improves w/ repeated swallows, and mild diffuse pharyngeal residue remaining w/ nectar and thin liquids. Aspiration is evidenced after the swallow of honey thick liquid pharyngeal residue mixed w/ pt secretions, and again w/ thin liquids via cup and straw presentations during the swallow of current presentation as well as remaining pharyngeal residue. Amount of pharyngeal residue remaining appears to decrease w/ decreasing liquid viscosity.     He is felt to most benefit from po diet initiation of puree consistencies and NECTAR THICK liquids. Po intake should only be administered/allowed when pt is FULLY awake and alert and positioned upright and centered in bed or in chair. Medications to be given only when Mr Edward is FULLY awake and alert and administered whole in puree w/ single pill presentations.     He will continue to benefit from formal dysphagia tx.     SLP Recommendation and Plan       Recommendations:   1. Puree consistencies, NECTAR THICK liquids.   2. Meds whole in puree/NECTARS ONLY when fully a/a. Single pill presentations.   3. FULLY a/a for ALL po intake.    4. Jack precautions.   5. Oral care protocol.   6. Fully upright and centered for all po intake.     SLP to f/u for continued dysphagia tx.     D/w pt results and recommendations w/ verbal understanding and agreement.     D/w RN results and recommendations w/ verbal understanding and agreement.     Thank you for allowing me to participate in the care of your patient-  Lilliam Banks MS CCC-SLP             EDUCATION  The patient has been educated in the following areas:   Dysphagia (Swallowing Impairment) Oral Care/Hydration Modified Diet Instruction.              Time Calculation:       Therapy Charges for Today     Code Description Service Date Service Provider Modifiers Qty    [id number] [facility] TREATMENT  SWALLOW 4 2/28/2023 Lilliam Banks, MS CCC-SLP GN 1    [id number] [facility] ST EVAL ORAL PHARYNG SWALLOW 4 3/1/2023 Lilliam Banks, MS CCC-SLP GN 1    [id number] HC ST MOTION FLUORO EVAL SWALLOW 8 3/1/2023 Lilliam Banks, MS CCC-SLP GN 1               Lilliam Banks MS SANCHEZ-SLP  3/1/2023

## 2023-03-02 ENCOUNTER — APPOINTMENT (OUTPATIENT)
Dept: GENERAL RADIOLOGY | Facility: HOSPITAL | Age: 53
End: 2023-03-02
Payer: COMMERCIAL

## 2023-03-02 ENCOUNTER — OUTSIDE FACILITY SERVICE (OUTPATIENT)
Dept: INFECTIOUS DISEASES | Facility: CLINIC | Age: 53
End: 2023-03-02
Payer: MEDICARE

## 2023-03-02 ENCOUNTER — OUTSIDE FACILITY SERVICE (OUTPATIENT)
Dept: PULMONOLOGY | Facility: CLINIC | Age: 53
End: 2023-03-02
Payer: MEDICARE

## 2023-03-02 LAB
ALBUMIN SERPL-MCNC: 2.7 G/DL (ref 3.5–5.2)
ALBUMIN/GLOB SERPL: 0.8 G/DL
ALP SERPL-CCNC: 110 U/L (ref 39–117)
ALT SERPL W P-5'-P-CCNC: <5 U/L (ref 1–41)
ANION GAP SERPL CALCULATED.3IONS-SCNC: 8.9 MMOL/L (ref 5–15)
AST SERPL-CCNC: 14 U/L (ref 1–40)
BASOPHILS # BLD AUTO: 0.06 10*3/MM3 (ref 0–0.2)
BASOPHILS NFR BLD AUTO: 0.5 % (ref 0–1.5)
BILIRUB SERPL-MCNC: 0.4 MG/DL (ref 0–1.2)
BUN SERPL-MCNC: 5 MG/DL (ref 6–20)
BUN/CREAT SERPL: 13.9 (ref 7–25)
CALCIUM SPEC-SCNC: 8.9 MG/DL (ref 8.6–10.5)
CHLORIDE SERPL-SCNC: 98 MMOL/L (ref 98–107)
CO2 SERPL-SCNC: 28.1 MMOL/L (ref 22–29)
CREAT SERPL-MCNC: 0.36 MG/DL (ref 0.76–1.27)
CRP SERPL-MCNC: 3.08 MG/DL (ref 0–0.5)
DEPRECATED RDW RBC AUTO: 46.5 FL (ref 37–54)
EGFRCR SERPLBLD CKD-EPI 2021: 133.8 ML/MIN/1.73
EOSINOPHIL # BLD AUTO: 0.15 10*3/MM3 (ref 0–0.4)
EOSINOPHIL NFR BLD AUTO: 1.3 % (ref 0.3–6.2)
ERYTHROCYTE [DISTWIDTH] IN BLOOD BY AUTOMATED COUNT: 13.5 % (ref 12.3–15.4)
GLOBULIN UR ELPH-MCNC: 3.4 GM/DL
GLUCOSE SERPL-MCNC: 114 MG/DL (ref 65–99)
HCT VFR BLD AUTO: 34.7 % (ref 37.5–51)
HGB BLD-MCNC: 10.8 G/DL (ref 13–17.7)
IMM GRANULOCYTES # BLD AUTO: 0.04 10*3/MM3 (ref 0–0.05)
IMM GRANULOCYTES NFR BLD AUTO: 0.4 % (ref 0–0.5)
LYMPHOCYTES # BLD AUTO: 1.45 10*3/MM3 (ref 0.7–3.1)
LYMPHOCYTES NFR BLD AUTO: 12.7 % (ref 19.6–45.3)
MAGNESIUM SERPL-MCNC: 1.6 MG/DL (ref 1.6–2.6)
MCH RBC QN AUTO: 29.3 PG (ref 26.6–33)
MCHC RBC AUTO-ENTMCNC: 31.1 G/DL (ref 31.5–35.7)
MCV RBC AUTO: 94.3 FL (ref 79–97)
MONOCYTES # BLD AUTO: 1.19 10*3/MM3 (ref 0.1–0.9)
MONOCYTES NFR BLD AUTO: 10.5 % (ref 5–12)
NEUTROPHILS NFR BLD AUTO: 74.6 % (ref 42.7–76)
NEUTROPHILS NFR BLD AUTO: 8.49 10*3/MM3 (ref 1.7–7)
NRBC BLD AUTO-RTO: 0 /100 WBC (ref 0–0.2)
PLATELET # BLD AUTO: 257 10*3/MM3 (ref 140–450)
PMV BLD AUTO: 9.2 FL (ref 6–12)
POTASSIUM SERPL-SCNC: 3.9 MMOL/L (ref 3.5–5.2)
PROT SERPL-MCNC: 6.1 G/DL (ref 6–8.5)
RBC # BLD AUTO: 3.68 10*6/MM3 (ref 4.14–5.8)
SODIUM SERPL-SCNC: 135 MMOL/L (ref 136–145)
WBC NRBC COR # BLD: 11.38 10*3/MM3 (ref 3.4–10.8)

## 2023-03-02 PROCEDURE — 36415 COLL VENOUS BLD VENIPUNCTURE: CPT | Performed by: INTERNAL MEDICINE

## 2023-03-02 PROCEDURE — 71045 X-RAY EXAM CHEST 1 VIEW: CPT | Performed by: RADIOLOGY

## 2023-03-02 PROCEDURE — 85025 COMPLETE CBC W/AUTO DIFF WBC: CPT | Performed by: INTERNAL MEDICINE

## 2023-03-02 PROCEDURE — 86140 C-REACTIVE PROTEIN: CPT | Performed by: INTERNAL MEDICINE

## 2023-03-02 PROCEDURE — 71045 X-RAY EXAM CHEST 1 VIEW: CPT

## 2023-03-02 PROCEDURE — 80053 COMPREHEN METABOLIC PANEL: CPT | Performed by: INTERNAL MEDICINE

## 2023-03-02 PROCEDURE — 99232 SBSQ HOSP IP/OBS MODERATE 35: CPT | Performed by: INTERNAL MEDICINE

## 2023-03-02 PROCEDURE — 99221 1ST HOSP IP/OBS SF/LOW 40: CPT | Performed by: PSYCHIATRY & NEUROLOGY

## 2023-03-02 PROCEDURE — 92526 ORAL FUNCTION THERAPY: CPT

## 2023-03-02 PROCEDURE — 83735 ASSAY OF MAGNESIUM: CPT | Performed by: INTERNAL MEDICINE

## 2023-03-02 NOTE — CONSULTS
Chief complaint/Focus of Exam: Mental status change    Subjective .     History of present illness:  From previous notes: Patient is a 52-year-old male who has been admitted since 1/5/2023 for respiratory failure and sepsis who initially had some difficulty weaning from the ventilator and has had some agitation and delirium at times.  Psychiatry originally consulted on the patient on 2/8/2023.  At that time, Dr. Jaffe tapered the patient off of Seroquel, discontinued Xanax, and started a Librium taper.  Psychiatry was reconsulted to evaluate patient for change in mental status is now he is somnolent and at that time I recommended expediting the taper of Librium.  Patient did improve somewhat with mentation after this but did have to be placed back on Xanax per her primary care team due to anxiety and agitation about being hospitalized.  Psychiatry was reconsulted to evaluate patient for medications.  I spoke with patient's wife and patient was somewhat too somnolent to participate fully in clinical evaluation but he does seem to be more alert.  He tends to have intermittent confusion and agitation still and his wife would like him to be on less medications if possible so we will work towards that end.    Psychiatry was reconsulted to continue to evaluate patient for medication management due to somnolence and anxiety.  I evaluated patient at bedside today.  He was participating in speech therapy.  He was awake and oriented.  He felt his anxiety was well controlled but he does have intermittent bouts of confusion.  He asked about medical marijuana which I advised was still illegal in the state.  He seems to be doing better and Depakote has been tapered off.    Review of Systems  Pertinent items are noted in HPI    History  Past Medical History:   Diagnosis Date   • Anemia    • Arthritis of back     not sure   • Johnson esophagus    • Cholelithiasis    • COPD (chronic obstructive pulmonary disease) (HCC)    • CTS  (carpal tunnel syndrome)    • DDD (degenerative disc disease), thoracic    • Degenerative disc disease, lumbar    • Dystonia    • GERD (gastroesophageal reflux disease)    • GI (gastrointestinal bleed)    • Hypertension    • Irritable bowel syndrome    • Low back strain    • Lumbosacral disc disease    • Migraines    • Peptic ulcer disease    • Stroke (HCC)     TIA   • TIA (transient ischemic attack)    • Ulcerative colitis (HCC)    • Wears dentures     upper only   ,   Past Surgical History:   Procedure Laterality Date   • ABDOMINAL SURGERY  09/17/2021   • APPENDECTOMY     • CHOLECYSTECTOMY N/A 04/22/2017    Procedure: CHOLECYSTECTOMY LAPAROSCOPIC;  Surgeon: Jaqueline Guaman MD;  Location:  [facility];  Service:    • COLONOSCOPY N/A 05/08/2017    Procedure: COLONOSCOPY  CPTCODE:46846;  Surgeon: Nicho Richey III, MD;  Location:  [facility];  Service:    • CUBITAL TUNNEL RELEASE Left 9/1/2022    Procedure: CUBITAL TUNNEL RELEASE LEFT;  Surgeon: Alec Hough MD;  [facility]:  [facility];  Service: Orthopedics;  Laterality: Left;   • ENDOSCOPY     • ENDOSCOPY N/A 05/08/2017    Procedure: ESOPHAGOGASTRODUODENOSCOPY WITH BIOPSY  CPTCODE:41629;  Surgeon: Nicho Richey III, MD;  Location:  [facility];  Service:    • ENDOSCOPY N/A 11/03/2017    Procedure: ESOPHAGOGASTRODUODENOSCOPY WITH BIOPSY  CPTCODE: 84327;  Surgeon: Nicho Richey III, MD;  [facility];  Service:    • ENDOSCOPY N/A 02/20/2018    Procedure: ESOPHAGOGASTRODUODENOSCOPY WITH DILATATION CPT CODE: 78278;  Surgeon: Nicho Richey III, MD;  [facility];  Service:    • ENDOSCOPY N/A 06/05/2018    Procedure: ESOPHAGOGASTRODUODENOSCOPY WITH BIOPSY CPT CODE: 88514;  Surgeon: Nicho Richey III, MD;  Location:  [facility];  Service: Gastroenterology   • ENDOSCOPY N/A 05/26/2021    Procedure: ESOPHAGOGASTRODUODENOSCOPY WITH BIOPSY;  Surgeon: Julieta Hebert MD;  [facility]:  [facility];  Service: Gastroenterology;   Laterality: N/A;   • ENDOSCOPY N/A 2021    Procedure: ESOPHAGOGASTRODUODENOSCOPY WITH BIOPSY;  Surgeon: Julieta Hebert MD;  [facility];  Service: Gastroenterology;  Laterality: N/A;   • GASTRECTOMY N/A 2019    Procedure: OPEN VAGOTOMY, ANTRECTOMY,REVISION- Y GASTROJEJUNOSOTOMY;  Surgeon: Herbert Umanzor MD;  Location:  [facility];  Service: General   • TRACHEOSTOMY N/A 2023    Procedure: TRACHEOSTOMY;  Surgeon: Felton De León MD;  Location:  [facility];  Service: General;  Laterality: N/A;   • UPPER GASTROINTESTINAL ENDOSCOPY     • VENTRAL/INCISIONAL HERNIA REPAIR N/A 12/10/2020    Procedure: INCISIONAL HERNIA REPAIR LAPAROSCOPIC WITH MESH;  Surgeon: Herbert Umanzor MD;  Location:  [facility];  Service: General;  Laterality: N/A;   ,   Family History   Problem Relation Age of Onset   • Osteoporosis Mother    • Hypertension Father    • Osteoporosis Father    • No Known Problems Sister    • No Known Problems Brother    • Drug abuse Brother    • No Known Problems Daughter    • Diabetes Sister    ,   Social History     Socioeconomic History   • Marital status:    Tobacco Use   • Smoking status: Every Day     Packs/day: 1.00     Years: 35.00     Pack years: 35.00     Types: Cigarettes     Last attempt to quit: 2020     Years since quittin.4   • Smokeless tobacco: Never   • Tobacco comments:     Been smoking 20 years   Vaping Use   • Vaping Use: Never used   Substance and Sexual Activity   • Alcohol use: Yes     Alcohol/week: 4.0 standard drinks     Types: 4 Cans of beer per week   • Drug use: No   • Sexual activity: Yes     Partners: Female     Birth control/protection: None     E-cigarette/Vaping   • E-cigarette/Vaping Use Never User      E-cigarette/Vaping Substances   • Nicotine No    • THC No    • CBD No    • Flavoring No      E-cigarette/Vaping Devices   • Disposable No    • Pre-filled or Refillable Cartridge No    • Refillable Tank No    • Pre-filled Pod  No        ,   Medications Prior to Admission   Medication Sig Dispense Refill Last Dose   • Acetaminophen 650 MG/20.3ML suspension Administer 650 mg per G tube Every 4 (Four) Hours As Needed.   Unknown   • ALPRAZolam (XANAX) 1 MG tablet Take 1 mg by mouth 2 (Two) Times a Day.   Unknown   • aluminum-magnesium hydroxide-simethicone (MAALOX/MYLANTA) 200-200-20 MG/5ML suspension Administer 30 mL per G tube Every 6 (Six) Hours As Needed for Indigestion or Heartburn.   Unknown   • cefepime 2 g in sodium chloride 0.9 % 100 mL IVPB Infuse 2 g into a venous catheter Every 8 (Eight) Hours.   Unknown   • dexmedetomidine HCl (PRECEDEX) 200 MCG/2ML injection Infuse 400 mcg into a venous catheter Take As Directed.   Unknown   • dextrose (D50W) 50 % solution Infuse 50 mL into a venous catheter Daily As Needed for Low Blood Sugar.   Unknown   • dextrose (GLUTOSE) 40 % gel Take 1 g by mouth Daily As Needed for Low Blood Sugar.   Unknown   • dextrose 5 % solution with norepinephrine 1 MG/ML solution Infuse 0.5 mcg/min into a venous catheter Dose Adjusted By Provider As Needed.   Unknown   • docusate sodium (COLACE) 100 MG capsule Administer 100 mg per G tube 2 (Two) Times a Day.   Unknown   • famotidine (PEPCID) 20 MG tablet Administer 20 mg per G tube 2 (Two) Times a Day.   Unknown   • fentaNYL Citrate 100 MCG/2ML solution prefilled syringe Inject 50 mcg as directed Every 2 (Two) Hours As Needed.   Unknown   • folic acid (FOLVITE) 1 MG tablet Administer 1 mg per G tube Daily.   Unknown   • glucagon (GLUCAGEN) 1 MG injection Inject 1 mg under the skin into the appropriate area as directed Daily As Needed.   Unknown   • heparin 100 UNIT/ML solution injection Infuse 100 Units into a venous catheter Every 12 (Twelve) Hours.   Unknown   • heparin 100 UNIT/ML solution injection Infuse 100 Units into a venous catheter As Needed for Line Care.   Unknown   • Hydrocortisone Sod Suc, PF, (Solu-CORTEF) 100 MG injection Infuse 50 mg into a  venous catheter Every 12 (Twelve) Hours.   Unknown   • Insulin Lispro (humaLOG) 100 UNIT/ML injection Inject 0-6 Units under the skin into the appropriate area as directed Every 6 (Six) Hours.   Unknown   • ipratropium (ATROVENT) 0.02 % nebulizer solution Take 500 mcg by nebulization 4 (Four) Times a Day.   Unknown   • levETIRAcetam 1,000 mg in sodium chloride 0.9 % 100 mL IVPB Infuse 1,000 mg into a venous catheter 3 (Three) Times a Day.   Unknown   • levothyroxine (SYNTHROID, LEVOTHROID) 25 MCG tablet Take 25 mcg by mouth Daily.   Unknown   • magnesium hydroxide (MILK OF MAGNESIA) 2400 MG/10ML suspension suspension Administer 10 mL per G tube Daily As Needed.   Unknown   • magnesium oxide (MAG-OX) 400 MG tablet Administer 400 mg per G tube 2 (Two) Times a Day As Needed (per protocol).   Unknown   • Magnesium Sulfate in D5W (magnesium sulfate 1g in 0.9% NaCl) 10-5 MG/ML-% IVPB Infuse 1 g into a venous catheter Every 1 (One) Hour As Needed (per protocol).   Unknown   • midodrine (PROAMATINE) 5 MG tablet Take 15 mg by mouth 3 (Three) Times a Day Before Meals.   Unknown   • OLANZapine (zyPREXA) 5 MG tablet Administer 10 mg per G tube 2 (Two) Times a Day.   Unknown   • ondansetron (ZOFRAN) 2 mg/mL injection Infuse 4 mg into a venous catheter Every 6 (Six) Hours As Needed for Nausea or Vomiting.   Unknown   • oxyCODONE (ROXICODONE) 5 MG immediate release tablet Take 5 mg by mouth Every 6 (Six) Hours As Needed for Moderate Pain.   Unknown   • potassium chloride (K-DUR,KLOR-CON) 20 MEQ CR tablet Take 20 mEq by mouth Every 3 (Three) Hours As Needed (per protocol).   Unknown   • potassium chloride 10 MEQ/100ML Infuse 10 mEq into a venous catheter Every 1 (One) Hour As Needed (per protocol).   Unknown   • Propofol (DIPRIVAN) 10 MG/ML injection Infuse 1,000 mg into a venous catheter Every 4 (Four) Hours As Needed.   Unknown   • sennosides-docusate (PERICOLACE) 8.6-50 MG per tablet Administer 2 tablets per G tube 2 (Two) Times  a Day.   Unknown   • sodium chloride 0.9 % flush Infuse 10 mL into a venous catheter Every 12 (Twelve) Hours.   Unknown   • sodium chloride 0.9 % flush Infuse 10 mL into a venous catheter As Needed for Line Care.   Unknown   • thiamine (VITAMIN B-1) 100 MG tablet  tablet Administer 100 mg per G tube Daily.   Unknown   , Scheduled Meds:  , Continuous Infusions:  No current facility-administered medications for this encounter.  , PRN Meds:   and Allergies:  Contrast dye (echo or unknown ct/mr), Prilosec [omeprazole], Protonix [pantoprazole sodium], Dilantin [phenytoin], Dilaudid [hydromorphone], Flagyl [metronidazole], and Topamax [topiramate]    Objective     Vital Signs        Mental Status Exam:   Mental Status Exam:        Hygiene:   good  Cooperation:  Cooperative  Eye Contact:  Good  Psychomotor Behavior:  Slow  Affect:  Full range  Speech:  Normal  Thought Progress:  Goal directed and Linear  Thought Content:  Normal  Suicidal:  None  Homicidal:  None  Hallucinations:  None  Delusion:  None  Memory:  Intact  Orientation:  Person, Place, Time and Situation  Reliability:  poor  Insight:  Poor  Judgement:  Fair  Impulse Control:  Fair    Results Review:   I reviewed the patient's new clinical results.  Lab Results (last 24 hours)     Procedure Component Value Units Date/Time    C-reactive Protein [[id number]]  (Abnormal) Collected: 03/02/23 0240    Specimen: Blood Updated: 03/02/23 0315     C-Reactive Protein 3.08 mg/dL     Comprehensive Metabolic Panel [[id number]]  (Abnormal) Collected: 03/02/23 0240    Specimen: Blood Updated: 03/02/23 0315     Glucose 114 mg/dL      BUN 5 mg/dL      Creatinine 0.36 mg/dL      Sodium 135 mmol/L      Potassium 3.9 mmol/L      Chloride 98 mmol/L      CO2 28.1 mmol/L      Calcium 8.9 mg/dL      Total Protein 6.1 g/dL      Albumin 2.7 g/dL      ALT (SGPT) <5 U/L      AST (SGOT) 14 U/L      Alkaline Phosphatase 110 U/L      Total Bilirubin 0.4 mg/dL      Globulin 3.4 gm/dL      A/G  Ratio 0.8 g/dL      BUN/Creatinine Ratio 13.9     Anion Gap 8.9 mmol/L      eGFR 133.8 mL/min/1.73     Narrative:      GFR Normal >60  Chronic Kidney Disease <60  Kidney Failure <15      Magnesium [[id number]]  (Normal) Collected: 03/02/23 0240    Specimen: Blood Updated: 03/02/23 0315     Magnesium 1.6 mg/dL     CBC & Differential [[id number]]  (Abnormal) Collected: 03/02/23 0240    Specimen: Blood Updated: 03/02/23 0253    Narrative:      The following orders were created for panel order CBC & Differential.  Procedure                               Abnormality         Status                     ---------                               -----------         ------                     CBC Auto Differential[[id number]]        Abnormal            Final result                 Please view results for these tests on the individual orders.    CBC Auto Differential [[id number]]  (Abnormal) Collected: 03/02/23 0240    Specimen: Blood Updated: 03/02/23 0253     WBC 11.38 10*3/mm3      RBC 3.68 10*6/mm3      Hemoglobin 10.8 g/dL      Hematocrit 34.7 %      MCV 94.3 fL      MCH 29.3 pg      MCHC 31.1 g/dL      RDW 13.5 %      RDW-SD 46.5 fl      MPV 9.2 fL      Platelets 257 10*3/mm3      Neutrophil % 74.6 %      Lymphocyte % 12.7 %      Monocyte % 10.5 %      Eosinophil % 1.3 %      Basophil % 0.5 %      Immature Grans % 0.4 %      Neutrophils, Absolute 8.49 10*3/mm3      Lymphocytes, Absolute 1.45 10*3/mm3      Monocytes, Absolute 1.19 10*3/mm3      Eosinophils, Absolute 0.15 10*3/mm3      Basophils, Absolute 0.06 10*3/mm3      Immature Grans, Absolute 0.04 10*3/mm3      nRBC 0.0 /100 WBC         Imaging Results (Last 24 Hours)     Procedure Component Value Units Date/Time    FL Video Swallow Single Contrast [[id number]] Collected: 03/01/23 1332     Updated: 03/01/23 1334    Narrative:      EXAM:    FL Swallowing Function With Video/Cine     EXAM DATE:    3/1/2023 12:36 PM     CLINICAL HISTORY:    Aspiration r/o     TECHNIQUE:     Fluoroscopy of the oral cavity through upper esophagus with video/cine  recording.  The study was performed in conjunction with speech  pathology.  Various consistencies of liquid and food were administered  orally by the speech pathologist.     Fluoroscopy exposure time of  approximately 1 minute or less.     COMPARISON:    No relevant prior studies available.     FINDINGS:    PHARYNGEAL PHASE:  Significant pharyngeal residue remains post initial  po presentations, improving across this evaluation. Pharyngeal residue  remaining decreases across this evaluation. Aspiration evidenced of  nectar thick liquids via initial spoon presentation after the.  swallow  of remaining residue from puree and honey consistencies mixing w/  secretions. Aspiration of thin liquids via all presentation styles  during the swallow w/ cough response elicited, however is insufficient  to clear aspiration and remaining.  residue. Repeated cued swallows are  insufficient to clear pharyngeal residue remaining. Isolated event of  aspiration of nectar thick liquids via cup presentation style x1 of 5  presentations. Compensatory strategy of chin tuck is sufficient to  extinguish.  aspiration events, however pt is unable to consistently  perform this strategy despite max verbal and visual cues.       Impression:          Please see the speech pathologist's report for additional  information.     This report was finalized on 3/1/2023 1:32 PM by Dr. Fab Huff MD.               Assessment & Plan     Altered mental status, Anxiety  -Patient seems to be improving with recommendations, continue as is for now        I discussed the patients findings and my recommendations with patient, family and nursing staff    Reymundo Dudley MD  03/02/23  13:17 EST

## 2023-03-03 ENCOUNTER — OUTSIDE FACILITY SERVICE (OUTPATIENT)
Dept: INFECTIOUS DISEASES | Facility: CLINIC | Age: 53
End: 2023-03-03
Payer: MEDICARE

## 2023-03-03 ENCOUNTER — OUTSIDE FACILITY SERVICE (OUTPATIENT)
Dept: PULMONOLOGY | Facility: CLINIC | Age: 53
End: 2023-03-03
Payer: MEDICARE

## 2023-03-03 LAB
BASOPHILS # BLD AUTO: 0.09 10*3/MM3 (ref 0–0.2)
BASOPHILS NFR BLD AUTO: 0.8 % (ref 0–1.5)
DEPRECATED RDW RBC AUTO: 47.3 FL (ref 37–54)
EOSINOPHIL # BLD AUTO: 0.14 10*3/MM3 (ref 0–0.4)
EOSINOPHIL NFR BLD AUTO: 1.3 % (ref 0.3–6.2)
ERYTHROCYTE [DISTWIDTH] IN BLOOD BY AUTOMATED COUNT: 13.4 % (ref 12.3–15.4)
HCT VFR BLD AUTO: 33.8 % (ref 37.5–51)
HGB BLD-MCNC: 10.7 G/DL (ref 13–17.7)
IMM GRANULOCYTES # BLD AUTO: 0.03 10*3/MM3 (ref 0–0.05)
IMM GRANULOCYTES NFR BLD AUTO: 0.3 % (ref 0–0.5)
LYMPHOCYTES # BLD AUTO: 1.44 10*3/MM3 (ref 0.7–3.1)
LYMPHOCYTES NFR BLD AUTO: 13.3 % (ref 19.6–45.3)
MAGNESIUM SERPL-MCNC: 1.5 MG/DL (ref 1.6–2.6)
MCH RBC QN AUTO: 30.1 PG (ref 26.6–33)
MCHC RBC AUTO-ENTMCNC: 31.7 G/DL (ref 31.5–35.7)
MCV RBC AUTO: 95.2 FL (ref 79–97)
MONOCYTES # BLD AUTO: 1.14 10*3/MM3 (ref 0.1–0.9)
MONOCYTES NFR BLD AUTO: 10.5 % (ref 5–12)
NEUTROPHILS NFR BLD AUTO: 73.8 % (ref 42.7–76)
NEUTROPHILS NFR BLD AUTO: 8 10*3/MM3 (ref 1.7–7)
NRBC BLD AUTO-RTO: 0 /100 WBC (ref 0–0.2)
PLATELET # BLD AUTO: 258 10*3/MM3 (ref 140–450)
PMV BLD AUTO: 9.1 FL (ref 6–12)
QT INTERVAL: 338 MS
QTC INTERVAL: 451 MS
RBC # BLD AUTO: 3.55 10*6/MM3 (ref 4.14–5.8)
WBC NRBC COR # BLD: 10.84 10*3/MM3 (ref 3.4–10.8)

## 2023-03-03 PROCEDURE — 93005 ELECTROCARDIOGRAM TRACING: CPT | Performed by: INTERNAL MEDICINE

## 2023-03-03 PROCEDURE — 83735 ASSAY OF MAGNESIUM: CPT | Performed by: INTERNAL MEDICINE

## 2023-03-03 PROCEDURE — 92526 ORAL FUNCTION THERAPY: CPT

## 2023-03-03 PROCEDURE — 36415 COLL VENOUS BLD VENIPUNCTURE: CPT | Performed by: INTERNAL MEDICINE

## 2023-03-03 PROCEDURE — 99232 SBSQ HOSP IP/OBS MODERATE 35: CPT | Performed by: INTERNAL MEDICINE

## 2023-03-03 PROCEDURE — 99232 SBSQ HOSP IP/OBS MODERATE 35: CPT | Performed by: NURSE PRACTITIONER

## 2023-03-03 PROCEDURE — 85025 COMPLETE CBC W/AUTO DIFF WBC: CPT | Performed by: INTERNAL MEDICINE

## 2023-03-03 PROCEDURE — 80053 COMPREHEN METABOLIC PANEL: CPT | Performed by: INTERNAL MEDICINE

## 2023-03-03 PROCEDURE — 86140 C-REACTIVE PROTEIN: CPT | Performed by: INTERNAL MEDICINE

## 2023-03-03 PROCEDURE — 93010 ELECTROCARDIOGRAM REPORT: CPT | Performed by: INTERNAL MEDICINE

## 2023-03-04 ENCOUNTER — OUTSIDE FACILITY SERVICE (OUTPATIENT)
Dept: PULMONOLOGY | Facility: CLINIC | Age: 53
End: 2023-03-04
Payer: MEDICARE

## 2023-03-04 LAB
ALBUMIN SERPL-MCNC: 2.7 G/DL (ref 3.5–5.2)
ALBUMIN/GLOB SERPL: 0.8 G/DL
ALP SERPL-CCNC: 109 U/L (ref 39–117)
ALT SERPL W P-5'-P-CCNC: 5 U/L (ref 1–41)
ANION GAP SERPL CALCULATED.3IONS-SCNC: 9.5 MMOL/L (ref 5–15)
AST SERPL-CCNC: 14 U/L (ref 1–40)
BILIRUB SERPL-MCNC: 0.4 MG/DL (ref 0–1.2)
BUN SERPL-MCNC: 6 MG/DL (ref 6–20)
BUN/CREAT SERPL: 15 (ref 7–25)
CALCIUM SPEC-SCNC: 8.7 MG/DL (ref 8.6–10.5)
CHLORIDE SERPL-SCNC: 101 MMOL/L (ref 98–107)
CO2 SERPL-SCNC: 28.5 MMOL/L (ref 22–29)
CREAT SERPL-MCNC: 0.4 MG/DL (ref 0.76–1.27)
CRP SERPL-MCNC: 1.22 MG/DL (ref 0–0.5)
EGFRCR SERPLBLD CKD-EPI 2021: 129.7 ML/MIN/1.73
GLOBULIN UR ELPH-MCNC: 3.4 GM/DL
GLUCOSE SERPL-MCNC: 103 MG/DL (ref 65–99)
MAGNESIUM SERPL-MCNC: 1.8 MG/DL (ref 1.6–2.6)
MAGNESIUM SERPL-MCNC: 1.9 MG/DL (ref 1.6–2.6)
POTASSIUM SERPL-SCNC: 3.8 MMOL/L (ref 3.5–5.2)
PROT SERPL-MCNC: 6.1 G/DL (ref 6–8.5)
SODIUM SERPL-SCNC: 139 MMOL/L (ref 136–145)

## 2023-03-04 PROCEDURE — 83735 ASSAY OF MAGNESIUM: CPT | Performed by: PHYSICIAN ASSISTANT

## 2023-03-04 PROCEDURE — 99232 SBSQ HOSP IP/OBS MODERATE 35: CPT | Performed by: INTERNAL MEDICINE

## 2023-03-05 ENCOUNTER — OUTSIDE FACILITY SERVICE (OUTPATIENT)
Dept: PULMONOLOGY | Facility: CLINIC | Age: 53
End: 2023-03-05
Payer: MEDICARE

## 2023-03-05 LAB
GLUCOSE BLDC GLUCOMTR-MCNC: 139 MG/DL (ref 70–130)
MAGNESIUM SERPL-MCNC: 1.6 MG/DL (ref 1.6–2.6)

## 2023-03-05 PROCEDURE — 36415 COLL VENOUS BLD VENIPUNCTURE: CPT | Performed by: INTERNAL MEDICINE

## 2023-03-05 PROCEDURE — 82962 GLUCOSE BLOOD TEST: CPT

## 2023-03-05 PROCEDURE — 99232 SBSQ HOSP IP/OBS MODERATE 35: CPT | Performed by: INTERNAL MEDICINE

## 2023-03-05 PROCEDURE — 83735 ASSAY OF MAGNESIUM: CPT | Performed by: INTERNAL MEDICINE

## 2023-03-06 ENCOUNTER — OUTSIDE FACILITY SERVICE (OUTPATIENT)
Dept: INFECTIOUS DISEASES | Facility: CLINIC | Age: 53
End: 2023-03-06
Payer: MEDICARE

## 2023-03-06 ENCOUNTER — OUTSIDE FACILITY SERVICE (OUTPATIENT)
Dept: PULMONOLOGY | Facility: CLINIC | Age: 53
End: 2023-03-06
Payer: MEDICARE

## 2023-03-06 LAB
ALBUMIN SERPL-MCNC: 2.6 G/DL (ref 3.5–5.2)
ALBUMIN/GLOB SERPL: 0.8 G/DL
ALP SERPL-CCNC: 101 U/L (ref 39–117)
ALT SERPL W P-5'-P-CCNC: 7 U/L (ref 1–41)
ANION GAP SERPL CALCULATED.3IONS-SCNC: 10.1 MMOL/L (ref 5–15)
AST SERPL-CCNC: 17 U/L (ref 1–40)
BASOPHILS # BLD AUTO: 0.08 10*3/MM3 (ref 0–0.2)
BASOPHILS NFR BLD AUTO: 1 % (ref 0–1.5)
BILIRUB SERPL-MCNC: 0.4 MG/DL (ref 0–1.2)
BUN SERPL-MCNC: 7 MG/DL (ref 6–20)
BUN/CREAT SERPL: 20 (ref 7–25)
CALCIUM SPEC-SCNC: 8.6 MG/DL (ref 8.6–10.5)
CHLORIDE SERPL-SCNC: 102 MMOL/L (ref 98–107)
CO2 SERPL-SCNC: 25.9 MMOL/L (ref 22–29)
CREAT SERPL-MCNC: 0.35 MG/DL (ref 0.76–1.27)
CRP SERPL-MCNC: 0.89 MG/DL (ref 0–0.5)
DEPRECATED RDW RBC AUTO: 46.9 FL (ref 37–54)
EGFRCR SERPLBLD CKD-EPI 2021: 135 ML/MIN/1.73
EOSINOPHIL # BLD AUTO: 0.13 10*3/MM3 (ref 0–0.4)
EOSINOPHIL NFR BLD AUTO: 1.6 % (ref 0.3–6.2)
ERYTHROCYTE [DISTWIDTH] IN BLOOD BY AUTOMATED COUNT: 13.3 % (ref 12.3–15.4)
GLOBULIN UR ELPH-MCNC: 3.2 GM/DL
GLUCOSE BLDC GLUCOMTR-MCNC: 114 MG/DL (ref 70–130)
GLUCOSE SERPL-MCNC: 104 MG/DL (ref 65–99)
HCT VFR BLD AUTO: 35.7 % (ref 37.5–51)
HGB BLD-MCNC: 11.2 G/DL (ref 13–17.7)
IMM GRANULOCYTES # BLD AUTO: 0.02 10*3/MM3 (ref 0–0.05)
IMM GRANULOCYTES NFR BLD AUTO: 0.2 % (ref 0–0.5)
LYMPHOCYTES # BLD AUTO: 0.9 10*3/MM3 (ref 0.7–3.1)
LYMPHOCYTES NFR BLD AUTO: 10.8 % (ref 19.6–45.3)
MAGNESIUM SERPL-MCNC: 1.7 MG/DL (ref 1.6–2.6)
MCH RBC QN AUTO: 29.9 PG (ref 26.6–33)
MCHC RBC AUTO-ENTMCNC: 31.4 G/DL (ref 31.5–35.7)
MCV RBC AUTO: 95.5 FL (ref 79–97)
MONOCYTES # BLD AUTO: 1.02 10*3/MM3 (ref 0.1–0.9)
MONOCYTES NFR BLD AUTO: 12.2 % (ref 5–12)
NEUTROPHILS NFR BLD AUTO: 6.18 10*3/MM3 (ref 1.7–7)
NEUTROPHILS NFR BLD AUTO: 74.2 % (ref 42.7–76)
NRBC BLD AUTO-RTO: 0 /100 WBC (ref 0–0.2)
PLATELET # BLD AUTO: 209 10*3/MM3 (ref 140–450)
PMV BLD AUTO: 9.2 FL (ref 6–12)
POTASSIUM SERPL-SCNC: 3.7 MMOL/L (ref 3.5–5.2)
PROT SERPL-MCNC: 5.8 G/DL (ref 6–8.5)
RBC # BLD AUTO: 3.74 10*6/MM3 (ref 4.14–5.8)
SODIUM SERPL-SCNC: 138 MMOL/L (ref 136–145)
WBC NRBC COR # BLD: 8.33 10*3/MM3 (ref 3.4–10.8)

## 2023-03-06 PROCEDURE — 82962 GLUCOSE BLOOD TEST: CPT

## 2023-03-06 PROCEDURE — 85025 COMPLETE CBC W/AUTO DIFF WBC: CPT | Performed by: INTERNAL MEDICINE

## 2023-03-06 PROCEDURE — 86140 C-REACTIVE PROTEIN: CPT | Performed by: INTERNAL MEDICINE

## 2023-03-06 PROCEDURE — 99232 SBSQ HOSP IP/OBS MODERATE 35: CPT | Performed by: NURSE PRACTITIONER

## 2023-03-06 PROCEDURE — 99232 SBSQ HOSP IP/OBS MODERATE 35: CPT | Performed by: INTERNAL MEDICINE

## 2023-03-06 PROCEDURE — 92610 EVALUATE SWALLOWING FUNCTION: CPT

## 2023-03-06 PROCEDURE — 83735 ASSAY OF MAGNESIUM: CPT | Performed by: INTERNAL MEDICINE

## 2023-03-06 PROCEDURE — 80053 COMPREHEN METABOLIC PANEL: CPT | Performed by: INTERNAL MEDICINE

## 2023-03-07 ENCOUNTER — OUTSIDE FACILITY SERVICE (OUTPATIENT)
Dept: PULMONOLOGY | Facility: CLINIC | Age: 53
End: 2023-03-07
Payer: MEDICARE

## 2023-03-07 ENCOUNTER — OUTSIDE FACILITY SERVICE (OUTPATIENT)
Dept: INFECTIOUS DISEASES | Facility: CLINIC | Age: 53
End: 2023-03-07
Payer: MEDICARE

## 2023-03-07 LAB
GLUCOSE BLDC GLUCOMTR-MCNC: 103 MG/DL (ref 70–130)
MAGNESIUM SERPL-MCNC: 1.7 MG/DL (ref 1.6–2.6)

## 2023-03-07 PROCEDURE — 36415 COLL VENOUS BLD VENIPUNCTURE: CPT | Performed by: INTERNAL MEDICINE

## 2023-03-07 PROCEDURE — 99232 SBSQ HOSP IP/OBS MODERATE 35: CPT | Performed by: INTERNAL MEDICINE

## 2023-03-07 PROCEDURE — 83735 ASSAY OF MAGNESIUM: CPT | Performed by: INTERNAL MEDICINE

## 2023-03-07 PROCEDURE — 82962 GLUCOSE BLOOD TEST: CPT

## 2023-03-07 PROCEDURE — 99232 SBSQ HOSP IP/OBS MODERATE 35: CPT | Performed by: NURSE PRACTITIONER

## 2023-03-08 ENCOUNTER — TELEPHONE (OUTPATIENT)
Dept: CARDIOLOGY | Facility: CLINIC | Age: 53
End: 2023-03-08
Payer: MEDICARE

## 2023-03-08 NOTE — TELEPHONE ENCOUNTER
Caller: BRIAN    Relationship: SELF      Best call back number:[phone]     best time to reach you: ANY    Who are you requesting to speak with (clinical staff, provider,  specific staff member): CLINICAL        What was the call regarding: PATIENT NEEDS FOLLOW UP VISIT WAS JUST RELEASED FROM THE HOSPITAL ON 3/7/23. PLEASE CALL.    Do you require a callback: YES

## 2023-03-28 ENCOUNTER — OFFICE VISIT (OUTPATIENT)
Dept: CARDIOLOGY | Facility: CLINIC | Age: 53
End: 2023-03-28
Payer: MEDICARE

## 2023-03-28 VITALS
HEART RATE: 74 BPM | BODY MASS INDEX: 21.86 KG/M2 | SYSTOLIC BLOOD PRESSURE: 104 MMHG | WEIGHT: 136 LBS | RESPIRATION RATE: 16 BRPM | HEIGHT: 66 IN | OXYGEN SATURATION: 100 % | DIASTOLIC BLOOD PRESSURE: 67 MMHG

## 2023-03-28 DIAGNOSIS — Z72.0 TOBACCO USE: ICD-10-CM

## 2023-03-28 DIAGNOSIS — I10 ESSENTIAL HYPERTENSION: Primary | ICD-10-CM

## 2023-03-28 PROCEDURE — 3078F DIAST BP <80 MM HG: CPT | Performed by: NURSE PRACTITIONER

## 2023-03-28 PROCEDURE — 3074F SYST BP LT 130 MM HG: CPT | Performed by: NURSE PRACTITIONER

## 2023-03-28 PROCEDURE — 1159F MED LIST DOCD IN RCRD: CPT | Performed by: NURSE PRACTITIONER

## 2023-03-28 PROCEDURE — 1160F RVW MEDS BY RX/DR IN RCRD: CPT | Performed by: NURSE PRACTITIONER

## 2023-03-28 PROCEDURE — 99213 OFFICE O/P EST LOW 20 MIN: CPT | Performed by: NURSE PRACTITIONER

## 2023-03-28 RX ORDER — SPIRONOLACTONE 25 MG/1
25 TABLET ORAL DAILY
COMMUNITY

## 2023-03-28 NOTE — PROGRESS NOTES
[facility]             EILEEN Coronado Theresa, APRN [facility]  1970  03/28/2023    Patient Active Problem List   Diagnosis   • Precordial chest pain   • Weight loss, abnormal   • Right upper quadrant abdominal pain   • Epigastric pain   • History of bleeding ulcers   • Nausea   • Malaise and fatigue   • Acute gastric ulcer without hemorrhage or perforation   • Poor appetite   • Duodenal ulcer   • Gastroesophageal reflux disease   • Dysphagia   • Iron deficiency anemia   • Malabsorption   • Gastric ulcer without hemorrhage or perforation   • Dystonia   • Peptic ulcer without hemorrhage or perforation   • Gastric outlet obstruction   • Incisional hernia, without obstruction or gangrene   • Incisional hernia   • Chest pain, atypical   • Gastric bezoar   • Essential hypertension   • Cigarette smoker   • Chronic back pain   • Sinus tachycardia   • Tobacco use   • Mild carpal tunnel syndrome   • Orthostatic hypotension   • Palpitations   • Abdominal swelling   • Lower extremity edema   • Pneumonia due to infectious organism, unspecified laterality, unspecified part of lung   • Acute respiratory failure with hypoxia (HCC)       Dear Leticia Martinez APRN:    Subjective     Chief Complaint   Patient presents with   • Follow-up     Hosp stay, CHF   • Palpitations     some   • Edema     occas   • Med Management     Pharmacy slips       HPI:     This is a 52 y.o. male with known past medical history of orthostatic hypotension, tobacco abuse, alcohol abuse and sinus tachycardia.    Jamison Edward presents today for hospital follow-up.  Patient was recently admitted to [facility] and December/January 2022/2023 for community-acquired pneumonia, acute hypoxic respiratory failure secondary to aspiration pneumonia, septic shock, liver cirrhosis and metabolic encephalopathy.  Patient had a prolonged hospital admission due to aspiration event  for which she had acute respiratory distress requiring intubation and developed aspiration pneumonia and septic shock requiring vasopressor support.  He was then transferred to continue care for extended therapy.    Patient states since being discharged from the hospital he has been doing overall well.  Does have some weakness and has been using a walker but is currently working with physical therapy.  Denies any chest pain, shortness of breath or lower extremity edema.  He was taken off multiple medications during his hospitalization and is currently only on metoprolol and spironolactone.  Blood pressure stable.  Echocardiogram during his admission showed normal LV function.    • Diagnostic Testing  1. Nuclear stress test 5/2017: No evidence of ischemia  2. Echocardiogram 9/2021: Ef 61-65%  3. Nuclear stress test 9/2021: No evidence of ischemia  4. Cardiac event monitor 10/2022: Normal sinus rhythm with episodes of sinus tachycardia and sinus bradycardia with no arrhythmias noted  5. Echocardiogram 12/2022: EF greater than 70%     All other systems were reviewed and were negative.    Patient Active Problem List   Diagnosis   • Precordial chest pain   • Weight loss, abnormal   • Right upper quadrant abdominal pain   • Epigastric pain   • History of bleeding ulcers   • Nausea   • Malaise and fatigue   • Acute gastric ulcer without hemorrhage or perforation   • Poor appetite   • Duodenal ulcer   • Gastroesophageal reflux disease   • Dysphagia   • Iron deficiency anemia   • Malabsorption   • Gastric ulcer without hemorrhage or perforation   • Dystonia   • Peptic ulcer without hemorrhage or perforation   • Gastric outlet obstruction   • Incisional hernia, without obstruction or gangrene   • Incisional hernia   • Chest pain, atypical   • Gastric bezoar   • Essential hypertension   • Cigarette smoker   • Chronic back pain   • Sinus tachycardia   • Tobacco use   • Mild carpal tunnel syndrome   • Orthostatic hypotension   •  Palpitations   • Abdominal swelling   • Lower extremity edema   • Pneumonia due to infectious organism, unspecified laterality, unspecified part of lung   • Acute respiratory failure with hypoxia (HCC)       family history includes Diabetes in his sister; Drug abuse in his brother; Hypertension in his father; No Known Problems in his brother, daughter, and sister; Osteoporosis in his father and mother.     reports that he has been smoking cigarettes. He has a 35.00 pack-year smoking history. He has never used smokeless tobacco. He reports current alcohol use of about 4.0 standard drinks per week. He reports that he does not use drugs.    Allergies   Allergen Reactions   • Contrast Dye (Echo Or Unknown Ct/Mr) Other (See Comments)     Shortness of breath   • Prilosec [Omeprazole] Other (See Comments)     Chest pain  Pt states he can take pepcid with no problem   • Protonix [Pantoprazole Sodium] Palpitations     Patient states that protonix causes chest pain.  Shruthi Pérez, [facility]  4/23/2017  11:19 AM  Pt states he can tolerate pepcid with no problem     • Ativan [Lorazepam] Hallucinations   • Depakene [Valproic Acid] Hallucinations   • Geodon [Ziprasidone Hcl] Mental Status Change   • Libritabs [Chlordiazepoxide] Other (See Comments)     Tongue swell/split   • Dilantin [Phenytoin] Delirium   • Dilaudid [Hydromorphone] Hallucinations   • Flagyl [Metronidazole] Nausea And Vomiting   • Topamax [Topiramate] Anxiety         Current Outpatient Medications:   •  metoprolol tartrate (LOPRESSOR) 25 MG tablet, Take 1 tablet by mouth 2 (Two) Times a Day., Disp: , Rfl:   •  spironolactone (ALDACTONE) 25 MG tablet, Take 1 tablet by mouth Daily., Disp: , Rfl:   •  levothyroxine (SYNTHROID, LEVOTHROID) 25 MCG tablet, Take 25 mcg by mouth Daily., Disp: , Rfl:   •  magnesium hydroxide (MILK OF MAGNESIA) 2400 MG/10ML suspension suspension, Administer 10 mL per G tube Daily As Needed., Disp: , Rfl:   •  Magnesium Sulfate in D5W  (magnesium sulfate 1g in 0.9% NaCl) 10-5 MG/ML-% IVPB, Infuse 1 g into a venous catheter Every 1 (One) Hour As Needed (per protocol)., Disp: , Rfl:       Physical Exam:  I have reviewed the patient's current vital signs as documented in the patient's EMR.   Vitals:    03/28/23 1037   BP: 104/67   Pulse: 74   Resp: 16   SpO2: 100%     Body mass index is 21.95 kg/m².       03/28/23  1037   Weight: 61.7 kg (136 lb)      Physical Exam  Constitutional:       General: He is not in acute distress.     Appearance: Normal appearance. He is well-developed.   HENT:      Head: Normocephalic and atraumatic.      Mouth/Throat:      Mouth: Mucous membranes are moist.   Eyes:      Extraocular Movements: Extraocular movements intact.      Pupils: Pupils are equal, round, and reactive to light.   Neck:      Vascular: No JVD.   Cardiovascular:      Rate and Rhythm: Normal rate and regular rhythm.      Heart sounds: Normal heart sounds. No murmur heard.    No S3 or S4 sounds.   Pulmonary:      Effort: Pulmonary effort is normal. No respiratory distress.      Breath sounds: Normal breath sounds. No wheezing.   Abdominal:      General: Bowel sounds are normal. There is no distension.      Palpations: Abdomen is soft. There is no hepatomegaly.      Tenderness: There is no abdominal tenderness.   Musculoskeletal:         General: Normal range of motion.      Cervical back: Normal range of motion and neck supple.   Skin:     General: Skin is warm and dry.      Coloration: Skin is not jaundiced or pale.   Neurological:      General: No focal deficit present.      Mental Status: He is alert and oriented to person, place, and time. Mental status is at baseline.   Psychiatric:         Mood and Affect: Mood normal.         Behavior: Behavior normal.         Thought Content: Thought content normal.         Judgment: Judgment normal.         DATA REVIEWED:     TTE/ANIYA:  Results for orders placed during the hospital encounter of 12/20/22    Adult  Transthoracic Echo Complete W/ Cont if Necessary Per Protocol    Interpretation Summary  •  Left ventricular systolic function is hyperdynamic (EF > 70%). Left ventricular ejection fraction appears to be greater than 70%.  •  Left ventricular diastolic function is consistent with (grade I) impaired relaxation.  •  Estimated right ventricular systolic pressure from tricuspid regurgitation is mildly elevated (35-45 mmHg).  •  No significant pericardial disease.      Laboratory evaluations:    Lab Results   Component Value Date    GLUCOSE 104 (H) 03/06/2023    BUN 7 03/06/2023    CREATININE 0.35 (L) 03/06/2023    EGFRIFNONA 106 11/29/2021    BCR 20.0 03/06/2023    K 3.7 03/06/2023    CO2 25.9 03/06/2023    CALCIUM 8.6 03/06/2023    ALBUMIN 2.6 (L) 03/06/2023    AST 17 03/06/2023    ALT 7 03/06/2023     Lab Results   Component Value Date    WBC 8.33 03/06/2023    HGB 11.2 (L) 03/06/2023    HCT 35.7 (L) 03/06/2023    MCV 95.5 03/06/2023     03/06/2023     Lab Results   Component Value Date    CHOL 80 01/06/2023    TRIG 122 01/06/2023    HDL 17 (L) 01/06/2023    LDL 41 01/06/2023     Lab Results   Component Value Date    TSH 2.100 01/12/2023     Lab Results   Component Value Date    HGBA1C 5.10 12/08/2020     Lab Results   Component Value Date    ALT 7 03/06/2023     Lab Results   Component Value Date    HGBA1C 5.10 12/08/2020    HGBA1C 5.10 09/12/2019     Lab Results   Component Value Date    CREATININE 0.35 (L) 03/06/2023     Lab Results   Component Value Date    IRON 49 (L) 12/21/2022    TIBC 70 (L) 12/21/2022    FERRITIN 1,305.00 (H) 12/23/2022     Lab Results   Component Value Date    INR 1.43 (H) 12/30/2022    INR 1.33 (H) 12/28/2022    INR 1.44 (H) 12/24/2022    PROTIME 17.8 (H) 12/30/2022    PROTIME 16.8 (H) 12/28/2022    PROTIME 17.9 (H) 12/24/2022           --------------------------------------------------------------------------------------------------------------------------    ASSESSMENT/PLAN:       Diagnosis Plan   1. Essential hypertension        2. Tobacco use            1. Essential hypertension  • Blood pressure controlled.  Patient was taken off multiple medications during his hospitalization secondary to septic shock.  Continue with metoprolol and spironolactone at current dosing.  Recent BMP shows stable kidney function and electrolytes.  Patient does have a history of hypotension in the past therefore I have asked him to monitor blood pressure closely and if patient becomes symptomatic we can decrease spironolactone to 12.5mg.    2.  Tobacco abuse  · Discussed with patient about the importance of smoking cessation to reduce his risk of cardiovascular disease.      This document has been @Electronically signed by EILEEN Coronado, 03/28/23, 9:38 AM EDT.       Dictated Utilizing Dragon Dictation: Part of this note may be an electronic transcription/translation of spoken language to printed text using the Dragon Dictation System.    Follow-up appointment and medication changes provided in hand delivered After Visit Summary as well as reviewed in the room.

## 2023-04-07 ENCOUNTER — OFFICE VISIT (OUTPATIENT)
Dept: GASTROENTEROLOGY | Facility: CLINIC | Age: 53
End: 2023-04-07
Payer: MEDICARE

## 2023-04-07 VITALS — WEIGHT: 136 LBS | BODY MASS INDEX: 21.86 KG/M2 | HEIGHT: 66 IN

## 2023-04-07 DIAGNOSIS — K21.00 GASTROESOPHAGEAL REFLUX DISEASE WITH ESOPHAGITIS WITHOUT HEMORRHAGE: Primary | ICD-10-CM

## 2023-04-07 PROCEDURE — 1159F MED LIST DOCD IN RCRD: CPT | Performed by: PHYSICIAN ASSISTANT

## 2023-04-07 PROCEDURE — 99213 OFFICE O/P EST LOW 20 MIN: CPT | Performed by: PHYSICIAN ASSISTANT

## 2023-04-07 PROCEDURE — 1160F RVW MEDS BY RX/DR IN RCRD: CPT | Performed by: PHYSICIAN ASSISTANT

## 2023-04-07 RX ORDER — ESOMEPRAZOLE MAGNESIUM 40 MG/1
40 CAPSULE, DELAYED RELEASE ORAL
Qty: 30 CAPSULE | Refills: 11 | Status: SHIPPED | OUTPATIENT
Start: 2023-04-07

## 2023-04-07 NOTE — PROGRESS NOTES
DATE:  4/7/2023    DIAGNOSIS:    CHIEF COMPLAINT:  Chief Complaint   Patient presents with   • Elevated bilirubin     HPI   Jamison Edward is a 52 y.o. male who presents to the office today for a follow-up evaluation of heartburn. The patient reports he was last seen in the clinic on 07/2022for GERD. He reports he had an EGD and an upper GI series performed by Dr. Ovalles he has a history of gastric bypass surgery secondary to gastric outlet obstruction and he is currently on Reglan twice daily. The patient reports he was last seen in the ER on 11/15/2022  in which he was diagnosed with hyperbilirubinemia. He states an ultrasound of the gallbladder was performed that showed fatty liver, the common bile duct was normal at 2 mm in internal diameter, the gallbladder is surgically absent and there is no biliary obstruction noted. He had labs performed in the ED in which his lipase level was decreased at 9 and CMP result showed Increase in liver enzymes at ALT is 46, AST is 82, alkaline phosphatase is 385, his total bilirubin is at 6.1. He states he had jaundice in his eyes that started on Saturday. He reports it looks like he had blood in his urine on Tuesday. He denies any changes in the last 2 to 3 weeks. He admits he does drink alcohol but states he stopped when he saw the yellow in his eyes. He admits he was drinking quite a bit. He states he tested a low positive twice for lupus at the  rheumatologist.    Interval History:  Patient has not been seen in clinic since November 2022-she is accompanied by his wife who states that he had a roughly 3-month hospital stay due to double pneumonia and sepsis that eventually led into liver failure.  He was just discharged back in early March in which he is still trying to fully recover.  He is wanting to reestablish care in regards to his liver.  He was on several medications while in the hospital (Librium, Depakote and Geodon) that his liver did not tolerate very well.   Patient's wife states at 1 point his liver enzymes were elevated and he was retaining fluid-he ended up having a paracentesis performed however only a small amount of fluid was removed.  He is feeling well since leaving the hospital-he is working on building up strength.  Patient states that he has been staying extremely hungry since being discharged from the hospital-she did lose a significant amount of weight while inpatient.  Patient is asking for refills on his Nexium 40 mg once daily-this medication does seem to control GERD symptoms well.    The following portions of the patient's history were reviewed and updated as appropriate: allergies, current medications, past family history, past medical history, past social history, past surgical history and problem list.  REVIEW OF SYSTEMS:   Review of Systems   Constitutional: Negative.    HENT: Positive for trouble swallowing. Negative for sore throat.    Eyes: Negative.    Respiratory: Negative for chest tightness.    Cardiovascular: Negative for chest pain.   Gastrointestinal: Positive for abdominal distention, abdominal pain and nausea. Negative for anal bleeding, blood in stool, constipation, diarrhea, rectal pain and vomiting.   Endocrine: Negative.    Genitourinary: Negative for difficulty urinating.   Musculoskeletal: Positive for back pain and neck pain.   Skin: Negative.    Allergic/Immunologic: Negative for environmental allergies and food allergies.   Neurological: Positive for headaches. Negative for dizziness and seizures.   Hematological: Does not bruise/bleed easily.   Psychiatric/Behavioral: Positive for sleep disturbance.       PAST MEDICAL HISTORY:  Past Medical History:   Diagnosis Date   • Anemia    • Arthritis of back     not sure   • Johnson esophagus    • Cholelithiasis    • COPD (chronic obstructive pulmonary disease)    • CTS (carpal tunnel syndrome)    • DDD (degenerative disc disease), thoracic    • Degenerative disc disease, lumbar    •  Dystonia    • GERD (gastroesophageal reflux disease)    • GI (gastrointestinal bleed)    • Hypertension    • Irritable bowel syndrome    • Low back strain    • Lumbosacral disc disease    • Migraines    • Peptic ulcer disease    • Stroke     TIA   • TIA (transient ischemic attack)    • Ulcerative colitis    • Wears dentures     upper only       PAST SURGICAL HISTORY:  Past Surgical History:   Procedure Laterality Date   • ABDOMINAL SURGERY  09/17/2021   • APPENDECTOMY     • CHOLECYSTECTOMY N/A 04/22/2017    Procedure: CHOLECYSTECTOMY LAPAROSCOPIC;  Surgeon: Jaqueline Guaman MD;  Location: [facility] OR;  Service:    • COLONOSCOPY N/A 05/08/2017    Procedure: COLONOSCOPY  CPTCODE:27247;  Surgeon: Nicho Richey III, MD;  Location: [facility] OR;  Service:    • CUBITAL TUNNEL RELEASE Left 9/1/2022    Procedure: CUBITAL TUNNEL RELEASE LEFT;  Surgeon: Alec Hough MD;  Location: [facility] OR;  Service: Orthopedics;  Laterality: Left;   • ENDOSCOPY     • ENDOSCOPY N/A 05/08/2017    Procedure: ESOPHAGOGASTRODUODENOSCOPY WITH BIOPSY  CPTCODE:95250;  Surgeon: Nicho Richey III, MD;  Location: [facility] OR;  Service:    • ENDOSCOPY N/A 11/03/2017    Procedure: ESOPHAGOGASTRODUODENOSCOPY WITH BIOPSY  CPTCODE: 51544;  Surgeon: Nicho Richey III, MD;  Location: [facility] OR;  Service:    • ENDOSCOPY N/A 02/20/2018    Procedure: ESOPHAGOGASTRODUODENOSCOPY WITH DILATATION CPT CODE: 19302;  Surgeon: Nicho Richey III, MD;  Location: [facility] OR;  Service:    • ENDOSCOPY N/A 06/05/2018    Procedure: ESOPHAGOGASTRODUODENOSCOPY WITH BIOPSY CPT CODE: 29891;  Surgeon: Nicho Richey III, MD;  Location: [facility] OR;  Service: Gastroenterology   • ENDOSCOPY N/A 05/26/2021    Procedure: ESOPHAGOGASTRODUODENOSCOPY WITH BIOPSY;  Surgeon: Julieta Hebert MD;  Location: [facility] OR;  Service: Gastroenterology;  Laterality: N/A;   • ENDOSCOPY N/A 06/02/2021    Procedure: ESOPHAGOGASTRODUODENOSCOPY WITH BIOPSY;   Surgeon: Julieta Hebert MD;  [facility] OR;  Service: Gastroenterology;  Laterality: N/A;   • GASTRECTOMY N/A 2019    Procedure: OPEN VAGOTOMY, ANTRECTOMY,REVISION- Y GASTROJEJUNOSOTOMY;  Surgeon: Herbert Umanzor MD;  Location:  [facility] OR;  Service: General   • TRACHEOSTOMY N/A 2023    Procedure: TRACHEOSTOMY;  Surgeon: Felton De León MD;  Location:  [facility] OR;  Service: General;  Laterality: N/A;   • UPPER GASTROINTESTINAL ENDOSCOPY     • VENTRAL/INCISIONAL HERNIA REPAIR N/A 12/10/2020    Procedure: INCISIONAL HERNIA REPAIR LAPAROSCOPIC WITH MESH;  Surgeon: Herbert Umanzor MD;  Location:  [facility] OR;  Service: General;  Laterality: N/A;       SOCIAL HISTORY:  Social History     Socioeconomic History   • Marital status:    Tobacco Use   • Smoking status: Every Day     Packs/day: 1.00     Years: 35.00     Pack years: 35.00     Types: Cigarettes     Last attempt to quit: 2020     Years since quittin.5   • Smokeless tobacco: Never   • Tobacco comments:     Been smoking 20 years   Vaping Use   • Vaping Use: Never used   Substance and Sexual Activity   • Alcohol use: Yes     Alcohol/week: 4.0 standard drinks     Types: 4 Cans of beer per week   • Drug use: No   • Sexual activity: Yes     Partners: Female     Birth control/protection: None       FAMILY HISTORY:  Family History   Problem Relation Age of Onset   • Osteoporosis Mother    • Hypertension Father    • Osteoporosis Father    • No Known Problems Sister    • No Known Problems Brother    • Drug abuse Brother    • No Known Problems Daughter    • Diabetes Sister        MEDICATIONS:      Current Outpatient Medications:   •  metoprolol tartrate (LOPRESSOR) 25 MG tablet, Take 1 tablet by mouth 2 (Two) Times a Day., Disp: , Rfl:   •  spironolactone (ALDACTONE) 25 MG tablet, Take 1 tablet by mouth Daily., Disp: , Rfl:   •  esomeprazole (nexIUM) 40 MG capsule, Take 1 capsule by mouth Every Morning Before Breakfast. Take  "one capsule 30 minutes prior to eating breakfast, Disp: 30 capsule, Rfl: 11    ALLERGIES:    Allergies   Allergen Reactions   • Contrast Dye (Echo Or Unknown Ct/Mr) Other (See Comments)     Shortness of breath   • Prilosec [Omeprazole] Other (See Comments)     Chest pain  Pt states he can take pepcid with no problem   • Protonix [Pantoprazole Sodium] Palpitations     Patient states that protonix causes chest pain.  Shruthi Pérez, [facility]  4/23/2017  11:19 AM  Pt states he can tolerate pepcid with no problem     • Ativan [Lorazepam] Hallucinations   • Depakene [Valproic Acid] Hallucinations   • Geodon [Ziprasidone Hcl] Mental Status Change   • Libritabs [Chlordiazepoxide] Other (See Comments)     Tongue swell/split   • Dilantin [Phenytoin] Delirium   • Dilaudid [Hydromorphone] Hallucinations   • Flagyl [Metronidazole] Nausea And Vomiting   • Topamax [Topiramate] Anxiety       VIST VITALS/PHYSICAL EXAM:  Ht 167.6 cm (66\")   Wt 61.7 kg (136 lb)   BMI 21.95 kg/m²   Physical Exam  Constitutional:       General: He is not in acute distress.     Appearance: Normal appearance. He is well-developed.   HENT:      Head: Normocephalic and atraumatic.   Eyes:      Pupils: Pupils are equal, round, and reactive to light.   Cardiovascular:      Rate and Rhythm: Normal rate and regular rhythm.      Heart sounds: Normal heart sounds.   Pulmonary:      Effort: Pulmonary effort is normal. No respiratory distress.      Breath sounds: Normal breath sounds. No wheezing, rhonchi or rales.   Abdominal:      General: Abdomen is flat. Bowel sounds are normal. There is no distension.      Palpations: Abdomen is soft. There is no mass.      Tenderness: There is no abdominal tenderness. There is no guarding or rebound.      Hernia: No hernia is present.   Musculoskeletal:         General: No swelling. Normal range of motion.      Cervical back: Normal range of motion and neck supple.      Right lower leg: No edema.      Left lower leg: No " edema.   Skin:     General: Skin is warm and dry.   Neurological:      Mental Status: He is alert and oriented to person, place, and time.   Psychiatric:         Attention and Perception: Attention normal.         Mood and Affect: Mood normal.         Speech: Speech normal.         Behavior: Behavior normal. Behavior is cooperative.         Thought Content: Thought content normal.         PATHOLOGY:  NONE      ENDOSCOPY:  NONE      IMAGING:  CT Abdomen Pelvis Without Contrast    Result Date: 12/30/2022  1.  Again noted is low-attenuation of the liver. 2.  Grossly stable small ascites is again noted. 3.  Probable right psoas hematoma measuring 3.2 by about 10 cm. 4.  Patchy bibasilar groundglass attenuation and confluent nodularity suggestive of infectious process again noted.  This report was finalized on 12/30/2022 4:40 PM by Dr. Fab Huff MD.      CT Head Without Contrast    Result Date: 2/14/2023    Unremarkable exam demonstrating no CT evidence of acute intracranial findings.  This report was finalized on 2/14/2023 12:29 PM by Dr. Giacomo Barnes MD.      CT Head Without Contrast    Result Date: 12/21/2022  1.  No acute intracranial abnormality. No significant change compared to CT brain yesterday. 2.  Cerebral volume loss, greater than expected for patient's age. Signer Name: Tra Lewis MD  Signed: 12/21/2022 11:32 PM  Workstation Name: [email or web address]  Radiology [facility]    CT Head Without Contrast    Result Date: 12/20/2022  1.  No acute intracranial abnormality. 2.  Cerebral volume loss, greater than expected for patient's age. Signer Name: Tra Lewis MD  Signed: 12/20/2022 5:42 PM  Workstation Name: [email or web address]  Radiology [facility]    CT Lumbar Spine Without Contrast    Result Date: 12/20/2022  1.  No acute fracture or chronic subluxation. 2.  Multilevel lumbar spondylosis as described. 3.  Partially visualized intra-abdominal ascites, new compared to recent CT abdomen 11/15/2022.  Signer Name: Tra Lewis MD  Signed: 12/20/2022 5:51 PM  Workstation Name: [email or web address]  Radiology [facility]    MRI Brain Without Contrast    Result Date: 12/20/2022  Nonspecific periventricular white matter lesions are seen to a mild extent bilaterally. This most likely represents mild chronic deep white matter ischemic change. Chronic inflammatory changes are seen in the mastoid air cells bilaterally. Otherwise negative. Signer Name: Zachery Cano MD  Signed: 12/20/2022 10:11 PM  Workstation Name: [facility]  Radiology [facility]    MRI Lumbar Spine Without Contrast    Result Date: 12/20/2022  1.  Bard degenerative changes are described in detail above most significant appearing radiographically at L5-S1 and L3-4. There is approximately moderate canal stenosis at L5-S1 and mild to moderate canal stenosis at L3-4. Please refer to the level by level description of lateral recess and foraminal compromise and correlate with radicular symptoms. Signer Name: Dejah Krishna MD  Signed: 12/20/2022 10:19 PM  Workstation Name: [facility]  Radiology [facility]    CT Abdomen Pelvis With Contrast    Addendum Date: 1/24/2023    //////////// ADDENDUM #1 //////////// TECHNIQUE: (REVISED) CT of the abdomen and pelvis WITH IV contrast. Report Dictated By: Ruth ARELLANO  Preliminary Report Signed By: Jatin Kerr MD  Electronically Signed: 1/23/2023 11:38 AM  [facility] Signer Name: Jatin Kerr MD  Signed: 1/24/2023 8:40 AM  Workstation Name: [email or web address]  Radiology [facility]////////////  ORIGINAL REPORT //////////// CT Abdomen Pelvis W INDICATION: Acute abdominal pain and distention TECHNIQUE: CT of the abdomen and pelvis without IV contrast. Coronal and sagittal reconstructions were obtained.  Radiation dose reduction techniques included automated exposure control or exposure modulation based on body size. Count of known CT and cardiac nuc med studies  performed in previous 12 months: 1. COMPARISON: 2022 FINDINGS: Abdomen: There are new patchy groundglass infiltrates in the lungs with small right effusion and right base atelectasis. There is a large amount ascites. The liver is small and shows diffuse fatty change. There are postoperative changes of the GE juncture. Spleen, pancreas, adrenal glands and kidneys are normal in appearance. Is mild distention of the small bowel. The distal ileum is collapsed. The prior study showed wall thickening throughout the colon suggesting colitis and that has improved. Pelvis: Bladder is normal. Prostate gland is normal. Bones are unremarkable.     Result Date: 1/24/2023  Extensive colon wall thickening noted on the previous study has improved and almost resolved Mild distention of most of the small bowel Large amount ascites Small fatty liver Patchy groundglass infiltrates are present in the lungs which are new from 12/20/2022 and may represent Covid 19 pneumonia Signer Name: Jatin Kerr MD  Signed: 12/24/2022 1:52 PM  Workstation Name: [facility]  Radiology [facility]    CT Abdomen Pelvis With Contrast    Result Date: 12/20/2022  Markedly abnormal appearance to the liver, dramatically changed from the previous CT in November. There is patchy abnormal irregular enhancement suggesting diffuse hepatocellular disease. This is accompanied by a moderate volume of ascites that is also new. Mucosal edema in the colon and small bowel is noted. This may be on the basis of liver disease and hypoalbuminemia. Acute colitis/enteritis is not excluded. This is accompanied by bibasilar atelectasis and nonspecific patchy infiltrates at both lung bases. Signer Name: Zachery Cano MD  Signed: 12/20/2022 10:36 PM  Workstation Name: [facility]  Radiology [facility]    XR Chest 1 View    Result Date: 3/2/2023    Resolving right lower lobe opacity.  This report was finalized on 3/2/2023 3:50 PM by Dr. Fab Huff MD.       XR Chest 1 View    Result Date: 2/28/2023  Enteric tube tip again projecting over the proximal body/fundus of the stomach. Signer Name: Preston Phillips MD  Signed: 2/28/2023 12:25 AM  Workstation Name: [facility]  Radiology [facility]    XR Chest 1 View    Result Date: 2/27/2023  1.  Interval repositioning of enteric tube with tip now near the gastric fundus. Otherwise, no significant change compared to chest x-ray same day. 2.  Low lung volumes with right lower lobe consolidation. Signer Name: Tra Lewis MD  Signed: 2/27/2023 11:14 PM  Workstation Name: [email or web address]  Radiology [facility]    XR Chest 1 View    Result Date: 2/27/2023  1.  Right lower lobe atelectasis or airspace disease. 2.  Tiny right pleural effusion.  This report was finalized on 2/27/2023 9:36 AM by Dr. Fab Huff MD.      XR Chest 1 View    Result Date: 2/25/2023  NG tube tip is in the gastric fundus Signer Name: Koko Barba MD  Signed: 2/25/2023 9:47 AM  Workstation Name: [email or web address]  Radiology [facility]    XR Chest 1 View    Result Date: 2/23/2023   1. NG tube which appears to be coiled within the stomach. Contrast partially opacifies the stomach and is also noted in portions of the proximal small bowel. 2. Incomplete imaging of the lungs.  This report was finalized on 2/23/2023 4:57 PM by Dr. Phi Franks MD.      XR Chest 1 View    Result Date: 2/22/2023  NG tube tip is confirmed within the stomach. Signer Name: Zachery Sy MD  Signed: 2/22/2023 2:08 AM  Workstation Name: [email or web address]  Radiology [facility]    XR Chest 1 View    Result Date: 2/20/2023  Impression: The NG tube is in satisfactory position with the tip within the gastric fundus. The sidehole appears to be well below the gastroesophageal junction. Signer Name: Adrian Phillips MD  Signed: 2/20/2023 11:41 PM  Workstation Name: [facility]  Radiology [facility]    XR Chest 1 View    Result  Date: 2/20/2023  Nasogastric tube with proximal port just below the GE junction and distal tip within the proximal stomach. Further advancement 5 to 10 cm may allow for better NG tube security. Signer Name: AKIRA Khan MD  Signed: 2/20/2023 9:23 PM  Workstation Name: [facility]  Radiology [facility]    XR Chest 1 View    Result Date: 2/19/2023  The nasogastric tube is in good position with the tip over the mid stomach. Signer Name: Zachery Cano MD  Signed: 2/19/2023 5:27 PM  Workstation Name: [facility]  Radiology [facility]    XR Chest 1 View    Result Date: 2/14/2023  Slight improvement in aeration of the right lung base  This report was finalized on 2/14/2023 6:52 AM by Dr. Giacomo Barnes MD.      XR Chest 1 View    Result Date: 2/13/2023  Little overall change  This report was finalized on 2/13/2023 9:20 AM by Dr. Giacomo Barnes MD.      XR Chest 1 View    Result Date: 2/12/2023  1. Tubes and lines in position as described. No pneumothorax. 2. No significant interval change. Signer Name: Shamir Baker MD  Signed: 2/12/2023 12:38 AM  Workstation Name: [email or web address]  Radiology [facility]    XR Chest 1 View    Result Date: 2/11/2023  Newly placed NG tube in good position. Signer Name: Brian Gudino MD  Signed: 2/11/2023 3:50 PM  Workstation Name: [facility]  Radiology [facility]    XR Chest 1 View    Result Date: 2/8/2023  impression: Nasogastric tube tip is in the body the stomach. Otherwise been no change Signer Name: Jatin Kerr MD  Signed: 2/8/2023 10:56 PM  Workstation Name: [facility]  Radiology [facility]    XR Chest 1 View    Result Date: 2/8/2023  1.  Grossly stable right greater than left patchy bibasilar airspace disease. 2.  Tiny right pleural effusion again noted.  This report was finalized on 2/8/2023 8:42 AM by Dr. Fab Huff MD.      XR Chest 1 View    Result Date: 2/6/2023  NG tube tip is in the gastric  fundus Signer Name: Koko Barba MD  Signed: 2/6/2023 6:02 PM  Workstation Name: [email or web address]  [facility]    XR Chest 1 View    Result Date: 2/5/2023  Well-positioned NG tube within the mid stomach. Signer Name: Brian Gudino MD  Signed: 2/5/2023 10:09 AM  Workstation Name: [email or web address]  [facility]    XR Chest 1 View    Result Date: 2/5/2023  NG tube tip is in the body of the stomach. Exam overall is not significantly changed. Signer Name: Zachery Sy MD  Signed: 2/5/2023 3:55 AM  Workstation Name: [email or web address]  [facility]    XR Chest 1 View    Result Date: 2/4/2023  1.  Interval positioning of enteric tube with tip in the distal stomach. 2.  Persistent right basilar infiltrate. Signer Name: Tra Lewis MD  Signed: 2/4/2023 4:08 PM  Workstation Name: [email or web address]  [facility]    XR Chest 1 View    Result Date: 2/4/2023  1.  Right basilar infiltrate and volume loss and probable small right pleural effusion. Diffuse interstitial edema or infiltrate. No change since yesterday. 2.  Support equipment in good position. Signer Name: Brian Gudino MD  Signed: 2/4/2023 9:32 AM  Workstation Name: [email or web address] [facility]  [facility]    XR Chest 1 View    Result Date: 2/3/2023  Interval retraction of the left-sided PICC line with tip now projecting near the cavoatrial junction. No pneumothorax. No other interval change. Signer Name: Preston Phillips MD  Signed: 2/3/2023 2:14 AM  Workstation Name: [facility]  [facility]    XR Chest 1 View    Result Date: 2/3/2023  1. Repositioning of the enteric tube with tip now projecting over the proximal body of the stomach. 2. Left PICC line tip projects over the right atrium, unchanged. 3. No change in bilateral pulmonary opacities. 4. No pneumothorax. Signer Name: Preston Phillips MD  Signed: 2/3/2023 1:30 AM  Workstation Name:  [facility]  Radiology [facility]    XR Chest 1 View    Result Date: 2/2/2023  Impression: The tip of the NG tube is in the midesophagus. This is not in satisfactory position. See above recommendations. Signer Name: Adrian Phillips MD  Signed: 2/2/2023 8:41 PM  Workstation Name: [facility]    XR Chest 1 View    Result Date: 2/1/2023  Impression: Satisfactory placement of NG tube. Signer Name: Adrian Phillips MD  Signed: 2/1/2023 7:49 PM  Workstation Name: [facility]  Radiology [facility]    XR Chest 1 View    Result Date: 2/1/2023  Line placement as above  This report was finalized on 2/1/2023 1:16 PM by Dr. Giacomo Barnes MD.      XR Chest 1 View    Result Date: 1/30/2023  1.  Interval improvement in bilateral airspace disease and significant decrease in pleural effusions. 2.  Support devices as above.  This report was finalized on 1/30/2023 8:38 AM by Dr. Phi Franks MD.      XR Chest 1 View    Result Date: 1/27/2023  1. Tubes and lines in position as described. 2. Stable to slight interval worsening appearance of the chest. No pneumothorax. Signer Name: Shamir Baker MD  Signed: 1/27/2023 6:23 AM  Workstation Name: [email or web address]  Radiology [facility]    XR Chest 1 View    Result Date: 1/24/2023  Bilateral airspace disease and bibasilar effusions  This report was finalized on 1/24/2023 7:35 AM by Dr. Giacomo Barnes MD.      XR Chest 1 View    Result Date: 1/21/2023  Lines and tubes in expected radiographic position. Pleural effusions and multifocal airspace disease not appreciably different. Signer Name: KEVIN SHELDON MD  Signed: 1/21/2023 12:51 PM  Workstation Name: [facility]  Radiology [facility]    XR Chest 1 View    Result Date: 1/20/2023  1.  Patchy bibasilar airspace disease again noted. 2.  Tiny bilateral pleural effusions.  This report was finalized on 1/20/2023 3:53 PM by Dr. Fab Huff MD.      XR Chest 1  View    Result Date: 1/19/2023  1.  Again noted is bibasilar airspace disease. 2.  Possibly tiny bilateral pleural effusions.  This report was finalized on 1/19/2023 2:19 PM by Dr. Fab Huff MD.      XR Chest 1 View    Result Date: 1/19/2023  The flexible feeding tube appears in good position over the mid stomach. Signer Name: Zachery Cano MD  Signed: 1/19/2023 1:40 AM  Workstation Name: [facility]  [facility]    XR Chest 1 View    Result Date: 1/18/2023  1.  Nasogastric tube tip in the stomach. 2.  Tiny bilateral pleural effusions. 3.  Bibasilar airspace disease again noted.  This report was finalized on 1/18/2023 10:14 AM by Dr. Fab Huff MD.      XR Chest 1 View    Result Date: 1/12/2023  1.  Slight increase in basilar airspace disease and CHF/edema. 2.  Support devices as above.  This report was finalized on 1/12/2023 8:35 AM by Dr. Phi Franks MD.      XR Chest 1 View    Result Date: 1/6/2023  Improved, but continued bibasilar airspace disease  This report was finalized on 1/6/2023 6:30 PM by Dr. Giacomo Barnes MD.      XR Chest 1 View    Result Date: 1/5/2023  1. Stable portable chest radiograph, unchanged since earlier today. 2. Support equipment remains in good position. Signer Name: Brian Gudino MD  Signed: 1/5/2023 10:40 PM  Workstation Name: [facility]  [facility]    XR Chest 1 View    Result Date: 1/5/2023  Patchy airspace disease bilaterally. Line placement as above  This report was finalized on 1/5/2023 10:46 AM by Dr. Giacomo Barnes MD.      XR Chest 1 View    Result Date: 1/4/2023   1. Minimal bilateral airspace disease. Overall very slight improvement  This report was finalized on 1/4/2023 8:33 AM by Dr. Giacomo Barnes MD.      XR Chest 1 View    Result Date: 12/30/2022    Bilateral airspace disease is grossly stable.  This report was finalized on 12/30/2022 11:53 AM by Dr. Fab Huff MD.      XR Chest 1 View    Result Date:  12/30/2022  Well-positioned tubes, otherwise no change. Signer Name: Zachery Sy MD  Signed: 12/30/2022 5:05 AM  Workstation Name: [id number]  [facility]    XR Chest 1 View    Result Date: 12/30/2022  Worsening bilateral pulmonary infiltrates. No pneumothorax. Signer Name: Zachery Sy MD  Signed: 12/30/2022 1:43 AM  Workstation Name: [id number]  [facility]    XR Chest 1 View    Result Date: 12/28/2022    No acute cardiopulmonary findings identified.  This report was finalized on 12/28/2022 8:23 AM by Dr. Fab Huff MD.      CT Angiogram Chest Pulmonary Embolism    Result Date: 12/20/2022  Bilateral multifocal groundglass infiltrates as well as platelike atelectasis in the lower lung fields, all new since the previous examination. Underlying emphysema. No evidence of pulmonary embolism. Signer Name: Zachery Cano MD  Signed: 12/20/2022 10:42 PM  Workstation Name: [email or web address]  [facility]    US Paracentesis    Result Date: 12/28/2022  1.  Small volume paracentesis for diagnostic fluid was performed with 22-gauge needle. 2.  Small ascites on today's study.  This report was finalized on 12/28/2022 1:45 PM by Dr. Fab Huff MD.      XR Abdomen KUB    Result Date: 1/3/2023  Ascites. Nonspecific bowel gas pattern. No visible large volume stool burden. Signer Name: Brian Gudino MD  Signed: 1/3/2023 5:31 AM  Workstation Name: [id number]  [facility]    XR Chest AP    Result Date: 12/20/2022    Development of left greater than right patchy and interstitial basilar airspace disease which may represent changes of bibasilar pneumonia.  This report was finalized on 12/20/2022 4:39 PM by Dr. Phi Franks MD.      FL Video Swallow Single Contrast    Result Date: 3/1/2023      Please see the speech pathologist's report for additional information.  This report was finalized on 3/1/2023 1:32 PM by Dr. Sanon  MD Refugio.      FL Video Swallow Single Contrast    Result Date: 2/23/2023  1.  Aspiration with multiple consistencies. 2. Please see dysphasia notes for further details.  This report was finalized on 2/23/2023 1:12 PM by Dr. Phi Franks MD.      FL Video Swallow Single Contrast    Result Date: 2/6/2023      Please see the speech pathologist's report for additional information.  This report was finalized on 2/6/2023 3:22 PM by Dr. Fab Huff MD.            RECENT LABS:  Lab Results   Component Value Date    WBC 8.33 03/06/2023    HGB 11.2 (L) 03/06/2023    HCT 35.7 (L) 03/06/2023    MCV 95.5 03/06/2023    RDW 13.3 03/06/2023     03/06/2023    NEUTRORELPCT 74.2 03/06/2023    LYMPHORELPCT 10.8 (L) 03/06/2023    MONORELPCT 12.2 (H) 03/06/2023    EOSRELPCT 1.6 03/06/2023    BASORELPCT 1.0 03/06/2023    NEUTROABS 6.18 03/06/2023    LYMPHSABS 0.90 03/06/2023       Lab Results   Component Value Date     03/06/2023    K 3.7 03/06/2023    CO2 25.9 03/06/2023     03/06/2023    BUN 7 03/06/2023    CREATININE 0.35 (L) 03/06/2023    EGFRIFNONA 106 11/29/2021    GLUCOSE 104 (H) 03/06/2023    CALCIUM 8.6 03/06/2023    ALKPHOS 101 03/06/2023    AST 17 03/06/2023    ALT 7 03/06/2023    BILITOT 0.4 03/06/2023    ALBUMIN 2.6 (L) 03/06/2023    PROTEINTOT 5.8 (L) 03/06/2023    MG 1.7 03/07/2023    PHOS 3.6 05/25/2021             ASSESSMENT & PLAN:  In regards to the patient's GERD-we will send in refills for AC omeprazole 40 mg once daily.  I reviewed labs and imaging patient had performed while inpatient, we discussed his liver enzymes as well as imaging.  He will need repeat liver work-up in roughly 6 months to further evaluate.  We will look at doing a liver ultrasound as well as CBC, CMP, Lovett and AFP.   Diagnosis Plan   1. Gastroesophageal reflux disease with esophagitis without hemorrhage  esomeprazole (nexIUM) 40 MG capsule          Return in about 6 months (around 10/7/2023).        Electronically Signed  by: Melo Browning PA-C , April 7, 2023 12:40 EDT       CC:   Leticia Martinez APRN

## 2023-04-27 ENCOUNTER — OFFICE VISIT (OUTPATIENT)
Dept: PSYCHIATRY | Facility: CLINIC | Age: 53
End: 2023-04-27
Payer: MEDICARE

## 2023-04-27 VITALS
SYSTOLIC BLOOD PRESSURE: 120 MMHG | BODY MASS INDEX: 22.4 KG/M2 | HEART RATE: 69 BPM | WEIGHT: 139.4 LBS | DIASTOLIC BLOOD PRESSURE: 68 MMHG | HEIGHT: 66 IN

## 2023-04-27 DIAGNOSIS — F41.9 ANXIETY DISORDER, UNSPECIFIED TYPE: Primary | ICD-10-CM

## 2023-04-27 DIAGNOSIS — Z79.899 MEDICATION MANAGEMENT: ICD-10-CM

## 2023-04-27 LAB
EXTERNAL AMPHETAMINE SCREEN URINE: NEGATIVE
EXTERNAL BENZODIAZEPINE SCREEN URINE: NEGATIVE
EXTERNAL BUPRENORPHINE SCREEN URINE: NEGATIVE
EXTERNAL COCAINE SCREEN URINE: NEGATIVE
EXTERNAL MDMA: NEGATIVE
EXTERNAL METHADONE SCREEN URINE: NEGATIVE
EXTERNAL METHAMPHETAMINE SCREEN URINE: NEGATIVE
EXTERNAL OPIATES SCREEN URINE: NEGATIVE
EXTERNAL OXYCODONE SCREEN URINE: NEGATIVE
EXTERNAL THC SCREEN URINE: NEGATIVE

## 2023-04-27 RX ORDER — BACLOFEN 20 MG/1
TABLET ORAL
COMMUNITY
Start: 2023-04-13

## 2023-04-27 RX ORDER — ESCITALOPRAM OXALATE 5 MG/1
5 TABLET ORAL DAILY
Qty: 30 TABLET | Refills: 0 | Status: SHIPPED | OUTPATIENT
Start: 2023-04-27 | End: 2024-04-26

## 2023-04-27 NOTE — PROGRESS NOTES
Subjective     Jamison Edward is a 52 y.o. male who presents today for initial evaluation     Chief Complaint: Anxiety       History of Present Illness:    History of Present Illness  Jamison Edward is a 52-year-old male who presents today for an initial evaluation with me.  He tells me that he has been having some anxiety mostly related to a fairly recent hospital admission.  Jamison is accompanied today with his wife who was able to give collateral information.  Jamison tells me that he had went to the ER in December because of his legs swelling and he states the next thing he remembers, it was March and he was in [facility].  Jamison reports having worry, anxiety nearly every day, sleep disturbance, and energy loss.  He tells me that he takes 10 mg of melatonin at night and only gets about 3 hours of sleep.  He feels like his anxiety is mostly related to life situations.  After he went to the hospital in late December, he became agitated and very confused.  He had also had visual hallucinations.  They determined that he was in septic shock and had bilateral pneumonia.  At one point, his oxygen saturation had decreased and they had to intubate him.  His wife tells me that they had given him way too much medications including Xanax, Ativan, Depakote, Keppra, Librium, Seroquel, Effexor, and Geodon.  She states that these medications would worsen his agitation and confusion.  Per wife, he was maxed out on several medications which kept him too sedated.  He was on the ventilator for quite some time, 55 days per wife, and had difficulty weaning off.  She states he would become anxious when they would wean the vent and the cycle would start over again.  During his hospital stay he is also diagnosed with liver cirrhosis.  Patient tells me that he was drinking alcohol daily but had stopped drinking on November 12, 2022.  He reports that his symptoms of cirrhosis did not start until late December.  He  reports being sober since.  Denies drug use.  UDS today is negative for all substances tested.  Denies SI/HI/AVH.  Jamison denies ever being on medications for agitation/anxiety prior to the recent hospital admission.  Wife reports that Jamison went 77 days without eating by mouth but is now on a regular diet and tolerating it well.  He is doing physical therapy due to loss of muscle tone/motor function after being in the hospital for so long.  Jamison denies having any hallucinations since being discharged from the hospital.  He does report having some issues with mental clarity and memory since hospitalization.       The following portions of the patient's history were reviewed and updated as appropriate: allergies, current medications, past family history, past medical history, past social history, past surgical history and problem list.    Past Psychiatric History:  Began Treatment:This year  Diagnoses:Delirium  Psychiatrist:Patient has never seen a psychiatric provider prior to consultations while admitted to the hospital.  Therapist:Denies  Admission History:Denies  Medication Trials:Xanax, Ativan, Depakote, Seroquel, Geodon, Effexor  Self Harm: Denies  Suicide Attempts:Denies      Past Medical History:  Past Medical History:   Diagnosis Date   • Alcohol abuse don't know    quit Nov. 12,2023   • Alcoholism    • Anemia    • Anxiety whwhen put in continued care   • Arthritis of back     not sure   • Johnson esophagus    • Cholelithiasis    • Chronic pain disorder have degenerative disc disease   • COPD (chronic obstructive pulmonary disease)    • CTS (carpal tunnel syndrome)    • DDD (degenerative disc disease), thoracic    • Degenerative disc disease, lumbar    • Dystonia    • GERD (gastroesophageal reflux disease)    • GI (gastrointestinal bleed)    • Hypertension    • Irritable bowel syndrome    • Liver disease    • Low back strain    • Lumbosacral disc disease    • Migraines    • Peptic ulcer disease    •  Stroke     TIA   • TIA (transient ischemic attack)    • Ulcerative colitis    • Wears dentures     upper only       Substance Abuse History:   Types:alcohol, THC when he was a teenager  Withdrawal Symptoms:Patient reports that they believed some of his delirium while admitted to the hospital from December to March was due to alcohol withdrawal.  Patient states that he stopped drinking alcohol 2022.  Longest Period Sober:Patient states that he has been sober for the last 5 months.  AA: No    Social History:  Social History     Socioeconomic History   • Marital status:    Tobacco Use   • Smoking status: Every Day     Packs/day: 1.00     Years: 35.00     Pack years: 35.00     Types: Cigarettes     Last attempt to quit: 2020     Years since quittin.6   • Smokeless tobacco: Never   • Tobacco comments:     Been smoking 20 years   Vaping Use   • Vaping Use: Never used   Substance and Sexual Activity   • Alcohol use: Not Currently     Comment: Quit drinking    • Drug use: No   • Sexual activity: Yes     Partners: Female     Birth control/protection: None       Family History:  Family History   Problem Relation Age of Onset   • Osteoporosis Mother    • Hypertension Father    • Osteoporosis Father    • No Known Problems Sister    • No Known Problems Brother    • Drug abuse Brother    • No Known Problems Daughter    • Diabetes Sister        Past Surgical History:  Past Surgical History:   Procedure Laterality Date   • ABDOMINAL SURGERY  2021   • APPENDECTOMY     • CHOLECYSTECTOMY N/A 2017    Procedure: CHOLECYSTECTOMY LAPAROSCOPIC;  Surgeon: Jaqueline Guaman MD;  Location: [facility];  Service:    • COLONOSCOPY N/A 2017    Procedure: COLONOSCOPY  CPTCODE:13761;  Surgeon: Nicho Richey III, MD;  Location: [facility];  Service:    • CUBITAL TUNNEL RELEASE Left 2022    Procedure: CUBITAL TUNNEL RELEASE LEFT;  Surgeon: Alec Hough MD;  [facility]:   [facility];  Service: Orthopedics;  Laterality: Left;   • ENDOSCOPY     • ENDOSCOPY N/A 05/08/2017    Procedure: ESOPHAGOGASTRODUODENOSCOPY WITH BIOPSY  CPTCODE:30988;  Surgeon: Nicho Richey III, MD;  [facility]:  [facility];  Service:    • ENDOSCOPY N/A 11/03/2017    Procedure: ESOPHAGOGASTRODUODENOSCOPY WITH BIOPSY  CPTCODE: 18113;  Surgeon: Nicho Richey III, MD;  [facility];  Service:    • ENDOSCOPY N/A 02/20/2018    Procedure: ESOPHAGOGASTRODUODENOSCOPY WITH DILATATION CPT CODE: 32515;  Surgeon: Nicho Richey III, MD;  [facility];  Service:    • ENDOSCOPY N/A 06/05/2018    Procedure: ESOPHAGOGASTRODUODENOSCOPY WITH BIOPSY CPT CODE: 70959;  Surgeon: Nicho Richey III, MD;  [facility]:  [facility];  Service: Gastroenterology   • ENDOSCOPY N/A 05/26/2021    Procedure: ESOPHAGOGASTRODUODENOSCOPY WITH BIOPSY;  Surgeon: Julieta Hebert MD;  [facility];  Service: Gastroenterology;  Laterality: N/A;   • ENDOSCOPY N/A 06/02/2021    Procedure: ESOPHAGOGASTRODUODENOSCOPY WITH BIOPSY;  Surgeon: Julieta Hebert MD;  [facility]:  [facility];  Service: Gastroenterology;  Laterality: N/A;   • GASTRECTOMY N/A 09/17/2019    Procedure: OPEN VAGOTOMY, ANTRECTOMY,REVISION- Y GASTROJEJUNOSOTOMY;  Surgeon: Herbert Umanzor MD;  Location:  [facility];  Service: General   • HERNIA REPAIR  12/10/2020   • TRACHEOSTOMY N/A 01/17/2023    Procedure: TRACHEOSTOMY;  Surgeon: Felton De León MD;  Location:  [facility];  Service: General;  Laterality: N/A;   • UPPER GASTROINTESTINAL ENDOSCOPY     • VENTRAL/INCISIONAL HERNIA REPAIR N/A 12/10/2020    Procedure: INCISIONAL HERNIA REPAIR LAPAROSCOPIC WITH MESH;  Surgeon: Herbert Umanzor MD;  Location:  [facility];  Service: General;  Laterality: N/A;       Problem List:  Patient Active Problem List   Diagnosis   • Precordial chest pain   • Weight loss, abnormal   • Right upper quadrant abdominal pain   • Epigastric pain   • History  of bleeding ulcers   • Nausea   • Malaise and fatigue   • Acute gastric ulcer without hemorrhage or perforation   • Poor appetite   • Duodenal ulcer   • Gastroesophageal reflux disease   • Dysphagia   • Iron deficiency anemia   • Malabsorption   • Gastric ulcer without hemorrhage or perforation   • Dystonia   • Peptic ulcer without hemorrhage or perforation   • Gastric outlet obstruction   • Incisional hernia, without obstruction or gangrene   • Incisional hernia   • Chest pain, atypical   • Gastric bezoar   • Essential hypertension   • Cigarette smoker   • Chronic back pain   • Sinus tachycardia   • Tobacco use   • Mild carpal tunnel syndrome   • Orthostatic hypotension   • Palpitations   • Abdominal swelling   • Lower extremity edema   • Pneumonia due to infectious organism, unspecified laterality, unspecified part of lung   • Acute respiratory failure with hypoxia       Allergy:   Allergies   Allergen Reactions   • Contrast Dye (Echo Or Unknown Ct/Mr) Other (See Comments)     Shortness of breath   • Prilosec [Omeprazole] Other (See Comments)     Chest pain  Pt states he can take pepcid with no problem   • Protonix [Pantoprazole Sodium] Palpitations     Patient states that protonix causes chest pain.  Shruthi Pérez, [facility]  4/23/2017  11:19 AM  Pt states he can tolerate pepcid with no problem     • Ativan [Lorazepam] Hallucinations   • Depakene [Valproic Acid] Hallucinations   • Geodon [Ziprasidone Hcl] Mental Status Change   • Libritabs [Chlordiazepoxide] Other (See Comments)     Tongue swell/split   • Dilantin [Phenytoin] Delirium   • Dilaudid [Hydromorphone] Hallucinations   • Flagyl [Metronidazole] Nausea And Vomiting   • Topamax [Topiramate] Anxiety        Current Medications:   Current Outpatient Medications   Medication Sig Dispense Refill   • baclofen (LIORESAL) 20 MG tablet      • esomeprazole (nexIUM) 40 MG capsule Take 1 capsule by mouth Every Morning Before Breakfast. Take one capsule 30 minutes  "prior to eating breakfast 30 capsule 11   • metoprolol tartrate (LOPRESSOR) 25 MG tablet Take 1 tablet by mouth 2 (Two) Times a Day.     • spironolactone (ALDACTONE) 25 MG tablet Take 1 tablet by mouth Daily.     • escitalopram (Lexapro) 5 MG tablet Take 1 tablet by mouth Daily. 30 tablet 0     No current facility-administered medications for this visit.       Review of Systems:    Review of Systems   Constitutional: Positive for activity change and appetite change.   Cardiovascular: Negative.    Neurological: Positive for weakness. Negative for seizures.   Psychiatric/Behavioral: Positive for sleep disturbance. Negative for agitation, behavioral problems, decreased concentration, dysphoric mood, hallucinations, self-injury, suicidal ideas, negative for hyperactivity, depressed mood and stress. The patient is nervous/anxious.          Physical Exam:   Physical Exam  Vitals reviewed.   HENT:      Head: Atraumatic.   Pulmonary:      Effort: Pulmonary effort is normal.   Musculoskeletal:      Cervical back: Normal range of motion.   Neurological:      General: No focal deficit present.      Mental Status: He is alert and oriented to person, place, and time. Mental status is at baseline.   Psychiatric:         Attention and Perception: Attention and perception normal.         Mood and Affect: Mood is anxious and depressed. Affect is blunt.         Speech: Speech normal.         Behavior: Behavior normal. Behavior is cooperative.         Thought Content: Thought content normal. Thought content is not paranoid. Thought content does not include homicidal or suicidal ideation.         Cognition and Memory: Cognition and memory normal.         Judgment: Judgment normal.         Vitals:  Blood pressure 120/68, pulse 69, height 167.6 cm (65.98\"), weight 63.2 kg (139 lb 6.4 oz).   Body mass index is 22.51 kg/m².    Last 3 Blood Pressure Readings:  BP Readings from Last 3 Encounters:   04/27/23 120/68   03/28/23 104/67 "   01/05/23 102/56       Mental Status Exam:   Hygiene:   good  Cooperation:  Cooperative  Eye Contact:  Good  Psychomotor Behavior:  Appropriate  Affect:  Restricted  Mood: normal  Hopelessness: Denies  Speech:  Normal  Thought Process:  Goal directed  Thought Content:  Normal  Suicidal:  None  Homicidal:  None  Hallucinations:  None  Delusion:  None  Memory:  Deficits  Orientation:  Person, Place, Time and Situation  Reliability:  good  Insight:  Good  Judgement:  Good  Impulse Control:  Good  Physical/Medical Issues:  Yes See PMH       Lab Results:   Office Visit on 04/27/2023   Component Date Value Ref Range Status   • External Amphetamine Screen Urine 04/27/2023 Negative   Final   • External Benzodiazepine Screen Uri* 04/27/2023 Negative   Final   • External Cocaine Screen Urine 04/27/2023 Negative   Final   • External THC Screen Urine 04/27/2023 Negative   Final   • External Methadone Screen Urine 04/27/2023 Negative   Final   • External Methamphetamine Screen Ur* 04/27/2023 Negative   Final   • External Oxycodone Screen Urine 04/27/2023 Negative   Final   • External Buprenorphine Screen Urine 04/27/2023 Negative   Final   • External MDMA 04/27/2023 Negative   Final   • External Opiates Screen Urine 04/27/2023 Negative   Final   No results displayed because visit has over 200 results.            Assessment & Plan   Problems Addressed this Visit    None  Visit Diagnoses     Anxiety disorder, unspecified type    -  Primary    Relevant Medications    escitalopram (Lexapro) 5 MG tablet    Other Relevant Orders    [facility] Drug Screen (Completed)    Medication management          Diagnoses       Codes Comments    Anxiety disorder, unspecified type    -  Primary ICD-10-CM: F41.9  ICD-9-CM: 300.00     Medication management     ICD-10-CM: Z79.899  ICD-9-CM: V58.69           Visit Diagnoses:    ICD-10-CM ICD-9-CM   1. Anxiety disorder, unspecified type  F41.9 300.00   2. Medication management  Z79.899 V58.69        GOALS:  Short Term Goals: Patient will be compliant with medication, and patient will have no significant medication related side effects.  Patient will be engaged in psychotherapy as indicated.  Patient will report subjective improvement of symptoms.  Long term goals: To stabilize mood and treat/improve subjective symptoms, the patient will stay out of the hospital, the patient will be at an optimal level of functioning, and the patient will take all medications as prescribed.  The patient/guardian verbalized understanding and agreement with goals that were mutually set.      TREATMENT PLAN: Continue supportive psychotherapy efforts and medications as indicated.  Pharmacological and Non-Pharmacological treatment options discussed during today's visit. Patient/Guardian acknowledged and verbally consented with current treatment plan and was educated on the importance of compliance with treatment and follow-up appointments.      MEDICATION ISSUES:  Discussed medication options and treatment plan of prescribed medication as well as the risks, benefits, any black box warnings, and side effects including potential falls, possible impaired driving, and metabolic adversities among others. Patient is agreeable to call the office with any worsening of symptoms or onset of side effects, or if any concerns or questions arise.  The contact information for the office is made available to the patient. Patient is agreeable to call 911 or go to the nearest ER should they begin having any SI/HI, or if any urgent concerns arise. No medication side effects or related complaints today.     MEDS ORDERED DURING VISIT:  New Medications Ordered This Visit   Medications   • escitalopram (Lexapro) 5 MG tablet     Sig: Take 1 tablet by mouth Daily.     Dispense:  30 tablet     Refill:  0       Follow Up Appointment:   Return in about 4 weeks (around 5/25/2023).             This document has been electronically signed by Lizbeth Alejo  APRN  April 27, 2023 10:49 EDT    Dictated Utilizing Dragon Dictation: Part of this note may be an electronic transcription/translation of spoken language to printed text using the Dragon Dictation System.

## 2023-05-23 DIAGNOSIS — F41.9 ANXIETY DISORDER, UNSPECIFIED TYPE: ICD-10-CM

## 2023-05-23 RX ORDER — ESCITALOPRAM OXALATE 5 MG/1
TABLET ORAL
Qty: 30 TABLET | Refills: 0 | OUTPATIENT
Start: 2023-05-23

## 2023-05-25 ENCOUNTER — OFFICE VISIT (OUTPATIENT)
Dept: PSYCHIATRY | Facility: CLINIC | Age: 53
End: 2023-05-25
Payer: MEDICARE

## 2023-05-25 VITALS
SYSTOLIC BLOOD PRESSURE: 106 MMHG | DIASTOLIC BLOOD PRESSURE: 57 MMHG | HEIGHT: 66 IN | WEIGHT: 152.8 LBS | HEART RATE: 79 BPM | BODY MASS INDEX: 24.56 KG/M2

## 2023-05-25 DIAGNOSIS — F41.9 ANXIETY DISORDER, UNSPECIFIED TYPE: Primary | ICD-10-CM

## 2023-05-25 DIAGNOSIS — F29 PSYCHOSIS, UNSPECIFIED PSYCHOSIS TYPE: ICD-10-CM

## 2023-05-25 RX ORDER — ESCITALOPRAM OXALATE 10 MG/1
10 TABLET ORAL DAILY
Qty: 30 TABLET | Refills: 0 | Status: SHIPPED | OUTPATIENT
Start: 2023-05-25 | End: 2024-05-24

## 2023-05-25 RX ORDER — QUETIAPINE FUMARATE 50 MG/1
50 TABLET, FILM COATED ORAL NIGHTLY
Qty: 30 TABLET | Refills: 0 | Status: SHIPPED | OUTPATIENT
Start: 2023-05-25

## 2023-05-25 NOTE — PROGRESS NOTES
Subjective     Jamison Edward is a 52 y.o. male who presents today for follow up    Chief Complaint: Anxiety       History of Present Illness:    History of Present Illness    Jamison is a 52-year-old male who presents today for a follow-up visit.  At patient's last appointment, he was started on Lexapro 5 mg p.o. daily for anxiety.  Patient states that he feels no change since starting the medication.  He denies having any side effects.  He tells me that as far as physical health, he is doing a lot better and is no longer having to do PT.  He is ambulating independently.  He states that he has trouble staying asleep.  He takes melatonin occasionally at night but states that he will still wake up throughout the night.  He tells me he cannot stay asleep for long periods of time.  He reports having nightmares frequently.  He denies having any depression.  He tells me his appetite is good and has improved.  He tells me that he has been hearing voices for a while and states that it happened within the last year.  He states that he is still sober and has not used alcohol since November 12.  Denies an increase in the auditory hallucinations since starting the Lexapro last month.  He tells me that the hallucinations are noncommand in nature and that they are just having conversations.  He tells me that he is upset thinking about the nurses and continue care playing mind games on him.  He states that this hospital admission was a very traumatic experience for him.  Patient's wife is interested in doing the gene site test and I discussed with her the option to order it on line to have it done at home.  Jamison denies SI/HI.       The following portions of the patient's history were reviewed and updated as appropriate: allergies, current medications, past family history, past medical history, past social history, past surgical history and problem list.    Past Psychiatric History:  Began Treatment:This  year  Diagnoses:Delirium  Psychiatrist:Patient has never seen a psychiatric provider prior to consultations while admitted to the hospital.  Therapist:Denies  Admission History:Denies  Medication Trials:Xanax, Ativan, Depakote, Seroquel, Geodon, Effexor  Self Harm: Denies  Suicide Attempts:Denies      Past Medical History:  Past Medical History:   Diagnosis Date   • Alcohol abuse don't know    quit    • Alcoholism    • Anemia    • Anxiety whwhen put in continued care   • Arthritis of back     not sure   • Johnson esophagus    • Cholelithiasis    • Chronic pain disorder have degenerative disc disease   • COPD (chronic obstructive pulmonary disease)    • CTS (carpal tunnel syndrome)    • DDD (degenerative disc disease), thoracic    • Degenerative disc disease, lumbar    • Dystonia    • GERD (gastroesophageal reflux disease)    • GI (gastrointestinal bleed)    • Hypertension    • Irritable bowel syndrome    • Liver disease    • Low back strain    • Lumbosacral disc disease    • Migraines    • Peptic ulcer disease    • Stroke     TIA   • TIA (transient ischemic attack)    • Ulcerative colitis    • Wears dentures     upper only       Substance Abuse History:   Types:alcohol, THC when he was a teenager  Withdrawal Symptoms:Patient reports that they believed some of his delirium while admitted to the hospital from December to March was due to alcohol withdrawal.  Patient states that he stopped drinking alcohol 2022.  Longest Period Sober:Patient states that he has been sober for the last 5 months.  AA: No    Social History:  Social History     Socioeconomic History   • Marital status:    Tobacco Use   • Smoking status: Every Day     Packs/day: 1.00     Years: 35.00     Pack years: 35.00     Types: Cigarettes     Last attempt to quit: 2020     Years since quittin.7   • Smokeless tobacco: Never   • Tobacco comments:     Been smoking 20 years   Vaping Use   • Vaping Use: Never used    Substance and Sexual Activity   • Alcohol use: Not Currently     Comment: Quit drinking November 12,2023   • Drug use: No   • Sexual activity: Yes     Partners: Female     Birth control/protection: None       Family History:  Family History   Problem Relation Age of Onset   • Osteoporosis Mother    • Hypertension Father    • Osteoporosis Father    • No Known Problems Sister    • No Known Problems Brother    • Drug abuse Brother    • No Known Problems Daughter    • Diabetes Sister        Past Surgical History:  Past Surgical History:   Procedure Laterality Date   • ABDOMINAL SURGERY  09/17/2021   • APPENDECTOMY     • CHOLECYSTECTOMY N/A 04/22/2017    Procedure: CHOLECYSTECTOMY LAPAROSCOPIC;  Surgeon: Jaqueline Guaman MD;  Location: [facility] OR;  Service:    • COLONOSCOPY N/A 05/08/2017    Procedure: COLONOSCOPY  CPTCODE:39540;  Surgeon: Nicho Richey III, MD;  Location: [facility] OR;  Service:    • CUBITAL TUNNEL RELEASE Left 09/01/2022    Procedure: CUBITAL TUNNEL RELEASE LEFT;  Surgeon: Alec Hough MD;  Location: [facility] OR;  Service: Orthopedics;  Laterality: Left;   • ENDOSCOPY     • ENDOSCOPY N/A 05/08/2017    Procedure: ESOPHAGOGASTRODUODENOSCOPY WITH BIOPSY  CPTCODE:59721;  Surgeon: Nicho Richey III, MD;  Location:  [facility] OR;  Service:    • ENDOSCOPY N/A 11/03/2017    Procedure: ESOPHAGOGASTRODUODENOSCOPY WITH BIOPSY  CPTCODE: 82706;  Surgeon: Nicho Richey III, MD;  Location:  [facility] OR;  Service:    • ENDOSCOPY N/A 02/20/2018    Procedure: ESOPHAGOGASTRODUODENOSCOPY WITH DILATATION CPT CODE: 03320;  Surgeon: Nicho Richey III, MD;  Location: [facility] OR;  Service:    • ENDOSCOPY N/A 06/05/2018    Procedure: ESOPHAGOGASTRODUODENOSCOPY WITH BIOPSY CPT CODE: 16585;  Surgeon: Nicho Richey III, MD;  Location: [facility] OR;  Service: Gastroenterology   • ENDOSCOPY N/A 05/26/2021    Procedure: ESOPHAGOGASTRODUODENOSCOPY WITH BIOPSY;  Surgeon: Julieta Hebert MD;   Location:  [facility] OR;  Service: Gastroenterology;  Laterality: N/A;   • ENDOSCOPY N/A 06/02/2021    Procedure: ESOPHAGOGASTRODUODENOSCOPY WITH BIOPSY;  Surgeon: Julieta Hebert MD;  [facility] OR;  Service: Gastroenterology;  Laterality: N/A;   • GASTRECTOMY N/A 09/17/2019    Procedure: OPEN VAGOTOMY, ANTRECTOMY,REVISION- Y GASTROJEJUNOSOTOMY;  Surgeon: Herbert Umanzor MD;  Location:  [facility] OR;  Service: General   • HERNIA REPAIR  12/10/2020   • TRACHEOSTOMY N/A 01/17/2023    Procedure: TRACHEOSTOMY;  Surgeon: Felton De León MD;  Location:  [facility] OR;  Service: General;  Laterality: N/A;   • UPPER GASTROINTESTINAL ENDOSCOPY     • VENTRAL/INCISIONAL HERNIA REPAIR N/A 12/10/2020    Procedure: INCISIONAL HERNIA REPAIR LAPAROSCOPIC WITH MESH;  Surgeon: Herbert Umanzor MD;  Location:  [facility] OR;  Service: General;  Laterality: N/A;       Problem List:  Patient Active Problem List   Diagnosis   • Precordial chest pain   • Weight loss, abnormal   • Right upper quadrant abdominal pain   • Epigastric pain   • History of bleeding ulcers   • Nausea   • Malaise and fatigue   • Acute gastric ulcer without hemorrhage or perforation   • Poor appetite   • Duodenal ulcer   • Gastroesophageal reflux disease   • Dysphagia   • Iron deficiency anemia   • Malabsorption   • Gastric ulcer without hemorrhage or perforation   • Dystonia   • Peptic ulcer without hemorrhage or perforation   • Gastric outlet obstruction   • Incisional hernia, without obstruction or gangrene   • Incisional hernia   • Chest pain, atypical   • Gastric bezoar   • Essential hypertension   • Cigarette smoker   • Chronic back pain   • Sinus tachycardia   • Tobacco use   • Mild carpal tunnel syndrome   • Orthostatic hypotension   • Palpitations   • Abdominal swelling   • Lower extremity edema   • Pneumonia due to infectious organism, unspecified laterality, unspecified part of lung   • Acute respiratory failure with hypoxia       Allergy:    Allergies   Allergen Reactions   • Contrast Dye (Echo Or Unknown Ct/Mr) Other (See Comments)     Shortness of breath   • Prilosec [Omeprazole] Other (See Comments)     Chest pain  Pt states he can take pepcid with no problem   • Protonix [Pantoprazole Sodium] Palpitations     Patient states that protonix causes chest pain.  Shruthi Pérez, [facility]  4/23/2017  11:19 AM  Pt states he can tolerate pepcid with no problem     • Ativan [Lorazepam] Hallucinations   • Depakene [Valproic Acid] Hallucinations   • Geodon [Ziprasidone Hcl] Mental Status Change   • Libritabs [Chlordiazepoxide] Other (See Comments)     Tongue swell/split   • Dilantin [Phenytoin] Delirium   • Dilaudid [Hydromorphone] Hallucinations   • Flagyl [Metronidazole] Nausea And Vomiting   • Topamax [Topiramate] Anxiety        Current Medications:   Current Outpatient Medications   Medication Sig Dispense Refill   • baclofen (LIORESAL) 20 MG tablet      • escitalopram (Lexapro) 10 MG tablet Take 1 tablet by mouth Daily. 30 tablet 0   • esomeprazole (nexIUM) 40 MG capsule Take 1 capsule by mouth Every Morning Before Breakfast. Take one capsule 30 minutes prior to eating breakfast 30 capsule 11   • metoprolol tartrate (LOPRESSOR) 25 MG tablet Take 1 tablet by mouth 2 (Two) Times a Day.     • spironolactone (ALDACTONE) 25 MG tablet Take 1 tablet by mouth Daily.     • QUEtiapine (SEROquel) 50 MG tablet Take 1 tablet by mouth Every Night. 30 tablet 0     No current facility-administered medications for this visit.       Review of Systems:    Review of Systems   Constitutional: Positive for activity change and appetite change.   Cardiovascular: Negative.    Neurological: Positive for weakness. Negative for seizures.   Psychiatric/Behavioral: Positive for hallucinations and sleep disturbance. Negative for agitation, behavioral problems, decreased concentration, dysphoric mood, self-injury, suicidal ideas, negative for hyperactivity, depressed mood and stress.  "The patient is nervous/anxious.          Physical Exam:   Physical Exam  Vitals reviewed.   HENT:      Head: Atraumatic.   Pulmonary:      Effort: Pulmonary effort is normal.   Musculoskeletal:      Cervical back: Normal range of motion.   Neurological:      General: No focal deficit present.      Mental Status: He is alert and oriented to person, place, and time. Mental status is at baseline.   Psychiatric:         Attention and Perception: Attention and perception normal.         Mood and Affect: Mood is anxious and depressed. Affect is blunt.         Speech: Speech normal.         Behavior: Behavior normal. Behavior is cooperative.         Thought Content: Thought content normal. Thought content is not paranoid. Thought content does not include homicidal or suicidal ideation.         Cognition and Memory: Cognition and memory normal.         Judgment: Judgment normal.         Vitals:  Blood pressure 106/57, pulse 79, height 167.6 cm (65.98\"), weight 69.3 kg (152 lb 12.8 oz).   Body mass index is 24.67 kg/m².    Last 3 Blood Pressure Readings:  BP Readings from Last 3 Encounters:   05/25/23 106/57   04/27/23 120/68   03/28/23 104/67       Mental Status Exam:   Hygiene:   good  Cooperation:  Cooperative  Eye Contact:  Good  Psychomotor Behavior:  Appropriate  Affect:  Restricted  Mood: normal  Hopelessness: Denies  Speech:  Normal  Thought Process:  Goal directed  Thought Content:  Normal  Suicidal:  None  Homicidal:  None  Hallucinations:  Auditory  Delusion:  None  Memory:  Deficits  Orientation:  Person, Place, Time and Situation  Reliability:  good  Insight:  Good  Judgement:  Good  Impulse Control:  Good  Physical/Medical Issues:  Yes See PMH       Lab Results:   Office Visit on 04/27/2023   Component Date Value Ref Range Status   • External Amphetamine Screen Urine 04/27/2023 Negative   Final   • External Benzodiazepine Screen Uri* 04/27/2023 Negative   Final   • External Cocaine Screen Urine 04/27/2023 " Negative   Final   • External THC Screen Urine 04/27/2023 Negative   Final   • External Methadone Screen Urine 04/27/2023 Negative   Final   • External Methamphetamine Screen Ur* 04/27/2023 Negative   Final   • External Oxycodone Screen Urine 04/27/2023 Negative   Final   • External Buprenorphine Screen Urine 04/27/2023 Negative   Final   • External MDMA 04/27/2023 Negative   Final   • External Opiates Screen Urine 04/27/2023 Negative   Final   No results displayed because visit has over 200 results.            Assessment & Plan   Problems Addressed this Visit    None  Visit Diagnoses     Anxiety disorder, unspecified type    -  Primary    Relevant Medications    QUEtiapine (SEROquel) 50 MG tablet    escitalopram (Lexapro) 10 MG tablet    Psychosis, unspecified psychosis type        Relevant Medications    QUEtiapine (SEROquel) 50 MG tablet    escitalopram (Lexapro) 10 MG tablet      Diagnoses       Codes Comments    Anxiety disorder, unspecified type    -  Primary ICD-10-CM: F41.9  ICD-9-CM: 300.00     Psychosis, unspecified psychosis type     ICD-10-CM: F29  ICD-9-CM: 298.9           Visit Diagnoses:    ICD-10-CM ICD-9-CM   1. Anxiety disorder, unspecified type  F41.9 300.00   2. Psychosis, unspecified psychosis type  F29 298.9       GOALS:  Short Term Goals: Patient will be compliant with medication, and patient will have no significant medication related side effects.  Patient will be engaged in psychotherapy as indicated.  Patient will report subjective improvement of symptoms.  Long term goals: To stabilize mood and treat/improve subjective symptoms, the patient will stay out of the hospital, the patient will be at an optimal level of functioning, and the patient will take all medications as prescribed.  The patient/guardian verbalized understanding and agreement with goals that were mutually set.      TREATMENT PLAN: Continue supportive psychotherapy efforts and medications as indicated.  Pharmacological and  Non-Pharmacological treatment options discussed during today's visit. Patient/Guardian acknowledged and verbally consented with current treatment plan and was educated on the importance of compliance with treatment and follow-up appointments.      MEDICATION ISSUES:  Discussed medication options and treatment plan of prescribed medication as well as the risks, benefits, any black box warnings, and side effects including potential falls, possible impaired driving, and metabolic adversities among others. Patient is agreeable to call the office with any worsening of symptoms or onset of side effects, or if any concerns or questions arise.  The contact information for the office is made available to the patient. Patient is agreeable to call 911 or go to the nearest ER should they begin having any SI/HI, or if any urgent concerns arise. No medication side effects or related complaints today.     MEDS ORDERED DURING VISIT:  New Medications Ordered This Visit   Medications   • QUEtiapine (SEROquel) 50 MG tablet     Sig: Take 1 tablet by mouth Every Night.     Dispense:  30 tablet     Refill:  0   • escitalopram (Lexapro) 10 MG tablet     Sig: Take 1 tablet by mouth Daily.     Dispense:  30 tablet     Refill:  0     Plan:  - Initiate Seroquel 50 mg p.o. at night for auditory hallucinations and sleep disturbance.  - Increase Lexapro to 10 mg p.o. daily for anxiety.  - We will follow-up in approximately 4 weeks.    Follow Up Appointment:   Return in about 4 weeks (around 6/22/2023).             This document has been electronically signed by EILEEN Lovell  May 25, 2023 14:42 EDT    Dictated Utilizing Dragon Dictation: Part of this note may be an electronic transcription/translation of spoken language to printed text using the Dragon Dictation System.

## 2023-08-02 ENCOUNTER — OFFICE VISIT (OUTPATIENT)
Dept: PSYCHIATRY | Facility: CLINIC | Age: 53
End: 2023-08-02
Payer: MEDICARE

## 2023-08-02 ENCOUNTER — LAB (OUTPATIENT)
Dept: LAB | Facility: HOSPITAL | Age: 53
End: 2023-08-02
Payer: MEDICARE

## 2023-08-02 VITALS
WEIGHT: 149 LBS | BODY MASS INDEX: 23.95 KG/M2 | HEIGHT: 66 IN | DIASTOLIC BLOOD PRESSURE: 70 MMHG | HEART RATE: 84 BPM | SYSTOLIC BLOOD PRESSURE: 118 MMHG

## 2023-08-02 DIAGNOSIS — Z79.899 MEDICATION MANAGEMENT: Primary | ICD-10-CM

## 2023-08-02 DIAGNOSIS — F22 PARANOIA: ICD-10-CM

## 2023-08-02 DIAGNOSIS — Z79.899 MEDICATION MANAGEMENT: ICD-10-CM

## 2023-08-02 DIAGNOSIS — F41.9 ANXIETY DISORDER, UNSPECIFIED TYPE: ICD-10-CM

## 2023-08-02 PROCEDURE — 84439 ASSAY OF FREE THYROXINE: CPT

## 2023-08-02 PROCEDURE — 36415 COLL VENOUS BLD VENIPUNCTURE: CPT

## 2023-08-02 PROCEDURE — 80053 COMPREHEN METABOLIC PANEL: CPT

## 2023-08-02 PROCEDURE — 84443 ASSAY THYROID STIM HORMONE: CPT

## 2023-08-02 PROCEDURE — 85025 COMPLETE CBC W/AUTO DIFF WBC: CPT

## 2023-08-02 PROCEDURE — 80061 LIPID PANEL: CPT

## 2023-08-02 PROCEDURE — 83036 HEMOGLOBIN GLYCOSYLATED A1C: CPT

## 2023-08-02 RX ORDER — ESCITALOPRAM OXALATE 20 MG/1
20 TABLET ORAL DAILY
Qty: 30 TABLET | Refills: 1 | Status: SHIPPED | OUTPATIENT
Start: 2023-08-02 | End: 2024-08-01

## 2023-08-02 RX ORDER — QUETIAPINE FUMARATE 150 MG/1
75 TABLET, FILM COATED ORAL NIGHTLY
Qty: 15 TABLET | Refills: 0 | Status: SHIPPED | OUTPATIENT
Start: 2023-08-02

## 2023-08-03 LAB
ALBUMIN SERPL-MCNC: 4.1 G/DL (ref 3.5–5.2)
ALBUMIN/GLOB SERPL: 1.6 G/DL
ALP SERPL-CCNC: 112 U/L (ref 39–117)
ALT SERPL W P-5'-P-CCNC: 19 U/L (ref 1–41)
ANION GAP SERPL CALCULATED.3IONS-SCNC: 10.7 MMOL/L (ref 5–15)
AST SERPL-CCNC: 23 U/L (ref 1–40)
BASOPHILS # BLD AUTO: 0.06 10*3/MM3 (ref 0–0.2)
BASOPHILS NFR BLD AUTO: 0.6 % (ref 0–1.5)
BILIRUB SERPL-MCNC: 0.2 MG/DL (ref 0–1.2)
BUN SERPL-MCNC: 11 MG/DL (ref 6–20)
BUN/CREAT SERPL: 13.4 (ref 7–25)
CALCIUM SPEC-SCNC: 9.6 MG/DL (ref 8.6–10.5)
CHLORIDE SERPL-SCNC: 103 MMOL/L (ref 98–107)
CHOLEST SERPL-MCNC: 148 MG/DL (ref 0–200)
CO2 SERPL-SCNC: 23.3 MMOL/L (ref 22–29)
CREAT SERPL-MCNC: 0.82 MG/DL (ref 0.76–1.27)
DEPRECATED RDW RBC AUTO: 43.7 FL (ref 37–54)
EGFRCR SERPLBLD CKD-EPI 2021: 104.4 ML/MIN/1.73
EOSINOPHIL # BLD AUTO: 0.34 10*3/MM3 (ref 0–0.4)
EOSINOPHIL NFR BLD AUTO: 3.4 % (ref 0.3–6.2)
ERYTHROCYTE [DISTWIDTH] IN BLOOD BY AUTOMATED COUNT: 13.1 % (ref 12.3–15.4)
GLOBULIN UR ELPH-MCNC: 2.5 GM/DL
GLUCOSE SERPL-MCNC: 103 MG/DL (ref 65–99)
HBA1C MFR BLD: 5.4 % (ref 4.8–5.6)
HCT VFR BLD AUTO: 43.9 % (ref 37.5–51)
HDLC SERPL-MCNC: 42 MG/DL (ref 40–60)
HGB BLD-MCNC: 14.8 G/DL (ref 13–17.7)
IMM GRANULOCYTES # BLD AUTO: 0.03 10*3/MM3 (ref 0–0.05)
IMM GRANULOCYTES NFR BLD AUTO: 0.3 % (ref 0–0.5)
LDLC SERPL CALC-MCNC: 85 MG/DL (ref 0–100)
LDLC/HDLC SERPL: 1.96 {RATIO}
LYMPHOCYTES # BLD AUTO: 1.54 10*3/MM3 (ref 0.7–3.1)
LYMPHOCYTES NFR BLD AUTO: 15.5 % (ref 19.6–45.3)
MCH RBC QN AUTO: 30.5 PG (ref 26.6–33)
MCHC RBC AUTO-ENTMCNC: 33.7 G/DL (ref 31.5–35.7)
MCV RBC AUTO: 90.5 FL (ref 79–97)
MONOCYTES # BLD AUTO: 0.81 10*3/MM3 (ref 0.1–0.9)
MONOCYTES NFR BLD AUTO: 8.2 % (ref 5–12)
NEUTROPHILS NFR BLD AUTO: 7.13 10*3/MM3 (ref 1.7–7)
NEUTROPHILS NFR BLD AUTO: 72 % (ref 42.7–76)
NRBC BLD AUTO-RTO: 0 /100 WBC (ref 0–0.2)
PLATELET # BLD AUTO: 189 10*3/MM3 (ref 140–450)
PMV BLD AUTO: 10.5 FL (ref 6–12)
POTASSIUM SERPL-SCNC: 4.7 MMOL/L (ref 3.5–5.2)
PROT SERPL-MCNC: 6.6 G/DL (ref 6–8.5)
RBC # BLD AUTO: 4.85 10*6/MM3 (ref 4.14–5.8)
SODIUM SERPL-SCNC: 137 MMOL/L (ref 136–145)
T4 FREE SERPL-MCNC: 0.87 NG/DL (ref 0.93–1.7)
TRIGL SERPL-MCNC: 119 MG/DL (ref 0–150)
TSH SERPL DL<=0.05 MIU/L-ACNC: 1.74 UIU/ML (ref 0.27–4.2)
VLDLC SERPL-MCNC: 21 MG/DL (ref 5–40)
WBC NRBC COR # BLD: 9.91 10*3/MM3 (ref 3.4–10.8)

## 2023-08-07 ENCOUNTER — OFFICE VISIT (OUTPATIENT)
Dept: CARDIOLOGY | Facility: CLINIC | Age: 53
End: 2023-08-07
Payer: MEDICARE

## 2023-08-07 VITALS
WEIGHT: 152.2 LBS | OXYGEN SATURATION: 97 % | RESPIRATION RATE: 18 BRPM | DIASTOLIC BLOOD PRESSURE: 69 MMHG | HEART RATE: 79 BPM | BODY MASS INDEX: 24.46 KG/M2 | HEIGHT: 66 IN | SYSTOLIC BLOOD PRESSURE: 103 MMHG

## 2023-08-07 DIAGNOSIS — I10 ESSENTIAL HYPERTENSION: ICD-10-CM

## 2023-08-07 DIAGNOSIS — Z72.0 TOBACCO USE: Primary | ICD-10-CM

## 2023-08-07 PROCEDURE — 99214 OFFICE O/P EST MOD 30 MIN: CPT | Performed by: NURSE PRACTITIONER

## 2023-08-07 PROCEDURE — 1159F MED LIST DOCD IN RCRD: CPT | Performed by: NURSE PRACTITIONER

## 2023-08-07 PROCEDURE — 1160F RVW MEDS BY RX/DR IN RCRD: CPT | Performed by: NURSE PRACTITIONER

## 2023-08-07 PROCEDURE — 3078F DIAST BP <80 MM HG: CPT | Performed by: NURSE PRACTITIONER

## 2023-08-07 PROCEDURE — 3074F SYST BP LT 130 MM HG: CPT | Performed by: NURSE PRACTITIONER

## 2023-08-07 NOTE — PROGRESS NOTES
[facility]             [facility] EILEEN Michele Theresa, APRN  Jamison ELLIS Cheyanne  1970  08/07/2023    Patient Active Problem List   Diagnosis    Precordial chest pain    Weight loss, abnormal    Right upper quadrant abdominal pain    Epigastric pain    History of bleeding ulcers    Nausea    Malaise and fatigue    Acute gastric ulcer without hemorrhage or perforation    Poor appetite    Duodenal ulcer    Gastroesophageal reflux disease    Dysphagia    Iron deficiency anemia    Malabsorption    Gastric ulcer without hemorrhage or perforation    Dystonia    Peptic ulcer without hemorrhage or perforation    Gastric outlet obstruction    Incisional hernia, without obstruction or gangrene    Incisional hernia    Chest pain, atypical    Gastric bezoar    Essential hypertension    Cigarette smoker    Chronic back pain    Sinus tachycardia    Tobacco use    Mild carpal tunnel syndrome    Orthostatic hypotension    Palpitations    Abdominal swelling    Lower extremity edema    Pneumonia due to infectious organism, unspecified laterality, unspecified part of lung    Acute respiratory failure with hypoxia       Dear Leticia Martinez APRN:    Subjective     Chief Complaint   Patient presents with    Med Management       HPI:     This is a 52 y.o. male with known past medical history of orthostatic hypotension, tobacco abuse, alcohol abuse and sinus tachycardia.     Jamison ELLIS Cheyanne presents today for routine cardiology follow up.  Patient states he has been doing very well since his last visit.  Is no longer using walker to ambulate.  Denies any chest pain, shortness of breath or dizziness.  Blood pressure has been stable.  Recent lab work essentially unremarkable.    Diagnostic Testing  Nuclear stress test 5/2017: No evidence of ischemia  Echocardiogram 9/2021: Ef 61-65%  Nuclear stress test 9/2021: No evidence of ischemia  Cardiac event monitor 10/2022: Normal sinus  rhythm with episodes of sinus tachycardia and sinus bradycardia with no arrhythmias noted  Echocardiogram 12/2022: EF greater than 70%     All other systems were reviewed and were negative.    Patient Active Problem List   Diagnosis    Precordial chest pain    Weight loss, abnormal    Right upper quadrant abdominal pain    Epigastric pain    History of bleeding ulcers    Nausea    Malaise and fatigue    Acute gastric ulcer without hemorrhage or perforation    Poor appetite    Duodenal ulcer    Gastroesophageal reflux disease    Dysphagia    Iron deficiency anemia    Malabsorption    Gastric ulcer without hemorrhage or perforation    Dystonia    Peptic ulcer without hemorrhage or perforation    Gastric outlet obstruction    Incisional hernia, without obstruction or gangrene    Incisional hernia    Chest pain, atypical    Gastric bezoar    Essential hypertension    Cigarette smoker    Chronic back pain    Sinus tachycardia    Tobacco use    Mild carpal tunnel syndrome    Orthostatic hypotension    Palpitations    Abdominal swelling    Lower extremity edema    Pneumonia due to infectious organism, unspecified laterality, unspecified part of lung    Acute respiratory failure with hypoxia       family history includes Diabetes in his sister; Drug abuse in his brother; Hypertension in his father; No Known Problems in his brother, daughter, and sister; Osteoporosis in his father and mother.     reports that he has been smoking cigarettes. He has a 35.00 pack-year smoking history. He has never used smokeless tobacco. He reports that he does not currently use alcohol. He reports that he does not use drugs.    Allergies   Allergen Reactions    Contrast Dye (Echo Or Unknown Ct/Mr) Other (See Comments)     Shortness of breath    Prilosec [Omeprazole] Other (See Comments)     Chest pain  Pt states he can take pepcid with no problem    Protonix [Pantoprazole Sodium] Palpitations     Patient states that protonix causes chest  pain.  Shruthi Pérez, [facility]  4/23/2017  11:19 AM  Pt states he can tolerate pepcid with no problem      Ativan [Lorazepam] Hallucinations    Depakene [Valproic Acid] Hallucinations    Geodon [Ziprasidone Hcl] Mental Status Change    Libritabs [Chlordiazepoxide] Other (See Comments)     Tongue swell/split    Dilantin [Phenytoin] Delirium    Dilaudid [Hydromorphone] Hallucinations    Flagyl [Metronidazole] Nausea And Vomiting    Topamax [Topiramate] Anxiety         Current Outpatient Medications:     baclofen (LIORESAL) 20 MG tablet, , Disp: , Rfl:     escitalopram (Lexapro) 20 MG tablet, Take 1 tablet by mouth Daily., Disp: 30 tablet, Rfl: 1    esomeprazole (nexIUM) 40 MG capsule, Take 1 capsule by mouth Every Morning Before Breakfast. Take one capsule 30 minutes prior to eating breakfast, Disp: 30 capsule, Rfl: 11    metoprolol tartrate (LOPRESSOR) 25 MG tablet, Take 1 tablet by mouth 2 (Two) Times a Day., Disp: , Rfl:     QUEtiapine 150 MG tablet, Take 75 mg by mouth Every Night. Take 0.5 tablets every night, Disp: 15 tablet, Rfl: 0    spironolactone (ALDACTONE) 25 MG tablet, Take 1 tablet by mouth Daily., Disp: , Rfl:       Physical Exam:  I have reviewed the patient's current vital signs as documented in the patient's EMR.   Vitals:    08/07/23 1034   BP: 103/69   Pulse: 79   Resp: 18   SpO2: 97%     Body mass index is 24.58 kg/mý.       08/07/23  1034   Weight: 69 kg (152 lb 3.2 oz)      Physical Exam  Constitutional:       General: He is not in acute distress.     Appearance: Normal appearance. He is well-developed.   HENT:      Head: Normocephalic and atraumatic.      Mouth/Throat:      Mouth: Mucous membranes are moist.   Eyes:      Extraocular Movements: Extraocular movements intact.      Pupils: Pupils are equal, round, and reactive to light.   Neck:      Vascular: No JVD.   Cardiovascular:      Rate and Rhythm: Normal rate and regular rhythm.      Heart sounds: Normal heart sounds. No murmur heard.     No S3 or S4 sounds.   Pulmonary:      Effort: Pulmonary effort is normal. No respiratory distress.      Breath sounds: Normal breath sounds. No wheezing.   Abdominal:      General: Bowel sounds are normal. There is no distension.      Palpations: Abdomen is soft. There is no hepatomegaly.      Tenderness: There is no abdominal tenderness.   Musculoskeletal:         General: Normal range of motion.      Cervical back: Normal range of motion and neck supple.   Skin:     General: Skin is warm and dry.      Coloration: Skin is not jaundiced or pale.   Neurological:      General: No focal deficit present.      Mental Status: He is alert and oriented to person, place, and time. Mental status is at baseline.   Psychiatric:         Mood and Affect: Mood normal.         Behavior: Behavior normal.         Thought Content: Thought content normal.         Judgment: Judgment normal.        DATA REVIEWED:     TTE/ANIYA:  Results for orders placed during the hospital encounter of 12/20/22    Adult Transthoracic Echo Complete W/ Cont if Necessary Per Protocol    Interpretation Summary    Left ventricular systolic function is hyperdynamic (EF > 70%). Left ventricular ejection fraction appears to be greater than 70%.    Left ventricular diastolic function is consistent with (grade I) impaired relaxation.    Estimated right ventricular systolic pressure from tricuspid regurgitation is mildly elevated (35-45 mmHg).    No significant pericardial disease.      Laboratory evaluations:    Lab Results   Component Value Date    GLUCOSE 103 (H) 08/02/2023    BUN 11 08/02/2023    CREATININE 0.82 08/02/2023    EGFRIFNONA 106 11/29/2021    BCR 13.4 08/02/2023    K 4.7 08/02/2023    CO2 23.3 08/02/2023    CALCIUM 9.6 08/02/2023    ALBUMIN 4.1 08/02/2023    AST 23 08/02/2023    ALT 19 08/02/2023     Lab Results   Component Value Date    WBC 9.91 08/02/2023    HGB 14.8 08/02/2023    HCT 43.9 08/02/2023    MCV 90.5 08/02/2023     08/02/2023      Lab Results   Component Value Date    CHOL 148 08/02/2023    TRIG 119 08/02/2023    HDL 42 08/02/2023    LDL 85 08/02/2023     Lab Results   Component Value Date    TSH 1.740 08/02/2023     Lab Results   Component Value Date    HGBA1C 5.40 08/02/2023     Lab Results   Component Value Date    ALT 19 08/02/2023     Lab Results   Component Value Date    HGBA1C 5.40 08/02/2023    HGBA1C 5.10 12/08/2020    HGBA1C 5.10 09/12/2019     Lab Results   Component Value Date    CREATININE 0.82 08/02/2023     Lab Results   Component Value Date    IRON 49 (L) 12/21/2022    TIBC 70 (L) 12/21/2022    FERRITIN 1,305.00 (H) 12/23/2022     Lab Results   Component Value Date    INR 1.43 (H) 12/30/2022    INR 1.33 (H) 12/28/2022    INR 1.44 (H) 12/24/2022    PROTIME 17.8 (H) 12/30/2022    PROTIME 16.8 (H) 12/28/2022    PROTIME 17.9 (H) 12/24/2022           --------------------------------------------------------------------------------------------------------------------------    ASSESSMENT/PLAN:      Diagnosis Plan   1. Tobacco use        2. Essential hypertension            Tobacco abuse  Continues to smoke.  Discussed with patient by the importance of smoking cessation to reduce his risk of cardiovascular disease.    Essential hypertension  Blood pressure well controlled.  Recent lab work shows stable kidney function.  Continue with metoprolol and spironolactone at current dosing.      This document has been @Electronically signed by EILEEN Coronado, 08/07/23, 9:04 AM EDT.       Dictated Utilizing Dragon Dictation: Part of this note may be an electronic transcription/translation of spoken language to printed text using the Dragon Dictation System.    Follow-up appointment and medication changes provided in hand delivered After Visit Summary as well as reviewed in the room.

## 2023-08-11 ENCOUNTER — TELEPHONE (OUTPATIENT)
Dept: PSYCHIATRY | Facility: CLINIC | Age: 53
End: 2023-08-11
Payer: MEDICARE

## 2023-08-11 RX ORDER — QUETIAPINE FUMARATE 50 MG/1
75 TABLET, FILM COATED ORAL NIGHTLY
Qty: 45 TABLET | Refills: 0 | Status: SHIPPED | OUTPATIENT
Start: 2023-08-11

## 2023-08-11 NOTE — TELEPHONE ENCOUNTER
Pt's wife stated that pharmacy can not fill the prescription for Seroquel 150mg and is requesting 100mg to be sent in instead.

## 2023-09-13 ENCOUNTER — TRANSCRIBE ORDERS (OUTPATIENT)
Dept: ADMINISTRATIVE | Facility: HOSPITAL | Age: 53
End: 2023-09-13
Payer: MEDICARE

## 2023-09-13 DIAGNOSIS — M54.41 CHRONIC MIDLINE LOW BACK PAIN WITH BILATERAL SCIATICA: Primary | ICD-10-CM

## 2023-09-13 DIAGNOSIS — G89.29 CHRONIC MIDLINE LOW BACK PAIN WITH BILATERAL SCIATICA: Primary | ICD-10-CM

## 2023-09-13 DIAGNOSIS — M54.42 CHRONIC MIDLINE LOW BACK PAIN WITH BILATERAL SCIATICA: Primary | ICD-10-CM

## 2023-09-27 ENCOUNTER — OFFICE VISIT (OUTPATIENT)
Dept: PSYCHIATRY | Facility: CLINIC | Age: 53
End: 2023-09-27
Payer: MEDICARE

## 2023-09-27 VITALS
HEART RATE: 74 BPM | HEIGHT: 66 IN | OXYGEN SATURATION: 100 % | DIASTOLIC BLOOD PRESSURE: 72 MMHG | SYSTOLIC BLOOD PRESSURE: 132 MMHG | WEIGHT: 154.4 LBS | BODY MASS INDEX: 24.81 KG/M2

## 2023-09-27 DIAGNOSIS — F41.9 ANXIETY DISORDER, UNSPECIFIED TYPE: Primary | ICD-10-CM

## 2023-09-27 DIAGNOSIS — F41.9 ANXIETY DISORDER, UNSPECIFIED TYPE: ICD-10-CM

## 2023-09-27 DIAGNOSIS — F29 PSYCHOSIS, UNSPECIFIED PSYCHOSIS TYPE: ICD-10-CM

## 2023-09-27 DIAGNOSIS — F22 PARANOIA: ICD-10-CM

## 2023-09-27 RX ORDER — QUETIAPINE FUMARATE 100 MG/1
100 TABLET, FILM COATED ORAL NIGHTLY
Qty: 45 TABLET | Refills: 0 | Status: SHIPPED | OUTPATIENT
Start: 2023-09-27

## 2023-09-27 RX ORDER — ESCITALOPRAM OXALATE 20 MG/1
20 TABLET ORAL DAILY
Qty: 30 TABLET | Refills: 1 | Status: SHIPPED | OUTPATIENT
Start: 2023-09-27 | End: 2024-09-26

## 2023-09-27 NOTE — PROGRESS NOTES
Subjective     Jamison Edward is a 53 y.o. male who presents today for follow up    Chief Complaint: Anxiety       History of Present Illness:    History of Present Illness    Jamison is a 52-year-old male who presents today for a follow-up visit.  At his last visit his Seroquel was increased to 75 mg p.o. nightly and Lexapro was increased to 20 mg p.o. daily.  He tells me that overall he has been feeling well but does have some remaining anxiety.  He tells me that his shoulders have been hurting him a lot and he has yet to hear back from his PCP in regards to his recent x-ray.  He does have [facility] scheduled for 2 weeks and is hopeful to get answers then.  He tells me that he was sleeping better while taking 100 mg of Seroquel.  His hallucinations have ceased with the increase in Seroquel.  He denies any current SI/HI/AVH.  Denies any acute issues with depression.  His appetite is good and sleep has been fair.  He denies having any hallucinations at this time.  He does report having a lot of fatigue at times during the day.  Reviewed recent lab work with patient.     The following portions of the patient's history were reviewed and updated as appropriate: allergies, current medications, past family history, past medical history, past social history, past surgical history and problem list.    Past Psychiatric History:  Began Treatment:This year  Diagnoses: Delirium  Psychiatrist: Patient has never seen a psychiatric provider prior to consultations while admitted to the hospital.  Therapist:Denies  Admission History:Denies  Medication Trials: Xanax, Ativan, Depakote, Seroquel, Geodon, Effexor  Self Harm: Denies  Suicide Attempts:Denies      Past Medical History:  Past Medical History:   Diagnosis Date    Alcohol abuse don't know    quit Nov. 12,2023    Alcoholism     Anemia     Anxiety whwhen put in continued care    Arthritis of back     not sure    Johnson esophagus     Cholelithiasis     Chronic pain  disorder have degenerative disc disease    COPD (chronic obstructive pulmonary disease)     CTS (carpal tunnel syndrome)     DDD (degenerative disc disease), thoracic     Degenerative disc disease, lumbar     Dystonia     GERD (gastroesophageal reflux disease)     GI (gastrointestinal bleed)     Hypertension     Irritable bowel syndrome     Liver disease     Low back strain     Lumbosacral disc disease     Migraines     Peptic ulcer disease     Stroke     TIA    Substance abuse     TIA (transient ischemic attack)     Ulcerative colitis     Wears dentures     upper only       Substance Abuse History:   Types: alcohol, THC when he was a teenager  Withdrawal Symptoms: Patient reports that they believed some of his delirium while admitted to the hospital from December to March was due to alcohol withdrawal.  Patient states that he stopped drinking alcohol November 12, 2022.  Longest Period Sober:Patient states that he has been sober since November  AA: No    Social History:  Social History     Socioeconomic History    Marital status:    Tobacco Use    Smoking status: Every Day     Packs/day: 1.00     Years: 35.00     Pack years: 35.00     Types: Cigarettes     Last attempt to quit: 9/1/2020     Years since quitting: 3.0    Smokeless tobacco: Never    Tobacco comments:     Been smoking 20 years   Vaping Use    Vaping Use: Never used   Substance and Sexual Activity    Alcohol use: Not Currently     Comment: Quit drinking November 12,2023    Drug use: No    Sexual activity: Yes     Partners: Female     Birth control/protection: None       Family History:  Family History   Problem Relation Age of Onset    Osteoporosis Mother     Hypertension Father     Osteoporosis Father     No Known Problems Sister     No Known Problems Brother     Drug abuse Brother     No Known Problems Daughter     Diabetes Sister        Past Surgical History:  Past Surgical History:   Procedure Laterality Date    ABDOMINAL SURGERY  09/17/2021     APPENDECTOMY      CHOLECYSTECTOMY N/A 04/22/2017    Procedure: CHOLECYSTECTOMY LAPAROSCOPIC;  Surgeon: Jaqueline Guaman MD;  [facility];  Service:     COLONOSCOPY N/A 05/08/2017    Procedure: COLONOSCOPY  CPTCODE:46058;  Surgeon: Nicho Richey III, MD;  [facility];  Service:     CUBITAL TUNNEL RELEASE Left 09/01/2022    Procedure: CUBITAL TUNNEL RELEASE LEFT;  Surgeon: Alec Hough MD;  [facility];  Service: Orthopedics;  Laterality: Left;    ENDOSCOPY      ENDOSCOPY N/A 05/08/2017    Procedure: ESOPHAGOGASTRODUODENOSCOPY WITH BIOPSY  CPTCODE:91693;  Surgeon: Nicho Richey III, MD;  [facility];  Service:     ENDOSCOPY N/A 11/03/2017    Procedure: ESOPHAGOGASTRODUODENOSCOPY WITH BIOPSY  CPTCODE: 13634;  Surgeon: Nicho Richey III, MD;  [facility];  Service:     ENDOSCOPY N/A 02/20/2018    Procedure: ESOPHAGOGASTRODUODENOSCOPY WITH DILATATION CPT CODE: 34136;  Surgeon: Nicho Richey III, MD;  [facility];  Service:     ENDOSCOPY N/A 06/05/2018    Procedure: ESOPHAGOGASTRODUODENOSCOPY WITH BIOPSY CPT CODE: 05433;  Surgeon: Nicho Richey III, MD;  [facility];  Service: Gastroenterology    ENDOSCOPY N/A 05/26/2021    Procedure: ESOPHAGOGASTRODUODENOSCOPY WITH BIOPSY;  Surgeon: Julieta Hebert MD;  [facility];  Service: Gastroenterology;  Laterality: N/A;    ENDOSCOPY N/A 06/02/2021    Procedure: ESOPHAGOGASTRODUODENOSCOPY WITH BIOPSY;  Surgeon: Julieta Hebert MD;  [facility];  Service: Gastroenterology;  Laterality: N/A;    GASTRECTOMY N/A 09/17/2019    Procedure: OPEN VAGOTOMY, ANTRECTOMY,REVISION- Y GASTROJEJUNOSOTOMY;  Surgeon: Herbert Umanzor MD;  Location:  [facility];  Service: General    HERNIA REPAIR  12/10/2020    TRACHEOSTOMY N/A 01/17/2023    Procedure: TRACHEOSTOMY;  Surgeon: Felton De León MD;  [facility];  Service: General;  Laterality: N/A;     UPPER GASTROINTESTINAL ENDOSCOPY      VENTRAL/INCISIONAL HERNIA REPAIR N/A 12/10/2020    Procedure: INCISIONAL HERNIA REPAIR LAPAROSCOPIC WITH MESH;  Surgeon: Herbert Umanzor MD;  Location: [facility];  Service: General;  Laterality: N/A;       Problem List:  Patient Active Problem List   Diagnosis    Precordial chest pain    Weight loss, abnormal    Right upper quadrant abdominal pain    Epigastric pain    History of bleeding ulcers    Nausea    Malaise and fatigue    Acute gastric ulcer without hemorrhage or perforation    Poor appetite    Duodenal ulcer    Gastroesophageal reflux disease    Dysphagia    Iron deficiency anemia    Malabsorption    Gastric ulcer without hemorrhage or perforation    Dystonia    Peptic ulcer without hemorrhage or perforation    Gastric outlet obstruction    Incisional hernia, without obstruction or gangrene    Incisional hernia    Chest pain, atypical    Gastric bezoar    Essential hypertension    Cigarette smoker    Chronic back pain    Sinus tachycardia    Tobacco use    Mild carpal tunnel syndrome    Orthostatic hypotension    Palpitations    Abdominal swelling    Lower extremity edema    Pneumonia due to infectious organism, unspecified laterality, unspecified part of lung    Acute respiratory failure with hypoxia       Allergy:   Allergies   Allergen Reactions    Contrast Dye (Echo Or Unknown Ct/Mr) Other (See Comments)     Shortness of breath    Prilosec [Omeprazole] Other (See Comments)     Chest pain  Pt states he can take pepcid with no problem    Protonix [Pantoprazole Sodium] Palpitations     Patient states that protonix causes chest pain.  Shruthi Pérez, [facility]  4/23/2017  11:19 AM  Pt states he can tolerate pepcid with no problem      Ativan [Lorazepam] Hallucinations    Depakene [Valproic Acid] Hallucinations    Geodon [Ziprasidone Hcl] Mental Status Change    Libritabs [Chlordiazepoxide] Other (See Comments)     Tongue swell/split    Dilantin [Phenytoin] Delirium     Dilaudid [Hydromorphone] Hallucinations    Flagyl [Metronidazole] Nausea And Vomiting    Topamax [Topiramate] Anxiety        Current Medications:   Current Outpatient Medications   Medication Sig Dispense Refill    baclofen (LIORESAL) 20 MG tablet       escitalopram (LEXAPRO) 20 MG tablet TAKE 1 TABLET BY MOUTH DAILY 30 tablet 1    esomeprazole (nexIUM) 40 MG capsule Take 1 capsule by mouth Every Morning Before Breakfast. Take one capsule 30 minutes prior to eating breakfast 30 capsule 11    metoprolol tartrate (LOPRESSOR) 25 MG tablet Take 1 tablet by mouth 2 (Two) Times a Day.      QUEtiapine (SEROquel) 100 MG tablet Take 1 tablet by mouth Every Night. 45 tablet 0    spironolactone (ALDACTONE) 25 MG tablet Take 1 tablet by mouth Daily.       No current facility-administered medications for this visit.       Review of Systems:    Review of Systems   Constitutional:  Positive for fatigue. Negative for activity change and appetite change.   Cardiovascular: Negative.    Gastrointestinal:  Positive for nausea.   Musculoskeletal:  Positive for back pain and neck pain.   Neurological:  Positive for weakness. Negative for seizures.   Psychiatric/Behavioral:  Positive for sleep disturbance (pt states he wakes frequently to void) and stress. Negative for agitation, behavioral problems, decreased concentration, dysphoric mood, hallucinations, self-injury, suicidal ideas, negative for hyperactivity and depressed mood. The patient is nervous/anxious.        Physical Exam:   Physical Exam  Vitals reviewed.   HENT:      Head: Atraumatic.   Pulmonary:      Effort: Pulmonary effort is normal.   Musculoskeletal:      Cervical back: Normal range of motion.   Neurological:      General: No focal deficit present.      Mental Status: He is alert and oriented to person, place, and time. Mental status is at baseline.   Psychiatric:         Attention and Perception: Attention and perception normal.         Mood and Affect: Affect normal.  "Mood is anxious. Mood is not depressed. Affect is not blunt.         Speech: Speech normal.         Behavior: Behavior normal. Behavior is cooperative.         Thought Content: Thought content normal. Thought content is paranoid and delusional. Thought content does not include homicidal or suicidal ideation.         Cognition and Memory: Cognition and memory normal.         Judgment: Judgment normal.       Vitals:  Blood pressure 132/72, pulse 74, height 167.6 cm (65.98\"), weight 70 kg (154 lb 6.4 oz), SpO2 100 %.   Body mass index is 24.93 kg/m².    Last 3 Blood Pressure Readings:  BP Readings from Last 3 Encounters:   09/27/23 132/72   08/07/23 103/69   08/02/23 118/70       Mental Status Exam:   Hygiene:   good  Cooperation:  Cooperative  Eye Contact:  Good  Psychomotor Behavior:  Appropriate  Affect:  Restricted  Mood: normal  Hopelessness: Denies  Speech:  Normal  Thought Process:  Goal directed  Thought Content:  Normal  Suicidal:  None  Homicidal:  None  Hallucinations:  Auditory  Delusion:  None  Memory:  Deficits  Orientation:  Person, Place, Time and Situation  Reliability:  good  Insight:  Good  Judgement:  Good  Impulse Control:  Good  Physical/Medical Issues:  Yes See PMH        Lab Results:   Lab on 08/02/2023   Component Date Value Ref Range Status    Glucose 08/02/2023 103 (H)  65 - 99 mg/dL Final    BUN 08/02/2023 11  6 - 20 mg/dL Final    Creatinine 08/02/2023 0.82  0.76 - 1.27 mg/dL Final    Sodium 08/02/2023 137  136 - 145 mmol/L Final    Potassium 08/02/2023 4.7  3.5 - 5.2 mmol/L Final    Chloride 08/02/2023 103  98 - 107 mmol/L Final    CO2 08/02/2023 23.3  22.0 - 29.0 mmol/L Final    Calcium 08/02/2023 9.6  8.6 - 10.5 mg/dL Final    Total Protein 08/02/2023 6.6  6.0 - 8.5 g/dL Final    Albumin 08/02/2023 4.1  3.5 - 5.2 g/dL Final    ALT (SGPT) 08/02/2023 19  1 - 41 U/L Final    AST (SGOT) 08/02/2023 23  1 - 40 U/L Final    Alkaline Phosphatase 08/02/2023 112  39 - 117 U/L Final    Total " Bilirubin 08/02/2023 0.2  0.0 - 1.2 mg/dL Final    Globulin 08/02/2023 2.5  gm/dL Final    A/G Ratio 08/02/2023 1.6  g/dL Final    BUN/Creatinine Ratio 08/02/2023 13.4  7.0 - 25.0 Final    Anion Gap 08/02/2023 10.7  5.0 - 15.0 mmol/L Final    eGFR 08/02/2023 104.4  >60.0 mL/min/1.73 Final    TSH 08/02/2023 1.740  0.270 - 4.200 uIU/mL Final    Free T4 08/02/2023 0.87 (L)  0.93 - 1.70 ng/dL Final    Total Cholesterol 08/02/2023 148  0 - 200 mg/dL Final    Triglycerides 08/02/2023 119  0 - 150 mg/dL Final    HDL Cholesterol 08/02/2023 42  40 - 60 mg/dL Final    LDL Cholesterol  08/02/2023 85  0 - 100 mg/dL Final    VLDL Cholesterol 08/02/2023 21  5 - 40 mg/dL Final    LDL/HDL Ratio 08/02/2023 1.96   Final    Hemoglobin A1C 08/02/2023 5.40  4.80 - 5.60 % Final    WBC 08/02/2023 9.91  3.40 - 10.80 10*3/mm3 Final    RBC 08/02/2023 4.85  4.14 - 5.80 10*6/mm3 Final    Hemoglobin 08/02/2023 14.8  13.0 - 17.7 g/dL Final    Hematocrit 08/02/2023 43.9  37.5 - 51.0 % Final    MCV 08/02/2023 90.5  79.0 - 97.0 fL Final    MCH 08/02/2023 30.5  26.6 - 33.0 pg Final    MCHC 08/02/2023 33.7  31.5 - 35.7 g/dL Final    RDW 08/02/2023 13.1  12.3 - 15.4 % Final    RDW-SD 08/02/2023 43.7  37.0 - 54.0 fl Final    MPV 08/02/2023 10.5  6.0 - 12.0 fL Final    Platelets 08/02/2023 189  140 - 450 10*3/mm3 Final    Neutrophil % 08/02/2023 72.0  42.7 - 76.0 % Final    Lymphocyte % 08/02/2023 15.5 (L)  19.6 - 45.3 % Final    Monocyte % 08/02/2023 8.2  5.0 - 12.0 % Final    Eosinophil % 08/02/2023 3.4  0.3 - 6.2 % Final    Basophil % 08/02/2023 0.6  0.0 - 1.5 % Final    Immature Grans % 08/02/2023 0.3  0.0 - 0.5 % Final    Neutrophils, Absolute 08/02/2023 7.13 (H)  1.70 - 7.00 10*3/mm3 Final    Lymphocytes, Absolute 08/02/2023 1.54  0.70 - 3.10 10*3/mm3 Final    Monocytes, Absolute 08/02/2023 0.81  0.10 - 0.90 10*3/mm3 Final    Eosinophils, Absolute 08/02/2023 0.34  0.00 - 0.40 10*3/mm3 Final    Basophils, Absolute 08/02/2023 0.06  0.00 - 0.20  10*3/mm3 Final    Immature Grans, Absolute 08/02/2023 0.03  0.00 - 0.05 10*3/mm3 Final    nRBC 08/02/2023 0.0  0.0 - 0.2 /100 WBC Final         Assessment & Plan   Problems Addressed this Visit    None  Visit Diagnoses       Anxiety disorder, unspecified type    -  Primary    Relevant Medications    QUEtiapine (SEROquel) 100 MG tablet    Paranoia        Relevant Medications    QUEtiapine (SEROquel) 100 MG tablet    Psychosis, unspecified psychosis type        Relevant Medications    QUEtiapine (SEROquel) 100 MG tablet          Diagnoses         Codes Comments    Anxiety disorder, unspecified type    -  Primary ICD-10-CM: F41.9  ICD-9-CM: 300.00     Paranoia     ICD-10-CM: F22  ICD-9-CM: 297.1     Psychosis, unspecified psychosis type     ICD-10-CM: F29  ICD-9-CM: 298.9             Visit Diagnoses:    ICD-10-CM ICD-9-CM   1. Anxiety disorder, unspecified type  F41.9 300.00   2. Paranoia  F22 297.1   3. Psychosis, unspecified psychosis type  F29 298.9       GOALS:  Short Term Goals: Patient will be compliant with medication, and patient will have no significant medication related side effects.  Patient will be engaged in psychotherapy as indicated.  Patient will report subjective improvement of symptoms.  Long term goals: To stabilize mood and treat/improve subjective symptoms, the patient will stay out of the hospital, the patient will be at an optimal level of functioning, and the patient will take all medications as prescribed.  The patient/guardian verbalized understanding and agreement with goals that were mutually set.      TREATMENT PLAN: Continue supportive psychotherapy efforts and medications as indicated.  Pharmacological and Non-Pharmacological treatment options discussed during today's visit. Patient/Guardian acknowledged and verbally consented with current treatment plan and was educated on the importance of compliance with treatment and follow-up appointments.      MEDICATION ISSUES:  Discussed  medication options and treatment plan of prescribed medication as well as the risks, benefits, any black box warnings, and side effects including potential falls, possible impaired driving, and metabolic adversities among others. Patient is agreeable to call the office with any worsening of symptoms or onset of side effects, or if any concerns or questions arise.  The contact information for the office is made available to the patient. Patient is agreeable to call 911 or go to the nearest ER should they begin having any SI/HI, or if any urgent concerns arise. No medication side effects or related complaints today.     MEDS ORDERED DURING VISIT:  New Medications Ordered This Visit   Medications    QUEtiapine (SEROquel) 100 MG tablet     Sig: Take 1 tablet by mouth Every Night.     Dispense:  45 tablet     Refill:  0     Plan:  -Increase Seroquel to 100 mg p.o. at night for paranoid thoughts and sleep disturbance/anxiety.  - Continue Lexapro to 20 mg p.o. daily for anxiety.  - Reviewed CBC, CMP, TSH, T4, lipid panel, hemoglobin A1c today.   - Follow-up in 6 weeks.      Follow Up Appointment:   Return in about 6 weeks (around 11/8/2023) for Recheck.             This document has been electronically signed by EILEEN Lovell  September 27, 2023 17:37 EDT    Dictated Utilizing Dragon Dictation: Part of this note may be an electronic transcription/translation of spoken language to printed text using the Dragon Dictation System.

## 2023-10-09 ENCOUNTER — OFFICE VISIT (OUTPATIENT)
Dept: GASTROENTEROLOGY | Facility: CLINIC | Age: 53
End: 2023-10-09
Payer: MEDICARE

## 2023-10-09 VITALS
HEIGHT: 66 IN | WEIGHT: 152.6 LBS | BODY MASS INDEX: 24.53 KG/M2 | HEART RATE: 66 BPM | DIASTOLIC BLOOD PRESSURE: 78 MMHG | SYSTOLIC BLOOD PRESSURE: 132 MMHG

## 2023-10-09 DIAGNOSIS — R17 ELEVATED BILIRUBIN: Primary | ICD-10-CM

## 2023-10-09 DIAGNOSIS — K21.00 GASTROESOPHAGEAL REFLUX DISEASE WITH ESOPHAGITIS WITHOUT HEMORRHAGE: ICD-10-CM

## 2023-10-09 DIAGNOSIS — K75.81 NONALCOHOLIC STEATOHEPATITIS (NASH): ICD-10-CM

## 2023-10-09 DIAGNOSIS — E78.5 HYPERLIPIDEMIA, UNSPECIFIED HYPERLIPIDEMIA TYPE: ICD-10-CM

## 2023-10-09 RX ORDER — DEXLANSOPRAZOLE 60 MG/1
60 CAPSULE, DELAYED RELEASE ORAL
Qty: 30 CAPSULE | Refills: 11 | Status: SHIPPED | OUTPATIENT
Start: 2023-10-09

## 2023-10-09 NOTE — PROGRESS NOTES
DATE:  10/11/2023    DIAGNOSIS: GERD, elevated LFTs    CHIEF COMPLAINT:  Chief Complaint   Patient presents with    GERD     HPI   Patient has not been seen in clinic since November 2022-she is accompanied by his wife who states that he had a roughly 3-month hospital stay due to double pneumonia and sepsis that eventually led into liver failure.  He was just discharged back in early March in which he is still trying to fully recover.  He is wanting to reestablish care in regards to his liver.  He was on several medications while in the hospital (Librium, Depakote and Geodon) that his liver did not tolerate very well.  Patient's wife states at 1 point his liver enzymes were elevated and he was retaining fluid-he ended up having a paracentesis performed however only a small amount of fluid was removed.  He is feeling well since leaving the hospital-he is working on building up strength.  Patient states that he has been staying extremely hungry since being discharged from the hospital-she did lose a significant amount of weight while inpatient.  Patient is asking for refills on his Nexium 40 mg once daily-this medication does seem to control GERD symptoms well.    Interval History:  Patient reports that he has been doing well since he was last seen in clinic.  He has continued to recoup from his previous hospitalization but overall doing very well.  He is still concerned about liver function.  He did have labs performed in early August that showed improved levels.  His LFTs, alkaline phosphatase and total bilirubin were all within normal limits.  He is not experiencing any right upper quadrant abdominal pain.  Denies any confusion or disorientation.  Denies any sudden weight gain.  Denies any swelling in the abdomen or lower extremities.  He does complain today of heartburn/reflux.  He has previously taken Nexium which does not seem to be controlling symptoms well.  He continues to have several episodes of breakthrough  symptoms weekly spite avoiding spicy/acidic foods.    The following portions of the patient's history were reviewed and updated as appropriate: allergies, current medications, past family history, past medical history, past social history, past surgical history and problem list.  REVIEW OF SYSTEMS:   Review of Systems   Constitutional: Negative.    HENT:  Positive for trouble swallowing. Negative for sore throat.    Eyes: Negative.    Respiratory:  Negative for chest tightness.    Cardiovascular:  Negative for chest pain.   Gastrointestinal:  Positive for abdominal distention, abdominal pain and nausea. Negative for anal bleeding, blood in stool, constipation, diarrhea, rectal pain and vomiting.   Endocrine: Negative.    Genitourinary:  Negative for difficulty urinating.   Musculoskeletal:  Positive for back pain and neck pain.   Skin: Negative.    Allergic/Immunologic: Negative for environmental allergies and food allergies.   Neurological:  Positive for headaches. Negative for dizziness and seizures.   Hematological:  Does not bruise/bleed easily.   Psychiatric/Behavioral:  Positive for sleep disturbance.        PAST MEDICAL HISTORY:  Past Medical History:   Diagnosis Date    Alcohol abuse don't know    quit Nov. 12,2023    Alcoholism     Anemia     Anxiety whwhen put in continued care    Arthritis of back     not sure    Johnson esophagus     Cholelithiasis     Chronic pain disorder have degenerative disc disease    COPD (chronic obstructive pulmonary disease)     CTS (carpal tunnel syndrome)     DDD (degenerative disc disease), thoracic     Degenerative disc disease, lumbar     Dystonia     GERD (gastroesophageal reflux disease)     GI (gastrointestinal bleed)     Hypertension     Irritable bowel syndrome     Liver disease     Low back strain     Lumbosacral disc disease     Migraines     Peptic ulcer disease     Stroke     TIA    Substance abuse     TIA (transient ischemic attack)     Ulcerative colitis      Wears dentures     upper only       PAST SURGICAL HISTORY:  Past Surgical History:   Procedure Laterality Date    ABDOMINAL SURGERY  09/17/2021    APPENDECTOMY      CHOLECYSTECTOMY N/A 04/22/2017    Procedure: CHOLECYSTECTOMY LAPAROSCOPIC;  Surgeon: Jaqueline Guaman MD;  [facility];  Service:     COLONOSCOPY N/A 05/08/2017    Procedure: COLONOSCOPY  CPTCODE:84363;  Surgeon: Nicho Richey III, MD;  [facility];  Service:     CUBITAL TUNNEL RELEASE Left 09/01/2022    Procedure: CUBITAL TUNNEL RELEASE LEFT;  Surgeon: Alec Hough MD;  [facility];  Service: Orthopedics;  Laterality: Left;    ENDOSCOPY      ENDOSCOPY N/A 05/08/2017    Procedure: ESOPHAGOGASTRODUODENOSCOPY WITH BIOPSY  CPTCODE:97743;  Surgeon: Nicho Richey III, MD;  [facility];  Service:     ENDOSCOPY N/A 11/03/2017    Procedure: ESOPHAGOGASTRODUODENOSCOPY WITH BIOPSY  CPTCODE: 31414;  Surgeon: Nicho Richey III, MD;  [facility];  Service:     ENDOSCOPY N/A 02/20/2018    Procedure: ESOPHAGOGASTRODUODENOSCOPY WITH DILATATION CPT CODE: 29756;  Surgeon: Nicho Richey III, MD;  [facility];  Service:     ENDOSCOPY N/A 06/05/2018    Procedure: ESOPHAGOGASTRODUODENOSCOPY WITH BIOPSY CPT CODE: 61549;  Surgeon: Nicho Richey III, MD;  [facility];  Service: Gastroenterology    ENDOSCOPY N/A 05/26/2021    Procedure: ESOPHAGOGASTRODUODENOSCOPY WITH BIOPSY;  Surgeon: Julieta Hebert MD;  [facility];  Service: Gastroenterology;  Laterality: N/A;    ENDOSCOPY N/A 06/02/2021    Procedure: ESOPHAGOGASTRODUODENOSCOPY WITH BIOPSY;  Surgeon: Julieta Hebert MD;  [facility];  Service: Gastroenterology;  Laterality: N/A;    GASTRECTOMY N/A 09/17/2019    Procedure: OPEN VAGOTOMY, ANTRECTOMY,REVISION- Y GASTROJEJUNOSOTOMY;  Surgeon: Herbert Umanzor MD;  Location:  [facility];  Service: General    HERNIA REPAIR  12/10/2020     TRACHEOSTOMY N/A 01/17/2023    Procedure: TRACHEOSTOMY;  Surgeon: Felton De León MD;  [facility];  Service: General;  Laterality: N/A;    UPPER GASTROINTESTINAL ENDOSCOPY      VENTRAL/INCISIONAL HERNIA REPAIR N/A 12/10/2020    Procedure: INCISIONAL HERNIA REPAIR LAPAROSCOPIC WITH MESH;  Surgeon: Herbret Umanzor MD;  Location:  [facility];  Service: General;  Laterality: N/A;       SOCIAL HISTORY:  Social History     Socioeconomic History    Marital status:    Tobacco Use    Smoking status: Every Day     Packs/day: 1.00     Years: 35.00     Additional pack years: 0.00     Total pack years: 35.00     Types: Cigarettes     Last attempt to quit: 9/1/2020     Years since quitting: 3.1    Smokeless tobacco: Never    Tobacco comments:     Been smoking 20 years   Vaping Use    Vaping Use: Never used   Substance and Sexual Activity    Alcohol use: Not Currently     Comment: Quit drinking November 12,2023    Drug use: No    Sexual activity: Yes     Partners: Female     Birth control/protection: None       FAMILY HISTORY:  Family History   Problem Relation Age of Onset    Osteoporosis Mother     Hypertension Father     Osteoporosis Father     No Known Problems Sister     No Known Problems Brother     Drug abuse Brother     No Known Problems Daughter     Diabetes Sister        MEDICATIONS:      Current Outpatient Medications:     baclofen (LIORESAL) 20 MG tablet, , Disp: , Rfl:     escitalopram (LEXAPRO) 20 MG tablet, TAKE 1 TABLET BY MOUTH DAILY, Disp: 30 tablet, Rfl: 1    metoprolol tartrate (LOPRESSOR) 25 MG tablet, Take 1 tablet by mouth 2 (Two) Times a Day., Disp: , Rfl:     QUEtiapine (SEROquel) 100 MG tablet, Take 1 tablet by mouth Every Night., Disp: 45 tablet, Rfl: 0    spironolactone (ALDACTONE) 25 MG tablet, Take 1 tablet by mouth Daily., Disp: , Rfl:     dexlansoprazole (DEXILANT) 60 MG capsule, Take 1 capsule by mouth Every Morning Before Breakfast., Disp: 30 capsule, Rfl: 11    ALLERGIES:   "  Allergies   Allergen Reactions    Contrast Dye (Echo Or Unknown Ct/Mr) Other (See Comments)     Shortness of breath    Prilosec [Omeprazole] Other (See Comments)     Chest pain  Pt states he can take pepcid with no problem    Protonix [Pantoprazole Sodium] Palpitations     Patient states that protonix causes chest pain.  Shruthi Pérez, [facility]  4/23/2017  11:19 AM  Pt states he can tolerate pepcid with no problem      Ativan [Lorazepam] Hallucinations    Depakene [Valproic Acid] Hallucinations    Geodon [Ziprasidone Hcl] Mental Status Change    Libritabs [Chlordiazepoxide] Other (See Comments)     Tongue swell/split    Dilantin [Phenytoin] Delirium    Dilaudid [Hydromorphone] Hallucinations    Flagyl [Metronidazole] Nausea And Vomiting    Topamax [Topiramate] Anxiety       VIST VITALS/PHYSICAL EXAM:  /78   Pulse 66   Ht 167.6 cm (66\")   Wt 69.2 kg (152 lb 9.6 oz)   BMI 24.63 kg/mý   Physical Exam  Constitutional:       General: He is not in acute distress.     Appearance: Normal appearance. He is well-developed.   HENT:      Head: Normocephalic and atraumatic.   Eyes:      Pupils: Pupils are equal, round, and reactive to light.   Cardiovascular:      Rate and Rhythm: Normal rate and regular rhythm.      Heart sounds: Normal heart sounds.   Pulmonary:      Effort: Pulmonary effort is normal. No respiratory distress.      Breath sounds: Normal breath sounds. No wheezing, rhonchi or rales.   Abdominal:      General: Abdomen is flat. Bowel sounds are normal. There is no distension.      Palpations: Abdomen is soft. There is no mass.      Tenderness: There is no abdominal tenderness. There is no guarding or rebound.      Hernia: No hernia is present.   Musculoskeletal:         General: No swelling. Normal range of motion.      Cervical back: Normal range of motion and neck supple.      Right lower leg: No edema.      Left lower leg: No edema.   Skin:     General: Skin is warm and dry.   Neurological:      " Mental Status: He is alert and oriented to person, place, and time.   Psychiatric:         Attention and Perception: Attention normal.         Mood and Affect: Mood normal.         Speech: Speech normal.         Behavior: Behavior normal. Behavior is cooperative.         Thought Content: Thought content normal.         PATHOLOGY:  NONE      ENDOSCOPY:  NONE      IMAGING:  CT Abdomen Pelvis Without Contrast    Result Date: 12/30/2022  1.  Again noted is low-attenuation of the liver. 2.  Grossly stable small ascites is again noted. 3.  Probable right psoas hematoma measuring 3.2 by about 10 cm. 4.  Patchy bibasilar groundglass attenuation and confluent nodularity suggestive of infectious process again noted.  This report was finalized on 12/30/2022 4:40 PM by Dr. Fab Huff MD.      CT Head Without Contrast    Result Date: 2/14/2023    Unremarkable exam demonstrating no CT evidence of acute intracranial findings.  This report was finalized on 2/14/2023 12:29 PM by Dr. Giacomo Barnes MD.      CT Head Without Contrast    Result Date: 12/21/2022  1.  No acute intracranial abnormality. No significant change compared to CT brain yesterday. 2.  Cerebral volume loss, greater than expected for patient's age. Signer Name: Tra Lewis MD  Signed: 12/21/2022 11:32 PM  Workstation Name: [email or web address]  [facility]    CT Head Without Contrast    Result Date: 12/20/2022  1.  No acute intracranial abnormality. 2.  Cerebral volume loss, greater than expected for patient's age. Signer Name: Tra Lewis MD  Signed: 12/20/2022 5:42 PM  Workstation Name: [email or web address]  [facility]    CT Lumbar Spine Without Contrast    Result Date: 12/20/2022  1.  No acute fracture or chronic subluxation. 2.  Multilevel lumbar spondylosis as described. 3.  Partially visualized intra-abdominal ascites, new compared to recent CT abdomen 11/15/2022. Signer Name: Tra Lewis MD  Signed: 12/20/2022 5:51 PM  Workstation  Name: [email or web address]  [facility]    MRI Brain Without Contrast    Result Date: 12/20/2022  Nonspecific periventricular white matter lesions are seen to a mild extent bilaterally. This most likely represents mild chronic deep white matter ischemic change. Chronic inflammatory changes are seen in the mastoid air cells bilaterally. Otherwise negative. Signer Name: Zachery Cano MD  Signed: 12/20/2022 10:11 PM  Workstation Name: [email or web address]  [facility]    MRI Lumbar Spine Without Contrast    Result Date: 12/20/2022  1.  Bard degenerative changes are described in detail above most significant appearing radiographically at L5-S1 and L3-4. There is approximately moderate canal stenosis at L5-S1 and mild to moderate canal stenosis at L3-4. Please refer to the level by level description of lateral recess and foraminal compromise and correlate with radicular symptoms. Signer Name: Dejah Krishna MD  Signed: 12/20/2022 10:19 PM  Workstation Name: [facility]  [facility]    CT Abdomen Pelvis With Contrast    Addendum Date: 1/24/2023    //////////// ADDENDUM #1 //////////// TECHNIQUE: (REVISED) CT of the abdomen and pelvis WITH IV contrast. Report Dictated By: Ruth ARELLANO  Preliminary Report Signed By: Jatin Kerr MD  Electronically Signed: 1/23/2023 11:38 AM  [facility] Signer Name: Jatin Kerr MD  Signed: 1/24/2023 8:40 AM  Workstation Name: [facility]  Radiology [facility]////////////  ORIGINAL REPORT //////////// CT Abdomen Pelvis W INDICATION: Acute abdominal pain and distention TECHNIQUE: CT of the abdomen and pelvis without IV contrast. Coronal and sagittal reconstructions were obtained.  Radiation dose reduction techniques included automated exposure control or exposure modulation based on body size. Count of known CT and cardiac nuc med studies performed in previous 12 months: 1. COMPARISON: 2022 FINDINGS:  Abdomen: There are new patchy groundglass infiltrates in the lungs with small right effusion and right base atelectasis. There is a large amount ascites. The liver is small and shows diffuse fatty change. There are postoperative changes of the GE juncture. Spleen, pancreas, adrenal glands and kidneys are normal in appearance. Is mild distention of the small bowel. The distal ileum is collapsed. The prior study showed wall thickening throughout the colon suggesting colitis and that has improved. Pelvis: Bladder is normal. Prostate gland is normal. Bones are unremarkable.     Result Date: 1/24/2023  Extensive colon wall thickening noted on the previous study has improved and almost resolved Mild distention of most of the small bowel Large amount ascites Small fatty liver Patchy groundglass infiltrates are present in the lungs which are new from 12/20/2022 and may represent Covid 19 pneumonia Signer Name: Jatin Kerr MD  Signed: 12/24/2022 1:52 PM  Workstation Name: [facility]  [facility]    CT Abdomen Pelvis With Contrast    Result Date: 12/20/2022  Markedly abnormal appearance to the liver, dramatically changed from the previous CT in November. There is patchy abnormal irregular enhancement suggesting diffuse hepatocellular disease. This is accompanied by a moderate volume of ascites that is also new. Mucosal edema in the colon and small bowel is noted. This may be on the basis of liver disease and hypoalbuminemia. Acute colitis/enteritis is not excluded. This is accompanied by bibasilar atelectasis and nonspecific patchy infiltrates at both lung bases. Signer Name: Zachery Cano MD  Signed: 12/20/2022 10:36 PM  Workstation Name: [facility]IR[facility]  [facility]    XR Chest 1 View    Result Date: 3/2/2023    Resolving right lower lobe opacity.  This report was finalized on 3/2/2023 3:50 PM by Dr. Fab Huff MD.      XR Chest 1 View    Result Date: 2/28/2023  Enteric tube  tip again projecting over the proximal body/fundus of the stomach. Signer Name: Preston Phillips MD  Signed: 2/28/2023 12:25 AM  Workstation Name: [facility]  [facility]    XR Chest 1 View    Result Date: 2/27/2023  1.  Interval repositioning of enteric tube with tip now near the gastric fundus. Otherwise, no significant change compared to chest x-ray same day. 2.  Low lung volumes with right lower lobe consolidation. Signer Name: Tra Lewis MD  Signed: 2/27/2023 11:14 PM  Workstation Name: [email or web address]  [facility]    XR Chest 1 View    Result Date: 2/27/2023  1.  Right lower lobe atelectasis or airspace disease. 2.  Tiny right pleural effusion.  This report was finalized on 2/27/2023 9:36 AM by Dr. Fab Huff MD.      XR Chest 1 View    Result Date: 2/25/2023  NG tube tip is in the gastric fundus Signer Name: Koko Barba MD  Signed: 2/25/2023 9:47 AM  Workstation Name: [email or web address]  [facility]    XR Chest 1 View    Result Date: 2/23/2023   1. NG tube which appears to be coiled within the stomach. Contrast partially opacifies the stomach and is also noted in portions of the proximal small bowel. 2. Incomplete imaging of the lungs.  This report was finalized on 2/23/2023 4:57 PM by Dr. Phi Franks MD.      XR Chest 1 View    Result Date: 2/22/2023  NG tube tip is confirmed within the stomach. Signer Name: Zachery Sy MD  Signed: 2/22/2023 2:08 AM  Workstation Name: [email or web address]  [facility]    XR Chest 1 View    Result Date: 2/20/2023  Impression: The NG tube is in satisfactory position with the tip within the gastric fundus. The sidehole appears to be well below the gastroesophageal junction. Signer Name: Adrian Phillips MD  Signed: 2/20/2023 11:41 PM  Workstation Name: [email or web address]  [facility]    XR Chest 1 View    Result Date: 2/20/2023  Nasogastric tube with proximal port just  below the GE junction and distal tip within the proximal stomach. Further advancement 5 to 10 cm may allow for better NG tube security. Signer Name: AKIRA Khan MD  Signed: 2/20/2023 9:23 PM  Workstation Name: [facility]  Radiology [facility]    XR Chest 1 View    Result Date: 2/19/2023  The nasogastric tube is in good position with the tip over the mid stomach. Signer Name: Zachery Cano MD  Signed: 2/19/2023 5:27 PM  Workstation Name: [facility]  [facility]    XR Chest 1 View    Result Date: 2/14/2023  Slight improvement in aeration of the right lung base  This report was finalized on 2/14/2023 6:52 AM by Dr. Giacomo Barnes MD.      XR Chest 1 View    Result Date: 2/13/2023  Little overall change  This report was finalized on 2/13/2023 9:20 AM by Dr. Giacomo Barnes MD.      XR Chest 1 View    Result Date: 2/12/2023  1. Tubes and lines in position as described. No pneumothorax. 2. No significant interval change. Signer Name: Shamir Baker MD  Signed: 2/12/2023 12:38 AM  Workstation Name: [email or web address]  Radiology [facility]    XR Chest 1 View    Result Date: 2/11/2023  Newly placed NG tube in good position. Signer Name: Brian Gudino MD  Signed: 2/11/2023 3:50 PM  Workstation Name: [facility]  [facility]iology [facility]    XR Chest 1 View    Result Date: 2/8/2023  impression: Nasogastric tube tip is in the body the stomach. Otherwise been no change Signer Name: Jatin Kerr MD  Signed: 2/8/2023 10:56 PM  Workstation Name: [facility]  [facility]    XR Chest 1 View    Result Date: 2/8/2023  1.  Grossly stable right greater than left patchy bibasilar airspace disease. 2.  Tiny right pleural effusion again noted.  This report was finalized on 2/8/2023 8:42 AM by Dr. Fab Huff MD.      XR Chest 1 View    Result Date: 2/6/2023  NG tube tip is in the gastric fundus Signer Name: Koko Barba MD  Signed: 2/6/2023 6:02  PM  Workstation Name: [email or web address]  [facility]    XR Chest 1 View    Result Date: 2/5/2023  Well-positioned NG tube within the mid stomach. Signer Name: Brian Gudino MD  Signed: 2/5/2023 10:09 AM  Workstation Name: [facility]  [facility]    XR Chest 1 View    Result Date: 2/5/2023  NG tube tip is in the body of the stomach. Exam overall is not significantly changed. Signer Name: Zachery Sy MD  Signed: 2/5/2023 3:55 AM  Workstation Name: [email or web address]  [facility]    XR Chest 1 View    Result Date: 2/4/2023  1.  Interval positioning of enteric tube with tip in the distal stomach. 2.  Persistent right basilar infiltrate. Signer Name: Tra Lewis MD  Signed: 2/4/2023 4:08 PM  Workstation Name: [email or web address]  [facility]    XR Chest 1 View    Result Date: 2/4/2023  1.  Right basilar infiltrate and volume loss and probable small right pleural effusion. Diffuse interstitial edema or infiltrate. No change since yesterday. 2.  Support equipment in good position. Signer Name: Brian Gudino MD  Signed: 2/4/2023 9:32 AM  Workstation Name: [facility]  [facility]    XR Chest 1 View    Result Date: 2/3/2023  Interval retraction of the left-sided PICC line with tip now projecting near the cavoatrial junction. No pneumothorax. No other interval change. Signer Name: Preston Phillips MD  Signed: 2/3/2023 2:14 AM  Workstation Name: [facility]  [facility]    XR Chest 1 View    Result Date: 2/3/2023  1. Repositioning of the enteric tube with tip now projecting over the proximal body of the stomach. 2. Left PICC line tip projects over the right atrium, unchanged. 3. No change in bilateral pulmonary opacities. 4. No pneumothorax. Signer Name: Preston Phillips MD  Signed: 2/3/2023 1:30 AM  Workstation Name: [facility]  [facility]    XR Chest 1  View    Result Date: 2/2/2023  Impression: The tip of the NG tube is in the midesophagus. This is not in satisfactory position. See above recommendations. Signer Name: Adrian Phillips MD  Signed: 2/2/2023 8:41 PM  Workstation Name: [facility]    XR Chest 1 View    Result Date: 2/1/2023  Impression: Satisfactory placement of NG tube. Signer Name: Adrian Phillips MD  Signed: 2/1/2023 7:49 PM  Workstation Name: [facility]    XR Chest 1 View    Result Date: 2/1/2023  Line placement as above  This report was finalized on 2/1/2023 1:16 PM by Dr. Giacomo Barnes MD.      XR Chest 1 View    Result Date: 1/30/2023  1.  Interval improvement in bilateral airspace disease and significant decrease in pleural effusions. 2.  Support devices as above.  This report was finalized on 1/30/2023 8:38 AM by Dr. Phi Franks MD.      XR Chest 1 View    Result Date: 1/27/2023  1. Tubes and lines in position as described. 2. Stable to slight interval worsening appearance of the chest. No pneumothorax. Signer Name: Shamir Baker MD  Signed: 1/27/2023 6:23 AM  Workstation Name: [email or web address]  [facility]    XR Chest 1 View    Result Date: 1/24/2023  Bilateral airspace disease and bibasilar effusions  This report was finalized on 1/24/2023 7:35 AM by Dr. Giacomo Barnes MD.      XR Chest 1 View    Result Date: 1/21/2023  Lines and tubes in expected radiographic position. Pleural effusions and multifocal airspace disease not appreciably different. Signer Name: KEVIN SHELDON MD  Signed: 1/21/2023 12:51 PM  Workstation Name: [facility]    XR Chest 1 View    Result Date: 1/20/2023  1.  Patchy bibasilar airspace disease again noted. 2.  Tiny bilateral pleural effusions.  This report was finalized on 1/20/2023 3:53 PM by Dr. Fab Huff MD.      XR Chest 1 View    Result Date: 1/19/2023  1.  Again noted is bibasilar  airspace disease. 2.  Possibly tiny bilateral pleural effusions.  This report was finalized on 1/19/2023 2:19 PM by Dr. Fab Huff MD.      XR Chest 1 View    Result Date: 1/19/2023  The flexible feeding tube appears in good position over the mid stomach. Signer Name: Zachery Cano MD  Signed: 1/19/2023 1:40 AM  Workstation Name: [facility]  Rad[facility]    XR Chest 1 View    Result Date: 1/18/2023  1.  Nasogastric tube tip in the stomach. 2.  Tiny bilateral pleural effusions. 3.  Bibasilar airspace disease again noted.  This report was finalized on 1/18/2023 10:14 AM by Dr. Fab Huff MD.      XR Chest 1 View    Result Date: 1/12/2023  1.  Slight increase in basilar airspace disease and CHF/edema. 2.  Support devices as above.  This report was finalized on 1/12/2023 8:35 AM by Dr. Phi Franks MD.      XR Chest 1 View    Result Date: 1/6/2023  Improved, but continued bibasilar airspace disease  This report was finalized on 1/6/2023 6:30 PM by Dr. Giacomo Barnes MD.      XR Chest 1 View    Result Date: 1/5/2023  1. Stable portable chest radiograph, unchanged since earlier today. 2. Support equipment remains in good position. Signer Name: Brian Gudino MD  Signed: 1/5/2023 10:40 PM  Workstation Name: [email or web address]  Radiology [facility]    XR Chest 1 View    Result Date: 1/5/2023  Patchy airspace disease bilaterally. Line placement as above  This report was finalized on 1/5/2023 10:46 AM by Dr. Giacomo Barnes MD.      XR Chest 1 View    Result Date: 1/4/2023   1. Minimal bilateral airspace disease. Overall very slight improvement  This report was finalized on 1/4/2023 8:33 AM by Dr. Giacomo Barnes MD.      XR Chest 1 View    Result Date: 12/30/2022    Bilateral airspace disease is grossly stable.  This report was finalized on 12/30/2022 11:53 AM by Dr. Fab Huff MD.      XR Chest 1 View    Result Date: 12/30/2022  Well-positioned tubes, otherwise no change. Signer  Name: Zachery Sy MD  Signed: 12/30/2022 5:05 AM  Workstation Name: [id number]  [facility]    XR Chest 1 View    Result Date: 12/30/2022  Worsening bilateral pulmonary infiltrates. No pneumothorax. Signer Name: Zachery Sy MD  Signed: 12/30/2022 1:43 AM  Workstation Name: [email or web address]  [facility]    XR Chest 1 View    Result Date: 12/28/2022    No acute cardiopulmonary findings identified.  This report was finalized on 12/28/2022 8:23 AM by Dr. Fab Huff MD.      CT Angiogram Chest Pulmonary Embolism    Result Date: 12/20/2022  Bilateral multifocal groundglass infiltrates as well as platelike atelectasis in the lower lung fields, all new since the previous examination. Underlying emphysema. No evidence of pulmonary embolism. Signer Name: Zachery Cano MD  Signed: 12/20/2022 10:42 PM  Workstation Name: [id number]  Radiology [facility]    US Paracentesis    Result Date: 12/28/2022  1.  Small volume paracentesis for diagnostic fluid was performed with 22-gauge needle. 2.  Small ascites on today's study.  This report was finalized on 12/28/2022 1:45 PM by Dr. Fab Huff MD.      XR Abdomen KUB    Result Date: 1/3/2023  Ascites. Nonspecific bowel gas pattern. No visible large volume stool burden. Signer Name: Brian Gudino MD  Signed: 1/3/2023 5:31 AM  [email or web address]  [facility]iology [facility]    XR Chest AP    Result Date: 12/20/2022    Development of left greater than right patchy and interstitial basilar airspace disease which may represent changes of bibasilar pneumonia.  This report was finalized on 12/20/2022 4:39 PM by Dr. Phi Franks MD.      FL Video Swallow Single Contrast    Result Date: 3/1/2023      Please see the speech pathologist's report for additional information.  This report was finalized on 3/1/2023 1:32 PM by Dr. Fab Huff MD.      FL Video Swallow Single Contrast    Result Date:  2/23/2023  1.  Aspiration with multiple consistencies. 2. Please see dysphasia notes for further details.  This report was finalized on 2/23/2023 1:12 PM by Dr. Phi Franks MD.      FL Video Swallow Single Contrast    Result Date: 2/6/2023      Please see the speech pathologist's report for additional information.  This report was finalized on 2/6/2023 3:22 PM by Dr. Fab Huff MD.            RECENT LABS:  Lab Results   Component Value Date    WBC 9.75 10/10/2023    HGB 15.2 10/10/2023    HCT 45.3 10/10/2023    MCV 90.4 10/10/2023    RDW 12.5 10/10/2023     10/10/2023    NEUTRORELPCT 67.1 10/10/2023    LYMPHORELPCT 21.0 10/10/2023    MONORELPCT 6.6 10/10/2023    EOSRELPCT 4.4 10/10/2023    BASORELPCT 0.6 10/10/2023    NEUTROABS 6.54 10/10/2023    LYMPHSABS 2.05 10/10/2023       Lab Results   Component Value Date     10/10/2023    K 4.3 10/10/2023    CO2 22.3 10/10/2023     10/10/2023    BUN 6 10/10/2023    CREATININE 0.90 10/10/2023    EGFRIFNONA 106 11/29/2021    GLUCOSE 123 (H) 10/10/2023    CALCIUM 9.3 10/10/2023    ALKPHOS 105 10/10/2023    AST 22 10/10/2023    ALT 13 10/10/2023    BILITOT 0.3 10/10/2023    ALBUMIN 4.0 10/10/2023    PROTEINTOT 6.4 10/10/2023    MG 1.7 03/07/2023    PHOS 3.6 05/25/2021             ASSESSMENT & PLAN:  Patient is doing well and is currently well controlled on Dexailnt 60mg once daily for GERD. We will recheck CBC,CMP, and NEGRETE at this visit to check on previously elevated LFTs and bilirubin.    Diagnosis Plan   1. Elevated bilirubin  Comprehensive Metabolic Panel    CBC & Differential    NEGRETE Fibrosure Plus      2. Gastroesophageal reflux disease with esophagitis without hemorrhage  dexlansoprazole (DEXILANT) 60 MG capsule      3. Nonalcoholic steatohepatitis (NEGRETE)  NEGRETE Fibrosure Plus      4. Hyperlipidemia, unspecified hyperlipidemia type  NEGRETE Fibrosure Plus            Return in about 1 year (around 10/9/2024).        Electronically Signed by: Melo  LATA Browning , October 11, 2023 12:16 EDT       CC:   Leticia Martinez APRN

## 2023-10-10 ENCOUNTER — LAB (OUTPATIENT)
Dept: LAB | Facility: HOSPITAL | Age: 53
End: 2023-10-10
Payer: MEDICARE

## 2023-10-10 ENCOUNTER — HOSPITAL ENCOUNTER (OUTPATIENT)
Dept: MRI IMAGING | Facility: HOSPITAL | Age: 53
Discharge: HOME | End: 2023-10-10
Payer: MEDICARE

## 2023-10-10 DIAGNOSIS — G89.29 CHRONIC MIDLINE LOW BACK PAIN WITH BILATERAL SCIATICA: ICD-10-CM

## 2023-10-10 DIAGNOSIS — M54.41 CHRONIC MIDLINE LOW BACK PAIN WITH BILATERAL SCIATICA: ICD-10-CM

## 2023-10-10 DIAGNOSIS — M54.42 CHRONIC MIDLINE LOW BACK PAIN WITH BILATERAL SCIATICA: ICD-10-CM

## 2023-10-10 LAB
ALBUMIN SERPL-MCNC: 4 G/DL (ref 3.5–5.2)
ALBUMIN/GLOB SERPL: 1.7 G/DL
ALP SERPL-CCNC: 105 U/L (ref 39–117)
ALT SERPL W P-5'-P-CCNC: 13 U/L (ref 1–41)
ANION GAP SERPL CALCULATED.3IONS-SCNC: 11.7 MMOL/L (ref 5–15)
AST SERPL-CCNC: 22 U/L (ref 1–40)
BASOPHILS # BLD AUTO: 0.06 10*3/MM3 (ref 0–0.2)
BASOPHILS NFR BLD AUTO: 0.6 % (ref 0–1.5)
BILIRUB SERPL-MCNC: 0.3 MG/DL (ref 0–1.2)
BUN SERPL-MCNC: 6 MG/DL (ref 6–20)
BUN/CREAT SERPL: 6.7 (ref 7–25)
CALCIUM SPEC-SCNC: 9.3 MG/DL (ref 8.6–10.5)
CHLORIDE SERPL-SCNC: 105 MMOL/L (ref 98–107)
CO2 SERPL-SCNC: 22.3 MMOL/L (ref 22–29)
CREAT SERPL-MCNC: 0.9 MG/DL (ref 0.76–1.27)
DEPRECATED RDW RBC AUTO: 40.4 FL (ref 37–54)
EGFRCR SERPLBLD CKD-EPI 2021: 100.9 ML/MIN/1.73
EOSINOPHIL # BLD AUTO: 0.43 10*3/MM3 (ref 0–0.4)
EOSINOPHIL NFR BLD AUTO: 4.4 % (ref 0.3–6.2)
ERYTHROCYTE [DISTWIDTH] IN BLOOD BY AUTOMATED COUNT: 12.5 % (ref 12.3–15.4)
GLOBULIN UR ELPH-MCNC: 2.4 GM/DL
GLUCOSE SERPL-MCNC: 123 MG/DL (ref 65–99)
HCT VFR BLD AUTO: 45.3 % (ref 37.5–51)
HGB BLD-MCNC: 15.2 G/DL (ref 13–17.7)
IMM GRANULOCYTES # BLD AUTO: 0.03 10*3/MM3 (ref 0–0.05)
IMM GRANULOCYTES NFR BLD AUTO: 0.3 % (ref 0–0.5)
LYMPHOCYTES # BLD AUTO: 2.05 10*3/MM3 (ref 0.7–3.1)
LYMPHOCYTES NFR BLD AUTO: 21 % (ref 19.6–45.3)
MCH RBC QN AUTO: 30.3 PG (ref 26.6–33)
MCHC RBC AUTO-ENTMCNC: 33.6 G/DL (ref 31.5–35.7)
MCV RBC AUTO: 90.4 FL (ref 79–97)
MONOCYTES # BLD AUTO: 0.64 10*3/MM3 (ref 0.1–0.9)
MONOCYTES NFR BLD AUTO: 6.6 % (ref 5–12)
NEUTROPHILS NFR BLD AUTO: 6.54 10*3/MM3 (ref 1.7–7)
NEUTROPHILS NFR BLD AUTO: 67.1 % (ref 42.7–76)
NRBC BLD AUTO-RTO: 0 /100 WBC (ref 0–0.2)
PLATELET # BLD AUTO: 182 10*3/MM3 (ref 140–450)
PMV BLD AUTO: 10.4 FL (ref 6–12)
POTASSIUM SERPL-SCNC: 4.3 MMOL/L (ref 3.5–5.2)
PROT SERPL-MCNC: 6.4 G/DL (ref 6–8.5)
RBC # BLD AUTO: 5.01 10*6/MM3 (ref 4.14–5.8)
SODIUM SERPL-SCNC: 139 MMOL/L (ref 136–145)
WBC NRBC COR # BLD: 9.75 10*3/MM3 (ref 3.4–10.8)

## 2023-10-10 PROCEDURE — 82947 ASSAY GLUCOSE BLOOD QUANT: CPT | Performed by: PHYSICIAN ASSISTANT

## 2023-10-10 PROCEDURE — 84478 ASSAY OF TRIGLYCERIDES: CPT | Performed by: PHYSICIAN ASSISTANT

## 2023-10-10 PROCEDURE — 82172 ASSAY OF APOLIPOPROTEIN: CPT | Performed by: PHYSICIAN ASSISTANT

## 2023-10-10 PROCEDURE — 83010 ASSAY OF HAPTOGLOBIN QUANT: CPT | Performed by: PHYSICIAN ASSISTANT

## 2023-10-10 PROCEDURE — 85025 COMPLETE CBC W/AUTO DIFF WBC: CPT | Performed by: PHYSICIAN ASSISTANT

## 2023-10-10 PROCEDURE — 83883 ASSAY NEPHELOMETRY NOT SPEC: CPT | Performed by: PHYSICIAN ASSISTANT

## 2023-10-10 PROCEDURE — 72148 MRI LUMBAR SPINE W/O DYE: CPT

## 2023-10-10 PROCEDURE — 82247 BILIRUBIN TOTAL: CPT | Performed by: PHYSICIAN ASSISTANT

## 2023-10-10 PROCEDURE — 82977 ASSAY OF GGT: CPT | Performed by: PHYSICIAN ASSISTANT

## 2023-10-10 PROCEDURE — 82465 ASSAY BLD/SERUM CHOLESTEROL: CPT | Performed by: PHYSICIAN ASSISTANT

## 2023-10-10 PROCEDURE — 80053 COMPREHEN METABOLIC PANEL: CPT | Performed by: PHYSICIAN ASSISTANT

## 2023-10-12 ENCOUNTER — TELEPHONE (OUTPATIENT)
Dept: GASTROENTEROLOGY | Facility: CLINIC | Age: 53
End: 2023-10-12
Payer: MEDICARE

## 2023-10-12 LAB
A2 MACROGLOB SERPL-MCNC: 226 MG/DL (ref 110–276)
ALT SERPL W P-5'-P-CCNC: 20 IU/L (ref 0–55)
APO A-I SERPL-MCNC: 130 MG/DL (ref 101–178)
AST SERPL W P-5'-P-CCNC: 24 IU/L (ref 0–40)
BILIRUB SERPL-MCNC: 0.3 MG/DL (ref 0–1.2)
CHOLEST SERPL-MCNC: 153 MG/DL (ref 100–199)
FIBROSIS SCORING:: ABNORMAL
FIBROSIS STAGE SERPL QL: ABNORMAL
GGT SERPL-CCNC: 47 IU/L (ref 0–65)
GLUCOSE SERPL-MCNC: 127 MG/DL (ref 70–99)
HAPTOGLOB SERPL-MCNC: 121 MG/DL (ref 29–370)
LABORATORY COMMENT REPORT: ABNORMAL
LIVER FIBR SCORE SERPL CALC.FIBROSURE: 0.3 (ref 0–0.21)
LIVER STEATOSIS GRADE SERPL QL: ABNORMAL
LIVER STEATOSIS SCORE SERPL: 0.52 (ref 0–0.4)
NASH GRADE SERPL QL: ABNORMAL
NASH INTERPRETATION SERPL-IMP: ABNORMAL
NASH SCORE SERPL: 0.47 (ref 0–0.25)
NASH SCORING: ABNORMAL
STEATOSIS SCORING: ABNORMAL
TEST PERFORMANCE INFO SPEC: ABNORMAL
TEST PERFORMANCE INFO SPEC: ABNORMAL
TRIGL SERPL-MCNC: 152 MG/DL (ref 0–149)

## 2023-10-12 NOTE — TELEPHONE ENCOUNTER
Can you please let patient know that his labs are all within normal limits.  His [place] FibroSure does confirm some mild fatty liver however I do not believe this is something that he will have to continuously follow unless primary care notes an increase in liver enzymes.  He will just need to follow-up for refills of GERD medication.

## 2023-11-08 ENCOUNTER — OFFICE VISIT (OUTPATIENT)
Dept: PSYCHIATRY | Facility: CLINIC | Age: 53
End: 2023-11-08
Payer: MEDICARE

## 2023-11-08 VITALS
OXYGEN SATURATION: 97 % | WEIGHT: 153.6 LBS | HEART RATE: 73 BPM | SYSTOLIC BLOOD PRESSURE: 122 MMHG | HEIGHT: 66 IN | DIASTOLIC BLOOD PRESSURE: 76 MMHG | BODY MASS INDEX: 24.68 KG/M2

## 2023-11-08 DIAGNOSIS — F41.9 ANXIETY DISORDER, UNSPECIFIED TYPE: ICD-10-CM

## 2023-11-08 RX ORDER — HYDROCODONE BITARTRATE AND ACETAMINOPHEN 5; 325 MG/1; MG/1
TABLET ORAL
COMMUNITY
Start: 2023-11-07

## 2023-11-08 RX ORDER — QUETIAPINE FUMARATE 100 MG/1
100 TABLET, FILM COATED ORAL NIGHTLY
Qty: 30 TABLET | Refills: 1 | Status: SHIPPED | OUTPATIENT
Start: 2023-11-08

## 2023-11-08 RX ORDER — ESCITALOPRAM OXALATE 20 MG/1
20 TABLET ORAL DAILY
Qty: 30 TABLET | Refills: 1 | Status: SHIPPED | OUTPATIENT
Start: 2023-11-08 | End: 2024-11-07

## 2023-11-08 NOTE — PROGRESS NOTES
Subjective     Jamison Edward is a 53 y.o. male who presents today for follow up    Chief Complaint: Anxiety       History of Present Illness:    History of Present Illness      Jamison is a 53-year-old male who presents today for a follow-up visit.  He tells me that he has been doing well since his last visit 6 weeks ago.  He does tell me that he has been having issues with pain in both his lower and upper back.  He is scheduled to see a neurosurgeon next Wednesday but is worried about having surgery due to his medical complications earlier in the year.  He tells me that he is no longer having any auditory hallucinations or paranoia with his current dose of Seroquel.  He denies any acute issues with depression and tells me that his anxiety has been as expected giving his situation.  He denies any SI/HI/AVH.  He tells me that his sleep has been poor and relates it to the fact that he cannot get comfortable at night.  He tells me that his appetite has been okay.    The following portions of the patient's history were reviewed and updated as appropriate: allergies, current medications, past family history, past medical history, past social history, past surgical history and problem list.    Past Psychiatric History:  Began Treatment:This year  Diagnoses: Delirium  Psychiatrist: Patient has never seen a psychiatric provider prior to consultations while admitted to the hospital.  Therapist:Denies  Admission History:Denies  Medication Trials: Xanax, Ativan, Depakote, Seroquel, Geodon, Effexor  Self Harm: Denies  Suicide Attempts:Denies      Past Medical History:  Past Medical History:   Diagnosis Date    Alcohol abuse don't know    quit Nov. 12,2023    Alcoholism     Anemia     Anxiety whwhen put in continued care    Arthritis of back     not sure    Johnson esophagus     Cholelithiasis     Chronic pain disorder have degenerative disc disease    COPD (chronic obstructive pulmonary disease)     CTS (carpal tunnel  syndrome)     DDD (degenerative disc disease), thoracic     Degenerative disc disease, lumbar     Dystonia     GERD (gastroesophageal reflux disease)     GI (gastrointestinal bleed)     Hypertension     Irritable bowel syndrome     Liver disease     Low back strain     Lumbosacral disc disease     Migraines     Peptic ulcer disease     Stroke     TIA    Substance abuse     TIA (transient ischemic attack)     Ulcerative colitis     Wears dentures     upper only       Substance Abuse History:   Types: alcohol, THC when he was a teenager  Withdrawal Symptoms: Patient reports that they believed some of his delirium while admitted to the hospital from December to March was due to alcohol withdrawal.  Patient states that he stopped drinking alcohol November 12, 2022.  Longest Period Sober:Patient states that he has been sober since November  AA: No    Social History:  Social History     Socioeconomic History    Marital status:    Tobacco Use    Smoking status: Every Day     Packs/day: 1.00     Years: 35.00     Additional pack years: 0.00     Total pack years: 35.00     Types: Cigarettes     Last attempt to quit: 9/1/2020     Years since quitting: 3.1    Smokeless tobacco: Never    Tobacco comments:     Been smoking 20 years   Vaping Use    Vaping Use: Never used   Substance and Sexual Activity    Alcohol use: Not Currently     Comment: Quit drinking November 12,2023    Drug use: No    Sexual activity: Yes     Partners: Female     Birth control/protection: None       Family History:  Family History   Problem Relation Age of Onset    Osteoporosis Mother     Hypertension Father     Osteoporosis Father     No Known Problems Sister     No Known Problems Brother     Drug abuse Brother     No Known Problems Daughter     Diabetes Sister        Past Surgical History:  Past Surgical History:   Procedure Laterality Date    ABDOMINAL SURGERY  09/17/2021    APPENDECTOMY      CHOLECYSTECTOMY N/A 04/22/2017    Procedure:  CHOLECYSTECTOMY LAPAROSCOPIC;  Surgeon: Jaqueline Guaman MD;  [facility];  Service:     COLONOSCOPY N/A 05/08/2017    Procedure: COLONOSCOPY  CPTCODE:75549;  Surgeon: Nicho Richey III, MD;  [facility];  Service:     CUBITAL TUNNEL RELEASE Left 09/01/2022    Procedure: CUBITAL TUNNEL RELEASE LEFT;  Surgeon: Alec Hough MD;  [facility];  Service: Orthopedics;  Laterality: Left;    ENDOSCOPY      ENDOSCOPY N/A 05/08/2017    Procedure: ESOPHAGOGASTRODUODENOSCOPY WITH BIOPSY  CPTCODE:78351;  Surgeon: Nicho Richey III, MD;  [facility];  Service:     ENDOSCOPY N/A 11/03/2017    Procedure: ESOPHAGOGASTRODUODENOSCOPY WITH BIOPSY  CPTCODE: 58239;  Surgeon: Nicho Richey III, MD;  [facility];  Service:     ENDOSCOPY N/A 02/20/2018    Procedure: ESOPHAGOGASTRODUODENOSCOPY WITH DILATATION CPT CODE: 91551;  Surgeon: Nicho Richey III, MD;  [facility];  Service:     ENDOSCOPY N/A 06/05/2018    Procedure: ESOPHAGOGASTRODUODENOSCOPY WITH BIOPSY CPT CODE: 00137;  Surgeon: Nicho Richey III, MD;  [facility];  Service: Gastroenterology    ENDOSCOPY N/A 05/26/2021    Procedure: ESOPHAGOGASTRODUODENOSCOPY WITH BIOPSY;  Surgeon: Julieta Hebert MD;  [facility];  Service: Gastroenterology;  Laterality: N/A;    ENDOSCOPY N/A 06/02/2021    Procedure: ESOPHAGOGASTRODUODENOSCOPY WITH BIOPSY;  Surgeon: Julieta Hebert MD;  [facility];  Service: Gastroenterology;  Laterality: N/A;    GASTRECTOMY N/A 09/17/2019    Procedure: OPEN VAGOTOMY, ANTRECTOMY,REVISION- Y GASTROJEJUNOSOTOMY;  Surgeon: Herbert Umanzor MD;  Location:  [facility];  Service: General    HERNIA REPAIR  12/10/2020    TRACHEOSTOMY N/A 01/17/2023    Procedure: TRACHEOSTOMY;  Surgeon: Felton De León MD;  [facility];  Service: General;  Laterality: N/A;    UPPER GASTROINTESTINAL ENDOSCOPY      VENTRAL/INCISIONAL HERNIA  REPAIR N/A 12/10/2020    Procedure: INCISIONAL HERNIA REPAIR LAPAROSCOPIC WITH MESH;  Surgeon: Herbert Umanzor MD;  [facility];  Service: General;  Laterality: N/A;       Problem List:  Patient Active Problem List   Diagnosis    Precordial chest pain    Weight loss, abnormal    Right upper quadrant abdominal pain    Epigastric pain    History of bleeding ulcers    Nausea    Malaise and fatigue    Acute gastric ulcer without hemorrhage or perforation    Poor appetite    Duodenal ulcer    Gastroesophageal reflux disease    Dysphagia    Iron deficiency anemia    Malabsorption    Gastric ulcer without hemorrhage or perforation    Dystonia    Peptic ulcer without hemorrhage or perforation    Gastric outlet obstruction    Incisional hernia, without obstruction or gangrene    Incisional hernia    Chest pain, atypical    Gastric bezoar    Essential hypertension    Cigarette smoker    Chronic back pain    Sinus tachycardia    Tobacco use    Mild carpal tunnel syndrome    Orthostatic hypotension    Palpitations    Abdominal swelling    Lower extremity edema    Pneumonia due to infectious organism, unspecified laterality, unspecified part of lung    Acute respiratory failure with hypoxia       Allergy:   Allergies   Allergen Reactions    Contrast Dye (Echo Or Unknown Ct/Mr) Other (See Comments)     Shortness of breath    Prilosec [Omeprazole] Other (See Comments)     Chest pain  Pt states he can take pepcid with no problem    Protonix [Pantoprazole Sodium] Palpitations     Patient states that protonix causes chest pain.  Shruthi Pérez, [facility]  4/23/2017  11:19 AM  Pt states he can tolerate pepcid with no problem      Ativan [Lorazepam] Hallucinations    Depakene [Valproic Acid] Hallucinations    Geodon [Ziprasidone Hcl] Mental Status Change    Libritabs [Chlordiazepoxide] Other (See Comments)     Tongue swell/split    Dilantin [Phenytoin] Delirium    Dilaudid [Hydromorphone] Hallucinations    Flagyl  [Metronidazole] Nausea And Vomiting    Topamax [Topiramate] Anxiety        Current Medications:   Current Outpatient Medications   Medication Sig Dispense Refill    baclofen (LIORESAL) 20 MG tablet       dexlansoprazole (DEXILANT) 60 MG capsule Take 1 capsule by mouth Every Morning Before Breakfast. 30 capsule 11    escitalopram (LEXAPRO) 20 MG tablet TAKE 1 TABLET BY MOUTH DAILY 30 tablet 1    HYDROcodone-acetaminophen (NORCO) 5-325 MG per tablet       metoprolol tartrate (LOPRESSOR) 25 MG tablet Take 1 tablet by mouth 2 (Two) Times a Day.      QUEtiapine (SEROquel) 100 MG tablet Take 1 tablet by mouth Every Night. 45 tablet 0    spironolactone (ALDACTONE) 25 MG tablet Take 1 tablet by mouth Daily.       No current facility-administered medications for this visit.       Review of Systems:    Review of Systems   Constitutional:  Positive for fatigue. Negative for activity change and appetite change.   Cardiovascular: Negative.    Musculoskeletal:  Positive for back pain and neck pain.   Neurological:  Positive for weakness. Negative for seizures.   Psychiatric/Behavioral:  Positive for sleep disturbance (due to pain) and stress. Negative for agitation, behavioral problems, decreased concentration, dysphoric mood, hallucinations, self-injury, suicidal ideas, negative for hyperactivity and depressed mood. The patient is nervous/anxious.          Physical Exam:   Physical Exam  Vitals reviewed.   HENT:      Head: Atraumatic.   Pulmonary:      Effort: Pulmonary effort is normal.   Musculoskeletal:      Cervical back: Normal range of motion.   Neurological:      General: No focal deficit present.      Mental Status: He is alert and oriented to person, place, and time. Mental status is at baseline.   Psychiatric:         Attention and Perception: Attention and perception normal.         Mood and Affect: Mood and affect normal. Mood is not anxious or depressed. Affect is not blunt.         Speech: Speech normal.          "Behavior: Behavior normal. Behavior is cooperative.         Thought Content: Thought content normal. Thought content is not paranoid or delusional. Thought content does not include homicidal or suicidal ideation.         Cognition and Memory: Cognition and memory normal.         Judgment: Judgment normal.         Vitals:  Blood pressure 122/76, pulse 73, height 167.6 cm (66\"), weight 69.7 kg (153 lb 9.6 oz), SpO2 97%.   Body mass index is 24.79 kg/m².    Last 3 Blood Pressure Readings:  BP Readings from Last 3 Encounters:   11/08/23 122/76   10/09/23 132/78   09/27/23 132/72       Mental Status Exam:   Hygiene:   good  Cooperation:  Cooperative  Eye Contact:  Good  Psychomotor Behavior:  Appropriate  Affect:  Restricted  Mood: normal  Hopelessness: Denies  Speech:  Normal  Thought Process:  Goal directed  Thought Content:  Normal  Suicidal:  None  Homicidal:  None  Hallucinations:  Auditory  Delusion:  None  Memory:  Deficits  Orientation:  Person, Place, Time and Situation  Reliability:  good  Insight:  Good  Judgement:  Good  Impulse Control:  Good  Physical/Medical Issues:  Yes See [facility]        Lab Results:   Office Visit on 10/09/2023   Component Date Value Ref Range Status    Glucose 10/10/2023 123 (H)  65 - 99 mg/dL Final    BUN 10/10/2023 6  6 - 20 mg/dL Final    Creatinine 10/10/2023 0.90  0.76 - 1.27 mg/dL Final    Sodium 10/10/2023 139  136 - 145 mmol/L Final    Potassium 10/10/2023 4.3  3.5 - 5.2 mmol/L Final    Chloride 10/10/2023 105  98 - 107 mmol/L Final    CO2 10/10/2023 22.3  22.0 - 29.0 mmol/L Final    Calcium 10/10/2023 9.3  8.6 - 10.5 mg/dL Final    Total Protein 10/10/2023 6.4  6.0 - 8.5 g/dL Final    Albumin 10/10/2023 4.0  3.5 - 5.2 g/dL Final    ALT (SGPT) 10/10/2023 13  1 - 41 U/L Final    AST (SGOT) 10/10/2023 22  1 - 40 U/L Final    Alkaline Phosphatase 10/10/2023 105  39 - 117 U/L Final    Total Bilirubin 10/10/2023 0.3  0.0 - 1.2 mg/dL Final    Globulin 10/10/2023 2.4  gm/dL Final    A/G " Ratio 10/10/2023 1.7  g/dL Final    BUN/Creatinine Ratio 10/10/2023 6.7 (L)  7.0 - 25.0 Final    Anion Gap 10/10/2023 11.7  5.0 - 15.0 mmol/L Final    eGFR 10/10/2023 100.9  >60.0 mL/min/1.73 Final    Fibrosis Score 10/10/2023 0.30 (H)  0.00 - 0.21 Final    Fibrosis Stage 10/10/2023 Comment   Final                     F1 - Portal fibrosis    Steatosis Score (Reference) 10/10/2023 0.52 (H)  0.00 - 0.40 Final    Steatosis Grade (Reference) 10/10/2023 Comment   Final                    S1 - Mild Steatosis                       (But Clinically Significant) (5-33%)    NEGRETE Score (Reference) 10/10/2023 0.47 (H)  0.00 - 0.25 Final    Negrete Grade (Reference) 10/10/2023 Comment   Final                       N1 -  Mild NEGRETE    Methodology: 10/10/2023 Comment   Final    The analytes tested are performed by FibroSure-Specific methods.  Not intended for use with other diagnostic considerations.    Alpha 2-Macroglobulins, Qn 10/10/2023 226  110 - 276 mg/dL Final    Haptoglobin 10/10/2023 121  29 - 370 mg/dL Final    Apolipoprotein A-1 10/10/2023 130  101 - 178 mg/dL Final    Total Bilirubin 10/10/2023 0.3  0.0 - 1.2 mg/dL Final    GGT 10/10/2023 47  0 - 65 IU/L Final    ALT (SGPT) 10/10/2023 20  0 - 55 IU/L Final    AST (SGOT) P5P (Reference) 10/10/2023 24  0 - 40 IU/L Final    Cholesterol, Total (Reference) 10/10/2023 153  100 - 199 mg/dL Final    Glucose, Serum (Reference) 10/10/2023 127 (H)  70 - 99 mg/dL Final    Triglycerides 10/10/2023 152 (H)  0 - 149 mg/dL Final    Interpretations: (Reference) 10/10/2023 Comment   Final    Comment: Quantitative results of 10 biochemicals in combination with age and  gender, are analyzed using a computational algorithm to provide a  quantitative surrogate marker (0.0-1.0) of liver fibrosis (Metavir  F0-F4), hepatic steatosis (0.0-1.0, S0-S3), and Non-Alcoholic  Steato-Hepatitis (NEGRETE) (0.0-1.0, N0-N3). The absence of steatosis  (S<0.40) precludes the diagnosis of NEGRETE.  Fibrosis marker: In  a study of 171 Non-Alcoholic Fatty Liver Disease  (NAFLD) patients where 23% had significant NAFLD fibrosis (Metavir  F2-F4) and 11% had cirrhosis by liver biopsy, a fibrosis result of  >0.3 yielded a sensitivity of 83% and a specificity of 78% for the  detection of significant fibrosis.[1]  Steatosis marker: In a population of 2997 patients, where 61% had  significant steatosis (>=5%) on a liver biopsy, a steatosis score  >0.4 had a sensitivity of 79% and a specificity of 50% for  identification of significant steatosis.[2]  NEGRETE marker: In a population of 1081 NAFLD patients, where 51% had  at                            least some NEGRETE by liver biopsy, a prediction of NEGRETE had a  sensitivity of 72% for identifying NEGRETE and a specificity of 71%.[3]    Fibrosis Scoring: 10/10/2023 Comment   Final         <=0.21 = Stage F0 - No fibrosis  0.21 - 0.27 = Stage F0 - F1  0.27 - 0.31 = Stage F1 - Portal fibrosis  0.31 - 0.48 = Stage F1 - F2  0.48 - 0.58 = Stage F2 - Bridging fibrosis with few septa  0.58 - 0.72 = Stage F3 - Bridging fibrosis with many septa  0.72 - 0.74 = Stage F3 - F4        >0.74 = Stage F4 - Cirrhosis    Steatosis Scoring 10/10/2023 Comment   Final         <=0.40 = S0  - No Steatosis (<5%)  0.40 - 0.55 = S1  - Mild Steatosis                      (but Clinically Significant) (5-33%)        >0.55 = S2S3- Moderate to Severe Steatosis                      (Clinically Significant) (%)    NEGRETE Scoring 10/10/2023 Comment   Final         <=0.25 = N0 - No NEGRETE  0.25 - 0.50 = N1 - Mild NEGRETE  0.50 - 0.75 = N2 - Moderate NEGRETE        >0.75 = N3 - Severe NEGRETE    Limitations: 10/10/2023 Comment   Final    NEGRETE FibroSure(R) Plus is recommended for patients with suspected  non-alcoholic fatty liver disease. It is not recommended for  patients with other liver diseases. It is also not recommended  in patients with Gilbert Disease, acute hemolysis, acute viral  hepatitis, drug induced hepatitis, genetic liver  disease,  autoimmune hepatitis and/or extra-hepatic cholestasis. Any of  these clinical situations may lead to inaccurate quantitative  predictions of fibrosis.    Comment 10/10/2023 Comment   Final    This test was developed and its performance characteristics  determined by [employer] It has not been cleared or approved  by the Food and Drug Administration.  For questions regarding this report please contact customer service  at [phone].  References:  1.  Maurice LOPEZ et al. Diagnostic Value of Biochemical Markers  (FibroTest) for the prediction of Liver Fibrosis in patients with  Non-Alcoholic Fatty Liver Disease. BMC Gastroenterology 2006; 6:6.  2.  Brady CAMARENA. et al. The Diagnostic Performance of a Simplified  Blood Test (SteatoTest-2) for the Prediction of Liver Steatosis.  Eur J Gastroenterol Hepatol. 2019; 31:393-402.  3.  Brady CAMARENA. et al. Diagnostic performance of a new noninvasive  test for nonalcoholic steatohepatitis using a simplified histological  reference. Eur J Gastroenterol Hepatol. 2018 May; 30:569-577.    WBC 10/10/2023 9.75  3.40 - 10.80 10*3/mm3 Final    RBC 10/10/2023 5.01  4.14 - 5.80 10*6/mm3 Final    Hemoglobin 10/10/2023 15.2  13.0 - 17.7 g/dL Final    Hematocrit 10/10/2023 45.3  37.5 - 51.0 % Final    MCV 10/10/2023 90.4  79.0 - 97.0 fL Final    MCH 10/10/2023 30.3  26.6 - 33.0 pg Final    MCHC 10/10/2023 33.6  31.5 - 35.7 g/dL Final    RDW 10/10/2023 12.5  12.3 - 15.4 % Final    RDW-SD 10/10/2023 40.4  37.0 - 54.0 fl Final    MPV 10/10/2023 10.4  6.0 - 12.0 fL Final    Platelets 10/10/2023 182  140 - 450 10*3/mm3 Final    Neutrophil % 10/10/2023 67.1  42.7 - 76.0 % Final    Lymphocyte % 10/10/2023 21.0  19.6 - 45.3 % Final    Monocyte % 10/10/2023 6.6  5.0 - 12.0 % Final    Eosinophil % 10/10/2023 4.4  0.3 - 6.2 % Final    Basophil % 10/10/2023 0.6  0.0 - 1.5 % Final    Immature Grans % 10/10/2023 0.3  0.0 - 0.5 % Final    Neutrophils, Absolute 10/10/2023 6.54  1.70 - 7.00 10*3/mm3  Final    Lymphocytes, Absolute 10/10/2023 2.05  0.70 - 3.10 10*3/mm3 Final    Monocytes, Absolute 10/10/2023 0.64  0.10 - 0.90 10*3/mm3 Final    Eosinophils, Absolute 10/10/2023 0.43 (H)  0.00 - 0.40 10*3/mm3 Final    Basophils, Absolute 10/10/2023 0.06  0.00 - 0.20 10*3/mm3 Final    Immature Grans, Absolute 10/10/2023 0.03  0.00 - 0.05 10*3/mm3 Final    nRBC 10/10/2023 0.0  0.0 - 0.2 /100 WBC Final         Assessment & Plan   Problems Addressed this Visit    None  Diagnoses    None.         Visit Diagnoses:  No diagnosis found.      GOALS:  Short Term Goals: Patient will be compliant with medication, and patient will have no significant medication related side effects.  Patient will be engaged in psychotherapy as indicated.  Patient will report subjective improvement of symptoms.  Long term goals: To stabilize mood and treat/improve subjective symptoms, the patient will stay out of the hospital, the patient will be at an optimal level of functioning, and the patient will take all medications as prescribed.  The patient/guardian verbalized understanding and agreement with goals that were mutually set.      TREATMENT PLAN: Continue supportive psychotherapy efforts and medications as indicated.  Pharmacological and Non-Pharmacological treatment options discussed during today's visit. Patient/Guardian acknowledged and verbally consented with current treatment plan and was educated on the importance of compliance with treatment and follow-up appointments.      MEDICATION ISSUES:  Discussed medication options and treatment plan of prescribed medication as well as the risks, benefits, any black box warnings, and side effects including potential falls, possible impaired driving, and metabolic adversities among others. Patient is agreeable to call the office with any worsening of symptoms or onset of side effects, or if any concerns or questions arise.  The contact information for the office is made available to the  patient. Patient is agreeable to call 911 or go to the nearest ER should they begin having any SI/HI, or if any urgent concerns arise. No medication side effects or related complaints today.     MEDS ORDERED DURING VISIT:  No orders of the defined types were placed in this encounter.    Plan:  -Increase Seroquel to 100 mg p.o. at night for paranoid thoughts and sleep disturbance/anxiety.  - Continue Lexapro to 20 mg p.o. daily for anxiety.   - Follow-up in 8 weeks.      Follow Up Appointment:   No follow-ups on file.             This document has been electronically signed by EILEEN Lovell  November 8, 2023 15:52 EST    Dictated Utilizing Dragon Dictation: Part of this note may be an electronic transcription/translation of spoken language to printed text using the Dragon Dictation System.

## 2023-11-15 ENCOUNTER — OFFICE VISIT (OUTPATIENT)
Dept: NEUROSURGERY | Facility: CLINIC | Age: 53
End: 2023-11-15
Payer: MEDICARE

## 2023-11-15 VITALS — HEIGHT: 66 IN | WEIGHT: 154.2 LBS | BODY MASS INDEX: 24.78 KG/M2 | TEMPERATURE: 97.1 F

## 2023-11-15 DIAGNOSIS — M54.16 LUMBAR RADICULOPATHY: Primary | ICD-10-CM

## 2023-11-15 DIAGNOSIS — M51.36 DDD (DEGENERATIVE DISC DISEASE), LUMBAR: ICD-10-CM

## 2023-11-15 PROCEDURE — 99203 OFFICE O/P NEW LOW 30 MIN: CPT | Performed by: NEUROLOGICAL SURGERY

## 2023-11-15 PROCEDURE — 1160F RVW MEDS BY RX/DR IN RCRD: CPT | Performed by: NEUROLOGICAL SURGERY

## 2023-11-15 PROCEDURE — 1159F MED LIST DOCD IN RCRD: CPT | Performed by: NEUROLOGICAL SURGERY

## 2023-11-15 RX ORDER — OMEPRAZOLE 40 MG/1
40 CAPSULE, DELAYED RELEASE ORAL DAILY
COMMUNITY

## 2023-11-15 NOTE — PROGRESS NOTES
Patient: Jamison Edward  : 1970    Primary Care Provider: Leticia Martinez APRN    Requesting Provider: As above        History    Chief Complaint: Low back and right lower extremity pain with sensory alteration.    History of Present Illness: Mr. Edward is a 53-year-old gentleman with chronic back difficulties dating back at least a couple of decades.  In December of last year he was hospitalized at the [facility] for 77 days.  He was in the ICU and on a ventilator for some time.  His pain has increased.  He has pain rather globally involving the right leg.  Most of his pain is in his low back.  Symptoms are worse with any activity.  Nothing makes him feel better.  20 years ago he underwent some sort of steroid injections in his back and that was poorly tolerated and caused swelling in such.  He has no left leg symptoms.  Has a history of cirrhosis.  He stopped drinking 3 days ago.    Review of Systems   Constitutional:  Positive for activity change. Negative for appetite change, chills, diaphoresis, fatigue, fever and unexpected weight change.   HENT:  Negative for congestion, dental problem, drooling, ear discharge, ear pain, facial swelling, hearing loss, mouth sores, nosebleeds, postnasal drip, rhinorrhea, sinus pressure, sinus pain, sneezing, sore throat, tinnitus, trouble swallowing and voice change.    Eyes:  Negative for photophobia, pain, discharge, redness, itching and visual disturbance.   Respiratory:  Negative for apnea, cough, choking, chest tightness, shortness of breath, wheezing and stridor.    Cardiovascular:  Negative for chest pain, palpitations and leg swelling.   Gastrointestinal:  Negative for abdominal distention, abdominal pain, anal bleeding, blood in stool, constipation, diarrhea, nausea, rectal pain and vomiting.   Endocrine: Negative for cold intolerance, heat intolerance, polydipsia, polyphagia and polyuria.   Genitourinary:  Negative for decreased urine volume,  "difficulty urinating, dysuria, enuresis, flank pain, frequency, genital sores, hematuria, penile discharge, penile pain, penile swelling, scrotal swelling, testicular pain and urgency.   Musculoskeletal:  Positive for arthralgias, back pain and neck pain. Negative for gait problem, joint swelling, myalgias and neck stiffness.   Skin:  Negative for color change, pallor, rash and wound.   Allergic/Immunologic: Negative for environmental allergies, food allergies and immunocompromised state.   Neurological:  Positive for numbness and headaches. Negative for dizziness, tremors, seizures, syncope, facial asymmetry, speech difficulty, weakness and light-headedness.   Hematological:  Negative for adenopathy. Does not bruise/bleed easily.   Psychiatric/Behavioral:  Negative for agitation, behavioral problems, confusion, decreased concentration, dysphoric mood, hallucinations, self-injury, sleep disturbance and suicidal ideas. The patient is not nervous/anxious and is not hyperactive.        The patient's past medical history, past surgical history, family history, and social history have been reviewed at length in the electronic medical record.      Physical Exam:   Temp 97.1 °F (36.2 °C) (Infrared)   Ht 167.6 cm (66\")   Wt 69.9 kg (154 lb 3.2 oz)   BMI 24.89 kg/m²   CONSTITUTIONAL: Patient is well-nourished, pleasant and appears stated age.  MUSCULOSKELETAL:  Straight leg raising is negative.  Edd's Sign is negative.  ROM in the low back is normal.  Tenderness in the back to palpation is not observed.  NEUROLOGICAL:  Orientation, memory, attention span, language function, and cognition have been examined and are intact.  Strength is intact in the lower extremities to direct testing.  Muscle tone is normal throughout.  Station and gait are normal.  Sensation is intact to light touch testing throughout.  Deep tendon reflexes are difficult to elicit throughout.  Coordination is intact.    Medical Decision " Making    Data Review:   (All imaging studies were personally reviewed unless stated otherwise)  MRI of the lumbar spine dated 10/10/2023 is compared to study from 12/20/2022.  The studies are similar.  There is diffuse degenerative disc disease.  There are some broad-based disc bulging at L3-4 that narrows the recesses.  There is some disc or osteophyte central to the left fourth that narrows the recess on the left.    Diagnosis:   1.  Chronic mechanical low back pain.  2.  Lumbar degenerative disc disease.  3.  Right lower extremity radiculopathy without a radiographic correlate.  Findings on the MRI are prominent on the left.    Treatment Options:   At this juncture I do not see a role for surgical intervention.  Given his other medical difficulties he is not supposed to undergo treatment with steroids by his report.  As such, I referred him for some physical therapy.  He will follow-up with neurosurgery on an as-needed basis.        I, Dr. Bernabe, personally performed the services described in the documentation, as scribed in my presence, and it is both accurate and complete.

## 2024-01-03 ENCOUNTER — OFFICE VISIT (OUTPATIENT)
Dept: PSYCHIATRY | Facility: CLINIC | Age: 54
End: 2024-01-03
Payer: MEDICARE

## 2024-01-03 VITALS
HEART RATE: 76 BPM | WEIGHT: 160.6 LBS | DIASTOLIC BLOOD PRESSURE: 73 MMHG | BODY MASS INDEX: 25.81 KG/M2 | HEIGHT: 66 IN | SYSTOLIC BLOOD PRESSURE: 111 MMHG

## 2024-01-03 DIAGNOSIS — F41.9 ANXIETY DISORDER, UNSPECIFIED TYPE: ICD-10-CM

## 2024-01-03 DIAGNOSIS — F22 PARANOIA: Primary | ICD-10-CM

## 2024-01-03 RX ORDER — QUETIAPINE FUMARATE 50 MG/1
150 TABLET, FILM COATED ORAL NIGHTLY
Qty: 90 TABLET | Refills: 2 | Status: SHIPPED | OUTPATIENT
Start: 2024-01-03

## 2024-01-03 RX ORDER — ESCITALOPRAM OXALATE 20 MG/1
20 TABLET ORAL DAILY
Qty: 30 TABLET | Refills: 2 | Status: SHIPPED | OUTPATIENT
Start: 2024-01-03 | End: 2025-01-02

## 2024-01-03 NOTE — PROGRESS NOTES
Subjective     Jamison Edward is a 53 y.o. male who presents today for follow up    Chief Complaint: Anxiety       History of Present Illness:    History of Present Illness  Jamison is a 53-year-old male who presents today for a follow-up visit.  He tells me that he has been doing well since his last visit.  He is accompanied today by his wife who provides collateral information. He denies any SI/HI/AVH.  He tells me that his sleep has been poor and tells me that he has difficulty maintaining sleep but is able to fall asleep soon after taking Seroquel.  He tells me that his appetite has been okay.  He does feel like he is getting irritable in the evening times occasionally and feels like it is because he is in so much pain.  No acute concerns with depression or anxiety.  He did see a neurosurgeon who felt like it would cause more issues if he were to have surgery.  He is awaiting an MRI of his shoulder to see if there are any issues that are causing pain.      He is accompanied today by his wife who  The following portions of the patient's history were reviewed and updated as appropriate: allergies, current medications, past family history, past medical history, past social history, past surgical history and problem list.    Past Psychiatric History:  Began Treatment:This year  Diagnoses: Delirium  Psychiatrist: Patient has never seen a psychiatric provider prior to consultations while admitted to the hospital.  Therapist:Denies  Admission History:Denies  Medication Trials: Xanax, Ativan, Depakote, Seroquel, Geodon, Effexor  Self Harm: Denies  Suicide Attempts:Denies      Past Medical History:  Past Medical History:   Diagnosis Date    Alcohol abuse don't know    quit Nov. 12,2023    Alcoholism     Anemia     Anxiety whwhen put in continued care    Arthritis of back     not sure    Johnson esophagus     Brain concussion     Cholelithiasis     Chronic pain disorder have degenerative disc disease    Cirrhosis      COPD (chronic obstructive pulmonary disease)     CTS (carpal tunnel syndrome)     DDD (degenerative disc disease), thoracic     Degenerative disc disease, lumbar     Dystonia     GERD (gastroesophageal reflux disease)     GI (gastrointestinal bleed)     Hypertension     Irritable bowel syndrome     Liver disease     Low back pain     Low back strain     Lumbosacral disc disease     Migraines     MRSA (methicillin resistant Staphylococcus aureus)     Peptic ulcer disease     Stroke     TIA    Substance abuse     TIA (transient ischemic attack)     Ulcerative colitis     Wears dentures     upper only       Substance Abuse History:   Types: alcohol, THC when he was a teenager  Withdrawal Symptoms: Patient reports that they believed some of his delirium while admitted to the hospital from December to March was due to alcohol withdrawal.  Patient states that he stopped drinking alcohol November 12, 2022.  Longest Period Sober:Patient states that he has been sober since November  AA: No    Social History:  Social History     Socioeconomic History    Marital status:    Tobacco Use    Smoking status: Every Day     Packs/day: 1.00     Years: 35.00     Additional pack years: 0.00     Total pack years: 35.00     Types: Cigarettes    Smokeless tobacco: Never    Tobacco comments:     Been smoking 20 years   Vaping Use    Vaping Use: Never used   Substance and Sexual Activity    Alcohol use: Not Currently     Comment: Quit drinking November 12,2023    Drug use: No    Sexual activity: Yes     Partners: Female     Birth control/protection: None       Family History:  Family History   Problem Relation Age of Onset    Osteoporosis Mother     Hypertension Father     Osteoporosis Father     No Known Problems Sister     No Known Problems Brother     Drug abuse Brother     No Known Problems Daughter     Diabetes Sister     Stroke Paternal Grandfather        Past Surgical History:  Past Surgical History:   Procedure Laterality  Date    ABDOMINAL SURGERY  09/17/2021    APPENDECTOMY      CARPAL TUNNEL RELEASE  2005    CHOLECYSTECTOMY N/A 04/22/2017    Procedure: CHOLECYSTECTOMY LAPAROSCOPIC;  Surgeon: Jaqueline Guaman MD;  [facility];  Service:     COLONOSCOPY N/A 05/08/2017    Procedure: COLONOSCOPY  CPTCODE:73446;  Surgeon: Nicho Richey III, MD;  [facility];  Service:     CUBITAL TUNNEL RELEASE Left 09/01/2022    Procedure: CUBITAL TUNNEL RELEASE LEFT;  Surgeon: Alec Hough MD;  [facility];  Service: Orthopedics;  Laterality: Left;    ENDOSCOPY      ENDOSCOPY N/A 05/08/2017    Procedure: ESOPHAGOGASTRODUODENOSCOPY WITH BIOPSY  CPTCODE:77233;  Surgeon: Nicho Richey III, MD;  [facility];  Service:     ENDOSCOPY N/A 11/03/2017    Procedure: ESOPHAGOGASTRODUODENOSCOPY WITH BIOPSY  CPTCODE: 65816;  Surgeon: Nicho Richey III, MD;  [facility];  Service:     ENDOSCOPY N/A 02/20/2018    Procedure: ESOPHAGOGASTRODUODENOSCOPY WITH DILATATION CPT CODE: 65953;  Surgeon: Nicho Richey III, MD;  [facility];  Service:     ENDOSCOPY N/A 06/05/2018    Procedure: ESOPHAGOGASTRODUODENOSCOPY WITH BIOPSY CPT CODE: 34337;  Surgeon: Nicho Richey III, MD;  [facility];  Service: Gastroenterology    ENDOSCOPY N/A 05/26/2021    Procedure: ESOPHAGOGASTRODUODENOSCOPY WITH BIOPSY;  Surgeon: Julieta Hebert MD;  [facility];  Service: Gastroenterology;  Laterality: N/A;    ENDOSCOPY N/A 06/02/2021    Procedure: ESOPHAGOGASTRODUODENOSCOPY WITH BIOPSY;  Surgeon: Julieta Hebert MD;  [facility];  Service: Gastroenterology;  Laterality: N/A;    GASTRECTOMY N/A 09/17/2019    Procedure: OPEN VAGOTOMY, ANTRECTOMY,REVISION- Y GASTROJEJUNOSOTOMY;  Surgeon: Herbert Umanzor MD;  Location:  [facility];  Service: General    HERNIA REPAIR  12/10/2020    TRACHEOSTOMY N/A 01/17/2023    Procedure: TRACHEOSTOMY;  Surgeon: Felton De León  MD Kevin;  [facility];  Service: General;  Laterality: N/A;    UPPER GASTROINTESTINAL ENDOSCOPY      VENTRAL/INCISIONAL HERNIA REPAIR N/A 12/10/2020    Procedure: INCISIONAL HERNIA REPAIR LAPAROSCOPIC WITH MESH;  Surgeon: Herbert Umanzor MD;  Location:  [facility];  Service: General;  Laterality: N/A;       Problem List:  Patient Active Problem List   Diagnosis    Precordial chest pain    Weight loss, abnormal    Right upper quadrant abdominal pain    Epigastric pain    History of bleeding ulcers    Nausea    Malaise and fatigue    Acute gastric ulcer without hemorrhage or perforation    Poor appetite    Duodenal ulcer    Gastroesophageal reflux disease    Dysphagia    Iron deficiency anemia    Malabsorption    Gastric ulcer without hemorrhage or perforation    Dystonia    Peptic ulcer without hemorrhage or perforation    Gastric outlet obstruction    Incisional hernia, without obstruction or gangrene    Incisional hernia    Chest pain, atypical    Gastric bezoar    Essential hypertension    Cigarette smoker    Chronic back pain    Sinus tachycardia    Tobacco use    Mild carpal tunnel syndrome    Orthostatic hypotension    Palpitations    Abdominal swelling    Lower extremity edema    Pneumonia due to infectious organism, unspecified laterality, unspecified part of lung    Acute respiratory failure with hypoxia       Allergy:   Allergies   Allergen Reactions    Contrast Dye (Echo Or Unknown Ct/Mr) Other (See Comments)     Shortness of breath    Prilosec [Omeprazole] Other (See Comments)     Chest pain  Pt states he can take pepcid with no problem    Protonix [Pantoprazole Sodium] Palpitations     Patient states that protonix causes chest pain.  Shruthi Pérez, [facility]  4/23/2017  11:19 AM  Pt states he can tolerate pepcid with no problem      Ativan [Lorazepam] Hallucinations    Depakene [Valproic Acid] Hallucinations    Geodon [Ziprasidone Hcl] Mental Status Change    Libritabs [Chlordiazepoxide]  Other (See Comments)     Tongue swell/split    Dilantin [Phenytoin] Delirium    Dilaudid [Hydromorphone] Hallucinations    Flagyl [Metronidazole] Nausea And Vomiting    Topamax [Topiramate] Anxiety        Current Medications:   Current Outpatient Medications   Medication Sig Dispense Refill    baclofen (LIORESAL) 20 MG tablet       dexlansoprazole (DEXILANT) 60 MG capsule Take 1 capsule by mouth Every Morning Before Breakfast. 30 capsule 11    escitalopram (LEXAPRO) 20 MG tablet Take 1 tablet by mouth Daily. 30 tablet 2    HYDROcodone-acetaminophen (NORCO) 5-325 MG per tablet       metoprolol tartrate (LOPRESSOR) 25 MG tablet Take 1 tablet by mouth 2 (Two) Times a Day.      spironolactone (ALDACTONE) 25 MG tablet Take 1 tablet by mouth Daily.      omeprazole (priLOSEC) 40 MG capsule Take 1 capsule by mouth Daily. (Patient not taking: Reported on 1/3/2024)      QUEtiapine (SEROquel) 50 MG tablet Take 3 tablets by mouth Every Night. 90 tablet 2     No current facility-administered medications for this visit.       Review of Systems:    Review of Systems   Constitutional:  Positive for fatigue. Negative for activity change and appetite change.   Cardiovascular: Negative.    Musculoskeletal:  Positive for back pain and neck pain.   Neurological:  Positive for weakness. Negative for seizures.   Psychiatric/Behavioral:  Positive for sleep disturbance (due to pain) and stress. Negative for agitation, behavioral problems, decreased concentration, dysphoric mood, hallucinations, self-injury, suicidal ideas, negative for hyperactivity and depressed mood. The patient is nervous/anxious.          Physical Exam:   Physical Exam  Vitals reviewed.   HENT:      Head: Atraumatic.   Pulmonary:      Effort: Pulmonary effort is normal.   Musculoskeletal:      Cervical back: Normal range of motion.   Neurological:      General: No focal deficit present.      Mental Status: He is alert and oriented to person, place, and time. Mental  "status is at baseline.   Psychiatric:         Attention and Perception: Attention and perception normal.         Mood and Affect: Mood and affect normal. Mood is not anxious or depressed. Affect is not blunt.         Speech: Speech normal.         Behavior: Behavior normal. Behavior is cooperative.         Thought Content: Thought content normal. Thought content is not paranoid or delusional. Thought content does not include homicidal or suicidal ideation.         Cognition and Memory: Cognition and memory normal.         Judgment: Judgment normal.         Vitals:  Blood pressure 111/73, pulse 76, height 167.6 cm (65.98\"), weight 72.8 kg (160 lb 9.6 oz).   Body mass index is 25.93 kg/m².    Last 3 Blood Pressure Readings:  BP Readings from Last 3 Encounters:   01/03/24 111/73   11/08/23 122/76   10/09/23 132/78       Mental Status Exam:   Hygiene:   good  Cooperation:  Cooperative  Eye Contact:  Good  Psychomotor Behavior:  Appropriate  Affect:  Restricted  Mood: normal  Hopelessness: Denies  Speech:  Normal  Thought Process:  Goal directed  Thought Content:  Normal  Suicidal:  None  Homicidal:  None  Hallucinations:  Auditory  Delusion:  None  Memory:  Deficits  Orientation:  Person, Place, Time and Situation  Reliability:  good  Insight:  Good  Judgement:  Good  Impulse Control:  Good  Physical/Medical Issues:  Yes See PMH        Lab Results:   Office Visit on 10/09/2023   Component Date Value Ref Range Status    Glucose 10/10/2023 123 (H)  65 - 99 mg/dL Final    BUN 10/10/2023 6  6 - 20 mg/dL Final    Creatinine 10/10/2023 0.90  0.76 - 1.27 mg/dL Final    Sodium 10/10/2023 139  136 - 145 mmol/L Final    Potassium 10/10/2023 4.3  3.5 - 5.2 mmol/L Final    Chloride 10/10/2023 105  98 - 107 mmol/L Final    CO2 10/10/2023 22.3  22.0 - 29.0 mmol/L Final    Calcium 10/10/2023 9.3  8.6 - 10.5 mg/dL Final    Total Protein 10/10/2023 6.4  6.0 - 8.5 g/dL Final    Albumin 10/10/2023 4.0  3.5 - 5.2 g/dL Final    ALT (SGPT) " 10/10/2023 13  1 - 41 U/L Final    AST (SGOT) 10/10/2023 22  1 - 40 U/L Final    Alkaline Phosphatase 10/10/2023 105  39 - 117 U/L Final    Total Bilirubin 10/10/2023 0.3  0.0 - 1.2 mg/dL Final    Globulin 10/10/2023 2.4  gm/dL Final    A/G Ratio 10/10/2023 1.7  g/dL Final    BUN/Creatinine Ratio 10/10/2023 6.7 (L)  7.0 - 25.0 Final    Anion Gap 10/10/2023 11.7  5.0 - 15.0 mmol/L Final    eGFR 10/10/2023 100.9  >60.0 mL/min/1.73 Final    Fibrosis Score 10/10/2023 0.30 (H)  0.00 - 0.21 Final    Fibrosis Stage 10/10/2023 Comment   Final                     F1 - Portal fibrosis    Steatosis Score (Reference) 10/10/2023 0.52 (H)  0.00 - 0.40 Final    Steatosis Grade (Reference) 10/10/2023 Comment   Final                    S1 - Mild Steatosis                       (But Clinically Significant) (5-33%)    NEGRETE Score (Reference) 10/10/2023 0.47 (H)  0.00 - 0.25 Final    Negrete Grade (Reference) 10/10/2023 Comment   Final                       N1 -  Mild NEGRETE    Methodology: 10/10/2023 Comment   Final    The analytes tested are performed by FibroSure-Specific methods.  Not intended for use with other diagnostic considerations.    Alpha 2-Macroglobulins, Qn 10/10/2023 226  110 - 276 mg/dL Final    Haptoglobin 10/10/2023 121  29 - 370 mg/dL Final    Apolipoprotein A-1 10/10/2023 130  101 - 178 mg/dL Final    Total Bilirubin 10/10/2023 0.3  0.0 - 1.2 mg/dL Final    GGT 10/10/2023 47  0 - 65 IU/L Final    ALT (SGPT) 10/10/2023 20  0 - 55 IU/L Final    AST (SGOT) P5P (Reference) 10/10/2023 24  0 - 40 IU/L Final    Cholesterol, Total (Reference) 10/10/2023 153  100 - 199 mg/dL Final    Glucose, Serum (Reference) 10/10/2023 127 (H)  70 - 99 mg/dL Final    Triglycerides 10/10/2023 152 (H)  0 - 149 mg/dL Final    Interpretations: (Reference) 10/10/2023 Comment   Final    Comment: Quantitative results of 10 biochemicals in combination with age and  gender, are analyzed using a computational algorithm to provide a  quantitative  surrogate marker (0.0-1.0) of liver fibrosis (Metavir  F0-F4), hepatic steatosis (0.0-1.0, S0-S3), and Non-Alcoholic  Steato-Hepatitis (NEGRETE) (0.0-1.0, N0-N3). The absence of steatosis  (S<0.40) precludes the diagnosis of NEGRETE.  Fibrosis marker: In a study of 171 Non-Alcoholic Fatty Liver Disease  (NAFLD) patients where 23% had significant NAFLD fibrosis (Metavir  F2-F4) and 11% had cirrhosis by liver biopsy, a fibrosis result of  >0.3 yielded a sensitivity of 83% and a specificity of 78% for the  detection of significant fibrosis.[1]  Steatosis marker: In a population of 2997 patients, where 61% had  significant steatosis (>=5%) on a liver biopsy, a steatosis score  >0.4 had a sensitivity of 79% and a specificity of 50% for  identification of significant steatosis.[2]  NEGRETE marker: In a population of 1081 NAFLD patients, where 51% had  at                            least some NEGRETE by liver biopsy, a prediction of NEGRETE had a  sensitivity of 72% for identifying NEGRETE and a specificity of 71%.[3]    Fibrosis Scoring: 10/10/2023 Comment   Final         <=0.21 = Stage F0 - No fibrosis  0.21 - 0.27 = Stage F0 - F1  0.27 - 0.31 = Stage F1 - Portal fibrosis  0.31 - 0.48 = Stage F1 - F2  0.48 - 0.58 = Stage F2 - Bridging fibrosis with few septa  0.58 - 0.72 = Stage F3 - Bridging fibrosis with many septa  0.72 - 0.74 = Stage F3 - F4        >0.74 = Stage F4 - Cirrhosis    Steatosis Scoring 10/10/2023 Comment   Final         <=0.40 = S0  - No Steatosis (<5%)  0.40 - 0.55 = S1  - Mild Steatosis                      (but Clinically Significant) (5-33%)        >0.55 = S2S3- Moderate to Severe Steatosis                      (Clinically Significant) (%)    NEGRETE Scoring 10/10/2023 Comment   Final         <=0.25 = N0 - No NEGRETE  0.25 - 0.50 = N1 - Mild NEGRETE  0.50 - 0.75 = N2 - Moderate NEGRETE        >0.75 = N3 - Severe NEGRETE    Limitations: 10/10/2023 Comment   Final    NEGRETE FibroSure(R) Plus is recommended for patients with  suspected  non-alcoholic fatty liver disease. It is not recommended for  patients with other liver diseases. It is also not recommended  in patients with Gilbert Disease, acute hemolysis, acute viral  hepatitis, drug induced hepatitis, genetic liver disease,  autoimmune hepatitis and/or extra-hepatic cholestasis. Any of  these clinical situations may lead to inaccurate quantitative  predictions of fibrosis.    Comment 10/10/2023 Comment   Final    This test was developed and its performance characteristics  determined by [facility]. It has not been cleared or approved  by the Food and Drug Administration.  For questions regarding this report please contact customer service  at [phone].  References:  1.  Maurice LOPEZ et al. Diagnostic Value of Biochemical Markers  (FibroTest) for the prediction of Liver Fibrosis in patients with  Non-Alcoholic Fatty Liver Disease. BMC Gastroenterology 2006; 6:6.  2.  Brady CAMARENA. et al. The Diagnostic Performance of a Simplified  Blood Test (SteatoTest-2) for the Prediction of Liver Steatosis.  Eur J Gastroenterol Hepatol. 2019; 31:393-402.  3.  Brady CAMARENA. et al. Diagnostic performance of a new noninvasive  test for nonalcoholic steatohepatitis using a simplified histological  reference. Eur J Gastroenterol Hepatol. 2018 May; 30:569-577.    WBC 10/10/2023 9.75  3.40 - 10.80 10*3/mm3 Final    RBC 10/10/2023 5.01  4.14 - 5.80 10*6/mm3 Final    Hemoglobin 10/10/2023 15.2  13.0 - 17.7 g/dL Final    Hematocrit 10/10/2023 45.3  37.5 - 51.0 % Final    MCV 10/10/2023 90.4  79.0 - 97.0 fL Final    MCH 10/10/2023 30.3  26.6 - 33.0 pg Final    MCHC 10/10/2023 33.6  31.5 - 35.7 g/dL Final    RDW 10/10/2023 12.5  12.3 - 15.4 % Final    RDW-SD 10/10/2023 40.4  37.0 - 54.0 fl Final    MPV 10/10/2023 10.4  6.0 - 12.0 fL Final    Platelets 10/10/2023 182  140 - 450 10*3/mm3 Final    Neutrophil % 10/10/2023 67.1  42.7 - 76.0 % Final    Lymphocyte % 10/10/2023 21.0  19.6 - 45.3 % Final    Monocyte %  10/10/2023 6.6  5.0 - 12.0 % Final    Eosinophil % 10/10/2023 4.4  0.3 - 6.2 % Final    Basophil % 10/10/2023 0.6  0.0 - 1.5 % Final    Immature Grans % 10/10/2023 0.3  0.0 - 0.5 % Final    Neutrophils, Absolute 10/10/2023 6.54  1.70 - 7.00 10*3/mm3 Final    Lymphocytes, Absolute 10/10/2023 2.05  0.70 - 3.10 10*3/mm3 Final    Monocytes, Absolute 10/10/2023 0.64  0.10 - 0.90 10*3/mm3 Final    Eosinophils, Absolute 10/10/2023 0.43 (H)  0.00 - 0.40 10*3/mm3 Final    Basophils, Absolute 10/10/2023 0.06  0.00 - 0.20 10*3/mm3 Final    Immature Grans, Absolute 10/10/2023 0.03  0.00 - 0.05 10*3/mm3 Final    nRBC 10/10/2023 0.0  0.0 - 0.2 /100 WBC Final         Assessment & Plan   Problems Addressed this Visit    None  Visit Diagnoses       Paranoia    -  Primary    Relevant Medications    escitalopram (LEXAPRO) 20 MG tablet    QUEtiapine (SEROquel) 50 MG tablet    Anxiety disorder, unspecified type        Relevant Medications    escitalopram (LEXAPRO) 20 MG tablet    QUEtiapine (SEROquel) 50 MG tablet          Diagnoses         Codes Comments    Paranoia    -  Primary ICD-10-CM: F22  ICD-9-CM: 297.1     Anxiety disorder, unspecified type     ICD-10-CM: F41.9  ICD-9-CM: 300.00             Visit Diagnoses:    ICD-10-CM ICD-9-CM   1. Paranoia  F22 297.1   2. Anxiety disorder, unspecified type  F41.9 300.00           GOALS:  Short Term Goals: Patient will be compliant with medication, and patient will have no significant medication related side effects.  Patient will be engaged in psychotherapy as indicated.  Patient will report subjective improvement of symptoms.  Long term goals: To stabilize mood and treat/improve subjective symptoms, the patient will stay out of the hospital, the patient will be at an optimal level of functioning, and the patient will take all medications as prescribed.  The patient/guardian verbalized understanding and agreement with goals that were mutually set.      TREATMENT PLAN: Continue supportive  psychotherapy efforts and medications as indicated.  Pharmacological and Non-Pharmacological treatment options discussed during today's visit. Patient/Guardian acknowledged and verbally consented with current treatment plan and was educated on the importance of compliance with treatment and follow-up appointments.      MEDICATION ISSUES:  Discussed medication options and treatment plan of prescribed medication as well as the risks, benefits, any black box warnings, and side effects including potential falls, possible impaired driving, and metabolic adversities among others. Patient is agreeable to call the office with any worsening of symptoms or onset of side effects, or if any concerns or questions arise.  The contact information for the office is made available to the patient. Patient is agreeable to call 911 or go to the nearest ER should they begin having any SI/HI, or if any urgent concerns arise. No medication side effects or related complaints today.     MEDS ORDERED DURING VISIT:  New Medications Ordered This Visit   Medications    escitalopram (LEXAPRO) 20 MG tablet     Sig: Take 1 tablet by mouth Daily.     Dispense:  30 tablet     Refill:  2    QUEtiapine (SEROquel) 50 MG tablet     Sig: Take 3 tablets by mouth Every Night.     Dispense:  90 tablet     Refill:  2     Plan:  -Increase Seroquel to 150 mg p.o. at night for paranoid thoughts and sleep disturbance/anxiety.  - Continue Lexapro to 20 mg p.o. daily for anxiety.   - Follow-up in 8 weeks.      Follow Up Appointment:   Return in about 2 months (around 3/3/2024) for Recheck.             This document has been electronically signed by EILEEN Lovell  January 3, 2024 14:37 EST    Dictated Utilizing Dragon Dictation: Part of this note may be an electronic transcription/translation of spoken language to printed text using the Dragon Dictation System.

## 2024-01-08 ENCOUNTER — TRANSCRIBE ORDERS (OUTPATIENT)
Dept: ADMINISTRATIVE | Facility: HOSPITAL | Age: 54
End: 2024-01-08
Payer: MEDICARE

## 2024-01-08 DIAGNOSIS — M54.2 CERVICAL PAIN: Primary | ICD-10-CM

## 2024-01-26 ENCOUNTER — HOSPITAL ENCOUNTER (OUTPATIENT)
Dept: MRI IMAGING | Facility: HOSPITAL | Age: 54
Discharge: HOME | End: 2024-01-26
Admitting: NURSE PRACTITIONER
Payer: MEDICARE

## 2024-01-26 DIAGNOSIS — M54.2 CERVICAL PAIN: ICD-10-CM

## 2024-01-26 PROCEDURE — 72141 MRI NECK SPINE W/O DYE: CPT

## 2024-01-26 PROCEDURE — 72141 MRI NECK SPINE W/O DYE: CPT | Performed by: RADIOLOGY

## 2024-02-07 ENCOUNTER — OFFICE VISIT (OUTPATIENT)
Dept: CARDIOLOGY | Facility: CLINIC | Age: 54
End: 2024-02-07
Payer: MEDICARE

## 2024-02-07 VITALS
RESPIRATION RATE: 18 BRPM | OXYGEN SATURATION: 99 % | DIASTOLIC BLOOD PRESSURE: 67 MMHG | HEART RATE: 64 BPM | WEIGHT: 159 LBS | SYSTOLIC BLOOD PRESSURE: 122 MMHG | HEIGHT: 65 IN | BODY MASS INDEX: 26.49 KG/M2

## 2024-02-07 DIAGNOSIS — R42 DIZZINESS: ICD-10-CM

## 2024-02-07 DIAGNOSIS — I10 ESSENTIAL HYPERTENSION: Primary | ICD-10-CM

## 2024-02-07 DIAGNOSIS — Z72.0 TOBACCO USE: ICD-10-CM

## 2024-02-07 PROCEDURE — 93000 ELECTROCARDIOGRAM COMPLETE: CPT | Performed by: NURSE PRACTITIONER

## 2024-02-07 PROCEDURE — 99214 OFFICE O/P EST MOD 30 MIN: CPT | Performed by: NURSE PRACTITIONER

## 2024-02-07 PROCEDURE — 3078F DIAST BP <80 MM HG: CPT | Performed by: NURSE PRACTITIONER

## 2024-02-07 PROCEDURE — 3074F SYST BP LT 130 MM HG: CPT | Performed by: NURSE PRACTITIONER

## 2024-02-07 PROCEDURE — 1160F RVW MEDS BY RX/DR IN RCRD: CPT | Performed by: NURSE PRACTITIONER

## 2024-02-07 PROCEDURE — 1159F MED LIST DOCD IN RCRD: CPT | Performed by: NURSE PRACTITIONER

## 2024-02-07 NOTE — PROGRESS NOTES
[facility]             Psychiatric EILEEN Michele Theresa, APRN  Jamison ELLIS hCeyanne  1970  02/07/2024    Patient Active Problem List   Diagnosis    Precordial chest pain    Weight loss, abnormal    Right upper quadrant abdominal pain    Epigastric pain    History of bleeding ulcers    Nausea    Malaise and fatigue    Acute gastric ulcer without hemorrhage or perforation    Poor appetite    Duodenal ulcer    Gastroesophageal reflux disease    Dysphagia    Iron deficiency anemia    Malabsorption    Gastric ulcer without hemorrhage or perforation    Dystonia    Peptic ulcer without hemorrhage or perforation    Gastric outlet obstruction    Incisional hernia, without obstruction or gangrene    Incisional hernia    Chest pain, atypical    Gastric bezoar    Essential hypertension    Cigarette smoker    Chronic back pain    Sinus tachycardia    Tobacco use    Mild carpal tunnel syndrome    Orthostatic hypotension    Palpitations    Abdominal swelling    Lower extremity edema    Pneumonia due to infectious organism, unspecified laterality, unspecified part of lung    Acute respiratory failure with hypoxia    Dizziness       Dear Leticia Martinez, EILEEN:    Subjective     Chief Complaint   Patient presents with    Follow-up     Routine       HPI:     This is a 53 y.o. male with known past medical history of orthostatic hypotension, tobacco abuse, alcohol abuse and sinus tachycardia.      Jamison ELLIS Cheyanne presents today for routine cardiology follow up.  Patient states he has been doing well since his last visit.  Denies any chest pain or shortness of breath.  Does have some intermittent dizziness when he goes from sitting to standing but denies any syncope.  Blood pressure well-controlled.  No recent lab work on file.    Diagnostic Testing  Nuclear stress test 5/2017: No evidence of ischemia  Echocardiogram 9/2021: Ef 61-65%  Nuclear stress test 9/2021: No evidence of  ischemia  Cardiac event monitor 10/2022: Normal sinus rhythm with episodes of sinus tachycardia and sinus bradycardia with no arrhythmias noted  Echocardiogram 12/2022: EF greater than 70%     All other systems were reviewed and were negative.    Patient Active Problem List   Diagnosis    Precordial chest pain    Weight loss, abnormal    Right upper quadrant abdominal pain    Epigastric pain    History of bleeding ulcers    Nausea    Malaise and fatigue    Acute gastric ulcer without hemorrhage or perforation    Poor appetite    Duodenal ulcer    Gastroesophageal reflux disease    Dysphagia    Iron deficiency anemia    Malabsorption    Gastric ulcer without hemorrhage or perforation    Dystonia    Peptic ulcer without hemorrhage or perforation    Gastric outlet obstruction    Incisional hernia, without obstruction or gangrene    Incisional hernia    Chest pain, atypical    Gastric bezoar    Essential hypertension    Cigarette smoker    Chronic back pain    Sinus tachycardia    Tobacco use    Mild carpal tunnel syndrome    Orthostatic hypotension    Palpitations    Abdominal swelling    Lower extremity edema    Pneumonia due to infectious organism, unspecified laterality, unspecified part of lung    Acute respiratory failure with hypoxia    Dizziness       family history includes Diabetes in his sister; Drug abuse in his brother; Hypertension in his father; No Known Problems in his brother, daughter, and sister; Osteoporosis in his father and mother; Stroke in his paternal grandfather.     reports that he has been smoking cigarettes. He has a 35.00 pack-year smoking history. He has been exposed to tobacco smoke. He has never used smokeless tobacco. He reports that he does not currently use alcohol. He reports that he does not use drugs.    Allergies   Allergen Reactions    Contrast Dye (Echo Or Unknown Ct/Mr) Other (See Comments)     Shortness of breath    Prilosec [Omeprazole] Other (See Comments)     Chest pain  Pt  states he can take pepcid with no problem    Protonix [Pantoprazole Sodium] Palpitations     Patient states that protonix causes chest pain.  Shruthi Pérez, [facility]  4/23/2017  11:19 AM  Pt states he can tolerate pepcid with no problem      Ativan [Lorazepam] Hallucinations    Depakene [Valproic Acid] Hallucinations    Geodon [Ziprasidone Hcl] Mental Status Change    Libritabs [Chlordiazepoxide] Other (See Comments)     Tongue swell/split    Dilantin [Phenytoin] Delirium    Dilaudid [Hydromorphone] Hallucinations    Flagyl [Metronidazole] Nausea And Vomiting    Topamax [Topiramate] Anxiety         Current Outpatient Medications:     baclofen (LIORESAL) 20 MG tablet, , Disp: , Rfl:     dexlansoprazole (DEXILANT) 60 MG capsule, Take 1 capsule by mouth Every Morning Before Breakfast., Disp: 30 capsule, Rfl: 11    escitalopram (LEXAPRO) 20 MG tablet, Take 1 tablet by mouth Daily., Disp: 30 tablet, Rfl: 2    HYDROcodone-acetaminophen (NORCO) 5-325 MG per tablet, , Disp: , Rfl:     metoprolol tartrate (LOPRESSOR) 25 MG tablet, Take 1 tablet by mouth 2 (Two) Times a Day., Disp: , Rfl:     QUEtiapine (SEROquel) 50 MG tablet, Take 3 tablets by mouth Every Night., Disp: 90 tablet, Rfl: 2    spironolactone (ALDACTONE) 25 MG tablet, Take 1 tablet by mouth Daily., Disp: , Rfl:     omeprazole (priLOSEC) 40 MG capsule, Take 1 capsule by mouth Daily. (Patient not taking: Reported on 1/3/2024), Disp: , Rfl:       Physical Exam:  I have reviewed the patient's current vital signs as documented in the patient's EMR.   Vitals:    02/07/24 1020   BP: 122/67   Pulse: 64   Resp: 18   SpO2: 99%     Body mass index is 26.46 kg/m².       02/07/24  1020   Weight: 72.1 kg (159 lb)      Physical Exam  Constitutional:       General: He is not in acute distress.     Appearance: Normal appearance. He is well-developed.   HENT:      Head: Normocephalic and atraumatic.      Mouth/Throat:      Mouth: Mucous membranes are moist.   Eyes:       Extraocular Movements: Extraocular movements intact.      Pupils: Pupils are equal, round, and reactive to light.   Neck:      Vascular: No JVD.   Cardiovascular:      Rate and Rhythm: Normal rate and regular rhythm.      Heart sounds: Normal heart sounds. No murmur heard.     No S3 or S4 sounds.   Pulmonary:      Effort: Pulmonary effort is normal. No respiratory distress.      Breath sounds: Normal breath sounds. No wheezing.   Abdominal:      General: Bowel sounds are normal. There is no distension.      Palpations: Abdomen is soft. There is no hepatomegaly.      Tenderness: There is no abdominal tenderness.   Musculoskeletal:         General: Normal range of motion.      Cervical back: Normal range of motion and neck supple.   Skin:     General: Skin is warm and dry.      Coloration: Skin is not jaundiced or pale.   Neurological:      General: No focal deficit present.      Mental Status: He is alert and oriented to person, place, and time. Mental status is at baseline.   Psychiatric:         Mood and Affect: Mood normal.         Behavior: Behavior normal.         Thought Content: Thought content normal.         Judgment: Judgment normal.            DATA REVIEWED:     TTE/ANIYA:  Results for orders placed during the hospital encounter of 12/20/22    Adult Transthoracic Echo Complete W/ Cont if Necessary Per Protocol    Interpretation Summary    Left ventricular systolic function is hyperdynamic (EF > 70%). Left ventricular ejection fraction appears to be greater than 70%.    Left ventricular diastolic function is consistent with (grade I) impaired relaxation.    Estimated right ventricular systolic pressure from tricuspid regurgitation is mildly elevated (35-45 mmHg).    No significant pericardial disease.      Laboratory evaluations:    Lab Results   Component Value Date    GLUCOSE 123 (H) 10/10/2023    BUN 6 10/10/2023    CREATININE 0.90 10/10/2023    EGFRIFNONA 106 11/29/2021    BCR 6.7 (L) 10/10/2023    K 4.3  10/10/2023    CO2 22.3 10/10/2023    CALCIUM 9.3 10/10/2023    ALBUMIN 4.0 10/10/2023    AST 22 10/10/2023    ALT 13 10/10/2023     Lab Results   Component Value Date    WBC 9.75 10/10/2023    HGB 15.2 10/10/2023    HCT 45.3 10/10/2023    MCV 90.4 10/10/2023     10/10/2023     Lab Results   Component Value Date    CHOL 148 08/02/2023    TRIG 152 (H) 10/10/2023    HDL 42 08/02/2023    LDL 85 08/02/2023     Lab Results   Component Value Date    TSH 1.740 08/02/2023     Lab Results   Component Value Date    HGBA1C 5.40 08/02/2023     Lab Results   Component Value Date    ALT 13 10/10/2023     Lab Results   Component Value Date    HGBA1C 5.40 08/02/2023    HGBA1C 5.10 12/08/2020    HGBA1C 5.10 09/12/2019     Lab Results   Component Value Date    CREATININE 0.90 10/10/2023     Lab Results   Component Value Date    IRON 49 (L) 12/21/2022    TIBC 70 (L) 12/21/2022    FERRITIN 1,305.00 (H) 12/23/2022     Lab Results   Component Value Date    INR 1.43 (H) 12/30/2022    INR 1.33 (H) 12/28/2022    INR 1.44 (H) 12/24/2022    PROTIME 17.8 (H) 12/30/2022    PROTIME 16.8 (H) 12/28/2022    PROTIME 17.9 (H) 12/24/2022           ECG 12 Lead    Date/Time: 2/7/2024 10:46 AM  Performed by: Eva Michele APRN    Authorized by: Eva Michele APRN  Comparison: compared with previous ECG   Rhythm: sinus rhythm    Clinical impression: non-specific ECG         --------------------------------------------------------------------------------------------------------------------------    ASSESSMENT/PLAN:      Diagnosis Plan   1. Essential hypertension  Comprehensive Metabolic Panel      2. Tobacco use        3. Dizziness            Essential hypertension  Dizziness  Blood pressure well-controlled.  Patient reports intermittent dizziness when going from sitting to standing.  We did discuss about adequate hydration and taking his time when he has position changes.  If dizziness continues we will decrease spironolactone  to 12.5 mg daily.  No recent lab work on file.  Obtain CMP.    Tobacco abuse  Continues to smoke.  Discussed with patient by the importance of smoking cessation to reduce his risk of cardiovascular disease.      This document has been @Electronically signed by EILEEN Coronado, 02/07/24, 8:28 AM EST.       Dictated Utilizing Dragon Dictation: Part of this note may be an electronic transcription/translation of spoken language to printed text using the Dragon Dictation System.    Follow-up appointment and medication changes provided in hand delivered After Visit Summary as well as reviewed in the room.

## 2024-02-15 ENCOUNTER — TELEPHONE (OUTPATIENT)
Dept: NEUROSURGERY | Facility: CLINIC | Age: 54
End: 2024-02-15
Payer: MEDICARE

## 2024-03-08 ENCOUNTER — OFFICE VISIT (OUTPATIENT)
Dept: PSYCHIATRY | Facility: CLINIC | Age: 54
End: 2024-03-08
Payer: MEDICARE

## 2024-03-08 VITALS
BODY MASS INDEX: 26.66 KG/M2 | SYSTOLIC BLOOD PRESSURE: 129 MMHG | HEART RATE: 74 BPM | WEIGHT: 160 LBS | DIASTOLIC BLOOD PRESSURE: 81 MMHG | HEIGHT: 65 IN

## 2024-03-08 DIAGNOSIS — G47.00 INSOMNIA, UNSPECIFIED TYPE: Primary | ICD-10-CM

## 2024-03-08 DIAGNOSIS — F41.9 ANXIETY DISORDER, UNSPECIFIED TYPE: ICD-10-CM

## 2024-03-08 RX ORDER — ALBUTEROL SULFATE 2.5 MG/3ML
2.5 SOLUTION RESPIRATORY (INHALATION)
COMMUNITY
Start: 2024-01-05

## 2024-03-08 RX ORDER — QUETIAPINE FUMARATE 200 MG/1
200 TABLET, FILM COATED ORAL NIGHTLY
Qty: 30 TABLET | Refills: 2 | Status: SHIPPED | OUTPATIENT
Start: 2024-03-08

## 2024-03-08 RX ORDER — ESCITALOPRAM OXALATE 20 MG/1
20 TABLET ORAL DAILY
Qty: 30 TABLET | Refills: 2 | Status: SHIPPED | OUTPATIENT
Start: 2024-03-08 | End: 2025-03-08

## 2024-03-08 NOTE — PROGRESS NOTES
"  Subjective     Jamison Edward is a 53 y.o. male who presents today for follow up    Chief Complaint: Anxiety       History of Present Illness:    History of Present Illness  Jamison is a 53-year-old male who presents today for a follow-up visit.  He tells me that he has been doing well since his last visit.  He is accompanied today by his wife who provides collateral information. He denies any SI/HI/AVH.  He states that he has gone up to 200 mg of Seroquel to help with sleep and anxiety. He states that he has been doing well on this dose and reports sleeping well. No concern for depression. He does report some anxiety about \"life\" but nothing unusuall.     He is accompanied today by his wife who  The following portions of the patient's history were reviewed and updated as appropriate: allergies, current medications, past family history, past medical history, past social history, past surgical history and problem list.    Past Psychiatric History:  Began Treatment:This year  Diagnoses: Delirium  Psychiatrist: Patient has never seen a psychiatric provider prior to consultations while admitted to the hospital.  Therapist:Denies  Admission History:Denies  Medication Trials: Xanax, Ativan, Depakote, Seroquel, Geodon, Effexor  Self Harm: Denies  Suicide Attempts:Denies      Past Medical History:  Past Medical History:   Diagnosis Date    Alcohol abuse     quit nov 12 2022    Alcoholism     Anemia     Anxiety whwhen put in continued care    Arthritis of back     not sure    Johnson esophagus     Brain concussion     Cholelithiasis     Chronic pain disorder have degenerative disc disease    Cirrhosis     COPD (chronic obstructive pulmonary disease)     CTS (carpal tunnel syndrome)     DDD (degenerative disc disease), thoracic     Degenerative disc disease, lumbar     Dystonia     GERD (gastroesophageal reflux disease)     GI (gastrointestinal bleed)     Hypertension     Irritable bowel syndrome     Liver disease     " Low back pain     Low back strain     Lumbosacral disc disease     Migraines     MRSA (methicillin resistant Staphylococcus aureus)     Peptic ulcer disease     Stroke     TIA    TIA (transient ischemic attack)     Ulcerative colitis     Wears dentures     upper only       Substance Abuse History:   Types: alcohol, THC when he was a teenager  Withdrawal Symptoms: Patient reports that they believed some of his delirium while admitted to the hospital from December to March was due to alcohol withdrawal.  Patient states that he stopped drinking alcohol November 12, 2022.  Longest Period Sober:Patient states that he has been sober since November  AA: No    Social History:  Social History     Socioeconomic History    Marital status:    Tobacco Use    Smoking status: Every Day     Current packs/day: 1.00     Average packs/day: 1 pack/day for 35.0 years (35.0 ttl pk-yrs)     Types: Cigarettes     Passive exposure: Current    Smokeless tobacco: Never    Tobacco comments:     Been smoking 20 years   Vaping Use    Vaping status: Never Used   Substance and Sexual Activity    Alcohol use: Not Currently     Comment: Quit drinking November 12,2023    Drug use: No    Sexual activity: Yes     Partners: Female     Birth control/protection: None       Family History:  Family History   Problem Relation Age of Onset    Osteoporosis Mother     Hypertension Father     Osteoporosis Father     No Known Problems Sister     No Known Problems Brother     Drug abuse Brother     No Known Problems Daughter     Diabetes Sister     Stroke Paternal Grandfather        Past Surgical History:  Past Surgical History:   Procedure Laterality Date    ABDOMINAL SURGERY  09/17/2021    APPENDECTOMY      CARPAL TUNNEL RELEASE  2005    CHOLECYSTECTOMY N/A 04/22/2017    Procedure: CHOLECYSTECTOMY LAPAROSCOPIC;  Surgeon: Jaqueline Guaman MD;  Location: [facility];  Service:     COLONOSCOPY N/A 05/08/2017    Procedure: COLONOSCOPY  CPTCODE:62357;  Surgeon:  Nicho Richey III, MD;  Location:  [facility];  Service:     CUBITAL TUNNEL RELEASE Left 09/01/2022    Procedure: CUBITAL TUNNEL RELEASE LEFT;  Surgeon: Alec Hough MD;  Location:  [facility];  Service: Orthopedics;  Laterality: Left;    ENDOSCOPY      ENDOSCOPY N/A 05/08/2017    Procedure: ESOPHAGOGASTRODUODENOSCOPY WITH BIOPSY  CPTCODE:54331;  Surgeon: Nicho Richey III, MD;  Location:  [facility];  Service:     ENDOSCOPY N/A 11/03/2017    Procedure: ESOPHAGOGASTRODUODENOSCOPY WITH BIOPSY  CPTCODE: 73081;  Surgeon: Nicho Richey III, MD;  [facility];  Service:     ENDOSCOPY N/A 02/20/2018    Procedure: ESOPHAGOGASTRODUODENOSCOPY WITH DILATATION CPT CODE: 41533;  Surgeon: Nicho Richey III, MD;  [facility];  Service:     ENDOSCOPY N/A 06/05/2018    Procedure: ESOPHAGOGASTRODUODENOSCOPY WITH BIOPSY CPT CODE: 83218;  Surgeon: Nicho Richey III, MD;  [facility];  Service: Gastroenterology    ENDOSCOPY N/A 05/26/2021    Procedure: ESOPHAGOGASTRODUODENOSCOPY WITH BIOPSY;  Surgeon: Julieta Hebert MD;  [facility];  Service: Gastroenterology;  Laterality: N/A;    ENDOSCOPY N/A 06/02/2021    Procedure: ESOPHAGOGASTRODUODENOSCOPY WITH BIOPSY;  Surgeon: Julieta Hebert MD;  [facility];  Service: Gastroenterology;  Laterality: N/A;    GASTRECTOMY N/A 09/17/2019    Procedure: OPEN VAGOTOMY, ANTRECTOMY,REVISION- Y GASTROJEJUNOSOTOMY;  Surgeon: Herbert Umanzor MD;  Location:  [facility];  Service: General    HERNIA REPAIR  12/10/2020    TRACHEOSTOMY N/A 01/17/2023    Procedure: TRACHEOSTOMY;  Surgeon: Felton De León MD;  [facility];  Service: General;  Laterality: N/A;    UPPER GASTROINTESTINAL ENDOSCOPY      VENTRAL/INCISIONAL HERNIA REPAIR N/A 12/10/2020    Procedure: INCISIONAL HERNIA REPAIR LAPAROSCOPIC WITH MESH;  Surgeon: Herbert Umanzor MD;  Location:  [facility];  Service: General;  Laterality: N/A;        Problem List:  Patient Active Problem List   Diagnosis    Precordial chest pain    Weight loss, abnormal    Right upper quadrant abdominal pain    Epigastric pain    History of bleeding ulcers    Nausea    Malaise and fatigue    Acute gastric ulcer without hemorrhage or perforation    Poor appetite    Duodenal ulcer    Gastroesophageal reflux disease    Dysphagia    Iron deficiency anemia    Malabsorption    Gastric ulcer without hemorrhage or perforation    Dystonia    Peptic ulcer without hemorrhage or perforation    Gastric outlet obstruction    Incisional hernia, without obstruction or gangrene    Incisional hernia    Chest pain, atypical    Gastric bezoar    Essential hypertension    Cigarette smoker    Chronic back pain    Sinus tachycardia    Tobacco use    Mild carpal tunnel syndrome    Orthostatic hypotension    Palpitations    Abdominal swelling    Lower extremity edema    Pneumonia due to infectious organism, unspecified laterality, unspecified part of lung    Acute respiratory failure with hypoxia    Dizziness       Allergy:   Allergies   Allergen Reactions    Contrast Dye (Echo Or Unknown Ct/Mr) Other (See Comments)     Shortness of breath    Prilosec [Omeprazole] Other (See Comments)     Chest pain  Pt states he can take pepcid with no problem    Protonix [Pantoprazole Sodium] Palpitations     Patient states that protonix causes chest pain.  Shruthimirian Pérez, [facility]  4/23/2017  11:19 AM  Pt states he can tolerate pepcid with no problem      Ativan [Lorazepam] Hallucinations    Depakene [Valproic Acid] Hallucinations    Geodon [Ziprasidone Hcl] Mental Status Change    Libritabs [Chlordiazepoxide] Other (See Comments)     Tongue swell/split    Dilantin [Phenytoin] Delirium    Dilaudid [Hydromorphone] Hallucinations    Flagyl [Metronidazole] Nausea And Vomiting    Topamax [Topiramate] Anxiety        Current Medications:   Current Outpatient Medications   Medication Sig Dispense Refill    albuterol  (PROVENTIL) (2.5 MG/3ML) 0.083% nebulizer solution Take 2.5 mg by nebulization 4 (Four) Times a Day.      baclofen (LIORESAL) 20 MG tablet       dexlansoprazole (DEXILANT) 60 MG capsule Take 1 capsule by mouth Every Morning Before Breakfast. 30 capsule 11    escitalopram (LEXAPRO) 20 MG tablet Take 1 tablet by mouth Daily. 30 tablet 2    HYDROcodone-acetaminophen (NORCO) 5-325 MG per tablet       metoprolol tartrate (LOPRESSOR) 25 MG tablet Take 1 tablet by mouth 2 (Two) Times a Day.      QUEtiapine (SEROquel) 200 MG tablet Take 1 tablet by mouth Every Night. 30 tablet 2    spironolactone (ALDACTONE) 25 MG tablet Take 1 tablet by mouth Daily.      omeprazole (priLOSEC) 40 MG capsule Take 1 capsule by mouth Daily. (Patient not taking: Reported on 1/3/2024)       No current facility-administered medications for this visit.       Review of Systems:    Review of Systems   Constitutional:  Positive for fatigue. Negative for activity change and appetite change.   Cardiovascular: Negative.    Musculoskeletal:  Positive for back pain and neck pain.   Neurological:  Positive for weakness. Negative for seizures.   Psychiatric/Behavioral:  Positive for stress. Negative for agitation, behavioral problems, decreased concentration, dysphoric mood, hallucinations, self-injury, sleep disturbance, suicidal ideas, negative for hyperactivity and depressed mood. The patient is not nervous/anxious.          Physical Exam:   Physical Exam  Vitals reviewed.   HENT:      Head: Atraumatic.   Pulmonary:      Effort: Pulmonary effort is normal.   Musculoskeletal:      Cervical back: Normal range of motion.   Neurological:      General: No focal deficit present.      Mental Status: He is alert and oriented to person, place, and time. Mental status is at baseline.   Psychiatric:         Attention and Perception: Attention and perception normal.         Mood and Affect: Mood and affect normal. Mood is not anxious or depressed. Affect is not  "blunt.         Speech: Speech normal.         Behavior: Behavior normal. Behavior is cooperative.         Thought Content: Thought content normal. Thought content is not paranoid or delusional. Thought content does not include homicidal or suicidal ideation.         Cognition and Memory: Cognition and memory normal.         Judgment: Judgment normal.         Vitals:  Blood pressure 129/81, pulse 74, height 165.1 cm (65\"), weight 72.6 kg (160 lb).   Body mass index is 26.63 kg/m².    Last 3 Blood Pressure Readings:  BP Readings from Last 3 Encounters:   03/08/24 129/81   02/07/24 122/67   01/03/24 111/73       Mental Status Exam:   Hygiene:   good  Cooperation:  Cooperative  Eye Contact:  Good  Psychomotor Behavior:  Appropriate  Affect:  Restricted  Mood: normal  Hopelessness: Denies  Speech:  Normal  Thought Process:  Goal directed  Thought Content:  Normal  Suicidal:  None  Homicidal:  None  Hallucinations:  Auditory  Delusion:  None  Memory:  Deficits  Orientation:  Person, Place, Time and Situation  Reliability:  good  Insight:  Good  Judgement:  Good  Impulse Control:  Good  Physical/Medical Issues:  Yes See PMH        Lab Results:   No visits with results within 3 Month(s) from this visit.   Latest known visit with results is:   Office Visit on 10/09/2023   Component Date Value Ref Range Status    Glucose 10/10/2023 123 (H)  65 - 99 mg/dL Final    BUN 10/10/2023 6  6 - 20 mg/dL Final    Creatinine 10/10/2023 0.90  0.76 - 1.27 mg/dL Final    Sodium 10/10/2023 139  136 - 145 mmol/L Final    Potassium 10/10/2023 4.3  3.5 - 5.2 mmol/L Final    Chloride 10/10/2023 105  98 - 107 mmol/L Final    CO2 10/10/2023 22.3  22.0 - 29.0 mmol/L Final    Calcium 10/10/2023 9.3  8.6 - 10.5 mg/dL Final    Total Protein 10/10/2023 6.4  6.0 - 8.5 g/dL Final    Albumin 10/10/2023 4.0  3.5 - 5.2 g/dL Final    ALT (SGPT) 10/10/2023 13  1 - 41 U/L Final    AST (SGOT) 10/10/2023 22  1 - 40 U/L Final    Alkaline Phosphatase 10/10/2023 " 105  39 - 117 U/L Final    Total Bilirubin 10/10/2023 0.3  0.0 - 1.2 mg/dL Final    Globulin 10/10/2023 2.4  gm/dL Final    A/G Ratio 10/10/2023 1.7  g/dL Final    BUN/Creatinine Ratio 10/10/2023 6.7 (L)  7.0 - 25.0 Final    Anion Gap 10/10/2023 11.7  5.0 - 15.0 mmol/L Final    eGFR 10/10/2023 100.9  >60.0 mL/min/1.73 Final    Fibrosis Score 10/10/2023 0.30 (H)  0.00 - 0.21 Final    Fibrosis Stage 10/10/2023 Comment   Final                     F1 - Portal fibrosis    Steatosis Score (Reference) 10/10/2023 0.52 (H)  0.00 - 0.40 Final    Steatosis Grade (Reference) 10/10/2023 Comment   Final                    S1 - Mild Steatosis                       (But Clinically Significant) (5-33%)    NEGRETE Score (Reference) 10/10/2023 0.47 (H)  0.00 - 0.25 Final    Negrete Grade (Reference) 10/10/2023 Comment   Final                       N1 -  Mild NEGRETE    Methodology: 10/10/2023 Comment   Final    The analytes tested are performed by FibroSure-Specific methods.  Not intended for use with other diagnostic considerations.    Alpha 2-Macroglobulins, Qn 10/10/2023 226  110 - 276 mg/dL Final    Haptoglobin 10/10/2023 121  29 - 370 mg/dL Final    Apolipoprotein A-1 10/10/2023 130  101 - 178 mg/dL Final    Total Bilirubin 10/10/2023 0.3  0.0 - 1.2 mg/dL Final    GGT 10/10/2023 47  0 - 65 IU/L Final    ALT (SGPT) 10/10/2023 20  0 - 55 IU/L Final    AST (SGOT) P5P (Reference) 10/10/2023 24  0 - 40 IU/L Final    Cholesterol, Total (Reference) 10/10/2023 153  100 - 199 mg/dL Final    Glucose, Serum (Reference) 10/10/2023 127 (H)  70 - 99 mg/dL Final    Triglycerides 10/10/2023 152 (H)  0 - 149 mg/dL Final    Interpretations: (Reference) 10/10/2023 Comment   Final    Comment: Quantitative results of 10 biochemicals in combination with age and  gender, are analyzed using a computational algorithm to provide a  quantitative surrogate marker (0.0-1.0) of liver fibrosis (Metavir  F0-F4), hepatic steatosis (0.0-1.0, S0-S3), and  Non-Alcoholic  Steato-Hepatitis (NEGRETE) (0.0-1.0, N0-N3). The absence of steatosis  (S<0.40) precludes the diagnosis of NEGRETE.  Fibrosis marker: In a study of 171 Non-Alcoholic Fatty Liver Disease  (NAFLD) patients where 23% had significant NAFLD fibrosis (Metavir  F2-F4) and 11% had cirrhosis by liver biopsy, a fibrosis result of  >0.3 yielded a sensitivity of 83% and a specificity of 78% for the  detection of significant fibrosis.[1]  Steatosis marker: In a population of 2997 patients, where 61% had  significant steatosis (>=5%) on a liver biopsy, a steatosis score  >0.4 had a sensitivity of 79% and a specificity of 50% for  identification of significant steatosis.[2]  NEGRETE marker: In a population of 1081 NAFLD patients, where 51% had  at                            least some NEGRETE by liver biopsy, a prediction of NEGRETE had a  sensitivity of 72% for identifying NEGRETE and a specificity of 71%.[3]    Fibrosis Scoring: 10/10/2023 Comment   Final         <=0.21 = Stage F0 - No fibrosis  0.21 - 0.27 = Stage F0 - F1  0.27 - 0.31 = Stage F1 - Portal fibrosis  0.31 - 0.48 = Stage F1 - F2  0.48 - 0.58 = Stage F2 - Bridging fibrosis with few septa  0.58 - 0.72 = Stage F3 - Bridging fibrosis with many septa  0.72 - 0.74 = Stage F3 - F4        >0.74 = Stage F4 - Cirrhosis    Steatosis Scoring 10/10/2023 Comment   Final         <=0.40 = S0  - No Steatosis (<5%)  0.40 - 0.55 = S1  - Mild Steatosis                      (but Clinically Significant) (5-33%)        >0.55 = S2S3- Moderate to Severe Steatosis                      (Clinically Significant) (%)    NEGRETE Scoring 10/10/2023 Comment   Final         <=0.25 = N0 - No NEGRETE  0.25 - 0.50 = N1 - Mild NEGREET  0.50 - 0.75 = N2 - Moderate NEGRETE        >0.75 = N3 - Severe NEGRETE    Limitations: 10/10/2023 Comment   Final    NEGRETE FibroSure(R) Plus is recommended for patients with suspected  non-alcoholic fatty liver disease. It is not recommended for  patients with other liver diseases.  It is also not recommended  in patients with Gilbert Disease, acute hemolysis, acute viral  hepatitis, drug induced hepatitis, genetic liver disease,  autoimmune hepatitis and/or extra-hepatic cholestasis. Any of  these clinical situations may lead to inaccurate quantitative  predictions of fibrosis.    Comment 10/10/2023 Comment   Final    This test was developed and its performance characteristics  determined by [email or web address]. It has not been cleared or approved  by the Food and Drug Administration.  For questions regarding this report please contact customer service  at [phone].  References:  1.  Maurice LOPEZ et al. Diagnostic Value of Biochemical Markers  (FibroTest) for the prediction of Liver Fibrosis in patients with  Non-Alcoholic Fatty Liver Disease. BMC Gastroenterology 2006; 6:6.  2.  Brady CAMARENA. et al. The Diagnostic Performance of a Simplified  Blood Test (SteatoTest-2) for the Prediction of Liver Steatosis.  Eur J Gastroenterol Hepatol. 2019; 31:393-402.  3.  Brady CAMARENA. et al. Diagnostic performance of a new noninvasive  test for nonalcoholic steatohepatitis using a simplified histological  reference. Eur J Gastroenterol Hepatol. 2018 May; 30:569-577.    WBC 10/10/2023 9.75  3.40 - 10.80 10*3/mm3 Final    RBC 10/10/2023 5.01  4.14 - 5.80 10*6/mm3 Final    Hemoglobin 10/10/2023 15.2  13.0 - 17.7 g/dL Final    Hematocrit 10/10/2023 45.3  37.5 - 51.0 % Final    MCV 10/10/2023 90.4  79.0 - 97.0 fL Final    MCH 10/10/2023 30.3  26.6 - 33.0 pg Final    MCHC 10/10/2023 33.6  31.5 - 35.7 g/dL Final    RDW 10/10/2023 12.5  12.3 - 15.4 % Final    RDW-SD 10/10/2023 40.4  37.0 - 54.0 fl Final    MPV 10/10/2023 10.4  6.0 - 12.0 fL Final    Platelets 10/10/2023 182  140 - 450 10*3/mm3 Final    Neutrophil % 10/10/2023 67.1  42.7 - 76.0 % Final    Lymphocyte % 10/10/2023 21.0  19.6 - 45.3 % Final    Monocyte % 10/10/2023 6.6  5.0 - 12.0 % Final    Eosinophil % 10/10/2023 4.4  0.3 - 6.2 % Final    Basophil % 10/10/2023  0.6  0.0 - 1.5 % Final    Immature Grans % 10/10/2023 0.3  0.0 - 0.5 % Final    Neutrophils, Absolute 10/10/2023 6.54  1.70 - 7.00 10*3/mm3 Final    Lymphocytes, Absolute 10/10/2023 2.05  0.70 - 3.10 10*3/mm3 Final    Monocytes, Absolute 10/10/2023 0.64  0.10 - 0.90 10*3/mm3 Final    Eosinophils, Absolute 10/10/2023 0.43 (H)  0.00 - 0.40 10*3/mm3 Final    Basophils, Absolute 10/10/2023 0.06  0.00 - 0.20 10*3/mm3 Final    Immature Grans, Absolute 10/10/2023 0.03  0.00 - 0.05 10*3/mm3 Final    nRBC 10/10/2023 0.0  0.0 - 0.2 /100 WBC Final         Assessment & Plan   Problems Addressed this Visit    None  Visit Diagnoses       Insomnia, unspecified type    -  Primary    Anxiety disorder, unspecified type        Relevant Medications    QUEtiapine (SEROquel) 200 MG tablet    escitalopram (LEXAPRO) 20 MG tablet          Diagnoses         Codes Comments    Insomnia, unspecified type    -  Primary ICD-10-CM: G47.00  ICD-9-CM: 780.52     Anxiety disorder, unspecified type     ICD-10-CM: F41.9  ICD-9-CM: 300.00             Visit Diagnoses:    ICD-10-CM ICD-9-CM   1. Insomnia, unspecified type  G47.00 780.52   2. Anxiety disorder, unspecified type  F41.9 300.00             GOALS:  Short Term Goals: Patient will be compliant with medication, and patient will have no significant medication related side effects.  Patient will be engaged in psychotherapy as indicated.  Patient will report subjective improvement of symptoms.  Long term goals: To stabilize mood and treat/improve subjective symptoms, the patient will stay out of the hospital, the patient will be at an optimal level of functioning, and the patient will take all medications as prescribed.  The patient/guardian verbalized understanding and agreement with goals that were mutually set.      TREATMENT PLAN: Continue supportive psychotherapy efforts and medications as indicated.  Pharmacological and Non-Pharmacological treatment options discussed during today's visit.  Patient/Guardian acknowledged and verbally consented with current treatment plan and was educated on the importance of compliance with treatment and follow-up appointments.      MEDICATION ISSUES:  Discussed medication options and treatment plan of prescribed medication as well as the risks, benefits, any black box warnings, and side effects including potential falls, possible impaired driving, and metabolic adversities among others. Patient is agreeable to call the office with any worsening of symptoms or onset of side effects, or if any concerns or questions arise.  The contact information for the office is made available to the patient. Patient is agreeable to call 911 or go to the nearest ER should they begin having any SI/HI, or if any urgent concerns arise. No medication side effects or related complaints today.     MEDS ORDERED DURING VISIT:  New Medications Ordered This Visit   Medications    QUEtiapine (SEROquel) 200 MG tablet     Sig: Take 1 tablet by mouth Every Night.     Dispense:  30 tablet     Refill:  2    escitalopram (LEXAPRO) 20 MG tablet     Sig: Take 1 tablet by mouth Daily.     Dispense:  30 tablet     Refill:  2     Plan:  -Increase Seroquel to 200 mg p.o. at night for paranoid thoughts and sleep disturbance/anxiety.  - Continue Lexapro to 20 mg p.o. daily for anxiety.   - Follow-up in 12 weeks.      Follow Up Appointment:   Return in about 3 months (around 6/8/2024) for Recheck.             This document has been electronically signed by EILEEN Lovell  March 12, 2024 08:05 EDT    Dictated Utilizing Dragon Dictation: Part of this note may be an electronic transcription/translation of spoken language to printed text using the Dragon Dictation System.

## 2024-03-22 ENCOUNTER — LAB (OUTPATIENT)
Dept: LAB | Facility: HOSPITAL | Age: 54
End: 2024-03-22
Payer: MEDICARE

## 2024-03-22 DIAGNOSIS — I10 ESSENTIAL HYPERTENSION: ICD-10-CM

## 2024-03-22 LAB
ALBUMIN SERPL-MCNC: 4.4 G/DL (ref 3.5–5.2)
ALBUMIN/GLOB SERPL: 1.8 G/DL
ALP SERPL-CCNC: 134 U/L (ref 39–117)
ALT SERPL W P-5'-P-CCNC: 24 U/L (ref 1–41)
ANION GAP SERPL CALCULATED.3IONS-SCNC: 9.1 MMOL/L (ref 5–15)
AST SERPL-CCNC: 29 U/L (ref 1–40)
BILIRUB SERPL-MCNC: 0.3 MG/DL (ref 0–1.2)
BUN SERPL-MCNC: 15 MG/DL (ref 6–20)
BUN/CREAT SERPL: 15.3 (ref 7–25)
CALCIUM SPEC-SCNC: 9.3 MG/DL (ref 8.6–10.5)
CHLORIDE SERPL-SCNC: 104 MMOL/L (ref 98–107)
CO2 SERPL-SCNC: 25.9 MMOL/L (ref 22–29)
CREAT SERPL-MCNC: 0.98 MG/DL (ref 0.76–1.27)
EGFRCR SERPLBLD CKD-EPI 2021: 91.1 ML/MIN/1.73
GLOBULIN UR ELPH-MCNC: 2.5 GM/DL
GLUCOSE SERPL-MCNC: 102 MG/DL (ref 65–99)
POTASSIUM SERPL-SCNC: 4.9 MMOL/L (ref 3.5–5.2)
PROT SERPL-MCNC: 6.9 G/DL (ref 6–8.5)
SODIUM SERPL-SCNC: 139 MMOL/L (ref 136–145)

## 2024-03-22 PROCEDURE — 36415 COLL VENOUS BLD VENIPUNCTURE: CPT

## 2024-03-22 PROCEDURE — 80053 COMPREHEN METABOLIC PANEL: CPT

## 2024-06-05 RX ORDER — QUETIAPINE FUMARATE 200 MG/1
200 TABLET, FILM COATED ORAL NIGHTLY
Qty: 30 TABLET | Refills: 2 | Status: SHIPPED | OUTPATIENT
Start: 2024-06-05

## 2024-06-07 ENCOUNTER — OFFICE VISIT (OUTPATIENT)
Dept: PSYCHIATRY | Facility: CLINIC | Age: 54
End: 2024-06-07
Payer: MEDICARE

## 2024-06-07 VITALS
HEART RATE: 66 BPM | DIASTOLIC BLOOD PRESSURE: 73 MMHG | BODY MASS INDEX: 27.02 KG/M2 | WEIGHT: 162.2 LBS | HEIGHT: 65 IN | SYSTOLIC BLOOD PRESSURE: 112 MMHG

## 2024-06-07 DIAGNOSIS — G47.00 INSOMNIA, UNSPECIFIED TYPE: Primary | ICD-10-CM

## 2024-06-07 DIAGNOSIS — F41.9 ANXIETY DISORDER, UNSPECIFIED TYPE: ICD-10-CM

## 2024-06-07 RX ORDER — VENLAFAXINE HYDROCHLORIDE 75 MG/1
75 CAPSULE, EXTENDED RELEASE ORAL DAILY
Qty: 30 CAPSULE | Refills: 1 | Status: SHIPPED | OUTPATIENT
Start: 2024-06-07 | End: 2025-06-07

## 2024-06-07 RX ORDER — LEVETIRACETAM 500 MG/1
500 TABLET ORAL 2 TIMES DAILY
COMMUNITY
Start: 2024-05-20

## 2024-06-07 RX ORDER — QUETIAPINE FUMARATE 25 MG/1
25 TABLET, FILM COATED ORAL DAILY
Qty: 30 TABLET | Refills: 1 | Status: SHIPPED | OUTPATIENT
Start: 2024-06-07

## 2024-06-07 NOTE — PROGRESS NOTES
Subjective     Jamison Edward is a 53 y.o. male who presents today for follow up    Chief Complaint: Anxiety       History of Present Illness:    History of Present Illness  Jamison is a 53-year-old male who presents today for a follow-up visit.  He tells me that he has been more anxious lately.  He tells me that he is still struggling with chronic back pain and is going to have epidural injections.  He states that he has also had difficulty falling asleep more so now than before.  No concern for depression but he has not been as active as he had been in the past due to his back pain.  He tells me that he has noticed some increased irritability as well and often finds himself frustrated.  He denies any SI/HI/AVH.  Denies any side effects of the medications.  No EPS side effects noted by myself at today's visit.    The following portions of the patient's history were reviewed and updated as appropriate: allergies, current medications, past family history, past medical history, past social history, past surgical history and problem list.    Past Psychiatric History:  Began Treatment:This year  Diagnoses: Delirium  Psychiatrist: Patient has never seen a psychiatric provider prior to consultations while admitted to the hospital.  Therapist:Denies  Admission History:Denies  Medication Trials: Xanax, Ativan, Depakote, Seroquel, Geodon, Effexor  Self Harm: Denies  Suicide Attempts:Denies      Past Medical History:  Past Medical History:   Diagnosis Date    Alcohol abuse     quit nov 12 2022    Alcoholism     Anemia     Anxiety whwhen put in continued care    Arthritis of back     not sure    Johnson esophagus     Brain concussion     Cholelithiasis     Chronic pain disorder have degenerative disc disease    Cirrhosis     COPD (chronic obstructive pulmonary disease)     CTS (carpal tunnel syndrome)     DDD (degenerative disc disease), thoracic     Degenerative disc disease, lumbar     Dystonia     GERD  (gastroesophageal reflux disease)     GI (gastrointestinal bleed)     Hypertension     Irritable bowel syndrome     Liver disease     Low back pain     Low back strain     Lumbosacral disc disease     Migraines     MRSA (methicillin resistant Staphylococcus aureus)     Peptic ulcer disease     Stroke     TIA    TIA (transient ischemic attack)     Ulcerative colitis     Wears dentures     upper only       Substance Abuse History:   Types: alcohol, THC when he was a teenager  Withdrawal Symptoms: Patient reports that they believed some of his delirium while admitted to the hospital from December to March was due to alcohol withdrawal.  Patient states that he stopped drinking alcohol November 12, 2022.  Longest Period Sober:Patient states that he has been sober since November  AA: No    Social History:  Social History     Socioeconomic History    Marital status:    Tobacco Use    Smoking status: Every Day     Current packs/day: 1.00     Average packs/day: 1 pack/day for 35.0 years (35.0 ttl pk-yrs)     Types: Cigarettes     Passive exposure: Current    Smokeless tobacco: Never    Tobacco comments:     Been smoking 20 years   Vaping Use    Vaping status: Never Used   Substance and Sexual Activity    Alcohol use: Not Currently     Comment: Quit drinking November 12,2023    Drug use: No    Sexual activity: Yes     Partners: Female     Birth control/protection: None       Family History:  Family History   Problem Relation Age of Onset    Osteoporosis Mother     Hypertension Father     Osteoporosis Father     No Known Problems Sister     No Known Problems Brother     Drug abuse Brother     No Known Problems Daughter     Diabetes Sister     Stroke Paternal Grandfather        Past Surgical History:  Past Surgical History:   Procedure Laterality Date    ABDOMINAL SURGERY  09/17/2021    APPENDECTOMY      CARPAL TUNNEL RELEASE  2005    CHOLECYSTECTOMY N/A 04/22/2017    Procedure: CHOLECYSTECTOMY LAPAROSCOPIC;  Surgeon:  Jaqueline Guaman MD;  [facility];  Service:     COLONOSCOPY N/A 05/08/2017    Procedure: COLONOSCOPY  CPTCODE:09422;  Surgeon: Nicho Richey III, MD;  [facility];  Service:     CUBITAL TUNNEL RELEASE Left 09/01/2022    Procedure: CUBITAL TUNNEL RELEASE LEFT;  Surgeon: Alec Hough MD;  [facility];  Service: Orthopedics;  Laterality: Left;    ENDOSCOPY      ENDOSCOPY N/A 05/08/2017    Procedure: ESOPHAGOGASTRODUODENOSCOPY WITH BIOPSY  CPTCODE:58505;  Surgeon: Nicho Richey III, MD;  [facility];  Service:     ENDOSCOPY N/A 11/03/2017    Procedure: ESOPHAGOGASTRODUODENOSCOPY WITH BIOPSY  CPTCODE: 29224;  Surgeon: Nicho Richey III, MD;  [facility];  Service:     ENDOSCOPY N/A 02/20/2018    Procedure: ESOPHAGOGASTRODUODENOSCOPY WITH DILATATION CPT CODE: 16792;  Surgeon: Nicho Richey III, MD;  [facility];  Service:     ENDOSCOPY N/A 06/05/2018    Procedure: ESOPHAGOGASTRODUODENOSCOPY WITH BIOPSY CPT CODE: 60858;  Surgeon: Nicho Richey III, MD;  [facility];  Service: Gastroenterology    ENDOSCOPY N/A 05/26/2021    Procedure: ESOPHAGOGASTRODUODENOSCOPY WITH BIOPSY;  Surgeon: Julieta Hebert MD;  [facility];  Service: Gastroenterology;  Laterality: N/A;    ENDOSCOPY N/A 06/02/2021    Procedure: ESOPHAGOGASTRODUODENOSCOPY WITH BIOPSY;  Surgeon: Julieta Hebert MD;  [facility];  Service: Gastroenterology;  Laterality: N/A;    GASTRECTOMY N/A 09/17/2019    Procedure: OPEN VAGOTOMY, ANTRECTOMY,REVISION- Y GASTROJEJUNOSOTOMY;  Surgeon: Herbert Umanzor MD;  Location:  [facility];  Service: General    HERNIA REPAIR  12/10/2020    TRACHEOSTOMY N/A 01/17/2023    Procedure: TRACHEOSTOMY;  Surgeon: Felton De León MD;  [facility];  Service: General;  Laterality: N/A;    UPPER GASTROINTESTINAL ENDOSCOPY      VENTRAL/INCISIONAL HERNIA REPAIR N/A 12/10/2020    Procedure:  INCISIONAL HERNIA REPAIR LAPAROSCOPIC WITH MESH;  Surgeon: Herbert Umanzor MD;  [facility];  Service: General;  Laterality: N/A;       Problem List:  Patient Active Problem List   Diagnosis    Precordial chest pain    Weight loss, abnormal    Right upper quadrant abdominal pain    Epigastric pain    History of bleeding ulcers    Nausea    Malaise and fatigue    Acute gastric ulcer without hemorrhage or perforation    Poor appetite    Duodenal ulcer    Gastroesophageal reflux disease    Dysphagia    Iron deficiency anemia    Malabsorption    Gastric ulcer without hemorrhage or perforation    Dystonia    Peptic ulcer without hemorrhage or perforation    Gastric outlet obstruction    Incisional hernia, without obstruction or gangrene    Incisional hernia    Chest pain, atypical    Gastric bezoar    Essential hypertension    Cigarette smoker    Chronic back pain    Sinus tachycardia    Tobacco use    Mild carpal tunnel syndrome    Orthostatic hypotension    Palpitations    Abdominal swelling    Lower extremity edema    Pneumonia due to infectious organism, unspecified laterality, unspecified part of lung    Acute respiratory failure with hypoxia    Dizziness       Allergy:   Allergies   Allergen Reactions    Contrast Dye (Echo Or Unknown Ct/Mr) Other (See Comments)     Shortness of breath    Prilosec [Omeprazole] Other (See Comments)     Chest pain  Pt states he can take pepcid with no problem    Protonix [Pantoprazole Sodium] Palpitations     Patient states that protonix causes chest pain.  Shruthi Pérez, [facility]  4/23/2017  11:19 AM  Pt states he can tolerate pepcid with no problem      Ativan [Lorazepam] Hallucinations    Depakene [Valproic Acid] Hallucinations    Geodon [Ziprasidone Hcl] Mental Status Change    Libritabs [Chlordiazepoxide] Other (See Comments)     Tongue swell/split    Dilantin [Phenytoin] Delirium    Dilaudid [Hydromorphone] Hallucinations    Flagyl [Metronidazole] Nausea And Vomiting     Topamax [Topiramate] Anxiety        Current Medications:   Current Outpatient Medications   Medication Sig Dispense Refill    albuterol (PROVENTIL) (2.5 MG/3ML) 0.083% nebulizer solution Take 2.5 mg by nebulization 4 (Four) Times a Day.      baclofen (LIORESAL) 20 MG tablet       dexlansoprazole (DEXILANT) 60 MG capsule Take 1 capsule by mouth Every Morning Before Breakfast. 30 capsule 11    HYDROcodone-acetaminophen (NORCO) 5-325 MG per tablet       levETIRAcetam (KEPPRA) 500 MG tablet Take 1 tablet by mouth 2 (Two) Times a Day.      metoprolol tartrate (LOPRESSOR) 25 MG tablet Take 1 tablet by mouth 2 (Two) Times a Day.      QUEtiapine (SEROquel) 200 MG tablet TAKE ONE TABLET BY MOUTH ONCE NIGHTLY 30 tablet 2    spironolactone (ALDACTONE) 25 MG tablet Take 1 tablet by mouth Daily.      omeprazole (priLOSEC) 40 MG capsule Take 1 capsule by mouth Daily.      QUEtiapine (SEROquel) 25 MG tablet Take 1 tablet by mouth Daily. 30 tablet 1    venlafaxine XR (Effexor XR) 75 MG 24 hr capsule Take 1 capsule by mouth Daily. 30 capsule 1     No current facility-administered medications for this visit.       Review of Systems:    Review of Systems   Constitutional:  Positive for fatigue. Negative for activity change and appetite change.   Cardiovascular: Negative.    Musculoskeletal:  Positive for back pain and neck pain.   Neurological:  Positive for weakness. Negative for seizures.   Psychiatric/Behavioral:  Positive for sleep disturbance and stress. Negative for agitation, behavioral problems, decreased concentration, dysphoric mood, hallucinations, self-injury, suicidal ideas, negative for hyperactivity and depressed mood. The patient is nervous/anxious.          Physical Exam:   Physical Exam  Vitals reviewed.   HENT:      Head: Atraumatic.   Pulmonary:      Effort: Pulmonary effort is normal.   Musculoskeletal:      Cervical back: Normal range of motion.   Neurological:      General: No focal deficit present.       "Mental Status: He is alert and oriented to person, place, and time. Mental status is at baseline.   Psychiatric:         Attention and Perception: Attention and perception normal.         Mood and Affect: Mood and affect normal. Mood is not anxious.         Speech: Speech normal.         Behavior: Behavior normal. Behavior is cooperative.         Thought Content: Thought content normal. Thought content is not paranoid or delusional. Thought content does not include homicidal or suicidal ideation.         Cognition and Memory: Cognition and memory normal.         Judgment: Judgment normal.         Vitals:  Blood pressure 112/73, pulse 66, height 165.1 cm (65\"), weight 73.6 kg (162 lb 3.2 oz).   Body mass index is 26.99 kg/m².    Last 3 Blood Pressure Readings:  BP Readings from Last 3 Encounters:   06/07/24 112/73   03/08/24 129/81   02/07/24 122/67       Mental Status Exam:   Hygiene:   good  Cooperation:  Cooperative  Eye Contact:  Good  Psychomotor Behavior:  Appropriate  Affect:  Restricted  Mood: normal  Hopelessness: Denies  Speech:  Normal  Thought Process:  Goal directed  Thought Content:  Normal  Suicidal:  None  Homicidal:  None  Hallucinations:  Auditory  Delusion:  None  Memory:  Deficits  Orientation:  Person, Place, Time and Situation  Reliability:  good  Insight:  Good  Judgement:  Good  Impulse Control:  Good  Physical/Medical Issues:  Yes See PMH        Lab Results:   Lab on 03/22/2024   Component Date Value Ref Range Status    Glucose 03/22/2024 102 (H)  65 - 99 mg/dL Final    BUN 03/22/2024 15  6 - 20 mg/dL Final    Creatinine 03/22/2024 0.98  0.76 - 1.27 mg/dL Final    Sodium 03/22/2024 139  136 - 145 mmol/L Final    Potassium 03/22/2024 4.9  3.5 - 5.2 mmol/L Final    Chloride 03/22/2024 104  98 - 107 mmol/L Final    CO2 03/22/2024 25.9  22.0 - 29.0 mmol/L Final    Calcium 03/22/2024 9.3  8.6 - 10.5 mg/dL Final    Total Protein 03/22/2024 6.9  6.0 - 8.5 g/dL Final    Albumin 03/22/2024 4.4  3.5 - " 5.2 g/dL Final    ALT (SGPT) 03/22/2024 24  1 - 41 U/L Final    AST (SGOT) 03/22/2024 29  1 - 40 U/L Final    Alkaline Phosphatase 03/22/2024 134 (H)  39 - 117 U/L Final    Total Bilirubin 03/22/2024 0.3  0.0 - 1.2 mg/dL Final    Globulin 03/22/2024 2.5  gm/dL Final    A/G Ratio 03/22/2024 1.8  g/dL Final    BUN/Creatinine Ratio 03/22/2024 15.3  7.0 - 25.0 Final    Anion Gap 03/22/2024 9.1  5.0 - 15.0 mmol/L Final    eGFR 03/22/2024 91.1  >60.0 mL/min/1.73 Final         Assessment & Plan   Problems Addressed this Visit    None  Visit Diagnoses       Insomnia, unspecified type    -  Primary    Anxiety disorder, unspecified type        Relevant Medications    venlafaxine XR (Effexor XR) 75 MG 24 hr capsule    QUEtiapine (SEROquel) 25 MG tablet          Diagnoses         Codes Comments    Insomnia, unspecified type    -  Primary ICD-10-CM: G47.00  ICD-9-CM: 780.52     Anxiety disorder, unspecified type     ICD-10-CM: F41.9  ICD-9-CM: 300.00             Visit Diagnoses:    ICD-10-CM ICD-9-CM   1. Insomnia, unspecified type  G47.00 780.52   2. Anxiety disorder, unspecified type  F41.9 300.00               GOALS:  Short Term Goals: Patient will be compliant with medication, and patient will have no significant medication related side effects.  Patient will be engaged in psychotherapy as indicated.  Patient will report subjective improvement of symptoms.  Long term goals: To stabilize mood and treat/improve subjective symptoms, the patient will stay out of the hospital, the patient will be at an optimal level of functioning, and the patient will take all medications as prescribed.  The patient/guardian verbalized understanding and agreement with goals that were mutually set.      TREATMENT PLAN: Continue supportive psychotherapy efforts and medications as indicated.  Pharmacological and Non-Pharmacological treatment options discussed during today's visit. Patient/Guardian acknowledged and verbally consented with current  treatment plan and was educated on the importance of compliance with treatment and follow-up appointments.      MEDICATION ISSUES:  Discussed medication options and treatment plan of prescribed medication as well as the risks, benefits, any black box warnings, and side effects including potential falls, possible impaired driving, and metabolic adversities among others. Patient is agreeable to call the office with any worsening of symptoms or onset of side effects, or if any concerns or questions arise.  The contact information for the office is made available to the patient. Patient is agreeable to call 911 or go to the nearest ER should they begin having any SI/HI, or if any urgent concerns arise. No medication side effects or related complaints today.     MEDS ORDERED DURING VISIT:  New Medications Ordered This Visit   Medications    venlafaxine XR (Effexor XR) 75 MG 24 hr capsule     Sig: Take 1 capsule by mouth Daily.     Dispense:  30 capsule     Refill:  1    QUEtiapine (SEROquel) 25 MG tablet     Sig: Take 1 tablet by mouth Daily.     Dispense:  30 tablet     Refill:  1     Plan:  -Increase Seroquel to 225 mg p.o. at night for paranoid thoughts and sleep disturbance/anxiety.   - Taper off of Lexapro due to lack of efficacy and start Effexor 75 mg by mouth daily for anxiety.  - Follow-up in 4 weeks.  -Patient verbalizes understanding to go to nearest ED if SI/HI develop.    Follow Up Appointment:   Return in about 1 month (around 7/7/2024).             This document has been electronically signed by EILEEN Lovell  June 7, 2024 13:19 EDT    Dictated Utilizing Dragon Dictation: Part of this note may be an electronic transcription/translation of spoken language to printed text using the Dragon Dictation System.

## 2024-07-05 ENCOUNTER — OFFICE VISIT (OUTPATIENT)
Dept: PSYCHIATRY | Facility: CLINIC | Age: 54
End: 2024-07-05
Payer: MEDICARE

## 2024-07-05 VITALS
DIASTOLIC BLOOD PRESSURE: 72 MMHG | HEART RATE: 82 BPM | WEIGHT: 162 LBS | OXYGEN SATURATION: 96 % | BODY MASS INDEX: 26.99 KG/M2 | HEIGHT: 65 IN | SYSTOLIC BLOOD PRESSURE: 110 MMHG

## 2024-07-05 DIAGNOSIS — F41.9 ANXIETY DISORDER, UNSPECIFIED TYPE: Primary | ICD-10-CM

## 2024-07-05 DIAGNOSIS — G47.00 INSOMNIA, UNSPECIFIED TYPE: ICD-10-CM

## 2024-07-05 RX ORDER — VENLAFAXINE HYDROCHLORIDE 75 MG/1
75 CAPSULE, EXTENDED RELEASE ORAL DAILY
Qty: 30 CAPSULE | Refills: 2 | Status: SHIPPED | OUTPATIENT
Start: 2024-07-05 | End: 2025-07-05

## 2024-07-05 NOTE — PROGRESS NOTES
Subjective     Jamison Edward is a 53 y.o. male who presents today for follow up    Chief Complaint: Anxiety       History of Present Illness:    History of Present Illness  Jamison is a 53-year-old male who presents today for a follow-up visit.  He tells me that he has continued to be more irritable lately but attributes it to his chronic severe back pain.  He was unable to get have epidural injections due to being allergic to contrast dye.  He has also done physical therapy and is unsure of what the next steps will be to treat his back pain but does have an appointment scheduled with a neurosurgeon.  He often finds himself frustrated and irritated waiting on relief.  He has done well with the change to Effexor from Lexapro and denies having any side effects.  He denies having any depression or low mood.  Appetite has been okay.  Sleep is still somewhat difficult and he does have trouble getting comfortable enough to sleep.  No paranoia.  He denies any SI/HI/AVH.      The following portions of the patient's history were reviewed and updated as appropriate: allergies, current medications, past family history, past medical history, past social history, past surgical history and problem list.    Past Psychiatric History:  Began Treatment:This year  Diagnoses: Delirium  Psychiatrist: Patient has never seen a psychiatric provider prior to consultations while admitted to the hospital.  Therapist:Denies  Admission History:Denies  Medication Trials: Xanax, Ativan, Depakote, Seroquel, Geodon, Effexor  Self Harm: Denies  Suicide Attempts:Denies      Past Medical History:  Past Medical History:   Diagnosis Date    Alcohol abuse     quit nov 12 2022    Alcoholism     Anemia     Anxiety whwhen put in continued care    Arthritis of back     not sure    Johnson esophagus     Brain concussion     Cholelithiasis     Chronic pain disorder have degenerative disc disease    Cirrhosis     COPD (chronic obstructive pulmonary  disease)     CTS (carpal tunnel syndrome)     DDD (degenerative disc disease), thoracic     Degenerative disc disease, lumbar     Dystonia     GERD (gastroesophageal reflux disease)     GI (gastrointestinal bleed)     Hypertension     Irritable bowel syndrome     Liver disease     Low back pain     Low back strain     Lumbosacral disc disease     Migraines     MRSA (methicillin resistant Staphylococcus aureus)     Peptic ulcer disease     Stroke     TIA    TIA (transient ischemic attack)     Ulcerative colitis     Wears dentures     upper only       Substance Abuse History:   Types: alcohol, THC when he was a teenager  Withdrawal Symptoms: Patient reports that they believed some of his delirium while admitted to the hospital from December to March was due to alcohol withdrawal.  Patient states that he stopped drinking alcohol November 12, 2022.  Longest Period Sober:Patient states that he has been sober since November  AA: No    Social History:  Social History     Socioeconomic History    Marital status:    Tobacco Use    Smoking status: Every Day     Current packs/day: 1.00     Average packs/day: 1 pack/day for 35.0 years (35.0 ttl pk-yrs)     Types: Cigarettes     Passive exposure: Current    Smokeless tobacco: Never    Tobacco comments:     Been smoking 20 years   Vaping Use    Vaping status: Never Used   Substance and Sexual Activity    Alcohol use: Not Currently     Comment: Quit drinking November 12,2023    Drug use: No    Sexual activity: Yes     Partners: Female     Birth control/protection: None       Family History:  Family History   Problem Relation Age of Onset    Osteoporosis Mother     Hypertension Father     Osteoporosis Father     No Known Problems Sister     No Known Problems Brother     Drug abuse Brother     No Known Problems Daughter     Diabetes Sister     Stroke Paternal Grandfather        Past Surgical History:  Past Surgical History:   Procedure Laterality Date    ABDOMINAL SURGERY   09/17/2021    APPENDECTOMY      CARPAL TUNNEL RELEASE  2005    CHOLECYSTECTOMY N/A 04/22/2017    Procedure: CHOLECYSTECTOMY LAPAROSCOPIC;  Surgeon: Jaqueline Guaman MD;  Location:  [facility];  Service:     COLONOSCOPY N/A 05/08/2017    Procedure: COLONOSCOPY  CPTCODE:51723;  Surgeon: Nicho Richey III, MD;  [facility];  Service:     CUBITAL TUNNEL RELEASE Left 09/01/2022    Procedure: CUBITAL TUNNEL RELEASE LEFT;  Surgeon: Alec Hough MD;  [facility];  Service: Orthopedics;  Laterality: Left;    ENDOSCOPY      ENDOSCOPY N/A 05/08/2017    Procedure: ESOPHAGOGASTRODUODENOSCOPY WITH BIOPSY  CPTCODE:63120;  Surgeon: Nicho Richey III, MD;  [facility];  Service:     ENDOSCOPY N/A 11/03/2017    Procedure: ESOPHAGOGASTRODUODENOSCOPY WITH BIOPSY  CPTCODE: 26705;  Surgeon: Nicho Richey III, MD;  [facility];  Service:     ENDOSCOPY N/A 02/20/2018    Procedure: ESOPHAGOGASTRODUODENOSCOPY WITH DILATATION CPT CODE: 39058;  Surgeon: Nicho Richey III, MD;  [facility];  Service:     ENDOSCOPY N/A 06/05/2018    Procedure: ESOPHAGOGASTRODUODENOSCOPY WITH BIOPSY CPT CODE: 31153;  Surgeon: Nicho Richey III, MD;  [facility];  Service: Gastroenterology    ENDOSCOPY N/A 05/26/2021    Procedure: ESOPHAGOGASTRODUODENOSCOPY WITH BIOPSY;  Surgeon: Julieta Hebert MD;  [facility];  Service: Gastroenterology;  Laterality: N/A;    ENDOSCOPY N/A 06/02/2021    Procedure: ESOPHAGOGASTRODUODENOSCOPY WITH BIOPSY;  Surgeon: Julieta Hebert MD;  [facility];  Service: Gastroenterology;  Laterality: N/A;    GASTRECTOMY N/A 09/17/2019    Procedure: OPEN VAGOTOMY, ANTRECTOMY,REVISION- Y GASTROJEJUNOSOTOMY;  Surgeon: Herbert Umanzor MD;  Location:  [facility];  Service: General    HERNIA REPAIR  12/10/2020    TRACHEOSTOMY N/A 01/17/2023    Procedure: TRACHEOSTOMY;  Surgeon: Felton De León MD;  [facility];  Service: General;  Laterality: N/A;    UPPER GASTROINTESTINAL ENDOSCOPY      VENTRAL/INCISIONAL HERNIA REPAIR N/A 12/10/2020    Procedure: INCISIONAL HERNIA REPAIR LAPAROSCOPIC WITH MESH;  Surgeon: Herbert Umanzor MD;  Location: [facility];  Service: General;  Laterality: N/A;       Problem List:  Patient Active Problem List   Diagnosis    Precordial chest pain    Weight loss, abnormal    Right upper quadrant abdominal pain    Epigastric pain    History of bleeding ulcers    Nausea    Malaise and fatigue    Acute gastric ulcer without hemorrhage or perforation    Poor appetite    Duodenal ulcer    Gastroesophageal reflux disease    Dysphagia    Iron deficiency anemia    Malabsorption    Gastric ulcer without hemorrhage or perforation    Dystonia    Peptic ulcer without hemorrhage or perforation    Gastric outlet obstruction    Incisional hernia, without obstruction or gangrene    Incisional hernia    Chest pain, atypical    Gastric bezoar    Essential hypertension    Cigarette smoker    Chronic back pain    Sinus tachycardia    Tobacco use    Mild carpal tunnel syndrome    Orthostatic hypotension    Palpitations    Abdominal swelling    Lower extremity edema    Pneumonia due to infectious organism, unspecified laterality, unspecified part of lung    Acute respiratory failure with hypoxia    Dizziness       Allergy:   Allergies   Allergen Reactions    Contrast Dye (Echo Or Unknown Ct/Mr) Other (See Comments)     Shortness of breath    Prilosec [Omeprazole] Other (See Comments)     Chest pain  Pt states he can take pepcid with no problem    Protonix [Pantoprazole Sodium] Palpitations     Patient states that protonix causes chest pain.  Shurthi Pérez, [facility]  4/23/2017  11:19 AM  Pt states he can tolerate pepcid with no problem      Ativan [Lorazepam] Hallucinations    Depakene [Valproic Acid] Hallucinations    Geodon [Ziprasidone Hcl] Mental Status Change    Libritabs [Chlordiazepoxide] Other (See Comments)      Tongue swell/split    Dilantin [Phenytoin] Delirium    Dilaudid [Hydromorphone] Hallucinations    Flagyl [Metronidazole] Nausea And Vomiting    Topamax [Topiramate] Anxiety        Current Medications:   Current Outpatient Medications   Medication Sig Dispense Refill    albuterol (PROVENTIL) (2.5 MG/3ML) 0.083% nebulizer solution Take 2.5 mg by nebulization 4 (Four) Times a Day.      baclofen (LIORESAL) 20 MG tablet       dexlansoprazole (DEXILANT) 60 MG capsule Take 1 capsule by mouth Every Morning Before Breakfast. 30 capsule 11    HYDROcodone-acetaminophen (NORCO) 5-325 MG per tablet       levETIRAcetam (KEPPRA) 500 MG tablet Take 1 tablet by mouth 2 (Two) Times a Day.      metoprolol tartrate (LOPRESSOR) 25 MG tablet Take 1 tablet by mouth 2 (Two) Times a Day.      QUEtiapine (SEROquel) 200 MG tablet TAKE ONE TABLET BY MOUTH ONCE NIGHTLY 30 tablet 2    QUEtiapine (SEROquel) 25 MG tablet Take 1 tablet by mouth Daily. 30 tablet 1    spironolactone (ALDACTONE) 25 MG tablet Take 1 tablet by mouth Daily.      venlafaxine XR (Effexor XR) 75 MG 24 hr capsule Take 1 capsule by mouth Daily. 30 capsule 2     No current facility-administered medications for this visit.       Review of Systems:    Review of Systems   Constitutional:  Positive for fatigue. Negative for activity change and appetite change.   Cardiovascular: Negative.    Musculoskeletal:  Positive for back pain and neck pain.   Neurological:  Positive for weakness. Negative for seizures.   Psychiatric/Behavioral:  Positive for sleep disturbance and stress. Negative for agitation, behavioral problems, decreased concentration, dysphoric mood, hallucinations, self-injury, suicidal ideas, negative for hyperactivity and depressed mood. The patient is nervous/anxious.          Physical Exam:   Physical Exam  Vitals reviewed.   HENT:      Head: Atraumatic.   Pulmonary:      Effort: Pulmonary effort is normal.   Musculoskeletal:      Cervical back: Normal range of  "motion.   Neurological:      General: No focal deficit present.      Mental Status: He is alert and oriented to person, place, and time. Mental status is at baseline.   Psychiatric:         Attention and Perception: Attention and perception normal.         Mood and Affect: Mood and affect normal. Mood is not anxious.         Speech: Speech normal.         Behavior: Behavior normal. Behavior is cooperative.         Thought Content: Thought content normal. Thought content is not paranoid or delusional. Thought content does not include homicidal or suicidal ideation.         Cognition and Memory: Cognition and memory normal.         Judgment: Judgment normal.         Vitals:  Blood pressure 110/72, pulse 82, height 165.1 cm (65\"), weight 73.5 kg (162 lb), SpO2 96%.   Body mass index is 26.96 kg/m².    Last 3 Blood Pressure Readings:  BP Readings from Last 3 Encounters:   07/05/24 110/72   06/07/24 112/73   03/08/24 129/81       Mental Status Exam:   Hygiene:   good  Cooperation:  Cooperative  Eye Contact:  Good  Psychomotor Behavior:  Appropriate  Affect:  Restricted  Mood: normal  Hopelessness: Denies  Speech:  Normal  Thought Process:  Goal directed  Thought Content:  Normal  Suicidal:  None  Homicidal:  None  Hallucinations:  Auditory  Delusion:  None  Memory:  Deficits  Orientation:  Person, Place, Time and Situation  Reliability:  good  Insight:  Good  Judgement:  Good  Impulse Control:  Good  Physical/Medical Issues:  Yes See PMH        Lab Results:   No visits with results within 3 Month(s) from this visit.   Latest known visit with results is:   Lab on 03/22/2024   Component Date Value Ref Range Status    Glucose 03/22/2024 102 (H)  65 - 99 mg/dL Final    BUN 03/22/2024 15  6 - 20 mg/dL Final    Creatinine 03/22/2024 0.98  0.76 - 1.27 mg/dL Final    Sodium 03/22/2024 139  136 - 145 mmol/L Final    Potassium 03/22/2024 4.9  3.5 - 5.2 mmol/L Final    Chloride 03/22/2024 104  98 - 107 mmol/L Final    CO2 " 03/22/2024 25.9  22.0 - 29.0 mmol/L Final    Calcium 03/22/2024 9.3  8.6 - 10.5 mg/dL Final    Total Protein 03/22/2024 6.9  6.0 - 8.5 g/dL Final    Albumin 03/22/2024 4.4  3.5 - 5.2 g/dL Final    ALT (SGPT) 03/22/2024 24  1 - 41 U/L Final    AST (SGOT) 03/22/2024 29  1 - 40 U/L Final    Alkaline Phosphatase 03/22/2024 134 (H)  39 - 117 U/L Final    Total Bilirubin 03/22/2024 0.3  0.0 - 1.2 mg/dL Final    Globulin 03/22/2024 2.5  gm/dL Final    A/G Ratio 03/22/2024 1.8  g/dL Final    BUN/Creatinine Ratio 03/22/2024 15.3  7.0 - 25.0 Final    Anion Gap 03/22/2024 9.1  5.0 - 15.0 mmol/L Final    eGFR 03/22/2024 91.1  >60.0 mL/min/1.73 Final         Assessment & Plan   Problems Addressed this Visit    None  Visit Diagnoses       Anxiety disorder, unspecified type    -  Primary    Relevant Medications    venlafaxine XR (Effexor XR) 75 MG 24 hr capsule    Insomnia, unspecified type              Diagnoses         Codes Comments    Anxiety disorder, unspecified type    -  Primary ICD-10-CM: F41.9  ICD-9-CM: 300.00     Insomnia, unspecified type     ICD-10-CM: G47.00  ICD-9-CM: 780.52             Visit Diagnoses:    ICD-10-CM ICD-9-CM   1. Anxiety disorder, unspecified type  F41.9 300.00   2. Insomnia, unspecified type  G47.00 780.52                 GOALS:  Short Term Goals: Patient will be compliant with medication, and patient will have no significant medication related side effects.  Patient will be engaged in psychotherapy as indicated.  Patient will report subjective improvement of symptoms.  Long term goals: To stabilize mood and treat/improve subjective symptoms, the patient will stay out of the hospital, the patient will be at an optimal level of functioning, and the patient will take all medications as prescribed.  The patient/guardian verbalized understanding and agreement with goals that were mutually set.      TREATMENT PLAN: Continue supportive psychotherapy efforts and medications as indicated.  Pharmacological  and Non-Pharmacological treatment options discussed during today's visit. Patient/Guardian acknowledged and verbally consented with current treatment plan and was educated on the importance of compliance with treatment and follow-up appointments.      MEDICATION ISSUES:  Discussed medication options and treatment plan of prescribed medication as well as the risks, benefits, any black box warnings, and side effects including potential falls, possible impaired driving, and metabolic adversities among others. Patient is agreeable to call the office with any worsening of symptoms or onset of side effects, or if any concerns or questions arise.  The contact information for the office is made available to the patient. Patient is agreeable to call 911 or go to the nearest ER should they begin having any SI/HI, or if any urgent concerns arise. No medication side effects or related complaints today.     MEDS ORDERED DURING VISIT:  New Medications Ordered This Visit   Medications    venlafaxine XR (Effexor XR) 75 MG 24 hr capsule     Sig: Take 1 capsule by mouth Daily.     Dispense:  30 capsule     Refill:  2     Plan:  - Continue Seroquel to 225 mg p.o. at night for paranoid thoughts and sleep disturbance/anxiety.   - Continue Effexor 75 mg by mouth daily for anxiety.  - Follow-up in 8 weeks.  -Patient verbalizes understanding to go to nearest ED if SI/HI develop.    Follow Up Appointment:   Return in about 2 months (around 9/5/2024) for Recheck.             This document has been electronically signed by EILEEN Lovell  July 5, 2024 08:50 EDT    Dictated Utilizing Dragon Dictation: Part of this note may be an electronic transcription/translation of spoken language to printed text using the Dragon Dictation System.

## 2024-08-02 ENCOUNTER — TELEPHONE (OUTPATIENT)
Dept: NEUROSURGERY | Facility: CLINIC | Age: 54
End: 2024-08-02
Payer: MEDICARE

## 2024-08-02 NOTE — TELEPHONE ENCOUNTER
Caller: Lena Edward    Relationship to patient: Emergency Contact    Best call back number: [phone]    Chief complaint: WOULD LIKE TO ASK ABOUT TELEHEALTH    Type of visit: FOLLOW UP    Requested date: 08/15/24     If rescheduling, when is the original appointment: NA     Additional notes:PATIENTS WIFE CALLED AND IS WANTING TO SEE IF THEY HAVE THE OPTION TO DO A TELEHEALTH VISIT-THEY ARE WANTING TO KEEP THE 08/15/24 APPT. PATIENTS WIFE ADVISED IT IS HARD FOR PATIENT TO TRAVEL SOMETIMES-THEY ARE ASKING FOR A CALL BACK TO SEE IF THEY HAVE THIS OPTION-THANK YOU

## 2024-08-05 RX ORDER — QUETIAPINE FUMARATE 25 MG/1
25 TABLET, FILM COATED ORAL DAILY
Qty: 30 TABLET | Refills: 1 | Status: SHIPPED | OUTPATIENT
Start: 2024-08-05

## 2024-08-07 ENCOUNTER — OFFICE VISIT (OUTPATIENT)
Dept: CARDIOLOGY | Facility: CLINIC | Age: 54
End: 2024-08-07
Payer: MEDICARE

## 2024-08-07 VITALS
WEIGHT: 163 LBS | BODY MASS INDEX: 27.16 KG/M2 | HEIGHT: 65 IN | DIASTOLIC BLOOD PRESSURE: 66 MMHG | HEART RATE: 78 BPM | SYSTOLIC BLOOD PRESSURE: 95 MMHG | OXYGEN SATURATION: 96 % | RESPIRATION RATE: 18 BRPM

## 2024-08-07 DIAGNOSIS — I10 ESSENTIAL HYPERTENSION: Primary | ICD-10-CM

## 2024-08-07 DIAGNOSIS — Z72.0 TOBACCO USE: ICD-10-CM

## 2024-08-07 DIAGNOSIS — I95.1 ORTHOSTATIC HYPOTENSION: ICD-10-CM

## 2024-08-07 DIAGNOSIS — R42 DIZZINESS: ICD-10-CM

## 2024-08-07 PROCEDURE — 1160F RVW MEDS BY RX/DR IN RCRD: CPT | Performed by: NURSE PRACTITIONER

## 2024-08-07 PROCEDURE — 3074F SYST BP LT 130 MM HG: CPT | Performed by: NURSE PRACTITIONER

## 2024-08-07 PROCEDURE — 99213 OFFICE O/P EST LOW 20 MIN: CPT | Performed by: NURSE PRACTITIONER

## 2024-08-07 PROCEDURE — 3078F DIAST BP <80 MM HG: CPT | Performed by: NURSE PRACTITIONER

## 2024-08-07 PROCEDURE — 1159F MED LIST DOCD IN RCRD: CPT | Performed by: NURSE PRACTITIONER

## 2024-08-07 NOTE — PROGRESS NOTES
[facility]             [facility] EILEEN Michele Theresa, APRN  Jamison ELLIS Cheyanne  1970  08/07/2024    Patient Active Problem List   Diagnosis    Precordial chest pain    Weight loss, abnormal    Right upper quadrant abdominal pain    Epigastric pain    History of bleeding ulcers    Nausea    Malaise and fatigue    Acute gastric ulcer without hemorrhage or perforation    Poor appetite    Duodenal ulcer    Gastroesophageal reflux disease    Dysphagia    Iron deficiency anemia    Malabsorption    Gastric ulcer without hemorrhage or perforation    Dystonia    Peptic ulcer without hemorrhage or perforation    Gastric outlet obstruction    Incisional hernia, without obstruction or gangrene    Incisional hernia    Chest pain, atypical    Gastric bezoar    Essential hypertension    Cigarette smoker    Chronic back pain    Sinus tachycardia    Tobacco use    Mild carpal tunnel syndrome    Orthostatic hypotension    Palpitations    Abdominal swelling    Lower extremity edema    Pneumonia due to infectious organism, unspecified laterality, unspecified part of lung    Acute respiratory failure with hypoxia    Dizziness       Dear Leticia Martinez APRN:    Subjective     Chief Complaint   Patient presents with    Essential hypertension     6 month fu       HPI:     This is a 53 y.o. male with known past medical history of orthostatic hypotension, tobacco abuse, alcohol abuse and sinus tachycardia.       Jamison CALEB Edward presents today for routine cardiology follow up.  Patient states he has been doing overall well since his last visit.  Does have some intermittent dizziness particularly goes from sitting to standing.  Denies any syncope, shortness of breath or chest pain.  Blood pressure mildly low in the office today.  Recent lab work shows stable kidney function electrolytes.  Continues to smoke    Diagnostic Testing  Nuclear stress test 5/2017: No evidence of  ischemia  Echocardiogram 9/2021: Ef 61-65%  Nuclear stress test 9/2021: No evidence of ischemia  Cardiac event monitor 10/2022: Normal sinus rhythm with episodes of sinus tachycardia and sinus bradycardia with no arrhythmias noted  Echocardiogram 12/2022: EF greater than 70%     All other systems were reviewed and were negative.    Patient Active Problem List   Diagnosis    Precordial chest pain    Weight loss, abnormal    Right upper quadrant abdominal pain    Epigastric pain    History of bleeding ulcers    Nausea    Malaise and fatigue    Acute gastric ulcer without hemorrhage or perforation    Poor appetite    Duodenal ulcer    Gastroesophageal reflux disease    Dysphagia    Iron deficiency anemia    Malabsorption    Gastric ulcer without hemorrhage or perforation    Dystonia    Peptic ulcer without hemorrhage or perforation    Gastric outlet obstruction    Incisional hernia, without obstruction or gangrene    Incisional hernia    Chest pain, atypical    Gastric bezoar    Essential hypertension    Cigarette smoker    Chronic back pain    Sinus tachycardia    Tobacco use    Mild carpal tunnel syndrome    Orthostatic hypotension    Palpitations    Abdominal swelling    Lower extremity edema    Pneumonia due to infectious organism, unspecified laterality, unspecified part of lung    Acute respiratory failure with hypoxia    Dizziness       family history includes Diabetes in his sister; Drug abuse in his brother; Hypertension in his father; No Known Problems in his brother, daughter, and sister; Osteoporosis in his father and mother; Stroke in his paternal grandfather.     reports that he has been smoking cigarettes. He has a 35 pack-year smoking history. He has been exposed to tobacco smoke. He has never used smokeless tobacco. He reports that he does not currently use alcohol. He reports that he does not use drugs.    Allergies   Allergen Reactions    Contrast Dye (Echo Or Unknown Ct/Mr) Other (See Comments)      Shortness of breath    Prilosec [Omeprazole] Other (See Comments)     Chest pain  Pt states he can take pepcid with no problem    Protonix [Pantoprazole Sodium] Palpitations     Patient states that protonix causes chest pain.  Shruthi Pérez, [facility]  4/23/2017  11:19 AM  Pt states he can tolerate pepcid with no problem      Ativan [Lorazepam] Hallucinations    Depakene [Valproic Acid] Hallucinations    Geodon [Ziprasidone Hcl] Mental Status Change    Libritabs [Chlordiazepoxide] Other (See Comments)     Tongue swell/split    Dilantin [Phenytoin] Delirium    Dilaudid [Hydromorphone] Hallucinations    Flagyl [Metronidazole] Nausea And Vomiting    Topamax [Topiramate] Anxiety         Current Outpatient Medications:     albuterol (PROVENTIL) (2.5 MG/3ML) 0.083% nebulizer solution, Take 2.5 mg by nebulization 4 (Four) Times a Day., Disp: , Rfl:     baclofen (LIORESAL) 20 MG tablet, , Disp: , Rfl:     dexlansoprazole (DEXILANT) 60 MG capsule, Take 1 capsule by mouth Every Morning Before Breakfast., Disp: 30 capsule, Rfl: 11    HYDROcodone-acetaminophen (NORCO) 5-325 MG per tablet, , Disp: , Rfl:     levETIRAcetam (KEPPRA) 500 MG tablet, Take 1 tablet by mouth 2 (Two) Times a Day., Disp: , Rfl:     metoprolol tartrate (LOPRESSOR) 25 MG tablet, Take 1 tablet by mouth 2 (Two) Times a Day., Disp: , Rfl:     QUEtiapine (SEROquel) 200 MG tablet, TAKE ONE TABLET BY MOUTH ONCE NIGHTLY, Disp: 30 tablet, Rfl: 2    QUEtiapine (SEROquel) 25 MG tablet, TAKE 1 TABLET BY MOUTH DAILY, Disp: 30 tablet, Rfl: 1    spironolactone (ALDACTONE) 25 MG tablet, Take 1 tablet by mouth Daily., Disp: , Rfl:     venlafaxine XR (Effexor XR) 75 MG 24 hr capsule, Take 1 capsule by mouth Daily., Disp: 30 capsule, Rfl: 2      Physical Exam:  I have reviewed the patient's current vital signs as documented in the patient's EMR.   Vitals:    08/07/24 0929   BP: 95/66   Pulse: 78   Resp: 18   SpO2: 96%     Body mass index is 27.12 kg/m².       08/07/24  0929    Weight: 73.9 kg (163 lb)      Physical Exam  Constitutional:       General: He is not in acute distress.     Appearance: Normal appearance. He is well-developed.   HENT:      Head: Normocephalic and atraumatic.      Mouth/Throat:      Mouth: Mucous membranes are moist.   Eyes:      Extraocular Movements: Extraocular movements intact.      Pupils: Pupils are equal, round, and reactive to light.   Neck:      Vascular: No JVD.   Cardiovascular:      Rate and Rhythm: Normal rate and regular rhythm.      Heart sounds: Normal heart sounds. No murmur heard.     No S3 or S4 sounds.   Pulmonary:      Effort: Pulmonary effort is normal. No respiratory distress.      Breath sounds: Normal breath sounds. No wheezing.   Abdominal:      General: Bowel sounds are normal. There is no distension.      Palpations: Abdomen is soft. There is no hepatomegaly.      Tenderness: There is no abdominal tenderness.   Musculoskeletal:         General: Normal range of motion.      Cervical back: Normal range of motion and neck supple.   Skin:     General: Skin is warm and dry.      Coloration: Skin is not jaundiced or pale.   Neurological:      General: No focal deficit present.      Mental Status: He is alert and oriented to person, place, and time. Mental status is at baseline.   Psychiatric:         Mood and Affect: Mood normal.         Behavior: Behavior normal.         Thought Content: Thought content normal.         Judgment: Judgment normal.            DATA REVIEWED:     TTE/ANIYA:  Results for orders placed during the hospital encounter of 12/20/22    Adult Transthoracic Echo Complete W/ Cont if Necessary Per Protocol    Interpretation Summary    Left ventricular systolic function is hyperdynamic (EF > 70%). Left ventricular ejection fraction appears to be greater than 70%.    Left ventricular diastolic function is consistent with (grade I) impaired relaxation.    Estimated right ventricular systolic pressure from tricuspid  regurgitation is mildly elevated (35-45 mmHg).    No significant pericardial disease.      Laboratory evaluations:    Lab Results   Component Value Date    GLUCOSE 102 (H) 03/22/2024    BUN 15 03/22/2024    CREATININE 0.98 03/22/2024    EGFRIFNONA 106 11/29/2021    BCR 15.3 03/22/2024    K 4.9 03/22/2024    CO2 25.9 03/22/2024    CALCIUM 9.3 03/22/2024    ALBUMIN 4.4 03/22/2024    AST 29 03/22/2024    ALT 24 03/22/2024     Lab Results   Component Value Date    WBC 9.75 10/10/2023    HGB 15.2 10/10/2023    HCT 45.3 10/10/2023    MCV 90.4 10/10/2023     10/10/2023     Lab Results   Component Value Date    CHOL 148 08/02/2023    TRIG 152 (H) 10/10/2023    HDL 42 08/02/2023    LDL 85 08/02/2023     Lab Results   Component Value Date    TSH 1.740 08/02/2023     Lab Results   Component Value Date    HGBA1C 5.40 08/02/2023     Lab Results   Component Value Date    ALT 24 03/22/2024     Lab Results   Component Value Date    HGBA1C 5.40 08/02/2023    HGBA1C 5.10 12/08/2020    HGBA1C 5.10 09/12/2019     Lab Results   Component Value Date    CREATININE 0.98 03/22/2024     Lab Results   Component Value Date    IRON 49 (L) 12/21/2022    TIBC 70 (L) 12/21/2022    FERRITIN 1,305.00 (H) 12/23/2022     Lab Results   Component Value Date    INR 1.43 (H) 12/30/2022    INR 1.33 (H) 12/28/2022    INR 1.44 (H) 12/24/2022    PROTIME 17.8 (H) 12/30/2022    PROTIME 16.8 (H) 12/28/2022    PROTIME 17.9 (H) 12/24/2022           --------------------------------------------------------------------------------------------------------------------------    ASSESSMENT/PLAN:      Diagnosis Plan   1. Essential hypertension        2. Orthostatic hypotension        3. Tobacco use            Essential hypertension  Dizziness  Blood pressure mildly low in the office today.  Reports intermittent dizziness.  We did discuss about adequate hydration and taking his time when he has position changes.  We will decrease spironolactone to 12.5 mg daily to  help with hypotension.  Recent lab work shows stable kidney function.    Tobacco abuse  Continues to smoke.  Discussed patient with the importance of smoking cessation      This document has been @Electronically signed by EILEEN Coronado, 08/07/24, 8:45 AM EDT.       Dictated Utilizing Dragon Dictation: Part of this note may be an electronic transcription/translation of spoken language to printed text using the Dragon Dictation System.    Follow-up appointment and medication changes provided in hand delivered After Visit Summary as well as reviewed in the room.

## 2024-08-07 NOTE — TELEPHONE ENCOUNTER
Spoke to patient's spouse to advise provider would like to see patient in person; cannot change appt to telehealth visit at this time. She asked to move patient's appointment out to October 2024, when weather will be cooler, to make it easier on patient, as he has severe COPD.     Appointment moved. Advised her to call prior to appointment if they have any questions/concerns.

## 2024-08-30 RX ORDER — QUETIAPINE FUMARATE 200 MG/1
200 TABLET, FILM COATED ORAL NIGHTLY
Qty: 30 TABLET | Refills: 2 | Status: SHIPPED | OUTPATIENT
Start: 2024-08-30

## 2024-09-06 ENCOUNTER — OFFICE VISIT (OUTPATIENT)
Dept: PSYCHIATRY | Facility: CLINIC | Age: 54
End: 2024-09-06
Payer: MEDICARE

## 2024-09-06 VITALS
HEIGHT: 65 IN | WEIGHT: 154.6 LBS | SYSTOLIC BLOOD PRESSURE: 104 MMHG | BODY MASS INDEX: 25.76 KG/M2 | HEART RATE: 72 BPM | DIASTOLIC BLOOD PRESSURE: 75 MMHG

## 2024-09-06 DIAGNOSIS — Z72.0 TOBACCO USE: ICD-10-CM

## 2024-09-06 DIAGNOSIS — G47.00 INSOMNIA, UNSPECIFIED TYPE: ICD-10-CM

## 2024-09-06 DIAGNOSIS — F41.9 ANXIETY DISORDER, UNSPECIFIED TYPE: Primary | ICD-10-CM

## 2024-09-06 RX ORDER — VENLAFAXINE 100 MG/1
100 TABLET ORAL 2 TIMES DAILY
Qty: 30 TABLET | Refills: 4 | Status: SHIPPED | OUTPATIENT
Start: 2024-09-06

## 2024-09-06 RX ORDER — QUETIAPINE FUMARATE 200 MG/1
200 TABLET, FILM COATED ORAL NIGHTLY
Qty: 30 TABLET | Refills: 4 | Status: SHIPPED | OUTPATIENT
Start: 2024-09-06

## 2024-09-06 RX ORDER — LEVETIRACETAM 750 MG/1
750 TABLET ORAL 2 TIMES DAILY
COMMUNITY
Start: 2024-08-19

## 2024-09-06 RX ORDER — QUETIAPINE FUMARATE 25 MG/1
25 TABLET, FILM COATED ORAL DAILY
Qty: 30 TABLET | Refills: 4 | Status: SHIPPED | OUTPATIENT
Start: 2024-09-06

## 2024-09-06 RX ORDER — VENLAFAXINE HYDROCHLORIDE 150 MG/1
150 CAPSULE, EXTENDED RELEASE ORAL DAILY
COMMUNITY
Start: 2024-08-19 | End: 2024-09-06

## 2024-09-06 NOTE — PROGRESS NOTES
Subjective     Jamison Edward is a 53 y.o. male who presents today for follow up    Chief Complaint: Anxiety       History of Present Illness:    History of Present Illness  Jamison is a 53-year-old male who presents today for a follow-up visit.  His wife is present and provides collateral information.  He tells me that Dr. Hamilton had increased his Effexor and efforts to help control his pain that he feels like it wears off by the morning when he takes it at night.  He feels more anxious during the morning time.  He tells me that surgery is not an option for his back and he is unable to have injections due to being allergic to contrast dye.  He is still getting frustrated with the back pain.  He denies having any issues with depression.  He tells me that sleep is somewhat difficult due to anxiety and back pain.  His appetite has been fair.  He has lost some weight since his last visit.  No hallucinations.  No paranoia.  No SI/HI.  Overall, he has been doing well.        The following portions of the patient's history were reviewed and updated as appropriate: allergies, current medications, past family history, past medical history, past social history, past surgical history and problem list.    Past Psychiatric History:  Began Treatment:This year  Diagnoses: Delirium  Psychiatrist: Patient has never seen a psychiatric provider prior to consultations while admitted to the hospital.  Therapist:Denies  Admission History:Denies  Medication Trials: Xanax, Ativan, Depakote, Seroquel, Geodon, Effexor, lexapro  Self Harm: Denies  Suicide Attempts:Denies      Past Medical History:  Past Medical History:   Diagnosis Date    Alcohol abuse     quit nov 12 2022    Alcoholism     Anemia     Anxiety whwhen put in continued care    Arthritis of back     not sure    Johnson esophagus     Brain concussion     Cholelithiasis     Chronic pain disorder have degenerative disc disease    Cirrhosis     COPD (chronic obstructive  pulmonary disease)     CTS (carpal tunnel syndrome)     DDD (degenerative disc disease), thoracic     Degenerative disc disease, lumbar     Dystonia     GERD (gastroesophageal reflux disease)     GI (gastrointestinal bleed)     Hypertension     Irritable bowel syndrome     Liver disease     Low back pain     Low back strain     Lumbosacral disc disease     Migraines     MRSA (methicillin resistant Staphylococcus aureus)     Peptic ulcer disease     Stroke     TIA    TIA (transient ischemic attack)     Ulcerative colitis     Wears dentures     upper only       Substance Abuse History:   Types: alcohol, THC when he was a teenager  Withdrawal Symptoms: Patient reports that they believed some of his delirium while admitted to the hospital from December to March was due to alcohol withdrawal.  Patient states that he stopped drinking alcohol November 12, 2022.  Longest Period Sober:Patient states that he has been sober since November  AA: No    Social History:  Social History     Socioeconomic History    Marital status:    Tobacco Use    Smoking status: Every Day     Current packs/day: 1.00     Average packs/day: 1 pack/day for 35.0 years (35.0 ttl pk-yrs)     Types: Cigarettes     Passive exposure: Current    Smokeless tobacco: Never    Tobacco comments:     Been smoking 20 years   Vaping Use    Vaping status: Never Used   Substance and Sexual Activity    Alcohol use: Not Currently     Comment: Quit drinking November 12,2022    Drug use: No    Sexual activity: Yes     Partners: Female     Birth control/protection: None       Family History:  Family History   Problem Relation Age of Onset    Osteoporosis Mother     Hypertension Father     Osteoporosis Father     No Known Problems Sister     No Known Problems Brother     Drug abuse Brother     No Known Problems Daughter     Diabetes Sister     Stroke Paternal Grandfather        Past Surgical History:  Past Surgical History:   Procedure Laterality Date    ABDOMINAL  SURGERY  09/17/2021    APPENDECTOMY      CARPAL TUNNEL RELEASE  2005    CHOLECYSTECTOMY N/A 04/22/2017    Procedure: CHOLECYSTECTOMY LAPAROSCOPIC;  Surgeon: Jaqueline Guaman MD;  Location:  [facility];  Service:     COLONOSCOPY N/A 05/08/2017    Procedure: COLONOSCOPY  CPTCODE:01155;  Surgeon: Nicho Richey III, MD;  Location:  [facility];  Service:     CUBITAL TUNNEL RELEASE Left 09/01/2022    Procedure: CUBITAL TUNNEL RELEASE LEFT;  Surgeon: Alec Hough MD;  [facility];  Service: Orthopedics;  Laterality: Left;    ENDOSCOPY      ENDOSCOPY N/A 05/08/2017    Procedure: ESOPHAGOGASTRODUODENOSCOPY WITH BIOPSY  CPTCODE:77572;  Surgeon: Nicho Richey III, MD;  [facility];  Service:     ENDOSCOPY N/A 11/03/2017    Procedure: ESOPHAGOGASTRODUODENOSCOPY WITH BIOPSY  CPTCODE: 01528;  Surgeon: Nicho Richey III, MD;  [facility];  Service:     ENDOSCOPY N/A 02/20/2018    Procedure: ESOPHAGOGASTRODUODENOSCOPY WITH DILATATION CPT CODE: 24372;  Surgeon: Nicho Richey III, MD;  [facility];  Service:     ENDOSCOPY N/A 06/05/2018    Procedure: ESOPHAGOGASTRODUODENOSCOPY WITH BIOPSY CPT CODE: 08651;  Surgeon: Nicho Richey III, MD;  [facility];  Service: Gastroenterology    ENDOSCOPY N/A 05/26/2021    Procedure: ESOPHAGOGASTRODUODENOSCOPY WITH BIOPSY;  Surgeon: Julieta Hebert MD;  [facility];  Service: Gastroenterology;  Laterality: N/A;    ENDOSCOPY N/A 06/02/2021    Procedure: ESOPHAGOGASTRODUODENOSCOPY WITH BIOPSY;  Surgeon: Julieta Hebert MD;  [facility];  Service: Gastroenterology;  Laterality: N/A;    GASTRECTOMY N/A 09/17/2019    Procedure: OPEN VAGOTOMY, ANTRECTOMY,REVISION- Y GASTROJEJUNOSOTOMY;  Surgeon: Herbert Umanzor MD;  Location:  [facility];  Service: General    HERNIA REPAIR  12/10/2020    TRACHEOSTOMY N/A 01/17/2023    Procedure: TRACHEOSTOMY;  Surgeon: Felton De León MD;  [facility];  Service: General;  Laterality: N/A;    UPPER GASTROINTESTINAL ENDOSCOPY      VENTRAL/INCISIONAL HERNIA REPAIR N/A 12/10/2020    Procedure: INCISIONAL HERNIA REPAIR LAPAROSCOPIC WITH MESH;  Surgeon: Herbert Umanzor MD;  Location:  [facility];  Service: General;  Laterality: N/A;       Problem List:  Patient Active Problem List   Diagnosis    Precordial chest pain    Weight loss, abnormal    Right upper quadrant abdominal pain    Epigastric pain    History of bleeding ulcers    Nausea    Malaise and fatigue    Acute gastric ulcer without hemorrhage or perforation    Poor appetite    Duodenal ulcer    Gastroesophageal reflux disease    Dysphagia    Iron deficiency anemia    Malabsorption    Gastric ulcer without hemorrhage or perforation    Dystonia    Peptic ulcer without hemorrhage or perforation    Gastric outlet obstruction    Incisional hernia, without obstruction or gangrene    Incisional hernia    Chest pain, atypical    Gastric bezoar    Essential hypertension    Cigarette smoker    Chronic back pain    Sinus tachycardia    Tobacco use    Mild carpal tunnel syndrome    Orthostatic hypotension    Palpitations    Abdominal swelling    Lower extremity edema    Pneumonia due to infectious organism, unspecified laterality, unspecified part of lung    Acute respiratory failure with hypoxia    Dizziness       Allergy:   Allergies   Allergen Reactions    Contrast Dye (Echo Or Unknown Ct/Mr) Other (See Comments)     Shortness of breath    Prilosec [Omeprazole] Other (See Comments)     Chest pain  Pt states he can take pepcid with no problem    Protonix [Pantoprazole Sodium] Palpitations     Patient states that protonix causes chest pain.  Shruthi Pérez, [facility]  4/23/2017  11:19 AM  Pt states he can tolerate pepcid with no problem      Ativan [Lorazepam] Hallucinations    Depakene [Valproic Acid] Hallucinations    Geodon [Ziprasidone Hcl] Mental Status Change    Libritabs [Chlordiazepoxide] Other (See  Comments)     Tongue swell/split    Dilantin [Phenytoin] Delirium    Dilaudid [Hydromorphone] Hallucinations    Flagyl [Metronidazole] Nausea And Vomiting    Topamax [Topiramate] Anxiety        Current Medications:   Current Outpatient Medications   Medication Sig Dispense Refill    albuterol (PROVENTIL) (2.5 MG/3ML) 0.083% nebulizer solution Take 2.5 mg by nebulization 4 (Four) Times a Day.      baclofen (LIORESAL) 20 MG tablet       dexlansoprazole (DEXILANT) 60 MG capsule Take 1 capsule by mouth Every Morning Before Breakfast. 30 capsule 11    HYDROcodone-acetaminophen (NORCO) 5-325 MG per tablet       levETIRAcetam (KEPPRA) 500 MG tablet Take 1 tablet by mouth 2 (Two) Times a Day.      levETIRAcetam (KEPPRA) 750 MG tablet Take 1 tablet by mouth 2 (Two) Times a Day.      metoprolol tartrate (LOPRESSOR) 25 MG tablet Take 1 tablet by mouth 2 (Two) Times a Day.      QUEtiapine (SEROquel) 200 MG tablet Take 1 tablet by mouth Every Night. 30 tablet 4    QUEtiapine (SEROquel) 25 MG tablet Take 1 tablet by mouth Daily. 30 tablet 4    spironolactone (ALDACTONE) 25 MG tablet Take 0.5 tablets by mouth Daily.      venlafaxine (EFFEXOR) 100 MG tablet Take 1 tablet by mouth 2 (Two) Times a Day. 30 tablet 4     No current facility-administered medications for this visit.       Review of Systems:    Review of Systems   Constitutional:  Positive for fatigue. Negative for activity change and appetite change.   Cardiovascular: Negative.    Musculoskeletal:  Positive for back pain and neck pain.   Neurological:  Positive for weakness. Negative for seizures.   Psychiatric/Behavioral:  Positive for sleep disturbance and stress. Negative for agitation, behavioral problems, decreased concentration, dysphoric mood, hallucinations, self-injury, suicidal ideas, negative for hyperactivity and depressed mood. The patient is nervous/anxious.          Physical Exam:   Physical Exam  Vitals reviewed.   HENT:      Head: Atraumatic.   Pulmonary:  "     Effort: Pulmonary effort is normal.   Musculoskeletal:      Cervical back: Normal range of motion.   Neurological:      General: No focal deficit present.      Mental Status: He is alert and oriented to person, place, and time. Mental status is at baseline.   Psychiatric:         Attention and Perception: Attention and perception normal.         Mood and Affect: Mood and affect normal. Mood is not anxious.         Speech: Speech normal.         Behavior: Behavior normal. Behavior is cooperative.         Thought Content: Thought content normal. Thought content is not paranoid or delusional. Thought content does not include homicidal or suicidal ideation.         Cognition and Memory: Cognition and memory normal.         Judgment: Judgment normal.         Vitals:  Blood pressure 104/75, pulse 72, height 165.1 cm (65\"), weight 70.1 kg (154 lb 9.6 oz).   Body mass index is 25.73 kg/m².    Last 3 Blood Pressure Readings:  BP Readings from Last 3 Encounters:   09/06/24 104/75   08/07/24 95/66   07/05/24 110/72       Mental Status Exam:   Hygiene:   good  Cooperation:  Cooperative  Eye Contact:  Good  Psychomotor Behavior:  Appropriate  Affect:  Restricted  Mood: normal  Hopelessness: Denies  Speech:  Normal  Thought Process:  Goal directed  Thought Content:  Normal  Suicidal:  None  Homicidal:  None  Hallucinations:  Auditory  Delusion:  None  Memory:  Deficits  Orientation:  Person, Place, Time and Situation  Reliability:  good  Insight:  Good  Judgement:  Good  Impulse Control:  Good  Physical/Medical Issues:  Yes See PMH        Lab Results:   No visits with results within 3 Month(s) from this visit.   Latest known visit with results is:   Lab on 03/22/2024   Component Date Value Ref Range Status    Glucose 03/22/2024 102 (H)  65 - 99 mg/dL Final    BUN 03/22/2024 15  6 - 20 mg/dL Final    Creatinine 03/22/2024 0.98  0.76 - 1.27 mg/dL Final    Sodium 03/22/2024 139  136 - 145 mmol/L Final    Potassium 03/22/2024 " 4.9  3.5 - 5.2 mmol/L Final    Chloride 03/22/2024 104  98 - 107 mmol/L Final    CO2 03/22/2024 25.9  22.0 - 29.0 mmol/L Final    Calcium 03/22/2024 9.3  8.6 - 10.5 mg/dL Final    Total Protein 03/22/2024 6.9  6.0 - 8.5 g/dL Final    Albumin 03/22/2024 4.4  3.5 - 5.2 g/dL Final    ALT (SGPT) 03/22/2024 24  1 - 41 U/L Final    AST (SGOT) 03/22/2024 29  1 - 40 U/L Final    Alkaline Phosphatase 03/22/2024 134 (H)  39 - 117 U/L Final    Total Bilirubin 03/22/2024 0.3  0.0 - 1.2 mg/dL Final    Globulin 03/22/2024 2.5  gm/dL Final    A/G Ratio 03/22/2024 1.8  g/dL Final    BUN/Creatinine Ratio 03/22/2024 15.3  7.0 - 25.0 Final    Anion Gap 03/22/2024 9.1  5.0 - 15.0 mmol/L Final    eGFR 03/22/2024 91.1  >60.0 mL/min/1.73 Final         Assessment & Plan   Problems Addressed this Visit    None  Visit Diagnoses       Anxiety disorder, unspecified type    -  Primary    Relevant Medications    venlafaxine (EFFEXOR) 100 MG tablet    QUEtiapine (SEROquel) 200 MG tablet    QUEtiapine (SEROquel) 25 MG tablet    Insomnia, unspecified type              Diagnoses         Codes Comments    Anxiety disorder, unspecified type    -  Primary ICD-10-CM: F41.9  ICD-9-CM: 300.00     Insomnia, unspecified type     ICD-10-CM: G47.00  ICD-9-CM: 780.52             Visit Diagnoses:    ICD-10-CM ICD-9-CM   1. Anxiety disorder, unspecified type  F41.9 300.00   2. Insomnia, unspecified type  G47.00 780.52                   GOALS:  Short Term Goals: Patient will be compliant with medication, and patient will have no significant medication related side effects.  Patient will be engaged in psychotherapy as indicated.  Patient will report subjective improvement of symptoms.  Long term goals: To stabilize mood and treat/improve subjective symptoms, the patient will stay out of the hospital, the patient will be at an optimal level of functioning, and the patient will take all medications as prescribed.  The patient/guardian verbalized understanding and  agreement with goals that were mutually set.      TREATMENT PLAN: Continue supportive psychotherapy efforts and medications as indicated.  Pharmacological and Non-Pharmacological treatment options discussed during today's visit. Patient/Guardian acknowledged and verbally consented with current treatment plan and was educated on the importance of compliance with treatment and follow-up appointments.      MEDICATION ISSUES:  Discussed medication options and treatment plan of prescribed medication as well as the risks, benefits, any black box warnings, and side effects including potential falls, possible impaired driving, and metabolic adversities among others. Patient is agreeable to call the office with any worsening of symptoms or onset of side effects, or if any concerns or questions arise.  The contact information for the office is made available to the patient. Patient is agreeable to call 911 or go to the nearest ER should they begin having any SI/HI, or if any urgent concerns arise. No medication side effects or related complaints today.     MEDS ORDERED DURING VISIT:  New Medications Ordered This Visit   Medications    venlafaxine (EFFEXOR) 100 MG tablet     Sig: Take 1 tablet by mouth 2 (Two) Times a Day.     Dispense:  30 tablet     Refill:  4    QUEtiapine (SEROquel) 200 MG tablet     Sig: Take 1 tablet by mouth Every Night.     Dispense:  30 tablet     Refill:  4    QUEtiapine (SEROquel) 25 MG tablet     Sig: Take 1 tablet by mouth Daily.     Dispense:  30 tablet     Refill:  4     Plan:  - Continue Seroquel to 225 mg p.o. at night for paranoid thoughts and sleep disturbance/anxiety.   - Increase Effexor to 100mg PO BID. He felt that Effexor  was ineffective for the duration of the day.  - Follow-up in 4 months.  -Patient verbalizes understanding to go to nearest ED if SI/HI develop.    Follow Up Appointment:   Return in about 4 months (around 1/6/2025).             This document has been  electronically signed by EILEEN Lovell  September 6, 2024 09:00 EDT    Dictated Utilizing Dragon Dictation: Part of this note may be an electronic transcription/translation of spoken language to printed text using the Dragon Dictation System.

## 2024-10-08 ENCOUNTER — OFFICE VISIT (OUTPATIENT)
Dept: GASTROENTEROLOGY | Facility: CLINIC | Age: 54
End: 2024-10-08
Payer: MEDICARE

## 2024-10-08 VITALS
HEART RATE: 80 BPM | HEIGHT: 65 IN | DIASTOLIC BLOOD PRESSURE: 73 MMHG | BODY MASS INDEX: 25.99 KG/M2 | SYSTOLIC BLOOD PRESSURE: 107 MMHG | WEIGHT: 156 LBS

## 2024-10-08 DIAGNOSIS — R53.83 OTHER FATIGUE: ICD-10-CM

## 2024-10-08 DIAGNOSIS — R79.89 ELEVATED LIVER FUNCTION TESTS: Primary | ICD-10-CM

## 2024-10-08 DIAGNOSIS — F10.10 ALCOHOL ABUSE: ICD-10-CM

## 2024-10-08 DIAGNOSIS — K21.00 GASTROESOPHAGEAL REFLUX DISEASE WITH ESOPHAGITIS WITHOUT HEMORRHAGE: ICD-10-CM

## 2024-10-08 DIAGNOSIS — K70.0 ALCOHOLIC FATTY LIVER: ICD-10-CM

## 2024-10-08 PROCEDURE — 1160F RVW MEDS BY RX/DR IN RCRD: CPT | Performed by: NURSE PRACTITIONER

## 2024-10-08 PROCEDURE — 99214 OFFICE O/P EST MOD 30 MIN: CPT | Performed by: NURSE PRACTITIONER

## 2024-10-08 PROCEDURE — 3078F DIAST BP <80 MM HG: CPT | Performed by: NURSE PRACTITIONER

## 2024-10-08 PROCEDURE — 1159F MED LIST DOCD IN RCRD: CPT | Performed by: NURSE PRACTITIONER

## 2024-10-08 PROCEDURE — 3074F SYST BP LT 130 MM HG: CPT | Performed by: NURSE PRACTITIONER

## 2024-10-08 RX ORDER — DEXLANSOPRAZOLE 60 MG/1
60 CAPSULE, DELAYED RELEASE ORAL
Qty: 30 CAPSULE | Refills: 11 | Status: SHIPPED | OUTPATIENT
Start: 2024-10-08

## 2024-10-08 NOTE — PROGRESS NOTES
DATE:  10/8/2024    DIAGNOSIS: GERD, Alcoholic fatty liver    CHIEF COMPLAINT:  Follow-up of GERD    Interval History:  Mr. Edward presents today for follow-up.  He was previously following with Melo Browning PA-C and was last seen in October 2023.  Please see previous notes for prior management.  In review of his records, patient has had chronic difficulty with GERD.  Of note, he is s/p gastric bypass in 2019.  Patient also previously underwent cholecystectomy.  Patient says he has history of Johnson's esophagus.  However, biopsies of the esophagus from most recent EGD performed by Dr. Ovalles on 6/2/2021 revealed mild inflammation from acid reflux.  There was no evidence of intestinal metaplasia or dysplasia.  At present, GERD is well-controlled with Dexilant 60 mg p.o. daily.  He denies having recent flares.  Patient also has history of chronic alcohol abuse.  However, happily he has abstained since 2022.  Patient has history of fatty liver.  [facility] FibroSure from 10/10/2023 showed mild steatosis/mild LOVETT with fibrosis stage F1-portal fibrosis.  Patient has not had imaging of his abdomen since 2022.  He had recent labs on 9/30/2024 including CBC which showed normal blood counts including platelets.  CMP showed mildly elevated alkaline phosphatase of 152, otherwise normal LFTs.  He reports his bowels are moving well.  He denies having melena or rectal bleeding.  He had previous colonoscopy in May 2017 with Dr. Richey which was unremarkable.  Random colon biopsies were negative.  Repeat colonoscopy was recommended in 10 years.  He is without family history of colon cancer.     PAST MEDICAL HISTORY:  Past Medical History:   Diagnosis Date    Alcohol abuse     quit nov 12 2022    Alcoholism     Anemia     Anxiety whwhen put in continued care    Arthritis of back     not sure    Johnson esophagus     Brain concussion     Cholelithiasis     Chronic pain disorder have degenerative disc disease    Cirrhosis     COPD  (chronic obstructive pulmonary disease)     CTS (carpal tunnel syndrome)     DDD (degenerative disc disease), thoracic     Degenerative disc disease, lumbar     Dystonia     GERD (gastroesophageal reflux disease)     GI (gastrointestinal bleed)     Hypertension     Irritable bowel syndrome     Liver disease     Low back pain     Low back strain     Lumbosacral disc disease     Migraines     MRSA (methicillin resistant Staphylococcus aureus)     Peptic ulcer disease     Stroke     TIA    TIA (transient ischemic attack)     Ulcerative colitis     Wears dentures     upper only       PAST SURGICAL HISTORY:  Past Surgical History:   Procedure Laterality Date    ABDOMINAL SURGERY  09/17/2021    APPENDECTOMY      CARPAL TUNNEL RELEASE  2005    CHOLECYSTECTOMY N/A 04/22/2017    Procedure: CHOLECYSTECTOMY LAPAROSCOPIC;  Surgeon: Jaqueline Guaman MD;  Location:  [facility];  Service:     COLONOSCOPY N/A 05/08/2017    Procedure: COLONOSCOPY  CPTCODE:65973;  Surgeon: Nicho Richey III, MD;  Location:  [facility];  Service:     CUBITAL TUNNEL RELEASE Left 09/01/2022    Procedure: CUBITAL TUNNEL RELEASE LEFT;  Surgeon: Alec Hough MD;  Location:  [facility];  Service: Orthopedics;  Laterality: Left;    ENDOSCOPY      ENDOSCOPY N/A 05/08/2017    Procedure: ESOPHAGOGASTRODUODENOSCOPY WITH BIOPSY  CPTCODE:42058;  Surgeon: Nicho Richey III, MD;  Location:  [facility];  Service:     ENDOSCOPY N/A 11/03/2017    Procedure: ESOPHAGOGASTRODUODENOSCOPY WITH BIOPSY  CPTCODE: 76190;  Surgeon: Nicho Richey III, MD;  [facility];  Service:     ENDOSCOPY N/A 02/20/2018    Procedure: ESOPHAGOGASTRODUODENOSCOPY WITH DILATATION CPT CODE: 59211;  Surgeon: Nicho Richey III, MD;  [facility];  Service:     ENDOSCOPY N/A 06/05/2018    Procedure: ESOPHAGOGASTRODUODENOSCOPY WITH BIOPSY CPT CODE: 45159;  Surgeon: Nicho Richey III, MD;  [facility];  Service: Gastroenterology    ENDOSCOPY N/A  05/26/2021    Procedure: ESOPHAGOGASTRODUODENOSCOPY WITH BIOPSY;  Surgeon: Julieta Hebert MD;  [facility];  Service: Gastroenterology;  Laterality: N/A;    ENDOSCOPY N/A 06/02/2021    Procedure: ESOPHAGOGASTRODUODENOSCOPY WITH BIOPSY;  Surgeon: Julieta Hebert MD;  [facility];  Service: Gastroenterology;  Laterality: N/A;    GASTRECTOMY N/A 09/17/2019    Procedure: OPEN VAGOTOMY, ANTRECTOMY,REVISION- Y GASTROJEJUNOSOTOMY;  Surgeon: Herbert Umanzor MD;  Location:  [facility];  Service: General    HERNIA REPAIR  12/10/2020    TRACHEOSTOMY N/A 01/17/2023    Procedure: TRACHEOSTOMY;  Surgeon: Felton De León MD;  [facility];  Service: General;  Laterality: N/A;    UPPER GASTROINTESTINAL ENDOSCOPY      VENTRAL/INCISIONAL HERNIA REPAIR N/A 12/10/2020    Procedure: INCISIONAL HERNIA REPAIR LAPAROSCOPIC WITH MESH;  Surgeon: Herbert Umanzor MD;  Location:  [facility];  Service: General;  Laterality: N/A;       SOCIAL HISTORY:  Social History     Socioeconomic History    Marital status:    Tobacco Use    Smoking status: Every Day     Current packs/day: 1.00     Average packs/day: 1 pack/day for 35.0 years (35.0 ttl pk-yrs)     Types: Cigarettes     Passive exposure: Current    Smokeless tobacco: Never    Tobacco comments:     Been smoking 20 years   Vaping Use    Vaping status: Never Used   Substance and Sexual Activity    Alcohol use: Not Currently     Comment: Quit drinking November 12,2022    Drug use: No    Sexual activity: Yes     Partners: Female     Birth control/protection: None       FAMILY HISTORY:  Family History   Problem Relation Age of Onset    Osteoporosis Mother     Hypertension Father     Osteoporosis Father     No Known Problems Sister     No Known Problems Brother     Drug abuse Brother     No Known Problems Daughter     Diabetes Sister     Stroke Paternal Grandfather          MEDICATIONS:  The current medication list was reviewed in the  EMR    Current Outpatient Medications:     albuterol (PROVENTIL) (2.5 MG/3ML) 0.083% nebulizer solution, Take 2.5 mg by nebulization 4 (Four) Times a Day., Disp: , Rfl:     baclofen (LIORESAL) 20 MG tablet, , Disp: , Rfl:     dexlansoprazole (DEXILANT) 60 MG capsule, Take 1 capsule by mouth Every Morning Before Breakfast., Disp: 30 capsule, Rfl: 11    HYDROcodone-acetaminophen (NORCO) 5-325 MG per tablet, , Disp: , Rfl:     levETIRAcetam (KEPPRA) 750 MG tablet, Take 1 tablet by mouth 2 (Two) Times a Day., Disp: , Rfl:     metoprolol tartrate (LOPRESSOR) 25 MG tablet, Take 1 tablet by mouth 2 (Two) Times a Day., Disp: , Rfl:     QUEtiapine (SEROquel) 200 MG tablet, Take 1 tablet by mouth Every Night., Disp: 30 tablet, Rfl: 4    QUEtiapine (SEROquel) 25 MG tablet, Take 1 tablet by mouth Daily., Disp: 30 tablet, Rfl: 4    spironolactone (ALDACTONE) 25 MG tablet, Take 0.5 tablets by mouth Daily., Disp: , Rfl:     venlafaxine (EFFEXOR) 100 MG tablet, Take 1 tablet by mouth 2 (Two) Times a Day., Disp: 30 tablet, Rfl: 4    levETIRAcetam (KEPPRA) 500 MG tablet, Take 1 tablet by mouth 2 (Two) Times a Day., Disp: , Rfl:     ALLERGIES:    Allergies   Allergen Reactions    Contrast Dye (Echo Or Unknown Ct/Mr) Other (See Comments)     Shortness of breath    Prilosec [Omeprazole] Other (See Comments)     Chest pain  Pt states he can take pepcid with no problem    Protonix [Pantoprazole Sodium] Palpitations     Patient states that protonix causes chest pain.  Shruthi Pérez, [facility]  4/23/2017  11:19 AM  Pt states he can tolerate pepcid with no problem      Ativan [Lorazepam] Hallucinations    Depakene [Valproic Acid] Hallucinations    Geodon [Ziprasidone Hcl] Mental Status Change    Libritabs [Chlordiazepoxide] Other (See Comments)     Tongue swell/split    Dilantin [Phenytoin] Delirium    Dilaudid [Hydromorphone] Hallucinations    Flagyl [Metronidazole] Nausea And Vomiting    Topamax [Topiramate] Anxiety     REVIEW OF SYSTEMS:     A comprehensive 14 point review of systems was performed.  Significant findings as mentioned above.  All other systems reviewed and are negative.        Physical Exam   Vital Signs:   Vitals:    10/08/24 0853   BP: 107/73   Pulse: 80     General: Well developed, well nourished, alert and oriented x 3, in no acute distress.   Head: ATNC   Eyes: PERRL, No evidence of conjunctivitis.   Nose: No nasal discharge.   Mouth: Oral mucosal membranes moist. No oral ulceration or hemorrhages.   Neck: Neck supple. No thyromegaly. No JVD.   Lungs: Clear in all fields to A&P without rales, rhonchi or wheezing.   Heart: Regular rate and rhythm. No murmurs, rubs, or gallops.   Abdomen: Soft. Bowel sounds are normoactive. Nontender with palpation.   Extremities: No cyanosis or edema.   Neurologic: MS as above. Grossly non focal exam.    RECENT LABS:  Lab Results   Component Value Date    WBC 7.8 09/30/2024    HGB 14.3 09/30/2024    HCT 43.0 09/30/2024    MCV 98 (H) 09/30/2024    RDW 12.2 09/30/2024     09/30/2024    NEUTRORELPCT 69 09/30/2024    LYMPHORELPCT 20 09/30/2024    MONORELPCT 7 09/30/2024    EOSRELPCT 2 09/30/2024    BASORELPCT 1 09/30/2024    NEUTROABS 5.5 09/30/2024    LYMPHSABS 1.5 09/30/2024       Lab Results   Component Value Date     03/22/2024    K 4.9 03/22/2024    CO2 25.9 03/22/2024     03/22/2024    BUN 15 03/22/2024    CREATININE 0.98 03/22/2024    EGFRIFNONA 106 11/29/2021    GLUCOSE 102 (H) 03/22/2024    CALCIUM 9.3 03/22/2024    ALKPHOS 134 (H) 03/22/2024    AST 29 03/22/2024    ALT 24 03/22/2024    BILITOT 0.3 03/22/2024    ALBUMIN 4.4 03/22/2024    PROTEINTOT 6.9 03/22/2024    MG 1.7 03/07/2023    PHOS 3.6 05/25/2021       ASSESSMENT & PLAN:  Jamison Edward is a very pleasant 54 y.o. male with    1.  GERD:  2.  S/p cholecystectomy:  3.  S/p gastric bypass:    -GERD is currently well-controlled with Dexilant 60 mg p.o. daily.  Will continue.  Refills provided.  Will monitor.    4.   Fatty liver:  5.  History of alcohol abuse:  -As above, patient was previously following with Melo Browning PA-C and was last seen in October 2023.  Records were reviewed.  Fatty liver most likely secondary to alcohol abuse.  Happily, he has abstained since 2022.  Encouraged continued efforts.   - [facility] FibroSure from 10/10/2023 showed mild steatosis/mild LOVETT with fibrosis stage F1-portal fibrosis.  Patient has not had not had imaging of his abdomen since 2022.  He had recent labs on 9/30/2024 including CBC which showed normal blood counts including platelets.  CMP showed mildly elevated alkaline phosphatase of 152, otherwise normal LFTs.  Will obtain mitochondrial antibody, anti-smooth muscle antibody, antimicrosomal antibody, SURINDER and acute hepatitis panel which were not previously done.  Will also repeat ultrasound of the liver.  -Will have patient return to clinic in 6 months or sooner if needed.        Electronically Signed by: EILEEN Hamlin ,  October 8, 2024 09:00 EDT       CC:   Leticia Martinez APRN

## 2024-10-18 ENCOUNTER — HOSPITAL ENCOUNTER (OUTPATIENT)
Facility: HOSPITAL | Age: 54
Discharge: HOME | End: 2024-10-18
Payer: MEDICARE

## 2024-10-18 DIAGNOSIS — R79.89 ELEVATED LIVER FUNCTION TESTS: ICD-10-CM

## 2024-10-18 PROCEDURE — 86381 MITOCHONDRIAL ANTIBODY EACH: CPT | Performed by: NURSE PRACTITIONER

## 2024-10-18 PROCEDURE — 80074 ACUTE HEPATITIS PANEL: CPT | Performed by: NURSE PRACTITIONER

## 2024-10-18 PROCEDURE — 76705 ECHO EXAM OF ABDOMEN: CPT

## 2024-10-18 PROCEDURE — 86225 DNA ANTIBODY NATIVE: CPT | Performed by: NURSE PRACTITIONER

## 2024-10-18 PROCEDURE — 86376 MICROSOMAL ANTIBODY EACH: CPT | Performed by: NURSE PRACTITIONER

## 2024-10-18 PROCEDURE — 86015 ACTIN ANTIBODY EACH: CPT | Performed by: NURSE PRACTITIONER

## 2024-10-18 PROCEDURE — 86038 ANTINUCLEAR ANTIBODIES: CPT | Performed by: NURSE PRACTITIONER

## 2024-10-19 LAB
HAV IGM SERPL QL IA: NORMAL
HBV CORE IGM SERPL QL IA: NORMAL
HBV SURFACE AG SERPL QL IA: NORMAL
HCV AB SER QL: NORMAL

## 2024-10-21 LAB
ANA SER QL: POSITIVE
DSDNA AB SER-ACNC: <1 IU/ML (ref 0–9)
LKM-1 AB SER-ACNC: <1 UNITS (ref 0–20)
Lab: NORMAL
MITOCHONDRIA M2 IGG SER-ACNC: <20 UNITS (ref 0–20)
SMA IGG SER-ACNC: 27 UNITS (ref 0–19)

## 2024-10-29 ENCOUNTER — TELEPHONE (OUTPATIENT)
Dept: GASTROENTEROLOGY | Facility: CLINIC | Age: 54
End: 2024-10-29
Payer: MEDICARE

## 2024-10-31 ENCOUNTER — TELEPHONE (OUTPATIENT)
Dept: GASTROENTEROLOGY | Facility: CLINIC | Age: 54
End: 2024-10-31
Payer: MEDICARE

## 2024-10-31 NOTE — TELEPHONE ENCOUNTER
Called and discussed recent labs with patient's wife.  He did have very mildly elevated anti-smooth muscle antibody.  SURINDER was positive.  However, reflexed DNA/DS was negative.Reviewed this with Dr. Ovalles and not suggestive of autoimmune hepatitis.  The remaining workup was unremarkable.  Repeat ultrasound of the liver showed mild diffuse fatty infiltration of the liver.  Encouraged continued abstinence from drinking.  Will follow-up in clinic as scheduled.

## 2024-11-26 ENCOUNTER — TELEPHONE (OUTPATIENT)
Dept: PSYCHIATRY | Facility: CLINIC | Age: 54
End: 2024-11-26
Payer: MEDICARE

## 2024-11-26 NOTE — TELEPHONE ENCOUNTER
Attempted to contact pt in regards to a refill request. Pt should have refills at pharmacy. There was no answer or voicemail.

## 2024-11-27 RX ORDER — VENLAFAXINE 100 MG/1
100 TABLET ORAL 2 TIMES DAILY
Qty: 30 TABLET | Refills: 4 | Status: SHIPPED | OUTPATIENT
Start: 2024-11-27

## 2024-11-27 NOTE — TELEPHONE ENCOUNTER
"Instructions on medication is \"take 2 times daily\". Lizbeth only sent in a quantity of 30. Please refill.   "

## 2025-01-07 ENCOUNTER — TELEMEDICINE (OUTPATIENT)
Dept: PSYCHIATRY | Facility: CLINIC | Age: 55
End: 2025-01-07
Payer: MEDICARE

## 2025-01-07 DIAGNOSIS — F41.9 ANXIETY DISORDER, UNSPECIFIED TYPE: Primary | ICD-10-CM

## 2025-01-07 DIAGNOSIS — G47.00 INSOMNIA, UNSPECIFIED TYPE: ICD-10-CM

## 2025-01-07 RX ORDER — QUETIAPINE FUMARATE 25 MG/1
25 TABLET, FILM COATED ORAL DAILY
Qty: 30 TABLET | Refills: 3 | Status: SHIPPED | OUTPATIENT
Start: 2025-01-07

## 2025-01-07 RX ORDER — VENLAFAXINE 100 MG/1
100 TABLET ORAL 2 TIMES DAILY
Qty: 60 TABLET | Refills: 3 | Status: SHIPPED | OUTPATIENT
Start: 2025-01-07

## 2025-01-07 RX ORDER — QUETIAPINE FUMARATE 200 MG/1
200 TABLET, FILM COATED ORAL NIGHTLY
Qty: 30 TABLET | Refills: 3 | Status: SHIPPED | OUTPATIENT
Start: 2025-01-07

## 2025-01-07 NOTE — PROGRESS NOTES
This provider is located at the [facility] (through [facility]), [address] [place] [address], [phone] using a secure [employer] Video Visit through [employer]. Patient is being seen remotely via telehealth at their home address in [place], and stated they are in a secure environment for this session. The patient's condition being diagnosed/treated is appropriate for telemedicine. The provider identified herself as well as her credentials.  The patient, and/or patients guardian, consent to be seen remotely, and when consent is given they understand that the consent allows for patient identifiable information to be sent to a third party as needed.   They may refuse to be seen remotely at any time. The electronic data is encrypted and password protected, and the patient and/or guardian has been advised of the potential risks to privacy not withstanding such measures.    You have chosen to receive care through a telehealth visit.  Do you consent to use a video/audio connection for your medical care today? Yes    Patient identifiers utilized: Name and date of birth.    Patient verbally confirmed consent for today's encounter:  January 7, 2025    Subjective     Jamison Edward is a 54 y.o. male who presents today for follow up    Chief Complaint: Anxiety       History of Present Illness:    History of Present Illness  Jamison is a 54-year-old male who presents today for a follow-up visit via telehealth.  His wife is present and provides collateral information.  He has been okay the last few months and tells me that his mood has been well controlled with the change in dosing of the Effexor.  Sleep has been good.  Appetite has been poor the last few days, wife mentions that he may have picked up a stomach virus recently.  He tells me that he did fall this past weekend and has been very sore and reports an increase in his pain.  Overall, he seems to be doing well from a mental health standpoint.  No SI/HI/AVH.  No  reported concerns at this time.    regular check up and refills  Symptoms are: chronic.   Onset was at an unknown time.   Symptoms occur: daily.  Symptoms include: joint pain, fatigue, myalgias, neck pain, numbness and weakness.   Pertinent negative symptoms include no abdominal pain, no anorexia, no change in stool, no chest pain, no chills, no congestion, no cough, no diaphoresis, no fever, no headaches, no joint swelling, no nausea, no rash, no sore throat, no swollen glands, no dysuria, no vertigo and no vomiting.       The following portions of the patient's history were reviewed and updated as appropriate: allergies, current medications, past family history, past medical history, past social history, past surgical history and problem list.    Past Psychiatric History:  Began Treatment:This year  Diagnoses: Delirium  Psychiatrist: Patient has never seen a psychiatric provider prior to consultations while admitted to the hospital.  Therapist:Denies  Admission History:Denies  Medication Trials: Xanax, Ativan, Depakote, Seroquel, Geodon, Effexor, lexapro  Self Harm: Denies  Suicide Attempts:Denies      Past Medical History:  Past Medical History:   Diagnosis Date    Alcohol abuse     quit nov 12 2022    Alcoholism     Anemia     Anxiety whwhen put in continued care    Arthritis of back     not sure    Johnson esophagus     Brain concussion     Cholelithiasis     Chronic pain disorder have degenerative disc disease    Cirrhosis     COPD (chronic obstructive pulmonary disease)     CTS (carpal tunnel syndrome)     DDD (degenerative disc disease), thoracic     Degenerative disc disease, lumbar     Dystonia     GERD (gastroesophageal reflux disease)     GI (gastrointestinal bleed)     Hypertension     Irritable bowel syndrome     Liver disease     Low back pain     Low back strain     Lumbosacral disc disease     Migraines     MRSA (methicillin resistant Staphylococcus aureus)     Peptic ulcer disease     Stroke      TIA    TIA (transient ischemic attack)     Ulcerative colitis     Wears dentures     upper only       Substance Abuse History:   Types: alcohol, THC when he was a teenager  Withdrawal Symptoms: Patient reports that they believed some of his delirium while admitted to the hospital from December to March was due to alcohol withdrawal.  Patient states that he stopped drinking alcohol November 12, 2022.  Longest Period Sober:Patient states that he has been sober since November  AA: No    Social History:  Social History     Socioeconomic History    Marital status:    Tobacco Use    Smoking status: Every Day     Current packs/day: 1.00     Average packs/day: 1 pack/day for 35.0 years (35.0 ttl pk-yrs)     Types: Cigarettes     Passive exposure: Current    Smokeless tobacco: Never    Tobacco comments:     Been smoking 20 years   Vaping Use    Vaping status: Never Used   Substance and Sexual Activity    Alcohol use: Not Currently     Comment: Quit drinking November 12,2022    Drug use: No    Sexual activity: Yes     Partners: Female     Birth control/protection: None       Family History:  Family History   Problem Relation Age of Onset    Osteoporosis Mother     Hypertension Father     Osteoporosis Father     No Known Problems Sister     No Known Problems Brother     Drug abuse Brother     No Known Problems Daughter     Diabetes Sister     Stroke Paternal Grandfather        Past Surgical History:  Past Surgical History:   Procedure Laterality Date    ABDOMINAL SURGERY  09/17/2021    APPENDECTOMY      CARPAL TUNNEL RELEASE  2005    CHOLECYSTECTOMY N/A 04/22/2017    Procedure: CHOLECYSTECTOMY LAPAROSCOPIC;  Surgeon: Jaqueline Guaman MD;  Location: [facility] OR;  Service:     COLONOSCOPY N/A 05/08/2017    Procedure: COLONOSCOPY  CPTCODE:60617;  Surgeon: Nicho Richey III, MD;  Location: [facility] OR;  Service:     CUBITAL TUNNEL RELEASE Left 09/01/2022    Procedure: CUBITAL TUNNEL RELEASE LEFT;  Surgeon: Gianluca  Alec Leyva MD;  Location:  [facility] OR;  Service: Orthopedics;  Laterality: Left;    ENDOSCOPY      ENDOSCOPY N/A 05/08/2017    Procedure: ESOPHAGOGASTRODUODENOSCOPY WITH BIOPSY  CPTCODE:20410;  Surgeon: Nicho Richey III, MD;  Location:  [facility] OR;  Service:     ENDOSCOPY N/A 11/03/2017    Procedure: ESOPHAGOGASTRODUODENOSCOPY WITH BIOPSY  CPTCODE: 35133;  Surgeon: Nicho Richey III, MD;  Location:  [facility] OR;  Service:     ENDOSCOPY N/A 02/20/2018    Procedure: ESOPHAGOGASTRODUODENOSCOPY WITH DILATATION CPT CODE: 99816;  Surgeon: Nicho Richey III, MD;  Location:  [facility] OR;  Service:     ENDOSCOPY N/A 06/05/2018    Procedure: ESOPHAGOGASTRODUODENOSCOPY WITH BIOPSY CPT CODE: 56896;  Surgeon: Nicho Richey III, MD;  [facility] OR;  Service: Gastroenterology    ENDOSCOPY N/A 05/26/2021    Procedure: ESOPHAGOGASTRODUODENOSCOPY WITH BIOPSY;  Surgeon: Julieta Hebert MD;  [facility] OR;  Service: Gastroenterology;  Laterality: N/A;    ENDOSCOPY N/A 06/02/2021    Procedure: ESOPHAGOGASTRODUODENOSCOPY WITH BIOPSY;  Surgeon: Julieta Hebert MD;  [facility];  Service: Gastroenterology;  Laterality: N/A;    GASTRECTOMY N/A 09/17/2019    Procedure: OPEN VAGOTOMY, ANTRECTOMY,REVISION- Y GASTROJEJUNOSOTOMY;  Surgeon: Herbert Umanzor MD;  Location:  [facility] OR;  Service: General    HERNIA REPAIR  12/10/2020    TRACHEOSTOMY N/A 01/17/2023    Procedure: TRACHEOSTOMY;  Surgeon: Felton De León MD;  Location:  [facility] OR;  Service: General;  Laterality: N/A;    UPPER GASTROINTESTINAL ENDOSCOPY      VENTRAL/INCISIONAL HERNIA REPAIR N/A 12/10/2020    Procedure: INCISIONAL HERNIA REPAIR LAPAROSCOPIC WITH MESH;  Surgeon: Herbert Umanzor MD;  Location:  [facility] OR;  Service: General;  Laterality: N/A;       Problem List:  Patient Active Problem List   Diagnosis    Precordial chest pain    Weight loss, abnormal    Right upper quadrant abdominal pain    Epigastric pain     History of bleeding ulcers    Nausea    Malaise and fatigue    Acute gastric ulcer without hemorrhage or perforation    Poor appetite    Duodenal ulcer    Gastroesophageal reflux disease    Dysphagia    Iron deficiency anemia    Malabsorption    Gastric ulcer without hemorrhage or perforation    Dystonia    Peptic ulcer without hemorrhage or perforation    Gastric outlet obstruction    Incisional hernia, without obstruction or gangrene    Incisional hernia    Chest pain, atypical    Gastric bezoar    Essential hypertension    Cigarette smoker    Chronic back pain    Sinus tachycardia    Tobacco use    Mild carpal tunnel syndrome    Orthostatic hypotension    Palpitations    Abdominal swelling    Lower extremity edema    Pneumonia due to infectious organism, unspecified laterality, unspecified part of lung    Acute respiratory failure with hypoxia    Dizziness       Allergy:   Allergies   Allergen Reactions    Contrast Dye (Echo Or Unknown Ct/Mr) Other (See Comments)     Shortness of breath    Prilosec [Omeprazole] Other (See Comments)     Chest pain  Pt states he can take pepcid with no problem    Protonix [Pantoprazole Sodium] Palpitations     Patient states that protonix causes chest pain.  Shruthi Pérez, [facility]  4/23/2017  11:19 AM  Pt states he can tolerate pepcid with no problem      Ativan [Lorazepam] Hallucinations    Depakene [Valproic Acid] Hallucinations    Geodon [Ziprasidone Hcl] Mental Status Change    Libritabs [Chlordiazepoxide] Other (See Comments)     Tongue swell/split    Dilantin [Phenytoin] Delirium    Dilaudid [Hydromorphone] Hallucinations    Flagyl [Metronidazole] Nausea And Vomiting    Topamax [Topiramate] Anxiety        Current Medications:   Current Outpatient Medications   Medication Sig Dispense Refill    QUEtiapine (SEROquel) 200 MG tablet Take 1 tablet by mouth Every Night. 30 tablet 3    QUEtiapine (SEROquel) 25 MG tablet Take 1 tablet by mouth Daily. 30 tablet 3    venlafaxine  (EFFEXOR) 100 MG tablet Take 1 tablet by mouth 2 (Two) Times a Day. 60 tablet 3    albuterol (PROVENTIL) (2.5 MG/3ML) 0.083% nebulizer solution Take 2.5 mg by nebulization 4 (Four) Times a Day.      baclofen (LIORESAL) 20 MG tablet       dexlansoprazole (DEXILANT) 60 MG capsule Take 1 capsule by mouth Every Morning Before Breakfast. 30 capsule 11    HYDROcodone-acetaminophen (NORCO) 5-325 MG per tablet       levETIRAcetam (KEPPRA) 750 MG tablet Take 1 tablet by mouth 2 (Two) Times a Day.      metoprolol tartrate (LOPRESSOR) 25 MG tablet Take 1 tablet by mouth 2 (Two) Times a Day.      spironolactone (ALDACTONE) 25 MG tablet Take 0.5 tablets by mouth Daily.       No current facility-administered medications for this visit.       Review of Systems:    Review of Systems   Constitutional:  Positive for fatigue. Negative for activity change, appetite change, chills, diaphoresis and fever.   HENT:  Negative for congestion, sore throat and swollen glands.    Respiratory:  Negative for cough.    Cardiovascular: Negative.  Negative for chest pain.   Gastrointestinal:  Negative for abdominal pain, anorexia, nausea and vomiting.   Genitourinary:  Negative for dysuria.   Musculoskeletal:  Positive for back pain, joint pain, myalgias and neck pain.   Skin:  Negative for rash.   Neurological:  Positive for weakness and numbness. Negative for vertigo and seizures.   Psychiatric/Behavioral:  Positive for stress. Negative for agitation, behavioral problems, decreased concentration, dysphoric mood, hallucinations, self-injury, sleep disturbance, suicidal ideas, negative for hyperactivity and depressed mood. The patient is not nervous/anxious.          Physical Exam:   Physical Exam  Vitals reviewed.   HENT:      Head: Atraumatic.   Pulmonary:      Effort: Pulmonary effort is normal.   Musculoskeletal:      Cervical back: Normal range of motion.   Neurological:      General: No focal deficit present.      Mental Status: He is alert  and oriented to person, place, and time. Mental status is at baseline.   Psychiatric:         Attention and Perception: Attention and perception normal.         Mood and Affect: Mood and affect normal. Mood is not anxious.         Speech: Speech normal.         Behavior: Behavior normal. Behavior is cooperative.         Thought Content: Thought content normal. Thought content is not paranoid or delusional. Thought content does not include homicidal or suicidal ideation.         Cognition and Memory: Cognition and memory normal.         Judgment: Judgment normal.         Vitals:  There were no vitals taken for this visit.   There is no height or weight on file to calculate BMI.    Last 3 Blood Pressure Readings:  BP Readings from Last 3 Encounters:   10/08/24 107/73   09/06/24 104/75   08/07/24 95/66       Mental Status Exam:   Hygiene:   good  Cooperation:  Cooperative  Eye Contact:  Good  Psychomotor Behavior:  Appropriate  Affect:  Restricted  Mood: normal  Hopelessness: Denies  Speech:  Normal  Thought Process:  Goal directed  Thought Content:  Normal  Suicidal:  None  Homicidal:  None  Hallucinations:  Auditory  Delusion:  None  Memory:  Deficits  Orientation:  Person, Place, Time and Situation  Reliability:  good  Insight:  Good  Judgement:  Good  Impulse Control:  Good  Physical/Medical Issues:  Yes See PMH        Lab Results:   Office Visit on 10/08/2024   Component Date Value Ref Range Status    Smooth Muscle Ab 10/18/2024 27 (H)  0 - 19 Units Final                     Negative                     0 - 19                   Weak positive               20 - 30                   Moderate to strong positive     >30   Actin Antibodies are found in 52-85% of patients with   autoimmune hepatitis or chronic active hepatitis and   in 22% of patients with primary biliary cirrhosis.    Liver Fraction: 10/18/2024 <1.0  0.0 - 20.0 Units Final                                    Negative    0.0 - 20.0                                   Equivocal  20.1 - 24.9                                  Positive         >24.9  LKM type 1 antibodies are detected in patients with  autoimmune hepatitis type 2 and in up to 8% of  patients with chronic HCV infection.    Mitochondrial Ab 10/18/2024 <20.0  0.0 - 20.0 Units Final                                    Negative    0.0 - 20.0                                  Equivocal  20.1 - 24.9                                  Positive         >24.9  Mitochondrial (M2) Antibodies are found in 90-96% of  patients with primary biliary cirrhosis.    Hepatitis B Surface Ag 10/18/2024 Non-Reactive  Non-Reactive Final    Hep A IgM 10/18/2024 Non-Reactive  Non-Reactive Final    Hep B C IgM 10/18/2024 Non-Reactive  Non-Reactive Final    Hepatitis C Ab 10/18/2024 Non-Reactive  Non-Reactive Final    SURINDER Direct 10/18/2024 Positive (A)  Negative Final    Anti-DNA (DS) Ab Qn 10/18/2024 <1  0 - 9 IU/mL Final                                       Negative      <5                                     Equivocal  5 - 9                                     Positive      >9    See below: 10/18/2024 Comment   Final    Comment: Autoantibody                       Disease Association  ------------------------------------------------------------                          Condition                  Frequency  ---------------------   ------------------------   ---------  Antinuclear Antibody,    SLE, mixed connective  Direct (SURINDER-D)           tissue diseases  ---------------------   ------------------------   ---------  dsDNA                    SLE                        40 - 60%  ---------------------   ------------------------   ---------  Chromatin                Drug induced SLE                90%                           SLE                        48 - 97%  ---------------------   ------------------------   ---------  SSA (Ro)                 SLE                        25 - 35%                           Sjogren's Syndrome         40 -  70%                            Lupus                 100%  ---------------------   ------------------------   ---------  SSB (La)                 SLE                                                        10%                           Sjogren's Syndrome              30%  ---------------------   -----------------------    ---------  Sm (anti-Smith)          SLE                        15 - 30%  ---------------------   -----------------------    ---------  RNP                      Mixed Connective Tissue                           Disease                         95%  (U1 nRNP,                SLE                        30 - 50%  anti-ribonucleoprotein)  Polymyositis and/or                           Dermatomyositis                 20%  ---------------------   ------------------------   ---------  Scl-70 (antiDNA          Scleroderma (diffuse)      20 - 35%  topoisomerase)           Crest                           13%  ---------------------   ------------------------   ---------  Suzette-1                     Polymyositis and/or                           Dermatomyositis            20 - 40%  ---------------------   ------------------------   ---------  Centromere B             Scleroderma -                            Crest                           variant                         80%         Assessment & Plan   Problems Addressed this Visit    None  Visit Diagnoses       Anxiety disorder, unspecified type    -  Primary    Relevant Medications    venlafaxine (EFFEXOR) 100 MG tablet    QUEtiapine (SEROquel) 25 MG tablet    QUEtiapine (SEROquel) 200 MG tablet    Insomnia, unspecified type              Diagnoses         Codes Comments    Anxiety disorder, unspecified type    -  Primary ICD-10-CM: F41.9  ICD-9-CM: 300.00     Insomnia, unspecified type     ICD-10-CM: G47.00  ICD-9-CM: 780.52             Visit Diagnoses:    ICD-10-CM ICD-9-CM   1. Anxiety disorder, unspecified type  F41.9 300.00   2. Insomnia, unspecified type   G47.00 780.52       GOALS:  Short Term Goals: Patient will be compliant with medication, and patient will have no significant medication related side effects.  Patient will be engaged in psychotherapy as indicated.  Patient will report subjective improvement of symptoms.  Long term goals: To stabilize mood and treat/improve subjective symptoms, the patient will stay out of the hospital, the patient will be at an optimal level of functioning, and the patient will take all medications as prescribed.  The patient/guardian verbalized understanding and agreement with goals that were mutually set.      TREATMENT PLAN: Continue supportive psychotherapy efforts and medications as indicated.  Pharmacological and Non-Pharmacological treatment options discussed during today's visit. Patient/Guardian acknowledged and verbally consented with current treatment plan and was educated on the importance of compliance with treatment and follow-up appointments.      MEDICATION ISSUES:  Discussed medication options and treatment plan of prescribed medication as well as the risks, benefits, any black box warnings, and side effects including potential falls, possible impaired driving, and metabolic adversities among others. Patient is agreeable to call the office with any worsening of symptoms or onset of side effects, or if any concerns or questions arise.  The contact information for the office is made available to the patient. Patient is agreeable to call 911 or go to the nearest ER should they begin having any SI/HI, or if any urgent concerns arise. No medication side effects or related complaints today.     MEDS ORDERED DURING VISIT:  New Medications Ordered This Visit   Medications    venlafaxine (EFFEXOR) 100 MG tablet     Sig: Take 1 tablet by mouth 2 (Two) Times a Day.     Dispense:  60 tablet     Refill:  3     D/C previous refills    QUEtiapine (SEROquel) 25 MG tablet     Sig: Take 1 tablet by mouth Daily.     Dispense:  30  tablet     Refill:  3    QUEtiapine (SEROquel) 200 MG tablet     Sig: Take 1 tablet by mouth Every Night.     Dispense:  30 tablet     Refill:  3     Plan:  - Continue Seroquel to 225 mg p.o. at night for paranoid thoughts and sleep disturbance/anxiety.   - Continue Effexor 100mg PO BID. He felt that Effexor  was ineffective for the duration of the day.  - Follow-up in 3 months.  -Patient verbalizes understanding to go to nearest ED if SI/HI develop.    Follow Up Appointment:   No follow-ups on file.             This document has been electronically signed by EILEEN Lovell  January 7, 2025 08:50 EST    Dictated Utilizing Dragon Dictation: Part of this note may be an electronic transcription/translation of spoken language to printed text using the Dragon Dictation System.

## 2025-02-07 ENCOUNTER — OFFICE VISIT (OUTPATIENT)
Dept: CARDIOLOGY | Facility: CLINIC | Age: 55
End: 2025-02-07
Payer: MEDICARE

## 2025-02-07 ENCOUNTER — TELEPHONE (OUTPATIENT)
Dept: CARDIOLOGY | Facility: CLINIC | Age: 55
End: 2025-02-07

## 2025-02-07 VITALS
DIASTOLIC BLOOD PRESSURE: 74 MMHG | SYSTOLIC BLOOD PRESSURE: 101 MMHG | BODY MASS INDEX: 21.99 KG/M2 | HEIGHT: 65 IN | OXYGEN SATURATION: 98 % | WEIGHT: 132 LBS

## 2025-02-07 DIAGNOSIS — R00.0 SINUS TACHYCARDIA: Primary | ICD-10-CM

## 2025-02-07 DIAGNOSIS — Z72.0 TOBACCO USE: ICD-10-CM

## 2025-02-07 DIAGNOSIS — I10 ESSENTIAL HYPERTENSION: ICD-10-CM

## 2025-02-07 PROCEDURE — 93000 ELECTROCARDIOGRAM COMPLETE: CPT | Performed by: NURSE PRACTITIONER

## 2025-02-07 PROCEDURE — 3074F SYST BP LT 130 MM HG: CPT | Performed by: NURSE PRACTITIONER

## 2025-02-07 PROCEDURE — 3078F DIAST BP <80 MM HG: CPT | Performed by: NURSE PRACTITIONER

## 2025-02-07 PROCEDURE — 99214 OFFICE O/P EST MOD 30 MIN: CPT | Performed by: NURSE PRACTITIONER

## 2025-02-07 PROCEDURE — 1160F RVW MEDS BY RX/DR IN RCRD: CPT | Performed by: NURSE PRACTITIONER

## 2025-02-07 PROCEDURE — 1159F MED LIST DOCD IN RCRD: CPT | Performed by: NURSE PRACTITIONER

## 2025-02-07 NOTE — PROGRESS NOTES
[facility]             [facility] EILEEN Michele Theresa, APRN  Jamison Edward  1970  02/07/2025    Patient Active Problem List   Diagnosis    Precordial chest pain    Weight loss, abnormal    Right upper quadrant abdominal pain    Epigastric pain    History of bleeding ulcers    Nausea    Malaise and fatigue    Acute gastric ulcer without hemorrhage or perforation    Poor appetite    Duodenal ulcer    Gastroesophageal reflux disease    Dysphagia    Iron deficiency anemia    Malabsorption    Gastric ulcer without hemorrhage or perforation    Dystonia    Peptic ulcer without hemorrhage or perforation    Gastric outlet obstruction    Incisional hernia, without obstruction or gangrene    Incisional hernia    Chest pain, atypical    Gastric bezoar    Essential hypertension    Cigarette smoker    Chronic back pain    Sinus tachycardia    Tobacco use    Mild carpal tunnel syndrome    Orthostatic hypotension    Palpitations    Abdominal swelling    Lower extremity edema    Pneumonia due to infectious organism, unspecified laterality, unspecified part of lung    Acute respiratory failure with hypoxia    Dizziness       Dear Leticia Martinez APRN:    Subjective     Chief Complaint   Patient presents with    Follow-up       HPI:     This is a 54 y.o. male with known past medical history of orthostatic hypotension, tobacco abuse, alcohol abuse and sinus tachycardia.      Jamison Edward presents today for routine cardiology follow up.   History of Present Illness    He has been experiencing persistent hypotension, with a recorded blood pressure of 64/45 approximately one hour post medication intake. This has led to the discontinuation of his metoprolol and spironolactone regimen. He reports an elevated heart rate, which he attributes to his inability to take metoprolol due to his low blood pressure. His primary care physician has advised against the use of  metoprolol at this time. He reports no respiratory distress. He suspects potential dehydration as a contributing factor to his symptoms.  He has had around a 20 pound weight loss with being unable to consume food for the past 2 weeks due to GI issues. he maintains a fluid intake of 2 to 3 bottles daily, each containing 16 ounces. He has not taken metoprolol or spironolactone for the past month due to concerns of exacerbating his hypotension. He has elevated liver enzymes and is scheduled to see a gastroenterologist. His recent blood work showed a platelet count of 128 and protein level of 4.3.    MEDICATIONS  Current: albuterol (as needed)  Discontinued: metoprolol, spironolactone     Diagnostic Testing  Nuclear stress test 5/2017: No evidence of ischemia  Echocardiogram 9/2021: Ef 61-65%  Nuclear stress test 9/2021: No evidence of ischemia  Cardiac event monitor 10/2022: Normal sinus rhythm with episodes of sinus tachycardia and sinus bradycardia with no arrhythmias noted  Echocardiogram 12/2022: EF greater than 70%       All other systems were reviewed and were negative.    Patient Active Problem List   Diagnosis    Precordial chest pain    Weight loss, abnormal    Right upper quadrant abdominal pain    Epigastric pain    History of bleeding ulcers    Nausea    Malaise and fatigue    Acute gastric ulcer without hemorrhage or perforation    Poor appetite    Duodenal ulcer    Gastroesophageal reflux disease    Dysphagia    Iron deficiency anemia    Malabsorption    Gastric ulcer without hemorrhage or perforation    Dystonia    Peptic ulcer without hemorrhage or perforation    Gastric outlet obstruction    Incisional hernia, without obstruction or gangrene    Incisional hernia    Chest pain, atypical    Gastric bezoar    Essential hypertension    Cigarette smoker    Chronic back pain    Sinus tachycardia    Tobacco use    Mild carpal tunnel syndrome    Orthostatic hypotension    Palpitations    Abdominal swelling     Lower extremity edema    Pneumonia due to infectious organism, unspecified laterality, unspecified part of lung    Acute respiratory failure with hypoxia    Dizziness       family history includes Diabetes in his sister; Drug abuse in his brother; Hypertension in his father; No Known Problems in his brother, daughter, and sister; Osteoporosis in his father and mother; Stroke in his paternal grandfather.     reports that he has been smoking cigarettes. He has a 35 pack-year smoking history. He has been exposed to tobacco smoke. He has never used smokeless tobacco. He reports that he does not currently use alcohol. He reports that he does not use drugs.    Allergies   Allergen Reactions    Contrast Dye (Echo Or Unknown Ct/Mr) Other (See Comments)     Shortness of breath    Prilosec [Omeprazole] Other (See Comments)     Chest pain  Pt states he can take pepcid with no problem    Protonix [Pantoprazole Sodium] Palpitations     Patient states that protonix causes chest pain.  Shruthi Pérez, [facility]  4/23/2017  11:19 AM  Pt states he can tolerate pepcid with no problem      Ativan [Lorazepam] Hallucinations    Depakene [Valproic Acid] Hallucinations    Geodon [Ziprasidone Hcl] Mental Status Change    Libritabs [Chlordiazepoxide] Other (See Comments)     Tongue swell/split    Dilantin [Phenytoin] Delirium    Dilaudid [Hydromorphone] Hallucinations    Flagyl [Metronidazole] Nausea And Vomiting    Topamax [Topiramate] Anxiety         Current Outpatient Medications:     albuterol (PROVENTIL) (2.5 MG/3ML) 0.083% nebulizer solution, Take 2.5 mg by nebulization 4 (Four) Times a Day., Disp: , Rfl:     baclofen (LIORESAL) 20 MG tablet, , Disp: , Rfl:     dexlansoprazole (DEXILANT) 60 MG capsule, Take 1 capsule by mouth Every Morning Before Breakfast., Disp: 30 capsule, Rfl: 11    HYDROcodone-acetaminophen (NORCO) 5-325 MG per tablet, , Disp: , Rfl:     levETIRAcetam (KEPPRA) 750 MG tablet, Take 1 tablet by mouth 2 (Two) Times a  Day., Disp: , Rfl:     QUEtiapine (SEROquel) 200 MG tablet, Take 1 tablet by mouth Every Night., Disp: 30 tablet, Rfl: 3    QUEtiapine (SEROquel) 25 MG tablet, Take 1 tablet by mouth Daily., Disp: 30 tablet, Rfl: 3    venlafaxine (EFFEXOR) 100 MG tablet, Take 1 tablet by mouth 2 (Two) Times a Day., Disp: 60 tablet, Rfl: 3    metoprolol tartrate (LOPRESSOR) 25 MG tablet, Take 1 tablet by mouth 2 (Two) Times a Day. (Patient not taking: Reported on 2/7/2025), Disp: , Rfl:     spironolactone (ALDACTONE) 25 MG tablet, Take 0.5 tablets by mouth Daily. (Patient not taking: Reported on 2/7/2025), Disp: , Rfl:       Physical Exam:  I have reviewed the patient's current vital signs as documented in the patient's EMR.   Vitals:    02/07/25 0908   BP: 101/74   SpO2: 98%     Body mass index is 21.97 kg/m².       02/07/25  0908   Weight: 59.9 kg (132 lb)      Physical Exam  Constitutional:       General: He is not in acute distress.     Appearance: Normal appearance. He is well-developed.   HENT:      Head: Normocephalic and atraumatic.      Mouth/Throat:      Mouth: Mucous membranes are moist.   Eyes:      Extraocular Movements: Extraocular movements intact.      Pupils: Pupils are equal, round, and reactive to light.   Neck:      Vascular: No JVD.   Cardiovascular:      Rate and Rhythm: Normal rate and regular rhythm.      Heart sounds: Normal heart sounds. No murmur heard.     No S3 or S4 sounds.   Pulmonary:      Effort: Pulmonary effort is normal. No respiratory distress.      Breath sounds: Normal breath sounds. No wheezing.   Abdominal:      General: Bowel sounds are normal. There is no distension.      Palpations: Abdomen is soft. There is no hepatomegaly.      Tenderness: There is no abdominal tenderness.   Musculoskeletal:         General: Normal range of motion.      Cervical back: Normal range of motion and neck supple.   Skin:     General: Skin is warm and dry.      Coloration: Skin is not jaundiced or pale.    Neurological:      General: No focal deficit present.      Mental Status: He is alert and oriented to person, place, and time. Mental status is at baseline.   Psychiatric:         Mood and Affect: Mood normal.         Behavior: Behavior normal.         Thought Content: Thought content normal.         Judgment: Judgment normal.          DATA REVIEWED:     TTE/ANIYA:  Results for orders placed during the hospital encounter of 12/20/22    Adult Transthoracic Echo Complete W/ Cont if Necessary Per Protocol    Interpretation Summary    Left ventricular systolic function is hyperdynamic (EF > 70%). Left ventricular ejection fraction appears to be greater than 70%.    Left ventricular diastolic function is consistent with (grade I) impaired relaxation.    Estimated right ventricular systolic pressure from tricuspid regurgitation is mildly elevated (35-45 mmHg).    No significant pericardial disease.      Laboratory evaluations:    Lab Results   Component Value Date    GLUCOSE 102 (H) 03/22/2024    BUN 15 03/22/2024    CREATININE 0.98 03/22/2024    EGFRIFNONA 106 11/29/2021    BCR 15.3 03/22/2024    K 4.9 03/22/2024    CO2 25.9 03/22/2024    CALCIUM 9.3 03/22/2024    ALBUMIN 4.4 03/22/2024    AST 29 03/22/2024    ALT 24 03/22/2024     Lab Results   Component Value Date    WBC 5.8 02/04/2025    HGB 12.5 (L) 02/04/2025    HCT 36.5 (L) 02/04/2025     (H) 02/04/2025     (L) 02/04/2025     Lab Results   Component Value Date    CHOL 148 08/02/2023    TRIG 152 (H) 10/10/2023    HDL 42 08/02/2023    LDL 85 08/02/2023     Lab Results   Component Value Date    TSH 1.740 08/02/2023     Lab Results   Component Value Date    HGBA1C 5.40 08/02/2023     Lab Results   Component Value Date    ALT 24 03/22/2024     Lab Results   Component Value Date    HGBA1C 5.40 08/02/2023    HGBA1C 5.10 12/08/2020    HGBA1C 5.10 09/12/2019     Lab Results   Component Value Date    CREATININE 0.98 03/22/2024     Lab Results   Component Value  "Date    IRON 49 (L) 12/21/2022    TIBC 70 (L) 12/21/2022    FERRITIN 1,305.00 (H) 12/23/2022     Lab Results   Component Value Date    INR 1.43 (H) 12/30/2022    INR 1.33 (H) 12/28/2022    INR 1.44 (H) 12/24/2022    PROTIME 17.8 (H) 12/30/2022    PROTIME 16.8 (H) 12/28/2022    PROTIME 17.9 (H) 12/24/2022      No components found for: \"ABSOLUTELUNG\"    ECG 12 Lead    Date/Time: 2/7/2025 10:54 AM  Performed by: Eva Michele APRN    Authorized by: Eva Michele APRN  Comparison: compared with previous ECG   Rhythm: sinus tachycardia  Rate: normal  Other findings: non-specific ST-T wave changes    Clinical impression: non-specific ECG         --------------------------------------------------------------------------------------------------------------------------    ASSESSMENT/PLAN:      Diagnosis Plan   1. Sinus tachycardia  Cardiac Event Monitor (LEYLA) or Mobile Cardiac Outpatient Telemetry (MCT)      2. Essential hypertension        3. Tobacco use          Assessment & Plan  1. Sinus tachycardia  2. Essential hypertension  His elevated heart rate could be attributed to potential dehydration, which may be exacerbating his sinus tachycardia. The possibility of underlying arrhythmias can not be ruled out at this stage. His weight loss could also be a contributing factor to his low blood pressure. He does not exhibit any cardiac symptoms apart from occasional elevated heart rate episodes. A heart monitor will be applied today for a duration of 2 weeks to ensure that there are no missed cardiac issues. He was unable to take metoprolol and spironolactone due to hypotension and has been holding this for the last month. Advised to continue to hold these. He is encouraged to increase his fluid intake over the next week. If oral hydration proves challenging, intravenous fluids may be considered as an alternative. A consultation with a gastroenterologist is recommended to discuss his liver disease and " appropriate fluid management.    3. Tobacco abuse  Continues to smoke. Discussed smoking cessation.     This document has been @Electronically signed by EILEEN Coronado, 02/07/25, 8:29 AM EST.       Dictated Utilizing Dragon Dictation: Part of this note may be an electronic transcription/translation of spoken language to printed text using the Dragon Dictation System.    Patient or patient representative verbalized consent for the use of Ambient Listening during the visit with  EILEEN Coronado for chart documentation. 2/7/2025  10:28 EST    Follow-up appointment and medication changes provided in hand delivered After Visit Summary as well as reviewed in the room.

## 2025-02-07 NOTE — TELEPHONE ENCOUNTER
Spoke to patient and advised, per  confirmation with Eva, he was to wear monitor 14 days instead of 30 days. Patient verb understanding and agreed.

## 2025-02-10 ENCOUNTER — OFFICE VISIT (OUTPATIENT)
Dept: GASTROENTEROLOGY | Facility: CLINIC | Age: 55
End: 2025-02-10
Payer: MEDICARE

## 2025-02-10 VITALS
SYSTOLIC BLOOD PRESSURE: 95 MMHG | BODY MASS INDEX: 21.66 KG/M2 | DIASTOLIC BLOOD PRESSURE: 68 MMHG | HEART RATE: 108 BPM | HEIGHT: 65 IN | OXYGEN SATURATION: 94 % | WEIGHT: 130 LBS

## 2025-02-10 DIAGNOSIS — Z98.84 S/P GASTRIC BYPASS: ICD-10-CM

## 2025-02-10 DIAGNOSIS — Z90.49 S/P CHOLECYSTECTOMY: ICD-10-CM

## 2025-02-10 DIAGNOSIS — R63.4 UNINTENTIONAL WEIGHT LOSS: ICD-10-CM

## 2025-02-10 DIAGNOSIS — R11.2 NAUSEA AND VOMITING, UNSPECIFIED VOMITING TYPE: ICD-10-CM

## 2025-02-10 DIAGNOSIS — K70.0 ALCOHOLIC FATTY LIVER: ICD-10-CM

## 2025-02-10 DIAGNOSIS — K21.9 GASTROESOPHAGEAL REFLUX DISEASE, UNSPECIFIED WHETHER ESOPHAGITIS PRESENT: Primary | ICD-10-CM

## 2025-02-10 DIAGNOSIS — K70.30 ALCOHOLIC CIRRHOSIS OF LIVER WITHOUT ASCITES: ICD-10-CM

## 2025-02-10 DIAGNOSIS — Z86.19 HISTORY OF HELICOBACTER PYLORI INFECTION: ICD-10-CM

## 2025-02-10 DIAGNOSIS — R10.13 EPIGASTRIC PAIN: ICD-10-CM

## 2025-02-10 DIAGNOSIS — R13.19 ESOPHAGEAL DYSPHAGIA: ICD-10-CM

## 2025-02-10 DIAGNOSIS — R10.11 RUQ PAIN: ICD-10-CM

## 2025-02-10 LAB
INR PPP: 1.02 (ref 0.9–1.1)
PROTHROMBIN TIME: 13.5 SECONDS (ref 11.6–15.1)

## 2025-02-10 PROCEDURE — 99214 OFFICE O/P EST MOD 30 MIN: CPT | Performed by: NURSE PRACTITIONER

## 2025-02-10 PROCEDURE — 80053 COMPREHEN METABOLIC PANEL: CPT | Performed by: NURSE PRACTITIONER

## 2025-02-10 PROCEDURE — 85610 PROTHROMBIN TIME: CPT | Performed by: NURSE PRACTITIONER

## 2025-02-10 PROCEDURE — 1159F MED LIST DOCD IN RCRD: CPT | Performed by: NURSE PRACTITIONER

## 2025-02-10 PROCEDURE — 3078F DIAST BP <80 MM HG: CPT | Performed by: NURSE PRACTITIONER

## 2025-02-10 PROCEDURE — 36415 COLL VENOUS BLD VENIPUNCTURE: CPT | Performed by: NURSE PRACTITIONER

## 2025-02-10 PROCEDURE — 85025 COMPLETE CBC W/AUTO DIFF WBC: CPT | Performed by: NURSE PRACTITIONER

## 2025-02-10 PROCEDURE — 82105 ALPHA-FETOPROTEIN SERUM: CPT | Performed by: NURSE PRACTITIONER

## 2025-02-10 PROCEDURE — 1160F RVW MEDS BY RX/DR IN RCRD: CPT | Performed by: NURSE PRACTITIONER

## 2025-02-10 PROCEDURE — 3074F SYST BP LT 130 MM HG: CPT | Performed by: NURSE PRACTITIONER

## 2025-02-10 RX ORDER — PROMETHAZINE HYDROCHLORIDE 25 MG/1
25 TABLET ORAL EVERY 8 HOURS PRN
COMMUNITY

## 2025-02-10 RX ORDER — FAMOTIDINE 40 MG/1
40 TABLET, FILM COATED ORAL DAILY PRN
Qty: 30 TABLET | Refills: 5 | Status: SHIPPED | OUTPATIENT
Start: 2025-02-10

## 2025-02-10 NOTE — H&P (VIEW-ONLY)
DATE:  2/10/2025    DIAGNOSIS: GERD, Alcoholic fatty liver    CHIEF COMPLAINT:  Poorly controlled GERD  Unintentional weight loss  Dysphagia  RUQ abdominal pain  Nausea and vomiting    HPI:  Mr. Edward presents today for follow-up.  He was previously following with Melo Browning PA-C and was last seen in October 2023.  Please see previous notes for prior management.  In review of his records, patient has had chronic difficulty with GERD.  Of note, he is s/p gastric bypass in 2019.  Patient also previously underwent cholecystectomy.  Patient says he has history of Johnson's esophagus.  However, biopsies of the esophagus from most recent EGD performed by Dr. Ovalles on 6/2/2021 revealed mild inflammation from acid reflux.  There was no evidence of intestinal metaplasia or dysplasia.  At present, GERD is well-controlled with Dexilant 60 mg p.o. daily.  He denies having recent flares.  Patient also has history of chronic alcohol abuse.  However, happily he has abstained since 2022.  Patient has history of fatty liver.  [place] FibroSure from 10/10/2023 showed mild steatosis/mild LOVETT with fibrosis stage F1-portal fibrosis.  Patient has not had imaging of his abdomen since 2022.  He had recent labs on 9/30/2024 including CBC which showed normal blood counts including platelets.  CMP showed mildly elevated alkaline phosphatase of 152, otherwise normal LFTs.  He reports his bowels are moving well.  He denies having melena or rectal bleeding.  He had previous colonoscopy in May 2017 with Dr. Richey which was unremarkable.  Random colon biopsies were negative.  Repeat colonoscopy was recommended in 10 years.  He is without family history of colon cancer.     INTERVAL HISTORY:  Mr. Edward presents today for follow-up.  Patient reports he has been feeling poorly for the past~2 to 3 weeks.  He reports having a decreased appetite.  Patient says every time he tries to eat, he begins having nausea.  He reports vomiting bile  and undigested food almost daily.  He reports unintentional weight loss of ~26 lbs . He reports weighing 156 lbs in October and currently weights 130lbs. In regards to GERD, he remains on Dexilant 60 mg p.o. daily.  He is currently having daily flares with burning in his throat/chest, regurgitation and epigastric/RUQ abdominal pain.  He is s/p cholecystectomy.  Of note, patient is taking baclofen 20 mg 3 times daily for chronic back pain.  Discussed with patient how this will also be beneficial to help with bile acid reflux and recommended he take this prior to meals.  Patient reports history of H. pylori a few years ago.  He also complains of dysphagia with both solids and liquids.  He reports his bowels move daily with stool type V on [place] stool scale.  He feels as if he is evacuating his colon well.  He denies having melena, hematochezia or rectal bleeding.  In regards to fatty liver, he continues to follow with his PCP routinely with blood testing.  He had recent labs on 2/4/2025 including CBC which showed mild thrombocytopenia with platelet count of 128,000.  His platelets have previously been normal.  CMP again showed elevated alkaline phosphatase 207 which is further increased, otherwise normal LFTs.  He continues to abstain from drinking.  He has no other complaints today.    PAST MEDICAL HISTORY:  Past Medical History:   Diagnosis Date    Alcohol abuse     quit nov 12 2022    Alcoholism     Anemia     Anxiety whwhen put in continued care    Arthritis of back     not sure    Johnson esophagus     Brain concussion     Cholelithiasis     Chronic pain disorder have degenerative disc disease    Cirrhosis     COPD (chronic obstructive pulmonary disease)     CTS (carpal tunnel syndrome)     DDD (degenerative disc disease), thoracic     Degenerative disc disease, lumbar     Dystonia     GERD (gastroesophageal reflux disease)     GI (gastrointestinal bleed)     Hypertension     Irritable bowel syndrome     Liver  disease     Low back pain     Low back strain     Lumbosacral disc disease     Migraines     MRSA (methicillin resistant Staphylococcus aureus)     Peptic ulcer disease     Stroke     TIA    TIA (transient ischemic attack)     Ulcerative colitis     Wears dentures     upper only       PAST SURGICAL HISTORY:  Past Surgical History:   Procedure Laterality Date    ABDOMINAL SURGERY  09/17/2021    APPENDECTOMY      CARPAL TUNNEL RELEASE  2005    CHOLECYSTECTOMY N/A 04/22/2017    Procedure: CHOLECYSTECTOMY LAPAROSCOPIC;  Surgeon: Jaqueline Guaman MD;  Location:  [facility];  Service:     COLONOSCOPY N/A 05/08/2017    Procedure: COLONOSCOPY  CPTCODE:42666;  Surgeon: Nicho Richey III, MD;  Location:  [facility];  Service:     CUBITAL TUNNEL RELEASE Left 09/01/2022    Procedure: CUBITAL TUNNEL RELEASE LEFT;  Surgeon: Alec Hough MD;  Location:  [facility];  Service: Orthopedics;  Laterality: Left;    ENDOSCOPY      ENDOSCOPY N/A 05/08/2017    Procedure: ESOPHAGOGASTRODUODENOSCOPY WITH BIOPSY  CPTCODE:40474;  Surgeon: Nicho Richey III, MD;  [facility];  Service:     ENDOSCOPY N/A 11/03/2017    Procedure: ESOPHAGOGASTRODUODENOSCOPY WITH BIOPSY  CPTCODE: 04475;  Surgeon: Nicho Richey III, MD;  [facility];  Service:     ENDOSCOPY N/A 02/20/2018    Procedure: ESOPHAGOGASTRODUODENOSCOPY WITH DILATATION CPT CODE: 50596;  Surgeon: Nicho Richey III, MD;  [facility];  Service:     ENDOSCOPY N/A 06/05/2018    Procedure: ESOPHAGOGASTRODUODENOSCOPY WITH BIOPSY CPT CODE: 72080;  Surgeon: Nicho Richey III, MD;  [facility];  Service: Gastroenterology    ENDOSCOPY N/A 05/26/2021    Procedure: ESOPHAGOGASTRODUODENOSCOPY WITH BIOPSY;  Surgeon: Julieta Hebert MD;  [facility];  Service: Gastroenterology;  Laterality: N/A;    ENDOSCOPY N/A 06/02/2021    Procedure: ESOPHAGOGASTRODUODENOSCOPY WITH BIOPSY;  Surgeon: Julieta Hebert MD;   [facility];  Service: Gastroenterology;  Laterality: N/A;    GASTRECTOMY N/A 09/17/2019    Procedure: OPEN VAGOTOMY, ANTRECTOMY,REVISION- Y GASTROJEJUNOSOTOMY;  Surgeon: Herbert Umanzor MD;  Location:  [facility];  Service: General    HERNIA REPAIR  12/10/2020    TRACHEOSTOMY N/A 01/17/2023    Procedure: TRACHEOSTOMY;  Surgeon: Felton De León MD;  [facility];  Service: General;  Laterality: N/A;    UPPER GASTROINTESTINAL ENDOSCOPY      VENTRAL/INCISIONAL HERNIA REPAIR N/A 12/10/2020    Procedure: INCISIONAL HERNIA REPAIR LAPAROSCOPIC WITH MESH;  Surgeon: Herbert Umanzor MD;  Location:  [facility];  Service: General;  Laterality: N/A;       SOCIAL HISTORY:  Social History     Socioeconomic History    Marital status:    Tobacco Use    Smoking status: Every Day     Current packs/day: 1.00     Average packs/day: 1 pack/day for 35.0 years (35.0 ttl pk-yrs)     Types: Cigarettes     Passive exposure: Current    Smokeless tobacco: Never    Tobacco comments:     Been smoking 20 years   Vaping Use    Vaping status: Never Used   Substance and Sexual Activity    Alcohol use: Not Currently     Comment: Quit drinking November 12,2022    Drug use: No    Sexual activity: Yes     Partners: Female     Birth control/protection: None       FAMILY HISTORY:  Family History   Problem Relation Age of Onset    Osteoporosis Mother     Hypertension Father     Osteoporosis Father     No Known Problems Sister     No Known Problems Brother     Drug abuse Brother     No Known Problems Daughter     Diabetes Sister     Stroke Paternal Grandfather        MEDICATIONS:  The current medication list was reviewed in the EMR    Current Outpatient Medications:     albuterol (PROVENTIL) (2.5 MG/3ML) 0.083% nebulizer solution, Take 2.5 mg by nebulization 4 (Four) Times a Day., Disp: , Rfl:     baclofen (LIORESAL) 20 MG tablet, , Disp: , Rfl:     dexlansoprazole (DEXILANT) 60 MG capsule, Take 1 capsule by mouth Every Morning  Before Breakfast., Disp: 30 capsule, Rfl: 11    HYDROcodone-acetaminophen (NORCO) 5-325 MG per tablet, , Disp: , Rfl:     levETIRAcetam (KEPPRA) 750 MG tablet, Take 1 tablet by mouth 2 (Two) Times a Day., Disp: , Rfl:     metoprolol tartrate (LOPRESSOR) 25 MG tablet, Take 1 tablet by mouth 2 (Two) Times a Day. (Patient not taking: Reported on 2/7/2025), Disp: , Rfl:     QUEtiapine (SEROquel) 200 MG tablet, Take 1 tablet by mouth Every Night., Disp: 30 tablet, Rfl: 3    QUEtiapine (SEROquel) 25 MG tablet, Take 1 tablet by mouth Daily., Disp: 30 tablet, Rfl: 3    spironolactone (ALDACTONE) 25 MG tablet, Take 0.5 tablets by mouth Daily. (Patient not taking: Reported on 2/7/2025), Disp: , Rfl:     venlafaxine (EFFEXOR) 100 MG tablet, Take 1 tablet by mouth 2 (Two) Times a Day., Disp: 60 tablet, Rfl: 3    ALLERGIES:    Allergies   Allergen Reactions    Contrast Dye (Echo Or Unknown Ct/Mr) Other (See Comments)     Shortness of breath    Prilosec [Omeprazole] Other (See Comments)     Chest pain  Pt states he can take pepcid with no problem    Protonix [Pantoprazole Sodium] Palpitations     Patient states that protonix causes chest pain.  Shruthi Rina Pérez, [facility]  4/23/2017  11:19 AM  Pt states he can tolerate pepcid with no problem      Ativan [Lorazepam] Hallucinations    Depakene [Valproic Acid] Hallucinations    Geodon [Ziprasidone Hcl] Mental Status Change    Libritabs [Chlordiazepoxide] Other (See Comments)     Tongue swell/split    Dilantin [Phenytoin] Delirium    Dilaudid [Hydromorphone] Hallucinations    Flagyl [Metronidazole] Nausea And Vomiting    Topamax [Topiramate] Anxiety     REVIEW OF SYSTEMS:    A comprehensive 14 point review of systems was performed.  Significant findings as mentioned above.  All other systems reviewed and are negative.        Physical Exam   Vital Signs:   Vitals:    02/10/25 1011   BP: 95/68   Pulse: 108   SpO2: 94%     General: Well developed, well nourished, alert and oriented x 3, in  no acute distress.   Head: ATNC   Eyes: PERRL, No evidence of conjunctivitis.   Nose: No nasal discharge.   Mouth: Oral mucosal membranes moist. No oral ulceration or hemorrhages.   Neck: Neck supple. No thyromegaly. No JVD.   Lungs: Clear in all fields to A&P without rales, rhonchi or wheezing.   Heart: Regular rate and rhythm. No murmurs, rubs, or gallops.   Abdomen: Soft. Bowel sounds are normoactive. Nontender with palpation.   Extremities: No cyanosis or edema.   Neurologic: MS as above. Grossly non focal exam.    RECENT LABS:  Lab Results   Component Value Date    WBC 5.8 2025    HGB 12.5 (L) 2025    HCT 36.5 (L) 2025     (H) 2025    RDW 11.2 (L) 2025     (L) 2025    NEUTRORELPCT 59 2025    LYMPHORELPCT 30 2025    MONORELPCT 10 2025    EOSRELPCT 1 2025    BASORELPCT 0 2025    NEUTROABS 3.3 2025    LYMPHSABS 1.7 2025       Lab Results   Component Value Date     2024    K 4.9 2024    CO2 25.9 2024     2024    BUN 15 2024    CREATININE 0.98 2024    EGFRIFNONA 106 2021    GLUCOSE 102 (H) 2024    CALCIUM 9.3 2024    ALKPHOS 134 (H) 2024    AST 29 2024    ALT 24 2024    BILITOT 0.3 2024    ALBUMIN 4.4 2024    PROTEINTOT 6.9 2024    MG 1.7 2023    PHOS 3.6 2021       ASSESSMENT & PLAN:  Jamison Edwrad is a very pleasant 54 y.o. male with    1.  GERD:  2.  S/p cholecystectomy:  3.  S/p gastric bypass:  4.  Unintentional weight loss:  5.  History of H. pylori:  6.  Dysphagia (solids/liquids):  7.  Nausea and vomitin. Epigastric/RUQ abdominal pain:    -Recommended EGD to evaluate the above issues.  We discussed risks/benefits and patient is agreeable to proceed.  Will schedule.  In the interim, patient will continue on Dexilant 60 mg p.o. daily.  Will also provide Rx for Pepcid 40 mg p.o. daily as needed. Of  note, patient is taking baclofen 20 mg 3 times daily for chronic back pain.  Discussed with patient how this will also be beneficial for bile acid reflux and recommended he take this prior to meals. We discussed important dietary modifications, limiting triggers such as caffeine, chocolate, spicy foods, acidic foods, fried/greasy foods, food with high fat content and carbonated beverages.  Also recommended smoking cessation.  -Patient return to clinic following EGD.    9.  Alcoholic fatty liver:  - Happily, patient has abstained from drinking since 2022.  Encouraged continued efforts.   - [place] FibroSure from 10/10/2023 showed mild steatosis/mild NEGRETE with fibrosis stage F1-portal fibrosis.    Obtained additional workup in October 2024.  He did have a very mildly elevated anti-smooth muscle antibody.  SURINDER was positive.  However, reflexed DNA/DS was negative, not suggestive of autoimmune hepatitis.  The remaining workup was unremarkable and negative for PBC as well.  -Most recent ultrasound of the liver from 10/18/2024 showed mild diffuse fatty infiltration of the liver.  -Patient had recent labs on 2/4/2025 including CBC which showed mildly low platelet count, 128,000 (previously been normal).  CMP again showed elevated alkaline phosphatase 207, otherwise normal LFTs.  -Will repeat CBC and CMP today.  Will also check PT/INR and AFP.  Will repeat ultrasound of the liver to further evaluate for fatty liver versus early cirrhosis.  We are also obtaining EGD as above and will evaluate for possible esophageal varices.    Return to clinic following EGD.      Electronically Signed by: EILEEN Hamlin ,  February 10, 2025 09:50 EST       CC:   Leticia Martinez APRN

## 2025-02-10 NOTE — PROGRESS NOTES
DATE:  2/10/2025    DIAGNOSIS: GERD, Alcoholic fatty liver    CHIEF COMPLAINT:  Poorly controlled GERD  Unintentional weight loss  Dysphagia  RUQ abdominal pain  Nausea and vomiting    HPI:  Mr. Edward presents today for follow-up.  He was previously following with Melo Browning PA-C and was last seen in October 2023.  Please see previous notes for prior management.  In review of his records, patient has had chronic difficulty with GERD.  Of note, he is s/p gastric bypass in 2019.  Patient also previously underwent cholecystectomy.  Patient says he has history of Johnson's esophagus.  However, biopsies of the esophagus from most recent EGD performed by Dr. Ovalles on 6/2/2021 revealed mild inflammation from acid reflux.  There was no evidence of intestinal metaplasia or dysplasia.  At present, GERD is well-controlled with Dexilant 60 mg p.o. daily.  He denies having recent flares.  Patient also has history of chronic alcohol abuse.  However, happily he has abstained since 2022.  Patient has history of fatty liver.  [place] FibroSure from 10/10/2023 showed mild steatosis/mild LOVETT with fibrosis stage F1-portal fibrosis.  Patient has not had imaging of his abdomen since 2022.  He had recent labs on 9/30/2024 including CBC which showed normal blood counts including platelets.  CMP showed mildly elevated alkaline phosphatase of 152, otherwise normal LFTs.  He reports his bowels are moving well.  He denies having melena or rectal bleeding.  He had previous colonoscopy in May 2017 with Dr. Richey which was unremarkable.  Random colon biopsies were negative.  Repeat colonoscopy was recommended in 10 years.  He is without family history of colon cancer.     INTERVAL HISTORY:  Mr. Edward presents today for follow-up.  Patient reports he has been feeling poorly for the past~2 to 3 weeks.  He reports having a decreased appetite.  Patient says every time he tries to eat, he begins having nausea.  He reports vomiting bile  and undigested food almost daily.  He reports unintentional weight loss of ~26 lbs . He reports weighing 156 lbs in October and currently weights 130lbs. In regards to GERD, he remains on Dexilant 60 mg p.o. daily.  He is currently having daily flares with burning in his throat/chest, regurgitation and epigastric/RUQ abdominal pain.  He is s/p cholecystectomy.  Of note, patient is taking baclofen 20 mg 3 times daily for chronic back pain.  Discussed with patient how this will also be beneficial to help with bile acid reflux and recommended he take this prior to meals.  Patient reports history of H. pylori a few years ago.  He also complains of dysphagia with both solids and liquids.  He reports his bowels move daily with stool type V on [place] stool scale.  He feels as if he is evacuating his colon well.  He denies having melena, hematochezia or rectal bleeding.  In regards to fatty liver, he continues to follow with his PCP routinely with blood testing.  He had recent labs on 2/4/2025 including CBC which showed mild thrombocytopenia with platelet count of 128,000.  His platelets have previously been normal.  CMP again showed elevated alkaline phosphatase 207 which is further increased, otherwise normal LFTs.  He continues to abstain from drinking.  He has no other complaints today.    PAST MEDICAL HISTORY:  Past Medical History:   Diagnosis Date    Alcohol abuse     quit nov 12 2022    Alcoholism     Anemia     Anxiety whwhen put in continued care    Arthritis of back     not sure    Johnson esophagus     Brain concussion     Cholelithiasis     Chronic pain disorder have degenerative disc disease    Cirrhosis     COPD (chronic obstructive pulmonary disease)     CTS (carpal tunnel syndrome)     DDD (degenerative disc disease), thoracic     Degenerative disc disease, lumbar     Dystonia     GERD (gastroesophageal reflux disease)     GI (gastrointestinal bleed)     Hypertension     Irritable bowel syndrome     Liver  disease     Low back pain     Low back strain     Lumbosacral disc disease     Migraines     MRSA (methicillin resistant Staphylococcus aureus)     Peptic ulcer disease     Stroke     TIA    TIA (transient ischemic attack)     Ulcerative colitis     Wears dentures     upper only       PAST SURGICAL HISTORY:  Past Surgical History:   Procedure Laterality Date    ABDOMINAL SURGERY  09/17/2021    APPENDECTOMY      CARPAL TUNNEL RELEASE  2005    CHOLECYSTECTOMY N/A 04/22/2017    Procedure: CHOLECYSTECTOMY LAPAROSCOPIC;  Surgeon: Jaqueline Guaman MD;  [facility];  Service:     COLONOSCOPY N/A 05/08/2017    Procedure: COLONOSCOPY  CPTCODE:72074;  Surgeon: Nicho Richey III, MD;  [facility];  Service:     CUBITAL TUNNEL RELEASE Left 09/01/2022    Procedure: CUBITAL TUNNEL RELEASE LEFT;  Surgeon: Alec Hough MD;  [facility];  Service: Orthopedics;  Laterality: Left;    ENDOSCOPY      ENDOSCOPY N/A 05/08/2017    Procedure: ESOPHAGOGASTRODUODENOSCOPY WITH BIOPSY  CPTCODE:02851;  Surgeon: Nicho Richey III, MD;  [facility];  Service:     ENDOSCOPY N/A 11/03/2017    Procedure: ESOPHAGOGASTRODUODENOSCOPY WITH BIOPSY  CPTCODE: 78278;  Surgeon: Nicho Richey III, MD;  [facility];  Service:     ENDOSCOPY N/A 02/20/2018    Procedure: ESOPHAGOGASTRODUODENOSCOPY WITH DILATATION CPT CODE: 41671;  Surgeon: Nicho Richey III, MD;  [facility];  Service:     ENDOSCOPY N/A 06/05/2018    Procedure: ESOPHAGOGASTRODUODENOSCOPY WITH BIOPSY CPT CODE: 14772;  Surgeon: Nicho Richey III, MD;  [facility];  Service: Gastroenterology    ENDOSCOPY N/A 05/26/2021    Procedure: ESOPHAGOGASTRODUODENOSCOPY WITH BIOPSY;  Surgeon: Julieta Hebert MD;  [facility];  Service: Gastroenterology;  Laterality: N/A;    ENDOSCOPY N/A 06/02/2021    Procedure: ESOPHAGOGASTRODUODENOSCOPY WITH BIOPSY;  Surgeon: Julieta Hebert MD;   [facility];  Service: Gastroenterology;  Laterality: N/A;    GASTRECTOMY N/A 09/17/2019    Procedure: OPEN VAGOTOMY, ANTRECTOMY,REVISION- Y GASTROJEJUNOSOTOMY;  Surgeon: Herbert Umanzor MD;  Location:  [facility];  Service: General    HERNIA REPAIR  12/10/2020    TRACHEOSTOMY N/A 01/17/2023    Procedure: TRACHEOSTOMY;  Surgeon: Felton De León MD;  [facility];  Service: General;  Laterality: N/A;    UPPER GASTROINTESTINAL ENDOSCOPY      VENTRAL/INCISIONAL HERNIA REPAIR N/A 12/10/2020    Procedure: INCISIONAL HERNIA REPAIR LAPAROSCOPIC WITH MESH;  Surgeon: Herbert Umanzor MD;  Location:  [facility];  Service: General;  Laterality: N/A;       SOCIAL HISTORY:  Social History     Socioeconomic History    Marital status:    Tobacco Use    Smoking status: Every Day     Current packs/day: 1.00     Average packs/day: 1 pack/day for 35.0 years (35.0 ttl pk-yrs)     Types: Cigarettes     Passive exposure: Current    Smokeless tobacco: Never    Tobacco comments:     Been smoking 20 years   Vaping Use    Vaping status: Never Used   Substance and Sexual Activity    Alcohol use: Not Currently     Comment: Quit drinking November 12,2022    Drug use: No    Sexual activity: Yes     Partners: Female     Birth control/protection: None       FAMILY HISTORY:  Family History   Problem Relation Age of Onset    Osteoporosis Mother     Hypertension Father     Osteoporosis Father     No Known Problems Sister     No Known Problems Brother     Drug abuse Brother     No Known Problems Daughter     Diabetes Sister     Stroke Paternal Grandfather        MEDICATIONS:  The current medication list was reviewed in the EMR    Current Outpatient Medications:     albuterol (PROVENTIL) (2.5 MG/3ML) 0.083% nebulizer solution, Take 2.5 mg by nebulization 4 (Four) Times a Day., Disp: , Rfl:     baclofen (LIORESAL) 20 MG tablet, , Disp: , Rfl:     dexlansoprazole (DEXILANT) 60 MG capsule, Take 1 capsule by mouth Every Morning  Before Breakfast., Disp: 30 capsule, Rfl: 11    HYDROcodone-acetaminophen (NORCO) 5-325 MG per tablet, , Disp: , Rfl:     levETIRAcetam (KEPPRA) 750 MG tablet, Take 1 tablet by mouth 2 (Two) Times a Day., Disp: , Rfl:     metoprolol tartrate (LOPRESSOR) 25 MG tablet, Take 1 tablet by mouth 2 (Two) Times a Day. (Patient not taking: Reported on 2/7/2025), Disp: , Rfl:     QUEtiapine (SEROquel) 200 MG tablet, Take 1 tablet by mouth Every Night., Disp: 30 tablet, Rfl: 3    QUEtiapine (SEROquel) 25 MG tablet, Take 1 tablet by mouth Daily., Disp: 30 tablet, Rfl: 3    spironolactone (ALDACTONE) 25 MG tablet, Take 0.5 tablets by mouth Daily. (Patient not taking: Reported on 2/7/2025), Disp: , Rfl:     venlafaxine (EFFEXOR) 100 MG tablet, Take 1 tablet by mouth 2 (Two) Times a Day., Disp: 60 tablet, Rfl: 3    ALLERGIES:    Allergies   Allergen Reactions    Contrast Dye (Echo Or Unknown Ct/Mr) Other (See Comments)     Shortness of breath    Prilosec [Omeprazole] Other (See Comments)     Chest pain  Pt states he can take pepcid with no problem    Protonix [Pantoprazole Sodium] Palpitations     Patient states that protonix causes chest pain.  Shruthi Rina Pérez, [facility]  4/23/2017  11:19 AM  Pt states he can tolerate pepcid with no problem      Ativan [Lorazepam] Hallucinations    Depakene [Valproic Acid] Hallucinations    Geodon [Ziprasidone Hcl] Mental Status Change    Libritabs [Chlordiazepoxide] Other (See Comments)     Tongue swell/split    Dilantin [Phenytoin] Delirium    Dilaudid [Hydromorphone] Hallucinations    Flagyl [Metronidazole] Nausea And Vomiting    Topamax [Topiramate] Anxiety     REVIEW OF SYSTEMS:    A comprehensive 14 point review of systems was performed.  Significant findings as mentioned above.  All other systems reviewed and are negative.        Physical Exam   Vital Signs:   Vitals:    02/10/25 1011   BP: 95/68   Pulse: 108   SpO2: 94%     General: Well developed, well nourished, alert and oriented x 3, in  no acute distress.   Head: ATNC   Eyes: PERRL, No evidence of conjunctivitis.   Nose: No nasal discharge.   Mouth: Oral mucosal membranes moist. No oral ulceration or hemorrhages.   Neck: Neck supple. No thyromegaly. No JVD.   Lungs: Clear in all fields to A&P without rales, rhonchi or wheezing.   Heart: Regular rate and rhythm. No murmurs, rubs, or gallops.   Abdomen: Soft. Bowel sounds are normoactive. Nontender with palpation.   Extremities: No cyanosis or edema.   Neurologic: MS as above. Grossly non focal exam.    RECENT LABS:  Lab Results   Component Value Date    WBC 5.8 2025    HGB 12.5 (L) 2025    HCT 36.5 (L) 2025     (H) 2025    RDW 11.2 (L) 2025     (L) 2025    NEUTRORELPCT 59 2025    LYMPHORELPCT 30 2025    MONORELPCT 10 2025    EOSRELPCT 1 2025    BASORELPCT 0 2025    NEUTROABS 3.3 2025    LYMPHSABS 1.7 2025       Lab Results   Component Value Date     2024    K 4.9 2024    CO2 25.9 2024     2024    BUN 15 2024    CREATININE 0.98 2024    EGFRIFNONA 106 2021    GLUCOSE 102 (H) 2024    CALCIUM 9.3 2024    ALKPHOS 134 (H) 2024    AST 29 2024    ALT 24 2024    BILITOT 0.3 2024    ALBUMIN 4.4 2024    PROTEINTOT 6.9 2024    MG 1.7 2023    PHOS 3.6 2021       ASSESSMENT & PLAN:  Jamison Edward is a very pleasant 54 y.o. male with    1.  GERD:  2.  S/p cholecystectomy:  3.  S/p gastric bypass:  4.  Unintentional weight loss:  5.  History of H. pylori:  6.  Dysphagia (solids/liquids):  7.  Nausea and vomitin. Epigastric/RUQ abdominal pain:    -Recommended EGD to evaluate the above issues.  We discussed risks/benefits and patient is agreeable to proceed.  Will schedule.  In the interim, patient will continue on Dexilant 60 mg p.o. daily.  Will also provide Rx for Pepcid 40 mg p.o. daily as needed. Of  note, patient is taking baclofen 20 mg 3 times daily for chronic back pain.  Discussed with patient how this will also be beneficial for bile acid reflux and recommended he take this prior to meals. We discussed important dietary modifications, limiting triggers such as caffeine, chocolate, spicy foods, acidic foods, fried/greasy foods, food with high fat content and carbonated beverages.  Also recommended smoking cessation.  -Patient return to clinic following EGD.    9.  Alcoholic fatty liver:  - Happily, patient has abstained from drinking since 2022.  Encouraged continued efforts.   - [place] FibroSure from 10/10/2023 showed mild steatosis/mild NEGRETE with fibrosis stage F1-portal fibrosis.    Obtained additional workup in October 2024.  He did have a very mildly elevated anti-smooth muscle antibody.  SURINDER was positive.  However, reflexed DNA/DS was negative, not suggestive of autoimmune hepatitis.  The remaining workup was unremarkable and negative for PBC as well.  -Most recent ultrasound of the liver from 10/18/2024 showed mild diffuse fatty infiltration of the liver.  -Patient had recent labs on 2/4/2025 including CBC which showed mildly low platelet count, 128,000 (previously been normal).  CMP again showed elevated alkaline phosphatase 207, otherwise normal LFTs.  -Will repeat CBC and CMP today.  Will also check PT/INR and AFP.  Will repeat ultrasound of the liver to further evaluate for fatty liver versus early cirrhosis.  We are also obtaining EGD as above and will evaluate for possible esophageal varices.    Return to clinic following EGD.      Electronically Signed by: EILEEN Hamlin ,  February 10, 2025 09:50 EST       CC:   Leticia Martinez APRN

## 2025-02-11 ENCOUNTER — TELEPHONE (OUTPATIENT)
Dept: GASTROENTEROLOGY | Facility: CLINIC | Age: 55
End: 2025-02-11
Payer: MEDICARE

## 2025-02-11 LAB
ALBUMIN SERPL-MCNC: 2.8 G/DL (ref 3.5–5.2)
ALBUMIN/GLOB SERPL: 1.3 G/DL
ALP SERPL-CCNC: 208 U/L (ref 39–117)
ALPHA-FETOPROTEIN: 9.33 NG/ML (ref 0–8.3)
ALT SERPL W P-5'-P-CCNC: 33 U/L (ref 1–41)
ANION GAP SERPL CALCULATED.3IONS-SCNC: 8.7 MMOL/L (ref 5–15)
AST SERPL-CCNC: 40 U/L (ref 1–40)
BASOPHILS # BLD AUTO: 0.03 10*3/MM3 (ref 0–0.2)
BASOPHILS NFR BLD AUTO: 0.6 % (ref 0–1.5)
BILIRUB SERPL-MCNC: 0.7 MG/DL (ref 0–1.2)
BUN SERPL-MCNC: 10 MG/DL (ref 6–20)
BUN/CREAT SERPL: 13.9 (ref 7–25)
CALCIUM SPEC-SCNC: 8.2 MG/DL (ref 8.6–10.5)
CHLORIDE SERPL-SCNC: 104 MMOL/L (ref 98–107)
CO2 SERPL-SCNC: 26.3 MMOL/L (ref 22–29)
CREAT SERPL-MCNC: 0.72 MG/DL (ref 0.76–1.27)
DEPRECATED RDW RBC AUTO: 42.5 FL (ref 37–54)
EGFRCR SERPLBLD CKD-EPI 2021: 107.2 ML/MIN/1.73
EOSINOPHIL # BLD AUTO: 0.03 10*3/MM3 (ref 0–0.4)
EOSINOPHIL NFR BLD AUTO: 0.6 % (ref 0.3–6.2)
ERYTHROCYTE [DISTWIDTH] IN BLOOD BY AUTOMATED COUNT: 11.7 % (ref 12.3–15.4)
GLOBULIN UR ELPH-MCNC: 2.2 GM/DL
GLUCOSE SERPL-MCNC: 88 MG/DL (ref 65–99)
HCT VFR BLD AUTO: 39.8 % (ref 37.5–51)
HGB BLD-MCNC: 13.6 G/DL (ref 13–17.7)
IMM GRANULOCYTES # BLD AUTO: 0.02 10*3/MM3 (ref 0–0.05)
IMM GRANULOCYTES NFR BLD AUTO: 0.4 % (ref 0–0.5)
LYMPHOCYTES # BLD AUTO: 1.14 10*3/MM3 (ref 0.7–3.1)
LYMPHOCYTES NFR BLD AUTO: 21.6 % (ref 19.6–45.3)
MCH RBC QN AUTO: 34.3 PG (ref 26.6–33)
MCHC RBC AUTO-ENTMCNC: 34.2 G/DL (ref 31.5–35.7)
MCV RBC AUTO: 100.5 FL (ref 79–97)
MONOCYTES # BLD AUTO: 0.49 10*3/MM3 (ref 0.1–0.9)
MONOCYTES NFR BLD AUTO: 9.3 % (ref 5–12)
NEUTROPHILS NFR BLD AUTO: 3.56 10*3/MM3 (ref 1.7–7)
NEUTROPHILS NFR BLD AUTO: 67.5 % (ref 42.7–76)
NRBC BLD AUTO-RTO: 0 /100 WBC (ref 0–0.2)
PLATELET # BLD AUTO: 121 10*3/MM3 (ref 140–450)
PMV BLD AUTO: 12.1 FL (ref 6–12)
POTASSIUM SERPL-SCNC: 4.3 MMOL/L (ref 3.5–5.2)
PROT SERPL-MCNC: 5 G/DL (ref 6–8.5)
RBC # BLD AUTO: 3.96 10*6/MM3 (ref 4.14–5.8)
SODIUM SERPL-SCNC: 139 MMOL/L (ref 136–145)
WBC NRBC COR # BLD AUTO: 5.27 10*3/MM3 (ref 3.4–10.8)

## 2025-02-11 NOTE — TELEPHONE ENCOUNTER
Please let patient know that labs from yesterday including CBC showed his platelets remain low, currently 121,000 and stable. His Hg/Hct and WBCs were normal. CMP showed Alk phos remains elevated, 208. The remaining LFTs were normal. His AFP (tumor marker) was mildly elevated 9.33 which could be indicative of cirrhosis. Please have US liver as ordered to evaluate for cirrhosis and possible liver lesions. Also, please have EGD as scheduled for further workup for cirrhosis.

## 2025-02-12 ENCOUNTER — TELEPHONE (OUTPATIENT)
Dept: CARDIOLOGY | Facility: CLINIC | Age: 55
End: 2025-02-12
Payer: MEDICARE

## 2025-02-12 NOTE — TELEPHONE ENCOUNTER
Cardiac Risk Assessment  Procedure: EGD  LS: 8/7/25  F/up: 3/7/25  Stress: 9/22/01  Echo: 12/31/22        Form filled out and placed on Eva's desk.

## 2025-02-12 NOTE — TELEPHONE ENCOUNTER
I spoke to patient, notified him that per Lauren, Please let patient know that labs from yesterday including CBC showed his platelets remain low, currently 121,000 and stable. His Hg/Hct and WBCs were normal. CMP showed Alk phos remains elevated, 208. The remaining LFTs were normal. His AFP (tumor marker) was mildly elevated 9.33 which could be indicative of cirrhosis. Please have US liver as ordered to evaluate for cirrhosis and possible liver lesions. Also, please have EGD as scheduled for further workup for cirrhosis.  Patient voiced understanding

## 2025-02-12 NOTE — TELEPHONE ENCOUNTER
----- Message from Eva Michele sent at 2/12/2025 12:02 PM EST -----  Regarding: RE: Cardiac clearance    ----- Message -----  From: Krista Stephenson MA  Sent: 2/11/2025   2:05 PM EST  To: EILEEN Tolbert  Subject: Cardiac clearance                                Patient is scheduled for EGD with Dr. Ovalles on 4/8/2025. We are needing to obtain cardiac clearance prior to procedure. Derian

## 2025-02-14 ENCOUNTER — TELEPHONE (OUTPATIENT)
Dept: CARDIOLOGY | Facility: CLINIC | Age: 55
End: 2025-02-14

## 2025-02-14 NOTE — TELEPHONE ENCOUNTER
Caller: Jamison Edward    Relationship: Self    Best call back number: [phone]      What is the best time to reach you: ANY    Who are you requesting to speak with (clinical staff, provider,  specific staff member): CLINICAL    Do you know the name of the person who called: NOT SURE    What was the call regarding: PT WAS RETURNING A MISSED CALL, WHILE ON PHONE WITH HUB OFFICE WAS CALLING BACK PATIENT HUNG UP.    Is it okay if the provider responds through MyChart: CALL

## 2025-02-22 DIAGNOSIS — I47.29 OTHER VENTRICULAR TACHYCARDIA: ICD-10-CM

## 2025-02-22 DIAGNOSIS — I47.20 VENTRICULAR TACHYCARDIA: Primary | ICD-10-CM

## 2025-03-04 ENCOUNTER — HOSPITAL ENCOUNTER (OUTPATIENT)
Facility: HOSPITAL | Age: 55
Setting detail: HOSPITAL OUTPATIENT SURGERY
Discharge: HOME | End: 2025-03-04
Attending: INTERNAL MEDICINE | Admitting: INTERNAL MEDICINE
Payer: MEDICARE

## 2025-03-04 ENCOUNTER — ANESTHESIA EVENT (OUTPATIENT)
Dept: PERIOP | Facility: HOSPITAL | Age: 55
End: 2025-03-04
Payer: MEDICARE

## 2025-03-04 ENCOUNTER — ANESTHESIA (OUTPATIENT)
Dept: PERIOP | Facility: HOSPITAL | Age: 55
End: 2025-03-04
Payer: MEDICARE

## 2025-03-04 VITALS
BODY MASS INDEX: 19.77 KG/M2 | DIASTOLIC BLOOD PRESSURE: 73 MMHG | OXYGEN SATURATION: 97 % | HEIGHT: 66 IN | RESPIRATION RATE: 18 BRPM | HEART RATE: 104 BPM | SYSTOLIC BLOOD PRESSURE: 130 MMHG | TEMPERATURE: 98.3 F | WEIGHT: 123 LBS

## 2025-03-04 DIAGNOSIS — R13.19 ESOPHAGEAL DYSPHAGIA: ICD-10-CM

## 2025-03-04 DIAGNOSIS — R11.2 NAUSEA AND VOMITING, UNSPECIFIED VOMITING TYPE: ICD-10-CM

## 2025-03-04 DIAGNOSIS — R63.4 UNINTENTIONAL WEIGHT LOSS: ICD-10-CM

## 2025-03-04 DIAGNOSIS — K90.9 INTESTINAL MALABSORPTION, UNSPECIFIED TYPE: Primary | ICD-10-CM

## 2025-03-04 DIAGNOSIS — K21.9 GASTROESOPHAGEAL REFLUX DISEASE, UNSPECIFIED WHETHER ESOPHAGITIS PRESENT: ICD-10-CM

## 2025-03-04 DIAGNOSIS — K70.0 ALCOHOLIC FATTY LIVER: ICD-10-CM

## 2025-03-04 LAB
FOLATE SERPL-MCNC: 3.51 NG/ML (ref 4.78–24.2)
VIT B12 BLD-MCNC: 1206 PG/ML (ref 211–946)

## 2025-03-04 PROCEDURE — 25010000002 LIDOCAINE PF 2% 2 % SOLUTION: Performed by: NURSE ANESTHETIST, CERTIFIED REGISTERED

## 2025-03-04 PROCEDURE — 82746 ASSAY OF FOLIC ACID SERUM: CPT | Performed by: INTERNAL MEDICINE

## 2025-03-04 PROCEDURE — 82607 VITAMIN B-12: CPT | Performed by: INTERNAL MEDICINE

## 2025-03-04 PROCEDURE — 25810000003 LACTATED RINGERS PER 1000 ML: Performed by: ANESTHESIOLOGY

## 2025-03-04 PROCEDURE — 43235 EGD DIAGNOSTIC BRUSH WASH: CPT | Performed by: INTERNAL MEDICINE

## 2025-03-04 PROCEDURE — 25010000002 METOCLOPRAMIDE PER 10 MG: Performed by: INTERNAL MEDICINE

## 2025-03-04 PROCEDURE — 25010000002 PROPOFOL 10 MG/ML EMULSION: Performed by: NURSE ANESTHETIST, CERTIFIED REGISTERED

## 2025-03-04 RX ORDER — SODIUM CHLORIDE 0.9 % (FLUSH) 0.9 %
10 SYRINGE (ML) INJECTION AS NEEDED
Status: DISCONTINUED | OUTPATIENT
Start: 2025-03-04 | End: 2025-03-04 | Stop reason: HOSPADM

## 2025-03-04 RX ORDER — SODIUM CHLORIDE 9 MG/ML
40 INJECTION, SOLUTION INTRAVENOUS AS NEEDED
Status: DISCONTINUED | OUTPATIENT
Start: 2025-03-04 | End: 2025-03-04 | Stop reason: HOSPADM

## 2025-03-04 RX ORDER — MEPERIDINE HYDROCHLORIDE 25 MG/ML
12.5 INJECTION INTRAMUSCULAR; INTRAVENOUS; SUBCUTANEOUS
Status: DISCONTINUED | OUTPATIENT
Start: 2025-03-04 | End: 2025-03-04 | Stop reason: HOSPADM

## 2025-03-04 RX ORDER — PROPOFOL 10 MG/ML
VIAL (ML) INTRAVENOUS AS NEEDED
Status: DISCONTINUED | OUTPATIENT
Start: 2025-03-04 | End: 2025-03-04 | Stop reason: SURG

## 2025-03-04 RX ORDER — LIDOCAINE HYDROCHLORIDE 20 MG/ML
INJECTION, SOLUTION EPIDURAL; INFILTRATION; INTRACAUDAL; PERINEURAL AS NEEDED
Status: DISCONTINUED | OUTPATIENT
Start: 2025-03-04 | End: 2025-03-04 | Stop reason: SURG

## 2025-03-04 RX ORDER — METOCLOPRAMIDE HYDROCHLORIDE 5 MG/ML
10 INJECTION INTRAMUSCULAR; INTRAVENOUS ONCE
Status: COMPLETED | OUTPATIENT
Start: 2025-03-04 | End: 2025-03-04

## 2025-03-04 RX ORDER — MIDAZOLAM HYDROCHLORIDE 1 MG/ML
1 INJECTION, SOLUTION INTRAMUSCULAR; INTRAVENOUS
Status: DISCONTINUED | OUTPATIENT
Start: 2025-03-04 | End: 2025-03-04 | Stop reason: HOSPADM

## 2025-03-04 RX ORDER — SODIUM CHLORIDE, SODIUM LACTATE, POTASSIUM CHLORIDE, CALCIUM CHLORIDE 600; 310; 30; 20 MG/100ML; MG/100ML; MG/100ML; MG/100ML
125 INJECTION, SOLUTION INTRAVENOUS ONCE
Status: DISCONTINUED | OUTPATIENT
Start: 2025-03-04 | End: 2025-03-04 | Stop reason: HOSPADM

## 2025-03-04 RX ORDER — IPRATROPIUM BROMIDE AND ALBUTEROL SULFATE 2.5; .5 MG/3ML; MG/3ML
3 SOLUTION RESPIRATORY (INHALATION) ONCE AS NEEDED
Status: DISCONTINUED | OUTPATIENT
Start: 2025-03-04 | End: 2025-03-04 | Stop reason: HOSPADM

## 2025-03-04 RX ORDER — SODIUM CHLORIDE 0.9 % (FLUSH) 0.9 %
10 SYRINGE (ML) INJECTION EVERY 12 HOURS SCHEDULED
Status: DISCONTINUED | OUTPATIENT
Start: 2025-03-04 | End: 2025-03-04 | Stop reason: HOSPADM

## 2025-03-04 RX ORDER — ONDANSETRON 2 MG/ML
4 INJECTION INTRAMUSCULAR; INTRAVENOUS AS NEEDED
Status: DISCONTINUED | OUTPATIENT
Start: 2025-03-04 | End: 2025-03-04 | Stop reason: HOSPADM

## 2025-03-04 RX ORDER — FENTANYL CITRATE 50 UG/ML
50 INJECTION, SOLUTION INTRAMUSCULAR; INTRAVENOUS
Status: DISCONTINUED | OUTPATIENT
Start: 2025-03-04 | End: 2025-03-04 | Stop reason: HOSPADM

## 2025-03-04 RX ORDER — OXYCODONE AND ACETAMINOPHEN 5; 325 MG/1; MG/1
1 TABLET ORAL ONCE AS NEEDED
Status: DISCONTINUED | OUTPATIENT
Start: 2025-03-04 | End: 2025-03-04 | Stop reason: HOSPADM

## 2025-03-04 RX ORDER — KETOROLAC TROMETHAMINE 30 MG/ML
30 INJECTION, SOLUTION INTRAMUSCULAR; INTRAVENOUS EVERY 6 HOURS PRN
Status: DISCONTINUED | OUTPATIENT
Start: 2025-03-04 | End: 2025-03-04 | Stop reason: HOSPADM

## 2025-03-04 RX ORDER — SODIUM CHLORIDE, SODIUM LACTATE, POTASSIUM CHLORIDE, CALCIUM CHLORIDE 600; 310; 30; 20 MG/100ML; MG/100ML; MG/100ML; MG/100ML
125 INJECTION, SOLUTION INTRAVENOUS ONCE
Status: COMPLETED | OUTPATIENT
Start: 2025-03-04 | End: 2025-03-04

## 2025-03-04 RX ORDER — METOCLOPRAMIDE 10 MG/1
10 TABLET ORAL
Qty: 90 TABLET | Refills: 2 | Status: SHIPPED | OUTPATIENT
Start: 2025-03-04

## 2025-03-04 RX ADMIN — LIDOCAINE HYDROCHLORIDE 100 MG: 20 INJECTION, SOLUTION EPIDURAL; INFILTRATION; INTRACAUDAL; PERINEURAL at 08:37

## 2025-03-04 RX ADMIN — PROPOFOL 100 MG: 10 INJECTION, EMULSION INTRAVENOUS at 08:37

## 2025-03-04 RX ADMIN — SODIUM CHLORIDE, POTASSIUM CHLORIDE, SODIUM LACTATE AND CALCIUM CHLORIDE: 600; 310; 30; 20 INJECTION, SOLUTION INTRAVENOUS at 08:34

## 2025-03-04 RX ADMIN — METOCLOPRAMIDE 10 MG: 5 INJECTION, SOLUTION INTRAMUSCULAR; INTRAVENOUS at 09:30

## 2025-03-04 NOTE — ANESTHESIA POSTPROCEDURE EVALUATION
Patient: Jamison Edward    Procedure Summary       Date: 03/04/25 Room / Location: [facility] OR  /  [facility]    Anesthesia Start: 0834 Anesthesia Stop: 0846    Procedure: ESOPHAGOGASTRODUODENOSCOPY WITH BIOPSY (Esophagus) Diagnosis:       Gastroesophageal reflux disease, unspecified whether esophagitis present      Alcoholic fatty liver      Esophageal dysphagia      Unintentional weight loss      Nausea and vomiting, unspecified vomiting type      (Gastroesophageal reflux disease, unspecified whether esophagitis present [K21.9])      (Alcoholic fatty liver [K70.0])      (Esophageal dysphagia [R13.19])      (Unintentional weight loss [R63.4])      (Nausea and vomiting, unspecified vomiting type [R11.2])    Surgeons: Julieta Hebert MD Provider: Surya Ledezma MD    Anesthesia Type: general ASA Status: 4            Anesthesia Type: general    Vitals  Vitals Value Taken Time   /73 03/04/25 0939   Temp 98.3 °F (36.8 °C) 03/04/25 0847   Pulse 104 03/04/25 0939   Resp 18 03/04/25 0939   SpO2 97 % 03/04/25 0939           Post Anesthesia Care and Evaluation    Patient location during evaluation: PHASE II  Patient participation: complete - patient participated  Level of consciousness: awake and alert  Pain score: 0  Pain management: adequate    Airway patency: patent  Anesthetic complications: No anesthetic complications    Cardiovascular status: acceptable  Respiratory status: acceptable  Hydration status: acceptable

## 2025-03-04 NOTE — ANESTHESIA PREPROCEDURE EVALUATION
Anesthesia Evaluation     Patient summary reviewed and Nursing notes reviewed   no history of anesthetic complications:   NPO Solid Status: > 8 hours  NPO Liquid Status: > 8 hours           Airway   Mallampati: II  TM distance: >3 FB  Neck ROM: full  No difficulty expected  Dental    (+) poor dentition    Pulmonary    (+) pneumonia , a smoker Current, COPD moderate,shortness of breath, decreased breath sounds  Cardiovascular - normal exam    ECG reviewed  Rhythm: regular  Rate: normal    (+) hypertension, BROWN, PVD      Neuro/Psych  (+) TIA, CVA, headaches, dizziness/light headedness, numbness, psychiatric history  GI/Hepatic/Renal/Endo    (+) obesity, GERD, PUD, GI bleeding , liver disease    Musculoskeletal     (+) back pain, chronic pain  Abdominal  - normal exam   Substance History   (+) alcohol use     OB/GYN negative ob/gyn ROS         Other   arthritis, blood dyscrasia anemia,     ROS/Med Hx Other: Echo 12/31/2022:  •  Left ventricular systolic function is hyperdynamic (EF > 70%). Left ventricular ejection fraction appears to be greater than 70%.  •  Left ventricular diastolic function is consistent with (grade I) impaired relaxation.  •  Estimated right ventricular systolic pressure from tricuspid regurgitation is mildly elevated (35-45 mmHg).  •  No significant pericardial disease.                  Anesthesia Plan    ASA 4     general     intravenous induction     Anesthetic plan, risks, benefits, and alternatives have been provided, discussed and informed consent has been obtained with: patient.  Pre-procedure education provided  Plan discussed with CRNA.      CODE STATUS:

## 2025-03-05 ENCOUNTER — TELEPHONE (OUTPATIENT)
Dept: CARDIOLOGY | Facility: CLINIC | Age: 55
End: 2025-03-05
Payer: MEDICARE

## 2025-03-05 NOTE — TELEPHONE ENCOUNTER
Rescheduled patient's appointment on 3/7 due to wearing repeat heart monitor made new appointment 4/7 until after results are back patient expressed understanding

## 2025-03-10 ENCOUNTER — HOSPITAL ENCOUNTER (OUTPATIENT)
Facility: HOSPITAL | Age: 55
Discharge: HOME | End: 2025-03-10
Admitting: NURSE PRACTITIONER
Payer: MEDICARE

## 2025-03-10 DIAGNOSIS — K70.0 ALCOHOLIC FATTY LIVER: ICD-10-CM

## 2025-03-10 PROCEDURE — 76705 ECHO EXAM OF ABDOMEN: CPT | Performed by: RADIOLOGY

## 2025-03-10 PROCEDURE — 76705 ECHO EXAM OF ABDOMEN: CPT

## 2025-03-12 ENCOUNTER — TELEPHONE (OUTPATIENT)
Dept: GASTROENTEROLOGY | Facility: CLINIC | Age: 55
End: 2025-03-12

## 2025-03-12 DIAGNOSIS — E53.8 FOLIC ACID DEFICIENCY: Primary | ICD-10-CM

## 2025-03-12 RX ORDER — FOLIC ACID 1 MG/1
1 TABLET ORAL DAILY
Qty: 30 TABLET | Refills: 3 | Status: SHIPPED | OUTPATIENT
Start: 2025-03-12

## 2025-03-12 NOTE — TELEPHONE ENCOUNTER
Caller: Lena Edward    Relationship: Emergency Contact    Best call back number: [phone]    Caller requesting test results: YES    What test was performed: ULTRASOUND OF THE LIVER    When was the test performed: 3-10-25    Where was the test performed: [facility] [address]    Additional notes: WIFE CALLED WANTING TO SEE WHAT THE RESULTS SHOWED

## 2025-03-12 NOTE — TELEPHONE ENCOUNTER
----- Message from Julieta Hebert sent at 3/12/2025  1:47 PM EDT -----  Please call the patient to let him know that his folate level is mildly low.  I have sent in a prescription for folic acid.  He will take 1 pill daily.

## 2025-03-13 ENCOUNTER — RESULTS FOLLOW-UP (OUTPATIENT)
Facility: HOSPITAL | Age: 55
End: 2025-03-13
Payer: MEDICARE

## 2025-03-13 NOTE — TELEPHONE ENCOUNTER
Please let patient know that repeat ultrasound of the abdomen from 3/10/2025 is now showing that fatty liver has progressed to cirrhosis.Also noted small volume ascites.  Please advise patient to keep follow-up as scheduled for further discussion and management.

## 2025-03-14 NOTE — TELEPHONE ENCOUNTER
I called patient, no answer, I left a message on his VM that per Lauren,  Please let patient know that repeat ultrasound of the abdomen from 3/10/2025 is now showing that fatty liver has progressed to cirrhosis.Also noted small volume ascites. Please advise patient to keep follow-up as scheduled for further discussion and management.

## 2025-03-24 ENCOUNTER — HOSPITAL ENCOUNTER (OUTPATIENT)
Dept: CARDIOLOGY | Facility: HOSPITAL | Age: 55
Discharge: HOME | End: 2025-03-24
Admitting: NURSE PRACTITIONER
Payer: MEDICARE

## 2025-03-24 DIAGNOSIS — I47.20 VENTRICULAR TACHYCARDIA: ICD-10-CM

## 2025-03-24 DIAGNOSIS — I47.29 OTHER VENTRICULAR TACHYCARDIA: ICD-10-CM

## 2025-03-24 LAB
AV MEAN PRESS GRAD SYS DOP V1V2: 2 MMHG
AV VMAX SYS DOP: 88.3 CM/SEC
BH CV ECHO MEAS - AO MAX PG: 3.1 MMHG
BH CV ECHO MEAS - AO ROOT DIAM: 2.3 CM
BH CV ECHO MEAS - AO V2 VTI: 18.2 CM
BH CV ECHO MEAS - EDV(CUBED): 29.8 ML
BH CV ECHO MEAS - EDV(MOD-SP4): 30 ML
BH CV ECHO MEAS - EF(MOD-SP4): 65.3 %
BH CV ECHO MEAS - ESV(CUBED): 24.4 ML
BH CV ECHO MEAS - ESV(MOD-SP4): 10.4 ML
BH CV ECHO MEAS - FS: 6.5 %
BH CV ECHO MEAS - IVS/LVPW: 1.08 CM
BH CV ECHO MEAS - IVSD: 1.3 CM
BH CV ECHO MEAS - LA DIMENSION: 2 CM
BH CV ECHO MEAS - LAT PEAK E' VEL: 10.6 CM/SEC
BH CV ECHO MEAS - LV DIASTOLIC VOL/BSA (35-75): 18.4 CM2
BH CV ECHO MEAS - LV MASS(C)D: 121.9 GRAMS
BH CV ECHO MEAS - LV SYSTOLIC VOL/BSA (12-30): 6.4 CM2
BH CV ECHO MEAS - LVIDD: 3.1 CM
BH CV ECHO MEAS - LVIDS: 2.9 CM
BH CV ECHO MEAS - LVOT AREA: 2.27 CM2
BH CV ECHO MEAS - LVOT DIAM: 1.7 CM
BH CV ECHO MEAS - LVPWD: 1.2 CM
BH CV ECHO MEAS - MED PEAK E' VEL: 8.6 CM/SEC
BH CV ECHO MEAS - MV A MAX VEL: 35 CM/SEC
BH CV ECHO MEAS - MV E MAX VEL: 102 CM/SEC
BH CV ECHO MEAS - MV E/A: 2.9
BH CV ECHO MEAS - PA ACC TIME: 0.11 SEC
BH CV ECHO MEAS - SV(MOD-SP4): 19.6 ML
BH CV ECHO MEAS - SVI(MOD-SP4): 12 ML/M2
BH CV ECHO MEASUREMENTS AVERAGE E/E' RATIO: 10.63
LEFT ATRIUM VOLUME INDEX: 8.9 ML/M2

## 2025-03-24 PROCEDURE — 93306 TTE W/DOPPLER COMPLETE: CPT

## 2025-03-24 PROCEDURE — 93306 TTE W/DOPPLER COMPLETE: CPT | Performed by: SPECIALIST

## 2025-04-01 ENCOUNTER — TELEMEDICINE (OUTPATIENT)
Dept: PSYCHIATRY | Facility: CLINIC | Age: 55
End: 2025-04-01
Payer: MEDICARE

## 2025-04-01 DIAGNOSIS — Z79.899 MEDICATION MANAGEMENT: Primary | ICD-10-CM

## 2025-04-01 DIAGNOSIS — F41.9 ANXIETY DISORDER, UNSPECIFIED TYPE: ICD-10-CM

## 2025-04-01 RX ORDER — QUETIAPINE FUMARATE 200 MG/1
200 TABLET, FILM COATED ORAL NIGHTLY
Qty: 30 TABLET | Refills: 3 | Status: SHIPPED | OUTPATIENT
Start: 2025-04-01

## 2025-04-01 RX ORDER — VENLAFAXINE 100 MG/1
100 TABLET ORAL 2 TIMES DAILY
Qty: 60 TABLET | Refills: 3 | Status: SHIPPED | OUTPATIENT
Start: 2025-04-01

## 2025-04-01 RX ORDER — QUETIAPINE FUMARATE 25 MG/1
25 TABLET, FILM COATED ORAL DAILY
Qty: 30 TABLET | Refills: 3 | Status: SHIPPED | OUTPATIENT
Start: 2025-04-01

## 2025-04-01 NOTE — PROGRESS NOTES
This provider is located at the [facility] (through [facility]), [address] [place] [address], [id number] using a secure [employer] Video Visit through [employer]. Patient is being seen remotely via telehealth at their home address in [place], and stated they are in a secure environment for this session. The patient's condition being diagnosed/treated is appropriate for telemedicine. The provider identified herself as well as her credentials.  The patient, and/or patients guardian, consent to be seen remotely, and when consent is given they understand that the consent allows for patient identifiable information to be sent to a third party as needed.   They may refuse to be seen remotely at any time. The electronic data is encrypted and password protected, and the patient and/or guardian has been advised of the potential risks to privacy not withstanding such measures.    You have chosen to receive care through a telehealth visit.  Do you consent to use a video/audio connection for your medical care today? Yes    Patient identifiers utilized: Name and date of birth.    Patient verbally confirmed consent for today's encounter:  January 7, 2025    Subjective     Jamison Edward is a 54 y.o. male who presents today for follow up    Chief Complaint: Anxiety       History of Present Illness:    History of Present Illness  Jamison is a 54-year-old male who presents today for a follow-up visit via telehealth.  His wife is present and provides collateral information.  He has been thinks several health issues including pancreatitis and colitis.  He has had an EGD done and we will see his GI provider next week.  He will also see his cardiology provider next week.  He is sleeping fairly well and has no issues other than getting up to use the restroom.  Appetite is poor and he has lost approximately 30 pounds according to his wife.  Wife states that he has also been falling more frequently due to his blood sugar dropping.   Appetite has been poor and he is unable to keep anything down.  Reports vomiting and severe abdominal pain after eating.  Jamison states that he gets more anxious when his family and gets more anxious.  Overall, he is doing good from a mental health standpoint other than some situational stress and anxiety.  He is tearful at times in talking about his health.  He does have a history of a traumatic and very lengthy hospitalization.  No hallucinations.  No SI/HI.  Anxiety  Presents for follow-up visit.  Symptoms include malaise/fatigue.  Patient reports no chest pain, confusion, decreased concentration, depressed mood, dizziness, dry mouth, excessive worry, insomnia, irritability, nausea, nervous/anxious behavior, obsessions, palpitations, shortness of breath or suicidal ideas. Symptoms occur most days. The severity of symptoms is mild. Nothing aggravates the symptoms. The patient sleeps 6 hours per night. The quality of sleep is fair. Compliance with medications is %. Side effects of treatment include fatigue.   Additional comments: ii have been sick with my stomach makes my anxiety worse      The following portions of the patient's history were reviewed and updated as appropriate: allergies, current medications, past family history, past medical history, past social history, past surgical history and problem list.    Past Psychiatric History:  Began Treatment:This year  Diagnoses: Delirium  Psychiatrist: Patient has never seen a psychiatric provider prior to consultations while admitted to the hospital.  Therapist:Denies  Admission History:Denies  Medication Trials: Xanax, Ativan, Depakote, Seroquel, Geodon, Effexor, lexapro  Self Harm: Denies  Suicide Attempts:Denies      Past Medical History:  Past Medical History:   Diagnosis Date    Alcohol abuse     quit nov 12 2022    Alcoholic fatty liver 2/10/2025    Alcoholism     Anemia     Anxiety whwhen put in continued care    Arthritis of back     not sure     Johnson esophagus     Brain concussion     Cholelithiasis     Chronic pain disorder have degenerative disc disease    Cirrhosis     COPD (chronic obstructive pulmonary disease)     CTS (carpal tunnel syndrome)     DDD (degenerative disc disease), thoracic     Degenerative disc disease, lumbar     Dystonia     GERD (gastroesophageal reflux disease)     GI (gastrointestinal bleed)     Hypertension     Irritable bowel syndrome     Liver disease     Low back pain     Low back strain     Lumbosacral disc disease     Migraines     MRSA (methicillin resistant Staphylococcus aureus)     Peptic ulcer disease     Stroke     TIA    TIA (transient ischemic attack)     Ulcerative colitis     Wears dentures     upper only       Substance Abuse History:   Types: alcohol, THC when he was a teenager  Withdrawal Symptoms: Patient reports that they believed some of his delirium while admitted to the hospital from December to March was due to alcohol withdrawal.  Patient states that he stopped drinking alcohol November 12, 2022.  Longest Period Sober:Patient states that he has been sober since November  AA: No    Social History:  Social History     Socioeconomic History    Marital status:    Tobacco Use    Smoking status: Every Day     Current packs/day: 1.00     Average packs/day: 1 pack/day for 35.0 years (35.0 ttl pk-yrs)     Types: Cigarettes     Passive exposure: Current    Smokeless tobacco: Never    Tobacco comments:     Been smoking 20 years   Vaping Use    Vaping status: Never Used   Substance and Sexual Activity    Alcohol use: Not Currently     Comment: Quit drinking November 12,2022    Drug use: No    Sexual activity: Yes     Partners: Female     Birth control/protection: None       Family History:  Family History   Problem Relation Age of Onset    Osteoporosis Mother     Hypertension Father     Osteoporosis Father     No Known Problems Sister     No Known Problems Brother     Drug abuse Brother     No Known Problems  Daughter     Diabetes Sister     Stroke Paternal Grandfather        Past Surgical History:  Past Surgical History:   Procedure Laterality Date    ABDOMINAL SURGERY  09/17/2021    APPENDECTOMY      CARPAL TUNNEL RELEASE  2005    CHOLECYSTECTOMY N/A 04/22/2017    Procedure: CHOLECYSTECTOMY LAPAROSCOPIC;  Surgeon: Jaqueline Guaman MD;  [facility];  Service:     COLONOSCOPY N/A 05/08/2017    Procedure: COLONOSCOPY  CPTCODE:27470;  Surgeon: Nicho Richey III, MD;  [facility];  Service:     CUBITAL TUNNEL RELEASE Left 09/01/2022    Procedure: CUBITAL TUNNEL RELEASE LEFT;  Surgeon: Alec Hough MD;  [facility];  Service: Orthopedics;  Laterality: Left;    ENDOSCOPY      ENDOSCOPY N/A 05/08/2017    Procedure: ESOPHAGOGASTRODUODENOSCOPY WITH BIOPSY  CPTCODE:34381;  Surgeon: Nicho Richey III, MD;  [facility];  Service:     ENDOSCOPY N/A 11/03/2017    Procedure: ESOPHAGOGASTRODUODENOSCOPY WITH BIOPSY  CPTCODE: 56534;  Surgeon: Nicho Richey III, MD;  [facility];  Service:     ENDOSCOPY N/A 02/20/2018    Procedure: ESOPHAGOGASTRODUODENOSCOPY WITH DILATATION CPT CODE: 64563;  Surgeon: Nicho Richey III, MD;  [facility];  Service:     ENDOSCOPY N/A 06/05/2018    Procedure: ESOPHAGOGASTRODUODENOSCOPY WITH BIOPSY CPT CODE: 83220;  Surgeon: Nicho Richey III, MD;  [facility];  Service: Gastroenterology    ENDOSCOPY N/A 05/26/2021    Procedure: ESOPHAGOGASTRODUODENOSCOPY WITH BIOPSY;  Surgeon: Julieta Hebert MD;  [facility];  Service: Gastroenterology;  Laterality: N/A;    ENDOSCOPY N/A 06/02/2021    Procedure: ESOPHAGOGASTRODUODENOSCOPY WITH BIOPSY;  Surgeon: Julieta Hebert MD;  [facility];  Service: Gastroenterology;  Laterality: N/A;    ENDOSCOPY N/A 3/4/2025    Procedure: ESOPHAGOGASTRODUODENOSCOPY WITH BIOPSY;  Surgeon: Julieta Hebert MD;  [facility];  Service:  Gastroenterology;  Laterality: N/A;    GASTRECTOMY N/A 09/17/2019    Procedure: OPEN VAGOTOMY, ANTRECTOMY,REVISION- Y GASTROJEJUNOSOTOMY;  Surgeon: Herbert Umanzor MD;  Location:  [facility];  Service: General    HERNIA REPAIR  12/10/2020    TRACHEOSTOMY N/A 01/17/2023    Procedure: TRACHEOSTOMY;  Surgeon: Felton De León MD;  [facility];  Service: General;  Laterality: N/A;    UPPER GASTROINTESTINAL ENDOSCOPY      VENTRAL/INCISIONAL HERNIA REPAIR N/A 12/10/2020    Procedure: INCISIONAL HERNIA REPAIR LAPAROSCOPIC WITH MESH;  Surgeon: Herbert Umanzor MD;  Location:  [facility];  Service: General;  Laterality: N/A;       Problem List:  Patient Active Problem List   Diagnosis    Precordial chest pain    Weight loss, abnormal    Right upper quadrant abdominal pain    Epigastric pain    History of bleeding ulcers    Nausea    Malaise and fatigue    Acute gastric ulcer without hemorrhage or perforation    Poor appetite    Duodenal ulcer    Gastroesophageal reflux disease    Dysphagia    Iron deficiency anemia    Malabsorption    Gastric ulcer without hemorrhage or perforation    Dystonia    Peptic ulcer without hemorrhage or perforation    Gastric outlet obstruction    Incisional hernia, without obstruction or gangrene    Incisional hernia    Chest pain, atypical    Gastric bezoar    Essential hypertension    Cigarette smoker    Chronic back pain    Sinus tachycardia    Tobacco use    Mild carpal tunnel syndrome    Orthostatic hypotension    Palpitations    Abdominal swelling    Lower extremity edema    Pneumonia due to infectious organism, unspecified laterality, unspecified part of lung    Acute respiratory failure with hypoxia    Dizziness    Alcoholic fatty liver    Unintentional weight loss    Nausea and vomiting       Allergy:   Allergies   Allergen Reactions    Contrast Dye (Echo Or Unknown Ct/Mr) Other (See Comments)     Shortness of breath    Prilosec [Omeprazole] Other (See Comments)     Chest  pain  Pt states he can take pepcid with no problem    Protonix [Pantoprazole Sodium] Palpitations     Patient states that protonix causes chest pain.  Shruthi Flynn [place], [facility]  4/23/2017  11:19 AM  Pt states he can tolerate pepcid with no problem      Ativan [Lorazepam] Hallucinations    Depakene [Valproic Acid] Hallucinations    Geodon [Ziprasidone Hcl] Mental Status Change    Libritabs [Chlordiazepoxide] Other (See Comments)     Tongue swell/split    Dilantin [Phenytoin] Delirium    Dilaudid [Hydromorphone] Hallucinations    Flagyl [Metronidazole] Nausea And Vomiting    Topamax [Topiramate] Anxiety        Current Medications:   Current Outpatient Medications   Medication Sig Dispense Refill    QUEtiapine (SEROquel) 200 MG tablet Take 1 tablet by mouth Every Night. 30 tablet 3    QUEtiapine (SEROquel) 25 MG tablet Take 1 tablet by mouth Daily. 30 tablet 3    venlafaxine (EFFEXOR) 100 MG tablet Take 1 tablet by mouth 2 (Two) Times a Day. 60 tablet 3    albuterol (PROVENTIL) (2.5 MG/3ML) 0.083% nebulizer solution Take 2.5 mg by nebulization 4 (Four) Times a Day.      baclofen (LIORESAL) 20 MG tablet       dexlansoprazole (DEXILANT) 60 MG capsule Take 1 capsule by mouth Every Morning Before Breakfast. 30 capsule 11    famotidine (Pepcid) 40 MG tablet Take 1 tablet by mouth Daily As Needed for Indigestion or Heartburn. 30 tablet 5    folic acid (FOLVITE) 1 MG tablet Take 1 tablet by mouth Daily. 30 tablet 3    HYDROcodone-acetaminophen (NORCO) 5-325 MG per tablet       levETIRAcetam (KEPPRA) 750 MG tablet Take 1 tablet by mouth 2 (Two) Times a Day.      metoclopramide (Reglan) 10 MG tablet Take 1 tablet by mouth 2 (Two) Times a Day Before Meals. Take one tablet by mouth three times a day 30 minutes before meals 90 tablet 2    metoprolol tartrate (LOPRESSOR) 25 MG tablet Take 1 tablet by mouth 2 (Two) Times a Day.      promethazine (PHENERGAN) 25 MG tablet Take 1 tablet by mouth Every 8 (Eight) Hours As Needed for Nausea  or Vomiting.       No current facility-administered medications for this visit.       Review of Systems:    Review of Systems   Constitutional:  Positive for fatigue and malaise/fatigue. Negative for activity change, appetite change, chills, diaphoresis, fever and irritability.   HENT:  Negative for congestion, sore throat and swollen glands.    Respiratory:  Negative for cough and shortness of breath.    Cardiovascular: Negative.  Negative for chest pain and palpitations.   Gastrointestinal:  Negative for abdominal pain, anorexia, nausea and vomiting.   Genitourinary:  Negative for dysuria.   Musculoskeletal:  Positive for back pain, joint pain, myalgias and neck pain.   Skin:  Negative for rash.   Neurological:  Positive for weakness and numbness. Negative for dizziness, vertigo, seizures and confusion.   Psychiatric/Behavioral:  Positive for stress. Negative for agitation, behavioral problems, decreased concentration, dysphoric mood, hallucinations, self-injury, sleep disturbance, suicidal ideas, negative for hyperactivity and depressed mood. The patient is not nervous/anxious and does not have insomnia.          Physical Exam:   Physical Exam  Vitals reviewed.   HENT:      Head: Atraumatic.   Pulmonary:      Effort: Pulmonary effort is normal.   Musculoskeletal:      Cervical back: Normal range of motion.   Neurological:      General: No focal deficit present.      Mental Status: He is alert and oriented to person, place, and time. Mental status is at baseline.   Psychiatric:         Attention and Perception: Attention and perception normal.         Mood and Affect: Mood and affect normal. Mood is not anxious.         Speech: Speech normal.         Behavior: Behavior normal. Behavior is cooperative.         Thought Content: Thought content normal. Thought content is not paranoid or delusional. Thought content does not include homicidal or suicidal ideation.         Cognition and Memory: Cognition and memory  normal.         Judgment: Judgment normal.         Vitals:  There were no vitals taken for this visit.   There is no height or weight on file to calculate BMI.    Last 3 Blood Pressure Readings:  BP Readings from Last 3 Encounters:   03/04/25 130/73   02/10/25 95/68   02/07/25 101/74       Mental Status Exam:   Hygiene:   good  Cooperation:  Cooperative  Eye Contact:  Good  Psychomotor Behavior:  Appropriate  Affect:  Restricted  Mood: normal  Hopelessness: Denies  Speech:  Normal  Thought Process:  Goal directed  Thought Content:  Normal  Suicidal:  None  Homicidal:  None  Hallucinations:  Auditory  Delusion:  None  Memory:  Deficits  Orientation:  Person, Place, Time and Situation  Reliability:  good  Insight:  Good  Judgement:  Good  Impulse Control:  Good  Physical/Medical Issues:  Yes See PMH        Lab Results:   Hospital Outpatient Visit on 03/24/2025   Component Date Value Ref Range Status    LVIDd 03/24/2025 3.1  cm Final    LVIDs 03/24/2025 2.9  cm Final    IVSd 03/24/2025 1.30  cm Final    LVPWd 03/24/2025 1.20  cm Final    FS 03/24/2025 6.5  % Final    IVS/LVPW 03/24/2025 1.08  cm Final    ESV(cubed) 03/24/2025 24.4  ml Final    LV Sys Vol (BSA corrected) 03/24/2025 6.4  cm2 Final    EDV(cubed) 03/24/2025 29.8  ml Final    LV Lord Vol (BSA corrected) 03/24/2025 18.4  cm2 Final    LV mass(C)d 03/24/2025 121.9  grams Final    LVOT area 03/24/2025 2.27  cm2 Final    LVOT diam 03/24/2025 1.70  cm Final    EDV(MOD-sp4) 03/24/2025 30.0  ml Final    ESV(MOD-sp4) 03/24/2025 10.4  ml Final    SV(MOD-sp4) 03/24/2025 19.6  ml Final    SVi(MOD-SP4) 03/24/2025 12.0  ml/m2 Final    EF(MOD-sp4) 03/24/2025 65.3  % Final    MV E max raghavendra 03/24/2025 102.0  cm/sec Final    MV A max raghavendra 03/24/2025 35.0  cm/sec Final    MV E/A 03/24/2025 2.9   Final    LA ESV Index (BP) 03/24/2025 8.9  ml/m2 Final    Med Peak E' Raghavendra 03/24/2025 8.6  cm/sec Final    Lat Peak E' Raghavendra 03/24/2025 10.6  cm/sec Final    Avg E/e' ratio 03/24/2025  10.63   Final    LA dimension (2D)  03/24/2025 2.00  cm Final    Ao pk aparna 03/24/2025 88.3  cm/sec Final    Ao max PG 03/24/2025 3.1  mmHg Final    Ao mean PG 03/24/2025 2.00  mmHg Final    Ao V2 VTI 03/24/2025 18.2  cm Final    PA acc time 03/24/2025 0.11  sec Final    Ao root diam 03/24/2025 2.30  cm Final   Admission on 03/04/2025, Discharged on 03/04/2025   Component Date Value Ref Range Status    Folate 03/04/2025 3.51 (L)  4.78 - 24.20 ng/mL Final    Vitamin B-12 03/04/2025 1,206 (H)  211 - 946 pg/mL Final   Ancillary Procedure on 02/27/2025   Component Date Value Ref Range Status    Heart rate (average) 02/27/2025 106   Final    Heart rate minimum 02/27/2025 58   Final    Heart rate maximum 02/27/2025 160   Final   Office Visit on 02/10/2025   Component Date Value Ref Range Status    Glucose 02/10/2025 88  65 - 99 mg/dL Final    BUN 02/10/2025 10  6 - 20 mg/dL Final    Creatinine 02/10/2025 0.72 (L)  0.76 - 1.27 mg/dL Final    Sodium 02/10/2025 139  136 - 145 mmol/L Final    Potassium 02/10/2025 4.3  3.5 - 5.2 mmol/L Final    Chloride 02/10/2025 104  98 - 107 mmol/L Final    CO2 02/10/2025 26.3  22.0 - 29.0 mmol/L Final    Calcium 02/10/2025 8.2 (L)  8.6 - 10.5 mg/dL Final    Total Protein 02/10/2025 5.0 (L)  6.0 - 8.5 g/dL Final    Albumin 02/10/2025 2.8 (L)  3.5 - 5.2 g/dL Final    ALT (SGPT) 02/10/2025 33  1 - 41 U/L Final    AST (SGOT) 02/10/2025 40  1 - 40 U/L Final    Alkaline Phosphatase 02/10/2025 208 (H)  39 - 117 U/L Final    Total Bilirubin 02/10/2025 0.7  0.0 - 1.2 mg/dL Final    Globulin 02/10/2025 2.2  gm/dL Final    A/G Ratio 02/10/2025 1.3  g/dL Final    BUN/Creatinine Ratio 02/10/2025 13.9  7.0 - 25.0 Final    Anion Gap 02/10/2025 8.7  5.0 - 15.0 mmol/L Final    eGFR 02/10/2025 107.2  >60.0 mL/min/1.73 Final    Protime 02/10/2025 13.5  11.6 - 15.1 Seconds Final    Note new Reference Range    INR 02/10/2025 1.02  0.90 - 1.10 Final    ALPHA-FETOPROTEIN 02/10/2025 9.33 (H)  0 - 8.3 ng/mL Final     WBC 02/10/2025 5.27  3.40 - 10.80 10*3/mm3 Final    RBC 02/10/2025 3.96 (L)  4.14 - 5.80 10*6/mm3 Final    Hemoglobin 02/10/2025 13.6  13.0 - 17.7 g/dL Final    Hematocrit 02/10/2025 39.8  37.5 - 51.0 % Final    MCV 02/10/2025 100.5 (H)  79.0 - 97.0 fL Final    MCH 02/10/2025 34.3 (H)  26.6 - 33.0 pg Final    MCHC 02/10/2025 34.2  31.5 - 35.7 g/dL Final    RDW 02/10/2025 11.7 (L)  12.3 - 15.4 % Final    RDW-SD 02/10/2025 42.5  37.0 - 54.0 fl Final    MPV 02/10/2025 12.1 (H)  6.0 - 12.0 fL Final    Platelets 02/10/2025 121 (L)  140 - 450 10*3/mm3 Final    Neutrophil % 02/10/2025 67.5  42.7 - 76.0 % Final    Lymphocyte % 02/10/2025 21.6  19.6 - 45.3 % Final    Monocyte % 02/10/2025 9.3  5.0 - 12.0 % Final    Eosinophil % 02/10/2025 0.6  0.3 - 6.2 % Final    Basophil % 02/10/2025 0.6  0.0 - 1.5 % Final    Immature Grans % 02/10/2025 0.4  0.0 - 0.5 % Final    Neutrophils, Absolute 02/10/2025 3.56  1.70 - 7.00 10*3/mm3 Final    Lymphocytes, Absolute 02/10/2025 1.14  0.70 - 3.10 10*3/mm3 Final    Monocytes, Absolute 02/10/2025 0.49  0.10 - 0.90 10*3/mm3 Final    Eosinophils, Absolute 02/10/2025 0.03  0.00 - 0.40 10*3/mm3 Final    Basophils, Absolute 02/10/2025 0.03  0.00 - 0.20 10*3/mm3 Final    Immature Grans, Absolute 02/10/2025 0.02  0.00 - 0.05 10*3/mm3 Final    nRBC 02/10/2025 0.0  0.0 - 0.2 /100 WBC Final   Ancillary Procedure on 02/07/2025   Component Date Value Ref Range Status    Heart rate (average) 02/07/2025 95   Final    Heart rate minimum 02/07/2025 59   Final    Heart rate maximum 02/07/2025 154   Final         Assessment & Plan   Problems Addressed this Visit    None  Visit Diagnoses         Medication management    -  Primary    Relevant Orders    CBC & Differential    Comprehensive Metabolic Panel    Lipid Panel    Hemoglobin A1c      Anxiety disorder, unspecified type        Relevant Medications    QUEtiapine (SEROquel) 200 MG tablet    QUEtiapine (SEROquel) 25 MG tablet    venlafaxine (EFFEXOR) 100  MG tablet    Other Relevant Orders    CBC & Differential    Comprehensive Metabolic Panel    Lipid Panel    Hemoglobin A1c          Diagnoses         Codes Comments      Medication management    -  Primary ICD-10-CM: Z79.899  ICD-9-CM: V58.69       Anxiety disorder, unspecified type     ICD-10-CM: F41.9  ICD-9-CM: 300.00             Visit Diagnoses:    ICD-10-CM ICD-9-CM   1. Medication management  Z79.899 V58.69   2. Anxiety disorder, unspecified type  F41.9 300.00         GOALS:  Short Term Goals: Patient will be compliant with medication, and patient will have no significant medication related side effects.  Patient will be engaged in psychotherapy as indicated.  Patient will report subjective improvement of symptoms.  Long term goals: To stabilize mood and treat/improve subjective symptoms, the patient will stay out of the hospital, the patient will be at an optimal level of functioning, and the patient will take all medications as prescribed.  The patient/guardian verbalized understanding and agreement with goals that were mutually set.      TREATMENT PLAN: Continue supportive psychotherapy efforts and medications as indicated.  Pharmacological and Non-Pharmacological treatment options discussed during today's visit. Patient/Guardian acknowledged and verbally consented with current treatment plan and was educated on the importance of compliance with treatment and follow-up appointments.      MEDICATION ISSUES:  Discussed medication options and treatment plan of prescribed medication as well as the risks, benefits, any black box warnings, and side effects including potential falls, possible impaired driving, and metabolic adversities among others. Patient is agreeable to call the office with any worsening of symptoms or onset of side effects, or if any concerns or questions arise.  The contact information for the office is made available to the patient. Patient is agreeable to call 911 or go to the nearest ER  should they begin having any SI/HI, or if any urgent concerns arise. No medication side effects or related complaints today.     MEDS ORDERED DURING VISIT:  New Medications Ordered This Visit   Medications    QUEtiapine (SEROquel) 200 MG tablet     Sig: Take 1 tablet by mouth Every Night.     Dispense:  30 tablet     Refill:  3    QUEtiapine (SEROquel) 25 MG tablet     Sig: Take 1 tablet by mouth Daily.     Dispense:  30 tablet     Refill:  3    venlafaxine (EFFEXOR) 100 MG tablet     Sig: Take 1 tablet by mouth 2 (Two) Times a Day.     Dispense:  60 tablet     Refill:  3     D/C previous refills     Plan:  - Continue Seroquel to 225 mg p.o. at night for paranoid thoughts and sleep disturbance/anxiety.   - Continue Effexor 100mg PO BID. He felt that Effexor  was ineffective for the duration of the day.  -We will order CBC, CMP, lipid panel, and A1c.  - Follow-up in 2 months.  -Patient verbalizes understanding to go to nearest ED if SI/HI develop.    Follow Up Appointment:   No follow-ups on file.             This document has been electronically signed by EILEEN Lovell  April 1, 2025 14:19 EDT    Dictated Utilizing Dragon Dictation: Part of this note may be an electronic transcription/translation of spoken language to printed text using the Dragon Dictation System.

## 2025-04-07 ENCOUNTER — OFFICE VISIT (OUTPATIENT)
Dept: CARDIOLOGY | Facility: CLINIC | Age: 55
End: 2025-04-07
Payer: MEDICARE

## 2025-04-07 VITALS
WEIGHT: 122 LBS | HEIGHT: 66 IN | HEART RATE: 110 BPM | BODY MASS INDEX: 19.61 KG/M2 | OXYGEN SATURATION: 89 % | DIASTOLIC BLOOD PRESSURE: 75 MMHG | SYSTOLIC BLOOD PRESSURE: 105 MMHG

## 2025-04-07 DIAGNOSIS — R00.0 SINUS TACHYCARDIA: Primary | ICD-10-CM

## 2025-04-07 DIAGNOSIS — I95.1 ORTHOSTATIC HYPOTENSION: ICD-10-CM

## 2025-04-07 DIAGNOSIS — I47.20 VENTRICULAR TACHYCARDIA (PAROXYSMAL): ICD-10-CM

## 2025-04-07 DIAGNOSIS — R63.4 UNINTENTIONAL WEIGHT LOSS: ICD-10-CM

## 2025-04-07 RX ORDER — SPIRONOLACTONE 25 MG/1
12.5 TABLET ORAL DAILY
COMMUNITY

## 2025-04-07 RX ORDER — POTASSIUM CHLORIDE 20MEQ/15ML
30 LIQUID (ML) ORAL DAILY
COMMUNITY
Start: 2025-03-12 | End: 2025-04-07

## 2025-04-07 RX ORDER — MIDODRINE HYDROCHLORIDE 5 MG/1
5 TABLET ORAL
Qty: 90 TABLET | Refills: 1 | Status: SHIPPED | OUTPATIENT
Start: 2025-04-07

## 2025-04-07 NOTE — PROGRESS NOTES
[facility]             [facility] EILEEN Michele Theresa, APRN Anthony CALEB Edward  1970  04/07/2025    Patient Active Problem List   Diagnosis    Precordial chest pain    Weight loss, abnormal    Right upper quadrant abdominal pain    Epigastric pain    History of bleeding ulcers    Nausea    Malaise and fatigue    Acute gastric ulcer without hemorrhage or perforation    Poor appetite    Duodenal ulcer    Gastroesophageal reflux disease    Dysphagia    Iron deficiency anemia    Malabsorption    Gastric ulcer without hemorrhage or perforation    Dystonia    Peptic ulcer without hemorrhage or perforation    Gastric outlet obstruction    Incisional hernia, without obstruction or gangrene    Incisional hernia    Chest pain, atypical    Gastric bezoar    Essential hypertension    Cigarette smoker    Chronic back pain    Sinus tachycardia    Tobacco use    Mild carpal tunnel syndrome    Orthostatic hypotension    Palpitations    Abdominal swelling    Lower extremity edema    Pneumonia due to infectious organism, unspecified laterality, unspecified part of lung    Acute respiratory failure with hypoxia    Dizziness    Alcoholic fatty liver    Unintentional weight loss    Nausea and vomiting       Dear Leticia Martinez, EILEEN:    Subjective     Chief Complaint   Patient presents with    Follow-up     results       HPI:     This is a 54 y.o. male with known past medical history of orthostatic hypotension, tobacco abuse, alcohol abuse and sinus tachycardia.      Jamison Edward presents today for routine cardiology follow up.   History of Present Illness  Patient presents today for follow-up on echocardiogram and event monitor.  Since his last visit he has had progressively worsening weight loss and unable to tolerate fluids or food as this leads to vomiting.  He has been following with GI.  Recently was in the ER at [facility] for colitis for which it was recommended  that he be admitted however they declined.  BMP showed severe hypokalemia with a potassium of 2.9 which was replaced with repeat showing improved potassium to 4.9.  Has been unable to tolerate metoprolol secondary to hypotension.  Was on spironolactone and this also had to be held secondary to hypotension.  Patient's wife does state that he is currently back on spironolactone 12.5 mg daily as tolerated but blood pressure does drop with administration.  Cardiac event monitor showed normal sinus rhythm with sinus tachycardia with average heart rate of 106 bpm with 1 episode of 4 beat run of ventricular tachycardia.  Echocardiogram showed normal LV function       Diagnostic Testing  Nuclear stress test 5/2017: No evidence of ischemia  Echocardiogram 9/2021: Ef 61-65%  Nuclear stress test 9/2021: No evidence of ischemia  Cardiac event monitor 10/2022: Normal sinus rhythm with episodes of sinus tachycardia and sinus bradycardia with no arrhythmias noted  Echocardiogram 12/2022: EF greater than 70%       All other systems were reviewed and were negative.    Patient Active Problem List   Diagnosis    Precordial chest pain    Weight loss, abnormal    Right upper quadrant abdominal pain    Epigastric pain    History of bleeding ulcers    Nausea    Malaise and fatigue    Acute gastric ulcer without hemorrhage or perforation    Poor appetite    Duodenal ulcer    Gastroesophageal reflux disease    Dysphagia    Iron deficiency anemia    Malabsorption    Gastric ulcer without hemorrhage or perforation    Dystonia    Peptic ulcer without hemorrhage or perforation    Gastric outlet obstruction    Incisional hernia, without obstruction or gangrene    Incisional hernia    Chest pain, atypical    Gastric bezoar    Essential hypertension    Cigarette smoker    Chronic back pain    Sinus tachycardia    Tobacco use    Mild carpal tunnel syndrome    Orthostatic hypotension    Palpitations    Abdominal swelling    Lower extremity edema     Pneumonia due to infectious organism, unspecified laterality, unspecified part of lung    Acute respiratory failure with hypoxia    Dizziness    Alcoholic fatty liver    Unintentional weight loss    Nausea and vomiting       family history includes Diabetes in his sister; Drug abuse in his brother; Hypertension in his father; No Known Problems in his brother, daughter, and sister; Osteoporosis in his father and mother; Stroke in his paternal grandfather.     reports that he has been smoking cigarettes. He has a 35 pack-year smoking history. He has been exposed to tobacco smoke. He has never used smokeless tobacco. He reports that he does not currently use alcohol. He reports that he does not use drugs.    Allergies   Allergen Reactions    Contrast Dye (Echo Or Unknown Ct/Mr) Other (See Comments)     Shortness of breath    Prilosec [Omeprazole] Other (See Comments)     Chest pain  Pt states he can take pepcid with no problem    Protonix [Pantoprazole Sodium] Palpitations     Patient states that protonix causes chest pain.  Shruthi Rina Pérez, [facility]  4/23/2017  11:19 AM  Pt states he can tolerate pepcid with no problem      Ativan [Lorazepam] Hallucinations    Depakene [Valproic Acid] Hallucinations    Geodon [Ziprasidone Hcl] Mental Status Change    Libritabs [Chlordiazepoxide] Other (See Comments)     Tongue swell/split    Dilantin [Phenytoin] Delirium    Dilaudid [Hydromorphone] Hallucinations    Flagyl [Metronidazole] Nausea And Vomiting    Topamax [Topiramate] Anxiety         Current Outpatient Medications:     albuterol (PROVENTIL) (2.5 MG/3ML) 0.083% nebulizer solution, Take 2.5 mg by nebulization 4 (Four) Times a Day., Disp: , Rfl:     baclofen (LIORESAL) 20 MG tablet, , Disp: , Rfl:     dexlansoprazole (DEXILANT) 60 MG capsule, Take 1 capsule by mouth Every Morning Before Breakfast., Disp: 30 capsule, Rfl: 11    HYDROcodone-acetaminophen (NORCO) 5-325 MG per tablet, , Disp: , Rfl:     levETIRAcetam (KEPPRA) 750  MG tablet, Take 1 tablet by mouth 2 (Two) Times a Day., Disp: , Rfl:     metoclopramide (Reglan) 10 MG tablet, Take 1 tablet by mouth 2 (Two) Times a Day Before Meals. Take one tablet by mouth three times a day 30 minutes before meals, Disp: 90 tablet, Rfl: 2    metoprolol tartrate (LOPRESSOR) 25 MG tablet, Take 0.5 tablets by mouth 2 (Two) Times a Day., Disp: , Rfl:     promethazine (PHENERGAN) 25 MG tablet, Take 1 tablet by mouth Every 8 (Eight) Hours As Needed for Nausea or Vomiting., Disp: , Rfl:     QUEtiapine (SEROquel) 200 MG tablet, Take 1 tablet by mouth Every Night., Disp: 30 tablet, Rfl: 3    QUEtiapine (SEROquel) 25 MG tablet, Take 1 tablet by mouth Daily., Disp: 30 tablet, Rfl: 3    spironolactone (ALDACTONE) 25 MG tablet, Take 0.5 tablets by mouth Daily., Disp: , Rfl:     venlafaxine (EFFEXOR) 100 MG tablet, Take 1 tablet by mouth 2 (Two) Times a Day., Disp: 60 tablet, Rfl: 3    famotidine (Pepcid) 40 MG tablet, Take 1 tablet by mouth Daily As Needed for Indigestion or Heartburn. (Patient not taking: Reported on 4/7/2025), Disp: 30 tablet, Rfl: 5    folic acid (FOLVITE) 1 MG tablet, Take 1 tablet by mouth Daily. (Patient not taking: Reported on 4/7/2025), Disp: 30 tablet, Rfl: 3    midodrine (PROAMATINE) 5 MG tablet, Take 1 tablet by mouth 3 (Three) Times a Day Before Meals., Disp: 90 tablet, Rfl: 1      Physical Exam:  I have reviewed the patient's current vital signs as documented in the patient's EMR.   Vitals:    04/07/25 1001   BP: 105/75   Pulse: 110   SpO2: (!) 89%     Body mass index is 19.7 kg/m².       04/07/25  1001   Weight: 55.3 kg (122 lb)      Physical Exam  Constitutional:       General: He is not in acute distress.     Appearance: He is well-developed. He is ill-appearing (underweight).   HENT:      Head: Normocephalic and atraumatic.      Mouth/Throat:      Mouth: Mucous membranes are moist.   Eyes:      Extraocular Movements: Extraocular movements intact.      Pupils: Pupils are  equal, round, and reactive to light.   Neck:      Vascular: No JVD.   Cardiovascular:      Rate and Rhythm: Normal rate and regular rhythm. Tachycardia present.      Heart sounds: Normal heart sounds. No murmur heard.     No S3 or S4 sounds.   Pulmonary:      Effort: Pulmonary effort is normal. No respiratory distress.      Breath sounds: Normal breath sounds. No wheezing.   Abdominal:      General: Bowel sounds are normal. There is no distension.      Palpations: Abdomen is soft. There is no hepatomegaly.      Tenderness: There is no abdominal tenderness.   Musculoskeletal:         General: Normal range of motion.      Cervical back: Normal range of motion and neck supple.   Skin:     General: Skin is warm and dry.      Coloration: Skin is not jaundiced or pale.   Neurological:      General: No focal deficit present.      Mental Status: He is alert and oriented to person, place, and time. Mental status is at baseline.   Psychiatric:         Mood and Affect: Mood normal.         Behavior: Behavior normal.         Thought Content: Thought content normal.         Judgment: Judgment normal.            DATA REVIEWED:     TTE/ANIYA:  Results for orders placed during the hospital encounter of 03/24/25    Adult Transthoracic Echo Complete w/ Color, Spectral and Contrast if necessary per protocol    Interpretation Summary    Left ventricular systolic function is normal. Left ventricular ejection fraction appears to be 56 - 60%.    Left ventricular wall thickness is consistent with mild concentric hypertrophy.    Left ventricular diastolic function was normal.    This is technic difficult study.      Laboratory evaluations:    Lab Results   Component Value Date    GLUCOSE 88 02/10/2025    BUN 10 02/10/2025    CREATININE 0.72 (L) 02/10/2025    EGFRIFNONA 106 11/29/2021    BCR 13.9 02/10/2025    K 4.3 02/10/2025    CO2 26.3 02/10/2025    CALCIUM 8.2 (L) 02/10/2025    ALBUMIN 2.8 (L) 02/10/2025    AST 40 02/10/2025    ALT 33  "02/10/2025     Lab Results   Component Value Date    WBC 5.2 03/10/2025    HGB 12.9 (L) 03/10/2025    HCT 39.4 03/10/2025    MCV 97 03/10/2025     03/10/2025     Lab Results   Component Value Date    CHOL 148 08/02/2023    TRIG 152 (H) 10/10/2023    HDL 42 08/02/2023    LDL 85 08/02/2023     Lab Results   Component Value Date    TSH 1.740 08/02/2023     Lab Results   Component Value Date    HGBA1C 5.40 08/02/2023     Lab Results   Component Value Date    ALT 33 02/10/2025     Lab Results   Component Value Date    HGBA1C 5.40 08/02/2023    HGBA1C 5.10 12/08/2020    HGBA1C 5.10 09/12/2019     Lab Results   Component Value Date    CREATININE 0.72 (L) 02/10/2025     Lab Results   Component Value Date    IRON 49 (L) 12/21/2022    TIBC 70 (L) 12/21/2022    FERRITIN 1,305.00 (H) 12/23/2022     Lab Results   Component Value Date    INR 1.02 02/10/2025    INR 1.43 (H) 12/30/2022    INR 1.33 (H) 12/28/2022    PROTIME 13.5 02/10/2025    PROTIME 17.8 (H) 12/30/2022    PROTIME 16.8 (H) 12/28/2022      No components found for: \"ABSOLUTELUNG\"    --------------------------------------------------------------------------------------------------------------------------    ASSESSMENT/PLAN:      Diagnosis Plan   1. Sinus tachycardia        2. Orthostatic hypotension        3. Unintentional weight loss          Assessment & Plan  Sinus tachycardia  Ventricular tachycardia  Hypertension  Cirrhosis  Patient has had persistent tachycardia over the last few months.  Also has had progressively worsening weight loss over the last month.  Had cardiac event monitor which showed average heart rate of 106 bpm with 4 beat run of ventricular tachycardia for which patient was asymptomatic.  At this point it is felt that patient's tachycardia is physiologic and secondary to patient's rapid weight loss and poor p.o. intake due to patient being unable to tolerate any food or liquids without vomiting.  Has been difficult to manage with " medications due to orthostatic hypotension.  Patient has been taking 12.5 mg spironolactone daily as tolerated but does see systolic blood pressures in the 60s at times.  Will start him on midodrine 5 mg p.o. 3 times daily to help with blood pressure as patient needs spironolactone given his underlying cirrhosis.  By also taking metoprolol as tolerated by blood pressure.  Echocardiogram showed normal LV function. plans to follow-up with GI this week.  I did express to patient that I am very concerned about his current clinical picture given his rapid weight loss and overall poor appearance on examination today.  I did recommend admission to the hospital however they declined at this time.  Recent ER visit showed potassium of 2.9 with replacement with repeat BMP showing potassium of 4.9.  I did advise that if they are taking spironolactone to hold potassium risk for hyperkalemia.      This document has been @Electronically signed by EILEEN Coronado, 04/07/25, 8:58 AM EDT.       Dictated Utilizing Dragon Dictation: Part of this note may be an electronic transcription/translation of spoken language to printed text using the Dragon Dictation System.    Patient or patient representative verbalized consent for the use of Ambient Listening during the visit with  EILEEN Coronado for chart documentation. 4/7/2025  10:46 EDT    Follow-up appointment and medication changes provided in hand delivered After Visit Summary as well as reviewed in the room.

## 2025-04-09 ENCOUNTER — OFFICE VISIT (OUTPATIENT)
Dept: GASTROENTEROLOGY | Facility: CLINIC | Age: 55
End: 2025-04-09
Payer: MEDICARE

## 2025-04-09 VITALS
WEIGHT: 119.6 LBS | BODY MASS INDEX: 19.22 KG/M2 | HEART RATE: 131 BPM | DIASTOLIC BLOOD PRESSURE: 73 MMHG | HEIGHT: 66 IN | SYSTOLIC BLOOD PRESSURE: 95 MMHG

## 2025-04-09 DIAGNOSIS — Z86.19 HISTORY OF HELICOBACTER PYLORI INFECTION: ICD-10-CM

## 2025-04-09 DIAGNOSIS — K86.1 CHRONIC PANCREATITIS, UNSPECIFIED PANCREATITIS TYPE: Primary | ICD-10-CM

## 2025-04-09 DIAGNOSIS — K21.9 GASTROESOPHAGEAL REFLUX DISEASE, UNSPECIFIED WHETHER ESOPHAGITIS PRESENT: ICD-10-CM

## 2025-04-09 DIAGNOSIS — K31.84 GASTROPARESIS: ICD-10-CM

## 2025-04-09 DIAGNOSIS — R13.19 ESOPHAGEAL DYSPHAGIA: ICD-10-CM

## 2025-04-09 DIAGNOSIS — K70.30 ALCOHOLIC CIRRHOSIS OF LIVER WITHOUT ASCITES: ICD-10-CM

## 2025-04-09 DIAGNOSIS — R63.4 UNINTENTIONAL WEIGHT LOSS: ICD-10-CM

## 2025-04-09 DIAGNOSIS — Z90.49 S/P CHOLECYSTECTOMY: ICD-10-CM

## 2025-04-09 DIAGNOSIS — R11.2 NAUSEA AND VOMITING, UNSPECIFIED VOMITING TYPE: ICD-10-CM

## 2025-04-09 DIAGNOSIS — K70.0 ALCOHOLIC FATTY LIVER: ICD-10-CM

## 2025-04-09 DIAGNOSIS — Z98.84 S/P GASTRIC BYPASS: ICD-10-CM

## 2025-04-09 LAB
INR PPP: 1.08 (ref 0.9–1.1)
PROTHROMBIN TIME: 14.1 SECONDS (ref 11.6–15.1)

## 2025-04-09 PROCEDURE — 85610 PROTHROMBIN TIME: CPT | Performed by: NURSE PRACTITIONER

## 2025-04-09 PROCEDURE — 85025 COMPLETE CBC W/AUTO DIFF WBC: CPT | Performed by: NURSE PRACTITIONER

## 2025-04-09 PROCEDURE — 82105 ALPHA-FETOPROTEIN SERUM: CPT | Performed by: NURSE PRACTITIONER

## 2025-04-09 PROCEDURE — 80053 COMPREHEN METABOLIC PANEL: CPT | Performed by: NURSE PRACTITIONER

## 2025-04-09 NOTE — PROGRESS NOTES
DATE:  4/9/2025    DIAGNOSIS: GERD, Alcoholic fatty liver/cirrhosis, unintentional weight loss, dysphagia    CHIEF COMPLAINT:  Unintentional weight loss  Dysphagia  Epigastric pain  Nausea and vomiting    HPI:  Mr. Edward presents today for follow-up.  He was previously following with Melo Browning PA-C and was last seen in October 2023.  Please see previous notes for prior management.  In review of his records, patient has had chronic difficulty with GERD.  Of note, he is s/p gastric bypass in 2019.  Patient also previously underwent cholecystectomy.  Patient says he has history of Johnson's esophagus.  However, biopsies of the esophagus from most recent EGD performed by Dr. Ovalles on 6/2/2021 revealed mild inflammation from acid reflux.  There was no evidence of intestinal metaplasia or dysplasia.  At present, GERD is well-controlled with Dexilant 60 mg p.o. daily.  He denies having recent flares.  Patient also has history of chronic alcohol abuse.  However, happily he has abstained since 2022.  Patient has history of fatty liver.  [facility]Sure from 10/10/2023 showed mild steatosis/mild LOVETT with fibrosis stage F1-portal fibrosis.  Patient has not had imaging of his abdomen since 2022.  He had recent labs on 9/30/2024 including CBC which showed normal blood counts including platelets.  CMP showed mildly elevated alkaline phosphatase of 152, otherwise normal LFTs.  He reports his bowels are moving well.  He denies having melena or rectal bleeding.  He had previous colonoscopy in May 2017 with Dr. Richey which was unremarkable.  Random colon biopsies were negative.  Repeat colonoscopy was recommended in 10 years.  He is without family history of colon cancer.     INTERVAL HISTORY:  Mr. Edward presents today for follow-up.  Since his last visit, he underwent EGD with Dr. Ovalles on 3/4/2025.  Esophagus appeared normal.  Noted evidence of gastric bypass.  Gastric pouch was found containing a bezoar.  The  gastrojejunal anastomosis was characterized by healthy-appearing mucosa.  No biopsies were taken.  Following EGD, he was started on Reglan 10 mg p.o. twice daily but he has not found this helpful as he continues to struggle with poor appetite, nausea and vomiting (3-4 times per day) and epigastric pain.  His weight continues to trend down.  At his last visit in early February, patient weighed 130 lbs and currently weighs 119 lbs.  In regards to GERD, patient has been on Dexilant 60 mg p.o. daily also has Rx for Pepcid 40 mg p.o. daily as needed.  Unfortunately, he has difficulty keeping his medications down.  He is following with cardiology for hypertension and tachycardia.  Due to poor oral intake, he has been having hypotension recently and has been unable to tolerate his medications.  Cardiology started him on midodrine and advised him to take spironolactone 12.5 mg p.o. daily and metoprolol as needed.  Cardiology advised him to go to the ED for evaluation due to overall clinical decline, however patient declined.At his last visit, labs were obtained including CBC which showed ongoing thrombocytopenia with platelet count of 121,000.  CMP showed alkaline phosphatase remains elevated, 208.  The remaining LFTs were normal.  His AFP was mildly elevated, 9.33.  Therefore, obtained repeat ultrasound of the liver which was done on 3/10/2025 and did show likely cirrhotic liver and small volume ascites.  He also had recent CT abdomen pelvis without contrast at [facility] in [place] on 3/12/2025 which showed severe fatty infiltration of the liver moderate ascites and anasarca.  Also noted chronic pancreatitis.  Also noted marked thickening involving the right colon consistent with nonspecific colitis.  Discussed with Dr. Ovalles who feels this could be related to his moderate ascites/anasarca.  Per radiology, this could be infectious, inflammatory, ischemic or neoplastic in nature.  As above, he previously  underwent colonoscopy in May 2017 with Dr. Richey which was unremarkable.  Random colon biopsies were negative.  Repeat colonoscopy was previously recommended in 10 years.  He is without family history of colon cancer.  We did discuss repeat colonoscopy to further evaluate.  Unfortunately, patient would be unable to tolerate colon prep.  He is currently having a BM ~once per week with stool type III-IV on [place] stool scale.  He has intermittent RLQ abdominal pain.  Denies melena, hematochezia or rectal bleeding.  Patient's wife reports they are considering palliative care/hospice due to continued, clinical decline.  Patient does not want to return to the ED for further workup or treatment.  He has no other complaints today.    PAST MEDICAL HISTORY:  Past Medical History:   Diagnosis Date    Alcohol abuse     quit nov 12 2022    Alcoholic fatty liver 2/10/2025    Alcoholism     Anemia     Anxiety whwhen put in continued care    Arthritis of back     not sure    Johnson esophagus     Brain concussion     Cholelithiasis     Chronic pain disorder have degenerative disc disease    Cirrhosis     COPD (chronic obstructive pulmonary disease)     CTS (carpal tunnel syndrome)     DDD (degenerative disc disease), thoracic     Degenerative disc disease, lumbar     Dystonia     GERD (gastroesophageal reflux disease)     GI (gastrointestinal bleed)     Hypertension     Irritable bowel syndrome     Liver disease     Low back pain     Low back strain     Lumbosacral disc disease     Migraines     MRSA (methicillin resistant Staphylococcus aureus)     Peptic ulcer disease     Stroke     TIA    TIA (transient ischemic attack)     Ulcerative colitis     Wears dentures     upper only       PAST SURGICAL HISTORY:  Past Surgical History:   Procedure Laterality Date    ABDOMINAL SURGERY  09/17/2021    APPENDECTOMY      CARPAL TUNNEL RELEASE  2005    CHOLECYSTECTOMY N/A 04/22/2017    Procedure: CHOLECYSTECTOMY LAPAROSCOPIC;  Surgeon:  Jaqueline Guaman MD;  [facility];  Service:     COLONOSCOPY N/A 05/08/2017    Procedure: COLONOSCOPY  CPTCODE:32790;  Surgeon: Nicho Richey III, MD;  [facility];  Service:     CUBITAL TUNNEL RELEASE Left 09/01/2022    Procedure: CUBITAL TUNNEL RELEASE LEFT;  Surgeon: Alec Hough MD;  [facility];  Service: Orthopedics;  Laterality: Left;    ENDOSCOPY      ENDOSCOPY N/A 05/08/2017    Procedure: ESOPHAGOGASTRODUODENOSCOPY WITH BIOPSY  CPTCODE:60043;  Surgeon: Nicho Richey III, MD;  [facility];  Service:     ENDOSCOPY N/A 11/03/2017    Procedure: ESOPHAGOGASTRODUODENOSCOPY WITH BIOPSY  CPTCODE: 55166;  Surgeon: Nicho Richey III, MD;  [facility];  Service:     ENDOSCOPY N/A 02/20/2018    Procedure: ESOPHAGOGASTRODUODENOSCOPY WITH DILATATION CPT CODE: 92718;  Surgeon: Nicho Richey III, MD;  [facility];  Service:     ENDOSCOPY N/A 06/05/2018    Procedure: ESOPHAGOGASTRODUODENOSCOPY WITH BIOPSY CPT CODE: 18180;  Surgeon: Nicho Richey III, MD;  [facility];  Service: Gastroenterology    ENDOSCOPY N/A 05/26/2021    Procedure: ESOPHAGOGASTRODUODENOSCOPY WITH BIOPSY;  Surgeon: Julieta Hebert MD;  [facility];  Service: Gastroenterology;  Laterality: N/A;    ENDOSCOPY N/A 06/02/2021    Procedure: ESOPHAGOGASTRODUODENOSCOPY WITH BIOPSY;  Surgeon: Julieta Hebert MD;  [facility];  Service: Gastroenterology;  Laterality: N/A;    ENDOSCOPY N/A 3/4/2025    Procedure: ESOPHAGOGASTRODUODENOSCOPY WITH BIOPSY;  Surgeon: Julieta Hebert MD;  [facility];  Service: Gastroenterology;  Laterality: N/A;    GASTRECTOMY N/A 09/17/2019    Procedure: OPEN VAGOTOMY, ANTRECTOMY,REVISION- Y GASTROJEJUNOSOTOMY;  Surgeon: Herbert Umanzor MD;  Location:  [facility];  Service: General    HERNIA REPAIR  12/10/2020    TRACHEOSTOMY N/A 01/17/2023    Procedure: TRACHEOSTOMY;  Surgeon:  Felton De León MD;  [facility];  Service: General;  Laterality: N/A;    UPPER GASTROINTESTINAL ENDOSCOPY      VENTRAL/INCISIONAL HERNIA REPAIR N/A 12/10/2020    Procedure: INCISIONAL HERNIA REPAIR LAPAROSCOPIC WITH MESH;  Surgeon: Herbert Umanzor MD;  Location:  [facility];  Service: General;  Laterality: N/A;       SOCIAL HISTORY:  Social History     Socioeconomic History    Marital status:    Tobacco Use    Smoking status: Every Day     Current packs/day: 1.00     Average packs/day: 1 pack/day for 35.0 years (35.0 ttl pk-yrs)     Types: Cigarettes     Passive exposure: Current    Smokeless tobacco: Never    Tobacco comments:     Been smoking 20 years   Vaping Use    Vaping status: Never Used   Substance and Sexual Activity    Alcohol use: Not Currently     Comment: Quit drinking November 12,2022    Drug use: No    Sexual activity: Yes     Partners: Female     Birth control/protection: None       FAMILY HISTORY:  Family History   Problem Relation Age of Onset    Osteoporosis Mother     Hypertension Father     Osteoporosis Father     No Known Problems Sister     No Known Problems Brother     Drug abuse Brother     No Known Problems Daughter     Diabetes Sister     Stroke Paternal Grandfather        MEDICATIONS:  The current medication list was reviewed in the EMR    Current Outpatient Medications:     albuterol (PROVENTIL) (2.5 MG/3ML) 0.083% nebulizer solution, Take 2.5 mg by nebulization 4 (Four) Times a Day., Disp: , Rfl:     baclofen (LIORESAL) 20 MG tablet, , Disp: , Rfl:     dexlansoprazole (DEXILANT) 60 MG capsule, Take 1 capsule by mouth Every Morning Before Breakfast., Disp: 30 capsule, Rfl: 11    famotidine (Pepcid) 40 MG tablet, Take 1 tablet by mouth Daily As Needed for Indigestion or Heartburn. (Patient not taking: Reported on 4/7/2025), Disp: 30 tablet, Rfl: 5    folic acid (FOLVITE) 1 MG tablet, Take 1 tablet by mouth Daily. (Patient not taking: Reported on 4/7/2025), Disp: 30  tablet, Rfl: 3    HYDROcodone-acetaminophen (NORCO) 5-325 MG per tablet, , Disp: , Rfl:     levETIRAcetam (KEPPRA) 750 MG tablet, Take 1 tablet by mouth 2 (Two) Times a Day., Disp: , Rfl:     metoclopramide (Reglan) 10 MG tablet, Take 1 tablet by mouth 2 (Two) Times a Day Before Meals. Take one tablet by mouth three times a day 30 minutes before meals, Disp: 90 tablet, Rfl: 2    metoprolol tartrate (LOPRESSOR) 25 MG tablet, Take 0.5 tablets by mouth 2 (Two) Times a Day., Disp: , Rfl:     midodrine (PROAMATINE) 5 MG tablet, Take 1 tablet by mouth 3 (Three) Times a Day Before Meals., Disp: 90 tablet, Rfl: 1    promethazine (PHENERGAN) 25 MG tablet, Take 1 tablet by mouth Every 8 (Eight) Hours As Needed for Nausea or Vomiting., Disp: , Rfl:     QUEtiapine (SEROquel) 200 MG tablet, Take 1 tablet by mouth Every Night., Disp: 30 tablet, Rfl: 3    QUEtiapine (SEROquel) 25 MG tablet, Take 1 tablet by mouth Daily., Disp: 30 tablet, Rfl: 3    spironolactone (ALDACTONE) 25 MG tablet, Take 0.5 tablets by mouth Daily., Disp: , Rfl:     venlafaxine (EFFEXOR) 100 MG tablet, Take 1 tablet by mouth 2 (Two) Times a Day., Disp: 60 tablet, Rfl: 3    ALLERGIES:    Allergies   Allergen Reactions    Contrast Dye (Echo Or Unknown Ct/Mr) Other (See Comments)     Shortness of breath    Prilosec [Omeprazole] Other (See Comments)     Chest pain  Pt states he can take pepcid with no problem    Protonix [Pantoprazole Sodium] Palpitations     Patient states that protonix causes chest pain.  Shruthi Pérez, [facility]  4/23/2017  11:19 AM  Pt states he can tolerate pepcid with no problem      Ativan [Lorazepam] Hallucinations    Depakene [Valproic Acid] Hallucinations    Geodon [Ziprasidone Hcl] Mental Status Change    Libritabs [Chlordiazepoxide] Other (See Comments)     Tongue swell/split    Dilantin [Phenytoin] Delirium    Dilaudid [Hydromorphone] Hallucinations    Flagyl [Metronidazole] Nausea And Vomiting    Topamax [Topiramate] Anxiety      REVIEW OF SYSTEMS:    A comprehensive 14 point review of systems was performed.  Significant findings as mentioned above.  All other systems reviewed and are negative.        Physical Exam   Vital Signs:   Vitals:    25 1046   BP: 95/73   Pulse: (!) 131     General: Appears fatigued, weak, in no acute distress.   Head: ATNC   Eyes: PERRL, No evidence of conjunctivitis.   Nose: No nasal discharge.   Mouth: Oral mucosal membranes moist. No oral ulceration or hemorrhages.   Neck: Neck supple. No thyromegaly. No JVD.   Lungs: Rhonchi bilaterally   Heart: Regular rate and rhythm. No murmurs, rubs, or gallops.   Abdomen: Soft. Bowel sounds are normoactive. Nontender with palpation.   Extremities: No cyanosis or edema.   Neurologic: MS as above. Grossly non focal exam.    RECENT LABS:  Lab Results   Component Value Date    WBC 5.2 03/10/2025    HGB 12.9 (L) 03/10/2025    HCT 39.4 03/10/2025    MCV 97 03/10/2025    RDW 12 03/10/2025     03/10/2025    NEUTRORELPCT 67 03/10/2025    LYMPHORELPCT 23 03/10/2025    MONORELPCT 9 03/10/2025    EOSRELPCT 0 03/10/2025    BASORELPCT 0 03/10/2025    NEUTROABS 3.5 03/10/2025    LYMPHSABS 1.2 03/10/2025       Lab Results   Component Value Date     02/10/2025    K 4.3 02/10/2025    CO2 26.3 02/10/2025     02/10/2025    BUN 10 02/10/2025    CREATININE 0.72 (L) 02/10/2025    EGFRIFNONA 106 2021    GLUCOSE 88 02/10/2025    CALCIUM 8.2 (L) 02/10/2025    ALKPHOS 208 (H) 02/10/2025    AST 40 02/10/2025    ALT 33 02/10/2025    BILITOT 0.7 02/10/2025    ALBUMIN 2.8 (L) 02/10/2025    PROTEINTOT 5.0 (L) 02/10/2025    MG 1.7 2023    PHOS 3.6 2021       ASSESSMENT & PLAN:  Jamison Edward is a 54 y.o. male with    1.  GERD:  2.  S/p cholecystectomy:  3.  S/p gastric bypass:  4.  Unintentional weight loss:  5.  History of H. pylori:  6.  Dysphagia (solids/liquids):  7.  Nausea and vomitin. Epigastric pain:  9. Chronic pancreatitis:   10. Probable  Gastroparesis:  11. Alcoholic fatty liver/early cirrhosis:    -Patient underwent EGD with Dr. Ovalles on 3/4/2025.  Esophagus appeared normal.  Noted evidence of gastric bypass.  Gastric pouch was found containing a bezoar.  The gastrojejunal anastomosis was characterized by healthy-appearing mucosa.  No biopsies were taken.  Following EGD, he was started on Reglan 10 mg p.o. twice daily but he has not found this helpful as he continues to struggle with poor appetite, nausea and vomiting (3-4 times per day) and epigastric pain.  His weight continues to trend down.  At his last visit in early February, patient weighed 130 lbs and currently weighs 119 lbs. In regards to GERD, patient has been on Dexilant 60 mg p.o. daily also has Rx for Pepcid 40 mg p.o. daily as needed.  Unfortunately, he has difficulty keeping his medications down.   -As above, he also had recent CT abdomen pelvis without contrast at [facility] in [place] on 3/12/2025 which showed severe fatty infiltration of the liver moderate ascites and anasarca.  Also noted chronic pancreatitis.  Also noted marked thickening involving the right colon consistent with nonspecific colitis.  Discussed with Dr. Ovalles who feels this could be related to his moderate ascites/anasarca.  Per radiology, this could be infectious, inflammatory, ischemic or neoplastic in nature.  As above, he previously underwent colonoscopy in May 2017 with Dr. Richey which was unremarkable.  Random colon biopsies were negative.  Repeat colonoscopy was previously recommended in 10 years.  He is without family history of colon cancer.  I did discuss repeat colonoscopy to further evaluate.  Unfortunately, patient would be unable to tolerate colon prep.  -Discussed the above in detail with Dr. Ovalles and patient's overall clinical decline. Recommended ED evaluation/workup which was also previously recommended by cardiology, however, patient declines.  Patient/wife state they are  considering palliative care/hospice which they ewill discuss with his PCP.  In the interim, will start patient on trial of Gimoti. Sample provided. Will provide RX if patient finds helpful. Wife to notify clinic. Will also obtain workup for EPI including fecal pancreatic elastase.  Chronic pancreatitis likely secondary to history of alcoholism.  At present, he continues to abstain.  Will check IgG4 and triglycerides.  -In regards to early cirrhosis, he has abstained from drinking since 2022.  At his last visit, labs were obtained including CBC which showed ongoing thrombocytopenia with platelet count of 121,000.  CMP showed alkaline phosphatase remains elevated, 208.  The remaining LFTs were normal.  His AFP was mildly elevated, 9.33.  Therefore, obtained repeat ultrasound of the liver which was done on 3/10/2025 and did show likely cirrhotic liver and small volume ascites. Obtained additional workup in October 2024.  He did have a very mildly elevated anti-smooth muscle antibody.  SURINDER was positive.  However, reflexed DNA/DS was negative, not suggestive of autoimmune hepatitis.  The remaining workup was unremarkable and negative for PBC as well.  Recent EGD was negative for esophageal varices.  -Obtained labs today including CBC showed macrocytic anemia with hemoglobin of 12.2.  He had normal platelet count currently 148,000. WBCs were normal.  CMP showed mildly elevated total bilirubin 1.3 and elevated alkaline phosphatase 252 with normal ALT and AST. Cr was 0.53.  PT/INR normal.  Repeat AFP also normalized currently 6.24. Current MELD 3.0 score: 11.   -As above, he is following with cardiology for hypertension and tachycardia.  Due to poor oral intake, he has been having hypotension recently and has been unable to tolerate his medications.  Cardiology started him on midodrine and advised him to take spironolactone 12.5 mg p.o. daily and metoprolol as needed.  Therefore, I am unable to increase spironolactone or  start Lasix for ascites. Would not recommend paracentesis at this time due to hypotension. Echocardiogram showed normal LV function.  -Will follow up in ~8 weeks.  In the interim, he will follow-up with his PCP to discuss consideration of palliative care/hospice.        Electronically Signed by: EILEEN Hamlin ,  April 9, 2025 10:51 EDT       CC:   Leticia Martinez APRN

## 2025-04-10 LAB
ALBUMIN SERPL-MCNC: 2.2 G/DL (ref 3.5–5.2)
ALBUMIN/GLOB SERPL: 0.8 G/DL
ALP SERPL-CCNC: 252 U/L (ref 39–117)
ALPHA-FETOPROTEIN: 6.24 NG/ML (ref 0–8.3)
ALT SERPL W P-5'-P-CCNC: 40 U/L (ref 1–41)
ANION GAP SERPL CALCULATED.3IONS-SCNC: 10 MMOL/L (ref 5–15)
AST SERPL-CCNC: 34 U/L (ref 1–40)
BASOPHILS # BLD AUTO: 0 10*3/MM3 (ref 0–0.2)
BASOPHILS NFR BLD AUTO: 0 % (ref 0–1.5)
BILIRUB SERPL-MCNC: 1.3 MG/DL (ref 0–1.2)
BUN SERPL-MCNC: 7 MG/DL (ref 6–20)
BUN/CREAT SERPL: 13.2 (ref 7–25)
CALCIUM SPEC-SCNC: 7.9 MG/DL (ref 8.6–10.5)
CHLORIDE SERPL-SCNC: 100 MMOL/L (ref 98–107)
CO2 SERPL-SCNC: 26 MMOL/L (ref 22–29)
CREAT SERPL-MCNC: 0.53 MG/DL (ref 0.76–1.27)
DEPRECATED RDW RBC AUTO: 45.7 FL (ref 37–54)
EGFRCR SERPLBLD CKD-EPI 2021: 117.6 ML/MIN/1.73
EOSINOPHIL # BLD AUTO: 0.01 10*3/MM3 (ref 0–0.4)
EOSINOPHIL NFR BLD AUTO: 0.2 % (ref 0.3–6.2)
ERYTHROCYTE [DISTWIDTH] IN BLOOD BY AUTOMATED COUNT: 13 % (ref 12.3–15.4)
GLOBULIN UR ELPH-MCNC: 2.9 GM/DL
GLUCOSE SERPL-MCNC: 118 MG/DL (ref 65–99)
HCT VFR BLD AUTO: 36 % (ref 37.5–51)
HGB BLD-MCNC: 12.2 G/DL (ref 13–17.7)
IMM GRANULOCYTES # BLD AUTO: 0.03 10*3/MM3 (ref 0–0.05)
IMM GRANULOCYTES NFR BLD AUTO: 0.5 % (ref 0–0.5)
LYMPHOCYTES # BLD AUTO: 1.13 10*3/MM3 (ref 0.7–3.1)
LYMPHOCYTES NFR BLD AUTO: 17.7 % (ref 19.6–45.3)
MCH RBC QN AUTO: 33.4 PG (ref 26.6–33)
MCHC RBC AUTO-ENTMCNC: 33.9 G/DL (ref 31.5–35.7)
MCV RBC AUTO: 98.6 FL (ref 79–97)
MONOCYTES # BLD AUTO: 0.4 10*3/MM3 (ref 0.1–0.9)
MONOCYTES NFR BLD AUTO: 6.3 % (ref 5–12)
NEUTROPHILS NFR BLD AUTO: 4.83 10*3/MM3 (ref 1.7–7)
NEUTROPHILS NFR BLD AUTO: 75.3 % (ref 42.7–76)
NRBC BLD AUTO-RTO: 0 /100 WBC (ref 0–0.2)
PLATELET # BLD AUTO: 148 10*3/MM3 (ref 140–450)
PMV BLD AUTO: 12.2 FL (ref 6–12)
POTASSIUM SERPL-SCNC: 3.6 MMOL/L (ref 3.5–5.2)
PROT SERPL-MCNC: 5.1 G/DL (ref 6–8.5)
RBC # BLD AUTO: 3.65 10*6/MM3 (ref 4.14–5.8)
SODIUM SERPL-SCNC: 136 MMOL/L (ref 136–145)
WBC NRBC COR # BLD AUTO: 6.4 10*3/MM3 (ref 3.4–10.8)

## 2025-04-16 DIAGNOSIS — K31.84 GASTROPARESIS: Primary | ICD-10-CM

## 2025-04-16 RX ORDER — METOCLOPRAMIDE HYDROCHLORIDE 15 MG/.07ML
1 SPRAY NASAL
Qty: 9.8 ML | Refills: 5 | Status: SHIPPED | OUTPATIENT
Start: 2025-04-16

## 2025-04-29 ENCOUNTER — TELEPHONE (OUTPATIENT)
Dept: GASTROENTEROLOGY | Facility: CLINIC | Age: 55
End: 2025-04-29
Payer: MEDICARE

## 2025-04-29 NOTE — TELEPHONE ENCOUNTER
PA for Gimoti submitted thru epic.   The requested medication is not covered under your Medicaid pharmacy benefit.

## 2025-05-13 ENCOUNTER — TELEPHONE (OUTPATIENT)
Dept: UROLOGY | Facility: CLINIC | Age: 55
End: 2025-05-13
Payer: MEDICARE

## 2025-05-13 NOTE — TELEPHONE ENCOUNTER
Krista, a lady named Daria called from [facility] and wants you to please call her back at [phone]. It is in-regards to his GMOTI medication. She was talking about a PA and shows it was approved but, it states that patient is in hospice care. She needed Lauren to state that patient was not in hospice care to dispense medication.

## 2025-05-13 NOTE — TELEPHONE ENCOUNTER
I spoke Lena, patient's wife, she stated since patient's visit in April he was placed on hospice care and he was unable to take the Gimoti RX, it caused seizures. I spoke  to [facility], notified them that patient is  in hospice care and to cancel the Gimoti RX per Wife's request.

## 2025-06-03 ENCOUNTER — TELEMEDICINE (OUTPATIENT)
Dept: PSYCHIATRY | Facility: CLINIC | Age: 55
End: 2025-06-03
Payer: MEDICARE

## 2025-06-03 DIAGNOSIS — Z72.0 TOBACCO USE: Primary | ICD-10-CM

## 2025-06-03 DIAGNOSIS — F41.9 ANXIETY DISORDER, UNSPECIFIED TYPE: ICD-10-CM

## 2025-06-03 RX ORDER — VENLAFAXINE 100 MG/1
100 TABLET ORAL 2 TIMES DAILY
Qty: 60 TABLET | Refills: 3 | Status: SHIPPED | OUTPATIENT
Start: 2025-06-03

## 2025-06-03 RX ORDER — QUETIAPINE FUMARATE 25 MG/1
25 TABLET, FILM COATED ORAL DAILY
Qty: 30 TABLET | Refills: 3 | Status: SHIPPED | OUTPATIENT
Start: 2025-06-03

## 2025-06-03 RX ORDER — QUETIAPINE FUMARATE 200 MG/1
200 TABLET, FILM COATED ORAL NIGHTLY
Qty: 30 TABLET | Refills: 3 | Status: SHIPPED | OUTPATIENT
Start: 2025-06-03

## 2025-06-03 NOTE — PROGRESS NOTES
This provider is located at the [facility] (through [facility]), [address] [place] [address], [id number] using a secure aloomahart Video Visit through [employer]. Patient is being seen remotely via telehealth at their home address in [place], and stated they are in a secure environment for this session. The patient's condition being diagnosed/treated is appropriate for telemedicine. The provider identified herself as well as her credentials.  The patient, and/or patients guardian, consent to be seen remotely, and when consent is given they understand that the consent allows for patient identifiable information to be sent to a third party as needed.   They may refuse to be seen remotely at any time. The electronic data is encrypted and password protected, and the patient and/or guardian has been advised of the potential risks to privacy not withstanding such measures.    You have chosen to receive care through a telehealth visit.  Do you consent to use a video/audio connection for your medical care today? Yes    Patient identifiers utilized: Name and date of birth.    Patient verbally confirmed consent for today's encounter:  June 6th 2025    Subjective     Jamison Edward is a 54 y.o. male who presents today for follow up    Chief Complaint: Anxiety       History of Present Illness:    History of Present Illness  Jamison is a 54-year-old male who presents today for a follow-up visit via telehealth.  His wife is present and provides collateral information.  States that he has been in hospice care since March due to liver failure and pancreatitis.  States he is down to less than 100 pounds now and continues to have GI upset and poor appetite.  He is sleeping approximately 6 hours at night with naps throughout the day.  States that sometimes he struggles to have energy to get up and do things and feels down.  Other times he feels high strung and anxious.  Overall, he feels stable with his current medications and is  agreeable to contact the office if any increase in anxiety or depression arise.  No hallucinations.  No SI/HI.  Anxiety  Presents for follow-up visit.  Patient reports no chest pain, confusion, decreased concentration, depressed mood, dizziness, dry mouth, excessive worry, insomnia, irritability, nausea, nervous/anxious behavior, obsessions, palpitations, shortness of breath or suicidal ideas. Symptoms occur most days. The severity of symptoms is mild. The patient sleeps 6 hours per night. The quality of sleep is fair. Compliance with medications is %. Side effects of treatment include fatigue.   Additional comments: ii have been sick with my stomach makes my anxiety worse      The following portions of the patient's history were reviewed and updated as appropriate: allergies, current medications, past family history, past medical history, past social history, past surgical history and problem list.    Past Psychiatric History:  Began Treatment:This year  Diagnoses:Delirium  Psychiatrist:Patient has never seen a psychiatric provider prior to consultations while admitted to the hospital.  Therapist:Denies  Admission History:Denies  Medication Trials:Xanax, Ativan, Depakote, Seroquel, Geodon, Effexor, lexapro  Self Harm: Denies  Suicide Attempts:Denies      Past Medical History:  Past Medical History:   Diagnosis Date    Alcohol abuse     quit nov 12 2022    Alcoholic fatty liver 2/10/2025    Alcoholism     Anemia     Anxiety whwhen put in continued care    Arthritis of back     not sure    Johnson esophagus     Brain concussion     Cholelithiasis     Chronic pain disorder have degenerative disc disease    Cirrhosis     COPD (chronic obstructive pulmonary disease)     CTS (carpal tunnel syndrome)     DDD (degenerative disc disease), thoracic     Degenerative disc disease, lumbar     Dystonia     GERD (gastroesophageal reflux disease)     GI (gastrointestinal bleed)     Hypertension     Irritable bowel syndrome      Liver disease     Low back pain     Low back strain     Lumbosacral disc disease     Migraines     MRSA (methicillin resistant Staphylococcus aureus)     Peptic ulcer disease     Stroke     TIA    TIA (transient ischemic attack)     Ulcerative colitis     Wears dentures     upper only       Substance Abuse History:   Types:alcohol, THC when he was a teenager  Withdrawal Symptoms:Patient reports that they believed some of his delirium while admitted to the hospital from December to March was due to alcohol withdrawal.  Patient states that he stopped drinking alcohol November 12, 2022.  Longest Period Sober:Patient states that he has been sober since November  AA: No    Social History:  Social History     Socioeconomic History    Marital status:    Tobacco Use    Smoking status: Every Day     Current packs/day: 1.00     Average packs/day: 1 pack/day for 35.0 years (35.0 ttl pk-yrs)     Types: Cigarettes     Passive exposure: Current    Smokeless tobacco: Never    Tobacco comments:     Been smoking 20 years   Vaping Use    Vaping status: Never Used   Substance and Sexual Activity    Alcohol use: Not Currently     Comment: Quit drinking November 12,2022    Drug use: No    Sexual activity: Yes     Partners: Female     Birth control/protection: None       Family History:  Family History   Problem Relation Age of Onset    Osteoporosis Mother     Hypertension Father     Osteoporosis Father     No Known Problems Sister     No Known Problems Brother     Drug abuse Brother     No Known Problems Daughter     Diabetes Sister     Stroke Paternal Grandfather        Past Surgical History:  Past Surgical History:   Procedure Laterality Date    ABDOMINAL SURGERY  09/17/2021    APPENDECTOMY      CARPAL TUNNEL RELEASE  2005    CHOLECYSTECTOMY N/A 04/22/2017    Procedure: CHOLECYSTECTOMY LAPAROSCOPIC;  Surgeon: Jaqueline Guaman MD;  Location: [facility];  Service:     COLONOSCOPY N/A 05/08/2017    Procedure: COLONOSCOPY   CPTCODE:13469;  Surgeon: Nicho Richey III, MD;  [facility];  Service:     CUBITAL TUNNEL RELEASE Left 09/01/2022    Procedure: CUBITAL TUNNEL RELEASE LEFT;  Surgeon: Alec Hough MD;  [facility];  Service: Orthopedics;  Laterality: Left;    ENDOSCOPY      ENDOSCOPY N/A 05/08/2017    Procedure: ESOPHAGOGASTRODUODENOSCOPY WITH BIOPSY  CPTCODE:31621;  Surgeon: Nicho Richey III, MD;  [facility];  Service:     ENDOSCOPY N/A 11/03/2017    Procedure: ESOPHAGOGASTRODUODENOSCOPY WITH BIOPSY  CPTCODE: 52215;  Surgeon: Nicho Richey III, MD;  [facility];  Service:     ENDOSCOPY N/A 02/20/2018    Procedure: ESOPHAGOGASTRODUODENOSCOPY WITH DILATATION CPT CODE: 88181;  Surgeon: Nicho Richey III, MD;  [facility];  Service:     ENDOSCOPY N/A 06/05/2018    Procedure: ESOPHAGOGASTRODUODENOSCOPY WITH BIOPSY CPT CODE: 73545;  Surgeon: Nicho Richey III, MD;  [facility];  Service: Gastroenterology    ENDOSCOPY N/A 05/26/2021    Procedure: ESOPHAGOGASTRODUODENOSCOPY WITH BIOPSY;  Surgeon: Julieta Hebert MD;  [facility];  Service: Gastroenterology;  Laterality: N/A;    ENDOSCOPY N/A 06/02/2021    Procedure: ESOPHAGOGASTRODUODENOSCOPY WITH BIOPSY;  Surgeon: Julieta Hebert MD;  [facility];  Service: Gastroenterology;  Laterality: N/A;    ENDOSCOPY N/A 3/4/2025    Procedure: ESOPHAGOGASTRODUODENOSCOPY WITH BIOPSY;  Surgeon: Julieta Hebert MD;  [facility];  Service: Gastroenterology;  Laterality: N/A;    GASTRECTOMY N/A 09/17/2019    Procedure: OPEN VAGOTOMY, ANTRECTOMY,REVISION- Y GASTROJEJUNOSOTOMY;  Surgeon: Herbert Umanzor MD;  Location:  [facility];  Service: General    HERNIA REPAIR  12/10/2020    TRACHEOSTOMY N/A 01/17/2023    Procedure: TRACHEOSTOMY;  Surgeon: Felton De León MD;  Location: [facility];  Service: General;  Laterality: N/A;    UPPER GASTROINTESTINAL  ENDOSCOPY      VENTRAL/INCISIONAL HERNIA REPAIR N/A 12/10/2020    Procedure: INCISIONAL HERNIA REPAIR LAPAROSCOPIC WITH MESH;  Surgeon: Herbert Umanzor MD;  Location: [facility];  Service: General;  Laterality: N/A;       Problem List:  Patient Active Problem List   Diagnosis    Precordial chest pain    Weight loss, abnormal    Right upper quadrant abdominal pain    Epigastric pain    History of bleeding ulcers    Nausea    Malaise and fatigue    Acute gastric ulcer without hemorrhage or perforation    Poor appetite    Duodenal ulcer    Gastroesophageal reflux disease    Dysphagia    Iron deficiency anemia    Malabsorption    Gastric ulcer without hemorrhage or perforation    Dystonia    Peptic ulcer without hemorrhage or perforation    Gastric outlet obstruction    Incisional hernia, without obstruction or gangrene    Incisional hernia    Chest pain, atypical    Gastric bezoar    Essential hypertension    Cigarette smoker    Chronic back pain    Sinus tachycardia    Tobacco use    Mild carpal tunnel syndrome    Orthostatic hypotension    Palpitations    Abdominal swelling    Lower extremity edema    Pneumonia due to infectious organism, unspecified laterality, unspecified part of lung    Acute respiratory failure with hypoxia    Dizziness    Alcoholic fatty liver    Unintentional weight loss    Nausea and vomiting    Ventricular tachycardia (paroxysmal)       Allergy:   Allergies   Allergen Reactions    Contrast Dye (Echo Or Unknown Ct/Mr) Other (See Comments)     Shortness of breath    Prilosec [Omeprazole] Other (See Comments)     Chest pain  Pt states he can take pepcid with no problem    Protonix [Pantoprazole Sodium] Palpitations     Patient states that protonix causes chest pain.  Shruthi Pérez [facility]  4/23/2017  11:19 AM  Pt states he can tolerate pepcid with no problem      Ativan [Lorazepam] Hallucinations    Depakene [Valproic Acid] Hallucinations    Geodon [Ziprasidone Hcl] Mental Status Change     Libritabs [Chlordiazepoxide] Other (See Comments)     Tongue swell/split    Dilantin [Phenytoin] Delirium    Dilaudid [Hydromorphone] Hallucinations    Flagyl [Metronidazole] Nausea And Vomiting    Topamax [Topiramate] Anxiety        Current Medications:   Current Outpatient Medications   Medication Sig Dispense Refill    QUEtiapine (SEROquel) 200 MG tablet Take 1 tablet by mouth Every Night. 30 tablet 3    QUEtiapine (SEROquel) 25 MG tablet Take 1 tablet by mouth Daily. 30 tablet 3    venlafaxine (EFFEXOR) 100 MG tablet Take 1 tablet by mouth 2 (Two) Times a Day. 60 tablet 3    albuterol (PROVENTIL) (2.5 MG/3ML) 0.083% nebulizer solution Take 2.5 mg by nebulization 4 (Four) Times a Day.      baclofen (LIORESAL) 20 MG tablet       HYDROcodone-acetaminophen (NORCO) 5-325 MG per tablet       levETIRAcetam (KEPPRA) 750 MG tablet Take 1 tablet by mouth 2 (Two) Times a Day.      midodrine (PROAMATINE) 5 MG tablet Take 1 tablet by mouth 3 (Three) Times a Day Before Meals. 90 tablet 1    spironolactone (ALDACTONE) 25 MG tablet Take 0.5 tablets by mouth Daily.       No current facility-administered medications for this visit.       Review of Systems:    Review of Systems   Constitutional:  Positive for appetite change, fatigue and unexpected weight loss. Negative for activity change, chills, diaphoresis, fever and irritability.   HENT:  Negative for congestion, sore throat and swollen glands.    Respiratory:  Negative for cough and shortness of breath.    Cardiovascular: Negative.  Negative for chest pain and palpitations.   Gastrointestinal:  Negative for abdominal pain, nausea and vomiting.   Genitourinary:  Negative for dysuria.   Musculoskeletal:  Positive for back pain, myalgias and neck pain.   Skin:  Negative for rash.   Neurological:  Positive for weakness and numbness. Negative for dizziness, seizures and confusion.   Psychiatric/Behavioral:  Positive for stress. Negative for agitation, behavioral problems,  decreased concentration, dysphoric mood, hallucinations, self-injury, sleep disturbance, suicidal ideas, negative for hyperactivity and depressed mood. The patient is not nervous/anxious and does not have insomnia.          Physical Exam:   Physical Exam  Vitals reviewed.   HENT:      Head: Atraumatic.   Pulmonary:      Effort: Pulmonary effort is normal.   Musculoskeletal:      Cervical back: Normal range of motion.   Neurological:      General: No focal deficit present.      Mental Status: He is alert and oriented to person, place, and time. Mental status is at baseline.   Psychiatric:         Attention and Perception: Attention and perception normal.         Mood and Affect: Mood and affect normal. Mood is not anxious.         Speech: Speech normal.         Behavior: Behavior normal. Behavior is cooperative.         Thought Content: Thought content normal. Thought content is not paranoid or delusional. Thought content does not include homicidal or suicidal ideation.         Cognition and Memory: Cognition and memory normal.         Judgment: Judgment normal.         Vitals:  There were no vitals taken for this visit.   There is no height or weight on file to calculate BMI.    Last 3 Blood Pressure Readings:  BP Readings from Last 3 Encounters:   04/09/25 95/73   04/07/25 105/75   03/04/25 130/73       Mental Status Exam:   Hygiene:   good  Cooperation:  Cooperative  Eye Contact:  Good  Psychomotor Behavior:  Appropriate  Affect:  Restricted  Mood: normal  Hopelessness: Denies  Speech:  Normal  Thought Process:  Goal directed  Thought Content:  Normal  Suicidal:  None  Homicidal:  None  Hallucinations:  Auditory  Delusion:  None  Memory:  Deficits  Orientation:  Person, Place, Time and Situation  Reliability:  good  Insight:  Good  Judgement:  Good  Impulse Control:  Good  Physical/Medical Issues:  Yes See PMH       Lab Results:   Office Visit on 04/09/2025   Component Date Value Ref Range Status    Glucose  04/09/2025 118 (H)  65 - 99 mg/dL Final    BUN 04/09/2025 7  6 - 20 mg/dL Final    Creatinine 04/09/2025 0.53 (L)  0.76 - 1.27 mg/dL Final    Sodium 04/09/2025 136  136 - 145 mmol/L Final    Potassium 04/09/2025 3.6  3.5 - 5.2 mmol/L Final    Chloride 04/09/2025 100  98 - 107 mmol/L Final    CO2 04/09/2025 26.0  22.0 - 29.0 mmol/L Final    Calcium 04/09/2025 7.9 (L)  8.6 - 10.5 mg/dL Final    Total Protein 04/09/2025 5.1 (L)  6.0 - 8.5 g/dL Final    Albumin 04/09/2025 2.2 (L)  3.5 - 5.2 g/dL Final    ALT (SGPT) 04/09/2025 40  1 - 41 U/L Final    AST (SGOT) 04/09/2025 34  1 - 40 U/L Final    Alkaline Phosphatase 04/09/2025 252 (H)  39 - 117 U/L Final    Total Bilirubin 04/09/2025 1.3 (H)  0.0 - 1.2 mg/dL Final    Globulin 04/09/2025 2.9  gm/dL Final    A/G Ratio 04/09/2025 0.8  g/dL Final    BUN/Creatinine Ratio 04/09/2025 13.2  7.0 - 25.0 Final    Anion Gap 04/09/2025 10.0  5.0 - 15.0 mmol/L Final    eGFR 04/09/2025 117.6  >60.0 mL/min/1.73 Final    Protime 04/09/2025 14.1  11.6 - 15.1 Seconds Final    INR 04/09/2025 1.08  0.90 - 1.10 Final    ALPHA-FETOPROTEIN 04/09/2025 6.24  0 - 8.3 ng/mL Final    WBC 04/09/2025 6.40  3.40 - 10.80 10*3/mm3 Final    RBC 04/09/2025 3.65 (L)  4.14 - 5.80 10*6/mm3 Final    Hemoglobin 04/09/2025 12.2 (L)  13.0 - 17.7 g/dL Final    Hematocrit 04/09/2025 36.0 (L)  37.5 - 51.0 % Final    MCV 04/09/2025 98.6 (H)  79.0 - 97.0 fL Final    MCH 04/09/2025 33.4 (H)  26.6 - 33.0 pg Final    MCHC 04/09/2025 33.9  31.5 - 35.7 g/dL Final    RDW 04/09/2025 13.0  12.3 - 15.4 % Final    RDW-SD 04/09/2025 45.7  37.0 - 54.0 fl Final    MPV 04/09/2025 12.2 (H)  6.0 - 12.0 fL Final    Platelets 04/09/2025 148  140 - 450 10*3/mm3 Final    Neutrophil % 04/09/2025 75.3  42.7 - 76.0 % Final    Lymphocyte % 04/09/2025 17.7 (L)  19.6 - 45.3 % Final    Monocyte % 04/09/2025 6.3  5.0 - 12.0 % Final    Eosinophil % 04/09/2025 0.2 (L)  0.3 - 6.2 % Final    Basophil % 04/09/2025 0.0  0.0 - 1.5 % Final    Immature  Grans % 04/09/2025 0.5  0.0 - 0.5 % Final    Neutrophils, Absolute 04/09/2025 4.83  1.70 - 7.00 10*3/mm3 Final    Lymphocytes, Absolute 04/09/2025 1.13  0.70 - 3.10 10*3/mm3 Final    Monocytes, Absolute 04/09/2025 0.40  0.10 - 0.90 10*3/mm3 Final    Eosinophils, Absolute 04/09/2025 0.01  0.00 - 0.40 10*3/mm3 Final    Basophils, Absolute 04/09/2025 0.00  0.00 - 0.20 10*3/mm3 Final    Immature Grans, Absolute 04/09/2025 0.03  0.00 - 0.05 10*3/mm3 Final    nRBC 04/09/2025 0.0  0.0 - 0.2 /100 WBC Final   Hospital Outpatient Visit on 03/24/2025   Component Date Value Ref Range Status    LVIDd 03/24/2025 3.1  cm Final    LVIDs 03/24/2025 2.9  cm Final    IVSd 03/24/2025 1.30  cm Final    LVPWd 03/24/2025 1.20  cm Final    FS 03/24/2025 6.5  % Final    IVS/LVPW 03/24/2025 1.08  cm Final    ESV(cubed) 03/24/2025 24.4  ml Final    LV Sys Vol (BSA corrected) 03/24/2025 6.4  cm2 Final    EDV(cubed) 03/24/2025 29.8  ml Final    LV Lord Vol (BSA corrected) 03/24/2025 18.4  cm2 Final    LV mass(C)d 03/24/2025 121.9  grams Final    LVOT area 03/24/2025 2.27  cm2 Final    LVOT diam 03/24/2025 1.70  cm Final    EDV(MOD-sp4) 03/24/2025 30.0  ml Final    ESV(MOD-sp4) 03/24/2025 10.4  ml Final    SV(MOD-sp4) 03/24/2025 19.6  ml Final    SVi(MOD-SP4) 03/24/2025 12.0  ml/m2 Final    EF(MOD-sp4) 03/24/2025 65.3  % Final    MV E max raghavendra 03/24/2025 102.0  cm/sec Final    MV A max raghavendra 03/24/2025 35.0  cm/sec Final    MV E/A 03/24/2025 2.9   Final    LA ESV Index (BP) 03/24/2025 8.9  ml/m2 Final    Med Peak E' Raghavendra 03/24/2025 8.6  cm/sec Final    Lat Peak E' Raghavendra 03/24/2025 10.6  cm/sec Final    Avg E/e' ratio 03/24/2025 10.63   Final    LA dimension (2D)  03/24/2025 2.00  cm Final    Ao pk raghavendra 03/24/2025 88.3  cm/sec Final    Ao max PG 03/24/2025 3.1  mmHg Final    Ao mean PG 03/24/2025 2.00  mmHg Final    Ao V2 VTI 03/24/2025 18.2  cm Final    PA acc time 03/24/2025 0.11  sec Final    Ao root diam 03/24/2025 2.30  cm Final   Admission on  03/04/2025, Discharged on 03/04/2025   Component Date Value Ref Range Status    Folate 03/04/2025 3.51 (L)  4.78 - 24.20 ng/mL Final    Vitamin B-12 03/04/2025 1,206 (H)  211 - 946 pg/mL Final         Assessment & Plan   Problems Addressed this Visit       Tobacco use - Primary     Other Visit Diagnoses         Anxiety disorder, unspecified type        Relevant Medications    venlafaxine (EFFEXOR) 100 MG tablet    QUEtiapine (SEROquel) 25 MG tablet    QUEtiapine (SEROquel) 200 MG tablet          Diagnoses         Codes Comments      Tobacco use    -  Primary ICD-10-CM: Z72.0  ICD-9-CM: 305.1       Anxiety disorder, unspecified type     ICD-10-CM: F41.9  ICD-9-CM: 300.00             Visit Diagnoses:    ICD-10-CM ICD-9-CM   1. Tobacco use  Z72.0 305.1   2. Anxiety disorder, unspecified type  F41.9 300.00           GOALS:  Short Term Goals: Patient will be compliant with medication, and patient will have no significant medication related side effects.  Patient will be engaged in psychotherapy as indicated.  Patient will report subjective improvement of symptoms.  Long term goals: To stabilize mood and treat/improve subjective symptoms, the patient will stay out of the hospital, the patient will be at an optimal level of functioning, and the patient will take all medications as prescribed.  The patient/guardian verbalized understanding and agreement with goals that were mutually set.      TREATMENT PLAN: Continue supportive psychotherapy efforts and medications as indicated.  Pharmacological and Non-Pharmacological treatment options discussed during today's visit. Patient/Guardian acknowledged and verbally consented with current treatment plan and was educated on the importance of compliance with treatment and follow-up appointments.      MEDICATION ISSUES:  Discussed medication options and treatment plan of prescribed medication as well as the risks, benefits, any black box warnings, and side effects including  potential falls, possible impaired driving, and metabolic adversities among others. Patient is agreeable to call the office with any worsening of symptoms or onset of side effects, or if any concerns or questions arise.  The contact information for the office is made available to the patient. Patient is agreeable to call 911 or go to the nearest ER should they begin having any SI/HI, or if any urgent concerns arise. No medication side effects or related complaints today.     MEDS ORDERED DURING VISIT:  New Medications Ordered This Visit   Medications    venlafaxine (EFFEXOR) 100 MG tablet     Sig: Take 1 tablet by mouth 2 (Two) Times a Day.     Dispense:  60 tablet     Refill:  3     D/C previous refills    QUEtiapine (SEROquel) 25 MG tablet     Sig: Take 1 tablet by mouth Daily.     Dispense:  30 tablet     Refill:  3    QUEtiapine (SEROquel) 200 MG tablet     Sig: Take 1 tablet by mouth Every Night.     Dispense:  30 tablet     Refill:  3     Plan:  - Continue Seroquel to 225 mg p.o. at night for paranoid thoughts and sleep disturbance/anxiety.   - Continue Effexor 100mg PO BID. He felt that Effexor  was ineffective for the duration of the day.  - Follow-up in 3 months.  -Patient verbalizes understanding to go to nearest ED if SI/HI develop.    Follow Up Appointment:   Return in about 3 months (around 9/3/2025), or if symptoms worsen or fail to improve.             This document has been electronically signed by EILEEN Lovell  Pratibha 3, 2025 14:29 EDT    Dictated Utilizing Dragon Dictation: Part of this note may be an electronic transcription/translation of spoken language to printed text using the Dragon Dictation System.

## (undated) DEVICE — DEFENDO AIR WATER SUCTION AND BIOPSY VALVE KIT FOR  OLYMPUS: Brand: DEFENDO AIR/WATER/SUCTION AND BIOPSY VALVE

## (undated) DEVICE — COVER,LIGHT HANDLE,FLX,1/PK: Brand: MEDLINE INDUSTRIES, INC.

## (undated) DEVICE — GOWN,REINF,POLY,ECL,PP SLV,XL: Brand: MEDLINE

## (undated) DEVICE — DRSNG WND BORDR/ADHS NONADHR/GZ LF 4X14IN STRL

## (undated) DEVICE — UNDYED BRAIDED (POLYGLACTIN 910), SYNTHETIC ABSORBABLE SUTURE: Brand: COATED VICRYL

## (undated) DEVICE — NEEDLE, QUINCKE, 18GX3.5": Brand: MEDLINE

## (undated) DEVICE — SYR LUERLOK 30CC

## (undated) DEVICE — SUT ETHLN 2/0 PS 18IN 585H

## (undated) DEVICE — PK LAP LASR CHOLE 10

## (undated) DEVICE — GOWN,PREVENTION PLUS,XXLARGE,STERILE: Brand: MEDLINE

## (undated) DEVICE — FRCP BX RADJAW4 NDL 2.8 240CM LG OG BX40

## (undated) DEVICE — THE BITE BLOCK MAXI, LATEX FREE STRAP IS USED TO PROTECT THE ENDOSCOPE INSERTION TUBE FROM BEING BITTEN BY THE PATIENT.

## (undated) DEVICE — ENDOCUT SCISSOR TIP, DISPOSABLE: Brand: RENEW

## (undated) DEVICE — JELLY,LUBE,STERILE,FLIP TOP,TUBE,4-OZ: Brand: MEDLINE

## (undated) DEVICE — INTENDED USE FOR SURGICAL MARKING ON INTACT SKIN, ALSO PROVIDES A PERMANENT METHOD OF IDENTIFYING OBJECTS IN THE OPERATING ROOM: Brand: WRITESITE® REGULAR TIP SKIN MARKER

## (undated) DEVICE — Device: Brand: DEFENDO AIR/WATER/SUCTION AND BIOPSY VALVE

## (undated) DEVICE — DRN PENRS 1/2X18IN LTX

## (undated) DEVICE — SUT SILK 3/0 SH CR8 18IN C013D

## (undated) DEVICE — 2, DISPOSABLE SUCTION/IRRIGATOR WITH DISPOSABLE TIP: Brand: STRYKEFLOW

## (undated) DEVICE — ANTIBACTERIAL UNDYED BRAIDED (POLYGLACTIN 910), SYNTHETIC ABSORBABLE SUTURE: Brand: COATED VICRYL

## (undated) DEVICE — PK EXTREM UPPR 70

## (undated) DEVICE — CLIP APPLIER: Brand: ENDO CLIP

## (undated) DEVICE — SINGLE PORT MANIFOLD: Brand: NEPTUNE 2

## (undated) DEVICE — GLV SURG PREMIERPRO MIC LTX PF SZ7.5 BRN

## (undated) DEVICE — Device

## (undated) DEVICE — ENDOPATH XCEL UNIVERSAL TROCAR STABLILITY SLEEVES: Brand: ENDOPATH XCEL

## (undated) DEVICE — SUT PROLN 2/0 PC3 8833H

## (undated) DEVICE — TROCAR: Brand: KII FIOS FIRST ENTRY

## (undated) DEVICE — JP PERF DRN SIL FLT 10MM FULL: Brand: CARDINAL HEALTH

## (undated) DEVICE — CLTH CLENS READYCLEANSE PERI CARE PK/5

## (undated) DEVICE — SAFESECURE,SECUREMENT,FOLEY CATH,STERILE: Brand: MEDLINE

## (undated) DEVICE — CONN Y IRR DISP 1P/U

## (undated) DEVICE — GLV SURG SENSICARE PI MIC PF SZ7 LF STRL

## (undated) DEVICE — 3M™ IOBAN™ 2 ANTIMICROBIAL INCISE DRAPE 6651EZ: Brand: IOBAN™ 2

## (undated) DEVICE — BNDR ABD PREMIUM/UNIV 10IN 27TO48IN

## (undated) DEVICE — VISUALIZATION SYSTEM: Brand: CLEARIFY

## (undated) DEVICE — SUT VIC 2/0 TIES 18IN J111T

## (undated) DEVICE — TROCAR: Brand: KII SLEEVE

## (undated) DEVICE — TBG PENCL TELESCP MEGADYNE SMOKE EVAC 10FT

## (undated) DEVICE — SUT PDS LP 1 TP1 96IN VIO PDP880GA

## (undated) DEVICE — ENDOPATH XCEL BLADELESS TROCARS WITH STABILITY SLEEVES: Brand: ENDOPATH XCEL

## (undated) DEVICE — DRAPE,UTILTY,TAPE,15X26, 4EA/PK: Brand: MEDLINE

## (undated) DEVICE — TUBING, SUCTION, 1/4" X 20', STRAIGHT: Brand: MEDLINE INDUSTRIES, INC.

## (undated) DEVICE — DALE TRACHEOSTOMY TUBE HOLDER, 1" WIDE, FITS UP TO 19.5" NECK.: Brand: DALE 240 BLUE

## (undated) DEVICE — TOWEL,OR,DSP,ST,BLUE,STD,4/PK,20PK/CS: Brand: MEDLINE

## (undated) DEVICE — SPNG DRN AMD EXCILON 6PLY 4X4IN PK/2

## (undated) DEVICE — SUT SILK 2/0 SH 30IN K833H

## (undated) DEVICE — MARYLAND JAW LAPAROSCOPIC SEALER/DIVIDER COATED: Brand: LIGASURE

## (undated) DEVICE — SUT SILK 2/0 TIES 18IN A185H

## (undated) DEVICE — HOLDER: Brand: DEROYAL

## (undated) DEVICE — INTENDED FOR TISSUE SEPARATION, AND OTHER PROCEDURES THAT REQUIRE A SHARP SURGICAL BLADE TO PUNCTURE OR CUT.: Brand: BARD-PARKER ® CARBON RIB-BACK BLADES

## (undated) DEVICE — GLV SURG SENSICARE MICRO PF LF 7.5 STRL

## (undated) DEVICE — SUT SILK 3/0 TIES 18IN A184H

## (undated) DEVICE — SPNG GZ WOVN 4X4IN 12PLY 10/BX STRL

## (undated) DEVICE — PENCL E/S HNDSWCH ROCKRBTN HOLSTR 10FT

## (undated) DEVICE — CVR HNDL LIGHT RIGID

## (undated) DEVICE — PDS II VLT 0 107CM AG ST3: Brand: ENDOLOOP

## (undated) DEVICE — [HIGH FLOW INSUFFLATOR,  DO NOT USE IF PACKAGE IS DAMAGED,  KEEP DRY,  KEEP AWAY FROM SUNLIGHT,  PROTECT FROM HEAT AND RADIOACTIVE SOURCES.]: Brand: PNEUMOSURE

## (undated) DEVICE — PK HD AND NK 70

## (undated) DEVICE — SUT PDS 1 TP1 48IN Z880G BX/12

## (undated) DEVICE — HARMONIC HD 1000I SHEARS, 20CM SHAFT LENGTH: Brand: HARMONIC

## (undated) DEVICE — ENCORE® LATEX MICRO SIZE 7, STERILE LATEX POWDER-FREE SURGICAL GLOVE: Brand: ENCORE

## (undated) DEVICE — GLV SURG SENSICARE MICRO PF LF 7 STRL

## (undated) DEVICE — DRAPE,TOP,102X53,STERILE: Brand: MEDLINE

## (undated) DEVICE — SUT ETHLN 3/0 FS1 663G

## (undated) DEVICE — BNDG ELAS CO-FLEX SLF ADHR 4IN5YD LF STRL

## (undated) DEVICE — GLV SURG PREMIERPRO MIC LTX PF SZ8 BRN

## (undated) DEVICE — SKIN AFFIX SURG ADHESIVE 72/CS 0.55ML: Brand: MEDLINE

## (undated) DEVICE — SUT VIC 0 UR5 27IN VCP376H

## (undated) DEVICE — TUBING, SUCTION, 3/16" X 10', STRAIGHT: Brand: MEDLINE

## (undated) DEVICE — GLV SURG SENSICARE PI MIC PF SZ7.5 LF STRL

## (undated) DEVICE — JACKSON-PRATT 100CC BULB RESERVOIR: Brand: CARDINAL HEALTH

## (undated) DEVICE — BNDG ELAS MATRX  2IN 5YD LF STRL

## (undated) DEVICE — ARM SLING: Brand: DEROYAL

## (undated) DEVICE — TOTAL TRAY, 16FR 10ML SIL FOLEY, URN: Brand: MEDLINE

## (undated) DEVICE — PK LAP GEN 70

## (undated) DEVICE — DRP C/ARM W/BAND W/CLIPS 41X74IN

## (undated) DEVICE — SUT VIC 3/0 SH 36IN J527H

## (undated) DEVICE — ENCORE® LATEX MICRO SIZE 7.5, STERILE LATEX POWDER-FREE SURGICAL GLOVE: Brand: ENCORE

## (undated) DEVICE — TRAP,MUCUS SPECIMEN,40CC: Brand: MEDLINE

## (undated) DEVICE — ST CATH CHOLANG WKARLAN/BALN 2LUM 4F 1.25

## (undated) DEVICE — INSUFFLATION NEEDLE TO ESTABLISH PNEUMOPERITONEUM.: Brand: INSUFFLATION NEEDLE

## (undated) DEVICE — ENDOPOUCH RETRIEVER SPECIMEN RETRIEVAL BAGS: Brand: ENDOPOUCH RETRIEVER

## (undated) DEVICE — ELECTRD BLD EDGE/INSUL1P SFTY SLV 2.75IN

## (undated) DEVICE — DBD-DRAPE,LAP,CHOLE,W/TROUGHS,STERILE: Brand: MEDLINE

## (undated) DEVICE — PENCL E/S HNDSWCH PUSHBTN HOLSTR 10FT

## (undated) DEVICE — APPL CHLORAPREP W/TINT 26ML BLU

## (undated) DEVICE — SYR LL TP 10ML STRL

## (undated) DEVICE — DISPOSABLE TOURNIQUET CUFF SINGLE BLADDER, SINGLE PORT AND LUER LOCK CONNECTOR: Brand: COLOR CUFF

## (undated) DEVICE — BNDG ADHS CURAD FLX/FABRC 2X4IN STRL LF

## (undated) DEVICE — SUT SILK 2/0 SH CR8 18IN CR8 C012D

## (undated) DEVICE — DRSNG WND GZ CURAD OIL EMULSION 3X3IN STRL

## (undated) DEVICE — SUT VIC 3/0 TIES J104T

## (undated) DEVICE — LEX GENERAL ABDOMINAL SPLIT: Brand: MEDLINE INDUSTRIES, INC.

## (undated) DEVICE — POOLE SUCTION INSTRUMENT WITH REMOVABLE SHEATH: Brand: POOLE